# Patient Record
Sex: FEMALE | Race: WHITE | NOT HISPANIC OR LATINO | Employment: FULL TIME | URBAN - METROPOLITAN AREA
[De-identification: names, ages, dates, MRNs, and addresses within clinical notes are randomized per-mention and may not be internally consistent; named-entity substitution may affect disease eponyms.]

---

## 2017-01-18 ENCOUNTER — OFFICE VISIT (OUTPATIENT)
Dept: INTERNAL MEDICINE | Facility: CLINIC | Age: 62
End: 2017-01-18
Payer: COMMERCIAL

## 2017-01-18 ENCOUNTER — HOSPITAL ENCOUNTER (OUTPATIENT)
Dept: RADIOLOGY | Facility: HOSPITAL | Age: 62
Discharge: HOME | End: 2017-01-18
Attending: INTERNAL MEDICINE
Payer: COMMERCIAL

## 2017-01-18 VITALS
SYSTOLIC BLOOD PRESSURE: 134 MMHG | BODY MASS INDEX: 44.31 KG/M2 | HEIGHT: 67 IN | OXYGEN SATURATION: 97 % | WEIGHT: 282.31 LBS | HEART RATE: 84 BPM | DIASTOLIC BLOOD PRESSURE: 72 MMHG

## 2017-01-18 DIAGNOSIS — E11.40 TYPE 2 DIABETES MELLITUS WITH DIABETIC NEUROPATHY, WITHOUT LONG-TERM CURRENT USE OF INSULIN: ICD-10-CM

## 2017-01-18 DIAGNOSIS — L97.521 FOOT ULCER, LEFT, LIMITED TO BREAKDOWN OF SKIN: ICD-10-CM

## 2017-01-18 DIAGNOSIS — I10 ESSENTIAL HYPERTENSION: ICD-10-CM

## 2017-01-18 DIAGNOSIS — L97.521 FOOT ULCER, LEFT, LIMITED TO BREAKDOWN OF SKIN: Primary | ICD-10-CM

## 2017-01-18 PROCEDURE — 73630 X-RAY EXAM OF FOOT: CPT | Mod: 26,LT,, | Performed by: RADIOLOGY

## 2017-01-18 PROCEDURE — 99999 PR PBB SHADOW E&M-EST. PATIENT-LVL III: CPT | Mod: PBBFAC,,, | Performed by: INTERNAL MEDICINE

## 2017-01-18 PROCEDURE — 99214 OFFICE O/P EST MOD 30 MIN: CPT | Mod: S$GLB,,, | Performed by: INTERNAL MEDICINE

## 2017-01-18 PROCEDURE — 73630 X-RAY EXAM OF FOOT: CPT | Mod: TC,PO,LT

## 2017-01-18 RX ORDER — AMOXICILLIN AND CLAVULANATE POTASSIUM 875; 125 MG/1; MG/1
1 TABLET, FILM COATED ORAL 2 TIMES DAILY
Qty: 20 TABLET | Refills: 0 | Status: SHIPPED | OUTPATIENT
Start: 2017-01-18 | End: 2017-01-28

## 2017-01-18 NOTE — PROGRESS NOTES
REASON FOR VISIT:  This is a 61-year-old female who five days ago was getting a   pedicure and the person was using a file, but apparently it caused a cut   underneath her left foot, fifth metatarsal.  Since then, she sees a little bit   of ulceration and redness surrounding it and pain.    She has type 2 diabetes and hypertension.  She does not check her blood glucose   readings.  Diabetes was under fair control, last one being hemoglobin A1c of   7.4.    MEDICATIONS:  Per MedCard, which include metformin 500 mg two twice a day.    PHYSICAL EXAMINATION:  VITAL SIGNS:  Weight is 282, pulse 84, blood pressure by me 134/72.  EXTREMITIES:  On evaluation of the feet, there is no deformity.  She has 1+   posterior tibial and dorsalis pedis pulses.  Sensation is slightly diminished to   monofilament; there is about a 2 mm ulceration on the plantar surface of the   left foot fifth metatarsal and surrounding hyperemia.    IMPRESSION:  1.  Diabetic foot ulcer with cellulitis.  2.  Type 2 diabetes with probable peripheral neuropathy.    PLAN:  Today we will get a CBC, chemistry, hemoglobin A1c, foot x-ray, and refer   to Podiatry.  We will start her on Augmentin 875 mg twice a day for the next 10   days.    /sc [id number] review        JAM/DARRYL  dd: 01/18/2017 16:18:01 (CST)  td: 01/19/2017 07:18:33 (CST)  Doc ID   #[id number]  Job ID #[id number]    CC:

## 2017-01-18 NOTE — MR AVS SNAPSHOT
[facility] [address]  [address]  [address]  [facility] [address]  Phone: [phone]                  Meryl Johnson   2017 4:00 PM   Office Visit    Description:  Female : 1955   Provider:  Russell Martinez MD   Department:  [facility] [address]           Reason for Visit     Foot Injury           Diagnoses this Visit        Comments    Foot ulcer, left, limited to breakdown of skin    -  Primary     Type 2 diabetes mellitus with diabetic neuropathy, without long-term current use of insulin         Essential hypertension                To Do List           Future Appointments        Provider Department Dept Phone    2017 3:00 PM Jun Modi DPM [facility] - Podiatry [phone]      Goals (5 Years of Data)     None       These Medications        Disp Refills Start End    amoxicillin-clavulanate 875-125mg (AUGMENTIN) 875-125 mg per tablet 20 tablet 0 2017    Take 1 tablet by mouth 2 (two) times daily. - Oral    Pharmacy: [facility] - [place], [place] - [address] Ph #: [phone]         [facility]     [facility] -  Assistance  Registered nurses in the [facility] Center provide clinical advisement, health education, appointment booking, and other advisory services.  Call for this free service at [phone].             Medications           Message regarding Medications     Verify the changes and/or additions to your medication regime listed below are the same as discussed with your clinician today.  If any of these changes or additions are incorrect, please notify your healthcare provider.        START taking these NEW medications        Refills    amoxicillin-clavulanate 875-125mg (AUGMENTIN) 875-125 mg per tablet 0    Sig: Take 1 tablet by mouth 2 (two) times daily.    Class: Normal    Route: Oral           Verify that the below list of  "medications is an accurate representation of the medications you are currently taking.  If none reported, the list may be blank. If incorrect, please contact your healthcare provider. Carry this list with you in case of emergency.           Current Medications     atorvastatin (LIPITOR) 40 MG tablet TAKE ONE TABLET BY MOUTH EVERY DAY    benazepril (LOTENSIN) 40 MG tablet TAKE 1 TABLET BY MOUTH EVERY DAY.    blood sugar diagnostic (BLOOD GLUCOSE TEST) Strp 1 strip by Misc.(Non-Drug; Combo Route) route 2 (two) times daily before meals. For mikki contour    chlorthalidone (HYGROTEN) 25 MG Tab TAKE ONE TABLET BY MOUTH EVERY DAY    ergocalciferol (ERGOCALCIFEROL) 50,000 unit Cap TAKE 1 CAPSULE BY MOUTH EVERY 7 DAYS    lancets (ONE TOUCH DELICA LANCETS) 33 gauge Misc 1 lancet by Misc.(Non-Drug; Combo Route) route 2 (two) times daily.    metformin (GLUCOPHAGE) 500 MG tablet TAKE 2 TABLETS (1,000 MG TOTAL) BY MOUTH 2 (TWO) TIMES DAILY WITH MEALS.    amoxicillin-clavulanate 875-125mg (AUGMENTIN) 875-125 mg per tablet Take 1 tablet by mouth 2 (two) times daily.    liraglutide 0.6 mg/0.1 mL, 18 mg/3 mL, subq PNIJ 0.6 mg/0.1 mL (18 mg/3 mL) PnIj Inject 0.6 mg into the skin once daily.           Clinical Reference Information           Vital Signs - Last Recorded  Most recent update: 1/18/2017  4:18 PM by Russell Martinez MD    BP Pulse Ht Wt SpO2 BMI    134/72 84 5' 7" (1.702 m) 128.1 kg (282 lb 4.8 oz) 97% 44.21 kg/m2      Blood Pressure          Most Recent Value    BP  134/72      Allergies as of 1/18/2017     Codeine    Vicodin [Hydrocodone-acetaminophen]      Immunizations Administered on Date of Encounter - 1/18/2017     None      Orders Placed During Today's Visit      Normal Orders This Visit    Ambulatory Referral to Podiatry     Future Labs/Procedures Expected by Expires    Basic metabolic panel  1/18/2017 1/18/2018    CBC auto differential  1/18/2017 1/18/2018    Hemoglobin A1c  1/18/2017 1/18/2018    X-Ray Foot " Complete 3 view Left  1/18/2017 1/18/2018

## 2017-01-19 ENCOUNTER — OFFICE VISIT (OUTPATIENT)
Dept: PODIATRY | Facility: CLINIC | Age: 62
End: 2017-01-19
Payer: COMMERCIAL

## 2017-01-19 ENCOUNTER — PATIENT MESSAGE (OUTPATIENT)
Dept: INTERNAL MEDICINE | Facility: CLINIC | Age: 62
End: 2017-01-19

## 2017-01-19 VITALS
DIASTOLIC BLOOD PRESSURE: 67 MMHG | BODY MASS INDEX: 44.89 KG/M2 | SYSTOLIC BLOOD PRESSURE: 126 MMHG | HEIGHT: 67 IN | HEART RATE: 89 BPM | WEIGHT: 286 LBS

## 2017-01-19 DIAGNOSIS — L03.119 CELLULITIS OF FOOT EXCLUDING TOE: ICD-10-CM

## 2017-01-19 DIAGNOSIS — L97.521: Primary | ICD-10-CM

## 2017-01-19 DIAGNOSIS — E11.40 TYPE 2 DIABETES MELLITUS WITH DIABETIC NEUROPATHY, WITHOUT LONG-TERM CURRENT USE OF INSULIN: ICD-10-CM

## 2017-01-19 PROCEDURE — 97597 DBRDMT OPN WND 1ST 20 CM/<: CPT | Mod: S$GLB,,, | Performed by: PODIATRIST

## 2017-01-19 PROCEDURE — 99204 OFFICE O/P NEW MOD 45 MIN: CPT | Mod: 25,S$GLB,, | Performed by: PODIATRIST

## 2017-01-19 PROCEDURE — 87070 CULTURE OTHR SPECIMN AEROBIC: CPT

## 2017-01-19 PROCEDURE — 87075 CULTR BACTERIA EXCEPT BLOOD: CPT

## 2017-01-19 PROCEDURE — 99999 PR PBB SHADOW E&M-EST. PATIENT-LVL III: CPT | Mod: PBBFAC,,, | Performed by: PODIATRIST

## 2017-01-19 NOTE — LETTER
January 19, 2017      Russell Martinez MD  [address]  [place] [address]           [facility] - Podiatry  [address]  [place] [address]  Phone: [phone]          Patient: Meryl Johnson   MR Number: [id number]   YOB: 1955   Date of Visit: 1/19/2017       Dear Dr. Russell Martinez:    Thank you for referring Meryl Johnson to me for evaluation. Attached you will find relevant portions of my assessment and plan of care.    If you have questions, please do not hesitate to call me. I look forward to following Meryl Johnson along with you.    Sincerely,    Jun Modi, JOSE ANTONIO    Enclosure  CC:  No Recipients    If you would like to receive this communication electronically, please contact [email or web address] or [phone] to request more information on [employer] Link access.    For providers and/or their staff who would like to refer a patient to [facility], please contact us through our one-stop-shop provider referral line, [facility], at [phone].    If you feel you have received this communication in error or would no longer like to receive these types of communications, please e-mail [email or web address]

## 2017-01-19 NOTE — PROGRESS NOTES
Subjective:      Patient ID: Meryl Johnson is a 61 y.o. female.    Chief Complaint: Foot Problem (nail infection)  Patient presents to clinic out of concern for a wound of the Lt. Plantar foot.  Relates receiving a pedicure last week that resulted in the current wound after a period of aggressive filing of callused skin.  States the wound has not healed appropriately and subsequently became red, hot, and swollen.  States this prompted a visit to her PCP who started her on Augmentin.  States the wound remains painful and rates as a 4/10.  States symptoms are exacerbated with direct weight bearing and alleviated with rest.  Has attempted to self treat by cleansing with peroxide and covering with neosporin and a bandage.  Denies having N/V/F/C/D.  Denies any additional pedal complaints.       Past Medical History   Diagnosis Date    Diabetes mellitus type II     H. pylori infection     Hyperlipidemia     Hypertension     Unspecified vitamin D deficiency 4/24/2013       Past Surgical History   Procedure Laterality Date    Gastric bypass         Family History   Problem Relation Age of Onset    COPD Mother     Heart disease Mother     Cancer Father      liver    Heart disease Father     Cancer Sister     Diabetes Sister     Hyperlipidemia Sister     Hypothyroidism Sister     Amblyopia Neg Hx     Blindness Neg Hx     Cataracts Neg Hx     Glaucoma Neg Hx     Hypertension Neg Hx     Macular degeneration Neg Hx     Retinal detachment Neg Hx     Strabismus Neg Hx     Stroke Neg Hx     Thyroid disease Neg Hx     Breast cancer Neg Hx     Colon cancer Neg Hx     Ovarian cancer Neg Hx     No Known Problems Brother     No Known Problems Maternal Aunt     No Known Problems Maternal Uncle     No Known Problems Paternal Aunt     No Known Problems Paternal Uncle     No Known Problems Maternal Grandmother     No Known Problems Maternal Grandfather     No Known Problems Paternal Grandmother     No  Known Problems Paternal Grandfather        Social History     Social History    Marital status: Single     Spouse name: N/A    Number of children: N/A    Years of education: N/A     Occupational History     [employer]     Social History Main Topics    Smoking status: Never Smoker    Smokeless tobacco: Never Used    Alcohol use No    Drug use: No    Sexual activity: Not Asked     Other Topics Concern    None     Social History Narrative       Current Outpatient Prescriptions   Medication Sig Dispense Refill    amoxicillin-clavulanate 875-125mg (AUGMENTIN) 875-125 mg per tablet Take 1 tablet by mouth 2 (two) times daily. 20 tablet 0    atorvastatin (LIPITOR) 40 MG tablet TAKE ONE TABLET BY MOUTH EVERY DAY 30 tablet 11    benazepril (LOTENSIN) 40 MG tablet TAKE 1 TABLET BY MOUTH EVERY DAY. 30 tablet 11    blood sugar diagnostic (BLOOD GLUCOSE TEST) Strp 1 strip by Misc.(Non-Drug; Combo Route) route 2 (two) times daily before meals. For mikki contour 200 strip 3    chlorthalidone (HYGROTEN) 25 MG Tab TAKE ONE TABLET BY MOUTH EVERY DAY 30 tablet 11    ergocalciferol (ERGOCALCIFEROL) 50,000 unit Cap TAKE 1 CAPSULE BY MOUTH EVERY 7 DAYS 12 capsule 3    lancets (ONE TOUCH DELICA LANCETS) 33 gauge Misc 1 lancet by Misc.(Non-Drug; Combo Route) route 2 (two) times daily. 60 each 11    metformin (GLUCOPHAGE) 500 MG tablet TAKE 2 TABLETS (1,000 MG TOTAL) BY MOUTH 2 (TWO) TIMES DAILY WITH MEALS. 360 tablet 3    liraglutide 0.6 mg/0.1 mL, 18 mg/3 mL, subq PNIJ 0.6 mg/0.1 mL (18 mg/3 mL) PnIj Inject 0.6 mg into the skin once daily. 3 mL 11     Current Facility-Administered Medications   Medication Dose Route Frequency Provider Last Rate Last Dose    cyanocobalamin injection 1,000 mcg  1,000 mcg Intramuscular Q30 Days Russell Martinez MD   1,000 mcg at 10/11/16 1619       Review of patient's allergies indicates:   Allergen Reactions    Codeine Nausea Only    Vicodin [hydrocodone-acetaminophen]  Nausea Only       Review of Systems   Constitution: Negative for chills and fever.   Cardiovascular: Negative for claudication and leg swelling.   Skin: Positive for color change, nail changes and poor wound healing.   Musculoskeletal: Positive for joint pain. Negative for arthritis and joint swelling.   Neurological: Negative for numbness and paresthesias.   Psychiatric/Behavioral: Negative for altered mental status.           Objective:      Physical Exam   Constitutional: She is oriented to person, place, and time. She appears well-developed and well-nourished. No distress.   Cardiovascular:   Pulses:       Dorsalis pedis pulses are 2+ on the right side, and 2+ on the left side.        Posterior tibial pulses are 1+ on the right side, and 1+ on the left side.   CFT <3 seconds bilateral.  Pedal hair growth present bilateral.   Varicosities noted bilateral.  Mild localized edema noted to the Lt. 5th mtp joint.   Toes are warm to touch bilateral.     Musculoskeletal: She exhibits edema and tenderness.   Muscle strength 5/5 in all muscle groups bilateral.  No tenderness nor crepitation with ROM of foot/ankle joints bilateral.  Bilateral pes planus foot type.  Pain with palpation to the Lt. Plantar lateral 5th mtp joint wound.     Neurological: She is alert and oriented to person, place, and time. She has normal strength. A sensory deficit is present.   Decreased protective sensation bilateral.    Skin: Skin is warm and dry. No abrasion, no bruising, no burn, no ecchymosis, no laceration, no lesion, no petechiae and no rash noted. She is not diaphoretic. There is erythema. No pallor.   Open wound noted to the plantar lateral aspect of the Lt. Sub 5th met head.  Wound base is superficial and comprised entirely of fibrin.  Lucila wound is mildly erythematous and edematous.  Lucila wound is devoid of fluctuance, purulence, and malodor.  Measures 0.4 x 0.4 x 0.1cm.               Assessment:       Encounter Diagnoses   Name  Primary?    Type 2 diabetes mellitus with diabetic neuropathy, without long-term current use of insulin     Traumatic ulcer of left foot limited to breakdown of skin Yes    Cellulitis of foot excluding toe          Plan:       Meryl was seen today for foot problem.    Diagnoses and all orders for this visit:    Traumatic ulcer of left foot limited to breakdown of skin  -     Aerobic culture (Specify Source)  -     CULTURE, ANAEROBE    Type 2 diabetes mellitus with diabetic neuropathy, without long-term current use of insulin    Cellulitis of foot excluding toe  -     Aerobic culture (Specify Source)  -     CULTURE, ANAEROBE      I counseled the patient on her conditions, their implications and medical management.    Reviewed x-ray of the Lt. Foot.  No definitive signs of osteolysis noted to the Lt. 5th metatarsal head.      Discussed with patient the need for a selective excisional debridement of the Lt. Foot wound. Reviewed pertinent risks, benefits, procedure, and follow up care to which all questions were thoroughly answered. Consent was read, signed, and witnessed. See attached procedure note.      Orders written for cultures of the wound.    Iodosorb ointment was applied to the wound. The site was offloaded with roxanna foam, and a football dressing was then applied.      Instructed to keep the dressing clean, dry, and intact x 1 week.      Fitted and dispensed a postoperative shoe to help offload the ulceration site.      Instructed to rest and elevate bilateral lower extremity.      Discussed the importance of adequate protein intake and hydration as they pertain to wound healing.    Advised to continue current course of Augmentin.     RTC in 1 week for a wound check.    Jun Modi DPM

## 2017-01-20 NOTE — PROCEDURES
"Wound Debridement  Date/Time: 1/19/2017 3:00 PM  Performed by: JOSEPH DIAZ  Authorized by: JOSEPH DIAZ     Time out: Immediately prior to procedure a "time out" was called to verify the correct patient, procedure, equipment, support staff and site/side marked as required.    Consent Done?:  Yes (Written)    Preparation: Patient was prepped and draped in usual sterile fashion    Local anesthesia used?: No      Wound Details:    Location:  Left foot    Location:  Left 5th Metatarsal Head    Type of Debridement:  Excisional       Length (cm):  0.4       Area (sq cm):  0.16       Width (cm):  0.4       Percent Debrided (%):  100       Depth (cm):  0.1       Total Area Debrided (sq cm):  0.16    Depth of debridement:  Epidermis/Dermis    Tissue debrided:  Dermis    Devitalized tissue debrided:  Fibrin    Instruments:  Blade    Bleeding:  Minimal  Hemostasis Achieved: Yes    Method Used:  Pressure  Patient tolerance:  Patient tolerated the procedure well with no immediate complications      "

## 2017-01-23 LAB — BACTERIA SPEC AEROBE CULT: NORMAL

## 2017-01-26 ENCOUNTER — OFFICE VISIT (OUTPATIENT)
Dept: PODIATRY | Facility: CLINIC | Age: 62
End: 2017-01-26
Payer: COMMERCIAL

## 2017-01-26 VITALS
SYSTOLIC BLOOD PRESSURE: 103 MMHG | BODY MASS INDEX: 45.17 KG/M2 | HEART RATE: 88 BPM | HEIGHT: 67 IN | DIASTOLIC BLOOD PRESSURE: 61 MMHG | WEIGHT: 287.81 LBS

## 2017-01-26 DIAGNOSIS — E11.40 TYPE 2 DIABETES MELLITUS WITH DIABETIC NEUROPATHY, WITHOUT LONG-TERM CURRENT USE OF INSULIN: ICD-10-CM

## 2017-01-26 DIAGNOSIS — L97.521: Primary | ICD-10-CM

## 2017-01-26 LAB — BACTERIA SPEC ANAEROBE CULT: NORMAL

## 2017-01-26 PROCEDURE — 99499 UNLISTED E&M SERVICE: CPT | Mod: S$GLB,,, | Performed by: PODIATRIST

## 2017-01-26 PROCEDURE — 99999 PR PBB SHADOW E&M-EST. PATIENT-LVL III: CPT | Mod: PBBFAC,,, | Performed by: PODIATRIST

## 2017-01-26 PROCEDURE — 97597 DBRDMT OPN WND 1ST 20 CM/<: CPT | Mod: S$GLB,,, | Performed by: PODIATRIST

## 2017-01-26 NOTE — PROGRESS NOTES
Subjective:      Patient ID: Meryl Johnson is a 61 y.o. female.    Chief Complaint: Foot Ulcer (left ft.); Follow-up; Wound Care; Dressing Change; and Diabetes Mellitus  Patient presents to clinic for a 1 week wound check.  Relates mild pain to the wound of the Lt. Plantar foot, however, states symptoms are only a 2/10 with today's visit.  Has kept the previous clinic dressing clean, dry, and intact.  Relates ambulation to tolerance in the postoperative shoe.  Denies having N/V/F/C/D.  Denies any additional pedal complaints.     Review of Systems   Constitution: Negative for chills and fever.   Cardiovascular: Negative for claudication and leg swelling.   Skin: Positive for color change, nail changes and poor wound healing.   Musculoskeletal: Positive for joint pain. Negative for arthritis and joint swelling.   Neurological: Negative for numbness and paresthesias.   Psychiatric/Behavioral: Negative for altered mental status.           Objective:      Physical Exam   Constitutional: She is oriented to person, place, and time. She appears well-developed and well-nourished. No distress.   Cardiovascular:   Pulses:       Dorsalis pedis pulses are 2+ on the right side, and 2+ on the left side.        Posterior tibial pulses are 1+ on the right side, and 1+ on the left side.   CFT <3 seconds bilateral.  Pedal hair growth present bilateral.   Varicosities noted bilateral.  Toes are warm to touch bilateral.     Musculoskeletal: She exhibits tenderness. She exhibits no edema.   Muscle strength 5/5 in all muscle groups bilateral.  No tenderness nor crepitation with ROM of foot/ankle joints bilateral.  Bilateral pes planus foot type.  Mild pain with palpation to the Lt. Plantar lateral 5th mtp joint wound.     Neurological: She is alert and oriented to person, place, and time. She has normal strength. A sensory deficit is present.   Decreased protective sensation bilateral.    Skin: Skin is warm and dry. Lesion noted. No  abrasion, no bruising, no burn, no ecchymosis, no laceration, no petechiae and no rash noted. She is not diaphoretic. No erythema. No pallor.   Open wound noted to the plantar lateral aspect of the Lt. Sub 5th met head.  Wound base is superficial and comprised entirely of fibrin.   Lucila wound is devoid of erythema, edema, fluctuance, purulence, and malodor.  Measures 0.2 x 0.2 x 0.1cm.               Assessment:       Encounter Diagnoses   Name Primary?    Type 2 diabetes mellitus with diabetic neuropathy, without long-term current use of insulin     Traumatic ulcer of left foot limited to breakdown of skin Yes         Plan:       Meryl was seen today for foot ulcer, follow-up, wound care, dressing change and diabetes mellitus.    Diagnoses and all orders for this visit:    Traumatic ulcer of left foot limited to breakdown of skin    Type 2 diabetes mellitus with diabetic neuropathy, without long-term current use of insulin      I counseled the patient on her conditions, their implications and medical management.    Performed a selective excisional debridement of the Lt. Foot wound.  See attached procedure note.     Mary was applied to the wound. The site was offloaded with roxanna foam, and a football dressing was then applied.      Instructed to keep the dressing clean, dry, and intact x 1 week.      Advised to continue wearing the postoperative shoe to help offload the ulceration site.      Instructed to rest and elevate bilateral lower extremity.      RTC in 1 week for a wound check.     Jun Modi DPM

## 2017-01-26 NOTE — LETTER
January 26, 2017      Russell Martinez MD  [address]  [address]  [facility] [address]           [facility] - Podiatry  [address]  [place] [address]  Phone: [phone]          Patient: Meryl Johnson   MR Number: [id number]   YOB: 1955   Date of Visit: 1/26/2017       Dear Dr. Russell Martinez:    Thank you for referring Meryl Johnson to me for evaluation. Attached you will find relevant portions of my assessment and plan of care.    If you have questions, please do not hesitate to call me. I look forward to following Meryl Johnson along with you.    Sincerely,    Jun Modi DPM    Enclosure  CC:  No Recipients    If you would like to receive this communication electronically, please contact [email or web address] or [phone] to request more information on [employer] Link access.    For providers and/or their staff who would like to refer a patient to [facility], please contact us through our one-stop-shop provider referral line, [facility], at [phone].    If you feel you have received this communication in error or would no longer like to receive these types of communications, please e-mail [email or web address]

## 2017-01-26 NOTE — MR AVS SNAPSHOT
[facility] - Podiatry  [address] [facility] [address]  Phone: [phone]                  Meryl Johnson   2017 1:40 PM   Office Visit    Description:  Female : 1955   Provider:  Jun Modi DPM   Department:  [facility]           Reason for Visit     Foot Ulcer     Follow-up     Wound Care     Dressing Change     Diabetes Mellitus           Diagnoses this Visit        Comments    Traumatic ulcer of left foot limited to breakdown of skin    -  Primary     Type 2 diabetes mellitus with diabetic neuropathy, without long-term current use of insulin                To Do List           Future Appointments        Provider Department Dept Phone    2017 7:40 AM Jun Modi DPM [facility] Podiatr [phone]      Goals (5 Years of Data)     None      Follow-Up and Disposition     Return in about 1 week (around 2017).      [facility] On Call     [facility] On Call Nurse [facility] Line -  Assistance  Registered nurses in the [facility] On Call Center provide clinical advisement, health education, appointment booking, and other advisory services.  Call for this free service at [phone].             Medications           Message regarding Medications     Verify the changes and/or additions to your medication regime listed below are the same as discussed with your clinician today.  If any of these changes or additions are incorrect, please notify your healthcare provider.             Verify that the below list of medications is an accurate representation of the medications you are currently taking.  If none reported, the list may be blank. If incorrect, please contact your healthcare provider. Carry this list with you in case of emergency.           Current Medications     amoxicillin-clavulanate 875-125mg (AUGMENTIN) 875-125 mg per tablet Take 1 tablet by mouth 2 (two) times daily.    atorvastatin (LIPITOR) 40 MG tablet TAKE ONE TABLET BY MOUTH EVERY DAY    benazepril  "(LOTENSIN) 40 MG tablet TAKE 1 TABLET BY MOUTH EVERY DAY.    blood sugar diagnostic (BLOOD GLUCOSE TEST) Strp 1 strip by Misc.(Non-Drug; Combo Route) route 2 (two) times daily before meals. For mikki contour    chlorthalidone (HYGROTEN) 25 MG Tab TAKE ONE TABLET BY MOUTH EVERY DAY    ergocalciferol (ERGOCALCIFEROL) 50,000 unit Cap TAKE 1 CAPSULE BY MOUTH EVERY 7 DAYS    lancets (ONE TOUCH DELICA LANCETS) 33 gauge Misc 1 lancet by Misc.(Non-Drug; Combo Route) route 2 (two) times daily.    metformin (GLUCOPHAGE) 500 MG tablet TAKE 2 TABLETS (1,000 MG TOTAL) BY MOUTH 2 (TWO) TIMES DAILY WITH MEALS.    liraglutide 0.6 mg/0.1 mL, 18 mg/3 mL, subq PNIJ 0.6 mg/0.1 mL (18 mg/3 mL) PnIj Inject 0.6 mg into the skin once daily.           Clinical Reference Information           Vital Signs - Last Recorded  Most recent update: 1/26/2017  1:53 PM by Deirdre Pickering MA    BP Pulse Ht Wt BMI    103/61 88 5' 7" (1.702 m) 130.5 kg (287 lb 12.8 oz) 45.08 kg/m2      Blood Pressure          Most Recent Value    BP  103/61      Allergies as of 1/26/2017     Codeine    Vicodin [Hydrocodone-acetaminophen]      Immunizations Administered on Date of Encounter - 1/26/2017     None      "

## 2017-01-27 NOTE — PROCEDURES
"Wound Debridement  Date/Time: 1/26/2017 1:40 PM  Performed by: JOSEPH DIAZ  Authorized by: JOSEPH DIAZ     Time out: Immediately prior to procedure a "time out" was called to verify the correct patient, procedure, equipment, support staff and site/side marked as required.    Consent Done?:  Yes (Verbal)    Preparation: Patient was prepped and draped in usual sterile fashion    Local anesthesia used?: No      Wound Details:    Location:  Left foot    Location:  Left 5th Metatarsal Head    Type of Debridement:  Excisional       Length (cm):  0.2       Area (sq cm):  0.04       Width (cm):  0.2       Percent Debrided (%):  100       Depth (cm):  0.1       Total Area Debrided (sq cm):  0.04    Depth of debridement:  Epidermis/Dermis    Tissue debrided:  Dermis    Devitalized tissue debrided:  Fibrin    Instruments:  Blade    Bleeding:  Minimal  Hemostasis Achieved: Yes    Method Used:  Pressure  Patient tolerance:  Patient tolerated the procedure well with no immediate complications      "

## 2017-01-28 ENCOUNTER — PATIENT MESSAGE (OUTPATIENT)
Dept: INTERNAL MEDICINE | Facility: CLINIC | Age: 62
End: 2017-01-28

## 2017-01-31 ENCOUNTER — PATIENT MESSAGE (OUTPATIENT)
Dept: INTERNAL MEDICINE | Facility: CLINIC | Age: 62
End: 2017-01-31

## 2017-01-31 DIAGNOSIS — N18.30 TYPE 2 DIABETES MELLITUS WITH STAGE 3 CHRONIC KIDNEY DISEASE, WITHOUT LONG-TERM CURRENT USE OF INSULIN: Primary | ICD-10-CM

## 2017-01-31 DIAGNOSIS — E11.22 TYPE 2 DIABETES MELLITUS WITH STAGE 3 CHRONIC KIDNEY DISEASE, WITHOUT LONG-TERM CURRENT USE OF INSULIN: Primary | ICD-10-CM

## 2017-02-01 NOTE — TELEPHONE ENCOUNTER
Ruma   Call patient to set up bmp and the urine test      She her phone numbers with her time schedule

## 2017-02-02 ENCOUNTER — OFFICE VISIT (OUTPATIENT)
Dept: PODIATRY | Facility: CLINIC | Age: 62
End: 2017-02-02
Payer: COMMERCIAL

## 2017-02-02 ENCOUNTER — PATIENT MESSAGE (OUTPATIENT)
Dept: INTERNAL MEDICINE | Facility: CLINIC | Age: 62
End: 2017-02-02

## 2017-02-02 VITALS
DIASTOLIC BLOOD PRESSURE: 70 MMHG | BODY MASS INDEX: 45.15 KG/M2 | SYSTOLIC BLOOD PRESSURE: 135 MMHG | HEART RATE: 92 BPM | HEIGHT: 67 IN | WEIGHT: 287.69 LBS

## 2017-02-02 DIAGNOSIS — N18.30 TYPE 2 DIABETES MELLITUS WITH STAGE 3 CHRONIC KIDNEY DISEASE, WITHOUT LONG-TERM CURRENT USE OF INSULIN: Primary | ICD-10-CM

## 2017-02-02 DIAGNOSIS — L97.521: Primary | ICD-10-CM

## 2017-02-02 DIAGNOSIS — E11.22 TYPE 2 DIABETES MELLITUS WITH STAGE 3 CHRONIC KIDNEY DISEASE, WITHOUT LONG-TERM CURRENT USE OF INSULIN: Primary | ICD-10-CM

## 2017-02-02 DIAGNOSIS — E11.40 TYPE 2 DIABETES MELLITUS WITH DIABETIC NEUROPATHY, WITHOUT LONG-TERM CURRENT USE OF INSULIN: ICD-10-CM

## 2017-02-02 PROCEDURE — 99999 PR PBB SHADOW E&M-EST. PATIENT-LVL III: CPT | Mod: PBBFAC,,, | Performed by: PODIATRIST

## 2017-02-02 PROCEDURE — 99213 OFFICE O/P EST LOW 20 MIN: CPT | Mod: S$GLB,,, | Performed by: PODIATRIST

## 2017-02-02 NOTE — PROGRESS NOTES
Subjective:      Patient ID: Meryl Johnson is a 61 y.o. female.    Chief Complaint: Foot Ulcer (left ft.); Follow-up (PCP Dr. Martinez 1/18/17); Wound Care; and Dressing Change  Patient presents to clinic for a 1 week wound check of the Lt. Foot.  Denies pain to the affected extremity with today's exam.  Has kept the previous clinic dressing clean, dry, and intact x 1 week.  Relates ambulation to tolerance in the postoperative shoe.  Denies having N/V/F/C/D.  Denies any additional pedal complaints.      Review of Systems   Constitution: Negative for chills and fever.   Cardiovascular: Negative for claudication and leg swelling.   Skin: Positive for color change, nail changes and poor wound healing.   Musculoskeletal: Negative for arthritis, joint pain and joint swelling.   Neurological: Negative for numbness and paresthesias.   Psychiatric/Behavioral: Negative for altered mental status.           Objective:      Physical Exam   Constitutional: She is oriented to person, place, and time. She appears well-developed and well-nourished. No distress.   Cardiovascular:   Pulses:       Dorsalis pedis pulses are 2+ on the right side, and 2+ on the left side.        Posterior tibial pulses are 1+ on the right side, and 1+ on the left side.   CFT <3 seconds bilateral.  Pedal hair growth present bilateral.   Varicosities noted bilateral.  Toes are warm to touch bilateral.     Musculoskeletal: She exhibits no edema or tenderness.   Muscle strength 5/5 in all muscle groups bilateral.  No tenderness nor crepitation with ROM of foot/ankle joints bilateral.  Bilateral pes planus foot type.  No pain with palpation of bilateral foot and ankle.   Neurological: She is alert and oriented to person, place, and time. She has normal strength. A sensory deficit is present.   Decreased protective sensation bilateral.    Skin: Skin is warm and dry. Lesion noted. No abrasion, no bruising, no burn, no ecchymosis, no laceration, no petechiae  and no rash noted. She is not diaphoretic. No erythema. No pallor.   Open wound noted to the plantar lateral aspect of the Lt. Sub 5th met head.  Wound base is comprised entirely of granulation.   Lucila wound is devoid of erythema, edema, fluctuance, purulence, and malodor.  Measures 0.1 x 0.1 x 0.1cm.               Assessment:       Encounter Diagnoses   Name Primary?    Traumatic ulcer of left foot limited to breakdown of skin Yes    Type 2 diabetes mellitus with diabetic neuropathy, without long-term current use of insulin          Plan:       Meryl was seen today for foot ulcer, follow-up, wound care and dressing change.    Diagnoses and all orders for this visit:    Traumatic ulcer of left foot limited to breakdown of skin    Type 2 diabetes mellitus with diabetic neuropathy, without long-term current use of insulin      I counseled the patient on her conditions, their implications and medical management.    No debridement of the Lt. Pedal wound warranted with today's exam.      Mary was applied to the wound. The site was offloaded with roxanna foam, and a football dressing was then applied.      Instructed to keep the dressing clean, dry, and intact x 1 week.      Advised to continue wearing the postoperative shoe to help offload the ulceration site.      Instructed to rest and elevate bilateral lower extremity.      RTC in 1 week for a wound check.      Jun Modi DPM

## 2017-02-02 NOTE — LETTER
February 2, 2017      Russell Martinez MD  [address]  [address]  [facility] [address]           [facility] - Podiatry  [address]  [place] [address]  Phone: [phone]          Patient: Meryl Johnson   MR Number: [id number]   YOB: 1955   Date of Visit: 2/2/2017       Dear Dr. Russell Martinez:    Thank you for referring Meryl Johnson to me for evaluation. Attached you will find relevant portions of my assessment and plan of care.    If you have questions, please do not hesitate to call me. I look forward to following Meryl Johnson along with you.    Sincerely,    Jun Modi DPM    Enclosure  CC:  No Recipients    If you would like to receive this communication electronically, please contact [email or web address] or [phone] to request more information on [employer] Link access.    For providers and/or their staff who would like to refer a patient to [facility], please contact us through our one-stop-shop provider referral line, [facility], at [phone].    If you feel you have received this communication in error or would no longer like to receive these types of communications, please e-mail [email or web address]

## 2017-02-03 ENCOUNTER — LAB VISIT (OUTPATIENT)
Dept: LAB | Facility: HOSPITAL | Age: 62
End: 2017-02-03
Attending: INTERNAL MEDICINE
Payer: COMMERCIAL

## 2017-02-03 DIAGNOSIS — E11.22 TYPE 2 DIABETES MELLITUS WITH STAGE 3 CHRONIC KIDNEY DISEASE, WITHOUT LONG-TERM CURRENT USE OF INSULIN: ICD-10-CM

## 2017-02-03 DIAGNOSIS — N18.30 TYPE 2 DIABETES MELLITUS WITH STAGE 3 CHRONIC KIDNEY DISEASE, WITHOUT LONG-TERM CURRENT USE OF INSULIN: ICD-10-CM

## 2017-02-03 LAB
BILIRUB UR QL STRIP: NEGATIVE
CLARITY UR REFRACT.AUTO: CLEAR
COLOR UR AUTO: YELLOW
CREAT UR-MCNC: 108 MG/DL
GLUCOSE UR QL STRIP: NEGATIVE
HGB UR QL STRIP: NEGATIVE
KETONES UR QL STRIP: NEGATIVE
LEUKOCYTE ESTERASE UR QL STRIP: NEGATIVE
MICROALBUMIN UR DL<=1MG/L-MCNC: 18 UG/ML
MICROALBUMIN/CREATININE RATIO: 16.7 UG/MG
NITRITE UR QL STRIP: NEGATIVE
PH UR STRIP: 6 [PH] (ref 5–8)
PROT UR QL STRIP: NEGATIVE
SP GR UR STRIP: 1.01 (ref 1–1.03)
URN SPEC COLLECT METH UR: NORMAL
UROBILINOGEN UR STRIP-ACNC: NEGATIVE EU/DL

## 2017-02-03 PROCEDURE — 81003 URINALYSIS AUTO W/O SCOPE: CPT

## 2017-02-03 PROCEDURE — 82570 ASSAY OF URINE CREATININE: CPT

## 2017-02-05 ENCOUNTER — PATIENT MESSAGE (OUTPATIENT)
Dept: INTERNAL MEDICINE | Facility: CLINIC | Age: 62
End: 2017-02-05

## 2017-02-08 ENCOUNTER — PATIENT MESSAGE (OUTPATIENT)
Dept: INTERNAL MEDICINE | Facility: CLINIC | Age: 62
End: 2017-02-08

## 2017-02-08 RX ORDER — LIRAGLUTIDE 6 MG/ML
INJECTION SUBCUTANEOUS
Qty: 6 ML | Refills: 11 | Status: SHIPPED | OUTPATIENT
Start: 2017-02-08 | End: 2017-09-16

## 2017-02-09 ENCOUNTER — OFFICE VISIT (OUTPATIENT)
Dept: PODIATRY | Facility: CLINIC | Age: 62
End: 2017-02-09
Payer: COMMERCIAL

## 2017-02-09 VITALS
HEIGHT: 67 IN | HEART RATE: 86 BPM | RESPIRATION RATE: 18 BRPM | WEIGHT: 287 LBS | SYSTOLIC BLOOD PRESSURE: 109 MMHG | DIASTOLIC BLOOD PRESSURE: 69 MMHG | BODY MASS INDEX: 45.04 KG/M2

## 2017-02-09 DIAGNOSIS — E11.40 TYPE 2 DIABETES MELLITUS WITH DIABETIC NEUROPATHY, WITHOUT LONG-TERM CURRENT USE OF INSULIN: ICD-10-CM

## 2017-02-09 DIAGNOSIS — Z87.2 HEALED ULCER OF LEFT FOOT ON EXAMINATION: Primary | ICD-10-CM

## 2017-02-09 PROCEDURE — 99999 PR PBB SHADOW E&M-EST. PATIENT-LVL III: CPT | Mod: PBBFAC,,, | Performed by: PODIATRIST

## 2017-02-09 PROCEDURE — 99213 OFFICE O/P EST LOW 20 MIN: CPT | Mod: S$GLB,,, | Performed by: PODIATRIST

## 2017-02-09 NOTE — LETTER
February 9, 2017      Russell Martinez MD  [address]  [address]  [facility] [address]           [facility] - Podiatry  [address]  [place] [address]  Phone: [phone]          Patient: Meryl Johnson   MR Number: [id number]   YOB: 1955   Date of Visit: 2/9/2017       Dear Dr. Russell Martinez:    Thank you for referring Meryl Johnson to me for evaluation. Attached you will find relevant portions of my assessment and plan of care.    If you have questions, please do not hesitate to call me. I look forward to following Meryl Johnson along with you.    Sincerely,    Jun Modi DPM    Enclosure  CC:  No Recipients    If you would like to receive this communication electronically, please contact [email or web address] or [phone] to request more information on [employer] Link access.    For providers and/or their staff who would like to refer a patient to [facility], please contact us through our one-stop-shop provider referral line, [facility], at [phone].    If you feel you have received this communication in error or would no longer like to receive these types of communications, please e-mail [email or web address]

## 2017-02-09 NOTE — MR AVS SNAPSHOT
[facility] - Podiatry  [address]  [place] [address]  Phone: [phone]                  Meryl Johnson   2017 7:20 AM   Appointment    Description:  Female : 1955   Provider:  Jun Modi DPM   Department:  [facility] - Podiatry                To Do List           Goals (5 Years of Data)     None      [facility] On Call     [facility] On Call Nurse Care Line -  Assistance  Registered nurses in the [facility] On Call Center provide clinical advisement, health education, appointment booking, and other advisory services.  Call for this free service at [phone].             Medications           Message regarding Medications     Verify the changes and/or additions to your medication regime listed below are the same as discussed with your clinician today.  If any of these changes or additions are incorrect, please notify your healthcare provider.             Verify that the below list of medications is an accurate representation of the medications you are currently taking.  If none reported, the list may be blank. If incorrect, please contact your healthcare provider. Carry this list with you in case of emergency.           Current Medications     atorvastatin (LIPITOR) 40 MG tablet TAKE ONE TABLET BY MOUTH EVERY DAY    benazepril (LOTENSIN) 40 MG tablet TAKE 1 TABLET BY MOUTH EVERY DAY.    blood sugar diagnostic (BLOOD GLUCOSE TEST) Strp 1 strip by Misc.(Non-Drug; Combo Route) route 2 (two) times daily before meals. For mikki contour    chlorthalidone (HYGROTEN) 25 MG Tab TAKE ONE TABLET BY MOUTH EVERY DAY    ergocalciferol (ERGOCALCIFEROL) 50,000 unit Cap TAKE 1 CAPSULE BY MOUTH EVERY 7 DAYS    lancets (ONE TOUCH DELICA LANCETS) 33 gauge Misc 1 lancet by Misc.(Non-Drug; Combo Route) route 2 (two) times daily.    metformin (GLUCOPHAGE) 500 MG tablet TAKE 2 TABLETS (1,000 MG TOTAL) BY MOUTH 2 (TWO) TIMES DAILY WITH MEALS.    VICTOZA 2-JAQUELINE 0.6 mg/0.1 mL (18 mg/3 mL) PnIj INJECT 0.6 MG  INTO THE SKIN ONCE DAILY           Clinical Reference Information           Allergies as of 2/9/2017     Codeine    Vicodin [Hydrocodone-acetaminophen]      Immunizations Administered on Date of Encounter - 2/9/2017     None      Language Assistance Services     ATTENTION: Language assistance services are available, free of charge. Please call [phone].      ATENCIÓN: Si habla maxim, tiene a cm disposición servicios gratuitos de asistencia lingüística. Llame al [phone].     CHÚ Ý: N?u b?n nói Ti?ng Vi?t, có các d?ch v? h? tr? ngôn ng? mi?n phí dành cho b?n. G?i s? [phone].         [facility] complies with applicable Federal civil rights laws and does not discriminate on the basis of race, color, national origin, age, disability, or sex.

## 2017-02-09 NOTE — PROGRESS NOTES
Subjective:      Patient ID: Meryl Johnson is a 61 y.o. female.    Chief Complaint: Follow-up (wound check ); Foot Ulcer; and Dressing Change  Patient presents to clinic for a 1 week wound check of the Lt. Foot.  Denies pain to the affected extremity with today's exam.  Has kept the previous football dressing clean, dry, and intact.  Relates ambulation to tolerance in the postoperative shoe.  Denies having N/V/F/C/D.  Denies any additional pedal complaints.     Review of Systems   Constitution: Negative for chills and fever.   Cardiovascular: Negative for claudication and leg swelling.   Skin: Positive for color change, nail changes and poor wound healing.   Musculoskeletal: Negative for arthritis, joint pain and joint swelling.   Neurological: Negative for numbness and paresthesias.   Psychiatric/Behavioral: Negative for altered mental status.           Objective:      Physical Exam   Constitutional: She is oriented to person, place, and time. She appears well-developed and well-nourished. No distress.   Cardiovascular:   Pulses:       Dorsalis pedis pulses are 2+ on the right side, and 2+ on the left side.        Posterior tibial pulses are 1+ on the right side, and 1+ on the left side.   CFT <3 seconds bilateral.  Pedal hair growth present bilateral.   Varicosities noted bilateral.  Toes are warm to touch bilateral.     Musculoskeletal: She exhibits no edema or tenderness.   Muscle strength 5/5 in all muscle groups bilateral.  No tenderness nor crepitation with ROM of foot/ankle joints bilateral.  Bilateral pes planus foot type.  No pain with palpation of bilateral foot and ankle.   Neurological: She is alert and oriented to person, place, and time. She has normal strength. A sensory deficit is present.   Decreased protective sensation bilateral.    Skin: Skin is warm, dry and intact. No abrasion, no bruising, no burn, no ecchymosis, no laceration, no lesion, no petechiae and no rash noted. She is not  diaphoretic. No erythema. No pallor.   Fragile epithelium noted at the site of the previous Lt. Sub 5th metatarsal head wound.               Assessment:       Encounter Diagnoses   Name Primary?    Type 2 diabetes mellitus with diabetic neuropathy, without long-term current use of insulin     Healed ulcer of left foot on examination Yes         Plan:       Meryl was seen today for follow-up, foot ulcer and dressing change.    Diagnoses and all orders for this visit:    Healed ulcer of left foot on examination    Type 2 diabetes mellitus with diabetic neuropathy, without long-term current use of insulin      I counseled the patient on her conditions, their implications and medical management.    Lt. Pedal wound is completely healed with today's exam.       The site was offloaded with roxanna foam, and a football dressing was then applied.      Instructed to keep the dressing clean, dry, and intact x 1 week.      Advised to continue wearing the postoperative shoe to help offload the forefoot.      Instructed to rest and elevate bilateral lower extremity.      RTC in 1 week to ensure maturation of former wound site.      Jun Modi DPM

## 2017-02-16 ENCOUNTER — OFFICE VISIT (OUTPATIENT)
Dept: PODIATRY | Facility: CLINIC | Age: 62
End: 2017-02-16
Payer: COMMERCIAL

## 2017-02-16 VITALS
WEIGHT: 130 LBS | BODY MASS INDEX: 20.4 KG/M2 | SYSTOLIC BLOOD PRESSURE: 121 MMHG | HEIGHT: 67 IN | HEART RATE: 96 BPM | DIASTOLIC BLOOD PRESSURE: 73 MMHG

## 2017-02-16 DIAGNOSIS — E11.40 TYPE 2 DIABETES MELLITUS WITH DIABETIC NEUROPATHY, WITHOUT LONG-TERM CURRENT USE OF INSULIN: ICD-10-CM

## 2017-02-16 DIAGNOSIS — Z87.2 HEALED ULCER OF LEFT FOOT ON EXAMINATION: Primary | ICD-10-CM

## 2017-02-16 PROCEDURE — 99213 OFFICE O/P EST LOW 20 MIN: CPT | Mod: S$GLB,,, | Performed by: PODIATRIST

## 2017-02-16 PROCEDURE — 99999 PR PBB SHADOW E&M-EST. PATIENT-LVL III: CPT | Mod: PBBFAC,,, | Performed by: PODIATRIST

## 2017-02-16 NOTE — LETTER
February 16, 2017      Russell Martinez MD  [address]  [address]  [facility] [address]           [facility] - Podiatry  [address]  [place] [address]  Phone: [phone]          Patient: Meryl Johnson   MR Number: [id number]   YOB: 1955   Date of Visit: 2/16/2017       Dear Dr. Russell Martinez:    Thank you for referring Meryl Johnson to me for evaluation. Attached you will find relevant portions of my assessment and plan of care.    If you have questions, please do not hesitate to call me. I look forward to following Meryl Johnson along with you.    Sincerely,    Jun Modi DPM    Enclosure  CC:  No Recipients    If you would like to receive this communication electronically, please contact [email or web address] or [phone] to request more information on [employer] Link access.    For providers and/or their staff who would like to refer a patient to [facility], please contact us through our one-stop-shop provider referral line, [facility], at [phone].    If you feel you have received this communication in error or would no longer like to receive these types of communications, please e-mail [email or web address]

## 2017-02-16 NOTE — PROGRESS NOTES
Subjective:      Patient ID: Meryl Johnson is a 61 y.o. female.    Chief Complaint: Healed ulcer of left foot on examination (lt foot)  Patient presents to clinic for a final follow up for a previous wound of the Lt. Plantar foot.  Denies pain to the affected area with today's exam.  Was able to keep the previous clinic dressing clean, dry, and intact x 1 week.  Has been ambulating to tolerance in the postoperative shoe.  Denies any additional pedal complaints.      Review of Systems   Constitution: Negative for chills and fever.   Cardiovascular: Negative for claudication and leg swelling.   Skin: Positive for color change and nail changes. Negative for poor wound healing.   Musculoskeletal: Negative for arthritis, joint pain and joint swelling.   Neurological: Negative for numbness and paresthesias.   Psychiatric/Behavioral: Negative for altered mental status.           Objective:      Physical Exam   Constitutional: She is oriented to person, place, and time. She appears well-developed and well-nourished. No distress.   Cardiovascular:   Pulses:       Dorsalis pedis pulses are 2+ on the right side, and 2+ on the left side.        Posterior tibial pulses are 1+ on the right side, and 1+ on the left side.   CFT <3 seconds bilateral.  Pedal hair growth present bilateral.   Varicosities noted bilateral.  Toes are warm to touch bilateral.     Musculoskeletal: She exhibits no edema or tenderness.   Muscle strength 5/5 in all muscle groups bilateral.  No tenderness nor crepitation with ROM of foot/ankle joints bilateral.  Bilateral pes planus foot type.  No pain with palpation of bilateral foot and ankle.   Neurological: She is alert and oriented to person, place, and time. She has normal strength. A sensory deficit is present.   Decreased protective sensation bilateral.    Skin: Skin is warm, dry and intact. No abrasion, no bruising, no burn, no ecchymosis, no laceration, no lesion, no petechiae and no rash noted.  She is not diaphoretic. No erythema. No pallor.   Maturing epithelium noted at the site of the previous Lt. Sub 5th metatarsal head wound.               Assessment:       Encounter Diagnoses   Name Primary?    Healed ulcer of left foot on examination Yes    Type 2 diabetes mellitus with diabetic neuropathy, without long-term current use of insulin          Plan:       Meryl was seen today for healed ulcer of left foot on examination.    Diagnoses and all orders for this visit:    Healed ulcer of left foot on examination    Type 2 diabetes mellitus with diabetic neuropathy, without long-term current use of insulin      I counseled the patient on her conditions, their implications and medical management.    Wound remains closed with today's visit.  Continued maturation of the skin noted at today's visit.    Advised to cover the site with a mepilex border for the next 7 days as the skin matures.    Advised against excessive weight bearing to prevent wound recurrence.    May begin bathing per her normal routine, however, discouraged from submerging the extremity for the next 7 days.    Instructed to begin wearing casual shoe gear to tolerance.    RTC in 6 months for routine diabetic care.    Jun Modi DPM

## 2017-02-28 DIAGNOSIS — I10 ESSENTIAL HYPERTENSION: Primary | ICD-10-CM

## 2017-03-01 ENCOUNTER — PATIENT MESSAGE (OUTPATIENT)
Dept: INTERNAL MEDICINE | Facility: CLINIC | Age: 62
End: 2017-03-01

## 2017-03-01 RX ORDER — CHLORTHALIDONE 25 MG/1
TABLET ORAL
Qty: 30 TABLET | Refills: 11 | Status: SHIPPED | OUTPATIENT
Start: 2017-03-01 | End: 2018-04-04 | Stop reason: SDUPTHER

## 2017-03-05 ENCOUNTER — NURSE TRIAGE (OUTPATIENT)
Dept: ADMINISTRATIVE | Facility: CLINIC | Age: 62
End: 2017-03-05

## 2017-03-05 ENCOUNTER — OFFICE VISIT (OUTPATIENT)
Dept: INTERNAL MEDICINE | Facility: CLINIC | Age: 62
End: 2017-03-05
Payer: COMMERCIAL

## 2017-03-05 VITALS
DIASTOLIC BLOOD PRESSURE: 69 MMHG | RESPIRATION RATE: 16 BRPM | SYSTOLIC BLOOD PRESSURE: 139 MMHG | BODY MASS INDEX: 47.09 KG/M2 | WEIGHT: 282.63 LBS | HEART RATE: 81 BPM | HEIGHT: 65 IN

## 2017-03-05 DIAGNOSIS — N30.01 ACUTE CYSTITIS WITH HEMATURIA: Primary | ICD-10-CM

## 2017-03-05 LAB
BILIRUB SERPL-MCNC: NORMAL MG/DL
BLOOD URINE, POC: NORMAL
COLOR, POC UA: YELLOW
GLUCOSE UR QL STRIP: NORMAL
KETONES UR QL STRIP: NORMAL
LEUKOCYTE ESTERASE URINE, POC: NORMAL
NITRITE, POC UA: NORMAL
PH, POC UA: 5
PROTEIN, POC: NORMAL
SPECIFIC GRAVITY, POC UA: 1.02
UROBILINOGEN, POC UA: NORMAL

## 2017-03-05 PROCEDURE — 99213 OFFICE O/P EST LOW 20 MIN: CPT | Mod: 25,S$GLB,, | Performed by: INTERNAL MEDICINE

## 2017-03-05 PROCEDURE — 99999 PR PBB SHADOW E&M-EST. PATIENT-LVL III: CPT | Mod: PBBFAC,,, | Performed by: INTERNAL MEDICINE

## 2017-03-05 PROCEDURE — 81002 URINALYSIS NONAUTO W/O SCOPE: CPT | Mod: S$GLB,,, | Performed by: INTERNAL MEDICINE

## 2017-03-05 PROCEDURE — 87086 URINE CULTURE/COLONY COUNT: CPT

## 2017-03-05 RX ORDER — CIPROFLOXACIN 500 MG/1
500 TABLET ORAL 2 TIMES DAILY
Qty: 20 TABLET | Refills: 0 | Status: SHIPPED | OUTPATIENT
Start: 2017-03-05 | End: 2017-03-15

## 2017-03-05 NOTE — PROGRESS NOTES
Subjective:       Patient ID: Meryl Johnson is a 61 y.o. female.    Chief Complaint: Urinary Tract Infection and Back Pain    Urinary Tract Infection    This is a new problem. The current episode started acute onset. The problem occurs every urination. The problem has been unchanged. The quality of the pain is described as aching. The pain is at a severity of 2/10. The pain is mild. There has been no fever. Associated symptoms include frequency and urgency. Pertinent negatives include no chills, nausea, vomiting or rash. She has tried nothing for the symptoms. The treatment provided no relief.     Review of Systems   Constitutional: Negative for activity change, chills, fatigue and fever.   HENT: Negative for congestion, ear pain, nosebleeds, postnasal drip, sinus pressure and sore throat.    Eyes: Negative.  Negative for visual disturbance.   Respiratory: Negative for cough, chest tightness, shortness of breath and wheezing.    Cardiovascular: Negative for chest pain.   Gastrointestinal: Negative for abdominal pain, diarrhea, nausea and vomiting.   Genitourinary: Positive for frequency and urgency. Negative for difficulty urinating and dysuria.   Musculoskeletal: Negative for arthralgias and neck stiffness.   Skin: Negative for rash.   Neurological: Negative for dizziness, weakness and headaches.   Psychiatric/Behavioral: Negative for sleep disturbance. The patient is not nervous/anxious.        Objective:      Physical Exam   Constitutional: She is oriented to person, place, and time. She appears well-developed and well-nourished.  Non-toxic appearance. No distress.   HENT:   Head: Normocephalic and atraumatic.   Right Ear: Tympanic membrane, external ear and ear canal normal.   Left Ear: Tympanic membrane, external ear and ear canal normal.   Eyes: EOM are normal. Pupils are equal, round, and reactive to light. No scleral icterus.   Neck: Normal range of motion. Neck supple. No thyromegaly present.    Cardiovascular: Normal rate, regular rhythm and normal heart sounds.    Pulmonary/Chest: Effort normal and breath sounds normal.   Abdominal: Soft. Bowel sounds are normal. She exhibits no mass. There is no tenderness. There is no rebound.   Musculoskeletal: Normal range of motion.   Lymphadenopathy:     She has no cervical adenopathy.   Neurological: She is alert and oriented to person, place, and time. She has normal reflexes. She displays normal reflexes. No cranial nerve deficit. She exhibits normal muscle tone. Coordination normal.   Skin: Skin is warm and dry.   Psychiatric: She has a normal mood and affect. Her behavior is normal.       Assessment:       1. Acute cystitis with hematuria        Plan:   Meryl was seen today for urinary tract infection and back pain.    Diagnoses and all orders for this visit:    Acute cystitis with hematuria  -     POCT URINE DIPSTICK WITHOUT MICROSCOPE  -     Urine culture    Other orders  -     ciprofloxacin HCl (CIPRO) 500 MG tablet; Take 1 tablet (500 mg total) by mouth 2 (two) times daily.

## 2017-03-05 NOTE — TELEPHONE ENCOUNTER
Reason for Disposition   Caller has cancelled the call before the first contact.    Protocols used: ST NO CONTACT OR DUPLICATE CONTACT CALL-A-AH    Patient states she will go to urgent care this afternoon. Triage call cancelled.

## 2017-03-06 ENCOUNTER — PATIENT MESSAGE (OUTPATIENT)
Dept: INTERNAL MEDICINE | Facility: CLINIC | Age: 62
End: 2017-03-06

## 2017-03-06 LAB
BACTERIA UR CULT: NORMAL
BACTERIA UR CULT: NORMAL

## 2017-03-07 ENCOUNTER — PATIENT MESSAGE (OUTPATIENT)
Dept: INTERNAL MEDICINE | Facility: CLINIC | Age: 62
End: 2017-03-07

## 2017-05-11 ENCOUNTER — PATIENT MESSAGE (OUTPATIENT)
Dept: INTERNAL MEDICINE | Facility: CLINIC | Age: 62
End: 2017-05-11

## 2017-05-11 RX ORDER — DICLOFENAC SODIUM 10 MG/G
2 GEL TOPICAL 4 TIMES DAILY
Qty: 100 G | Refills: 0 | Status: SHIPPED | OUTPATIENT
Start: 2017-05-11 | End: 2017-09-16

## 2017-05-15 ENCOUNTER — PATIENT MESSAGE (OUTPATIENT)
Dept: INTERNAL MEDICINE | Facility: CLINIC | Age: 62
End: 2017-05-15

## 2017-05-19 ENCOUNTER — PATIENT MESSAGE (OUTPATIENT)
Dept: INTERNAL MEDICINE | Facility: CLINIC | Age: 62
End: 2017-05-19

## 2017-05-25 ENCOUNTER — PATIENT MESSAGE (OUTPATIENT)
Dept: INTERNAL MEDICINE | Facility: CLINIC | Age: 62
End: 2017-05-25

## 2017-06-05 RX ORDER — METFORMIN HYDROCHLORIDE 500 MG/1
TABLET ORAL
Qty: 360 TABLET | Refills: 3 | Status: SHIPPED | OUTPATIENT
Start: 2017-06-05 | End: 2018-12-26 | Stop reason: SDUPTHER

## 2017-06-05 RX ORDER — ATORVASTATIN CALCIUM 40 MG/1
TABLET, FILM COATED ORAL
Qty: 30 TABLET | Refills: 11 | Status: SHIPPED | OUTPATIENT
Start: 2017-06-05 | End: 2018-06-22 | Stop reason: SDUPTHER

## 2017-08-07 ENCOUNTER — PATIENT MESSAGE (OUTPATIENT)
Dept: INTERNAL MEDICINE | Facility: CLINIC | Age: 62
End: 2017-08-07

## 2017-08-07 ENCOUNTER — LAB VISIT (OUTPATIENT)
Dept: LAB | Facility: HOSPITAL | Age: 62
End: 2017-08-07
Attending: INTERNAL MEDICINE
Payer: COMMERCIAL

## 2017-08-07 ENCOUNTER — OFFICE VISIT (OUTPATIENT)
Dept: INTERNAL MEDICINE | Facility: CLINIC | Age: 62
End: 2017-08-07
Payer: COMMERCIAL

## 2017-08-07 VITALS
SYSTOLIC BLOOD PRESSURE: 120 MMHG | DIASTOLIC BLOOD PRESSURE: 69 MMHG | HEIGHT: 67 IN | HEART RATE: 74 BPM | BODY MASS INDEX: 45.01 KG/M2 | WEIGHT: 286.81 LBS

## 2017-08-07 DIAGNOSIS — E78.5 HYPERLIPIDEMIA, UNSPECIFIED HYPERLIPIDEMIA TYPE: ICD-10-CM

## 2017-08-07 DIAGNOSIS — I10 ESSENTIAL HYPERTENSION: ICD-10-CM
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
## 2017-08-07 DIAGNOSIS — E11.40 TYPE 2 DIABETES MELLITUS WITH DIABETIC NEUROPATHY, WITHOUT LONG-TERM CURRENT USE OF INSULIN: ICD-10-CM

## 2017-08-07 DIAGNOSIS — E11.40 TYPE 2 DIABETES MELLITUS WITH DIABETIC NEUROPATHY, WITHOUT LONG-TERM CURRENT USE OF INSULIN: Primary | ICD-10-CM

## 2017-08-07 LAB
ALBUMIN SERPL BCP-MCNC: 3.9 G/DL
ALP SERPL-CCNC: 135 U/L
ALT SERPL W/O P-5'-P-CCNC: 20 U/L
ANION GAP SERPL CALC-SCNC: 14 MMOL/L
AST SERPL-CCNC: 20 U/L
BASOPHILS # BLD AUTO: 0.02 K/UL
BASOPHILS NFR BLD: 0.2 %
BILIRUB SERPL-MCNC: 0.3 MG/DL
BUN SERPL-MCNC: 41 MG/DL
CALCIUM SERPL-MCNC: 9.2 MG/DL
CHLORIDE SERPL-SCNC: 106 MMOL/L
CHOLEST/HDLC SERPL: 4.2 {RATIO}
CO2 SERPL-SCNC: 19 MMOL/L
CREAT SERPL-MCNC: 1.4 MG/DL
DIFFERENTIAL METHOD: ABNORMAL
EOSINOPHIL # BLD AUTO: 0.1 K/UL
EOSINOPHIL NFR BLD: 0.6 %
ERYTHROCYTE [DISTWIDTH] IN BLOOD BY AUTOMATED COUNT: 16.1 %
EST. GFR  (AFRICAN AMERICAN): 46.5 ML/MIN/1.73 M^2
EST. GFR  (NON AFRICAN AMERICAN): 40.3 ML/MIN/1.73 M^2
GLUCOSE SERPL-MCNC: 118 MG/DL
HCT VFR BLD AUTO: 37.1 %
HDL/CHOLESTEROL RATIO: 23.9 %
HDLC SERPL-MCNC: 209 MG/DL
HDLC SERPL-MCNC: 50 MG/DL
HGB BLD-MCNC: 11.2 G/DL
LDLC SERPL CALC-MCNC: 122 MG/DL
LYMPHOCYTES # BLD AUTO: 2.6 K/UL
LYMPHOCYTES NFR BLD: 31 %
MCH RBC QN AUTO: 24.1 PG
MCHC RBC AUTO-ENTMCNC: 30.2 G/DL
MCV RBC AUTO: 80 FL
MONOCYTES # BLD AUTO: 0.5 K/UL
MONOCYTES NFR BLD: 5.8 %
NEUTROPHILS # BLD AUTO: 5.2 K/UL
NEUTROPHILS NFR BLD: 62.3 %
NONHDLC SERPL-MCNC: 159 MG/DL
PLATELET # BLD AUTO: 430 K/UL
PMV BLD AUTO: 10.7 FL
POTASSIUM SERPL-SCNC: 4.7 MMOL/L
PROT SERPL-MCNC: 8 G/DL
RBC # BLD AUTO: 4.65 M/UL
SODIUM SERPL-SCNC: 139 MMOL/L
TRIGL SERPL-MCNC: 185 MG/DL
TSH SERPL DL<=0.005 MIU/L-ACNC: 1.88 UIU/ML
WBC # BLD AUTO: 8.33 K/UL

## 2017-08-07 PROCEDURE — 80053 COMPREHEN METABOLIC PANEL: CPT

## 2017-08-07 PROCEDURE — 99999 PR PBB SHADOW E&M-EST. PATIENT-LVL IV: CPT | Mod: PBBFAC,,, | Performed by: INTERNAL MEDICINE

## 2017-08-07 PROCEDURE — 84443 ASSAY THYROID STIM HORMONE: CPT

## 2017-08-07 PROCEDURE — 99214 OFFICE O/P EST MOD 30 MIN: CPT | Mod: S$GLB,,, | Performed by: INTERNAL MEDICINE

## 2017-08-07 PROCEDURE — 3045F PR MOST RECENT HEMOGLOBIN A1C LEVEL 7.0-9.0%: CPT | Mod: S$GLB,,, | Performed by: INTERNAL MEDICINE

## 2017-08-07 PROCEDURE — 93005 ELECTROCARDIOGRAM TRACING: CPT | Mod: S$GLB,,, | Performed by: INTERNAL MEDICINE

## 2017-08-07 PROCEDURE — 4010F ACE/ARB THERAPY RXD/TAKEN: CPT | Mod: S$GLB,,, | Performed by: INTERNAL MEDICINE

## 2017-08-07 PROCEDURE — 80061 LIPID PANEL: CPT

## 2017-08-07 PROCEDURE — 93010 ELECTROCARDIOGRAM REPORT: CPT | Mod: S$GLB,,, | Performed by: INTERNAL MEDICINE

## 2017-08-07 PROCEDURE — 83036 HEMOGLOBIN GLYCOSYLATED A1C: CPT

## 2017-08-07 PROCEDURE — 3008F BODY MASS INDEX DOCD: CPT | Mod: S$GLB,,, | Performed by: INTERNAL MEDICINE

## 2017-08-07 PROCEDURE — 36415 COLL VENOUS BLD VENIPUNCTURE: CPT | Mod: PO

## 2017-08-07 PROCEDURE — 85025 COMPLETE CBC W/AUTO DIFF WBC: CPT

## 2017-08-07 NOTE — PROGRESS NOTES
HPI:   62-year-old female comes in for follow-up visit, but would prompted this is that 2 days ago she had what appeared to be a presyncopal spell.  [place] [employer], with sitting down for about 4 hours in the morning, afterwards when she started walking she felt a little bit nauseated she stood near [facility] and then she fell forward she was not aware of this but she was aware of someone talk to her.  She was evaluated by EMTs with a reported systolic blood pressure 185 and blood glucose around 225.  She did not have any other medications that morning no additional taken any food or liquids    There was no report of having pains in the chest palpitations shortness of breath or headaches    PMH:  Type 2 diabetes  Hypertension  Hyperlipidemia  Obesity with gastric bypass surgery  Helicobacter pylori which has been treated    Social History:  Tobacco and alcohol use none  Current Medications:  Current Outpatient Prescriptions   Medication Sig Dispense Refill    atorvastatin (LIPITOR) 40 MG tablet TAKE ONE TABLET BY MOUTH EVERY DAY 30 tablet 11    benazepril (LOTENSIN) 40 MG tablet TAKE 1 TABLET BY MOUTH EVERY DAY. 30 tablet 11    blood sugar diagnostic (BLOOD GLUCOSE TEST) Strp 1 strip by Misc.(Non-Drug; Combo Route) route 2 (two) times daily before meals. For mikki contour 200 strip 3    chlorthalidone (HYGROTEN) 25 MG Tab TAKE 1 TABLET BY MOUTH EVERY DAY. 30 tablet 11    ergocalciferol (ERGOCALCIFEROL) 50,000 unit Cap TAKE 1 CAPSULE BY MOUTH EVERY 7 DAYS 12 capsule 3    lancets (ONE TOUCH DELICA LANCETS) 33 gauge Misc 1 lancet by Misc.(Non-Drug; Combo Route) route 2 (two) times daily. 60 each 11    metformin (GLUCOPHAGE) 500 MG tablet TAKE 2 TABLETS BY MOUTH TWO TIMES A DAY WITH MEALS 360 tablet 3    VICTOZA 2-JAQUELINE 0.6 mg/0.1 mL (18 mg/3 mL) PnIj INJECT 0.6 MG INTO THE SKIN ONCE DAILY 6 mL 11    diclofenac sodium 1 % Gel Apply 2 g topically 4 (four) times daily. 100 g 0     Current  "Facility-Administered Medications   Medication Dose Route Frequency Provider Last Rate Last Dose    cyanocobalamin injection 1,000 mcg  1,000 mcg Intramuscular Q30 Days Russell Martinez MD   1,000 mcg at 10/11/16 1619         Review of Systems  As noted above    For 1-2 months she has not been taken the Victoza    Vitals:  /69   Pulse 74   Ht 5' 7" (1.702 m)   Wt 130.1 kg (286 lb 13.1 oz)   BMI 44.92 kg/m²       Physical Exam  General appearance: No acute distress  Neck: No thyromegaly  Lungs: Clear breath sounds  Heart: Regular rate and rhythm, no murmurs  Abdominal exam: Soft, nontender no masses    Visit Diagnosis:  Type 2 diabetes  Hypertension  Hyperlipidemia  Fall with possible presyncopal event   etiology is not clear other than this could've been a bout of dehydration or vasovagal reaction      No diagnosis found.      Plan:  Orders outlined below  Maintain proper hydration  And dietary habits    No orders of the defined types were placed in this encounter.    "

## 2017-08-08 ENCOUNTER — TELEPHONE (OUTPATIENT)
Dept: INTERNAL MEDICINE | Facility: CLINIC | Age: 62
End: 2017-08-08

## 2017-08-08 ENCOUNTER — PATIENT MESSAGE (OUTPATIENT)
Dept: INTERNAL MEDICINE | Facility: CLINIC | Age: 62
End: 2017-08-08

## 2017-08-08 DIAGNOSIS — E78.5 HYPERLIPIDEMIA, UNSPECIFIED HYPERLIPIDEMIA TYPE: ICD-10-CM

## 2017-08-08 DIAGNOSIS — E11.40 TYPE 2 DIABETES MELLITUS WITH DIABETIC NEUROPATHY, WITHOUT LONG-TERM CURRENT USE OF INSULIN: Primary | ICD-10-CM

## 2017-08-08 LAB
ESTIMATED AVG GLUCOSE: 174 MG/DL
HBA1C MFR BLD HPLC: 7.7 %

## 2017-08-08 NOTE — TELEPHONE ENCOUNTER
Sorry about another email. The prescription needs to be called into [facility]. Pharmacy Alber Mccurdy. Thanks again

## 2017-08-08 NOTE — TELEPHONE ENCOUNTER
----- Message from Lucita Garsia MA sent at 8/8/2017  1:14 PM CDT -----  Contact: Kathryn weber/[facility] - Ext [phone]  Requesting Orders for a New Free Style Glucometer / Test Strips / Lancets. Please call. Thanks!

## 2017-08-09 ENCOUNTER — DOCUMENTATION ONLY (OUTPATIENT)
Dept: INTERNAL MEDICINE | Facility: CLINIC | Age: 62
End: 2017-08-09

## 2017-08-09 NOTE — PROGRESS NOTES
Her recent lab values were reviewed lab values were reviewed    She has restarted  victoza    She states she's been taking Lipitor daily, will continue with this and repeat the labs in 6 weeks weeks       reinforced proper hydration due to the creatinine being elevated     repeat labs in 6 weeks

## 2017-08-10 ENCOUNTER — TELEPHONE (OUTPATIENT)
Dept: INTERNAL MEDICINE | Facility: CLINIC | Age: 62
End: 2017-08-10

## 2017-08-10 NOTE — TELEPHONE ENCOUNTER
Set up lab orders in 6 weeks (Routing comment)        Patient schedule for 6 wks lab...Appt mail out to home address file.

## 2017-08-11 ENCOUNTER — PATIENT MESSAGE (OUTPATIENT)
Dept: INTERNAL MEDICINE | Facility: CLINIC | Age: 62
End: 2017-08-11

## 2017-08-15 ENCOUNTER — PATIENT MESSAGE (OUTPATIENT)
Dept: INTERNAL MEDICINE | Facility: CLINIC | Age: 62
End: 2017-08-15

## 2017-08-18 ENCOUNTER — TELEPHONE (OUTPATIENT)
Dept: INTERNAL MEDICINE | Facility: CLINIC | Age: 62
End: 2017-08-18

## 2017-08-18 NOTE — TELEPHONE ENCOUNTER
----- Message from Lucita Garsia MA sent at 8/18/2017 12:49 PM CDT -----  Contact: self - [facility] Ext [phone]   Requesting to speak to the MA regarding her paperwork. Please call. Thanks!

## 2017-08-18 NOTE — TELEPHONE ENCOUNTER
Called pt to inform her that her paper work was ready to be picked up she said she will be by today at 330 to pick it up

## 2017-09-16 ENCOUNTER — OFFICE VISIT (OUTPATIENT)
Dept: INTERNAL MEDICINE | Facility: CLINIC | Age: 62
End: 2017-09-16
Payer: COMMERCIAL

## 2017-09-16 VITALS
TEMPERATURE: 99 F | HEART RATE: 88 BPM | HEIGHT: 67 IN | OXYGEN SATURATION: 96 % | BODY MASS INDEX: 44.88 KG/M2 | SYSTOLIC BLOOD PRESSURE: 120 MMHG | WEIGHT: 285.94 LBS | DIASTOLIC BLOOD PRESSURE: 62 MMHG

## 2017-09-16 DIAGNOSIS — M25.532 LEFT WRIST PAIN: Primary | ICD-10-CM

## 2017-09-16 PROCEDURE — 3074F SYST BP LT 130 MM HG: CPT | Mod: S$GLB,,, | Performed by: INTERNAL MEDICINE

## 2017-09-16 PROCEDURE — 3078F DIAST BP <80 MM HG: CPT | Mod: S$GLB,,, | Performed by: INTERNAL MEDICINE

## 2017-09-16 PROCEDURE — 3008F BODY MASS INDEX DOCD: CPT | Mod: S$GLB,,, | Performed by: INTERNAL MEDICINE

## 2017-09-16 PROCEDURE — 99214 OFFICE O/P EST MOD 30 MIN: CPT | Mod: S$GLB,,, | Performed by: INTERNAL MEDICINE

## 2017-09-16 PROCEDURE — 99999 PR PBB SHADOW E&M-EST. PATIENT-LVL IV: CPT | Mod: PBBFAC,,, | Performed by: INTERNAL MEDICINE

## 2017-09-16 RX ORDER — NAPROXEN 500 MG/1
500 TABLET ORAL 2 TIMES DAILY PRN
Qty: 30 TABLET | Refills: 3 | Status: SHIPPED | OUTPATIENT
Start: 2017-09-16 | End: 2018-07-10

## 2017-09-16 NOTE — PROGRESS NOTES
URGENT CARE CLINIC  Progress Note    PRESENTING HISTORY     PCP: Russell Martinez MD  Chief Complaint/Reason for Visit:     Chief Complaint   Patient presents with    Wrist Pain     x 2 month off and on       History of Present Illness & ROS : Ms. Meryl Johnson is a 62 y.o. female.      History of left wrist pain.  Yesterday the pain returned.  Yesterday it was difficult to move her left wrist.    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      PAST HISTORY:     Past Medical History:   Diagnosis Date    Diabetes mellitus type II     H. pylori infection     Hyperlipidemia     Hypertension     Unspecified vitamin D deficiency 4/24/2013       Past Surgical History:   Procedure Laterality Date    GASTRIC BYPASS         Family History   Problem Relation Age of Onset    COPD Mother     Heart disease Mother     Cancer Father      liver    Heart disease Father     Cancer Sister     Diabetes Sister     Hyperlipidemia Sister     Hypothyroidism Sister     Amblyopia Neg Hx     Blindness Neg Hx     Cataracts Neg Hx     Glaucoma Neg Hx     Hypertension Neg Hx     Macular degeneration Neg Hx     Retinal detachment Neg Hx     Strabismus Neg Hx     Stroke Neg Hx     Thyroid disease Neg Hx     Breast cancer Neg Hx     Colon cancer Neg Hx     Ovarian cancer Neg Hx     No Known Problems Brother     No Known Problems Maternal Aunt     No Known Problems Maternal Uncle     No Known Problems Paternal Aunt     No Known Problems Paternal Uncle     No Known Problems Maternal Grandmother     No Known Problems Maternal Grandfather     No Known Problems Paternal Grandmother     No Known Problems Paternal Grandfather        Social History     Social History    Marital status: Single     Spouse name: N/A    Number of children: N/A    Years of education: N/A     Occupational History     [employer]     Social History Main Topics    Smoking status: Never Smoker    Smokeless tobacco:  Never Used    Alcohol use No    Drug use: No    Sexual activity: Not Asked     Other Topics Concern    None     Social History Narrative    None       MEDICATIONS & ALLERGIES:     Current Outpatient Prescriptions on File Prior to Visit   Medication Sig Dispense Refill    atorvastatin (LIPITOR) 40 MG tablet TAKE ONE TABLET BY MOUTH EVERY DAY 30 tablet 11    benazepril (LOTENSIN) 40 MG tablet TAKE 1 TABLET BY MOUTH EVERY DAY. 30 tablet 11    blood sugar diagnostic (BLOOD GLUCOSE TEST) Strp 1 strip by Misc.(Non-Drug; Combo Route) route 2 (two) times daily before meals. For mikki contour 200 strip 3    chlorthalidone (HYGROTEN) 25 MG Tab TAKE 1 TABLET BY MOUTH EVERY DAY. 30 tablet 11    ergocalciferol (ERGOCALCIFEROL) 50,000 unit Cap TAKE 1 CAPSULE BY MOUTH EVERY 7 DAYS 12 capsule 3    lancets (ONE TOUCH DELICA LANCETS) 33 gauge Misc 1 lancet by Misc.(Non-Drug; Combo Route) route 2 (two) times daily. 60 each 11    metformin (GLUCOPHAGE) 500 MG tablet TAKE 2 TABLETS BY MOUTH TWO TIMES A DAY WITH MEALS 360 tablet 3     Review of patient's allergies indicates:   Allergen Reactions    Codeine Nausea Only    Vicodin [hydrocodone-acetaminophen] Nausea Only       Medications Reconciliation:   I have reconciled the patient's home medications with the patient/family. I have updated all changes.  Refer to After-Visit Medication List.    OBJECTIVE:     Vital Signs:  Vitals:    09/16/17 1459   BP: 120/62   Pulse: 88   Temp: 99 °F (37.2 °C)     Wt Readings from Last 1 Encounters:   09/16/17 1459 129.7 kg (285 lb 15 oz)     Body mass index is 44.78 kg/m².     Physical Exam:  Left wrist no swelling. Full range of motion.  There is slight tenderness with palpation of lateral tendon at the wrist.  No joint swelling.    Laboratory  Lab Results   Component Value Date    WBC 8.33 08/07/2017    HGB 11.2 (L) 08/07/2017    HCT 37.1 08/07/2017     (H) 08/07/2017    CHOL 209 (H) 08/07/2017    TRIG 185 (H) 08/07/2017    HDL  50 2017    ALT 20 2017    AST 20 2017     2017    K 4.7 2017     2017    CREATININE 1.4 2017    BUN 41 (H) 2017    CO2 19 (L) 2017    TSH 1.877 2017    INR 1.1 2004    GLUF 95 2005    HGBA1C 7.7 (H) 2017       ASSESSMENT & PLAN:     Left wrist pain  - Due to overuse syndrome.  - Treat conservatively:    Rx:  -     naproxen (NAPROSYN) 500 MG tablet; Take 1 tablet (500 mg total) by mouth 2 (two) times daily as needed (pain).  Dispense: 30 tablet; Refill: 3    Instructions for the patient:  Wear left wrist brace.  Take Naproxen 500 m tablet twice daily with food for 5 days then take as needed thereafter.  Apply ice to the wrist 10-20 minute twice daily for few days.       Scheduled Follow-up :  Future Appointments  Date Time Provider Department Center   2017 8:00 AM LAB, APPOINTMENT [facility]       After Visit Medication List :     Medication List          Accurate as of 17  3:20 PM. If you have any questions, ask your nurse or doctor.               START taking these medications    naproxen 500 MG tablet  Commonly known as:  NAPROSYN  Take 1 tablet (500 mg total) by mouth 2 (two) times daily as needed (pain).  Started by:  Alexis Whitehead MD        CONTINUE taking these medications    atorvastatin 40 MG tablet  Commonly known as:  LIPITOR  TAKE ONE TABLET BY MOUTH EVERY DAY     benazepril 40 MG tablet  Commonly known as:  LOTENSIN  TAKE 1 TABLET BY MOUTH EVERY DAY.     blood sugar diagnostic Strp  Commonly known as:  BLOOD GLUCOSE TEST  1 strip by Misc.(Non-Drug; Combo Route) route 2 (two) times daily before meals. For mikki contour     chlorthalidone 25 MG Tab  Commonly known as:  HYGROTEN  TAKE 1 TABLET BY MOUTH EVERY DAY.     ergocalciferol 50,000 unit Cap  Commonly known as:  ERGOCALCIFEROL  TAKE 1 CAPSULE BY MOUTH EVERY 7 DAYS     lancets 33 gauge Misc  Commonly known as:  ONETOUCH  DELICA LANCETS  1 lancet by Misc.(Non-Drug; Combo Route) route 2 (two) times daily.     metformin 500 MG tablet  Commonly known as:  GLUCOPHAGE  TAKE 2 TABLETS BY MOUTH TWO TIMES A DAY WITH MEALS        STOP taking these medications    diclofenac sodium 1 % Gel  Stopped by:  Alexis Whitehead MD     VICTOZA 2-JAQUELINE 0.6 mg/0.1 mL (18 mg/3 mL) Pnij  Generic drug:  liraglutide 0.6 mg/0.1 mL (18 mg/3 mL) subq PNIJ  Stopped by:  Alexis Whitehead MD           Where to Get Your Medications      These medications were sent to [facility] - [place], [place] - [address], [place] [address]    Phone:  [phone]   · naproxen 500 MG tablet         Signing Physician:  Alexis Whitehead MD

## 2017-09-16 NOTE — PATIENT INSTRUCTIONS
Wear left wrist brace.  Take Naproxen 500 m tablet twice daily with food for 5 days then take as needed thereafter.  Apply ice to the wrist 10-20 minute twice daily for few days.

## 2017-09-20 ENCOUNTER — LAB VISIT (OUTPATIENT)
Dept: LAB | Facility: HOSPITAL | Age: 62
End: 2017-09-20
Attending: INTERNAL MEDICINE
Payer: COMMERCIAL

## 2017-09-20 DIAGNOSIS — E78.5 HYPERLIPIDEMIA, UNSPECIFIED HYPERLIPIDEMIA TYPE: ICD-10-CM

## 2017-09-20 DIAGNOSIS — E11.40 TYPE 2 DIABETES MELLITUS WITH DIABETIC NEUROPATHY, WITHOUT LONG-TERM CURRENT USE OF INSULIN: ICD-10-CM

## 2017-09-20 LAB
ANION GAP SERPL CALC-SCNC: 9 MMOL/L
BUN SERPL-MCNC: 23 MG/DL
CALCIUM SERPL-MCNC: 9.2 MG/DL
CHLORIDE SERPL-SCNC: 106 MMOL/L
CHOLEST SERPL-MCNC: 162 MG/DL
CHOLEST/HDLC SERPL: 3.3 {RATIO}
CO2 SERPL-SCNC: 24 MMOL/L
CREAT SERPL-MCNC: 0.9 MG/DL
EST. GFR  (AFRICAN AMERICAN): >60 ML/MIN/1.73 M^2
EST. GFR  (NON AFRICAN AMERICAN): >60 ML/MIN/1.73 M^2
ESTIMATED AVG GLUCOSE: 163 MG/DL
GLUCOSE SERPL-MCNC: 131 MG/DL
HBA1C MFR BLD HPLC: 7.3 %
HDLC SERPL-MCNC: 49 MG/DL
HDLC SERPL: 30.2 %
LDLC SERPL CALC-MCNC: 85.8 MG/DL
NONHDLC SERPL-MCNC: 113 MG/DL
POTASSIUM SERPL-SCNC: 4.8 MMOL/L
SODIUM SERPL-SCNC: 139 MMOL/L
TRIGL SERPL-MCNC: 136 MG/DL

## 2017-09-20 PROCEDURE — 80061 LIPID PANEL: CPT

## 2017-09-20 PROCEDURE — 83036 HEMOGLOBIN GLYCOSYLATED A1C: CPT

## 2017-09-20 PROCEDURE — 80048 BASIC METABOLIC PNL TOTAL CA: CPT

## 2017-09-20 PROCEDURE — 36415 COLL VENOUS BLD VENIPUNCTURE: CPT

## 2017-09-28 RX ORDER — BENAZEPRIL HYDROCHLORIDE 40 MG/1
TABLET ORAL
Qty: 30 TABLET | Refills: 11 | Status: SHIPPED | OUTPATIENT
Start: 2017-09-28 | End: 2018-10-23 | Stop reason: SDUPTHER

## 2017-09-29 ENCOUNTER — PATIENT OUTREACH (OUTPATIENT)
Dept: ADMINISTRATIVE | Facility: HOSPITAL | Age: 62
End: 2017-09-29

## 2017-10-02 ENCOUNTER — PATIENT MESSAGE (OUTPATIENT)
Dept: ADMINISTRATIVE | Facility: HOSPITAL | Age: 62
End: 2017-10-02

## 2017-10-09 ENCOUNTER — PATIENT MESSAGE (OUTPATIENT)
Dept: INTERNAL MEDICINE | Facility: CLINIC | Age: 62
End: 2017-10-09

## 2017-10-09 DIAGNOSIS — Z12.31 ENCOUNTER FOR SCREENING MAMMOGRAM FOR BREAST CANCER: Primary | ICD-10-CM

## 2017-10-09 NOTE — TELEPHONE ENCOUNTER
Mammogram : can you please put an order in for me to schedule by mammogram. Thanks Meryl

## 2017-10-11 ENCOUNTER — OFFICE VISIT (OUTPATIENT)
Dept: OPTOMETRY | Facility: CLINIC | Age: 62
End: 2017-10-11
Payer: COMMERCIAL

## 2017-10-11 DIAGNOSIS — Z01.00 EXAMINATION OF EYES AND VISION: Primary | ICD-10-CM

## 2017-10-11 DIAGNOSIS — H52.4 PRESBYOPIA OF BOTH EYES: ICD-10-CM

## 2017-10-11 DIAGNOSIS — H52.7 REFRACTIVE ERRORS: ICD-10-CM

## 2017-10-11 PROCEDURE — 92014 COMPRE OPH EXAM EST PT 1/>: CPT | Mod: S$GLB,,, | Performed by: OPTOMETRIST

## 2017-10-11 PROCEDURE — 92015 DETERMINE REFRACTIVE STATE: CPT | Mod: S$GLB,,, | Performed by: OPTOMETRIST

## 2017-10-11 PROCEDURE — 99999 PR PBB SHADOW E&M-EST. PATIENT-LVL II: CPT | Mod: PBBFAC,,, | Performed by: OPTOMETRIST

## 2017-10-11 NOTE — PROGRESS NOTES
HPI     Concerns About Ocular Health    Additional comments: Eye exam - diabetic eye examination and refraction.  Reports no apparent problems.             Comments   Patient's age: 62 y.o.  Occupation: [employer]  Approximate date of last eye examination:  08/24/2015  Name of last eye doctor seen: Dr Harris   City/State: [facility]   Wears glasses? Yes     If yes, wears  Full-time or part-time?  Part time  Present glasses are: Bifocal, SV Distance, SV Reading?  OTC Readers   Any problem with VA with glasses?  Pt co blurred vision both distance and   near   Wears CLs?:  No  Headaches?  No  Eye pain/discomfort?  No                                                                                     Flashes?  No  Floaters?  No  Diplopia/Double vision?  No  Patient's Ocular History:         Any eye surgeries? No         Any eye injury?  No         Any treatment for eye disease?  No  Family history of eye disease?  None  Significant patient medical history:         1. Diabetes?  Yes, diagnosed 2002  LBS - Unsure  Hemoglobin A1C       Date                     Value               Ref Range             Status                09/20/2017               7.3 (H)             4.0 - 5.6 %           Final                   08/07/2017               7.7 (H)             4.0 - 5.6 %           Final                   01/18/2017               7.4 (H)             4.5 - 6.2 %           Final                ----------       If yes, IDDM or NIDDM? NIDDM   2. HBP?  Yes, controlled by medication and diet               3. Other (describe):     ! OTC eyedrops currently using:  No   ! Prescription eye meds currently using:  No   ! Any history of allergy/adverse reaction to any eye meds used   previously?  No    ! Any history of allergy/adverse reaction to eyedrops used during prior   eye exam(s)? No    ! Any history of allergy/adverse reaction to Novacaine or similar meds?   No   ! Any history of allergy/adverse reaction to Epinephrine or similar  "meds?   No    ! Patient okay with use of anesthetic eyedrops to check eye pressure?    Yes        ! Patient okay with use of eyedrops to dilate pupils today?  Yes   !  Allergies/Medications/Medical History/Family History reviewed today?    Yes      PD =   67/63  Desired reading distance =  16.75"                                                                    Last edited by Charles Harris, OD on 10/11/2017  8:50 AM. (History)            Assessment /Plan     For exam results, see Encounter Report.    1. Examination of eyes and vision     2. Refractive errors     3. Presbyopia of both eyes                  Trace nuclear sclerosis of lens in both eyes, and early peripheral cortical cataract in both eyes.  Monitor only.  Otherwise, good ocular health in each eye.  No evidence of of diabetic or hypertensive retinal changes in either eye.  Slight hyperopia in the right  eye, and slght myopia in the left eye.  Presbyopia consistent with age.  New spectacle lens Rx(s) issued for use as desired.  Recheck in 12 - 18 months.           "

## 2017-10-11 NOTE — PATIENT INSTRUCTIONS
Trace nuclear sclerosis of lens in both eyes, and early peripheral cortical cataract in both eyes.  Monitor only.  Otherwise, good ocular health in each eye.  No evidence of of diabetic or hypertensive retinal changes in either eye.  Slight hyperopia in the right  eye, and slght myopia in the left eye.  Presbyopia consistent with age.  New spectacle lens Rx(s) issued for use as desired.  Recheck in 12 - 18 months.

## 2017-10-13 ENCOUNTER — HOSPITAL ENCOUNTER (OUTPATIENT)
Dept: RADIOLOGY | Facility: HOSPITAL | Age: 62
Discharge: HOME | End: 2017-10-13
Attending: INTERNAL MEDICINE
Payer: COMMERCIAL

## 2017-10-13 VITALS — WEIGHT: 285 LBS | HEIGHT: 67 IN | BODY MASS INDEX: 44.73 KG/M2

## 2017-10-13 DIAGNOSIS — Z12.31 ENCOUNTER FOR SCREENING MAMMOGRAM FOR BREAST CANCER: ICD-10-CM

## 2017-10-13 PROCEDURE — 77067 SCR MAMMO BI INCL CAD: CPT | Mod: TC

## 2017-10-13 PROCEDURE — 77063 BREAST TOMOSYNTHESIS BI: CPT | Mod: 26,,, | Performed by: RADIOLOGY

## 2017-10-13 PROCEDURE — 77067 SCR MAMMO BI INCL CAD: CPT | Mod: 26,,, | Performed by: RADIOLOGY

## 2017-10-24 ENCOUNTER — OFFICE VISIT (OUTPATIENT)
Dept: OBSTETRICS AND GYNECOLOGY | Facility: CLINIC | Age: 62
End: 2017-10-24
Payer: COMMERCIAL

## 2017-10-24 VITALS — BODY MASS INDEX: 44.99 KG/M2 | WEIGHT: 286.63 LBS | HEIGHT: 67 IN

## 2017-10-24 DIAGNOSIS — N95.9 MENOPAUSAL AND PERIMENOPAUSAL DISORDER: ICD-10-CM

## 2017-10-24 DIAGNOSIS — Z01.419 VISIT FOR GYNECOLOGIC EXAMINATION: Primary | ICD-10-CM

## 2017-10-24 PROCEDURE — 99396 PREV VISIT EST AGE 40-64: CPT | Mod: S$GLB,,, | Performed by: OBSTETRICS & GYNECOLOGY

## 2017-10-24 PROCEDURE — 99999 PR PBB SHADOW E&M-EST. PATIENT-LVL II: CPT | Mod: PBBFAC,,, | Performed by: OBSTETRICS & GYNECOLOGY

## 2017-10-24 NOTE — PROGRESS NOTES
HISTORY OF PRESENT ILLNESS:    Meryl Johnson is a 62 y.o. female , presents for a routine exam and has no complaints.  RECENT NORMAL MAMMO AND PAP DUE .    HAS JUST MOVED HER OFFICE TO THE [place].  Exam is limited by body habitus.    LAST VISIT :  PAP DUE AND MAMMO NEEDED, REFERRED.  REF MASELLI - SISTER  OF SOME TYPE OF CA AND PT CONCERNED BUT ADVISED THAT FIRST DEGREE POSTMENOPAUSAL RELATIVE WITH CA IS NOT LIKELY ASSOCIATED WITH GYN CANCER SYNDROME.  NOT ASHKENAZI Jew.  STILL WORKING [employer]  LAST VISIT :  FOR ROUTINE VISIT. HAS JUST CELEBRATED HER  YEAR AT [employer] AND DISCUSSED  BEING WORLD'S BIGGEST SAINTS FAN. DUE MAMMO  - ORDERED AND REF FOR BMD.  MENOPAUSAL FOR MANY YEARS AND NO C/O OF MENOPAUSAL SX    Past Medical History:   Diagnosis Date    Diabetes mellitus type II     H. pylori infection     Hyperlipidemia     Hypertension     Unspecified vitamin D deficiency 2013       Past Surgical History:   Procedure Laterality Date    GASTRIC BYPASS          MEDICATIONS AND ALLERGIES:      Current Outpatient Prescriptions:     atorvastatin (LIPITOR) 40 MG tablet, TAKE ONE TABLET BY MOUTH EVERY DAY, Disp: 30 tablet, Rfl: 11    benazepril (LOTENSIN) 40 MG tablet, TAKE ONE TABLET BY MOUTH ONCE DAILY, Disp: 30 tablet, Rfl: 11    blood sugar diagnostic (BLOOD GLUCOSE TEST) Strp, 1 strip by Misc.(Non-Drug; Combo Route) route 2 (two) times daily before meals. For mikki contour, Disp: 200 strip, Rfl: 3    chlorthalidone (HYGROTEN) 25 MG Tab, TAKE 1 TABLET BY MOUTH EVERY DAY., Disp: 30 tablet, Rfl: 11    ergocalciferol (ERGOCALCIFEROL) 50,000 unit Cap, TAKE 1 CAPSULE BY MOUTH EVERY 7 DAYS, Disp: 12 capsule, Rfl: 3    lancets (ONE TOUCH DELICA LANCETS) 33 gauge Misc, 1 lancet by Misc.(Non-Drug; Combo Route) route 2 (two) times daily., Disp: 60 each, Rfl: 11    metformin (GLUCOPHAGE) 500 MG tablet, TAKE 2 TABLETS BY MOUTH TWO TIMES A DAY WITH MEALS, Disp: 360  tablet, Rfl: 3    naproxen (NAPROSYN) 500 MG tablet, Take 1 tablet (500 mg total) by mouth 2 (two) times daily as needed (pain)., Disp: 30 tablet, Rfl: 3    Review of patient's allergies indicates:   Allergen Reactions    Codeine Nausea Only    Vicodin [hydrocodone-acetaminophen] Nausea Only       Family History   Problem Relation Age of Onset    COPD Mother     Heart disease Mother     Cancer Father      liver    Heart disease Father     Cancer Sister     Diabetes Sister     Hyperlipidemia Sister     Hypothyroidism Sister     No Known Problems Brother     No Known Problems Maternal Aunt     No Known Problems Maternal Uncle     No Known Problems Paternal Aunt     No Known Problems Paternal Uncle     No Known Problems Maternal Grandmother     No Known Problems Maternal Grandfather     No Known Problems Paternal Grandmother     No Known Problems Paternal Grandfather     Amblyopia Neg Hx     Blindness Neg Hx     Cataracts Neg Hx     Glaucoma Neg Hx     Hypertension Neg Hx     Macular degeneration Neg Hx     Retinal detachment Neg Hx     Strabismus Neg Hx     Stroke Neg Hx     Thyroid disease Neg Hx     Breast cancer Neg Hx     Colon cancer Neg Hx     Ovarian cancer Neg Hx        Social History     Social History    Marital status: Single     Spouse name: N/A    Number of children: N/A    Years of education: N/A     Occupational History     [employer]     Social History Main Topics    Smoking status: Never Smoker    Smokeless tobacco: Never Used    Alcohol use No    Drug use: No    Sexual activity: Not on file     Other Topics Concern    Not on file     Social History Narrative    No narrative on file       COMPREHENSIVE GYN HISTORY:  PAP History:  Denies abnormal Paps except a noted above.  Infection History: Denies STDs. Denies PID.  Benign History: Denies uterine fibroids. Denies ovarian cysts. Denies endometriosis.  Denies other conditions.  Cancer History:  "Denies cervical cancer. Denies uterine cancer or hyperplasia. Denies ovarian cancer. Denies vulvar cancer or pre-cancer. Denies vaginal cancer or pre-cancer. Denies breast cancer. Denies colon cancer.    ROS:  GENERAL: No weight changes. No swelling. No fatigue. No fever.  CARDIOVASCULAR: No chest pain. No shortness of breath. No leg cramps.   NEUROLOGICAL: No headaches. No vision changes.  BREASTS: No pain. No lumps. No discharge.  ABDOMEN: No pain. No nausea. No vomiting. No diarrhea. No constipation.  REPRODUCTIVE: No abnormal bleeding.   VULVA: No pain. No lesions. No itching.  VAGINA: No relaxation. No itching. No odor. No discharge. No lesions.  URINARY: No incontinence. No nocturia. No frequency. No dysuria.    Ht 5' 7" (1.702 m)   Wt 130 kg (286 lb 9.6 oz)   BMI 44.89 kg/m²     PE: Exam is limited by body habitus.  APPEARANCE: Well nourished, well developed, in no acute distress.  AFFECT: WNL, alert and oriented x 3.  SKIN: No hirsutism or acne.  NECK: Neck symmetric without masses or thyromegaly.  NODES: No inguinal, cervical, axillary or femoral lymph node enlargement.  CHEST: Good respiratory effort.   ABDOMEN: Soft. No tenderness or masses. No hepatosplenomegaly. No hernias.  BREASTS: Symmetrical, no skin changes or visible lesions. No palpable masses, nipple discharge bilaterally.  PELVIC: ATROPHIC EXTERNAL FEMALE GENITALIA without lesions. Normal hair distribution. Adequate perineal body, normal urethral meatus. VAGINA DRY without lesions or discharge. CERVIX STENOTIC without lesions, discharge or tenderness. No significant cystocele or rectocele. Bimanual exam shows uterus to be normal size, regular, mobile and nontender. Adnexa without masses or tenderness.  EXTREMITIES: No edema.    PROCEDURES:      DIAGNOSIS:  1. Visit for gynecologic examination     2. Menopausal and perimenopausal disorder         PLAN:    COUNSELING:  The patient was counseled today on osteoporosis prevention, calcium " supplementation, and regular weight bearing exercise. The patient was also counseled today on ACS PAP guidelines, with recommendations for yearly pelvic exams unless their uterus, cervix, and ovaries were removed for benign reasons; in that case, examinations every 3-5 years are recommended.  The patient was also counseled regarding monthly breast self-examination, routine STD screening for at-risk populations, prophylactic immunizations for transmitted infections such as  HPV, Pertussis, or Influenza as appropriate, and yearly mammograms when indicated by ACS guidelines.  She was advised to see her primary care physician for all other health maintenance.    FOLLOW-UP with me annually.

## 2017-11-30 ENCOUNTER — PATIENT MESSAGE (OUTPATIENT)
Dept: INTERNAL MEDICINE | Facility: CLINIC | Age: 62
End: 2017-11-30

## 2017-12-20 ENCOUNTER — TELEPHONE (OUTPATIENT)
Dept: INTERNAL MEDICINE | Facility: CLINIC | Age: 62
End: 2017-12-20

## 2017-12-20 RX ORDER — AZELASTINE 1 MG/ML
1 SPRAY, METERED NASAL 2 TIMES DAILY
Qty: 30 ML | Refills: 0 | Status: SHIPPED | OUTPATIENT
Start: 2017-12-20 | End: 2018-01-15 | Stop reason: SDUPTHER

## 2017-12-20 RX ORDER — BENZONATATE 100 MG/1
100 CAPSULE ORAL 3 TIMES DAILY PRN
Qty: 30 CAPSULE | Refills: 0 | Status: SHIPPED | OUTPATIENT
Start: 2017-12-20 | End: 2017-12-30

## 2017-12-20 NOTE — TELEPHONE ENCOUNTER
Called pt back and she wanted an appt but we have nothing for today offered her aakash and she turned it down. Pt is complaining of fever cough and a cold and is wondering if Dr Martinez can call her in anything she said today would be the only day she could come in

## 2017-12-20 NOTE — TELEPHONE ENCOUNTER
----- Message from Maurilio Iglesias sent at 12/20/2017  3:47 PM CST -----  Contact: patient  Patient called in requesting that dr. lewis's nurse would give her a call at [phone]. Thank You.

## 2017-12-27 ENCOUNTER — PATIENT MESSAGE (OUTPATIENT)
Dept: INTERNAL MEDICINE | Facility: CLINIC | Age: 62
End: 2017-12-27

## 2017-12-30 ENCOUNTER — OFFICE VISIT (OUTPATIENT)
Dept: URGENT CARE | Facility: CLINIC | Age: 62
End: 2017-12-30
Payer: COMMERCIAL

## 2017-12-30 VITALS
WEIGHT: 280 LBS | DIASTOLIC BLOOD PRESSURE: 82 MMHG | OXYGEN SATURATION: 97 % | BODY MASS INDEX: 43.95 KG/M2 | RESPIRATION RATE: 18 BRPM | TEMPERATURE: 98 F | HEIGHT: 67 IN | SYSTOLIC BLOOD PRESSURE: 150 MMHG | HEART RATE: 77 BPM

## 2017-12-30 DIAGNOSIS — J40 SINOBRONCHITIS: Primary | ICD-10-CM

## 2017-12-30 DIAGNOSIS — J32.9 SINOBRONCHITIS: Primary | ICD-10-CM

## 2017-12-30 DIAGNOSIS — R52 BODY ACHES: ICD-10-CM

## 2017-12-30 DIAGNOSIS — R05.9 COUGH: ICD-10-CM

## 2017-12-30 LAB
CTP QC/QA: YES
FLUAV AG NPH QL: NEGATIVE
FLUBV AG NPH QL: NEGATIVE

## 2017-12-30 PROCEDURE — 96372 THER/PROPH/DIAG INJ SC/IM: CPT | Mod: S$GLB,,, | Performed by: EMERGENCY MEDICINE

## 2017-12-30 PROCEDURE — 87804 INFLUENZA ASSAY W/OPTIC: CPT | Mod: QW,S$GLB,, | Performed by: PHYSICIAN ASSISTANT

## 2017-12-30 PROCEDURE — 99203 OFFICE O/P NEW LOW 30 MIN: CPT | Mod: 25,S$GLB,, | Performed by: PHYSICIAN ASSISTANT

## 2017-12-30 RX ORDER — DOXYCYCLINE 100 MG/1
100 CAPSULE ORAL 2 TIMES DAILY
Qty: 20 CAPSULE | Refills: 0 | Status: SHIPPED | OUTPATIENT
Start: 2017-12-30 | End: 2018-01-09

## 2017-12-30 RX ORDER — PROMETHAZINE HYDROCHLORIDE AND CODEINE PHOSPHATE 6.25; 1 MG/5ML; MG/5ML
5 SOLUTION ORAL EVERY 4 HOURS PRN
Qty: 118 ML | Refills: 0 | Status: SHIPPED | OUTPATIENT
Start: 2017-12-30 | End: 2018-01-11 | Stop reason: SDUPTHER

## 2017-12-30 RX ORDER — BETAMETHASONE SODIUM PHOSPHATE AND BETAMETHASONE ACETATE 3; 3 MG/ML; MG/ML
6 INJECTION, SUSPENSION INTRA-ARTICULAR; INTRALESIONAL; INTRAMUSCULAR; SOFT TISSUE
Status: COMPLETED | OUTPATIENT
Start: 2017-12-30 | End: 2017-12-30

## 2017-12-30 RX ADMIN — BETAMETHASONE SODIUM PHOSPHATE AND BETAMETHASONE ACETATE 6 MG: 3; 3 INJECTION, SUSPENSION INTRA-ARTICULAR; INTRALESIONAL; INTRAMUSCULAR; SOFT TISSUE at 11:12

## 2017-12-30 NOTE — PROGRESS NOTES
"Subjective:       Patient ID: Meryl Johnson is a 62 y.o. female.    Vitals:  height is 5' 7" (1.702 m) and weight is 127 kg (280 lb). Her oral temperature is 97.8 °F (36.6 °C). Her blood pressure is 150/82 (abnormal) and her pulse is 77. Her respiration is 18 and oxygen saturation is 97%.     Chief Complaint: Generalized Body Aches and Nasal Congestion    Other   This is a new problem. The current episode started 1 to 4 weeks ago. The problem occurs intermittently. The problem has been gradually worsening. Associated symptoms include chills, congestion, coughing and headaches. Pertinent negatives include no abdominal pain, chest pain, fever, myalgias, nausea or sore throat. Nothing aggravates the symptoms. She has tried NSAIDs for the symptoms. The treatment provided mild relief.     Review of Systems   Constitution: Positive for chills and malaise/fatigue. Negative for fever.   HENT: Positive for congestion and ear pain. Negative for hoarse voice and sore throat.    Eyes: Negative for discharge and redness.   Cardiovascular: Negative for chest pain, dyspnea on exertion and leg swelling.   Respiratory: Positive for cough. Negative for shortness of breath, sputum production and wheezing.    Musculoskeletal: Negative for myalgias.   Gastrointestinal: Negative for abdominal pain and nausea.   Neurological: Positive for headaches.       Objective:      Physical Exam   Constitutional: She is oriented to person, place, and time. She appears well-developed and well-nourished. She is cooperative.  Non-toxic appearance. She does not appear ill. No distress.   HENT:   Head: Normocephalic and atraumatic.   Right Ear: Hearing, external ear and ear canal normal. A middle ear effusion is present.   Left Ear: Hearing, external ear and ear canal normal. A middle ear effusion is present.   Nose: Mucosal edema and rhinorrhea present. No nasal deformity. No epistaxis. Right sinus exhibits no maxillary sinus tenderness and no " frontal sinus tenderness. Left sinus exhibits no maxillary sinus tenderness and no frontal sinus tenderness.   Mouth/Throat: Uvula is midline, oropharynx is clear and moist and mucous membranes are normal. No trismus in the jaw. Normal dentition. No uvula swelling. No posterior oropharyngeal erythema.   Eyes: Conjunctivae and lids are normal. No scleral icterus.   Sclera clear bilat   Neck: Trachea normal, full passive range of motion without pain and phonation normal. Neck supple.   Cardiovascular: Normal rate, regular rhythm, normal heart sounds, intact distal pulses and normal pulses.    Pulmonary/Chest: Effort normal. No accessory muscle usage. No respiratory distress. She has no decreased breath sounds. She has no wheezes. She has no rhonchi. She has no rales.   Mild upper airway congestion   Abdominal: Soft. Normal appearance and bowel sounds are normal. She exhibits no distension. There is no tenderness.   Musculoskeletal: Normal range of motion. She exhibits no edema or deformity.   Neurological: She is alert and oriented to person, place, and time. She exhibits normal muscle tone. Coordination normal.   Skin: Skin is warm, dry and intact. She is not diaphoretic. No pallor.   Psychiatric: She has a normal mood and affect. Her speech is normal and behavior is normal. Judgment and thought content normal. Cognition and memory are normal.   Nursing note and vitals reviewed.      Assessment:       1. Sinobronchitis    2. Body aches    3. Cough        Plan:         Sinobronchitis  -     betamethasone acetate-betamethasone sodium phosphate injection 6 mg; Inject 1 mL (6 mg total) into the muscle one time.  -     doxycycline (VIBRAMYCIN) 100 MG Cap; Take 1 capsule (100 mg total) by mouth 2 (two) times daily.  Dispense: 20 capsule; Refill: 0  -     promethazine-codeine 6.25-10 mg/5 ml (PHENERGAN WITH CODEINE) 6.25-10 mg/5 mL syrup; Take 5 mLs by mouth every 4 (four) hours as needed for Cough.  Dispense: 118 mL;  Refill: 0    Body aches  -     POCT Influenza A/B    Cough          Bronchitis, Viral (Adult)    You have a viral bronchitis. Bronchitis is inflammation and swelling of the lining of the lungs. This is often caused by an infection. Symptoms include a dry, hacking cough that is worse at night. The cough may bring up yellow-green mucus. You may also feel short of breath or wheeze. Other symptoms may include tiredness, chest discomfort, and chills.  Bronchitis that is caused by a virus is not treated with antibiotics. Instead, medicines may be given to help relieve symptoms. Symptoms can last up to 2 weeks, although the cough may last much longer.  This illness is contagious during the first few days and is spread through the air by coughing and sneezing, or by direct contact (touching the sick person and then touching your own eyes, nose, or mouth).  Most viral illnesses resolve within 10 to 14 days with rest and simple home remedies, although they may sometimes last for several weeks.  Home care  · If symptoms are severe, rest at home for the first 2 to 3 days. When you go back to your usual activities, don't let yourself get too tired.  · Do not smoke. Also avoid being exposed to secondhand smoke.  · You may use over-the-counter medicine to control fever or pain, unless another pain medicine was prescribed. (Note: If you have chronic liver or kidney disease or have ever had a stomach ulcer or gastrointestinal bleeding, talk with your healthcare provider before using these medicines. Also talk to your provider if you are taking medicine to prevent blood clots.) Aspirin should never be given to anyone younger than 18 years of age who is ill with a viral infection or fever. It may cause severe liver or brain damage.  · Your appetite may be poor, so a light diet is fine. Avoid dehydration by drinking 6 to 8 glasses of fluids per day (such as water, soft drinks, sports drinks, juices, tea, or soup). Extra fluids will  help loosen secretions in the nose and lungs.  · Over-the-counter cough, cold, and sore-throat medicines will not shorten the length of the illness, but they may help to reduce symptoms. (Note: Do not use decongestants if you have high blood pressure.)  Follow-up care  Follow up with your healthcare provider, or as advised. If you had an X-ray or ECG (electrocardiogram), a specialist will review it. You will be notified of any new findings that may affect your care.  Note: If you are age 65 or older, or if you have a chronic lung disease or condition that affects your immune system, or you smoke, talk to your healthcare provider about having pneumococcal vaccinations and a yearly influenza vaccination (flu shot).  When to seek medical advice  Call your healthcare provider right away if any of these occur:  · Fever of 100.4°F (38°C) or higher  · Coughing up increased amounts of colored sputum  · Weakness, drowsiness, headache, facial pain, ear pain, or a stiff neck  Call 911, or get immediate medical care  Contact emergency services right away if any of these occur:  · Coughing up blood  · Worsening weakness, drowsiness, headache, or stiff neck  · Trouble breathing, wheezing, or pain with breathing  Date Last Reviewed: 9/13/2015  © 9873-9541 [employer]. [address] [place] [address], [place] [address]. All rights reserved. This information is not intended as a substitute for professional medical care. Always follow your healthcare professional's instructions.      Please follow up with your Primary care provider within 2-5 days if your signs and symptoms have not resolved or worsen.     If your condition worsens or fails to improve we recommend that you receive another evaluation at the emergency room immediately or contact your primary medical clinic to discuss your concerns.   You must understand that you have received an Urgent Care treatment only and that you may be released before all of your medical  problems are known or treated. You, the patient, will arrange for follow up care as instructed.

## 2017-12-30 NOTE — PATIENT INSTRUCTIONS
Bronchitis, Viral (Adult)    You have a viral bronchitis. Bronchitis is inflammation and swelling of the lining of the lungs. This is often caused by an infection. Symptoms include a dry, hacking cough that is worse at night. The cough may bring up yellow-green mucus. You may also feel short of breath or wheeze. Other symptoms may include tiredness, chest discomfort, and chills.  Bronchitis that is caused by a virus is not treated with antibiotics. Instead, medicines may be given to help relieve symptoms. Symptoms can last up to 2 weeks, although the cough may last much longer.  This illness is contagious during the first few days and is spread through the air by coughing and sneezing, or by direct contact (touching the sick person and then touching your own eyes, nose, or mouth).  Most viral illnesses resolve within 10 to 14 days with rest and simple home remedies, although they may sometimes last for several weeks.  Home care  · If symptoms are severe, rest at home for the first 2 to 3 days. When you go back to your usual activities, don't let yourself get too tired.  · Do not smoke. Also avoid being exposed to secondhand smoke.  · You may use over-the-counter medicine to control fever or pain, unless another pain medicine was prescribed. (Note: If you have chronic liver or kidney disease or have ever had a stomach ulcer or gastrointestinal bleeding, talk with your healthcare provider before using these medicines. Also talk to your provider if you are taking medicine to prevent blood clots.) Aspirin should never be given to anyone younger than 18 years of age who is ill with a viral infection or fever. It may cause severe liver or brain damage.  · Your appetite may be poor, so a light diet is fine. Avoid dehydration by drinking 6 to 8 glasses of fluids per day (such as water, soft drinks, sports drinks, juices, tea, or soup). Extra fluids will help loosen secretions in the nose and lungs.  · Over-the-counter  cough, cold, and sore-throat medicines will not shorten the length of the illness, but they may help to reduce symptoms. (Note: Do not use decongestants if you have high blood pressure.)  Follow-up care  Follow up with your healthcare provider, or as advised. If you had an X-ray or ECG (electrocardiogram), a specialist will review it. You will be notified of any new findings that may affect your care.  Note: If you are age 65 or older, or if you have a chronic lung disease or condition that affects your immune system, or you smoke, talk to your healthcare provider about having pneumococcal vaccinations and a yearly influenza vaccination (flu shot).  When to seek medical advice  Call your healthcare provider right away if any of these occur:  · Fever of 100.4°F (38°C) or higher  · Coughing up increased amounts of colored sputum  · Weakness, drowsiness, headache, facial pain, ear pain, or a stiff neck  Call 911, or get immediate medical care  Contact emergency services right away if any of these occur:  · Coughing up blood  · Worsening weakness, drowsiness, headache, or stiff neck  · Trouble breathing, wheezing, or pain with breathing  Date Last Reviewed: 9/13/2015  © 8466-2272 [employer]. [address] [place] [address], [place] [address]. All rights reserved. This information is not intended as a substitute for professional medical care. Always follow your healthcare professional's instructions.      Please follow up with your Primary care provider within 2-5 days if your signs and symptoms have not resolved or worsen.     If your condition worsens or fails to improve we recommend that you receive another evaluation at the emergency room immediately or contact your primary medical clinic to discuss your concerns.   You must understand that you have received an Urgent Care treatment only and that you may be released before all of your medical problems are known or treated. You, the patient, will arrange for  follow up care as instructed.

## 2018-01-11 ENCOUNTER — PATIENT MESSAGE (OUTPATIENT)
Dept: INTERNAL MEDICINE | Facility: CLINIC | Age: 63
End: 2018-01-11

## 2018-01-11 DIAGNOSIS — J32.9 SINOBRONCHITIS: ICD-10-CM

## 2018-01-11 DIAGNOSIS — J40 SINOBRONCHITIS: ICD-10-CM

## 2018-01-11 RX ORDER — FLUTICASONE PROPIONATE 50 MCG
2 SPRAY, SUSPENSION (ML) NASAL DAILY
Qty: 16 G | Refills: 1 | Status: SHIPPED | OUTPATIENT
Start: 2018-01-11 | End: 2019-09-06

## 2018-01-11 RX ORDER — PROMETHAZINE HYDROCHLORIDE AND CODEINE PHOSPHATE 6.25; 1 MG/5ML; MG/5ML
5 SOLUTION ORAL EVERY 4 HOURS PRN
Qty: 118 ML | Refills: 0 | Status: SHIPPED | OUTPATIENT
Start: 2018-01-11 | End: 2018-01-11 | Stop reason: SDUPTHER

## 2018-01-11 RX ORDER — PROMETHAZINE HYDROCHLORIDE AND CODEINE PHOSPHATE 6.25; 1 MG/5ML; MG/5ML
5 SOLUTION ORAL EVERY 4 HOURS PRN
Qty: 118 ML | Refills: 0 | Status: SHIPPED | OUTPATIENT
Start: 2018-01-11 | End: 2018-07-10 | Stop reason: CLARIF

## 2018-01-15 RX ORDER — AZELASTINE 1 MG/ML
1 SPRAY, METERED NASAL 2 TIMES DAILY
Qty: 30 ML | Refills: 1 | Status: SHIPPED | OUTPATIENT
Start: 2018-01-15 | End: 2018-06-04

## 2018-03-05 ENCOUNTER — PATIENT MESSAGE (OUTPATIENT)
Dept: INTERNAL MEDICINE | Facility: CLINIC | Age: 63
End: 2018-03-05

## 2018-03-05 ENCOUNTER — APPOINTMENT (OUTPATIENT)
Dept: LAB | Facility: HOSPITAL | Age: 63
End: 2018-03-05
Attending: INTERNAL MEDICINE
Payer: COMMERCIAL

## 2018-03-05 DIAGNOSIS — N39.0 URINARY TRACT INFECTION WITHOUT HEMATURIA, SITE UNSPECIFIED: Primary | ICD-10-CM

## 2018-03-06 RX ORDER — SULFAMETHOXAZOLE AND TRIMETHOPRIM 800; 160 MG/1; MG/1
1 TABLET ORAL 2 TIMES DAILY
Qty: 10 TABLET | Refills: 0 | Status: SHIPPED | OUTPATIENT
Start: 2018-03-06 | End: 2018-03-11

## 2018-04-04 DIAGNOSIS — E11.9 TYPE 2 DIABETES MELLITUS WITHOUT COMPLICATION: ICD-10-CM

## 2018-04-04 RX ORDER — CHLORTHALIDONE 25 MG/1
TABLET ORAL
Qty: 30 TABLET | Refills: 11 | Status: SHIPPED | OUTPATIENT
Start: 2018-04-04 | End: 2019-04-10 | Stop reason: SDUPTHER

## 2018-05-10 ENCOUNTER — PATIENT MESSAGE (OUTPATIENT)
Dept: INTERNAL MEDICINE | Facility: CLINIC | Age: 63
End: 2018-05-10

## 2018-06-03 ENCOUNTER — PATIENT MESSAGE (OUTPATIENT)
Dept: INTERNAL MEDICINE | Facility: CLINIC | Age: 63
End: 2018-06-03

## 2018-06-04 ENCOUNTER — OFFICE VISIT (OUTPATIENT)
Dept: INTERNAL MEDICINE | Facility: CLINIC | Age: 63
End: 2018-06-04
Payer: COMMERCIAL

## 2018-06-04 ENCOUNTER — TELEPHONE (OUTPATIENT)
Dept: INTERNAL MEDICINE | Facility: CLINIC | Age: 63
End: 2018-06-04

## 2018-06-04 VITALS
HEIGHT: 67 IN | BODY MASS INDEX: 44.49 KG/M2 | HEART RATE: 75 BPM | WEIGHT: 283.5 LBS | DIASTOLIC BLOOD PRESSURE: 72 MMHG | OXYGEN SATURATION: 96 % | SYSTOLIC BLOOD PRESSURE: 118 MMHG | TEMPERATURE: 98 F

## 2018-06-04 DIAGNOSIS — R10.9 RIGHT FLANK PAIN: Primary | ICD-10-CM

## 2018-06-04 DIAGNOSIS — R42 DIZZINESS: ICD-10-CM

## 2018-06-04 DIAGNOSIS — E11.40 TYPE 2 DIABETES MELLITUS WITH DIABETIC NEUROPATHY, WITHOUT LONG-TERM CURRENT USE OF INSULIN: ICD-10-CM

## 2018-06-04 LAB
BILIRUB SERPL-MCNC: ABNORMAL MG/DL
BLOOD URINE, POC: NEGATIVE
COLOR, POC UA: YELLOW
GLUCOSE SERPL-MCNC: 133 MG/DL (ref 70–110)
GLUCOSE UR QL STRIP: NORMAL
KETONES UR QL STRIP: NEGATIVE
LEUKOCYTE ESTERASE URINE, POC: ABNORMAL
NITRITE, POC UA: NEGATIVE
PH, POC UA: 5
PROTEIN, POC: ABNORMAL
SPECIFIC GRAVITY, POC UA: 1.02
UROBILINOGEN, POC UA: 1

## 2018-06-04 PROCEDURE — 3078F DIAST BP <80 MM HG: CPT | Mod: CPTII,S$GLB,, | Performed by: NURSE PRACTITIONER

## 2018-06-04 PROCEDURE — 3074F SYST BP LT 130 MM HG: CPT | Mod: CPTII,S$GLB,, | Performed by: NURSE PRACTITIONER

## 2018-06-04 PROCEDURE — 3008F BODY MASS INDEX DOCD: CPT | Mod: CPTII,S$GLB,, | Performed by: NURSE PRACTITIONER

## 2018-06-04 PROCEDURE — 99213 OFFICE O/P EST LOW 20 MIN: CPT | Mod: 25,S$GLB,, | Performed by: NURSE PRACTITIONER

## 2018-06-04 PROCEDURE — 87086 URINE CULTURE/COLONY COUNT: CPT

## 2018-06-04 PROCEDURE — 81002 URINALYSIS NONAUTO W/O SCOPE: CPT | Mod: S$GLB,,, | Performed by: NURSE PRACTITIONER

## 2018-06-04 PROCEDURE — 82948 REAGENT STRIP/BLOOD GLUCOSE: CPT | Mod: S$GLB,,, | Performed by: NURSE PRACTITIONER

## 2018-06-04 PROCEDURE — 99999 PR PBB SHADOW E&M-EST. PATIENT-LVL V: CPT | Mod: PBBFAC,,, | Performed by: NURSE PRACTITIONER

## 2018-06-04 PROCEDURE — 3045F PR MOST RECENT HEMOGLOBIN A1C LEVEL 7.0-9.0%: CPT | Mod: CPTII,S$GLB,, | Performed by: NURSE PRACTITIONER

## 2018-06-04 NOTE — TELEPHONE ENCOUNTER
----- Message from Stephany Walters sent at 6/4/2018  6:14 AM CDT -----  Contact: self  Pt would like to be seen today for pains in her side and her sugar level shooting up very high last night.  I offered pt an appt with another physician but she rather see Dr Martinez.     She can be reached at [phone] or [phone]

## 2018-06-04 NOTE — TELEPHONE ENCOUNTER
Pt states she has been feeling very weak lately and her sugar was high at 316 . She is scheduled to see UC today at 12pm

## 2018-06-04 NOTE — PROGRESS NOTES
Subjective:       Patient ID: Meryl Johnson is a 62 y.o. female.    Chief Complaint: Fatigue    Ms. Johnson has felt dizziness when moving from seated to standing position for 3 days. She reports drinking adequate water. She had 1 episode of very elevated blood sugar yesterday afternoon (300s) but her blood sugar returned to under 200 after a few hours.  She had eaten toaster waffles with syrup for breakfast about 5 hours before the elevated glucose reading.       Fatigue   This is a new problem. The current episode started in the past 7 days. Episode frequency: on and off all day. Associated symptoms include fatigue and weakness. Pertinent negatives include no abdominal pain, anorexia, change in bowel habit, chest pain, congestion, coughing, diaphoresis, headaches, nausea, neck pain, rash, swollen glands, urinary symptoms, visual change or vomiting. The symptoms are aggravated by standing. She has tried position changes for the symptoms. The treatment provided significant relief.     Review of Systems   Constitutional: Positive for fatigue. Negative for diaphoresis.   HENT: Negative for congestion.    Eyes: Negative for visual disturbance.   Respiratory: Negative for cough.    Cardiovascular: Negative for chest pain.   Gastrointestinal: Negative for abdominal pain, anorexia, change in bowel habit, nausea and vomiting.   Genitourinary: Negative for dysuria.   Musculoskeletal: Negative for neck pain.   Skin: Negative for rash.   Neurological: Positive for weakness. Negative for headaches.   Psychiatric/Behavioral: Negative for confusion.       Objective:      Physical Exam   Constitutional: She is oriented to person, place, and time. She appears well-developed. No distress.   obese   Eyes: EOM are normal. No scleral icterus.   Nystagmus bilaterally with head thrust.    Neck: Normal range of motion. Neck supple.   Cardiovascular: Normal rate, regular rhythm and normal heart sounds.    Pulmonary/Chest: Effort normal  and breath sounds normal. No respiratory distress. She has no wheezes. She has no rales.   Abdominal: Soft. Bowel sounds are normal. She exhibits no distension and no mass. There is no tenderness. There is no guarding.   Neurological: She is alert and oriented to person, place, and time.   Skin: Skin is warm and dry. She is not diaphoretic.   Psychiatric: She has a normal mood and affect. Her behavior is normal.   Nursing note and vitals reviewed.      Assessment:       1. Right flank pain    2. Type 2 diabetes mellitus with diabetic neuropathy, without long-term current use of insulin    3. Dizziness        Plan:   1. Right flank pain  - POCT urine dipstick without microscope  - Urine culture    2. Type 2 diabetes mellitus with diabetic neuropathy, without long-term current use of insulin  - POCT glucose    3. Dizziness  - Increase PO fluid intake (urine sg high, elevated heart rate with positional changes).  - If no improvement will order trial of antivert       Pt has been given instructions populated from [employer] database and has verbalized understanding of the after visit summary and information contained wherein.    Follow up with a primary care provider. May go to ER for acute shortness of breath, lightheadedness, fever, or any other emergent complaints or changes in condition.

## 2018-06-06 ENCOUNTER — PATIENT MESSAGE (OUTPATIENT)
Dept: INTERNAL MEDICINE | Facility: CLINIC | Age: 63
End: 2018-06-06

## 2018-06-06 LAB — BACTERIA UR CULT: NORMAL

## 2018-06-22 RX ORDER — ATORVASTATIN CALCIUM 40 MG/1
TABLET, FILM COATED ORAL
Qty: 30 TABLET | Refills: 11 | Status: CANCELLED | OUTPATIENT
Start: 2018-06-22

## 2018-06-25 RX ORDER — ATORVASTATIN CALCIUM 40 MG/1
TABLET, FILM COATED ORAL
Qty: 30 TABLET | Refills: 11 | Status: SHIPPED | OUTPATIENT
Start: 2018-06-25 | End: 2019-08-14

## 2018-07-06 ENCOUNTER — PATIENT MESSAGE (OUTPATIENT)
Dept: INTERNAL MEDICINE | Facility: CLINIC | Age: 63
End: 2018-07-06

## 2018-07-06 RX ORDER — SULFAMETHOXAZOLE AND TRIMETHOPRIM 800; 160 MG/1; MG/1
1 TABLET ORAL 2 TIMES DAILY
Qty: 10 TABLET | Refills: 0 | Status: SHIPPED | OUTPATIENT
Start: 2018-07-06 | End: 2018-07-11

## 2018-07-09 ENCOUNTER — TELEPHONE (OUTPATIENT)
Dept: INTERNAL MEDICINE | Facility: CLINIC | Age: 63
End: 2018-07-09

## 2018-07-09 ENCOUNTER — LAB VISIT (OUTPATIENT)
Dept: LAB | Facility: HOSPITAL | Age: 63
End: 2018-07-09
Attending: INTERNAL MEDICINE
Payer: COMMERCIAL

## 2018-07-09 ENCOUNTER — PATIENT MESSAGE (OUTPATIENT)
Dept: INTERNAL MEDICINE | Facility: CLINIC | Age: 63
End: 2018-07-09

## 2018-07-09 DIAGNOSIS — E78.5 HYPERLIPIDEMIA, UNSPECIFIED HYPERLIPIDEMIA TYPE: ICD-10-CM

## 2018-07-09 DIAGNOSIS — Z00.00 ANNUAL PHYSICAL EXAM: Primary | ICD-10-CM

## 2018-07-09 DIAGNOSIS — E11.40 TYPE 2 DIABETES MELLITUS WITH DIABETIC NEUROPATHY, WITHOUT LONG-TERM CURRENT USE OF INSULIN: ICD-10-CM

## 2018-07-09 DIAGNOSIS — I10 ESSENTIAL HYPERTENSION: ICD-10-CM

## 2018-07-09 DIAGNOSIS — Z00.00 ANNUAL PHYSICAL EXAM: ICD-10-CM

## 2018-07-09 LAB
ALBUMIN SERPL BCP-MCNC: 3.8 G/DL
ALP SERPL-CCNC: 150 U/L
ALT SERPL W/O P-5'-P-CCNC: 21 U/L
ANION GAP SERPL CALC-SCNC: 9 MMOL/L
AST SERPL-CCNC: 21 U/L
BASOPHILS # BLD AUTO: 0.04 K/UL
BASOPHILS NFR BLD: 0.4 %
BILIRUB SERPL-MCNC: 0.3 MG/DL
BUN SERPL-MCNC: 22 MG/DL
CALCIUM SERPL-MCNC: 9.7 MG/DL
CHLORIDE SERPL-SCNC: 104 MMOL/L
CHOLEST SERPL-MCNC: 191 MG/DL
CHOLEST/HDLC SERPL: 4.1 {RATIO}
CO2 SERPL-SCNC: 22 MMOL/L
CREAT SERPL-MCNC: 1.2 MG/DL
DIFFERENTIAL METHOD: ABNORMAL
EOSINOPHIL # BLD AUTO: 0.1 K/UL
EOSINOPHIL NFR BLD: 0.7 %
ERYTHROCYTE [DISTWIDTH] IN BLOOD BY AUTOMATED COUNT: 16 %
EST. GFR  (AFRICAN AMERICAN): 56 ML/MIN/1.73 M^2
EST. GFR  (NON AFRICAN AMERICAN): 48.6 ML/MIN/1.73 M^2
ESTIMATED AVG GLUCOSE: 180 MG/DL
GLUCOSE SERPL-MCNC: 126 MG/DL
HBA1C MFR BLD HPLC: 7.9 %
HCT VFR BLD AUTO: 36 %
HDLC SERPL-MCNC: 47 MG/DL
HDLC SERPL: 24.6 %
HGB BLD-MCNC: 10.9 G/DL
IMM GRANULOCYTES # BLD AUTO: 0.04 K/UL
IMM GRANULOCYTES NFR BLD AUTO: 0.4 %
LDLC SERPL CALC-MCNC: 98.8 MG/DL
LYMPHOCYTES # BLD AUTO: 3 K/UL
LYMPHOCYTES NFR BLD: 30.9 %
MCH RBC QN AUTO: 24.1 PG
MCHC RBC AUTO-ENTMCNC: 30.3 G/DL
MCV RBC AUTO: 80 FL
MONOCYTES # BLD AUTO: 0.6 K/UL
MONOCYTES NFR BLD: 5.9 %
NEUTROPHILS # BLD AUTO: 5.9 K/UL
NEUTROPHILS NFR BLD: 61.7 %
NONHDLC SERPL-MCNC: 144 MG/DL
NRBC BLD-RTO: 0 /100 WBC
PLATELET # BLD AUTO: 410 K/UL
PMV BLD AUTO: 11.3 FL
POTASSIUM SERPL-SCNC: 4.4 MMOL/L
PROT SERPL-MCNC: 7.7 G/DL
RBC # BLD AUTO: 4.52 M/UL
SODIUM SERPL-SCNC: 135 MMOL/L
TRIGL SERPL-MCNC: 226 MG/DL
TSH SERPL DL<=0.005 MIU/L-ACNC: 2.58 UIU/ML
WBC # BLD AUTO: 9.56 K/UL

## 2018-07-09 PROCEDURE — 83036 HEMOGLOBIN GLYCOSYLATED A1C: CPT

## 2018-07-09 PROCEDURE — 80061 LIPID PANEL: CPT

## 2018-07-09 PROCEDURE — 36415 COLL VENOUS BLD VENIPUNCTURE: CPT | Mod: PO

## 2018-07-09 PROCEDURE — 84443 ASSAY THYROID STIM HORMONE: CPT

## 2018-07-09 PROCEDURE — 80053 COMPREHEN METABOLIC PANEL: CPT

## 2018-07-09 PROCEDURE — 82728 ASSAY OF FERRITIN: CPT

## 2018-07-09 PROCEDURE — 85025 COMPLETE CBC W/AUTO DIFF WBC: CPT

## 2018-07-09 PROCEDURE — 83540 ASSAY OF IRON: CPT

## 2018-07-09 NOTE — TELEPHONE ENCOUNTER
Meryl Martinez MD 1 hour ago (7:46 AM)         Kortney, is this a fasting appoint ? Am i suppose to blood work prior to appointment? I do not see anyof that in the messages.         Please place lab orders

## 2018-07-10 ENCOUNTER — PATIENT MESSAGE (OUTPATIENT)
Dept: INTERNAL MEDICINE | Facility: CLINIC | Age: 63
End: 2018-07-10

## 2018-07-10 ENCOUNTER — OFFICE VISIT (OUTPATIENT)
Dept: INTERNAL MEDICINE | Facility: CLINIC | Age: 63
End: 2018-07-10
Payer: COMMERCIAL

## 2018-07-10 VITALS
HEART RATE: 78 BPM | DIASTOLIC BLOOD PRESSURE: 82 MMHG | BODY MASS INDEX: 45.33 KG/M2 | WEIGHT: 288.81 LBS | HEIGHT: 67 IN | SYSTOLIC BLOOD PRESSURE: 131 MMHG

## 2018-07-10 DIAGNOSIS — N18.30 TYPE 2 DIABETES MELLITUS WITH STAGE 3 CHRONIC KIDNEY DISEASE, WITHOUT LONG-TERM CURRENT USE OF INSULIN: ICD-10-CM

## 2018-07-10 DIAGNOSIS — Z00.00 ANNUAL PHYSICAL EXAM: Primary | ICD-10-CM

## 2018-07-10 DIAGNOSIS — I10 ESSENTIAL HYPERTENSION: ICD-10-CM

## 2018-07-10 DIAGNOSIS — E78.5 HYPERLIPIDEMIA, UNSPECIFIED HYPERLIPIDEMIA TYPE: ICD-10-CM

## 2018-07-10 DIAGNOSIS — E11.22 TYPE 2 DIABETES MELLITUS WITH STAGE 3 CHRONIC KIDNEY DISEASE, WITHOUT LONG-TERM CURRENT USE OF INSULIN: ICD-10-CM

## 2018-07-10 DIAGNOSIS — Z12.11 COLON CANCER SCREENING: ICD-10-CM

## 2018-07-10 DIAGNOSIS — D64.9 CHRONIC ANEMIA: ICD-10-CM

## 2018-07-10 LAB
FERRITIN SERPL-MCNC: 6 NG/ML
IRON SERPL-MCNC: 30 UG/DL
SATURATED IRON: 6 %
TOTAL IRON BINDING CAPACITY: 493 UG/DL
TRANSFERRIN SERPL-MCNC: 333 MG/DL

## 2018-07-10 PROCEDURE — 3079F DIAST BP 80-89 MM HG: CPT | Mod: CPTII,S$GLB,, | Performed by: INTERNAL MEDICINE

## 2018-07-10 PROCEDURE — 3045F PR MOST RECENT HEMOGLOBIN A1C LEVEL 7.0-9.0%: CPT | Mod: CPTII,S$GLB,, | Performed by: INTERNAL MEDICINE

## 2018-07-10 PROCEDURE — 99396 PREV VISIT EST AGE 40-64: CPT | Mod: S$GLB,,, | Performed by: INTERNAL MEDICINE

## 2018-07-10 PROCEDURE — 99999 PR PBB SHADOW E&M-EST. PATIENT-LVL IV: CPT | Mod: PBBFAC,,, | Performed by: INTERNAL MEDICINE

## 2018-07-10 PROCEDURE — 3075F SYST BP GE 130 - 139MM HG: CPT | Mod: CPTII,S$GLB,, | Performed by: INTERNAL MEDICINE

## 2018-07-10 NOTE — TELEPHONE ENCOUNTER
Call lab     see if the ferritin iron TIBC level can be added to lab studies from yesterday,     ICD 10 code D50.9

## 2018-07-10 NOTE — PROGRESS NOTES
PAST MEDICAL HISTORY:  Type 2 diabetes with peripheral neuropathy  Hypertension.  Hyperlipidemia.  Helicobacter pylori which has been treated.  Obesity with gastric bypass surgery.  Left foot ulcer, fifth metatarsal, debridement    SOCIAL HISTORY:  Tobacco and alcohol use - none.  Works in Security at [employer].    No formal exercise routine.    FAMILY HISTORY:  Mother is , COPD, heart disease.  Father is ,   liver cancer, heart disease.  Two sisters, but one sister recently passed away   from metastatic liver disease, the primary is unknown; the other sister has   diabetes and hypothyroid disease.      REASON FOR VISIT:  This is a 62-year-old female who comes in for annual routine   visit.  Really no change in health status.  She readily admits that she still   has not been as attentive to diet.  She states that she does stress and eats a   lot of chocolate.    MEDICATIONS:  Benazepril 40 mg.  Atorvastatin 40 mg.  Chlorthalidone 25 mg.  Metformin 500 mg two twice a day, but she is only taking it two in the morning.  Currently, she is finishing up Bactrim DS for UTI.    RECENT LABS:  Hemoglobin A1c 7.9, a little bit worse.  TSH normal.  Cholesterol   191, triglycerides 226.  Chemistry normal except glucose 126, creatinine was   1.2, alkaline phosphatase 150.  Hematocrit 36, MCV 80.  Iron levels were added.    REVIEW OF SYMPTOMS:  Endorses no pains in the chest, palpitations, shortness of   breath, or abdominal pain.  Regular bowel function.  No difficulty urinating.    Occasional urgency.  Occasional right knee arthralgias and nocturnal leg cramps.    PHYSICAL EXAMINATION:  VITAL SIGNS:  Weight is 288 pounds, pulse 76, blood pressure 130/82.  HEENT:  Tympanic membranes normal.  Nasal mucosa is clear.  Oropharynx, no   abnormal findings.  NECK:  No thyromegaly.  No masses.  LUNGS:  Clear breath sounds, good effort.  HEART:  Regular rate and rhythm.  ABDOMEN:  Active bowel sounds, soft, nontender.  No  hepatosplenomegaly or   abdominal masses.  PULSES:  2+ carotid pulses, 2+ pedal pulses.  EXTREMITIES:  No edema.  RECTAL:  Digital rectal exam deferred.    IMPRESSION:  1.  General examination.  2.  Type 2 diabetes with chronic kidney disease, stage III.  3.  Hypertension.  4.  Hyperlipidemia.  5.  Anemia.  6.  Nocturnal leg cramps.    PLAN:  We will have her take metformin two twice a day and refer to   Endocrinology for reassessment.  Proper hydration with water discussed and   attention to diet.  Arrange for a renal Doppler, FIT test for colon cancer   screening.  Right now she is deferring on doing a colonoscopy.  We will set up   again return appointment in four months.                /francisco [id number] review        JAM/HN  dd: 07/10/2018 08:33:12 (CDT)  td: 07/11/2018 04:13:10 (CDT)  Doc ID   #[id number]  Job ID #[id number]    CC:

## 2018-07-11 ENCOUNTER — HOSPITAL ENCOUNTER (OUTPATIENT)
Dept: RADIOLOGY | Facility: HOSPITAL | Age: 63
Discharge: HOME | End: 2018-07-11
Attending: INTERNAL MEDICINE
Payer: COMMERCIAL

## 2018-07-11 DIAGNOSIS — N18.30 TYPE 2 DIABETES MELLITUS WITH STAGE 3 CHRONIC KIDNEY DISEASE, WITHOUT LONG-TERM CURRENT USE OF INSULIN: ICD-10-CM

## 2018-07-11 DIAGNOSIS — E11.22 TYPE 2 DIABETES MELLITUS WITH STAGE 3 CHRONIC KIDNEY DISEASE, WITHOUT LONG-TERM CURRENT USE OF INSULIN: ICD-10-CM

## 2018-07-11 PROCEDURE — 76770 US EXAM ABDO BACK WALL COMP: CPT | Mod: TC

## 2018-07-11 PROCEDURE — 76770 US EXAM ABDO BACK WALL COMP: CPT | Mod: 26,,, | Performed by: RADIOLOGY

## 2018-07-12 ENCOUNTER — LAB VISIT (OUTPATIENT)
Dept: LAB | Facility: HOSPITAL | Age: 63
End: 2018-07-12
Attending: INTERNAL MEDICINE
Payer: COMMERCIAL

## 2018-07-12 ENCOUNTER — PATIENT MESSAGE (OUTPATIENT)
Dept: INTERNAL MEDICINE | Facility: CLINIC | Age: 63
End: 2018-07-12

## 2018-07-12 ENCOUNTER — DOCUMENTATION ONLY (OUTPATIENT)
Dept: INTERNAL MEDICINE | Facility: CLINIC | Age: 63
End: 2018-07-12

## 2018-07-12 DIAGNOSIS — Z12.11 COLON CANCER SCREENING: ICD-10-CM

## 2018-07-12 DIAGNOSIS — D50.9 IRON DEFICIENCY ANEMIA, UNSPECIFIED IRON DEFICIENCY ANEMIA TYPE: Primary | ICD-10-CM

## 2018-07-12 LAB — HEMOCCULT STL QL IA: NEGATIVE

## 2018-07-12 PROCEDURE — 82274 ASSAY TEST FOR BLOOD FECAL: CPT

## 2018-07-12 NOTE — PROGRESS NOTES
Test results reviewed    ultrasound did reveal mild degree of chronic kidney disease    Fit test was negative for blood    Ferritin level was low consistent with iron deficiency      to take Fergon twice a day   referral to gastroenterology regarding this determine if any further evaluations needed such as up endoscopy

## 2018-07-16 ENCOUNTER — TELEPHONE (OUTPATIENT)
Dept: ENDOSCOPY | Facility: HOSPITAL | Age: 63
End: 2018-07-16

## 2018-07-16 ENCOUNTER — OFFICE VISIT (OUTPATIENT)
Dept: GASTROENTEROLOGY | Facility: CLINIC | Age: 63
End: 2018-07-16
Payer: COMMERCIAL

## 2018-07-16 VITALS
HEART RATE: 81 BPM | HEIGHT: 67 IN | WEIGHT: 290.13 LBS | BODY MASS INDEX: 45.54 KG/M2 | SYSTOLIC BLOOD PRESSURE: 104 MMHG | DIASTOLIC BLOOD PRESSURE: 70 MMHG

## 2018-07-16 DIAGNOSIS — Z98.84 HISTORY OF ROUX-EN-Y GASTRIC BYPASS: ICD-10-CM

## 2018-07-16 DIAGNOSIS — D50.9 IRON DEFICIENCY ANEMIA, UNSPECIFIED IRON DEFICIENCY ANEMIA TYPE: Primary | ICD-10-CM

## 2018-07-16 DIAGNOSIS — E66.01 MORBID OBESITY WITH BMI OF 45.0-49.9, ADULT: ICD-10-CM

## 2018-07-16 PROCEDURE — 3078F DIAST BP <80 MM HG: CPT | Mod: CPTII,S$GLB,, | Performed by: PHYSICIAN ASSISTANT

## 2018-07-16 PROCEDURE — 3074F SYST BP LT 130 MM HG: CPT | Mod: CPTII,S$GLB,, | Performed by: PHYSICIAN ASSISTANT

## 2018-07-16 PROCEDURE — 99999 PR PBB SHADOW E&M-EST. PATIENT-LVL III: CPT | Mod: PBBFAC,,, | Performed by: PHYSICIAN ASSISTANT

## 2018-07-16 PROCEDURE — 3008F BODY MASS INDEX DOCD: CPT | Mod: CPTII,S$GLB,, | Performed by: PHYSICIAN ASSISTANT

## 2018-07-16 PROCEDURE — 99204 OFFICE O/P NEW MOD 45 MIN: CPT | Mod: S$GLB,,, | Performed by: PHYSICIAN ASSISTANT

## 2018-07-16 RX ORDER — FERROUS GLUCONATE 324(38)MG
324 TABLET ORAL
COMMUNITY
End: 2019-05-16

## 2018-07-16 NOTE — PROGRESS NOTES
[facility] Consultation Note    Reason for Consult:  The primary encounter diagnosis was Iron deficiency anemia, unspecified iron deficiency anemia type. Diagnoses of History of Milo-en-Y gastric bypass and Morbid obesity with BMI of 45.0-49.9, adult were also pertinent to this visit.    PCP:   Russell Martinez   [address] / [place] [address]    Referring MD:  Russell Martinez Md  [address] [place] [address]    HPI:  This is a 62 y.o. female here for evaluation of  Iron deficiency anemia.  S/P Milo-en-y gastric bypass in 2004     Records sheo a Hx for DUANE x 3 yrs  7/2018 Pertinent labs:   Hgb-10.9  MCV- 80  Iron- 30  Iron Sat-6  TIBC- elevated  Ferritin- 6     Blood in the stool-no  Abdominal pain-no  N/V- no  GERD-occasional  Occasional diarrhea with metformin   Iron Supplement- Advised to start on fergon 324 - which she has not started to take     NSAIDs/ ASA -no  Anticoagulants - no  Antacids -no     EGD -20yrs ago - Dx with [facility]  Colonoscopy- none - she is not interested in a colonoscopy. She is worried about throwing up the prep    Scott screening- Family Hx of:  Colon cancer-no    ROS:  Constitutional: No fevers, chills, No weight loss  ENT: No allergies  CV: No chest pain  Pulm: No cough, No shortness of breath  Ophtho: No vision changes  GI: see HPI  Derm: No rash  Heme: No lymphadenopathy, No bruising  MSK: No arthritis  : No dysuria, No hematuria  Endo: No hot or cold intolerance  Neuro: No syncope, No seizure  Psych: No anxiety, No depression    Medical History:  has a past medical history of Anemia; Diabetes mellitus type II; H. pylori infection; Hyperlipidemia; Hypertension; and Unspecified vitamin D deficiency (4/24/2013).    Surgical History:  has a past surgical history that includes Gastric bypass.    Family History: family history includes COPD in her mother; Cancer in her father and sister; Diabetes in her sister; Heart disease in her father  "and mother; Hyperlipidemia in her sister; Hypothyroidism in her sister; No Known Problems in her brother, maternal aunt, maternal grandfather, maternal grandmother, maternal uncle, paternal aunt, paternal grandfather, paternal grandmother, and paternal uncle..     Social History:  reports that she has never smoked. She has never used smokeless tobacco. She reports that she does not drink alcohol or use drugs.    Review of patient's allergies indicates:   Allergen Reactions    Codeine Nausea Only    Vicodin [hydrocodone-acetaminophen] Nausea Only       Current Outpatient Prescriptions on File Prior to Visit   Medication Sig Dispense Refill    atorvastatin (LIPITOR) 40 MG tablet TAKE ONE TABLET BY MOUTH EVERY DAY 30 tablet 11    benazepril (LOTENSIN) 40 MG tablet TAKE ONE TABLET BY MOUTH ONCE DAILY 30 tablet 11    blood sugar diagnostic (BLOOD GLUCOSE TEST) Strp 1 strip by Misc.(Non-Drug; Combo Route) route 2 (two) times daily before meals. For mikki contour 200 strip 3    chlorthalidone (HYGROTEN) 25 MG Tab TAKE ONE TABLET BY MOUTH ONCE DAILY 30 tablet 11    lancets (ONE TOUCH DELICA LANCETS) 33 gauge Misc 1 lancet by Misc.(Non-Drug; Combo Route) route 2 (two) times daily. 60 each 11    metFORMIN (GLUCOPHAGE) 500 MG tablet TAKE 2 TABLETS BY MOUTH TWO TIMES A DAY WITH MEALS 360 tablet 3    fluticasone (FLONASE) 50 mcg/actuation nasal spray 2 sprays (100 mcg total) by Each Nare route once daily. 16 g 1     No current facility-administered medications on file prior to visit.          Objective Findings:    Vital Signs:  /70   Pulse 81   Ht 5' 7" (1.702 m)   Wt 131.6 kg (290 lb 2 oz)   BMI 45.44 kg/m²   Body mass index is 45.44 kg/m².    Physical Exam:  General Appearance: Well appearing in no acute distress  Head:   Normocephalic, without obvious abnormality  Eyes:    No scleral icterus  ENT: Neck supple, Lips, mucosa, and tongue normal  Lungs: CTA bilaterally in anterior and posterior fields, no " wheezes, no crackles.  Heart:  Regular rate and rhythm, S1, S2 normal, no murmurs heard  Abdomen: Soft, non tender, non distended with positive bowel sounds in all four quadrants.   Extremities: no edema  Skin: No rash  Neurologic: AAO x 3      Labs:  Lab Results   Component Value Date    WBC 9.56 07/09/2018    HGB 10.9 (L) 07/09/2018    HCT 36.0 (L) 07/09/2018     (H) 07/09/2018    CHOL 191 07/09/2018    TRIG 226 (H) 07/09/2018    HDL 47 07/09/2018    ALT 21 07/09/2018    AST 21 07/09/2018     (L) 07/09/2018    K 4.4 07/09/2018     07/09/2018    CREATININE 1.2 07/09/2018    BUN 22 07/09/2018    CO2 22 (L) 07/09/2018    TSH 2.578 07/09/2018    INR 1.1 01/29/2004    GLUF 95 02/07/2005    HGBA1C 7.9 (H) 07/09/2018       Imaging:    Endoscopy:    EGD -20yrs ago - Dx with [facility]  Colonoscopy- none - she is not interested in a colonoscopy. She is worried about throwing up the prep    Assessment:  1. Iron deficiency anemia, unspecified iron deficiency anemia type    2. History of Milo-en-Y gastric bypass    3. Morbid obesity with BMI of 45.0-49.9, adult        63yo F s/p gastric bypass with an atleast 3yr Hx of DUANE. DUANE may be related to gastric bypass, however need to rule out sources of bleeding      Recommendations:  Advised scheduling EGD and colonoscopy, however she does not wish to have a colonoscopy. 7/2018 iFOBT test was negative     2. Schedule EGD to rule out sources of bleeding    No Follow-up on file.      Order summary:  Orders Placed This Encounter    Case request GI: EGD (ESOPHAGOGASTRODUODENOSCOPY)         Thank you so much for allowing me to participate in the care of Meryl Porter PA-C

## 2018-07-16 NOTE — LETTER
July 16, 2018      Russell Martinez MD  [address]  [place] [address]           [facility]  [address]  [place] [address]  Phone: [phone]  Fax: [phone]          Patient: Meryl Johnson   MR Number: [id number]   YOB: 1955   Date of Visit: 7/16/2018       Dear Dr. Russell Martinez:    Thank you for referring Meryl Johnson to me for evaluation. Attached you will find relevant portions of my assessment and plan of care.    If you have questions, please do not hesitate to call me. I look forward to following Meryl Johnson along with you.    Sincerely,    Leanne Porter PA-C    Enclosure  CC:  No Recipients    If you would like to receive this communication electronically, please contact [email or web address] or [phone] to request more information on [employer] access.    For providers and/or their staff who would like to refer a patient to [facility], please contact us through our one-stop-shop provider referral line, [facility], at [phone].    If you feel you have received this communication in error or would no longer like to receive these types of communications, please e-mail [email or web address]

## 2018-07-23 ENCOUNTER — PATIENT MESSAGE (OUTPATIENT)
Dept: INTERNAL MEDICINE | Facility: CLINIC | Age: 63
End: 2018-07-23

## 2018-08-08 ENCOUNTER — ANESTHESIA (OUTPATIENT)
Dept: ENDOSCOPY | Facility: HOSPITAL | Age: 63
End: 2018-08-08
Payer: COMMERCIAL

## 2018-08-08 ENCOUNTER — SURGERY (OUTPATIENT)
Age: 63
End: 2018-08-08

## 2018-08-08 ENCOUNTER — ANESTHESIA EVENT (OUTPATIENT)
Dept: ENDOSCOPY | Facility: HOSPITAL | Age: 63
End: 2018-08-08
Payer: COMMERCIAL

## 2018-08-08 ENCOUNTER — HOSPITAL ENCOUNTER (OUTPATIENT)
Facility: HOSPITAL | Age: 63
Discharge: HOME | End: 2018-08-08
Attending: INTERNAL MEDICINE | Admitting: INTERNAL MEDICINE
Payer: COMMERCIAL

## 2018-08-08 VITALS
RESPIRATION RATE: 17 BRPM | OXYGEN SATURATION: 97 % | HEIGHT: 67 IN | HEART RATE: 73 BPM | TEMPERATURE: 99 F | DIASTOLIC BLOOD PRESSURE: 65 MMHG | SYSTOLIC BLOOD PRESSURE: 137 MMHG | WEIGHT: 270 LBS | BODY MASS INDEX: 42.38 KG/M2

## 2018-08-08 DIAGNOSIS — D50.9 IRON DEFICIENCY ANEMIA, UNSPECIFIED IRON DEFICIENCY ANEMIA TYPE: Primary | ICD-10-CM

## 2018-08-08 DIAGNOSIS — D50.9 IRON DEFICIENCY ANEMIA: ICD-10-CM

## 2018-08-08 LAB — POCT GLUCOSE: 156 MG/DL (ref 70–110)

## 2018-08-08 PROCEDURE — 25000003 PHARM REV CODE 250: Performed by: INTERNAL MEDICINE

## 2018-08-08 PROCEDURE — 25000003 PHARM REV CODE 250: Performed by: NURSE ANESTHETIST, CERTIFIED REGISTERED

## 2018-08-08 PROCEDURE — 43235 EGD DIAGNOSTIC BRUSH WASH: CPT | Performed by: INTERNAL MEDICINE

## 2018-08-08 PROCEDURE — 82962 GLUCOSE BLOOD TEST: CPT | Performed by: INTERNAL MEDICINE

## 2018-08-08 PROCEDURE — 37000008 HC ANESTHESIA 1ST 15 MINUTES: Performed by: INTERNAL MEDICINE

## 2018-08-08 PROCEDURE — E9220 PRA ENDO ANESTHESIA: HCPCS | Mod: ,,, | Performed by: NURSE ANESTHETIST, CERTIFIED REGISTERED

## 2018-08-08 PROCEDURE — 43235 EGD DIAGNOSTIC BRUSH WASH: CPT | Mod: ,,, | Performed by: INTERNAL MEDICINE

## 2018-08-08 PROCEDURE — 63600175 PHARM REV CODE 636 W HCPCS: Performed by: NURSE ANESTHETIST, CERTIFIED REGISTERED

## 2018-08-08 PROCEDURE — 37000009 HC ANESTHESIA EA ADD 15 MINS: Performed by: INTERNAL MEDICINE

## 2018-08-08 RX ORDER — SODIUM CHLORIDE 0.9 % (FLUSH) 0.9 %
3 SYRINGE (ML) INJECTION
Status: DISCONTINUED | OUTPATIENT
Start: 2018-08-08 | End: 2018-08-08 | Stop reason: HOSPADM

## 2018-08-08 RX ORDER — PROPOFOL 10 MG/ML
VIAL (ML) INTRAVENOUS
Status: DISCONTINUED | OUTPATIENT
Start: 2018-08-08 | End: 2018-08-08

## 2018-08-08 RX ORDER — SODIUM CHLORIDE 9 MG/ML
INJECTION, SOLUTION INTRAVENOUS CONTINUOUS
Status: DISCONTINUED | OUTPATIENT
Start: 2018-08-08 | End: 2018-08-08 | Stop reason: HOSPADM

## 2018-08-08 RX ORDER — PROPOFOL 10 MG/ML
VIAL (ML) INTRAVENOUS CONTINUOUS PRN
Status: DISCONTINUED | OUTPATIENT
Start: 2018-08-08 | End: 2018-08-08

## 2018-08-08 RX ORDER — LIDOCAINE HCL/PF 100 MG/5ML
SYRINGE (ML) INTRAVENOUS
Status: DISCONTINUED | OUTPATIENT
Start: 2018-08-08 | End: 2018-08-08

## 2018-08-08 RX ORDER — LABETALOL HYDROCHLORIDE 5 MG/ML
INJECTION, SOLUTION INTRAVENOUS
Status: DISCONTINUED | OUTPATIENT
Start: 2018-08-08 | End: 2018-08-08

## 2018-08-08 RX ADMIN — LABETALOL HYDROCHLORIDE 5 MG: 5 INJECTION, SOLUTION INTRAVENOUS at 08:08

## 2018-08-08 RX ADMIN — PROPOFOL 60 MG: 10 INJECTION, EMULSION INTRAVENOUS at 08:08

## 2018-08-08 RX ADMIN — LIDOCAINE HYDROCHLORIDE 100 MG: 20 INJECTION, SOLUTION INTRAVENOUS at 08:08

## 2018-08-08 RX ADMIN — SODIUM CHLORIDE: 0.9 INJECTION, SOLUTION INTRAVENOUS at 08:08

## 2018-08-08 RX ADMIN — PROPOFOL 200 MCG/KG/MIN: 10 INJECTION, EMULSION INTRAVENOUS at 08:08

## 2018-08-08 NOTE — ANESTHESIA POSTPROCEDURE EVALUATION
"Anesthesia Post Evaluation    Patient: Meryl Johnson    Procedure(s) Performed: Procedure(s) (LRB):  EGD (ESOPHAGOGASTRODUODENOSCOPY) (N/A)    Final Anesthesia Type: general  Patient location during evaluation: PACU  Patient participation: Yes- Able to Participate  Level of consciousness: awake and alert  Post-procedure vital signs: reviewed and stable  Pain management: adequate  Airway patency: patent  PONV status at discharge: No PONV  Anesthetic complications: no      Cardiovascular status: blood pressure returned to baseline  Respiratory status: spontaneous ventilation and room air  Hydration status: euvolemic  Follow-up not needed.        Visit Vitals  /60 (BP Location: Left arm)   Pulse 73   Temp 37.2 °C (99 °F) (Temporal)   Resp 17   Ht 5' 7" (1.702 m)   Wt 122.5 kg (270 lb)   SpO2 97%   Breastfeeding? No   BMI 42.29 kg/m²       Pain/Alen Score: Pain Assessment Performed: Yes (8/8/2018  7:57 AM)  Presence of Pain: denies (8/8/2018  8:48 AM)  Alen Score: 10 (8/8/2018  8:48 AM)      "

## 2018-08-08 NOTE — TRANSFER OF CARE
"Anesthesia Transfer of Care Note    Patient: Meryl Johnson    Procedure(s) Performed: Procedure(s) (LRB):  EGD (ESOPHAGOGASTRODUODENOSCOPY) (N/A)    Patient location: PACU    Anesthesia Type: general    Transport from OR: Transported from OR on room air with adequate spontaneous ventilation    Post pain: adequate analgesia    Post assessment: no apparent anesthetic complications and tolerated procedure well    Post vital signs: stable    Level of consciousness: sedated and responds to stimulation    Nausea/Vomiting: no nausea/vomiting    Complications: none    Transfer of care protocol was followed      Last vitals:   Visit Vitals  BP (!) 158/70 (BP Location: Left arm, Patient Position: Lying)   Pulse 78   Temp 36.7 °C (98 °F)   Resp 16   Ht 5' 7" (1.702 m)   Wt 122.5 kg (270 lb)   SpO2 99%   Breastfeeding? No   BMI 42.29 kg/m²     "

## 2018-08-08 NOTE — [employer]OVATION PATIENT INSTRUCTIONS
Discharge Summary/Instructions after an Endoscopic Procedure  Patient Name: Meryl Johnson  Patient MRN: [id number]  Patient YOB: 1955 Wednesday, August 08, 2018  Darius Gerardo MD  RESTRICTIONS:  During your procedure today, you received medications for sedation.  These   medications may affect your judgment, balance and coordination.  Therefore,   for 24 hours, you have the following restrictions:   - DO NOT drive a car, operate machinery, make legal/financial decisions,   sign important papers or drink alcohol.    ACTIVITY:  Today: no heavy lifting, straining or running due to procedural   sedation/anesthesia.  The following day: return to full activity including work.  DIET:  Eat and drink normally unless instructed otherwise.     TREATMENT FOR COMMON SIDE EFFECTS:  - Mild abdominal pain, nausea, belching, bloating or excessive gas:  rest,   eat lightly and use a heating pad.  - Sore Throat: treat with throat lozenges and/or gargle with warm salt   water.  - Because air was used during the procedure, expelling large amounts of air   from your rectum or belching is normal.  - If a bowel prep was taken, you may not have a bowel movement for 1-3 days.    This is normal.  SYMPTOMS TO WATCH FOR AND REPORT TO YOUR PHYSICIAN:  1. Abdominal pain or bloating, other than gas cramps.  2. Chest pain.  3. Back pain.  4. Signs of infection such as: chills or fever occurring within 24 hours   after the procedure.  5. Rectal bleeding, which would show as bright red, maroon, or black stools.   (A tablespoon of blood from the rectum is not serious, especially if   hemorrhoids are present.)  6. Vomiting.  7. Weakness or dizziness.  GO DIRECTLY TO THE NEAREST EMERGENCY ROOM IF YOU HAVE ANY OF THE FOLLOWING:      Difficulty breathing              Chills and/or fever over 101 F   Persistent vomiting and/or vomiting blood   Severe abdominal pain   Severe chest pain   Black, tarry stools   Bleeding- more than one  tablespoon   Any other symptom or condition that you feel may need urgent attention  Your doctor recommends these additional instructions:  If any biopsies were taken, your doctors clinic will contact you in 1 to 2   weeks with any results.  - Patient has a contact number available for emergencies.  The signs and   symptoms of potential delayed complications were discussed with the   patient.  Return to normal activities tomorrow.  Written discharge   instructions were provided to the patient.   - Discharge patient to home.   - Resume previous diet.   - Continue present medications, including Iron supplementation.   - Return to referring provider.   For questions, problems or results please call your physician - Darius Gerardo MD at Work:  [phone].  [facility] [place], EMERGENCY ROOM PHONE NUMBER: [phone]  IF A COMPLICATION OR EMERGENCY SITUATION ARISES AND YOU ARE UNABLE TO REACH   YOUR PHYSICIAN - GO DIRECTLY TO THE EMERGENCY ROOM.  Darius Gerardo MD  8/8/2018 8:32:34 AM  This report has been verified and signed electronically.  [employer]

## 2018-08-08 NOTE — PLAN OF CARE
Discharge instructions given including s/s to notify MD and do not operate heavy machinery. Pt verbalized understanding. Pt denies need for wheelchair. Pt ambulated off of unit with family member at side. Steady gait. No distress noted

## 2018-08-08 NOTE — ANESTHESIA PREPROCEDURE EVALUATION
08/08/2018  Meryl Johnson is a 63 y.o., female.    Past Medical History:   Diagnosis Date    Anemia     Diabetes mellitus type II     H. pylori infection     Hyperlipidemia     Hypertension     Unspecified vitamin D deficiency 4/24/2013     Past Surgical History:   Procedure Laterality Date    GASTRIC BYPASS           Anesthesia Evaluation    I have reviewed the Patient Summary Reports.    I have reviewed the Nursing Notes.   I have reviewed the Medications.     Review of Systems  Anesthesia Hx:  No problems with previous Anesthesia  Neg history of prior surgery. Denies Family Hx of Anesthesia complications.   Denies Personal Hx of Anesthesia complications.   Hematology/Oncology:  Hematology Normal   Oncology Normal     EENT/Dental:EENT/Dental Normal   Cardiovascular:   Hypertension    Pulmonary:  Pulmonary Normal    Renal/:  Renal/ Normal     Hepatic/GI:  Hepatic/GI Normal    Musculoskeletal:  Musculoskeletal Normal    Neurological:  Neurology Normal    Endocrine:   Diabetes    Dermatological:  Skin Normal    Psych:  Psychiatric Normal           Physical Exam  General:  Obesity    Airway/Jaw/Neck:  Airway Findings: Mouth Opening: Normal Tongue: Normal  General Airway Assessment: Adult  Mallampati: II  Improves to I with phonation.  TM Distance: Normal, at least 6 cm  Jaw/Neck Findings:  Neck Findings:  Girth Increased     Eyes/Ears/Nose:  EYES/EARS/NOSE FINDINGS: Normal   Dental:  DENTAL FINDINGS: Normal   Chest/Lungs:  Chest/Lungs Findings: Clear to auscultation, Normal Respiratory Rate     Heart/Vascular:  Heart Findings: Rate: Normal  Rhythm: Regular Rhythm  Sounds: Normal  Heart murmur: negative Vascular Findings: Normal    Abdomen:  Abdomen Findings: Normal    Musculoskeletal:  Musculoskeletal Findings: Normal   Skin:  Skin Findings: Normal    Mental Status:  Mental Status Findings: Normal         Anesthesia Plan  Type of Anesthesia, risks & benefits discussed:  Anesthesia Type:  general  Patient's Preference:   Intra-op Monitoring Plan:   Intra-op Monitoring Plan Comments:   Post Op Pain Control Plan:   Post Op Pain Control Plan Comments:   Induction:   IV  Beta Blocker:  Patient is not currently on a Beta-Blocker (No further documentation required).       Informed Consent: Patient understands risks and agrees with Anesthesia plan.  Questions answered. Anesthesia consent signed with patient.  ASA Score: 3     Day of Surgery Review of History & Physical:    H&P update referred to the provider.         Ready For Surgery From Anesthesia Perspective.

## 2018-08-08 NOTE — H&P (VIEW-ONLY)
[facility] Consultation Note    Reason for Consult:  The primary encounter diagnosis was Iron deficiency anemia, unspecified iron deficiency anemia type. Diagnoses of History of Milo-en-Y gastric bypass and Morbid obesity with BMI of 45.0-49.9, adult were also pertinent to this visit.    PCP:   Russell Martinez   [address] / [place] [address]    Referring MD:  Russell Martinez Md  [address] [place] [address]    HPI:  This is a 62 y.o. female here for evaluation of  Iron deficiency anemia.  S/P Milo-en-y gastric bypass in 2004     Records sheo a Hx for DUANE x 3 yrs  7/2018 Pertinent labs:   Hgb-10.9  MCV- 80  Iron- 30  Iron Sat-6  TIBC- elevated  Ferritin- 6     Blood in the stool-no  Abdominal pain-no  N/V- no  GERD-occasional  Occasional diarrhea with metformin   Iron Supplement- Advised to start on fergon 324 - which she has not started to take     NSAIDs/ ASA -no  Anticoagulants - no  Antacids -no     EGD -20yrs ago - Dx with [facility]  Colonoscopy- none - she is not interested in a colonoscopy. She is worried about throwing up the prep    Scott screening- Family Hx of:  Colon cancer-no    ROS:  Constitutional: No fevers, chills, No weight loss  ENT: No allergies  CV: No chest pain  Pulm: No cough, No shortness of breath  Ophtho: No vision changes  GI: see HPI  Derm: No rash  Heme: No lymphadenopathy, No bruising  MSK: No arthritis  : No dysuria, No hematuria  Endo: No hot or cold intolerance  Neuro: No syncope, No seizure  Psych: No anxiety, No depression    Medical History:  has a past medical history of Anemia; Diabetes mellitus type II; H. pylori infection; Hyperlipidemia; Hypertension; and Unspecified vitamin D deficiency (4/24/2013).    Surgical History:  has a past surgical history that includes Gastric bypass.    Family History: family history includes COPD in her mother; Cancer in her father and sister; Diabetes in her sister; Heart disease in her father  "and mother; Hyperlipidemia in her sister; Hypothyroidism in her sister; No Known Problems in her brother, maternal aunt, maternal grandfather, maternal grandmother, maternal uncle, paternal aunt, paternal grandfather, paternal grandmother, and paternal uncle..     Social History:  reports that she has never smoked. She has never used smokeless tobacco. She reports that she does not drink alcohol or use drugs.    Review of patient's allergies indicates:   Allergen Reactions    Codeine Nausea Only    Vicodin [hydrocodone-acetaminophen] Nausea Only       Current Outpatient Prescriptions on File Prior to Visit   Medication Sig Dispense Refill    atorvastatin (LIPITOR) 40 MG tablet TAKE ONE TABLET BY MOUTH EVERY DAY 30 tablet 11    benazepril (LOTENSIN) 40 MG tablet TAKE ONE TABLET BY MOUTH ONCE DAILY 30 tablet 11    blood sugar diagnostic (BLOOD GLUCOSE TEST) Strp 1 strip by Misc.(Non-Drug; Combo Route) route 2 (two) times daily before meals. For mikki contour 200 strip 3    chlorthalidone (HYGROTEN) 25 MG Tab TAKE ONE TABLET BY MOUTH ONCE DAILY 30 tablet 11    lancets (ONE TOUCH DELICA LANCETS) 33 gauge Misc 1 lancet by Misc.(Non-Drug; Combo Route) route 2 (two) times daily. 60 each 11    metFORMIN (GLUCOPHAGE) 500 MG tablet TAKE 2 TABLETS BY MOUTH TWO TIMES A DAY WITH MEALS 360 tablet 3    fluticasone (FLONASE) 50 mcg/actuation nasal spray 2 sprays (100 mcg total) by Each Nare route once daily. 16 g 1     No current facility-administered medications on file prior to visit.          Objective Findings:    Vital Signs:  /70   Pulse 81   Ht 5' 7" (1.702 m)   Wt 131.6 kg (290 lb 2 oz)   BMI 45.44 kg/m²   Body mass index is 45.44 kg/m².    Physical Exam:  General Appearance: Well appearing in no acute distress  Head:   Normocephalic, without obvious abnormality  Eyes:    No scleral icterus  ENT: Neck supple, Lips, mucosa, and tongue normal  Lungs: CTA bilaterally in anterior and posterior fields, no " wheezes, no crackles.  Heart:  Regular rate and rhythm, S1, S2 normal, no murmurs heard  Abdomen: Soft, non tender, non distended with positive bowel sounds in all four quadrants.   Extremities: no edema  Skin: No rash  Neurologic: AAO x 3      Labs:  Lab Results   Component Value Date    WBC 9.56 07/09/2018    HGB 10.9 (L) 07/09/2018    HCT 36.0 (L) 07/09/2018     (H) 07/09/2018    CHOL 191 07/09/2018    TRIG 226 (H) 07/09/2018    HDL 47 07/09/2018    ALT 21 07/09/2018    AST 21 07/09/2018     (L) 07/09/2018    K 4.4 07/09/2018     07/09/2018    CREATININE 1.2 07/09/2018    BUN 22 07/09/2018    CO2 22 (L) 07/09/2018    TSH 2.578 07/09/2018    INR 1.1 01/29/2004    GLUF 95 02/07/2005    HGBA1C 7.9 (H) 07/09/2018       Imaging:    Endoscopy:    EGD -20yrs ago - Dx with [facility]  Colonoscopy- none - she is not interested in a colonoscopy. She is worried about throwing up the prep    Assessment:  1. Iron deficiency anemia, unspecified iron deficiency anemia type    2. History of Milo-en-Y gastric bypass    3. Morbid obesity with BMI of 45.0-49.9, adult        63yo F s/p gastric bypass with an atleast 3yr Hx of DUANE. DUANE may be related to gastric bypass, however need to rule out sources of bleeding      Recommendations:  Advised scheduling EGD and colonoscopy, however she does not wish to have a colonoscopy. 7/2018 iFOBT test was negative     2. Schedule EGD to rule out sources of bleeding    No Follow-up on file.      Order summary:  Orders Placed This Encounter    Case request GI: EGD (ESOPHAGOGASTRODUODENOSCOPY)         Thank you so much for allowing me to participate in the care of Meryl Porter PA-C

## 2018-08-08 NOTE — DISCHARGE INSTRUCTIONS

## 2018-08-08 NOTE — INTERVAL H&P NOTE
The patient has been examined and the H&P has been reviewed:    There is no interval changes since last encounter.    EGD: Iron def anemia, s/p RYGB  Sedation: GA  ASA: Per anesthesia  Mallampati: Per anesthesia    Endoscopy risks, benefits and alternative options discussed and understood by patient/family.          Active Hospital Problems    Diagnosis  POA    Iron deficiency anemia [D50.9]  Yes      Resolved Hospital Problems    Diagnosis Date Resolved POA   No resolved problems to display.

## 2018-08-15 ENCOUNTER — TELEPHONE (OUTPATIENT)
Dept: ENDOSCOPY | Facility: HOSPITAL | Age: 63
End: 2018-08-15

## 2018-08-31 ENCOUNTER — PATIENT MESSAGE (OUTPATIENT)
Dept: ADMINISTRATIVE | Facility: OTHER | Age: 63
End: 2018-08-31

## 2018-09-27 ENCOUNTER — PATIENT MESSAGE (OUTPATIENT)
Dept: INTERNAL MEDICINE | Facility: CLINIC | Age: 63
End: 2018-09-27

## 2018-09-28 ENCOUNTER — IMMUNIZATION (OUTPATIENT)
Dept: INTERNAL MEDICINE | Facility: CLINIC | Age: 63
End: 2018-09-28
Payer: COMMERCIAL

## 2018-09-28 ENCOUNTER — LAB VISIT (OUTPATIENT)
Dept: LAB | Facility: HOSPITAL | Age: 63
End: 2018-09-28
Attending: INTERNAL MEDICINE
Payer: COMMERCIAL

## 2018-09-28 ENCOUNTER — OFFICE VISIT (OUTPATIENT)
Dept: INTERNAL MEDICINE | Facility: CLINIC | Age: 63
End: 2018-09-28
Payer: COMMERCIAL

## 2018-09-28 VITALS
DIASTOLIC BLOOD PRESSURE: 62 MMHG | OXYGEN SATURATION: 96 % | HEIGHT: 67 IN | WEIGHT: 291 LBS | HEART RATE: 78 BPM | BODY MASS INDEX: 45.67 KG/M2 | SYSTOLIC BLOOD PRESSURE: 124 MMHG

## 2018-09-28 DIAGNOSIS — M79.602 PAIN OF LEFT UPPER EXTREMITY: ICD-10-CM

## 2018-09-28 DIAGNOSIS — D50.9 IRON DEFICIENCY ANEMIA, UNSPECIFIED IRON DEFICIENCY ANEMIA TYPE: ICD-10-CM

## 2018-09-28 DIAGNOSIS — N18.30 TYPE 2 DIABETES MELLITUS WITH STAGE 3 CHRONIC KIDNEY DISEASE, WITHOUT LONG-TERM CURRENT USE OF INSULIN: ICD-10-CM

## 2018-09-28 DIAGNOSIS — G47.62 NOCTURNAL LEG CRAMPS: Primary | ICD-10-CM

## 2018-09-28 DIAGNOSIS — G47.62 NOCTURNAL LEG CRAMPS: ICD-10-CM

## 2018-09-28 DIAGNOSIS — E11.22 TYPE 2 DIABETES MELLITUS WITH STAGE 3 CHRONIC KIDNEY DISEASE, WITHOUT LONG-TERM CURRENT USE OF INSULIN: ICD-10-CM

## 2018-09-28 LAB
ANION GAP SERPL CALC-SCNC: 11 MMOL/L
BASOPHILS # BLD AUTO: 0.03 K/UL
BASOPHILS NFR BLD: 0.3 %
BUN SERPL-MCNC: 21 MG/DL
CALCIUM SERPL-MCNC: 9.6 MG/DL
CHLORIDE SERPL-SCNC: 107 MMOL/L
CK SERPL-CCNC: 128 U/L
CO2 SERPL-SCNC: 20 MMOL/L
CREAT SERPL-MCNC: 1.1 MG/DL
DIFFERENTIAL METHOD: ABNORMAL
EOSINOPHIL # BLD AUTO: 0.1 K/UL
EOSINOPHIL NFR BLD: 1 %
ERYTHROCYTE [DISTWIDTH] IN BLOOD BY AUTOMATED COUNT: 17.1 %
EST. GFR  (AFRICAN AMERICAN): >60 ML/MIN/1.73 M^2
EST. GFR  (NON AFRICAN AMERICAN): 53.6 ML/MIN/1.73 M^2
ESTIMATED AVG GLUCOSE: 177 MG/DL
FERRITIN SERPL-MCNC: 6 NG/ML
GLUCOSE SERPL-MCNC: 101 MG/DL
HBA1C MFR BLD HPLC: 7.8 %
HCT VFR BLD AUTO: 36.6 %
HGB BLD-MCNC: 10.6 G/DL
IMM GRANULOCYTES # BLD AUTO: 0.03 K/UL
IMM GRANULOCYTES NFR BLD AUTO: 0.3 %
IRON SERPL-MCNC: 31 UG/DL
LYMPHOCYTES # BLD AUTO: 2.5 K/UL
LYMPHOCYTES NFR BLD: 26.3 %
MAGNESIUM SERPL-MCNC: 2.1 MG/DL
MCH RBC QN AUTO: 23.4 PG
MCHC RBC AUTO-ENTMCNC: 29 G/DL
MCV RBC AUTO: 81 FL
MONOCYTES # BLD AUTO: 0.6 K/UL
MONOCYTES NFR BLD: 6.3 %
NEUTROPHILS # BLD AUTO: 6.2 K/UL
NEUTROPHILS NFR BLD: 65.8 %
NRBC BLD-RTO: 0 /100 WBC
PLATELET # BLD AUTO: 371 K/UL
PMV BLD AUTO: 11.1 FL
POTASSIUM SERPL-SCNC: 4.7 MMOL/L
RBC # BLD AUTO: 4.53 M/UL
SATURATED IRON: 6 %
SODIUM SERPL-SCNC: 138 MMOL/L
TOTAL IRON BINDING CAPACITY: 505 UG/DL
TRANSFERRIN SERPL-MCNC: 341 MG/DL
WBC # BLD AUTO: 9.39 K/UL

## 2018-09-28 PROCEDURE — 80048 BASIC METABOLIC PNL TOTAL CA: CPT

## 2018-09-28 PROCEDURE — 99214 OFFICE O/P EST MOD 30 MIN: CPT | Mod: 25,S$GLB,, | Performed by: INTERNAL MEDICINE

## 2018-09-28 PROCEDURE — 83735 ASSAY OF MAGNESIUM: CPT

## 2018-09-28 PROCEDURE — 83036 HEMOGLOBIN GLYCOSYLATED A1C: CPT

## 2018-09-28 PROCEDURE — 99999 PR PBB SHADOW E&M-EST. PATIENT-LVL III: CPT | Mod: PBBFAC,,, | Performed by: INTERNAL MEDICINE

## 2018-09-28 PROCEDURE — 3045F PR MOST RECENT HEMOGLOBIN A1C LEVEL 7.0-9.0%: CPT | Mod: CPTII,S$GLB,, | Performed by: INTERNAL MEDICINE

## 2018-09-28 PROCEDURE — 83540 ASSAY OF IRON: CPT

## 2018-09-28 PROCEDURE — 3078F DIAST BP <80 MM HG: CPT | Mod: CPTII,S$GLB,, | Performed by: INTERNAL MEDICINE

## 2018-09-28 PROCEDURE — 90471 IMMUNIZATION ADMIN: CPT | Mod: S$GLB,,, | Performed by: INTERNAL MEDICINE

## 2018-09-28 PROCEDURE — 3074F SYST BP LT 130 MM HG: CPT | Mod: CPTII,S$GLB,, | Performed by: INTERNAL MEDICINE

## 2018-09-28 PROCEDURE — 82728 ASSAY OF FERRITIN: CPT

## 2018-09-28 PROCEDURE — 90686 IIV4 VACC NO PRSV 0.5 ML IM: CPT | Mod: S$GLB,,, | Performed by: INTERNAL MEDICINE

## 2018-09-28 PROCEDURE — 3008F BODY MASS INDEX DOCD: CPT | Mod: CPTII,S$GLB,, | Performed by: INTERNAL MEDICINE

## 2018-09-28 PROCEDURE — 82550 ASSAY OF CK (CPK): CPT

## 2018-09-28 PROCEDURE — 36415 COLL VENOUS BLD VENIPUNCTURE: CPT | Mod: PO

## 2018-09-28 PROCEDURE — 85025 COMPLETE CBC W/AUTO DIFF WBC: CPT

## 2018-09-28 RX ORDER — DILTIAZEM HYDROCHLORIDE 30 MG/1
30 TABLET, FILM COATED ORAL NIGHTLY
Qty: 30 TABLET | Refills: 0 | Status: SHIPPED | OUTPATIENT
Start: 2018-09-28 | End: 2018-12-06

## 2018-09-28 NOTE — PROGRESS NOTES
REASON FOR VISIT:  This is a 63-year-old female.  For months now, she has been   having ongoing cramp pain that she feels in the legs at night.  Sometimes the   foot will be twisted because of such.  Also, for three days, she has been   noticing a muscle type pain in her left arm and her fingers will then cramp and   flex down and this is mainly also at night when she is lying in bed and she is   not lying on the left side.  There are no other symptoms.    PAST MEDICAL HISTORY:  Type 2 diabetes.  Hypertension.  Hyperlipidemia.    Recently on evaluation, she was noted to be iron deficient.  A FIT test was   negative and an upper endoscopy showed evidence of gastric bypass, but no acute   abnormal findings.    PHYSICAL EXAMINATION:  VITAL SIGNS:  Weight is 291 pounds, blood pressure 128/62.  LUNGS:  Clear.  HEART:  Regular rate and rhythm.  EXTREMITIES:  1+ biceps, triceps, 1+ knee and trace ankle jerk reflexes.    Negative Tinel sign.  PULSES:  1+ pedal pulses.    IMPRESSION:  1.  Muscular arm pain.  2.  Nocturnal leg cramps.    PLAN:  We will give a trial of diltiazem 30 mg at bedtime for one month.  Today   we will repeat a CBC, ferritin and iron levels, chemistry and hemoglobin A1c and   magnesium.  Phone review to followup.          /francisco [id number] review        CLAUS/DARRYL  dd: 09/28/2018 11:51:14 (CDT)  td: 09/29/2018 03:20:15 (CDT)  Doc ID   #[id number]  Job ID #[id number]    CC:

## 2018-09-29 ENCOUNTER — PATIENT MESSAGE (OUTPATIENT)
Dept: INTERNAL MEDICINE | Facility: CLINIC | Age: 63
End: 2018-09-29

## 2018-09-29 DIAGNOSIS — E11.40 TYPE 2 DIABETES MELLITUS WITH DIABETIC NEUROPATHY, WITHOUT LONG-TERM CURRENT USE OF INSULIN: ICD-10-CM

## 2018-09-29 DIAGNOSIS — D50.9 IRON DEFICIENCY ANEMIA, UNSPECIFIED IRON DEFICIENCY ANEMIA TYPE: Primary | ICD-10-CM

## 2018-09-29 NOTE — PROGRESS NOTES
Message was sent to the patient regarding labs    This still a significant iron deficiency anemia despite taking iron supplementation    will inquire to determine if she is actually doing this and referred to Gastroenterology for further evaluation      Also referred to Endocrinology regarding her type 2 diabetes and still suboptimal control      possibly the use of Victoza or Truclity may be of benefit

## 2018-10-01 ENCOUNTER — PATIENT MESSAGE (OUTPATIENT)
Dept: INTERNAL MEDICINE | Facility: CLINIC | Age: 63
End: 2018-10-01

## 2018-10-15 ENCOUNTER — PATIENT OUTREACH (OUTPATIENT)
Dept: OTHER | Facility: OTHER | Age: 63
End: 2018-10-15

## 2018-10-15 NOTE — PROGRESS NOTES
Last 5 Patient Entered Readings                                      Current 30 Day Average: 110/63     Recent Readings 9/20/2018 9/19/2018 9/18/2018 9/17/2018 9/16/2018    SBP (mmHg) 108 105 120 123 93    DBP (mmHg) 67 65 62 68 51    Pulse 90 72 90 74 108        10/16: RCB at 3:30pm

## 2018-10-15 NOTE — LETTER
October 30, 2018     Meryl Johnson  [address] [place] [id number]       Dear Meryl,    Thank you for enrolling in the [facility]. To participate in our programs, we ask that you submit weekly home readings through your [facility] account and maintain regular contact with your Care Team.  We have not received any data or heard from you in some time.     The Digital Medicine Care Team has attempted to reach you on multiple occasions to determine if you would like to continue participating in the program. While we encourage you to continue participating fully, we understand that circumstances may change.      To continue participating in the program, please contact me at . If we do not hear back, you will be un-enrolled and your physician will be notified of your decision.    If you have submitted home readings readings during the past 40 days and believe you are receiving this letter in error, please call the Digital Medicine Patient Support Line at [phone] for troubleshooting.      We look forward to hearing from you soon.    Sincerely,     Sage Johnson  [facility]  [phone]

## 2018-10-16 NOTE — PROGRESS NOTES
Last 5 Patient Entered Readings                                      Current 30 Day Average: 110/63     Recent Readings 9/20/2018 9/19/2018 9/18/2018 9/17/2018 9/16/2018    SBP (mmHg) 108 105 120 123 93    DBP (mmHg) 67 65 62 68 51    Pulse 90 72 90 74 108          10/16: No voice on the other side of the phone, just white noise.  Will call on 10/18 to complete enrollment call.

## 2018-10-18 NOTE — PROGRESS NOTES
Last 5 Patient Entered Readings                                      Current 30 Day Average: 111/65     Recent Readings 9/20/2018 9/19/2018 9/18/2018 9/17/2018 9/16/2018    SBP (mmHg) 108 105 120 123 93    DBP (mmHg) 67 65 62 68 51    Pulse 90 72 90 74 108          10/18: LVM.  Will call back on 10/23 to complete enrollment call.

## 2018-10-22 ENCOUNTER — PATIENT MESSAGE (OUTPATIENT)
Dept: INTERNAL MEDICINE | Facility: CLINIC | Age: 63
End: 2018-10-22

## 2018-10-23 RX ORDER — BENAZEPRIL HYDROCHLORIDE 40 MG/1
TABLET ORAL
Qty: 30 TABLET | Refills: 11 | Status: SHIPPED | OUTPATIENT
Start: 2018-10-23 | End: 2019-12-09 | Stop reason: SDUPTHER

## 2018-10-23 RX ORDER — BENAZEPRIL HYDROCHLORIDE 40 MG/1
TABLET ORAL
Qty: 30 TABLET | Refills: 11 | Status: CANCELLED | OUTPATIENT
Start: 2018-10-23

## 2018-10-23 NOTE — PROGRESS NOTES
Last 5 Patient Entered Readings                                      Current 30 Day Average:      Recent Readings 9/20/2018 9/19/2018 9/18/2018 9/17/2018 9/16/2018    SBP (mmHg) 108 105 120 123 93    DBP (mmHg) 67 65 62 68 51    Pulse 90 72 90 74 108          10/23: LVM.  Sent MyChart.  Will call in 1 week to complete enrollment call.

## 2018-10-30 NOTE — PROGRESS NOTES
Last 5 Patient Entered Readings                                      Current 30 Day Average:      Recent Readings 9/20/2018 9/19/2018 9/18/2018 9/17/2018 9/16/2018    SBP (mmHg) 108 105 120 123 93    DBP (mmHg) 67 65 62 68 51    Pulse 90 72 90 74 108        10/30: LVM.  Non compliance.  Will call in 2 weeks.

## 2018-11-02 ENCOUNTER — PATIENT MESSAGE (OUTPATIENT)
Dept: ADMINISTRATIVE | Facility: OTHER | Age: 63
End: 2018-11-02

## 2018-11-02 DIAGNOSIS — E11.9 TYPE 2 DIABETES MELLITUS: ICD-10-CM

## 2018-11-05 ENCOUNTER — PATIENT MESSAGE (OUTPATIENT)
Dept: INTERNAL MEDICINE | Facility: CLINIC | Age: 63
End: 2018-11-05

## 2018-11-05 ENCOUNTER — LAB VISIT (OUTPATIENT)
Dept: LAB | Facility: HOSPITAL | Age: 63
End: 2018-11-05
Attending: INTERNAL MEDICINE
Payer: COMMERCIAL

## 2018-11-05 DIAGNOSIS — D64.9 CHRONIC ANEMIA: ICD-10-CM

## 2018-11-05 DIAGNOSIS — E11.22 TYPE 2 DIABETES MELLITUS WITH STAGE 3 CHRONIC KIDNEY DISEASE, WITHOUT LONG-TERM CURRENT USE OF INSULIN: ICD-10-CM

## 2018-11-05 DIAGNOSIS — E78.5 HYPERLIPIDEMIA, UNSPECIFIED HYPERLIPIDEMIA TYPE: ICD-10-CM

## 2018-11-05 DIAGNOSIS — N18.30 TYPE 2 DIABETES MELLITUS WITH STAGE 3 CHRONIC KIDNEY DISEASE, WITHOUT LONG-TERM CURRENT USE OF INSULIN: ICD-10-CM

## 2018-11-05 DIAGNOSIS — Z00.00 ANNUAL PHYSICAL EXAM: ICD-10-CM

## 2018-11-05 DIAGNOSIS — I10 ESSENTIAL HYPERTENSION: ICD-10-CM

## 2018-11-05 LAB
ANION GAP SERPL CALC-SCNC: 11 MMOL/L
BASOPHILS # BLD AUTO: 0.04 K/UL
BASOPHILS NFR BLD: 0.5 %
BUN SERPL-MCNC: 26 MG/DL
CALCIUM SERPL-MCNC: 9.5 MG/DL
CHLORIDE SERPL-SCNC: 106 MMOL/L
CHOLEST SERPL-MCNC: 172 MG/DL
CHOLEST/HDLC SERPL: 3.8 {RATIO}
CO2 SERPL-SCNC: 22 MMOL/L
CREAT SERPL-MCNC: 1 MG/DL
DIFFERENTIAL METHOD: ABNORMAL
EOSINOPHIL # BLD AUTO: 0.1 K/UL
EOSINOPHIL NFR BLD: 1.1 %
ERYTHROCYTE [DISTWIDTH] IN BLOOD BY AUTOMATED COUNT: 16.8 %
EST. GFR  (AFRICAN AMERICAN): >60 ML/MIN/1.73 M^2
EST. GFR  (NON AFRICAN AMERICAN): >60 ML/MIN/1.73 M^2
ESTIMATED AVG GLUCOSE: 174 MG/DL
GLUCOSE SERPL-MCNC: 137 MG/DL
HBA1C MFR BLD HPLC: 7.7 %
HCT VFR BLD AUTO: 36.6 %
HDLC SERPL-MCNC: 45 MG/DL
HDLC SERPL: 26.2 %
HGB BLD-MCNC: 10.5 G/DL
IMM GRANULOCYTES # BLD AUTO: 0.02 K/UL
IMM GRANULOCYTES NFR BLD AUTO: 0.3 %
LDLC SERPL CALC-MCNC: 96.4 MG/DL
LYMPHOCYTES # BLD AUTO: 2.1 K/UL
LYMPHOCYTES NFR BLD: 28.7 %
MCH RBC QN AUTO: 23.5 PG
MCHC RBC AUTO-ENTMCNC: 28.7 G/DL
MCV RBC AUTO: 82 FL
MONOCYTES # BLD AUTO: 0.6 K/UL
MONOCYTES NFR BLD: 8 %
NEUTROPHILS # BLD AUTO: 4.5 K/UL
NEUTROPHILS NFR BLD: 61.4 %
NONHDLC SERPL-MCNC: 127 MG/DL
NRBC BLD-RTO: 0 /100 WBC
PLATELET # BLD AUTO: 361 K/UL
PMV BLD AUTO: 11.5 FL
POTASSIUM SERPL-SCNC: 4.5 MMOL/L
RBC # BLD AUTO: 4.46 M/UL
SODIUM SERPL-SCNC: 139 MMOL/L
TRIGL SERPL-MCNC: 153 MG/DL
WBC # BLD AUTO: 7.34 K/UL

## 2018-11-05 PROCEDURE — 80061 LIPID PANEL: CPT

## 2018-11-05 PROCEDURE — 83036 HEMOGLOBIN GLYCOSYLATED A1C: CPT

## 2018-11-05 PROCEDURE — 36415 COLL VENOUS BLD VENIPUNCTURE: CPT

## 2018-11-05 PROCEDURE — 80048 BASIC METABOLIC PNL TOTAL CA: CPT

## 2018-11-05 PROCEDURE — 85025 COMPLETE CBC W/AUTO DIFF WBC: CPT

## 2018-11-13 NOTE — PROGRESS NOTES
Last 5 Patient Entered Readings                                      Current 30 Day Average:      Recent Readings 9/20/2018 9/19/2018 9/18/2018 9/17/2018 9/16/2018    SBP (mmHg) 108 105 120 123 93    DBP (mmHg) 67 65 62 68 51    Pulse 90 72 90 74 108          11/13:  Final attempt.  LVM.  If no response in 2 weeks, will drop from Digital Medicine registry.

## 2018-11-27 NOTE — PROGRESS NOTES
Last 5 Patient Entered Readings                                      Current 30 Day Average:      Recent Readings 9/20/2018 9/19/2018 9/18/2018 9/17/2018 9/16/2018    SBP (mmHg) 108 105 120 123 93    DBP (mmHg) 67 65 62 68 51    Pulse 90 72 90 74 108          11/27: No response from patient.  Removing her from digital medicine registry.

## 2018-12-06 ENCOUNTER — LAB VISIT (OUTPATIENT)
Dept: LAB | Facility: HOSPITAL | Age: 63
End: 2018-12-06
Attending: INTERNAL MEDICINE
Payer: COMMERCIAL

## 2018-12-06 ENCOUNTER — OFFICE VISIT (OUTPATIENT)
Dept: INTERNAL MEDICINE | Facility: CLINIC | Age: 63
End: 2018-12-06
Payer: COMMERCIAL

## 2018-12-06 VITALS
WEIGHT: 284.38 LBS | HEIGHT: 67 IN | BODY MASS INDEX: 44.63 KG/M2 | OXYGEN SATURATION: 98 % | SYSTOLIC BLOOD PRESSURE: 128 MMHG | HEART RATE: 85 BPM | DIASTOLIC BLOOD PRESSURE: 80 MMHG

## 2018-12-06 DIAGNOSIS — D50.9 IRON DEFICIENCY ANEMIA, UNSPECIFIED IRON DEFICIENCY ANEMIA TYPE: ICD-10-CM

## 2018-12-06 DIAGNOSIS — E78.5 HYPERLIPIDEMIA, UNSPECIFIED HYPERLIPIDEMIA TYPE: ICD-10-CM

## 2018-12-06 DIAGNOSIS — E11.40 TYPE 2 DIABETES MELLITUS WITH DIABETIC NEUROPATHY, WITHOUT LONG-TERM CURRENT USE OF INSULIN: Primary | ICD-10-CM

## 2018-12-06 DIAGNOSIS — M54.6 ACUTE RIGHT-SIDED THORACIC BACK PAIN: ICD-10-CM

## 2018-12-06 DIAGNOSIS — I10 ESSENTIAL HYPERTENSION: ICD-10-CM

## 2018-12-06 LAB
FERRITIN SERPL-MCNC: 9 NG/ML
IRON SERPL-MCNC: 37 UG/DL
SATURATED IRON: 8 %
TOTAL IRON BINDING CAPACITY: 466 UG/DL
TRANSFERRIN SERPL-MCNC: 315 MG/DL

## 2018-12-06 PROCEDURE — 99214 OFFICE O/P EST MOD 30 MIN: CPT | Mod: S$GLB,,, | Performed by: INTERNAL MEDICINE

## 2018-12-06 PROCEDURE — 82728 ASSAY OF FERRITIN: CPT

## 2018-12-06 PROCEDURE — 36415 COLL VENOUS BLD VENIPUNCTURE: CPT | Mod: PO

## 2018-12-06 PROCEDURE — 3074F SYST BP LT 130 MM HG: CPT | Mod: CPTII,S$GLB,, | Performed by: INTERNAL MEDICINE

## 2018-12-06 PROCEDURE — 99999 PR PBB SHADOW E&M-EST. PATIENT-LVL III: CPT | Mod: PBBFAC,,, | Performed by: INTERNAL MEDICINE

## 2018-12-06 PROCEDURE — 3008F BODY MASS INDEX DOCD: CPT | Mod: CPTII,S$GLB,, | Performed by: INTERNAL MEDICINE

## 2018-12-06 PROCEDURE — 83540 ASSAY OF IRON: CPT

## 2018-12-06 PROCEDURE — 3045F PR MOST RECENT HEMOGLOBIN A1C LEVEL 7.0-9.0%: CPT | Mod: CPTII,S$GLB,, | Performed by: INTERNAL MEDICINE

## 2018-12-06 PROCEDURE — 3079F DIAST BP 80-89 MM HG: CPT | Mod: CPTII,S$GLB,, | Performed by: INTERNAL MEDICINE

## 2018-12-06 RX ORDER — METHOCARBAMOL 500 MG/1
500 TABLET, FILM COATED ORAL 3 TIMES DAILY
Qty: 30 TABLET | Refills: 0 | Status: SHIPPED | OUTPATIENT
Start: 2018-12-06 | End: 2018-12-18

## 2018-12-06 NOTE — PROGRESS NOTES
PAST MEDICAL HISTORY:  Type 2 diabetes with peripheral neuropathy  Hypertension.  Hyperlipidemia.  Helicobacter pylori which has been treated.  Obesity with gastric bypass surgery.  Left foot ulcer, fifth metatarsal, debridement  Iron deficiency anemia  Noturnal leg cramps      Upper endoscopy August 2018, gastric bypass status, no ulcerations or active bleeding          REASON FOR VISIT:  This is a 63-year-old female.  She comes in for a followup   visit.    The only thing noted lately she has been having pain in the left lateral   thoracic region, particularly when she turns or moves her body.  About four   years ago, there was a situation that they had an MRI of the thoracic spine,   which was unrevealing.    In regard to iron deficiency anemia, she had an upper endoscopy August 8th,   which showed the changes of the gastric bypass, but no other acute findings.  It   looks like since September she has been taking iron twice a day.    CURRENT MEDICATIONS:  1.  Atorvastatin 40 mg.  2.  Benazepril 40 mg.  3.  Chlorthalidone 25 mg.  4.  Fergon one twice a day.  5.  Metformin 500 mg two twice a day.    ON RECENT LABS:  Basic metabolic profile was normal except glucose 136,   creatinine 1.0, cholesterol 172, HDL 45, LDL 96.  Hemoglobin A1c was 7.7, but   the last two times, it was 7.8 or 7.9.  Hemoglobin and hematocrit 10.5-36.6,   unchanged compared to two months ago.    SYSTEMS REVIEW:  Endorses no chest pain, shortness of breath.  No abdominal   pain.  Bowel function is regular.  No difficulty urinating.    PHYSICAL EXAMINATION:  VITAL SIGNS:  Weight is 284 pounds, pulse 80, blood pressure 128/72.  NECK:  No thyromegaly.  LUNGS:  Clear.  HEART:  Regular rate and rhythm.  ABDOMEN:  Active bowel sounds, soft, nontender.  No masses.  PULSES:  2+ carotid pulses, 1+ pedal pulse.  There is a tender area when I   palpate over the right mid thoracic region.    IMPRESSION:  1.  Type 2 diabetes.  2.  Hypertension.  3.   Hyperlipidemia.  4.  Iron deficiency anemia.  5.  Thoracic back pain.    PLAN:  Methocarbamol 500 mg three times a day for 10 days prescribed.  Today, we   will get Fergon and iron levels.  Regarding diabetes, discussed about going on   Trulicity.  There was one time she was on Victoza, but I believe that it caused   nausea.    ADDENDUM:  On last visit, she was having nocturnal leg cramps, but did not take   the diltiazem; however, it is not much of a problem right now.          /[email or web address] [id number] review        JAM/DARRYL  dd: 12/06/2018 08:54:43 (CST)  td: 12/06/2018 23:34:23 (CST)  Doc ID   #[id number]  Job ID #[id number]    CC:

## 2018-12-07 ENCOUNTER — PATIENT MESSAGE (OUTPATIENT)
Dept: INTERNAL MEDICINE | Facility: CLINIC | Age: 63
End: 2018-12-07

## 2018-12-07 NOTE — PROGRESS NOTES
The ferritin marker for iron body stores is still low, although the actual iron level in the blood is normal    See if you can take the Fergon medicine 3 times a day    Also were you able to get the new medicine Trulicity regarding the diabetes

## 2018-12-10 ENCOUNTER — TELEPHONE (OUTPATIENT)
Dept: PHARMACY | Facility: CLINIC | Age: 63
End: 2018-12-10

## 2018-12-10 ENCOUNTER — PATIENT MESSAGE (OUTPATIENT)
Dept: INTERNAL MEDICINE | Facility: CLINIC | Age: 63
End: 2018-12-10

## 2018-12-12 ENCOUNTER — OFFICE VISIT (OUTPATIENT)
Dept: OPTOMETRY | Facility: CLINIC | Age: 63
End: 2018-12-12
Payer: COMMERCIAL

## 2018-12-12 DIAGNOSIS — H52.4 PRESBYOPIA OF BOTH EYES: ICD-10-CM

## 2018-12-12 DIAGNOSIS — H52.01 HYPERMETROPIA OF RIGHT EYE: ICD-10-CM

## 2018-12-12 DIAGNOSIS — Z01.00 DIABETIC EYE EXAM: Primary | ICD-10-CM

## 2018-12-12 DIAGNOSIS — E11.9 DIABETIC EYE EXAM: Primary | ICD-10-CM

## 2018-12-12 DIAGNOSIS — H52.12 MYOPIA OF LEFT EYE: ICD-10-CM

## 2018-12-12 DIAGNOSIS — E11.9 TYPE 2 DIABETES MELLITUS WITHOUT RETINOPATHY: ICD-10-CM

## 2018-12-12 PROCEDURE — 92014 COMPRE OPH EXAM EST PT 1/>: CPT | Mod: S$GLB,,, | Performed by: OPTOMETRIST

## 2018-12-12 PROCEDURE — 92015 DETERMINE REFRACTIVE STATE: CPT | Mod: S$GLB,,, | Performed by: OPTOMETRIST

## 2018-12-12 PROCEDURE — 99999 PR PBB SHADOW E&M-EST. PATIENT-LVL III: CPT | Mod: PBBFAC,,, | Performed by: OPTOMETRIST

## 2018-12-12 NOTE — PROGRESS NOTES
"HPI     Patient's age: 63 y.o. WF  Occupation: [employer]  Approximate date of last eye examination:  10/11/2017  Name of last eye doctor seen:   City/State: [facility]  Wears glasses? Yes      If yes, wears  Full-time or part-time?  Part time  Present glasses are: Bifocal, SV Distance, SV Reading?  Bifocals  Approximate age of present glasses:  2- yrs    Got new glasses following last exam, or subsequently?:  No    Any problem with VA with glasses?  Possible VA changes   Wears CLs?:  No   Headaches?  Frontal headaches occasionally - does seem to be related to   ocular/vision problems  Eye pain/discomfort?  No                                                                                      Flashes?  No   Floaters?  No   Diplopia/Double vision?  No   Patient's Ocular History:         Any eye surgeries? No          Any eye injury?  No          Any treatment for eye disease?  No   Family history of eye disease?  Sister + Diabetic   Significant patient medical history:         1. Diabetes?  Yes        If yes, IDDM or NIDDM?  NIDDM    2. HBP?  Yes               3. Other (describe):  No    ! OTC eyedrops currently using:  No    ! Prescription eye meds currently using:  No    ! Any history of allergy/adverse reaction to any eye meds used   previously?  No     ! Any history of allergy/adverse reaction to eyedrops used during prior   eye exam(s)? No     ! Any history of allergy/adverse reaction to Novacaine or similar meds?   No    ! Any history of allergy/adverse reaction to Epinephrine or similar meds?   No     ! Patient okay with use of anesthetic eyedrops to check eye pressure?    Yes         ! Patient okay with use of eyedrops to dilate pupils today?  Yes    !  Allergies/Medications/Medical History/Family History reviewed today?    Yes       PD =   67/63  Desired reading distance =  16.75"                                                              Last edited by Charles Harris, OD on 12/12/2018 10:57 AM. " (History)            Assessment /Plan     For exam results, see Encounter Report.    1. Diabetic eye exam     2. Type 2 diabetes mellitus without retinopathy     3. Hypermetropia of right eye     4. Myopia of left eye     5. Presbyopia of both eyes                 Trace nuclear sclerosis of lens in both eyes, and early peripheral cortical cataract in both eyes.  Monitor only.  Otherwise, good ocular health in each eye.  No evidence of of diabetic or hypertensive retinal changes in either eye.  Slight hyperopia in the right  eye, and slght myopia in the left eye.  Presbyopia consistent with age.  New spectacle lens Rx(s) issued for use as desired.  Recheck in 12 - 18 months.

## 2018-12-26 RX ORDER — METFORMIN HYDROCHLORIDE 500 MG/1
TABLET ORAL
Qty: 360 TABLET | Refills: 3 | Status: SHIPPED | OUTPATIENT
Start: 2018-12-26 | End: 2020-04-06

## 2019-03-19 ENCOUNTER — OFFICE VISIT (OUTPATIENT)
Dept: ENDOCRINOLOGY | Facility: CLINIC | Age: 64
End: 2019-03-19
Payer: COMMERCIAL

## 2019-03-19 VITALS
SYSTOLIC BLOOD PRESSURE: 108 MMHG | BODY MASS INDEX: 45.64 KG/M2 | DIASTOLIC BLOOD PRESSURE: 60 MMHG | HEART RATE: 78 BPM | HEIGHT: 67 IN | WEIGHT: 290.81 LBS

## 2019-03-19 DIAGNOSIS — E11.40 TYPE 2 DIABETES MELLITUS WITH DIABETIC NEUROPATHY, WITHOUT LONG-TERM CURRENT USE OF INSULIN: Primary | ICD-10-CM

## 2019-03-19 DIAGNOSIS — E66.01 MORBID OBESITY: ICD-10-CM

## 2019-03-19 DIAGNOSIS — Z98.84 HISTORY OF WEIGHT LOSS SURGERY: ICD-10-CM

## 2019-03-19 PROCEDURE — 3074F PR MOST RECENT SYSTOLIC BLOOD PRESSURE < 130 MM HG: ICD-10-PCS | Mod: CPTII,S$GLB,, | Performed by: INTERNAL MEDICINE

## 2019-03-19 PROCEDURE — 99204 PR OFFICE/OUTPT VISIT, NEW, LEVL IV, 45-59 MIN: ICD-10-PCS | Mod: S$GLB,,, | Performed by: INTERNAL MEDICINE

## 2019-03-19 PROCEDURE — 3078F DIAST BP <80 MM HG: CPT | Mod: CPTII,S$GLB,, | Performed by: INTERNAL MEDICINE

## 2019-03-19 PROCEDURE — 99999 PR PBB SHADOW E&M-EST. PATIENT-LVL IV: ICD-10-PCS | Mod: PBBFAC,,, | Performed by: INTERNAL MEDICINE

## 2019-03-19 PROCEDURE — 3045F PR MOST RECENT HEMOGLOBIN A1C LEVEL 7.0-9.0%: ICD-10-PCS | Mod: CPTII,S$GLB,, | Performed by: INTERNAL MEDICINE

## 2019-03-19 PROCEDURE — 3074F SYST BP LT 130 MM HG: CPT | Mod: CPTII,S$GLB,, | Performed by: INTERNAL MEDICINE

## 2019-03-19 PROCEDURE — 3045F PR MOST RECENT HEMOGLOBIN A1C LEVEL 7.0-9.0%: CPT | Mod: CPTII,S$GLB,, | Performed by: INTERNAL MEDICINE

## 2019-03-19 PROCEDURE — 3078F PR MOST RECENT DIASTOLIC BLOOD PRESSURE < 80 MM HG: ICD-10-PCS | Mod: CPTII,S$GLB,, | Performed by: INTERNAL MEDICINE

## 2019-03-19 PROCEDURE — 99204 OFFICE O/P NEW MOD 45 MIN: CPT | Mod: S$GLB,,, | Performed by: INTERNAL MEDICINE

## 2019-03-19 PROCEDURE — 99999 PR PBB SHADOW E&M-EST. PATIENT-LVL IV: CPT | Mod: PBBFAC,,, | Performed by: INTERNAL MEDICINE

## 2019-03-19 PROCEDURE — 3008F BODY MASS INDEX DOCD: CPT | Mod: CPTII,S$GLB,, | Performed by: INTERNAL MEDICINE

## 2019-03-19 PROCEDURE — 3008F PR BODY MASS INDEX (BMI) DOCUMENTED: ICD-10-PCS | Mod: CPTII,S$GLB,, | Performed by: INTERNAL MEDICINE

## 2019-03-19 NOTE — LETTER
March 19, 2019      Russell Martinez MD  [address]  [place] [address]           [facility] - Endocrinology  [address]  [place] [address]  Phone: [phone]          Patient: Meryl Johnson   MR Number: [id number]   YOB: 1955   Date of Visit: 3/19/2019       Dear Dr. Russell Martinez:    Thank you for referring Meryl Johnson to me for evaluation. Attached you will find relevant portions of my assessment and plan of care.    If you have questions, please do not hesitate to call me. I look forward to following Meryl Johnson along with you.    Sincerely,    Lotus Sherwood MD    Enclosure  CC:  No Recipients    If you would like to receive this communication electronically, please contact [email or web address] or [phone] to request more information on [email or web address] access.    For providers and/or their staff who would like to refer a patient to [facility], please contact us through our one-stop-shop provider referral line, [facility], at [phone].    If you feel you have received this communication in error or would no longer like to receive these types of communications, please e-mail [email or web address]

## 2019-03-19 NOTE — PATIENT INSTRUCTIONS
Goal weight loss 5% body weight ( 15 lbs)  Weigh yourself weekly  Increase vegetables, lean proteins, limit carbohydrates.   Choose high fiber (more than 3 - 5 grams/serving) of carbohydrates.     Labs in three months

## 2019-03-19 NOTE — PROGRESS NOTES
Subjective:       Patient ID: Meryl Johnson is a 63 y.o. female.    Chief Complaint: Consult    Meryl Johnson is a 63 y.o. Lady with DM2, HTN, HLD and obesity who is here for management of her DM.    She was diagnosed with DM in [id number].   She has previously been on Insulin in addition to her oral hypoglycemics, underwent a gastric sleeve surgery.  Since she lost weight has been off Insulin.     She currently denies any polyuria, nocturia, polydipsia, burning pain in the feet.     Diet   Breakfast - Grits / Egg and Winter  Lunch - Mac and cheese or skips lunch  Dinner - Pasta / Baked meat  Snacks -  Chocolates / cookies , ice tea    Medications   Metformin 500 mg BID    Tried Victoza previously and could not tolerate it due to nausea  She was started on trulicity but never took it and forgot about it     HbA1C 11/18 - 7.7  CKD - stage 3 - 4 fluctuation    Statin - Yes  ACE inhibiter - Yes    Last eye check up - 12/18 No diabetic retinopathy      No hospitalizations for DKA/HHS or hypoglycemic episodes  Does not check her BS at home.         Review of Systems   Constitutional: Negative for activity change, chills and fever.   HENT: Negative for sore throat and trouble swallowing.    Respiratory: Negative for cough and shortness of breath.    Cardiovascular: Negative for chest pain, palpitations and leg swelling.   Gastrointestinal: Negative for abdominal distention, abdominal pain, diarrhea, nausea and vomiting.   Endocrine: Negative for polydipsia, polyphagia and polyuria.   Genitourinary: Negative for dysuria and hematuria.   Skin: Negative for rash and wound.   Neurological: Negative for dizziness and headaches.   Psychiatric/Behavioral: Negative for agitation and confusion.       Objective:      Physical Exam   Constitutional: She is oriented to person, place, and time. She appears well-developed and well-nourished.   BMI - 45   HENT:   Head: Normocephalic and atraumatic.   Eyes: EOM are normal. Pupils are  equal, round, and reactive to light.   Neck: Normal range of motion. No JVD present. No tracheal deviation present.   Cardiovascular: Normal rate, regular rhythm and normal heart sounds.   No murmur heard.  Pulmonary/Chest: Effort normal and breath sounds normal. She has no wheezes. She has no rales.   Abdominal: Soft. Bowel sounds are normal. She exhibits no distension. There is no tenderness.   Musculoskeletal: Normal range of motion. She exhibits no edema.   Feet:   Right Foot:   Protective Sensation: 5 sites tested. 5 sites sensed.   Left Foot:   Protective Sensation: 5 sites tested. 5 sites sensed.   Neurological: She is alert and oriented to person, place, and time. No cranial nerve deficit.   Psychiatric: She has a normal mood and affect. Judgment normal.       Assessment:       1. Type 2 diabetes mellitus with mild CKD, without long-term current use of insulin        Plan:         63 y.o. lady with DM, mild CKD and obesity is here for management of her DM.    She has an HbA1C of 7.7, currently on Metformin 500mg BID, with a poor diet.    - Lifestyle changes with emphasis on Diet and weight loss  - Will refer to Diabetic Education.  - Start Trulicity, see if she can tolerate it as this will help with weight loss as well.  - If not tolerating will consider starting Tradjenta  - Repeat HbA1C in 3 months    Previously has Vit D Deficiency following with PCP  2015 was 13, DEXA scan last in 2010. Not taking her supplements  - Will recommend repeat  Vit D and DEXA    RTC in 3 months.    Case discussed with Dr Presley Mart  PGY -1   Endocrinology

## 2019-03-25 ENCOUNTER — PATIENT MESSAGE (OUTPATIENT)
Dept: INTERNAL MEDICINE | Facility: CLINIC | Age: 64
End: 2019-03-25

## 2019-03-25 NOTE — PROGRESS NOTES
PAST MEDICAL HISTORY:  Type 2 diabetes with peripheral neuropathy  Hypertension.  Hyperlipidemia.  Helicobacter pylori which has been treated.  Obesity with gastric bypass surgery.  Left foot ulcer, fifth metatarsal, debridement  Iron deficiency anemia  Noturnal leg cramps      Outpatient Medications  atorvastatin (LIPITOR) 40 MG tablet  benazepril (LOTENSIN) 40 MG tablet  chlorthalidone (HYGROTEN) 25 MG Tab  dulaglutide (TRULICITY) 0.75 mg/0.5 mL PnIj q week  ferrous gluconate (FERGON) 324 MG tablet BID  fluticasone (FLONASE) 50 mcg/actuation nasal spray  metFORMIN (GLUCOPHAGE) 500 MG tablet 2 BID          This is a 63-year-old female.  Reason for visit is that now for about five days,   she has been having persistent pain in the right upper quadrant.  There have   been times when she would feel it in the right mid back going up to the upper   back.  No nausea or vomiting.  At times felt a little bit queasy after eating.    No change in bowel function, although it is usually loose.  No change in   urination.  No dysuria.  No acute chest pain.  She will have dyspnea on   exertion, but this is nothing new.    MEDICAL HISTORY AND MEDICATIONS:  Outlined above.  The only abdominal surgery   has been gastric bypass surgery and she has had Helicobacter pylori, which has   been treated.    She has a history of iron deficiency anemia.  An upper GI in August 2018   revealed gastric bypass finding, but the mucosa looked normal.  Also, a fecal   immunochemical test in August 2018 was negative.  She has been resistant on   doing colonoscopies.    PHYSICAL EXAMINATION:  VITAL SIGNS:  Weight is 286 pounds, pulse 72, blood pressure 130/82.  LUNGS:  Clear.  HEART:  Regular rate and rhythm.  ABDOMEN:  Active bowel sounds, soft.  She has an area of discomfort in the right   upper quadrant, more lateral.  Also, mild right flank discomfort.    IMPRESSION:  1. Right upper quadrant pain associated with right flank pain.  Etiologies  could   be that due to gallbladder, gastritis, pancreatitis, urinary tract infection.  2. Type 2 diabetes.  3. History of iron deficiency anemia.  4. Hypertension.    PLAN:  Today, CBC, basic metabolic profile, hepatic function panel.  We will get   a urine for UA, urine C and S, and arrange for abdominal ultrasound.  Phone   review to follow up.        JAM/HN  dd: 03/26/2019 07:51:04 (CDT)  td: 03/26/2019 23:20:31 (CDT)  Doc ID   #[id number]  Job ID #[id number]    CC:                   Ultrasound and lab findings reviewed    The abdominal ultrasound reveal hepatomegaly and hepatic steatosis     nothing abnormal was seen in regard to gallbladder and pancreas      In regard to the labs, the lipase is elevated at 80       the diabetes status is low bit worse at 8.1 hemoglobin A1c       and is still a significant iron deficient anemia despite taking Fergon b.i.d.      In regard to the abdominal pain and elevated lipase, was noted that she met with Endocrinology March 19th, trulicity was started in the 1st injection was a few days prior to the onset of the right upper quadrant abdominal pain    She will contact her endocrinologist terminate the needs to be another medication in its place    Regarding the persistence of iron deficiency anemia, will put in referral to meet with Gastroenterology for any further gastrointestinal workup and referred to Hematology oncology to determine if iron infusions should be given

## 2019-03-26 ENCOUNTER — TELEPHONE (OUTPATIENT)
Dept: INTERNAL MEDICINE | Facility: CLINIC | Age: 64
End: 2019-03-26

## 2019-03-26 ENCOUNTER — OFFICE VISIT (OUTPATIENT)
Dept: INTERNAL MEDICINE | Facility: CLINIC | Age: 64
End: 2019-03-26
Payer: COMMERCIAL

## 2019-03-26 ENCOUNTER — PATIENT MESSAGE (OUTPATIENT)
Dept: INTERNAL MEDICINE | Facility: CLINIC | Age: 64
End: 2019-03-26

## 2019-03-26 ENCOUNTER — HOSPITAL ENCOUNTER (OUTPATIENT)
Dept: RADIOLOGY | Facility: HOSPITAL | Age: 64
Discharge: HOME | End: 2019-03-26
Attending: INTERNAL MEDICINE
Payer: COMMERCIAL

## 2019-03-26 VITALS
WEIGHT: 286 LBS | OXYGEN SATURATION: 97 % | HEIGHT: 67 IN | HEART RATE: 73 BPM | BODY MASS INDEX: 44.89 KG/M2 | DIASTOLIC BLOOD PRESSURE: 82 MMHG | SYSTOLIC BLOOD PRESSURE: 130 MMHG

## 2019-03-26 DIAGNOSIS — D50.9 IRON DEFICIENCY ANEMIA, UNSPECIFIED IRON DEFICIENCY ANEMIA TYPE: ICD-10-CM

## 2019-03-26 DIAGNOSIS — I10 ESSENTIAL HYPERTENSION: Primary | ICD-10-CM

## 2019-03-26 DIAGNOSIS — R10.11 RUQ ABDOMINAL PAIN: Primary | ICD-10-CM

## 2019-03-26 DIAGNOSIS — R10.11 RUQ ABDOMINAL PAIN: ICD-10-CM

## 2019-03-26 DIAGNOSIS — E11.40 TYPE 2 DIABETES MELLITUS WITH DIABETIC NEUROPATHY, WITHOUT LONG-TERM CURRENT USE OF INSULIN: ICD-10-CM

## 2019-03-26 DIAGNOSIS — R10.9 RT FLANK PAIN: ICD-10-CM

## 2019-03-26 DIAGNOSIS — D50.9 IRON DEFICIENCY ANEMIA, UNSPECIFIED IRON DEFICIENCY ANEMIA TYPE: Primary | ICD-10-CM

## 2019-03-26 LAB
BACTERIA #/AREA URNS AUTO: ABNORMAL /HPF
BILIRUB UR QL STRIP: NEGATIVE
CLARITY UR REFRACT.AUTO: ABNORMAL
COLOR UR AUTO: YELLOW
GLUCOSE UR QL STRIP: NEGATIVE
HGB UR QL STRIP: ABNORMAL
HYALINE CASTS UR QL AUTO: 3 /LPF
KETONES UR QL STRIP: NEGATIVE
LEUKOCYTE ESTERASE UR QL STRIP: ABNORMAL
MICROSCOPIC COMMENT: ABNORMAL
NITRITE UR QL STRIP: NEGATIVE
NON-SQ EPI CELLS #/AREA URNS AUTO: <1 /HPF
PH UR STRIP: 5 [PH] (ref 5–8)
PROT UR QL STRIP: NEGATIVE
RBC #/AREA URNS AUTO: >100 /HPF (ref 0–4)
SP GR UR STRIP: 1.01 (ref 1–1.03)
SQUAMOUS #/AREA URNS AUTO: 3 /HPF
URN SPEC COLLECT METH UR: ABNORMAL
WBC #/AREA URNS AUTO: >100 /HPF (ref 0–5)
WBC CLUMPS UR QL AUTO: ABNORMAL
YEAST UR QL AUTO: ABNORMAL

## 2019-03-26 PROCEDURE — 3045F PR MOST RECENT HEMOGLOBIN A1C LEVEL 7.0-9.0%: ICD-10-PCS | Mod: CPTII,S$GLB,, | Performed by: INTERNAL MEDICINE

## 2019-03-26 PROCEDURE — 87186 SC STD MICRODIL/AGAR DIL: CPT

## 2019-03-26 PROCEDURE — 76700 US ABDOMEN COMPLETE: ICD-10-PCS | Mod: 26,,, | Performed by: RADIOLOGY

## 2019-03-26 PROCEDURE — 3045F PR MOST RECENT HEMOGLOBIN A1C LEVEL 7.0-9.0%: CPT | Mod: CPTII,S$GLB,, | Performed by: INTERNAL MEDICINE

## 2019-03-26 PROCEDURE — 3079F PR MOST RECENT DIASTOLIC BLOOD PRESSURE 80-89 MM HG: ICD-10-PCS | Mod: CPTII,S$GLB,, | Performed by: INTERNAL MEDICINE

## 2019-03-26 PROCEDURE — 76700 US EXAM ABDOM COMPLETE: CPT | Mod: TC

## 2019-03-26 PROCEDURE — 81001 URINALYSIS AUTO W/SCOPE: CPT

## 2019-03-26 PROCEDURE — 3075F PR MOST RECENT SYSTOLIC BLOOD PRESS GE 130-139MM HG: ICD-10-PCS | Mod: CPTII,S$GLB,, | Performed by: INTERNAL MEDICINE

## 2019-03-26 PROCEDURE — 99214 OFFICE O/P EST MOD 30 MIN: CPT | Mod: S$GLB,,, | Performed by: INTERNAL MEDICINE

## 2019-03-26 PROCEDURE — 99999 PR PBB SHADOW E&M-EST. PATIENT-LVL III: ICD-10-PCS | Mod: PBBFAC,,, | Performed by: INTERNAL MEDICINE

## 2019-03-26 PROCEDURE — 3008F PR BODY MASS INDEX (BMI) DOCUMENTED: ICD-10-PCS | Mod: CPTII,S$GLB,, | Performed by: INTERNAL MEDICINE

## 2019-03-26 PROCEDURE — 99999 PR PBB SHADOW E&M-EST. PATIENT-LVL III: CPT | Mod: PBBFAC,,, | Performed by: INTERNAL MEDICINE

## 2019-03-26 PROCEDURE — 3079F DIAST BP 80-89 MM HG: CPT | Mod: CPTII,S$GLB,, | Performed by: INTERNAL MEDICINE

## 2019-03-26 PROCEDURE — 87088 URINE BACTERIA CULTURE: CPT

## 2019-03-26 PROCEDURE — 76700 US EXAM ABDOM COMPLETE: CPT | Mod: 26,,, | Performed by: RADIOLOGY

## 2019-03-26 PROCEDURE — 99214 PR OFFICE/OUTPT VISIT, EST, LEVL IV, 30-39 MIN: ICD-10-PCS | Mod: S$GLB,,, | Performed by: INTERNAL MEDICINE

## 2019-03-26 PROCEDURE — 87077 CULTURE AEROBIC IDENTIFY: CPT

## 2019-03-26 PROCEDURE — 3075F SYST BP GE 130 - 139MM HG: CPT | Mod: CPTII,S$GLB,, | Performed by: INTERNAL MEDICINE

## 2019-03-26 PROCEDURE — 87086 URINE CULTURE/COLONY COUNT: CPT

## 2019-03-26 PROCEDURE — 3008F BODY MASS INDEX DOCD: CPT | Mod: CPTII,S$GLB,, | Performed by: INTERNAL MEDICINE

## 2019-03-26 RX ORDER — CIPROFLOXACIN 500 MG/1
500 TABLET ORAL 2 TIMES DAILY
Qty: 20 TABLET | Refills: 0 | Status: SHIPPED | OUTPATIENT
Start: 2019-03-26 | End: 2019-04-05

## 2019-03-26 NOTE — TELEPHONE ENCOUNTER
Put in referral to both Gastroenterology and Hematology oncology for persistent iron deficiency anemia

## 2019-03-28 LAB — BACTERIA UR CULT: NORMAL

## 2019-03-28 NOTE — PROGRESS NOTES
History:     Reason For Consultation:   Anemia    Referring Provider:   Russell Martinez MD  [address] [place] [address]    Records Obtained: Records of the patients history including those obtained from the referring provider were reviewed and summarized in detail.    HPI:   Meryl Johnson presents for consultation of iron deficiency anemia. Patient had labs done recently which showed hemoglobin 10.5, white blood cells 6.6 and platelets 376, MCV 79, ANC 4.5. Iron studies showed iron 39, iron sat 8, TIBC 481 and ferritin 8. She is status post gastric bypass. She had fatigue and leg gramps.     She denies dark black stools/bright red blood per rectum.   She denies chest pain  She denies shortness of breath  EGD 8/8/18: gastric bypass with pouch, intact staple line.     Past Medical   Past Medical History:   Diagnosis Date    Anemia     Diabetes mellitus type II     H. pylori infection     Hyperlipidemia     Hypertension     Unspecified vitamin D deficiency 4/24/2013     Patient Active Problem List   Diagnosis    Hypertension    Hyperlipidemia    Type 2 diabetes mellitus with diabetic neuropathy, without long-term current use of insulin    Vitamin D deficiency    Morbid obesity    Iron deficiency anemia     Social History   Social History     Socioeconomic History    Marital status: Single     Spouse name: Not on file    Number of children: Not on file    Years of education: Not on file    Highest education level: Not on file   Occupational History     Employer: [employer]   Social Needs    Financial resource strain: Not on file    Food insecurity:     Worry: Not on file     Inability: Not on file    Transportation needs:     Medical: Not on file     Non-medical: Not on file   Tobacco Use    Smoking status: Never Smoker    Smokeless tobacco: Never Used   Substance and Sexual Activity    Alcohol use: No    Drug use: No    Sexual activity: Not on file   Lifestyle     Physical activity:     Days per week: Not on file     Minutes per session: Not on file    Stress: Not on file   Relationships    Social connections:     Talks on phone: Not on file     Gets together: Not on file     Attends Yazidism service: Not on file     Active member of club or organization: Not on file     Attends meetings of clubs or organizations: Not on file     Relationship status: Not on file    Intimate partner violence:     Fear of current or ex partner: Not on file     Emotionally abused: Not on file     Physically abused: Not on file     Forced sexual activity: Not on file   Other Topics Concern    Not on file   Social History Narrative    Not on file     Family History  Family History   Problem Relation Age of Onset    COPD Mother     Heart disease Mother     Cancer Father         liver    Heart disease Father     Cancer Sister     Diabetes Sister     Hyperlipidemia Sister     Hypothyroidism Sister     No Known Problems Brother     No Known Problems Maternal Aunt     No Known Problems Maternal Uncle     No Known Problems Paternal Aunt     No Known Problems Paternal Uncle     No Known Problems Maternal Grandmother     No Known Problems Maternal Grandfather     No Known Problems Paternal Grandmother     No Known Problems Paternal Grandfather     Amblyopia Neg Hx     Blindness Neg Hx     Cataracts Neg Hx     Glaucoma Neg Hx     Hypertension Neg Hx     Macular degeneration Neg Hx     Retinal detachment Neg Hx     Strabismus Neg Hx     Stroke Neg Hx     Thyroid disease Neg Hx     Breast cancer Neg Hx     Colon cancer Neg Hx     Ovarian cancer Neg Hx     Stomach cancer Neg Hx     Rectal cancer Neg Hx      Medications    Current Outpatient Medications:     atorvastatin (LIPITOR) 40 MG tablet, TAKE ONE TABLET BY MOUTH EVERY DAY, Disp: 30 tablet, Rfl: 11    benazepril (LOTENSIN) 40 MG tablet, TAKE ONE TABLET BY MOUTH ONCE DAILY, Disp: 30 tablet, Rfl: 11    blood  sugar diagnostic (BLOOD GLUCOSE TEST) Strp, 1 strip by Misc.(Non-Drug; Combo Route) route 2 (two) times daily before meals. For mikki contour, Disp: 200 strip, Rfl: 3    chlorthalidone (HYGROTEN) 25 MG Tab, TAKE ONE TABLET BY MOUTH ONCE DAILY, Disp: 30 tablet, Rfl: 11    ciprofloxacin HCl (CIPRO) 500 MG tablet, Take 1 tablet (500 mg total) by mouth 2 (two) times daily. for 10 days, Disp: 20 tablet, Rfl: 0    dulaglutide (TRULICITY) 0.75 mg/0.5 mL PnIj, Inject 0.5 mLs (0.75 mg total) into the skin every 7 days., Disp: 4 Syringe, Rfl: 5    ferrous gluconate (FERGON) 324 MG tablet, Take 324 mg by mouth daily with breakfast., Disp: , Rfl:     fluticasone (FLONASE) 50 mcg/actuation nasal spray, 2 sprays (100 mcg total) by Each Nare route once daily., Disp: 16 g, Rfl: 1    lancets (ONE TOUCH DELICA LANCETS) 33 gauge Misc, 1 lancet by Misc.(Non-Drug; Combo Route) route 2 (two) times daily., Disp: 60 each, Rfl: 11    metFORMIN (GLUCOPHAGE) 500 MG tablet, TAKE 2 TABLETS BY MOUTH TWO TIMES A DAY WITH MEALS, Disp: 360 tablet, Rfl: 3  Allergies  Review of patient's allergies indicates:   Allergen Reactions    Codeine Nausea Only    Vicodin [hydrocodone-acetaminophen] Nausea Only     Review of Systems  Review of Systems   Constitutional: Positive for appetite change. Negative for activity change, chills, fatigue, fever and unexpected weight change.   HENT: Negative for congestion, hearing loss, nosebleeds, sinus pressure and trouble swallowing.    Eyes: Negative for pain, discharge, itching and visual disturbance.   Respiratory: Positive for shortness of breath. Negative for cough and chest tightness.    Cardiovascular: Negative for chest pain, palpitations and leg swelling.   Gastrointestinal: Positive for abdominal pain. Negative for abdominal distention, blood in stool, diarrhea, nausea, rectal pain and vomiting.   Endocrine: Negative for heat intolerance and polydipsia.   Genitourinary: Negative for difficulty  urinating, dysuria, flank pain, frequency, hematuria and urgency.   Musculoskeletal: Positive for back pain. Negative for arthralgias and neck pain.   Skin: Negative for color change, pallor and rash.   Neurological: Positive for headaches. Negative for dizziness and numbness.   Hematological: Negative for adenopathy. Does not bruise/bleed easily (.lastiron).   Psychiatric/Behavioral: Negative for confusion and decreased concentration. The patient is nervous/anxious.         Objective     Objective:     Vitals:    03/29/19 0901   BP: 139/73   Pulse: 72   Temp: 97.9 °F (36.6 °C)     Body surface area is 2.5 meters squared.  Body mass index is 45.47 kg/m².    GENERAL:  Well-developed, well-nourished female in no acute distress. Vital signs reviewed.   SKIN:  Warm, dry without rashes, purpura or petechiae. Scattered bruising from recent phlebotomy noted on left arm  EYES:  Pupils equal, round and reactive to light.  EOMs intact.  Conjunctivae normal.  HEAD:  Normocephalic.  EARS/NOSE/MOUTH/THROAT:  Hearing intact. Septum midline.  No excoriations or nasal discharge. No stomatitis or ulcers.  Lips normal. Oropharynx without lesions or exudates.  NECK:  Supple with good range of motion; no thyromegaly or masses  LYMPHATICS:  No cervical, supraclavicular, axillary adenopathy.  RESP:  Lungs clear to percussion and auscultation. Good airflow. Normal respiratory effort.   CARDIAC:  Regular rate and rhythm without murmurs, rubs or gallops. Normal S1,S2. No edema  GI:  Soft, nontender, no hepatosplenomegaly, obese, normal bowel sounds  MSK:  No clubbing or cyanosis, No joint swelling noted in hands  NEUROLOGICAL:  Cranial Nerves II-XII grossly intact.  No focal neurological deficits.  PSYCHIATRIC:  Normal affect and mood.  Alert and Oriented x 3.       Labs and Imaging    Lab Results   Component Value Date    WBC 6.66 03/26/2019    RBC 4.46 03/26/2019    HGB 10.5 (L) 03/26/2019    HCT 35.4 (L) 03/26/2019    MCV 79 (L)  03/26/2019    MCH 23.5 (L) 03/26/2019    MCHC 29.7 (L) 03/26/2019    RDW 16.3 (H) 03/26/2019     (H) 03/26/2019    MPV 10.9 03/26/2019    GRAN 4.5 03/26/2019    GRAN 67.1 03/26/2019    LYMPH 1.6 03/26/2019    LYMPH 24.3 03/26/2019    MONO 0.5 03/26/2019    MONO 6.9 03/26/2019    EOS 0.1 03/26/2019    BASO 0.02 03/26/2019    EOSINOPHIL 1.1 03/26/2019    BASOPHIL 0.3 03/26/2019     Lab Results   Component Value Date    IRON 39 03/26/2019    TIBC 481 (H) 03/26/2019    FERRITIN 8 (L) 03/26/2019         Assessment   Assessment/Plan:     Iron deficiency anemia in setting of gastric bypass   * refractory to oral iron   * Injectafer x 2 doses. Discussed risk of allergic reaction and she's agreeable to proceed.    - RTC in 8 weeks to review response.     2. Morbid obesity  3. DM on insulin, HTN, managed by PCP    Follow-up in 2 months. I asked the patient to call for any questions, concerns, or new symptoms.         Distress Screening Results: Psychosocial Distress screening score of Distress Score: 0 noted and reviewed. No intervention indicated.

## 2019-03-29 ENCOUNTER — INITIAL CONSULT (OUTPATIENT)
Dept: HEMATOLOGY/ONCOLOGY | Facility: CLINIC | Age: 64
End: 2019-03-29
Payer: COMMERCIAL

## 2019-03-29 VITALS
HEART RATE: 72 BPM | DIASTOLIC BLOOD PRESSURE: 73 MMHG | BODY MASS INDEX: 45.57 KG/M2 | SYSTOLIC BLOOD PRESSURE: 139 MMHG | TEMPERATURE: 98 F | WEIGHT: 290.38 LBS | HEIGHT: 67 IN

## 2019-03-29 DIAGNOSIS — I10 ESSENTIAL HYPERTENSION: ICD-10-CM

## 2019-03-29 DIAGNOSIS — E11.40 TYPE 2 DIABETES MELLITUS WITH DIABETIC NEUROPATHY, WITHOUT LONG-TERM CURRENT USE OF INSULIN: ICD-10-CM

## 2019-03-29 DIAGNOSIS — E66.01 MORBID OBESITY: ICD-10-CM

## 2019-03-29 DIAGNOSIS — D50.8 OTHER IRON DEFICIENCY ANEMIA: Primary | ICD-10-CM

## 2019-03-29 PROCEDURE — 99999 PR PBB SHADOW E&M-EST. PATIENT-LVL III: ICD-10-PCS | Mod: PBBFAC,,, | Performed by: INTERNAL MEDICINE

## 2019-03-29 PROCEDURE — 99999 PR PBB SHADOW E&M-EST. PATIENT-LVL III: CPT | Mod: PBBFAC,,, | Performed by: INTERNAL MEDICINE

## 2019-03-29 PROCEDURE — 99244 OFF/OP CNSLTJ NEW/EST MOD 40: CPT | Mod: S$GLB,,, | Performed by: INTERNAL MEDICINE

## 2019-03-29 PROCEDURE — 99244 PR OFFICE CONSULTATION,LEVEL IV: ICD-10-PCS | Mod: S$GLB,,, | Performed by: INTERNAL MEDICINE

## 2019-03-29 RX ORDER — SODIUM CHLORIDE 9 MG/ML
INJECTION, SOLUTION INTRAVENOUS CONTINUOUS
Status: CANCELLED | OUTPATIENT
Start: 2019-03-29

## 2019-03-29 RX ORDER — HEPARIN 100 UNIT/ML
5 SYRINGE INTRAVENOUS
Status: CANCELLED | OUTPATIENT
Start: 2019-03-29

## 2019-03-29 RX ORDER — SODIUM CHLORIDE 0.9 % (FLUSH) 0.9 %
10 SYRINGE (ML) INJECTION
Status: CANCELLED | OUTPATIENT
Start: 2019-03-29

## 2019-03-29 NOTE — LETTER
March 29, 2019      Russell Martinez MD  [address]  [facility] [address]           [facility]  [address]  [facility] [address]  Phone: [phone]          Patient: Meryl Johnson   MR Number: [id number]   YOB: 1955   Date of Visit: 3/29/2019       Dear Dr. Russell Martinez:    Thank you for referring Meryl Johnson to me for evaluation. Attached you will find relevant portions of my assessment and plan of care.    If you have questions, please do not hesitate to call me. I look forward to following Meryl Johnson along with you.    Sincerely,    Vanna Stevenson MD    Enclosure  CC:  No Recipients    If you would like to receive this communication electronically, please contact [email or web address] or [phone] to request more information on [facility] access.    For providers and/or their staff who would like to refer a patient to [facility], please contact us through our one-stop-shop provider referral line, [facility] , at [phone].    If you feel you have received this communication in error or would no longer like to receive these types of communications, please e-mail [email or web address]

## 2019-04-09 ENCOUNTER — TELEPHONE (OUTPATIENT)
Dept: HEMATOLOGY/ONCOLOGY | Facility: CLINIC | Age: 64
End: 2019-04-09

## 2019-04-09 NOTE — TELEPHONE ENCOUNTER
Called patient to schedule iron infusion for Friday 4/12 & Monday 4/22. She will call back if can not get off of work..      ----- Message from Vanna Stevenson MD sent at 4/8/2019 11:22 AM CDT -----  [place], Will you please schedule Injectafer? It's been approved.   ----- Message -----  From: Clarice Mayer  Sent: 4/5/2019   8:30 AM  To: Vanna Stevenson MD, Pre Service Intake    Can we please get status on pt's Injectafer? Pending since 3/29    ----- Message -----  From: Vanna Stevenson MD  Sent: 4/5/2019   8:19 AM  To: Clarice Oneil - this patient's Injectafer is still pending review. Will you please touch base with precert?    Thanks, Vanna

## 2019-04-10 ENCOUNTER — PATIENT MESSAGE (OUTPATIENT)
Dept: INTERNAL MEDICINE | Facility: CLINIC | Age: 64
End: 2019-04-10

## 2019-04-10 RX ORDER — CHLORTHALIDONE 25 MG/1
TABLET ORAL
Qty: 30 TABLET | Refills: 11 | Status: SHIPPED | OUTPATIENT
Start: 2019-04-10 | End: 2020-07-06 | Stop reason: SDUPTHER

## 2019-04-12 ENCOUNTER — INFUSION (OUTPATIENT)
Dept: INFUSION THERAPY | Facility: HOSPITAL | Age: 64
End: 2019-04-12
Attending: INTERNAL MEDICINE
Payer: COMMERCIAL

## 2019-04-12 VITALS
TEMPERATURE: 98 F | DIASTOLIC BLOOD PRESSURE: 66 MMHG | SYSTOLIC BLOOD PRESSURE: 137 MMHG | RESPIRATION RATE: 18 BRPM | HEART RATE: 92 BPM

## 2019-04-12 DIAGNOSIS — D50.8 OTHER IRON DEFICIENCY ANEMIA: Primary | ICD-10-CM

## 2019-04-12 PROCEDURE — 25000003 PHARM REV CODE 250: Performed by: INTERNAL MEDICINE

## 2019-04-12 PROCEDURE — 96365 THER/PROPH/DIAG IV INF INIT: CPT

## 2019-04-12 PROCEDURE — 63600175 PHARM REV CODE 636 W HCPCS: Mod: JG | Performed by: INTERNAL MEDICINE

## 2019-04-12 RX ORDER — SODIUM CHLORIDE 9 MG/ML
INJECTION, SOLUTION INTRAVENOUS CONTINUOUS
Status: DISCONTINUED | OUTPATIENT
Start: 2019-04-12 | End: 2019-04-12 | Stop reason: HOSPADM

## 2019-04-12 RX ORDER — HEPARIN 100 UNIT/ML
5 SYRINGE INTRAVENOUS
Status: CANCELLED | OUTPATIENT
Start: 2019-04-19

## 2019-04-12 RX ORDER — SODIUM CHLORIDE 9 MG/ML
INJECTION, SOLUTION INTRAVENOUS CONTINUOUS
Status: CANCELLED | OUTPATIENT
Start: 2019-04-19

## 2019-04-12 RX ORDER — SODIUM CHLORIDE 0.9 % (FLUSH) 0.9 %
10 SYRINGE (ML) INJECTION
Status: CANCELLED | OUTPATIENT
Start: 2019-04-19

## 2019-04-12 RX ADMIN — FERRIC CARBOXYMALTOSE INJECTION 750 MG: 50 INJECTION, SOLUTION INTRAVENOUS at 08:04

## 2019-04-12 RX ADMIN — SODIUM CHLORIDE: 9 INJECTION, SOLUTION INTRAVENOUS at 08:04

## 2019-04-12 NOTE — PLAN OF CARE
Problem: Anemia  Goal: Anemia Symptom Improvement  Outcome: Ongoing (interventions implemented as appropriate)  Patient here for injectafer.  Assessment completed and labs reviewed.  VSS.  No needs at this time.  Chair reclined and blanket offered.  Will continue to monitor.

## 2019-04-12 NOTE — PLAN OF CARE
Problem: Adult Inpatient Plan of Care  Goal: Plan of Care Review  Outcome: Ongoing (interventions implemented as appropriate)  Tolerated infusion well.  Observed patient 1 hour post infusion, no reactions noted.  No questions or concerns at this time.  Ambulated off unit in [facility].

## 2019-04-15 ENCOUNTER — HOSPITAL ENCOUNTER (EMERGENCY)
Facility: HOSPITAL | Age: 64
Discharge: HOME | End: 2019-04-15
Attending: EMERGENCY MEDICINE
Payer: COMMERCIAL

## 2019-04-15 VITALS
SYSTOLIC BLOOD PRESSURE: 139 MMHG | HEART RATE: 96 BPM | OXYGEN SATURATION: 98 % | DIASTOLIC BLOOD PRESSURE: 75 MMHG | WEIGHT: 277 LBS | TEMPERATURE: 98 F | BODY MASS INDEX: 43.47 KG/M2 | HEIGHT: 67 IN | RESPIRATION RATE: 18 BRPM

## 2019-04-15 DIAGNOSIS — R04.0 EPISTAXIS: Primary | ICD-10-CM

## 2019-04-15 PROCEDURE — 25000003 PHARM REV CODE 250: Performed by: EMERGENCY MEDICINE

## 2019-04-15 PROCEDURE — 99284 EMERGENCY DEPT VISIT MOD MDM: CPT | Mod: ,,, | Performed by: EMERGENCY MEDICINE

## 2019-04-15 PROCEDURE — 99284 PR EMERGENCY DEPT VISIT,LEVEL IV: ICD-10-PCS | Mod: ,,, | Performed by: EMERGENCY MEDICINE

## 2019-04-15 PROCEDURE — 99283 EMERGENCY DEPT VISIT LOW MDM: CPT

## 2019-04-15 RX ORDER — OXYMETAZOLINE HCL 0.05 %
1 SPRAY, NON-AEROSOL (ML) NASAL
Status: COMPLETED | OUTPATIENT
Start: 2019-04-15 | End: 2019-04-15

## 2019-04-15 RX ADMIN — OXYMETAZOLINE HYDROCHLORIDE 1 SPRAY: 5 SPRAY NASAL at 09:04

## 2019-04-15 NOTE — ED TRIAGE NOTES
Pt presents with nose bleed from R nares. Pt reports she had a nose bleed last Tuesday and stopped and Wednesday she had an iron infusion.

## 2019-04-22 ENCOUNTER — INFUSION (OUTPATIENT)
Dept: INFUSION THERAPY | Facility: HOSPITAL | Age: 64
End: 2019-04-22
Attending: INTERNAL MEDICINE
Payer: COMMERCIAL

## 2019-04-22 VITALS
TEMPERATURE: 98 F | HEART RATE: 71 BPM | SYSTOLIC BLOOD PRESSURE: 139 MMHG | RESPIRATION RATE: 18 BRPM | DIASTOLIC BLOOD PRESSURE: 60 MMHG

## 2019-04-22 DIAGNOSIS — D50.8 OTHER IRON DEFICIENCY ANEMIA: Primary | ICD-10-CM

## 2019-04-22 PROCEDURE — 63600175 PHARM REV CODE 636 W HCPCS: Mod: JG | Performed by: INTERNAL MEDICINE

## 2019-04-22 PROCEDURE — 96374 THER/PROPH/DIAG INJ IV PUSH: CPT

## 2019-04-22 PROCEDURE — 25000003 PHARM REV CODE 250: Performed by: INTERNAL MEDICINE

## 2019-04-22 RX ORDER — SODIUM CHLORIDE 0.9 % (FLUSH) 0.9 %
10 SYRINGE (ML) INJECTION
Status: CANCELLED | OUTPATIENT
Start: 2019-04-26

## 2019-04-22 RX ORDER — SODIUM CHLORIDE 0.9 % (FLUSH) 0.9 %
10 SYRINGE (ML) INJECTION
Status: DISCONTINUED | OUTPATIENT
Start: 2019-04-22 | End: 2019-04-22 | Stop reason: HOSPADM

## 2019-04-22 RX ORDER — HEPARIN 100 UNIT/ML
5 SYRINGE INTRAVENOUS
Status: CANCELLED | OUTPATIENT
Start: 2019-04-26

## 2019-04-22 RX ORDER — SODIUM CHLORIDE 9 MG/ML
INJECTION, SOLUTION INTRAVENOUS CONTINUOUS
Status: CANCELLED | OUTPATIENT
Start: 2019-04-26

## 2019-04-22 RX ORDER — HEPARIN 100 UNIT/ML
5 SYRINGE INTRAVENOUS
Status: DISCONTINUED | OUTPATIENT
Start: 2019-04-22 | End: 2019-04-22 | Stop reason: HOSPADM

## 2019-04-22 RX ORDER — SODIUM CHLORIDE 9 MG/ML
INJECTION, SOLUTION INTRAVENOUS CONTINUOUS
Status: DISCONTINUED | OUTPATIENT
Start: 2019-04-22 | End: 2019-04-22 | Stop reason: HOSPADM

## 2019-04-22 RX ADMIN — SODIUM CHLORIDE: 9 INJECTION, SOLUTION INTRAVENOUS at 07:04

## 2019-04-22 RX ADMIN — FERRIC CARBOXYMALTOSE INJECTION 750 MG: 50 INJECTION, SOLUTION INTRAVENOUS at 07:04

## 2019-04-22 NOTE — PLAN OF CARE
Problem: Adult Inpatient Plan of Care  Goal: Plan of Care Review  Outcome: Outcome(s) achieved Date Met: 04/22/19  Patient tolerated injectafer #2 infusion with no complications. VS stable. Labs reviewed. PIV accessed removed upon therapy completion. Monitored for 30 minutes post infusion. AVS refused. Discharged home.

## 2019-05-16 ENCOUNTER — PATIENT MESSAGE (OUTPATIENT)
Dept: INTERNAL MEDICINE | Facility: CLINIC | Age: 64
End: 2019-05-16

## 2019-05-16 ENCOUNTER — OFFICE VISIT (OUTPATIENT)
Dept: INTERNAL MEDICINE | Facility: CLINIC | Age: 64
End: 2019-05-16
Payer: COMMERCIAL

## 2019-05-16 VITALS
SYSTOLIC BLOOD PRESSURE: 126 MMHG | TEMPERATURE: 98 F | OXYGEN SATURATION: 98 % | DIASTOLIC BLOOD PRESSURE: 74 MMHG | HEART RATE: 79 BPM | HEIGHT: 67 IN | WEIGHT: 286.63 LBS | BODY MASS INDEX: 44.99 KG/M2

## 2019-05-16 DIAGNOSIS — J06.9 VIRAL URI WITH COUGH: Primary | ICD-10-CM

## 2019-05-16 DIAGNOSIS — E11.40 TYPE 2 DIABETES MELLITUS WITH DIABETIC NEUROPATHY, WITHOUT LONG-TERM CURRENT USE OF INSULIN: ICD-10-CM

## 2019-05-16 DIAGNOSIS — D50.9 IRON DEFICIENCY ANEMIA, UNSPECIFIED IRON DEFICIENCY ANEMIA TYPE: ICD-10-CM

## 2019-05-16 PROCEDURE — 3074F SYST BP LT 130 MM HG: CPT | Mod: CPTII,S$GLB,, | Performed by: INTERNAL MEDICINE

## 2019-05-16 PROCEDURE — 3078F PR MOST RECENT DIASTOLIC BLOOD PRESSURE < 80 MM HG: ICD-10-PCS | Mod: CPTII,S$GLB,, | Performed by: INTERNAL MEDICINE

## 2019-05-16 PROCEDURE — 3008F BODY MASS INDEX DOCD: CPT | Mod: CPTII,S$GLB,, | Performed by: INTERNAL MEDICINE

## 2019-05-16 PROCEDURE — 3074F PR MOST RECENT SYSTOLIC BLOOD PRESSURE < 130 MM HG: ICD-10-PCS | Mod: CPTII,S$GLB,, | Performed by: INTERNAL MEDICINE

## 2019-05-16 PROCEDURE — 3045F PR MOST RECENT HEMOGLOBIN A1C LEVEL 7.0-9.0%: ICD-10-PCS | Mod: CPTII,S$GLB,, | Performed by: INTERNAL MEDICINE

## 2019-05-16 PROCEDURE — 99999 PR PBB SHADOW E&M-EST. PATIENT-LVL III: CPT | Mod: PBBFAC,,, | Performed by: INTERNAL MEDICINE

## 2019-05-16 PROCEDURE — 3045F PR MOST RECENT HEMOGLOBIN A1C LEVEL 7.0-9.0%: CPT | Mod: CPTII,S$GLB,, | Performed by: INTERNAL MEDICINE

## 2019-05-16 PROCEDURE — 99214 PR OFFICE/OUTPT VISIT, EST, LEVL IV, 30-39 MIN: ICD-10-PCS | Mod: S$GLB,,, | Performed by: INTERNAL MEDICINE

## 2019-05-16 PROCEDURE — 3078F DIAST BP <80 MM HG: CPT | Mod: CPTII,S$GLB,, | Performed by: INTERNAL MEDICINE

## 2019-05-16 PROCEDURE — 3008F PR BODY MASS INDEX (BMI) DOCUMENTED: ICD-10-PCS | Mod: CPTII,S$GLB,, | Performed by: INTERNAL MEDICINE

## 2019-05-16 PROCEDURE — 99214 OFFICE O/P EST MOD 30 MIN: CPT | Mod: S$GLB,,, | Performed by: INTERNAL MEDICINE

## 2019-05-16 PROCEDURE — 99999 PR PBB SHADOW E&M-EST. PATIENT-LVL III: ICD-10-PCS | Mod: PBBFAC,,, | Performed by: INTERNAL MEDICINE

## 2019-05-16 RX ORDER — AZELASTINE 1 MG/ML
2 SPRAY, METERED NASAL 2 TIMES DAILY
Qty: 30 ML | Refills: 0 | Status: SHIPPED | OUTPATIENT
Start: 2019-05-16 | End: 2019-09-06

## 2019-05-16 RX ORDER — BENZONATATE 100 MG/1
100 CAPSULE ORAL 3 TIMES DAILY PRN
Qty: 30 CAPSULE | Refills: 0 | Status: SHIPPED | OUTPATIENT
Start: 2019-05-16 | End: 2019-05-26

## 2019-05-16 NOTE — PROGRESS NOTES
REASON FOR VISIT:  This is a 63-year-old female.  Five days ago, she started   having rhinorrhea, nasal congestion and irritated throat.  Now she has a cough   that is mainly nonproductive, but today was clear mucus.  She is not running no   fever.  She is not short of breath or wheezing.    PAST MEDICAL HISTORY:  Type 2 diabetes.  Hypertension.  Hyperlipidemia.  Iron deficiency anemia.    MEDICATIONS:  List as per MedCard.    PHYSICAL EXAMINATION:  VITAL SIGNS:  Weight is 289 pounds, temperature is 98, pulse 80, blood pressure   100/72.  HEENT:  Tympanic membranes normal.  Nasal mucosa is swollen and congested.    Oropharynx, no abnormal findings.  NECK:  No adenopathy.  LUNGS:  Clear.  HEART:  Regular rate and rhythm.  IMPRESSION:  Upper respiratory infection with cough.    PLAN:  Astelin nasal spray, Cheratussin with codeine, can take Nyquil at night.    ADDENDUM:  She received IV and iron infusion, April 12th and April 22nd.  Plan   was to repeat labs in two months' time, but I do not see nothing has been   ordered.    Another issue is that she met with Endocrinology in March 28th regarding   diabetes.  There is a bit of confusion regarding medications.  She has been on   Trulicity, but Ms. Johnson has not had the instructions to put a hold on it, so   she will just stay on metformin, but I do not see that being recorded in the   notes.  She does not check her blood glucose and I am going to have her come   back next week after she is better.  We will repeat hemoglobin, hematocrit and a   hemoglobin A1c.        JAM/HN  dd: 05/16/2019 11:34:38 (CDT)  td: 05/17/2019 02:54:36 (CDT)  Doc ID   #[id number]  Job ID #[id number]    CC:

## 2019-05-23 ENCOUNTER — LAB VISIT (OUTPATIENT)
Dept: LAB | Facility: HOSPITAL | Age: 64
End: 2019-05-23
Attending: INTERNAL MEDICINE
Payer: COMMERCIAL

## 2019-05-23 DIAGNOSIS — E11.40 TYPE 2 DIABETES MELLITUS WITH DIABETIC NEUROPATHY, WITHOUT LONG-TERM CURRENT USE OF INSULIN: ICD-10-CM

## 2019-05-23 DIAGNOSIS — D50.9 IRON DEFICIENCY ANEMIA, UNSPECIFIED IRON DEFICIENCY ANEMIA TYPE: ICD-10-CM

## 2019-05-23 LAB
ANION GAP SERPL CALC-SCNC: 8 MMOL/L (ref 8–16)
BUN SERPL-MCNC: 23 MG/DL (ref 8–23)
CALCIUM SERPL-MCNC: 10.1 MG/DL (ref 8.7–10.5)
CHLORIDE SERPL-SCNC: 105 MMOL/L (ref 95–110)
CO2 SERPL-SCNC: 26 MMOL/L (ref 23–29)
CREAT SERPL-MCNC: 1 MG/DL (ref 0.5–1.4)
EST. GFR  (AFRICAN AMERICAN): >60 ML/MIN/1.73 M^2
EST. GFR  (NON AFRICAN AMERICAN): >60 ML/MIN/1.73 M^2
ESTIMATED AVG GLUCOSE: 154 MG/DL (ref 68–131)
GLUCOSE SERPL-MCNC: 133 MG/DL (ref 70–110)
HBA1C MFR BLD HPLC: 7 % (ref 4–5.6)
HCT VFR BLD AUTO: 42 % (ref 37–48.5)
HGB BLD-MCNC: 12.9 G/DL (ref 12–16)
POTASSIUM SERPL-SCNC: 4.7 MMOL/L (ref 3.5–5.1)
SODIUM SERPL-SCNC: 139 MMOL/L (ref 136–145)

## 2019-05-23 PROCEDURE — 80048 BASIC METABOLIC PNL TOTAL CA: CPT

## 2019-05-23 PROCEDURE — 83036 HEMOGLOBIN GLYCOSYLATED A1C: CPT

## 2019-05-23 PROCEDURE — 36415 COLL VENOUS BLD VENIPUNCTURE: CPT

## 2019-05-23 PROCEDURE — 85014 HEMATOCRIT: CPT

## 2019-05-23 PROCEDURE — 85018 HEMOGLOBIN: CPT

## 2019-05-23 NOTE — PROGRESS NOTES
Results discuss    The hemoglobin hematocrit is normal    Hemoglobin A1c is down to 7.0 and she admits that she has been more mindful and attentive to in eating habits in the continue with this

## 2019-06-05 ENCOUNTER — PATIENT MESSAGE (OUTPATIENT)
Dept: INTERNAL MEDICINE | Facility: CLINIC | Age: 64
End: 2019-06-05

## 2019-06-05 RX ORDER — AMOXICILLIN 875 MG/1
875 TABLET, FILM COATED ORAL 2 TIMES DAILY
Qty: 20 TABLET | Refills: 0 | Status: SHIPPED | OUTPATIENT
Start: 2019-06-05 | End: 2019-09-06

## 2019-06-05 RX ORDER — METHYLPREDNISOLONE 4 MG/1
TABLET ORAL
Qty: 21 PACKAGE | Refills: 0 | Status: SHIPPED | OUTPATIENT
Start: 2019-06-05 | End: 2019-09-06

## 2019-08-09 ENCOUNTER — PATIENT MESSAGE (OUTPATIENT)
Dept: INTERNAL MEDICINE | Facility: CLINIC | Age: 64
End: 2019-08-09

## 2019-08-14 RX ORDER — ATORVASTATIN CALCIUM 40 MG/1
TABLET, FILM COATED ORAL
Qty: 30 TABLET | Refills: 0 | Status: SHIPPED | OUTPATIENT
Start: 2019-08-14 | End: 2019-10-04

## 2019-08-14 RX ORDER — ATORVASTATIN CALCIUM 40 MG/1
TABLET, FILM COATED ORAL
Qty: 30 TABLET | Refills: 11 | Status: CANCELLED | OUTPATIENT
Start: 2019-08-14

## 2019-09-06 ENCOUNTER — PATIENT MESSAGE (OUTPATIENT)
Dept: INTERNAL MEDICINE | Facility: CLINIC | Age: 64
End: 2019-09-06

## 2019-09-06 ENCOUNTER — HOSPITAL ENCOUNTER (OUTPATIENT)
Dept: RADIOLOGY | Facility: HOSPITAL | Age: 64
Discharge: HOME | End: 2019-09-06
Attending: INTERNAL MEDICINE
Payer: COMMERCIAL

## 2019-09-06 ENCOUNTER — OFFICE VISIT (OUTPATIENT)
Dept: INTERNAL MEDICINE | Facility: CLINIC | Age: 64
End: 2019-09-06
Payer: COMMERCIAL

## 2019-09-06 VITALS
HEIGHT: 67 IN | DIASTOLIC BLOOD PRESSURE: 72 MMHG | BODY MASS INDEX: 45.15 KG/M2 | WEIGHT: 287.69 LBS | SYSTOLIC BLOOD PRESSURE: 120 MMHG | HEART RATE: 76 BPM

## 2019-09-06 DIAGNOSIS — E11.22 TYPE 2 DIABETES MELLITUS WITH STAGE 3 CHRONIC KIDNEY DISEASE, WITHOUT LONG-TERM CURRENT USE OF INSULIN: ICD-10-CM

## 2019-09-06 DIAGNOSIS — S86.911A KNEE STRAIN, RIGHT, INITIAL ENCOUNTER: ICD-10-CM

## 2019-09-06 DIAGNOSIS — S83.421A SPRAIN OF LATERAL COLLATERAL LIGAMENT OF RIGHT KNEE, INITIAL ENCOUNTER: ICD-10-CM

## 2019-09-06 DIAGNOSIS — N18.30 TYPE 2 DIABETES MELLITUS WITH STAGE 3 CHRONIC KIDNEY DISEASE, WITHOUT LONG-TERM CURRENT USE OF INSULIN: ICD-10-CM

## 2019-09-06 DIAGNOSIS — S83.421A SPRAIN OF LATERAL COLLATERAL LIGAMENT OF RIGHT KNEE, INITIAL ENCOUNTER: Primary | ICD-10-CM

## 2019-09-06 PROCEDURE — 3008F PR BODY MASS INDEX (BMI) DOCUMENTED: ICD-10-PCS | Mod: CPTII,S$GLB,, | Performed by: INTERNAL MEDICINE

## 2019-09-06 PROCEDURE — 3008F BODY MASS INDEX DOCD: CPT | Mod: CPTII,S$GLB,, | Performed by: INTERNAL MEDICINE

## 2019-09-06 PROCEDURE — 96372 PR INJECTION,THERAP/PROPH/DIAG2ST, IM OR SUBCUT: ICD-10-PCS | Mod: S$GLB,,, | Performed by: INTERNAL MEDICINE

## 2019-09-06 PROCEDURE — 99214 PR OFFICE/OUTPT VISIT, EST, LEVL IV, 30-39 MIN: ICD-10-PCS | Mod: 25,S$GLB,, | Performed by: INTERNAL MEDICINE

## 2019-09-06 PROCEDURE — 3045F PR MOST RECENT HEMOGLOBIN A1C LEVEL 7.0-9.0%: CPT | Mod: CPTII,S$GLB,, | Performed by: INTERNAL MEDICINE

## 2019-09-06 PROCEDURE — 99999 PR PBB SHADOW E&M-EST. PATIENT-LVL III: ICD-10-PCS | Mod: PBBFAC,,, | Performed by: INTERNAL MEDICINE

## 2019-09-06 PROCEDURE — 96372 THER/PROPH/DIAG INJ SC/IM: CPT | Mod: S$GLB,,, | Performed by: INTERNAL MEDICINE

## 2019-09-06 PROCEDURE — 3078F DIAST BP <80 MM HG: CPT | Mod: CPTII,S$GLB,, | Performed by: INTERNAL MEDICINE

## 2019-09-06 PROCEDURE — 3074F PR MOST RECENT SYSTOLIC BLOOD PRESSURE < 130 MM HG: ICD-10-PCS | Mod: CPTII,S$GLB,, | Performed by: INTERNAL MEDICINE

## 2019-09-06 PROCEDURE — 73562 XR KNEE ORTHO RIGHT WITH FLEXION: ICD-10-PCS | Mod: 26,59,LT, | Performed by: RADIOLOGY

## 2019-09-06 PROCEDURE — 99999 PR PBB SHADOW E&M-EST. PATIENT-LVL III: CPT | Mod: PBBFAC,,, | Performed by: INTERNAL MEDICINE

## 2019-09-06 PROCEDURE — 73562 X-RAY EXAM OF KNEE 3: CPT | Mod: 26,59,LT, | Performed by: RADIOLOGY

## 2019-09-06 PROCEDURE — 99214 OFFICE O/P EST MOD 30 MIN: CPT | Mod: 25,S$GLB,, | Performed by: INTERNAL MEDICINE

## 2019-09-06 PROCEDURE — 73564 XR KNEE ORTHO RIGHT WITH FLEXION: ICD-10-PCS | Mod: 26,RT,, | Performed by: RADIOLOGY

## 2019-09-06 PROCEDURE — 3074F SYST BP LT 130 MM HG: CPT | Mod: CPTII,S$GLB,, | Performed by: INTERNAL MEDICINE

## 2019-09-06 PROCEDURE — 3078F PR MOST RECENT DIASTOLIC BLOOD PRESSURE < 80 MM HG: ICD-10-PCS | Mod: CPTII,S$GLB,, | Performed by: INTERNAL MEDICINE

## 2019-09-06 PROCEDURE — 3045F PR MOST RECENT HEMOGLOBIN A1C LEVEL 7.0-9.0%: ICD-10-PCS | Mod: CPTII,S$GLB,, | Performed by: INTERNAL MEDICINE

## 2019-09-06 PROCEDURE — 73562 X-RAY EXAM OF KNEE 3: CPT | Mod: TC,FY,PO,LT

## 2019-09-06 PROCEDURE — 73564 X-RAY EXAM KNEE 4 OR MORE: CPT | Mod: 26,RT,, | Performed by: RADIOLOGY

## 2019-09-06 RX ORDER — KETOROLAC TROMETHAMINE 30 MG/ML
60 INJECTION, SOLUTION INTRAMUSCULAR; INTRAVENOUS
Status: COMPLETED | OUTPATIENT
Start: 2019-09-06 | End: 2019-09-06

## 2019-09-06 RX ADMIN — KETOROLAC TROMETHAMINE 60 MG: 30 INJECTION, SOLUTION INTRAMUSCULAR; INTRAVENOUS at 04:09

## 2019-09-06 NOTE — PROGRESS NOTES
REASON FOR VISIT:  This is a 64-year-old female.  She has been experiencing pain   involving the right knee over the lateral posterior aspect.  She started   noticing it exactly a week ago at work while walking and then she really noticed   it when she was bending the knee to get in bed at night, and it still has been   persistent.  The night and evening before, she was doing a lot of walking up   steps [place] the [employer] game.  There is no swelling.  It does not feel unstable.    PAST MEDICAL HISTORY:  Hypertension.  Hyperlipidemia.  Type 2 diabetes.    MEDICATIONS:  List as per MedCard.  She states that glucose readings a couple of   months ago when checked was in the low 100s, but it has not been checked in a   while.    PHYSICAL EXAMINATION:  VITAL SIGNS:  Per EPIC and were taken by me.  EXTREMITIES:  There is soft tissue swelling noted over the right knee.  The   lateral aspect does feel a little bit warm to touch.  She is slightly tender   over the joint line.  Pain is noted when a varus force is applied.  She does   have a negative Anatoliy sign.    IMPRESSION:  Left knee sprain and strain.    PLAN:  We will get a knee x-ray to see if there is any significant arthritis.    Cool pack has been recommended.  Toradol 60 mg IM today and then start her on   diclofenac 75 mg twice a day.    ADDENDUM:  Type 2 diabetes, and today we will repeat a basic metabolic profile   and hemoglobin A1c, hemoglobin, and hematocrit since she has in the past had a   history of iron deficiency anemia.        JAM/HN  dd: 09/06/2019 16:00:30 (CDT)  td: 09/07/2019 15:24:26 (CDT)  Doc ID   #[id number]  Job ID #[id number]    CC:          Addendum  Studies reviewed  X-ray shows moderate effusion  The hemoglobin A1c is up at 7.5  Will restart trulicity0.75 mg once a week along with to continue with metformin  Maintain proper hydration      This morning the patient feels that her pain is much worse    Will call in diclofenac 75 mg twice a day  For 1  week, prednisone 20 mg once a day for 4 days, and tramadol 50 mg 3 times a day as needed for 3 days    May need to be seen by Orthopedics for joint aspiration

## 2019-09-07 ENCOUNTER — HOSPITAL ENCOUNTER (EMERGENCY)
Facility: HOSPITAL | Age: 64
Discharge: HOME | End: 2019-09-07
Attending: EMERGENCY MEDICINE
Payer: COMMERCIAL

## 2019-09-07 ENCOUNTER — TELEPHONE (OUTPATIENT)
Dept: INTERNAL MEDICINE | Facility: CLINIC | Age: 64
End: 2019-09-07

## 2019-09-07 VITALS
BODY MASS INDEX: 43.95 KG/M2 | RESPIRATION RATE: 18 BRPM | HEART RATE: 76 BPM | TEMPERATURE: 98 F | OXYGEN SATURATION: 96 % | SYSTOLIC BLOOD PRESSURE: 134 MMHG | DIASTOLIC BLOOD PRESSURE: 78 MMHG | WEIGHT: 280 LBS | HEIGHT: 67 IN

## 2019-09-07 DIAGNOSIS — M25.561 ACUTE PAIN OF RIGHT KNEE: Primary | ICD-10-CM

## 2019-09-07 PROCEDURE — 99284 EMERGENCY DEPT VISIT MOD MDM: CPT | Mod: ,,, | Performed by: PHYSICIAN ASSISTANT

## 2019-09-07 PROCEDURE — 25000003 PHARM REV CODE 250: Performed by: PHYSICIAN ASSISTANT

## 2019-09-07 PROCEDURE — 99284 PR EMERGENCY DEPT VISIT,LEVEL IV: ICD-10-PCS | Mod: ,,, | Performed by: PHYSICIAN ASSISTANT

## 2019-09-07 PROCEDURE — 99282 EMERGENCY DEPT VISIT SF MDM: CPT

## 2019-09-07 RX ORDER — ACETAMINOPHEN 500 MG
1000 TABLET ORAL
Status: COMPLETED | OUTPATIENT
Start: 2019-09-07 | End: 2019-09-07

## 2019-09-07 RX ORDER — PREDNISONE 20 MG/1
20 TABLET ORAL DAILY
Qty: 4 TABLET | Refills: 0 | Status: SHIPPED | OUTPATIENT
Start: 2019-09-07 | End: 2019-09-11

## 2019-09-07 RX ORDER — DICLOFENAC SODIUM 75 MG/1
75 TABLET, DELAYED RELEASE ORAL 2 TIMES DAILY
Qty: 20 TABLET | Refills: 0 | Status: SHIPPED | OUTPATIENT
Start: 2019-09-07 | End: 2019-09-17

## 2019-09-07 RX ORDER — TRAMADOL HYDROCHLORIDE 50 MG/1
50 TABLET ORAL 3 TIMES DAILY PRN
Qty: 9 TABLET | Refills: 0 | Status: SHIPPED | OUTPATIENT
Start: 2019-09-07 | End: 2019-09-12

## 2019-09-07 RX ORDER — IBUPROFEN 600 MG/1
600 TABLET ORAL
Status: COMPLETED | OUTPATIENT
Start: 2019-09-07 | End: 2019-09-07

## 2019-09-07 RX ADMIN — ACETAMINOPHEN 1000 MG: 500 TABLET ORAL at 12:09

## 2019-09-07 RX ADMIN — IBUPROFEN 600 MG: 600 TABLET ORAL at 12:09

## 2019-09-07 NOTE — ED NOTES
"Pt reports knee pain, seen by Dr. Dawn yesterday, X rays "showed fluid". She reports right knee pain x 1 week. Denies fall, trauma.   "

## 2019-09-07 NOTE — ED NOTES
Patient identifiers verified and correct for Meryl Johnson.    LOC: The patient is awake, alert and aware of environment with an appropriate affect, the patient is oriented x 3 and speaking appropriately.  APPEARANCE: Patient resting comfortably and in no acute distress, patient is clean and well groomed, patient's clothing is properly fastened.  SKIN: The skin is warm and dry, color consistent with ethnicity, patient has normal skin turgor and moist mucus membranes, skin intact, no breakdown or bruising noted.  MUSCULOSKELETAL: Patient moving all extremities spontaneously, no obvious swelling or deformities noted.  RESPIRATORY: Airway is open and patent, respirations are spontaneous, patient has a normal effort and rate, no accessory muscle use noted, bilateral breath sounds clear and present.  CARDIAC: Patient has a normal rate and regular rhythm, no periphreal edema noted, capillary refill < 3 seconds.  ABDOMEN: Soft and non tender to palpation, no distention noted, normoactive bowel sounds present in all four quadrants.  NEUROLOGIC: PERRL,3 mm bilaterally, eyes open spontaneously, behavior appropriate to situation, follows commands, facial expression symmetrical, bilateral hand grasp equal and even, purposeful motor response noted, normal sensation in all extremities when touched with a finger.

## 2019-09-07 NOTE — ED PROVIDER NOTES
Encounter Date: 9/7/2019       History     Chief Complaint   Patient presents with    Knee Pain     rt knee pain wiorse  after xrays and eval in PCP's office yesterday     64-year-old white female with history of hypertension, hyperlipidemia, diabetes presents to the ED complaining of right knee pain for the past week.  She went to the [employer] game on Thursday and had to walk up and down a lot of stairs.  She developed right knee pain the next morning.  She describes her pain is 8/10 and worse with movement.  She denies any falls or direct trauma to the knee.  She denies any past surgical history to the right knee.  She has not been wearing any brace or playing ice to the area.  She was seen by her PCP yesterday and had x-ray which showed an effusion.  She had worsening pain last night and called her PCP this morning, she was instructed to present to the ED for evaluation.  She has not yet filled any of the medications prescribed by her PCP yesterday.  She denies fever, chills, chest pain, shortness of breath, abdominal pain, numbness, paresthesias.    The history is provided by the patient.     Review of patient's allergies indicates:   Allergen Reactions    Codeine Nausea Only    Vicodin [hydrocodone-acetaminophen] Nausea Only     Past Medical History:   Diagnosis Date    Anemia     Diabetes mellitus type II     H. pylori infection     Hyperlipidemia     Hypertension     Unspecified vitamin D deficiency 4/24/2013     Past Surgical History:   Procedure Laterality Date    EGD (ESOPHAGOGASTRODUODENOSCOPY) N/A 8/8/2018    Performed by [facility] at [facility] ([facility])    GASTRIC BYPASS       Family History   Problem Relation Age of Onset    COPD Mother     Heart disease Mother     Cancer Father         liver    Heart disease Father     Cancer Sister     Diabetes Sister     Hyperlipidemia Sister     Hypothyroidism Sister     No Known Problems Brother     No Known Problems Maternal Aunt      No Known Problems Maternal Uncle     No Known Problems Paternal Aunt     No Known Problems Paternal Uncle     No Known Problems Maternal Grandmother     No Known Problems Maternal Grandfather     No Known Problems Paternal Grandmother     No Known Problems Paternal Grandfather     Amblyopia Neg Hx     Blindness Neg Hx     Cataracts Neg Hx     Glaucoma Neg Hx     Hypertension Neg Hx     Macular degeneration Neg Hx     Retinal detachment Neg Hx     Strabismus Neg Hx     Stroke Neg Hx     Thyroid disease Neg Hx     Breast cancer Neg Hx     Colon cancer Neg Hx     Ovarian cancer Neg Hx     Stomach cancer Neg Hx     Rectal cancer Neg Hx      Social History     Tobacco Use    Smoking status: Never Smoker    Smokeless tobacco: Never Used   Substance Use Topics    Alcohol use: No    Drug use: No     Review of Systems   Constitutional: Negative for chills and fever.   HENT: Negative for congestion, rhinorrhea and sore throat.    Eyes: Negative for photophobia and visual disturbance.   Respiratory: Negative for shortness of breath.    Cardiovascular: Negative for chest pain.   Gastrointestinal: Negative for abdominal pain, constipation, diarrhea, nausea and vomiting.   Genitourinary: Negative for dysuria and hematuria.   Musculoskeletal: Positive for arthralgias, gait problem and joint swelling.   Skin: Negative for rash and wound.   Neurological: Negative for light-headedness, numbness and headaches.   Psychiatric/Behavioral: Negative for confusion.       Physical Exam     Initial Vitals [09/07/19 1125]   BP Pulse Resp Temp SpO2   134/78 76 18 97.9 °F (36.6 °C) 96 %      MAP       --         Physical Exam    Nursing note and vitals reviewed.  Constitutional: She appears well-developed and well-nourished. She is not diaphoretic. No distress.   HENT:   Head: Normocephalic and atraumatic.   Neck: Normal range of motion. Neck supple.   Cardiovascular: Normal rate, regular rhythm and normal heart  sounds. Exam reveals no gallop and no friction rub.    No murmur heard.  Pulmonary/Chest: Breath sounds normal. She has no wheezes. She has no rhonchi. She has no rales.   Abdominal: Soft. Bowel sounds are normal. There is no tenderness. There is no rebound and no guarding.   Musculoskeletal: Normal range of motion.        Right knee: She exhibits effusion. She exhibits normal range of motion, no ecchymosis, no deformity, no erythema and no bony tenderness. Tenderness found. Lateral joint line tenderness noted.   R pedal pulse 2+   Neurological: She is alert and oriented to person, place, and time.   Skin: Skin is warm and dry. No rash noted. No erythema.   Psychiatric: She has a normal mood and affect.         ED Course   Procedures  Labs Reviewed - No data to display       Imaging Results    None          Medical Decision Making:   History:   Old Records Summarized: records from clinic visits.       <> Summary of Records: Saw PCP yesterday.  X-ray of the right knee shows an effusion.  Prescribed diclofenac, prednisone, tramadol.       APC / Resident Notes:   64-year-old white female with history of hypertension, hyperlipidemia, diabetes presents to the ED complaining of right knee pain for the past week.  Vital signs stable. Normal range of motion of the right knee.  Tenderness along the lateral joint line.  No erythema or warmth.  Right pedal pulse 2 +.  I do not suspect septic arthritis.  She had an x-ray yesterday did not show any acute fracture.  I do not feel that she needs any further labs or imaging at this time.  Stable for discharge.    Right knee placed in Ace wrap.  She was offered crutches but declined stating that she has can at home.  Given Tylenol and ibuprofen in the ED.    She was discharged without any new prescriptions.  She will follow medications prescribed by her PCP yesterday.  She was instructed on RICE therapy.  She will follow up with Orthopedics.  All of the patient's questions were  answered.  I reviewed the patient's chart and discussed the case with my supervising physician.                    Clinical Impression:       ICD-10-CM ICD-9-CM   1. Acute pain of right knee M25.561 719.46         Disposition:   Disposition: Discharged  Condition: Stable                        Joanie Berg PA-C  09/07/19 1501

## 2019-09-09 ENCOUNTER — PATIENT MESSAGE (OUTPATIENT)
Dept: INTERNAL MEDICINE | Facility: CLINIC | Age: 64
End: 2019-09-09

## 2019-09-09 ENCOUNTER — OFFICE VISIT (OUTPATIENT)
Dept: ORTHOPEDICS | Facility: CLINIC | Age: 64
End: 2019-09-09
Payer: COMMERCIAL

## 2019-09-09 VITALS — WEIGHT: 284.38 LBS | HEIGHT: 67 IN | BODY MASS INDEX: 44.63 KG/M2

## 2019-09-09 DIAGNOSIS — G89.29 CHRONIC PAIN OF RIGHT KNEE: ICD-10-CM

## 2019-09-09 DIAGNOSIS — M25.461 EFFUSION OF RIGHT KNEE: ICD-10-CM

## 2019-09-09 DIAGNOSIS — G89.29 CHRONIC PAIN OF RIGHT KNEE: Primary | ICD-10-CM

## 2019-09-09 DIAGNOSIS — M25.561 CHRONIC PAIN OF RIGHT KNEE: ICD-10-CM

## 2019-09-09 DIAGNOSIS — M25.561 CHRONIC PAIN OF RIGHT KNEE: Primary | ICD-10-CM

## 2019-09-09 PROCEDURE — 99203 OFFICE O/P NEW LOW 30 MIN: CPT | Mod: S$GLB,,, | Performed by: PHYSICIAN ASSISTANT

## 2019-09-09 PROCEDURE — 99203 PR OFFICE/OUTPT VISIT, NEW, LEVL III, 30-44 MIN: ICD-10-PCS | Mod: S$GLB,,, | Performed by: PHYSICIAN ASSISTANT

## 2019-09-09 PROCEDURE — 99999 PR PBB SHADOW E&M-EST. PATIENT-LVL III: ICD-10-PCS | Mod: PBBFAC,,, | Performed by: PHYSICIAN ASSISTANT

## 2019-09-09 PROCEDURE — 3008F BODY MASS INDEX DOCD: CPT | Mod: CPTII,S$GLB,, | Performed by: PHYSICIAN ASSISTANT

## 2019-09-09 PROCEDURE — 99999 PR PBB SHADOW E&M-EST. PATIENT-LVL III: CPT | Mod: PBBFAC,,, | Performed by: PHYSICIAN ASSISTANT

## 2019-09-09 PROCEDURE — 3008F PR BODY MASS INDEX (BMI) DOCUMENTED: ICD-10-PCS | Mod: CPTII,S$GLB,, | Performed by: PHYSICIAN ASSISTANT

## 2019-09-09 NOTE — LETTER
September 10, 2019      Russell Martinez MD  [address]  [facility] [address]           [facility] - Orthopedics  [address], [address]  [facility] [address]  Phone: [phone]          Patient: Meryl Johnson   MR Number: [id number]   YOB: 1955   Date of Visit: 9/9/2019       Dear Dr. Russell Martinez:    Thank you for referring Meryl Johnson to me for evaluation. Attached you will find relevant portions of my assessment and plan of care.    If you have questions, please do not hesitate to call me. I look forward to following Meryl Johnson along with you.    Sincerely,    Venice Vega PA-C    Enclosure  CC:  No Recipients    If you would like to receive this communication electronically, please contact [email or web address] or [phone] to request more information on [employer] access.    For providers and/or their staff who would like to refer a patient to [facility], please contact us through our one-stop-shop provider referral line, [facility], at [phone].    If you feel you have received this communication in error or would no longer like to receive these types of communications, please e-mail [email or web address]

## 2019-09-10 NOTE — PROGRESS NOTES
"  SUBJECTIVE:     Chief Complaint : right knee pain    History of Present Illness:  Meryl Johnson is a 64 y.o. female [employer] employee in [place] seen in clinic today with a chief complaint of right knee pain. Patient went to [employer] game last week and after climbing a large amount of stairs she woke up the next morning with right knee pain. There was no injury.  Pain was severe. She was seen by her PCP who gave her IM toradol as well as diclofenac 75 mg bid and oral prednisone 20 mg x 4 doses. She felt better initially but had such severe pain the following day that she went to the ED where she was put in compression. ED visit was 9/7.  Pt presents today feeling somewhat better. Her pain is 6/10 on VAS. She presents in wheelchair. Pain is in knee without radiation. She admits to swelling and feeling of knee catching. Pan in medial knee as well as posterior. She denies instability, radiation of pain. She was off of work today and has been taking prednisone and oral diclofenac. A1c is 7.5. She has a cane but did not bring it today. No previous knee pain, injury, surgery. No pain in other joints.     Past Medical History:   Diagnosis Date    Anemia     Diabetes mellitus type II     H. pylori infection     Hyperlipidemia     Hypertension     Unspecified vitamin D deficiency 4/24/2013       Review of Systems:  Constitutional: no fever or chills  ENT: no nasal congestion or sore throat  Respiratory: no cough or shortness of breath  Cardiovascular: no chest pain or palpitations  Gastrointestinal: no nausea or vomiting, tolerating diet  Integument/Breast: no rash or pruritis  Hematologic/Lymphatic: no easy bruising or lymphadenopathy  Musculoskeletal: see HPI  Neurological: no seizures or tremors  Behavioral/Psych: no auditory or visual hallucinations    OBJECTIVE:     PHYSICAL EXAM:  Height 5' 7" (1.702 m), weight 129 kg (284 lb 6.3 oz).   General Appearance: WDWN, NAD  Gait: Antalgic  Neuro/Psych: Mood & affect " appropriate  Lungs: Respirations equal and unlabored.   CV: 2+ bilateral upper and lower extremity pulses.   Skin: Intact throughout LE  Extremities: No LE edema    Right Knee Exam  Range of Motion:0-120 active   Effusion:yes  Condition of skin:intact  Location of tenderness:Medial joint line   Strength:4 of 5 quadriceps strength and 5 of 5 hamstring strength  Stability:stable to testing  Anatoliy: + pain medially     Left Knee Exam  Range of Motion:0-125 active   Effusion:none  Condition of skin:intact  Location of tenderness:None   Strength:4 of 5 quadriceps strength and 5 of 5 hamstring strength  Stability:stable to testing  Anatoliy: negative/negative    Alignment:normal    Right Hip Examination: no pain with PROM     RADIOGRAPHS: AP, lateral and merchant knee x-rays reviewed today by me reveal minimal degenerative changes. No fracture. + swelling     ASSESSMENT/PLAN:   Right knee pain   - Pt is diabetic and is taking oral steroids and NSAID. She feels somewhat better today. We discussed options. Patient would like to continue waiting and taking medications. If she still has pain and swelling next week she will send me [facility] and I will fit her in for aspiration of effusion and injection if needed. She understands that this is a steroid as well and could cause temporary elevation of her glucose.  Pt is satisfied with plan. Pt escorted to [facility].

## 2019-09-25 ENCOUNTER — TELEPHONE (OUTPATIENT)
Dept: INTERNAL MEDICINE | Facility: CLINIC | Age: 64
End: 2019-09-25

## 2019-09-25 RX ORDER — IBUPROFEN 800 MG/1
800 TABLET ORAL 3 TIMES DAILY
Qty: 21 TABLET | Refills: 0 | Status: SHIPPED | OUTPATIENT
Start: 2019-09-25 | End: 2019-10-04

## 2019-09-25 NOTE — TELEPHONE ENCOUNTER
Pt states she need something for her knee pain and swelling she would like anti inflammatory sent to [facility] pharmacy pt is [place] [employer] [place] please advise thanks

## 2019-10-04 RX ORDER — ATORVASTATIN CALCIUM 40 MG/1
TABLET, FILM COATED ORAL
Qty: 30 TABLET | Refills: 5 | Status: SHIPPED | OUTPATIENT
Start: 2019-10-04 | End: 2020-07-06 | Stop reason: SDUPTHER

## 2019-11-22 DIAGNOSIS — E11.9 TYPE 2 DIABETES MELLITUS WITHOUT COMPLICATION: ICD-10-CM

## 2019-12-09 RX ORDER — BENAZEPRIL HYDROCHLORIDE 40 MG/1
TABLET ORAL
Qty: 30 TABLET | Refills: 11 | Status: SHIPPED | OUTPATIENT
Start: 2019-12-09 | End: 2020-12-31 | Stop reason: SDUPTHER

## 2020-04-06 RX ORDER — METFORMIN HYDROCHLORIDE 500 MG/1
TABLET ORAL
Qty: 360 TABLET | Refills: 3 | Status: SHIPPED | OUTPATIENT
Start: 2020-04-06 | End: 2021-03-24 | Stop reason: SDUPTHER

## 2020-04-21 DIAGNOSIS — Z01.84 ANTIBODY RESPONSE EXAMINATION: ICD-10-CM

## 2020-04-30 ENCOUNTER — LAB VISIT (OUTPATIENT)
Dept: LAB | Facility: HOSPITAL | Age: 65
End: 2020-04-30
Attending: INTERNAL MEDICINE
Payer: COMMERCIAL

## 2020-04-30 DIAGNOSIS — Z01.84 ANTIBODY RESPONSE EXAMINATION: ICD-10-CM

## 2020-04-30 LAB — SARS-COV-2 IGG SERPLBLD QL IA.RAPID: NEGATIVE

## 2020-04-30 PROCEDURE — 86769 SARS-COV-2 COVID-19 ANTIBODY: CPT

## 2020-04-30 PROCEDURE — 36415 COLL VENOUS BLD VENIPUNCTURE: CPT

## 2020-05-28 DIAGNOSIS — E11.9 TYPE 2 DIABETES MELLITUS WITHOUT COMPLICATION: ICD-10-CM

## 2020-07-06 ENCOUNTER — PATIENT MESSAGE (OUTPATIENT)
Dept: INTERNAL MEDICINE | Facility: CLINIC | Age: 65
End: 2020-07-06

## 2020-07-06 ENCOUNTER — PATIENT OUTREACH (OUTPATIENT)
Dept: ADMINISTRATIVE | Facility: HOSPITAL | Age: 65
End: 2020-07-06

## 2020-07-06 DIAGNOSIS — Z11.59 ENCOUNTER FOR HEPATITIS C SCREENING TEST FOR LOW RISK PATIENT: ICD-10-CM

## 2020-07-06 DIAGNOSIS — Z11.59 NEED FOR HEPATITIS C SCREENING TEST: Primary | ICD-10-CM

## 2020-07-06 DIAGNOSIS — N18.30 TYPE 2 DIABETES MELLITUS WITH STAGE 3 CHRONIC KIDNEY DISEASE, WITHOUT LONG-TERM CURRENT USE OF INSULIN: ICD-10-CM

## 2020-07-06 DIAGNOSIS — Z12.31 ENCOUNTER FOR SCREENING MAMMOGRAM FOR BREAST CANCER: ICD-10-CM

## 2020-07-06 DIAGNOSIS — Z12.11 COLON CANCER SCREENING: ICD-10-CM

## 2020-07-06 DIAGNOSIS — Z11.4 SCREENING FOR HIV WITHOUT PRESENCE OF RISK FACTORS: ICD-10-CM

## 2020-07-06 DIAGNOSIS — E11.40 TYPE 2 DIABETES MELLITUS WITH DIABETIC NEUROPATHY, WITHOUT LONG-TERM CURRENT USE OF INSULIN: ICD-10-CM

## 2020-07-06 DIAGNOSIS — Z00.00 ANNUAL PHYSICAL EXAM: Primary | ICD-10-CM

## 2020-07-06 DIAGNOSIS — E11.22 TYPE 2 DIABETES MELLITUS WITH STAGE 3 CHRONIC KIDNEY DISEASE, WITHOUT LONG-TERM CURRENT USE OF INSULIN: ICD-10-CM

## 2020-07-06 DIAGNOSIS — E78.5 HYPERLIPIDEMIA, UNSPECIFIED HYPERLIPIDEMIA TYPE: ICD-10-CM

## 2020-07-06 DIAGNOSIS — I10 ESSENTIAL HYPERTENSION: ICD-10-CM

## 2020-07-06 RX ORDER — CHLORTHALIDONE 25 MG/1
TABLET ORAL
Qty: 30 TABLET | Refills: 11 | Status: SHIPPED | OUTPATIENT
Start: 2020-07-06 | End: 2021-07-14 | Stop reason: SDUPTHER

## 2020-07-06 RX ORDER — ATORVASTATIN CALCIUM 40 MG/1
TABLET, FILM COATED ORAL
Qty: 30 TABLET | Refills: 5 | Status: SHIPPED | OUTPATIENT
Start: 2020-07-06 | End: 2021-03-24 | Stop reason: SDUPTHER

## 2020-07-08 ENCOUNTER — HOSPITAL ENCOUNTER (EMERGENCY)
Facility: HOSPITAL | Age: 65
Discharge: HOME | End: 2020-07-08
Attending: EMERGENCY MEDICINE
Payer: OTHER MISCELLANEOUS

## 2020-07-08 VITALS
SYSTOLIC BLOOD PRESSURE: 116 MMHG | RESPIRATION RATE: 15 BRPM | TEMPERATURE: 99 F | HEART RATE: 73 BPM | WEIGHT: 260 LBS | DIASTOLIC BLOOD PRESSURE: 59 MMHG | HEIGHT: 67 IN | OXYGEN SATURATION: 96 % | BODY MASS INDEX: 40.81 KG/M2

## 2020-07-08 DIAGNOSIS — S52.124A CLOSED NONDISPLACED FRACTURE OF HEAD OF RIGHT RADIUS, INITIAL ENCOUNTER: Primary | ICD-10-CM
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
## 2020-07-08 DIAGNOSIS — S70.01XA CONTUSION OF RIGHT HIP, INITIAL ENCOUNTER: ICD-10-CM

## 2020-07-08 DIAGNOSIS — S50.311A ABRASION OF RIGHT ELBOW, INITIAL ENCOUNTER: ICD-10-CM

## 2020-07-08 DIAGNOSIS — S63.622A SPRAIN OF INTERPHALANGEAL JOINT OF LEFT THUMB, INITIAL ENCOUNTER: ICD-10-CM

## 2020-07-08 PROCEDURE — 90715 TDAP VACCINE 7 YRS/> IM: CPT | Performed by: NURSE PRACTITIONER

## 2020-07-08 PROCEDURE — 99284 PR EMERGENCY DEPT VISIT,LEVEL IV: ICD-10-PCS | Mod: ,,, | Performed by: NURSE PRACTITIONER

## 2020-07-08 PROCEDURE — 99284 EMERGENCY DEPT VISIT MOD MDM: CPT | Mod: ,,, | Performed by: NURSE PRACTITIONER

## 2020-07-08 PROCEDURE — 99284 EMERGENCY DEPT VISIT MOD MDM: CPT | Mod: 25

## 2020-07-08 PROCEDURE — 63600175 PHARM REV CODE 636 W HCPCS: Performed by: NURSE PRACTITIONER

## 2020-07-08 PROCEDURE — 25000003 PHARM REV CODE 250: Performed by: NURSE PRACTITIONER

## 2020-07-08 PROCEDURE — 90471 IMMUNIZATION ADMIN: CPT | Performed by: NURSE PRACTITIONER

## 2020-07-08 RX ORDER — ACETAMINOPHEN 500 MG
1000 TABLET ORAL
Status: COMPLETED | OUTPATIENT
Start: 2020-07-08 | End: 2020-07-08

## 2020-07-08 RX ORDER — TRAMADOL HYDROCHLORIDE 50 MG/1
50 TABLET ORAL EVERY 8 HOURS PRN
Qty: 10 TABLET | Refills: 0 | Status: SHIPPED | OUTPATIENT
Start: 2020-07-08 | End: 2020-08-14

## 2020-07-08 RX ORDER — ONDANSETRON 4 MG/1
4 TABLET, ORALLY DISINTEGRATING ORAL EVERY 6 HOURS PRN
Qty: 20 TABLET | Refills: 0 | Status: SHIPPED | OUTPATIENT
Start: 2020-07-08 | End: 2020-07-21

## 2020-07-08 RX ADMIN — ACETAMINOPHEN 1000 MG: 500 TABLET ORAL at 04:07

## 2020-07-08 RX ADMIN — CLOSTRIDIUM TETANI TOXOID ANTIGEN (FORMALDEHYDE INACTIVATED), CORYNEBACTERIUM DIPHTHERIAE TOXOID ANTIGEN (FORMALDEHYDE INACTIVATED), BORDETELLA PERTUSSIS TOXOID ANTIGEN (GLUTARALDEHYDE INACTIVATED), BORDETELLA PERTUSSIS FILAMENTOUS HEMAGGLUTININ ANTIGEN (FORMALDEHYDE INACTIVATED), BORDETELLA PERTUSSIS PERTACTIN ANTIGEN, AND BORDETELLA PERTUSSIS FIMBRIAE 2/3 ANTIGEN 0.5 ML: 5; 2; 2.5; 5; 3; 5 INJECTION, SUSPENSION INTRAMUSCULAR at 04:07

## 2020-07-08 NOTE — DISCHARGE INSTRUCTIONS
Follow up with your orthopedic for continued treatment.    Thank you for allowing me to care for you today.  I hope our treatment plan will make you feel better in the next few days.  In order for me to take better care of my future patients and improve our Emergency Department, I would appreciate if you can provide us with feedback.  In the next few days, you may receive a survey in the mail.  If you do, it would mean a great deal to me if you would please take the time to complete it.    Thank you and I hope you feel better.  CLULEN Kirkland

## 2020-07-08 NOTE — ED TRIAGE NOTES
Patient states she was getting into her car when she tripped over unleveled ground. Denies hitting head. Denies being on blood thinners. Wound to right elbow. Reports pain to right elbow, right hand, right hip, and left thumb. Rates pain 8/10.

## 2020-07-08 NOTE — ED PROVIDER NOTES
Encounter Date: 7/8/2020       History     Chief Complaint   Patient presents with    Fall     tripped on cement, no loc, r elbow, wrist, L thumb, r hip     This is the urgent evaluation a 64-year-old white female who prior to arrival was leaving work and tripped and fell on some seen meant.  Patient reports that she landed on her right side, complaining of right hip and elbow pain with an abrasion to her posterior aspect of her elbow.  Also complaining of left thumb pain.  She denies hitting her head or any loss consciousness.  Patient has ambulated since the fall.  Tetanus is not up-to-date.  Past medical history includes diabetes, hyperlipidemia, hypertension.        Review of patient's allergies indicates:   Allergen Reactions    Codeine Nausea Only    Vicodin [hydrocodone-acetaminophen] Nausea Only     Past Medical History:   Diagnosis Date    Anemia     Diabetes mellitus type II     H. pylori infection     Hyperlipidemia     Hypertension     Unspecified vitamin D deficiency 4/24/2013     Past Surgical History:   Procedure Laterality Date    ESOPHAGOGASTRODUODENOSCOPY N/A 8/8/2018    Procedure: EGD (ESOPHAGOGASTRODUODENOSCOPY);  Surgeon: Darius Gerardo MD;  Location: [address]);  Service: Endoscopy;  Laterality: N/A;    GASTRIC BYPASS       Family History   Problem Relation Age of Onset    COPD Mother     Heart disease Mother     Cancer Father         liver    Heart disease Father     Cancer Sister     Diabetes Sister     Hyperlipidemia Sister     Hypothyroidism Sister     No Known Problems Brother     No Known Problems Maternal Aunt     No Known Problems Maternal Uncle     No Known Problems Paternal Aunt     No Known Problems Paternal Uncle     No Known Problems Maternal Grandmother     No Known Problems Maternal Grandfather     No Known Problems Paternal Grandmother     No Known Problems Paternal Grandfather     Amblyopia Neg Hx     Blindness Neg Hx     Cataracts Neg  Hx     Glaucoma Neg Hx     Hypertension Neg Hx     Macular degeneration Neg Hx     Retinal detachment Neg Hx     Strabismus Neg Hx     Stroke Neg Hx     Thyroid disease Neg Hx     Breast cancer Neg Hx     Colon cancer Neg Hx     Ovarian cancer Neg Hx     Stomach cancer Neg Hx     Rectal cancer Neg Hx      Social History     Tobacco Use    Smoking status: Never Smoker    Smokeless tobacco: Never Used   Substance Use Topics    Alcohol use: No    Drug use: No     Review of Systems   Constitutional: Negative for chills and fever.   Respiratory: Negative for cough and stridor.    Cardiovascular: Negative for chest pain.   Musculoskeletal: Positive for arthralgias.   Skin: Positive for wound.   Neurological: Negative for headaches.   All other systems reviewed and are negative.      Physical Exam     Initial Vitals [07/08/20 1542]   BP Pulse Resp Temp SpO2   (!) 159/89 (!) 112 18 97.7 °F (36.5 °C) 96 %      MAP       --         Physical Exam    Nursing note and vitals reviewed.  Constitutional: Vital signs are normal. She appears well-developed and well-nourished. She does not appear ill. No distress.   Neck: Neck supple.   Cardiovascular: Normal rate, regular rhythm and normal heart sounds.   Pulses:       Radial pulses are 2+ on the right side and 2+ on the left side.   Pulmonary/Chest: Effort normal and breath sounds normal. She has no decreased breath sounds. She has no wheezes.   Musculoskeletal:      Right shoulder: Normal.      Left shoulder: Normal.      Right elbow: She exhibits decreased range of motion and swelling. Tenderness found. Radial head tenderness noted.      Right wrist: She exhibits tenderness. She exhibits normal range of motion and no deformity.      Right hip: She exhibits tenderness. She exhibits normal range of motion, no swelling and no deformity.      Left hip: Normal.      Cervical back: Normal.      Comments: Elbow pain with pronation    Neurological: She is alert and  oriented to person, place, and time. No sensory deficit. GCS eye subscore is 4. GCS verbal subscore is 5. GCS motor subscore is 6.   Skin: Skin is warm, dry and intact.   Psychiatric: She has a normal mood and affect. Her behavior is normal.         ED Course   Procedures  Labs Reviewed - No data to display       Imaging Results          X-Ray Elbow Complete 3 views Right (Final result)  Result time 07/08/20 17:17:16    Final result by Deep Carrasco MD (07/08/20 17:17:16)                 Impression:      1. Lucency involving the radial head/neck junction may reflect that of vascular channel as this is not appreciated on subsequent orthogonal views however impacted nondisplaced fracture would be difficult to definitively exclude.  Correlation is recommended.  Further evaluation as warranted.      Electronically signed by: Deep Carrasco MD  Date:    07/08/2020  Time:    17:17             Narrative:    EXAMINATION:  XR ELBOW COMPLETE 3 VIEW RIGHT    CLINICAL HISTORY:  . Unspecified fall, initial encounter    TECHNIQUE:  AP, lateral, and oblique views of the right elbow were performed.    COMPARISON:  None    FINDINGS:  Three views right elbow.    No significant displacement of the anterior or posterior elbow fat pads.  Anterior humeral line and radiocapitellar line are in appropriate orientation.  There is subtle lucency involving the right radial head at the head/neck junction.  This is suspected to reflect positional change as this is only appreciated on the lateral view however correlation with any focal tenderness is recommended as impacted nondisplaced radial neck fracture cannot be definitively excluded.  There is osteopenia.  No radiopaque foreign body.                               X-Ray Hip 2 View Right (Final result)  Result time 07/08/20 17:18:11    Final result by Deep Carrasco MD (07/08/20 17:18:11)                 Impression:      1. No acute displaced fracture or dislocation of the right  hip.      Electronically signed by: Deep Carrasco MD  Date:    07/08/2020  Time:    17:18             Narrative:    EXAMINATION:  XR HIP 2 VIEW RIGHT    CLINICAL HISTORY:  fall;    TECHNIQUE:  AP view of the pelvis and frog leg lateral view of the right hip were performed.    COMPARISON:  01/29/2004    FINDINGS:  Two views right hip.    The right femoral head maintains appropriate relationship with the acetabulum.  No acute displaced fracture or dislocation of the right hip.  The bilateral sacroiliac joints are intact.  The pubic symphysis is intact.                               X-Ray Forearm Right (Final result)  Result time 07/08/20 17:19:27    Final result by Deep Carrasco MD (07/08/20 17:19:27)                 Impression:      1. Configuration of the radial head/neck junction is concerning for impacted fracture, correlation is advised.      Electronically signed by: Deep Carrasco MD  Date:    07/08/2020  Time:    17:19             Narrative:    EXAMINATION:  XR FOREARM RIGHT    CLINICAL HISTORY:  Unspecified fall, initial encounter    TECHNIQUE:  AP and lateral views of the right forearm were performed.    COMPARISON:  12/26/2009    FINDINGS:  Two views right forearm.    There is interval healing of previous distal radial fracture.  There is irregularity at the radial head/neck junction, concerning for impacted injury.  No significant joint effusion.  The elbow appears otherwise intact.                               X-Ray Hand 3 view Left (Final result)  Result time 07/08/20 17:20:33    Final result by Deep Carrasco MD (07/08/20 17:20:33)                 Impression:      1. No acute displaced fracture or dislocation of the hand noting degenerative changes.      Electronically signed by: Deep Carrasco MD  Date:    07/08/2020  Time:    17:20             Narrative:    EXAMINATION:  XR HAND COMPLETE 3 VIEW LEFT    CLINICAL HISTORY:  fall;.    TECHNIQUE:  PA, lateral, and oblique views of the left  hand were performed.    COMPARISON:  None    FINDINGS:  Three views left hand.    There is osteopenia.  Degenerative changes are noted of the hand.  No acute displaced fracture or dislocation of the hand.  No radiopaque foreign body.                                 Medical Decision Making:   Differential Diagnosis:   Differential Diagnosis includes, but is not limited to:  Fracture, dislocation, compartment syndrome, nerve injury/palsy, vascular injury, rhabdomyolysis, hemarthrosis, septic joint, bursitis, muscle strain, ligament tear/sprain, abrasion, soft tissue contusion, osteoarthritis.    Clinical Tests:   Radiological Study: Ordered and Reviewed  ED Management:  Sling was applied, discussed need to follow-up with orthopedics for continued evaluation of possible radial head fracture.  Discussed NSAIDs and ice.  And discussed wear the sling for 3 days and begin early movement afterwards.                                   Clinical Impression:       ICD-10-CM ICD-9-CM   1. Closed nondisplaced fracture of head of right radius, initial encounter  S52.124A 813.05   2. Fall  W19.XXXA E888.9   3. Abrasion of right elbow, initial encounter  S50.311A 913.0   4. Contusion of right hip, initial encounter  S70.01XA 924.01   5. Sprain of interphalangeal joint of left thumb, initial encounter  S63.622A 842.13         Disposition:   Disposition: Discharged  Condition: Stable                        Shayy Vieyra, CULLEN  07/08/20 [id number]

## 2020-07-09 ENCOUNTER — PATIENT OUTREACH (OUTPATIENT)
Dept: ADMINISTRATIVE | Facility: OTHER | Age: 65
End: 2020-07-09

## 2020-07-09 DIAGNOSIS — E11.9 TYPE 2 DIABETES MELLITUS WITHOUT COMPLICATION, WITHOUT LONG-TERM CURRENT USE OF INSULIN: Primary | ICD-10-CM

## 2020-07-09 NOTE — PROGRESS NOTES
Care Everywhere:   Immunization: updated  Health Maintenance: updated  Media Review: reviewed for outside colon cancer, mammogram and eye exam report  Legacy Review:   Order placed: diabetic eye screening photo  Upcoming appts:hemoglobin a1c 7.10.2020  Mammogram scheduling ticket sent patient's portal on 7.6.2020

## 2020-07-10 ENCOUNTER — OFFICE VISIT (OUTPATIENT)
Dept: SPORTS MEDICINE | Facility: CLINIC | Age: 65
End: 2020-07-10
Payer: COMMERCIAL

## 2020-07-10 ENCOUNTER — LAB VISIT (OUTPATIENT)
Dept: LAB | Facility: HOSPITAL | Age: 65
End: 2020-07-10
Attending: INTERNAL MEDICINE
Payer: COMMERCIAL

## 2020-07-10 VITALS — WEIGHT: 260 LBS | HEIGHT: 67 IN | BODY MASS INDEX: 40.81 KG/M2

## 2020-07-10 DIAGNOSIS — E78.5 HYPERLIPIDEMIA, UNSPECIFIED HYPERLIPIDEMIA TYPE: ICD-10-CM

## 2020-07-10 DIAGNOSIS — S52.124A CLOSED NONDISPLACED FRACTURE OF HEAD OF RIGHT RADIUS, INITIAL ENCOUNTER: Primary | ICD-10-CM

## 2020-07-10 DIAGNOSIS — I10 ESSENTIAL HYPERTENSION: ICD-10-CM

## 2020-07-10 DIAGNOSIS — E11.40 TYPE 2 DIABETES MELLITUS WITH DIABETIC NEUROPATHY, WITHOUT LONG-TERM CURRENT USE OF INSULIN: ICD-10-CM

## 2020-07-10 DIAGNOSIS — Z11.4 SCREENING FOR HIV WITHOUT PRESENCE OF RISK FACTORS: ICD-10-CM

## 2020-07-10 DIAGNOSIS — E11.22 TYPE 2 DIABETES MELLITUS WITH STAGE 3 CHRONIC KIDNEY DISEASE, WITHOUT LONG-TERM CURRENT USE OF INSULIN: ICD-10-CM

## 2020-07-10 DIAGNOSIS — N18.30 TYPE 2 DIABETES MELLITUS WITH STAGE 3 CHRONIC KIDNEY DISEASE, WITHOUT LONG-TERM CURRENT USE OF INSULIN: ICD-10-CM

## 2020-07-10 DIAGNOSIS — Z11.59 ENCOUNTER FOR HEPATITIS C SCREENING TEST FOR LOW RISK PATIENT: ICD-10-CM

## 2020-07-10 DIAGNOSIS — Z00.00 ANNUAL PHYSICAL EXAM: ICD-10-CM

## 2020-07-10 LAB
ALBUMIN SERPL BCP-MCNC: 3.8 G/DL (ref 3.5–5.2)
ALP SERPL-CCNC: 141 U/L (ref 55–135)
ALT SERPL W/O P-5'-P-CCNC: 21 U/L (ref 10–44)
ANION GAP SERPL CALC-SCNC: 10 MMOL/L (ref 8–16)
AST SERPL-CCNC: 17 U/L (ref 10–40)
BASOPHILS # BLD AUTO: 0.03 K/UL (ref 0–0.2)
BASOPHILS NFR BLD: 0.5 % (ref 0–1.9)
BILIRUB SERPL-MCNC: 0.4 MG/DL (ref 0.1–1)
BUN SERPL-MCNC: 20 MG/DL (ref 8–23)
CALCIUM SERPL-MCNC: 9.5 MG/DL (ref 8.7–10.5)
CHLORIDE SERPL-SCNC: 107 MMOL/L (ref 95–110)
CHOLEST SERPL-MCNC: 181 MG/DL (ref 120–199)
CHOLEST/HDLC SERPL: 3.9 {RATIO} (ref 2–5)
CO2 SERPL-SCNC: 22 MMOL/L (ref 23–29)
CREAT SERPL-MCNC: 1 MG/DL (ref 0.5–1.4)
DIFFERENTIAL METHOD: ABNORMAL
EOSINOPHIL # BLD AUTO: 0.1 K/UL (ref 0–0.5)
EOSINOPHIL NFR BLD: 2.1 % (ref 0–8)
ERYTHROCYTE [DISTWIDTH] IN BLOOD BY AUTOMATED COUNT: 13.9 % (ref 11.5–14.5)
EST. GFR  (AFRICAN AMERICAN): >60 ML/MIN/1.73 M^2
EST. GFR  (NON AFRICAN AMERICAN): 59.7 ML/MIN/1.73 M^2
ESTIMATED AVG GLUCOSE: 163 MG/DL (ref 68–131)
GLUCOSE SERPL-MCNC: 164 MG/DL (ref 70–110)
HBA1C MFR BLD HPLC: 7.3 % (ref 4–5.6)
HCT VFR BLD AUTO: 41 % (ref 37–48.5)
HCV AB SERPL QL IA: NEGATIVE
HDLC SERPL-MCNC: 46 MG/DL (ref 40–75)
HDLC SERPL: 25.4 % (ref 20–50)
HGB BLD-MCNC: 12.8 G/DL (ref 12–16)
IMM GRANULOCYTES # BLD AUTO: 0.01 K/UL (ref 0–0.04)
IMM GRANULOCYTES NFR BLD AUTO: 0.2 % (ref 0–0.5)
LDLC SERPL CALC-MCNC: 102 MG/DL (ref 63–159)
LYMPHOCYTES # BLD AUTO: 1.4 K/UL (ref 1–4.8)
LYMPHOCYTES NFR BLD: 24.3 % (ref 18–48)
MCH RBC QN AUTO: 28.8 PG (ref 27–31)
MCHC RBC AUTO-ENTMCNC: 31.2 G/DL (ref 32–36)
MCV RBC AUTO: 92 FL (ref 82–98)
MONOCYTES # BLD AUTO: 0.4 K/UL (ref 0.3–1)
MONOCYTES NFR BLD: 6.9 % (ref 4–15)
NEUTROPHILS # BLD AUTO: 3.7 K/UL (ref 1.8–7.7)
NEUTROPHILS NFR BLD: 66 % (ref 38–73)
NONHDLC SERPL-MCNC: 135 MG/DL
NRBC BLD-RTO: 0 /100 WBC
PLATELET # BLD AUTO: 254 K/UL (ref 150–350)
PMV BLD AUTO: 10.9 FL (ref 9.2–12.9)
POTASSIUM SERPL-SCNC: 4 MMOL/L (ref 3.5–5.1)
PROT SERPL-MCNC: 7.3 G/DL (ref 6–8.4)
RBC # BLD AUTO: 4.44 M/UL (ref 4–5.4)
SODIUM SERPL-SCNC: 139 MMOL/L (ref 136–145)
TRIGL SERPL-MCNC: 165 MG/DL (ref 30–150)
TSH SERPL DL<=0.005 MIU/L-ACNC: 2.45 UIU/ML (ref 0.4–4)
WBC # BLD AUTO: 5.64 K/UL (ref 3.9–12.7)

## 2020-07-10 PROCEDURE — 86803 HEPATITIS C AB TEST: CPT

## 2020-07-10 PROCEDURE — 80053 COMPREHEN METABOLIC PANEL: CPT

## 2020-07-10 PROCEDURE — 80061 LIPID PANEL: CPT

## 2020-07-10 PROCEDURE — 99999 PR PBB SHADOW E&M-EST. PATIENT-LVL III: CPT | Mod: PBBFAC,,, | Performed by: ORTHOPAEDIC SURGERY

## 2020-07-10 PROCEDURE — 84443 ASSAY THYROID STIM HORMONE: CPT

## 2020-07-10 PROCEDURE — 3008F BODY MASS INDEX DOCD: CPT | Mod: CPTII,S$GLB,, | Performed by: ORTHOPAEDIC SURGERY

## 2020-07-10 PROCEDURE — 99203 OFFICE O/P NEW LOW 30 MIN: CPT | Mod: S$GLB,,, | Performed by: ORTHOPAEDIC SURGERY

## 2020-07-10 PROCEDURE — 85025 COMPLETE CBC W/AUTO DIFF WBC: CPT

## 2020-07-10 PROCEDURE — 36415 COLL VENOUS BLD VENIPUNCTURE: CPT

## 2020-07-10 PROCEDURE — 83036 HEMOGLOBIN GLYCOSYLATED A1C: CPT

## 2020-07-10 PROCEDURE — 99999 PR PBB SHADOW E&M-EST. PATIENT-LVL III: ICD-10-PCS | Mod: PBBFAC,,, | Performed by: ORTHOPAEDIC SURGERY

## 2020-07-10 PROCEDURE — 99203 PR OFFICE/OUTPT VISIT, NEW, LEVL III, 30-44 MIN: ICD-10-PCS | Mod: S$GLB,,, | Performed by: ORTHOPAEDIC SURGERY

## 2020-07-10 PROCEDURE — 3008F PR BODY MASS INDEX (BMI) DOCUMENTED: ICD-10-PCS | Mod: CPTII,S$GLB,, | Performed by: ORTHOPAEDIC SURGERY

## 2020-07-10 NOTE — PROGRESS NOTES
CC Right elbow pain.  Prior patient.    RHD.  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    Was walking to car, at work, stepped off a curb and fell onto the ground.  Landed onto the Right side.  07/08/20.  No numbness or tingling.  First elbow injury.  Mild pain in the Right shoulder.  No elbow pain or injuries prior to this fall.      Review of Systems   Constitution: Negative. Negative for chills, fever and night sweats.   HENT: Negative for congestion and headaches.    Eyes: Negative for blurred vision, left vision loss and right vision loss.   Cardiovascular: Negative for chest pain and syncope.   Respiratory: Negative for cough and shortness of breath.    Endocrine: Negative for polydipsia, polyphagia and polyuria.   Hematologic/Lymphatic: Negative for bleeding problem. Does not bruise/bleed easily.   Skin: Negative for dry skin, itching and rash.   Musculoskeletal: Negative for falls and muscle weakness.   Gastrointestinal: Negative for abdominal pain and bowel incontinence.   Genitourinary: Negative for bladder incontinence and nocturia.   Neurological: Negative for disturbances in coordination, loss of balance and seizures.   Psychiatric/Behavioral: Negative for depression. The patient does not have insomnia.    Allergic/Immunologic: Negative for hives and persistent infections.     PAST MEDICAL HISTORY:   Past Medical History:   Diagnosis Date    Anemia     Diabetes mellitus type II     H. pylori infection     Hyperlipidemia     Hypertension     Unspecified vitamin D deficiency 4/24/2013     PAST SURGICAL HISTORY:   Past Surgical History:   Procedure Laterality Date    ESOPHAGOGASTRODUODENOSCOPY N/A 8/8/2018    Procedure: EGD (ESOPHAGOGASTRODUODENOSCOPY);  Surgeon: Darius Gerardo MD;  Location: [address];  Service: Endoscopy;  Laterality: N/A;    GASTRIC BYPASS       FAMILY HISTORY:   Family History   Problem Relation Age of Onset    COPD Mother     Heart disease Mother     Cancer Father          liver    Heart disease Father     Cancer Sister     Diabetes Sister     Hyperlipidemia Sister     Hypothyroidism Sister     No Known Problems Brother     No Known Problems Maternal Aunt     No Known Problems Maternal Uncle     No Known Problems Paternal Aunt     No Known Problems Paternal Uncle     No Known Problems Maternal Grandmother     No Known Problems Maternal Grandfather     No Known Problems Paternal Grandmother     No Known Problems Paternal Grandfather     Amblyopia Neg Hx     Blindness Neg Hx     Cataracts Neg Hx     Glaucoma Neg Hx     Hypertension Neg Hx     Macular degeneration Neg Hx     Retinal detachment Neg Hx     Strabismus Neg Hx     Stroke Neg Hx     Thyroid disease Neg Hx     Breast cancer Neg Hx     Colon cancer Neg Hx     Ovarian cancer Neg Hx     Stomach cancer Neg Hx     Rectal cancer Neg Hx      SOCIAL HISTORY:   Social History     Socioeconomic History    Marital status: Single     Spouse name: Not on file    Number of children: Not on file    Years of education: Not on file    Highest education level: Not on file   Occupational History     Employer: [employer]   Social Needs    Financial resource strain: Not on file    Food insecurity     Worry: Not on file     Inability: Not on file    Transportation needs     Medical: Not on file     Non-medical: Not on file   Tobacco Use    Smoking status: Never Smoker    Smokeless tobacco: Never Used   Substance and Sexual Activity    Alcohol use: No    Drug use: No    Sexual activity: Not on file   Lifestyle    Physical activity     Days per week: Not on file     Minutes per session: Not on file    Stress: Not on file   Relationships    Social connections     Talks on phone: Not on file     Gets together: Not on file     Attends Pent[employer]al service: Not on file     Active member of club or organization: Not on file     Attends meetings of clubs or organizations: Not on file     Relationship  "status: Not on file   Other Topics Concern    Not on file   Social History Narrative    Not on file       MEDICATIONS:   Current Outpatient Medications:     atorvastatin (LIPITOR) 40 MG tablet, TAKE ONE TABLET BY MOUTH EVERY DAY, Disp: 30 tablet, Rfl: 5    benazepriL (LOTENSIN) 40 MG tablet, TAKE ONE TABLET BY MOUTH ONCE DAILY, Disp: 30 tablet, Rfl: 11    blood sugar diagnostic (BLOOD GLUCOSE TEST) Strp, 1 strip by Misc.(Non-Drug; Combo Route) route 2 (two) times daily before meals. For mikki contour, Disp: 200 strip, Rfl: 3    chlorthalidone (HYGROTEN) 25 MG Tab, TAKE ONE TABLET BY MOUTH ONCE DAILY, Disp: 30 tablet, Rfl: 11    lancets (ONE TOUCH DELICA LANCETS) 33 gauge Misc, 1 lancet by Misc.(Non-Drug; Combo Route) route 2 (two) times daily., Disp: 60 each, Rfl: 11    metFORMIN (GLUCOPHAGE) 500 MG tablet, TAKE 2 TABLETS BY MOUTH TWO TIMES A DAY WITH MEALS, Disp: 360 tablet, Rfl: 3    traMADoL (ULTRAM) 50 mg tablet, Take 1 tablet (50 mg total) by mouth every 8 (eight) hours as needed for Pain., Disp: 10 tablet, Rfl: 0    dulaglutide (TRULICITY) 0.75 mg/0.5 mL PnIj, Inject 0.5 mLs (0.75 mg total) into the skin every 7 days. (Patient not taking: Reported on 7/10/2020), Disp: 4 Syringe, Rfl: 5    ondansetron (ZOFRAN-ODT) 4 MG TbDL, Dissolve 1 tablet (4 mg total) by mouth every 6 (six) hours as needed. (Patient not taking: Reported on 7/10/2020), Disp: 20 tablet, Rfl: 0  ALLERGIES:   Review of patient's allergies indicates:   Allergen Reactions    Codeine Nausea Only    Vicodin [hydrocodone-acetaminophen] Nausea Only       VITAL SIGNS: Ht 5' 7" (1.702 m)   Wt 117.9 kg (260 lb)   BMI 40.72 kg/m²        PHYSICAL EXAM:  General: Patient appears alert and oriented x 3.  Mood is pleasant.  Observation of ears, eyes and nose reveal no gross abnormalities. HEENT: NCAT, sclera nonicteric  Lungs: Respirations are equal and unlabored.  Well nourished, in no acute distress and ambulates with a non-antalgic gait " with no assistive devices.    Skin: Skin intact bilaterally. Sensation intact bilaterally. Compartments soft. No evidence of edema, infection, or induration.     Right ELBOW / WRIST EXTREMITY EXAM:    OBSERVATION / INSPECTION    Swelling  none    Deformity  none  Discoloration  none     Scars   none    Atrophy  None  Abrasion present over the olecranon    TENDERNESS / CREPITUS (T / C):           T / C        Medial epicondyle   - / -    Med. (Ulnar) collateral ligament  - / -    Flexor pronator Musculature   - / -   Biceps tendon    - / -   Head of radius    - / -    Lateral epicondyle   - / -    Extensor Musculature   - / -   Brachioradialis   - / -   Triceps tendon   - / -   Triceps muscle   - / -   Olecranon    - / -   Olecranon bursa   - / -   Cubital fossa    - / -   Anterior jointline   - / -   Radial tunnel    -/ -             ROM: ('*' = with pain)    Right Elbow  AROM (PROM)     Extension   0 deg  (5 deg)   Flexion   145 deg (145 deg)         Pronation  90 deg  (90 deg)      Supination   80 deg  (80 deg)                 Left Elbow  AROM (PROM)     Extension   0 deg  (5 deg)   Flexion   145 deg (145 deg)         Pronation  90 deg  (90 deg)      Supination   80 deg  (80 deg)            Right Wrist  AROM (PROM)   Extension   80 deg (85 deg)   Flexion   80 deg (85 deg)         Ulnar Deviation   35 deg (40 deg)  Radial Deviation 35 deg (40 deg)             Left Wrist   AROM (PROM)     Extension   80 deg (85 deg)   Flexion   80 deg (85 deg)         Ulnar Deviation   35 deg (40 deg)  Radial Deviation 35 deg (40 deg)        STRENGTH: ('*' = with pain)    Elbow Flexion:   5/5  Elbow Extension:  5/5  Wrist Flexion:   5/5  Wrist Extension:  5/5  :    5/5  Intrinsics:   5/5  EPL (Ext. pollicis longus): 5/5  Pinch Mechanism:  5/5    ELBOW EXAMINATION:  See above noted areas of tenderness.   Test for Ligamentous Instability - UCL normal  Test for Ligamentous Instability - LUCL normal  PLRI       neg  Tinel's  (Percussion) Test - Cubital  neg  Tennis Elbow Test    neg  Golfer's Elbow Test    neg  Radial Capitellar Grind Test   neg  Valgus/Extension Overload Test  neg  Resisted Long Finger Extension Pain neg  Moving Valgus    neg  Forearm pain with resisted supination neg  Yeargeson' s (elbow pain)   neg  Hook test     neg    WRIST EXAMINATION:  See above noted areas of tenderness.   Finkelstein's Test   neg  Tinel's Test - Carpal Tunnel  neg  Phalen's Test    neg  Median Nerve Compression Test neg  Ulnar-sided Compression Test neg  LT Ballottment Test   neg  Snuff box tenderness   neg  Lau's Test    neg  LT Instability    neg  Hook of Hamate Tenderness  neg     EXTREMITY NEURO-VASCULAR EXAMINATION: Sensation grossly intact to light touch all dermatomal regions. DTR 2+ Biceps, Triceps, BR and Negative Clara's sign. Grossly intact motor function at Elbow, Wrist and Hand. Distal pulses radial and ulnar 2+, brisk cap refill, symmetric.    Other Findings:  TTP over the radial head    Xrays: (AP, lateral, oblique) Right elbow ordered and reviewed by me personally today: Minimally displaced radial head fracture        ASSESSMENT:  Right elbow pain      PLAN:  Sling for 10 more days  Occupation therapy prescribed for elbow ROM with Pat at [place].  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  F/U 1 mo for interval x-rays and exam.

## 2020-07-13 ENCOUNTER — OFFICE VISIT (OUTPATIENT)
Dept: INTERNAL MEDICINE | Facility: CLINIC | Age: 65
End: 2020-07-13
Payer: COMMERCIAL

## 2020-07-13 ENCOUNTER — TELEPHONE (OUTPATIENT)
Dept: SPORTS MEDICINE | Facility: CLINIC | Age: 65
End: 2020-07-13

## 2020-07-13 VITALS
HEART RATE: 90 BPM | DIASTOLIC BLOOD PRESSURE: 82 MMHG | BODY MASS INDEX: 42.91 KG/M2 | HEIGHT: 67 IN | WEIGHT: 273.38 LBS | SYSTOLIC BLOOD PRESSURE: 130 MMHG | OXYGEN SATURATION: 98 %

## 2020-07-13 DIAGNOSIS — Z00.00 ANNUAL PHYSICAL EXAM: Primary | ICD-10-CM

## 2020-07-13 DIAGNOSIS — I10 ESSENTIAL HYPERTENSION: ICD-10-CM

## 2020-07-13 DIAGNOSIS — E78.5 HYPERLIPIDEMIA, UNSPECIFIED HYPERLIPIDEMIA TYPE: ICD-10-CM

## 2020-07-13 DIAGNOSIS — E11.40 TYPE 2 DIABETES MELLITUS WITH DIABETIC NEUROPATHY, WITHOUT LONG-TERM CURRENT USE OF INSULIN: ICD-10-CM

## 2020-07-13 PROCEDURE — 3051F PR MOST RECENT HEMOGLOBIN A1C LEVEL 7.0 - < 8.0%: ICD-10-PCS | Mod: CPTII,S$GLB,, | Performed by: INTERNAL MEDICINE

## 2020-07-13 PROCEDURE — 99999 PR PBB SHADOW E&M-EST. PATIENT-LVL III: ICD-10-PCS | Mod: PBBFAC,,, | Performed by: INTERNAL MEDICINE

## 2020-07-13 PROCEDURE — 99999 PR PBB SHADOW E&M-EST. PATIENT-LVL III: CPT | Mod: PBBFAC,,, | Performed by: INTERNAL MEDICINE

## 2020-07-13 PROCEDURE — 3051F HG A1C>EQUAL 7.0%<8.0%: CPT | Mod: CPTII,S$GLB,, | Performed by: INTERNAL MEDICINE

## 2020-07-13 PROCEDURE — 99396 PREV VISIT EST AGE 40-64: CPT | Mod: S$GLB,,, | Performed by: INTERNAL MEDICINE

## 2020-07-13 PROCEDURE — 3079F DIAST BP 80-89 MM HG: CPT | Mod: CPTII,S$GLB,, | Performed by: INTERNAL MEDICINE

## 2020-07-13 PROCEDURE — 3075F SYST BP GE 130 - 139MM HG: CPT | Mod: CPTII,S$GLB,, | Performed by: INTERNAL MEDICINE

## 2020-07-13 PROCEDURE — 3079F PR MOST RECENT DIASTOLIC BLOOD PRESSURE 80-89 MM HG: ICD-10-PCS | Mod: CPTII,S$GLB,, | Performed by: INTERNAL MEDICINE

## 2020-07-13 PROCEDURE — 99396 PR PREVENTIVE VISIT,EST,40-64: ICD-10-PCS | Mod: S$GLB,,, | Performed by: INTERNAL MEDICINE

## 2020-07-13 PROCEDURE — 3075F PR MOST RECENT SYSTOLIC BLOOD PRESS GE 130-139MM HG: ICD-10-PCS | Mod: CPTII,S$GLB,, | Performed by: INTERNAL MEDICINE

## 2020-07-13 NOTE — PROGRESS NOTES
PAST MEDICAL HISTORY:  Type 2 diabetes with peripheral neuropathy  Hypertension.  Hyperlipidemia.  Helicobacter pylori which has been treated.  Obesity with gastric bypass surgery.  Left foot ulcer, fifth metatarsal, debridement     SOCIAL HISTORY:  Tobacco and alcohol use - none.  Works in Security at [employer].  No formal exercise routine.     FAMILY HISTORY:  Mother is , COPD, heart disease.  Father is , liver cancer, heart disease.  Two sisters, but one sister recently passed away from metastatic liver disease, the primary is unknown; the other sister has diabetes and hypothyroid disease.      64-year-old female  Routine follow-up visit      The most noted circumstances was at last week she had a fall, tripped on the see min, landed on her right side, Systane fracture of the right radial head at the elbow, seen by Sports Medicine, in the sling, will be starting physical therapy      In regard to her diabetes, she is only taking metformin 2 a day instead of 2 twice a day due to diarrhea and she has not taking Trulicity because a nausea    Medications  Benazepril 40 mg  Atorvastatin 40 mg  Chlorthalidone 25 mg  Metformin 500 mg 2 in the morning    Component      Latest Ref Rng & Units 7/10/2020   WBC      3.90 - 12.70 K/uL 5.64   RBC      4.00 - 5.40 M/uL 4.44   Hemoglobin      12.0 - 16.0 g/dL 12.8   Hematocrit      37.0 - 48.5 % 41.0   MCV      82 - 98 fL 92   MCH      27.0 - 31.0 pg 28.8   MCHC      32.0 - 36.0 g/dL 31.2 (L)   RDW      11.5 - 14.5 % 13.9   Platelets      150 - 350 K/uL 254   MPV      9.2 - 12.9 fL 10.9   Immature Granulocytes      0.0 - 0.5 % 0.2   Gran # (ANC)      1.8 - 7.7 K/uL 3.7   Immature Grans (Abs)      0.00 - 0.04 K/uL 0.01   Lymph #      1.0 - 4.8 K/uL 1.4   Mono #      0.3 - 1.0 K/uL 0.4   Eos #      0.0 - 0.5 K/uL 0.1   Baso #      0.00 - 0.20 K/uL 0.03   nRBC      0 /100 WBC 0   Gran%      38.0 - 73.0 % 66.0   Lymph%      18.0 - 48.0 % 24.3   Mono%      4.0 - 15.0 %  6.9   Eosinophil%      0.0 - 8.0 % 2.1   Basophil%      0.0 - 1.9 % 0.5   Differential Method       Automated   Sodium      136 - 145 mmol/L 139   Potassium      3.5 - 5.1 mmol/L 4.0   Chloride      95 - 110 mmol/L 107   CO2      23 - 29 mmol/L 22 (L)   Glucose      70 - 110 mg/dL 164 (H)   BUN, Bld      8 - 23 mg/dL 20   Creatinine      0.5 - 1.4 mg/dL 1.0   Calcium      8.7 - 10.5 mg/dL 9.5   PROTEIN TOTAL      6.0 - 8.4 g/dL 7.3   Albumin      3.5 - 5.2 g/dL 3.8   BILIRUBIN TOTAL      0.1 - 1.0 mg/dL 0.4   Alkaline Phosphatase      55 - 135 U/L 141 (H)   AST      10 - 40 U/L 17   ALT      10 - 44 U/L 21   Anion Gap      8 - 16 mmol/L 10   eGFR if African American      >60 mL/min/1.73 m:2 >60.0   eGFR if non African American      >60 mL/min/1.73 m:2 59.7 (A)   Cholesterol      120 - 199 mg/dL 181   Triglycerides      30 - 150 mg/dL 165 (H)   HDL      40 - 75 mg/dL 46   LDL Cholesterol External      63.0 - 159.0 mg/dL 102.0   Hdl/Cholesterol Ratio      20.0 - 50.0 % 25.4   Total Cholesterol/HDL Ratio      2.0 - 5.0 3.9   Non-HDL Cholesterol      mg/dL 135   Hemoglobin A1C External      4.0 - 5.6 % 7.3 (H)   Estimated Avg Glucose      68 - 131 mg/dL 163 (H)   TSH      0.400 - 4.000 uIU/mL 2.450   Hepatitis C Ab      Negative Negative         Recent labs outlined above, hemoglobin A1c 7.3 which is slightly  Improved    Systems review, negative for chest pain, shortness of breath, palpitations, abdominal pain  No difficulty urinating  No indigestion heartburn  No headaches  Had right hip arthralgia an x-ray was normal      Examination  Weight 173 lb  Pulse 88  Blood pressure 100/62  HEENT exam no abnormal findings  Neck no thyromegaly no masses  Lungs clear breath sounds  Heart regular rate and rhythm  Abdominal exam is soft nontender no hepatosplenomegaly abdominal masses  Pulses 2+ carotid pulses 2+ pedal pulses  Extremities no edema    Impression  General exam  Type 2 diabetes  Hypertension  Hyperlipidemia  Radial  elbow fracture    Plan will  Referral to spine Endocrinology internal medicine regarding diabetic management, change metformin 1 pill twice a day and hopefully may be able to taper, take Metamucil tbsp once a day twice a day

## 2020-07-13 NOTE — TELEPHONE ENCOUNTER
Received FMLA forms from [employer]. Forms completed and faxed to them at [phone].    Colleen Amedee MA [facility]

## 2020-07-22 ENCOUNTER — TELEPHONE (OUTPATIENT)
Dept: SPORTS MEDICINE | Facility: CLINIC | Age: 65
End: 2020-07-22

## 2020-07-22 NOTE — TELEPHONE ENCOUNTER
----- Message from Diann Scott sent at 7/22/2020  3:46 PM CDT -----  Regarding: self  Pt is calling about therapy order. Asking for a call a back.    Contact info [phone]

## 2020-07-23 ENCOUNTER — CLINICAL SUPPORT (OUTPATIENT)
Dept: REHABILITATION | Facility: HOSPITAL | Age: 65
End: 2020-07-23
Payer: OTHER MISCELLANEOUS

## 2020-07-23 DIAGNOSIS — M25.60 RANGE OF MOTION DEFICIT: ICD-10-CM

## 2020-07-23 DIAGNOSIS — S52.124A CLOSED NONDISPLACED FRACTURE OF HEAD OF RIGHT RADIUS, INITIAL ENCOUNTER: ICD-10-CM

## 2020-07-23 PROCEDURE — 97110 THERAPEUTIC EXERCISES: CPT

## 2020-07-23 PROCEDURE — 97165 OT EVAL LOW COMPLEX 30 MIN: CPT

## 2020-07-23 NOTE — PATIENT INSTRUCTIONS
[facility], OCCUPATIONAL THERAPY  HOME EXERCISE PROGRAM     Complete the following exercises with 10 repetitions each, 4x/day.     AROM: Elbow Flexion / Extension          Bend and straighten elbow in 3 different positions: thumb up, palm up, palm down.      AROM: Supination / Pronation   With your elbow by your side, turn your palm up then turn your palm down.    AROM: Wrist Flexion / Extension               Bend your wrist forward and back as far as possible.      AROM: Wrist Radial / Ulnar Deviation  Bend your wrist from side to side as far as possible.    AROM: Wrist Flexion / Extension  Make a fist, then bend your wrist forward then back as far as possible.         AROM: Wrist Radial / Ulnar Deviation   Make a fist then bend your wrist toward your body, then away.         Copyright © [employer]. All rights reserved.     Therapist: DILLON Ignacio CHT

## 2020-07-23 NOTE — PLAN OF CARE
"  [facility] Occupational Therapy  Initial Evaluation     Name: Meryl Johnson  [facility] Number: [id number]    Therapy Diagnosis:   Encounter Diagnoses   Name Primary?    Closed nondisplaced fracture of head of right radius, initial encounter     Range of motion deficit      Physician: Alexis Thurman MD    Physician Orders:  ROM  Medical Diagnosis: S52.124A (ICD-10-CM) - Closed nondisplaced fracture of head of right radius, initial encounter     Surgical Procedure and Date: N/A  Evaluation Date: 7/23/2020  Insurance: Generic Worker's Comp  Insurance Authorization period Expiration: 07/10/2021     Plan of Care Certification Period: 7/23/2020 to 9/23/2020    Visit # / Visits Authortized: 1 / 12  Time In:3:50 pm  Time Out: 4:35 pm  Total Billable Time: 45 minutes    Precautions: Diabetes    Subjective     Involved Side: right  Dominant Side: Right  Date of Onset: 7/8/2020  Mechanism of Injury: Walking to car and stepped off curb and fell  History of Current Condition: Pt went to ER and placed in sling then she contacted Dr Babin for follow-up  Imaging: X-ray 7/8/2020 Impression:     1. Lucency involving the radial head/neck junction may reflect that of vascular channel as this is not appreciated on subsequent orthogonal views however impacted nondisplaced fracture would be difficult to definitively exclude.  Correlation is recommended.  Further evaluation as warranted.  Previous Therapy: yes    Patient's Goals for Therapy: "be able to use right arm"    Pain:  Functional Pain Scale Rating 0-10:   5/10 on average  3/10 at best  9/10 at worst  Location: right elbow  Description: Sharp  Aggravating Factors: Extension and Flexing  Easing Factors: rest    Occupation:  ID processor and access controller  Working presently: employed  Duties: computer skills    Functional Limitations/Social History:    Previous functional status includes: Independent with all ADLs.     Current FunctionalStatus   Home/Living " environment : lives with their family      Limitation of Functional Status as follows:   ADLs/IADLs:     - Feeding:with left hand    - Bathing: with left hand    - Dressing/Grooming: with left hand    - Driving: with left hand     Leisure: none noted      Past Medical History/Physical Systems Review:   Meryl Johnson  has a past medical history of Anemia, Diabetes mellitus type II, H. pylori infection, Hyperlipidemia, Hypertension, and Unspecified vitamin D deficiency.    Meryl Johnson  has a past surgical history that includes Gastric bypass and Esophagogastroduodenoscopy (N/A, 8/8/2018).    Meryl has a current medication list which includes the following prescription(s): atorvastatin, benazepril, blood sugar diagnostic, chlorthalidone, dulaglutide, lancets, metformin, and tramadol.    Review of patient's allergies indicates:   Allergen Reactions    Codeine Nausea Only    Vicodin [hydrocodone-acetaminophen] Nausea Only          Objective   Observation/Appearance:  Skin intact and pt in sling    Edema. Measured in centimeters.   7/23/2020 7/23/2020    Left Right   2in. Above elbow 33.2 40   2in. Below elbow 27 28.9   Elbow Crease 27.2 30       Elbow/Wrist AROM  Date 7/23/2020 7/23/2020    Left Right   Elbow Ext/Flex 0/140 15/130   Supination/Pronation 90/90 *0/90   Wrist ext/flex 80/70 *50/45   Wrist RD/UD 25/30 15/30   *Pt reports that she had a previous wrist fracture 10 years ago and her range of motion was limited in her wrist since then.    Sensation: c/o occasional night numbness in fingers     Strength (Dyanmometer) and Pinch Strength (Pinch Gauge)  Measured in pounds and psi. Average of three trials.   7/23/2020 7/23/2020    Left Right   Rung II def def           CMS Impairment/Limitation/Restriction for FOTO Elbow Survey    Therapist reviewed FOTO scores for Meryl Johnson on 7/23/2020.   FOTO documents entered into [employer] - see Media section.    Limitation Score: 73%  Category:  Carrying    Current : CL = least 60% but < 80% impaired, limited or restricted  Goal: CK = at least 40% but < 60% impaired, limited or restricted           Treatment     Treatment Time In: 3:50 pm  Treatment Time Out: 4:35 pm  Total Treatment time separate from Evaluation time:10 minutes    Meryl received the following supervised modalities after being cleared for contradictions for 10 minutes:   -Patient received MH x 10 min to right elbow to increase blood flow, circulation and tissue elasticity prior to therex      Meryl received the following manual therapy techniques for 0 minutes:   -NT    Meryl received therapeutic exercises for 10 minutes including:  -AROM as follows: elbow flex/ext 3 ways, sup/pron, wrist ext/flex (open/closed fist), RD/UD (open/closed fist)    Meryl participated in dynamic functional therapeutic activities to improve functional performance for 0  minutes, including:  -NT    Home Exercise Program/Education:  Issued HEP (see patient instructions in EMR) and educated on modality use for pain management . Exercises were reviewed and Meryl was able to demonstrate them prior to the end of the session.   Pt received a written copy of exercises to perform at home. Meryl demonstrated good  understanding of the education provided.  Pt was advised to perform these exercises free of pain, and to stop performing them if pain occurs.    Patient/Family Education: role of OT, goals for OT, scheduling/cancellations - pt verbalized understanding. Discussed insurance limitations with patient.    Additional Education provided: Issued Tubigrip G for edema management.    Assessment     Meryl Johnson is a 64 y.o. female referred to outpatient occupational/hand therapy and presents with a medical diagnosis of left radial head fracture, resulting in pain, decreased range of motion, decreased strength and demonstrates limitations as described in the chart below. Following a brief medical record review it  "is determined that pt will benefit from occupational therapy services in order to maximize pain free and/or functional use of right arm.     The patient's rehab potential is Good.     Anticipated barriers to occupational therapy: none  Pt has no cultural, educational or language barriers to learning provided.    Profile and History Assessment of Occupational Performance Level of Clinical Decision Making Complexity Score   Occupational Profile:   Meryl Johnson is a 64 y.o. female who lives with their family and is currently employed as an ID processor. Meryl Johnson has difficulty with  feeding, bathing, grooming and dressing  housework/household chores  affecting his/her daily functional abilities. His/her main goal for therapy is  "be able to use right arm"    .     Comorbidities:    has a past medical history of Anemia, Diabetes mellitus type II, H. pylori infection, Hyperlipidemia, Hypertension, and Unspecified vitamin D deficiency.        Medical and Therapy History Review:   Brief               Performance Deficits    Physical:  Joint Mobility  Joint Stability  Muscle Power/Strength  Muscle Endurance  Edema   Strength  Pain    Cognitive:  No Deficits    Psychosocial:    No Deficits     Clinical Decision Making:  low    Assessment Process:  Problem-Focused Assessments    Modification/Need for Assistance:  Not Necessary    Intervention Selection:  Limited Treatment Options       low  Based on PMHX, co morbidities , data from assessments and functional level of assistance required with task and clinical presentation directly impacting function.       The following goals were discussed with the patient and patient is in agreement with them as to be addressed in the treatment plan.     Goals:   Short Term (4 weeks on 8/24/2020):  1)   Patient to be IND with HEP and modalities for pain management  2)   Increase ROM 5-10 degrees for elbow ext/flex  to increase functional hand use for feeding and dressing " herself.  3)   Measure  in 2 weeks.  4)   Decrease edema .2-.3 cm to increase joint mobility /flexibility for improved overall functional hand use.       Long Term (by discharge):  1)   Pt will report 0 out of 10 pain with right arm use.  2)   Patient to score at CK on FOTO to demonstrate improved perception of functional right hand use.  3)   Pt will return to prior level of function for ADLs and household management.       Plan   Certification Period/Plan of care expiration: 7/23/2020 to 9/23/2020.    Outpatient Occupational Therapy 1 time weekly for 8 weeks may include the following interventions: Manual therapy/joint mobilizations, Modalities for pain management, Therapeutic exercises/activities., Strengthening and Edema Control.      Pat Amin, OT

## 2020-07-29 ENCOUNTER — CLINICAL SUPPORT (OUTPATIENT)
Dept: REHABILITATION | Facility: HOSPITAL | Age: 65
End: 2020-07-29
Payer: OTHER MISCELLANEOUS

## 2020-07-29 DIAGNOSIS — M25.60 RANGE OF MOTION DEFICIT: ICD-10-CM

## 2020-07-29 PROCEDURE — 97110 THERAPEUTIC EXERCISES: CPT

## 2020-07-29 NOTE — PROGRESS NOTES
Occupational Therapy Daily Treatment Note     Date: 7/29/2020  Name: Meryl Johnson  Clinic Number: [id number]    Therapy Diagnosis:   Encounter Diagnosis   Name Primary?    Range of motion deficit      Physician: Alexis Thurman MD     Physician Orders:  ROM  Medical Diagnosis: S52.124A (ICD-10-CM) - Closed nondisplaced fracture of head of right radius, initial encounter      Surgical Procedure and Date: N/A  Evaluation Date: 7/23/2020  Insurance: Generic Worker's Comp  Insurance Authorization period Expiration: 07/10/2021      Plan of Care Certification Period: 7/23/2020 to 9/23/2020     Visit # / Visits Authortized: 2/12  Time In: 9:15 am  Time Out: 9:50 am  Total Billable Time: 25 minutes     Precautions: Diabetes           Subjective     Pt reports: she was compliant with home exercise program given last session.   Response to previous treatment:more motion  Functional change: able to type better    Pain: 5/10  Location: right elbow      Objective   Observation/Appearance:  Skin intact and pt in sling     Edema. Measured in centimeters.    7/23/2020 7/23/2020     Left Right   2in. Above elbow 33.2 40   2in. Below elbow 27 28.9   Elbow Crease 27.2 30         Elbow/Wrist AROM  Date 7/23/2020 7/23/2020     Left Right   Elbow Ext/Flex 0/140 15/130   Supination/Pronation 90/90 *0/90   Wrist ext/flex 80/70 *50/45   Wrist RD/UD 25/30 15/30   *Pt reports that she had a previous wrist fracture 10 years ago and her range of motion was limited in her wrist since then.     Sensation: c/o occasional night numbness in fingers      Strength (Dyanmometer) and Pinch Strength (Pinch Gauge)  Measured in pounds and psi. Average of three trials.    7/23/2020 7/23/2020     Left Right   Rung II def def       Meryl received the following supervised modalities after being cleared for contradictions for 10 minutes:   -Patient received MH x 10 min to right elbow to increase blood flow, circulation and tissue  elasticity prior to therex      Meryl received the following manual therapy techniques for 0 minutes:   -NT     Meryl received therapeutic exercises for 25 minutes including:  -AROM as follows: elbow flex/ext 3 ways, sup/pron, wrist ext/flex (open/closed fist), RD/UD (open/closed fist)  -sup/pron with blue wheel  -UBE x 5 min forward, level 1     Meryl participated in dynamic functional therapeutic activities to improve functional performance for 0  minutes, including:  -NT       Home Exercises and Education Provided     Education provided:   - none today  - Progress towards goals: Excellent    Written Home Exercises Provided: Patient instructed to cont prior HEP.  Exercises were reviewed and Meryl was able to demonstrate them prior to the end of the session.  Meryl demonstrated good  understanding of the education provided.     See EMR under Patient Instructions for exercises provided prior visit.     Meryl demonstrated good  understanding of the education provided.         Assessment   Meryl Johnson is a 64 y.o. female referred to outpatient occupational/hand therapy and presents with a medical diagnosis of left radial head fracture, resulting in pain, decreased range of motion and decreased strength in right arm.  Advanced light activities with mild complaints of pain. Pt states that she is typing better.    Pt would continue to benefit from skilled OT.      Meryl is progressing well towards her goals and there are no updates to goals at this time. Pt prognosis is Good.     Pt will continue to benefit from skilled outpatient occupational therapy to address the deficits listed in the problem list on initial evaluation provide pt/family education and to maximize pt's level of independence in the home and community environment.     Anticipated barriers to occupational therapy: DM    Pt's spiritual, cultural and educational needs considered and pt agreeable to plan of care and goals.    Goals:  Short Term  (4 weeks on 8/24/2020):  1)   Patient to be IND with HEP and modalities for pain management. - Met 7/29/2020  2)   Increase ROM 5-10 degrees for elbow ext/flex  to increase functional hand use for feeding and dressing herself.-progressing  3)   Measure  in 2 weeks.-progressing  4)   Decrease edema .2-.3 cm to increase joint mobility /flexibility for improved overall functional hand use.-progressing         Long Term (by discharge):  1)   Pt will report 0 out of 10 pain with right arm use. - progressing  2)   Patient to score at CK on FOTO to demonstrate improved perception of functional right hand use.-progressing  3)   Pt will return to prior level of function for ADLs and household management. -progressing         Plan: Cont with OT   Certification Period/Plan of care expiration: 7/23/2020 to 9/23/2020.     Outpatient Occupational Therapy 1 time weekly for 8 weeks may include the following interventions: Manual therapy/joint mobilizations, Modalities for pain management, Therapeutic exercises/activities., Strengthening and Edema Control.    Discussed Plan of Care with patient: Yes  Updates/Grading for next session: Advance as tolerated.      Pat Amin, OT

## 2020-08-05 ENCOUNTER — CLINICAL SUPPORT (OUTPATIENT)
Dept: REHABILITATION | Facility: HOSPITAL | Age: 65
End: 2020-08-05
Payer: OTHER MISCELLANEOUS

## 2020-08-05 DIAGNOSIS — M25.60 RANGE OF MOTION DEFICIT: ICD-10-CM

## 2020-08-05 PROCEDURE — 97010 HOT OR COLD PACKS THERAPY: CPT

## 2020-08-05 PROCEDURE — 97110 THERAPEUTIC EXERCISES: CPT

## 2020-08-05 NOTE — PROGRESS NOTES
Occupational Therapy Daily Treatment Note     Date: 8/5/2020  Name: Meryl Johnson  Clinic Number: [id number]    Therapy Diagnosis:   Encounter Diagnosis   Name Primary?    Range of motion deficit      Physician: Alexis Thurman MD     Physician Orders:  ROM  Medical Diagnosis: S52.124A (ICD-10-CM) - Closed nondisplaced fracture of head of right radius, initial encounter      Surgical Procedure and Date: N/A  Evaluation Date: 7/23/2020  Insurance: Generic Worker's Comp  Insurance Authorization period Expiration: 07/10/2021      Plan of Care Certification Period: 7/23/2020 to 9/23/2020     Visit # / Visits Authortized: 3/12  Time In: 11:30 am  Time Out: 12:10 pm  Total Billable Time: 30 minutes     Precautions: Diabetes           Subjective     Pt reports: she was compliant with home exercise program given last session.   Response to previous treatment:more motion  Functional change: able to bathe better with right hand    Pain: 3/10  Location: right elbow      Objective   Observation/Appearance:  Skin intact and pt in sling     Edema. Measured in centimeters.    Left Right Right   2in. Above elbow 33.2 40 40   2in. Below elbow 27 28.9 28.9   Elbow Crease 27.2 30 28.8         Elbow/Wrist AROM  Date 7/23/2020 7/23/2020 8/5/2020     Left Right Right   Elbow Ext/Flex 0/140 15/130 0/135   Supination/Pronation 90/90 *0/90 45/90   Wrist ext/flex 80/70 *50/45 60/55   Wrist RD/UD 25/30 15/30 25/30   *Pt reports that she had a previous wrist fracture 10 years ago and her range of motion was limited in her wrist since then.     Sensation: c/o occasional night numbness in fingers      Strength (Dyanmometer) and Pinch Strength (Pinch Gauge)  Measured in pounds and psi. Average of three trials.    7/23/2020 7/23/2020     Left Right   Rung II def def       Meryl received the following supervised modalities after being cleared for contradictions for 10 minutes:   -Patient received MH x 10 min to right  elbow to increase blood flow, circulation and tissue elasticity prior to therex      Meryl received the following manual therapy techniques for 0 minutes:   -NT     Meryl received therapeutic exercises for 30 minutes including:  -AROM as follows: elbow flex/ext 3 ways, sup/pron, wrist ext/flex (open/closed fist), RD/UD (open/closed fist)  -brown dowel elbow ext/flex 2 x 15 reps (palm up, palm down)  -sup/pron with blue wheel  -UBE x 5 min forward,  5 min reverse, level 1     Meryl participated in dynamic functional therapeutic activities to improve functional performance for 0  minutes, including:  -NT       Home Exercises and Education Provided     Education provided:   - none today  - Progress towards goals: Excellent    Written Home Exercises Provided: Patient instructed to cont prior HEP.  Exercises were reviewed and Meryl was able to demonstrate them prior to the end of the session.  Meryl demonstrated good  understanding of the education provided.     See EMR under Patient Instructions for exercises provided prior visit.     Meryl demonstrated good  understanding of the education provided.         Assessment   Meryl Johnson is a 64 y.o. female referred to outpatient occupational/hand therapy and presents with a medical diagnosis of left radial head fracture, resulting in pain, decreased range of motion and decreased strength in right arm.  Advanced light activities with mild complaints of pain. Pt reports that she is able to bathe herself better.    Pt would continue to benefit from skilled OT.      Meryl is progressing well towards her goals and there are no updates to goals at this time. Pt prognosis is Good.     Pt will continue to benefit from skilled outpatient occupational therapy to address the deficits listed in the problem list on initial evaluation provide pt/family education and to maximize pt's level of independence in the home and community environment.     Anticipated barriers to  occupational therapy: DM    Pt's spiritual, cultural and educational needs considered and pt agreeable to plan of care and goals.    Goals:  Short Term (4 weeks on 8/24/2020):  1)   Patient to be IND with HEP and modalities for pain management. - Met 7/29/2020  2)   Increase ROM 5-10 degrees for elbow ext/flex  to increase functional hand use for feeding and dressing herself.-progressing  3)   Measure  in 2 weeks.-progressing  4)   Decrease edema .2-.3 cm to increase joint mobility /flexibility for improved overall functional hand use.-progressing         Long Term (by discharge):  1)   Pt will report 0 out of 10 pain with right arm use. - progressing  2)   Patient to score at CK on FOTO to demonstrate improved perception of functional right hand use.-progressing  3)   Pt will return to prior level of function for ADLs and household management. -progressing         Plan: Cont with OT   Certification Period/Plan of care expiration: 7/23/2020 to 9/23/2020.     Outpatient Occupational Therapy 1 time weekly for 8 weeks may include the following interventions: Manual therapy/joint mobilizations, Modalities for pain management, Therapeutic exercises/activities., Strengthening and Edema Control.    Discussed Plan of Care with patient: Yes  Updates/Grading for next session: Advance as tolerated.      Pat Amin, OT

## 2020-08-07 ENCOUNTER — PATIENT OUTREACH (OUTPATIENT)
Dept: ADMINISTRATIVE | Facility: OTHER | Age: 65
End: 2020-08-07

## 2020-08-07 NOTE — PROGRESS NOTES
Care Everywhere: updated  Immunization: updated  Health Maintenance: updated  Media Review: reviewed for outside colon cancer and mammogram report  Legacy Review:   Order placed:   Upcoming appts:  Mammogram scheduling ticket sent to patient's portal sent on 7/6/2020

## 2020-08-10 ENCOUNTER — OFFICE VISIT (OUTPATIENT)
Dept: SPORTS MEDICINE | Facility: CLINIC | Age: 65
End: 2020-08-10
Payer: OTHER MISCELLANEOUS

## 2020-08-10 ENCOUNTER — TELEPHONE (OUTPATIENT)
Dept: SPORTS MEDICINE | Facility: CLINIC | Age: 65
End: 2020-08-10

## 2020-08-10 ENCOUNTER — HOSPITAL ENCOUNTER (OUTPATIENT)
Dept: RADIOLOGY | Facility: HOSPITAL | Age: 65
Discharge: HOME | End: 2020-08-10
Attending: ORTHOPAEDIC SURGERY
Payer: COMMERCIAL

## 2020-08-10 VITALS — WEIGHT: 273 LBS | HEIGHT: 67 IN | BODY MASS INDEX: 42.85 KG/M2

## 2020-08-10 DIAGNOSIS — M75.21 BICEPS TENDINITIS OF RIGHT UPPER EXTREMITY: Primary | ICD-10-CM

## 2020-08-10 DIAGNOSIS — M25.529 ELBOW PAIN, UNSPECIFIED LATERALITY: ICD-10-CM

## 2020-08-10 DIAGNOSIS — M25.529 ELBOW PAIN, UNSPECIFIED LATERALITY: Primary | ICD-10-CM

## 2020-08-10 PROCEDURE — 73080 XR ELBOW COMPLETE 3 VIEW RIGHT: ICD-10-PCS | Mod: 26,RT,, | Performed by: RADIOLOGY

## 2020-08-10 PROCEDURE — 99999 PR PBB SHADOW E&M-EST. PATIENT-LVL II: CPT | Mod: PBBFAC,,, | Performed by: ORTHOPAEDIC SURGERY

## 2020-08-10 PROCEDURE — 99214 PR OFFICE/OUTPT VISIT, EST, LEVL IV, 30-39 MIN: ICD-10-PCS | Mod: S$GLB,,, | Performed by: ORTHOPAEDIC SURGERY

## 2020-08-10 PROCEDURE — 99214 OFFICE O/P EST MOD 30 MIN: CPT | Mod: S$GLB,,, | Performed by: ORTHOPAEDIC SURGERY

## 2020-08-10 PROCEDURE — 99999 PR PBB SHADOW E&M-EST. PATIENT-LVL II: ICD-10-PCS | Mod: PBBFAC,,, | Performed by: ORTHOPAEDIC SURGERY

## 2020-08-10 PROCEDURE — 73080 X-RAY EXAM OF ELBOW: CPT | Mod: 26,RT,, | Performed by: RADIOLOGY

## 2020-08-10 PROCEDURE — 73080 X-RAY EXAM OF ELBOW: CPT | Mod: TC,RT

## 2020-08-10 NOTE — PROGRESS NOTES
CC Right elbow pain.  Prior patient.    RHD.  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    Sane score 25% post fall. Currently 50% after therapy.     Here for routine 4 week followup. Has been doing therapy and reporting improved pain and range of motion. Denies of new paresthesias or new trauma. Notes some intermittent anterior shoulder pain and dorsal ulnar wrist pain with PT exercises, no weakness.  Minimal sxs at rest / night time.     Was walking to car, at work, stepped off a curb and fell onto the ground.  Landed onto the Right side.  07/08/20.  No numbness or tingling.  First elbow injury.  Mild pain in the Right shoulder.  No elbow pain or injuries prior to this fall.      Review of Systems   Constitution: Negative. Negative for chills, fever and night sweats.   HENT: Negative for congestion and headaches.    Eyes: Negative for blurred vision, left vision loss and right vision loss.   Cardiovascular: Negative for chest pain and syncope.   Respiratory: Negative for cough and shortness of breath.    Endocrine: Negative for polydipsia, polyphagia and polyuria.   Hematologic/Lymphatic: Negative for bleeding problem. Does not bruise/bleed easily.   Skin: Negative for dry skin, itching and rash.   Musculoskeletal: Negative for falls and muscle weakness.   Gastrointestinal: Negative for abdominal pain and bowel incontinence.   Genitourinary: Negative for bladder incontinence and nocturia.   Neurological: Negative for disturbances in coordination, loss of balance and seizures.   Psychiatric/Behavioral: Negative for depression. The patient does not have insomnia.    Allergic/Immunologic: Negative for hives and persistent infections.     PAST MEDICAL HISTORY:   Past Medical History:   Diagnosis Date    Anemia     Diabetes mellitus type II     H. pylori infection     Hyperlipidemia     Hypertension     Unspecified vitamin D deficiency 4/24/2013     PAST SURGICAL HISTORY:   Past Surgical History:   Procedure Laterality Date     ESOPHAGOGASTRODUODENOSCOPY N/A 8/8/2018    Procedure: EGD (ESOPHAGOGASTRODUODENOSCOPY);  Surgeon: Darius Gerardo MD;  Location: [address];  Service: Endoscopy;  Laterality: N/A;    GASTRIC BYPASS       FAMILY HISTORY:   Family History   Problem Relation Age of Onset    COPD Mother     Heart disease Mother     Cancer Father         liver    Heart disease Father     Cancer Sister     Diabetes Sister     Hyperlipidemia Sister     Hypothyroidism Sister     No Known Problems Brother     No Known Problems Maternal Aunt     No Known Problems Maternal Uncle     No Known Problems Paternal Aunt     No Known Problems Paternal Uncle     No Known Problems Maternal Grandmother     No Known Problems Maternal Grandfather     No Known Problems Paternal Grandmother     No Known Problems Paternal Grandfather     Amblyopia Neg Hx     Blindness Neg Hx     Cataracts Neg Hx     Glaucoma Neg Hx     Hypertension Neg Hx     Macular degeneration Neg Hx     Retinal detachment Neg Hx     Strabismus Neg Hx     Stroke Neg Hx     Thyroid disease Neg Hx     Breast cancer Neg Hx     Colon cancer Neg Hx     Ovarian cancer Neg Hx     Stomach cancer Neg Hx     Rectal cancer Neg Hx      SOCIAL HISTORY:   Social History     Socioeconomic History    Marital status: Single     Spouse name: Not on file    Number of children: Not on file    Years of education: Not on file    Highest education level: Not on file   Occupational History     Employer: [employer]   Social Needs    Financial resource strain: Not on file    Food insecurity     Worry: Not on file     Inability: Not on file    Transportation needs     Medical: Not on file     Non-medical: Not on file   Tobacco Use    Smoking status: Never Smoker    Smokeless tobacco: Never Used   Substance and Sexual Activity    Alcohol use: No    Drug use: No    Sexual activity: Not on file   Lifestyle    Physical activity     Days per  "week: Not on file     Minutes per session: Not on file    Stress: Not on file   Relationships    Social connections     Talks on phone: Not on file     Gets together: Not on file     Attends [employer]ism service: Not on file     Active member of club or organization: Not on file     Attends meetings of clubs or organizations: Not on file     Relationship status: Not on file   Other Topics Concern    Not on file   Social History Narrative    Not on file       MEDICATIONS:   Current Outpatient Medications:     atorvastatin (LIPITOR) 40 MG tablet, TAKE ONE TABLET BY MOUTH EVERY DAY, Disp: 30 tablet, Rfl: 5    benazepriL (LOTENSIN) 40 MG tablet, TAKE ONE TABLET BY MOUTH ONCE DAILY, Disp: 30 tablet, Rfl: 11    blood sugar diagnostic (BLOOD GLUCOSE TEST) Strp, 1 strip by Misc.(Non-Drug; Combo Route) route 2 (two) times daily before meals. For mikki contour, Disp: 200 strip, Rfl: 3    chlorthalidone (HYGROTEN) 25 MG Tab, TAKE ONE TABLET BY MOUTH ONCE DAILY, Disp: 30 tablet, Rfl: 11    dulaglutide (TRULICITY) 0.75 mg/0.5 mL PnIj, Inject 0.5 mLs (0.75 mg total) into the skin every 7 days. (Patient not taking: Reported on 7/13/2020), Disp: 4 Syringe, Rfl: 5    lancets (ONE TOUCH DELICA LANCETS) 33 gauge Misc, 1 lancet by Misc.(Non-Drug; Combo Route) route 2 (two) times daily., Disp: 60 each, Rfl: 11    metFORMIN (GLUCOPHAGE) 500 MG tablet, TAKE 2 TABLETS BY MOUTH TWO TIMES A DAY WITH MEALS, Disp: 360 tablet, Rfl: 3    traMADoL (ULTRAM) 50 mg tablet, Take 1 tablet (50 mg total) by mouth every 8 (eight) hours as needed for Pain., Disp: 10 tablet, Rfl: 0  ALLERGIES:   Review of patient's allergies indicates:   Allergen Reactions    Codeine Nausea Only    Vicodin [hydrocodone-acetaminophen] Nausea Only       VITAL SIGNS: Ht 5' 7" (1.702 m)   Wt 123.8 kg (273 lb)   BMI 42.76 kg/m²        PHYSICAL EXAM:  General: Patient appears alert and oriented x 3.  Mood is pleasant.  Observation of ears, eyes and nose reveal no " gross abnormalities. HEENT: NCAT, sclera nonicteric  Lungs: Respirations are equal and unlabored.  Well nourished, in no acute distress and ambulates with a non-antalgic gait with no assistive devices.    Skin: Skin intact bilaterally. Sensation intact bilaterally. Compartments soft. No evidence of edema, infection, or induration.     Right ELBOW / WRIST EXTREMITY EXAM:    OBSERVATION / INSPECTION    Swelling  none    Deformity  none  Discoloration  none     Scars   none    Atrophy  None  Abrasion present over the olecranon    TENDERNESS / CREPITUS (T / C):           T / C        Medial epicondyle   - / -    Med. (Ulnar) collateral ligament  - / -    Flexor pronator Musculature   - / -   Biceps tendon    - / -   Head of radius    - / -    Lateral epicondyle   - / -    Extensor Musculature   - / -   Brachioradialis   - / -   Triceps tendon   - / -   Triceps muscle   - / -   Olecranon    - / -   Olecranon bursa   - / -   Cubital fossa    - / -   Anterior jointline   - / -   Radial tunnel    -/ -             ROM: ('*' = with pain)    Right Elbow  AROM (PROM)     Extension   0 deg  (5 deg)   Flexion   145 deg (145 deg)         Pronation  90 deg  (90 deg)      Supination   80 deg  (80 deg)                 Left Elbow  AROM (PROM)     Extension   0 deg  (5 deg)   Flexion   145 deg (145 deg)         Pronation  90 deg  (90 deg)      Supination   80 deg  (80 deg)            Right Wrist  AROM (PROM)   Extension   80 deg (85 deg)   Flexion   80 deg (85 deg)         Ulnar Deviation   35 deg (40 deg)  Radial Deviation 35 deg (40 deg)             Left Wrist   AROM (PROM)     Extension   80 deg (85 deg)   Flexion   80 deg (85 deg)         Ulnar Deviation   35 deg (40 deg)  Radial Deviation 35 deg (40 deg)        STRENGTH: ('*' = with pain)    Elbow Flexion:   5/5  Elbow Extension:  5/5  Wrist Flexion:   5/5  Wrist Extension:  5/5  :    5/5  Intrinsics:   5/5  EPL (Ext. pollicis longus): 5/5  Pinch Mechanism:  5/5    ELBOW  EXAMINATION:  See above noted areas of tenderness.   Test for Ligamentous Instability - UCL normal  Test for Ligamentous Instability - LUCL normal  PLRI       neg  Tinel's (Percussion) Test - Cubital  neg  Tennis Elbow Test    neg  Golfer's Elbow Test    neg  Radial Capitellar Grind Test   neg  Valgus/Extension Overload Test  neg  Resisted Long Finger Extension Pain neg  Moving Valgus    neg  Forearm pain with resisted supination neg  Yeargeson' s (elbow pain)   neg  Hook test     neg    WRIST EXAMINATION:  See above noted areas of tenderness.   Finkelstein's Test   neg  Tinel's Test - Carpal Tunnel  neg  Phalen's Test    neg  Median Nerve Compression Test neg  Ulnar-sided Compression Test neg  LT Ballottment Test   neg  Snuff box tenderness   neg  Lau's Test    neg  LT Instability    neg  Hook of Hamate Tenderness  neg     EXTREMITY NEURO-VASCULAR EXAMINATION: Sensation grossly intact to light touch all dermatomal regions. DTR 2+ Biceps, Triceps, BR and Negative Clara's sign. Grossly intact motor function at Elbow, Wrist and Hand. Distal pulses radial and ulnar 2+, brisk cap refill, symmetric.    Other Findings:    TTP over right bicipital groove.   Mild TTP over right ECU tendon at the wrist    Xrays: (AP, lateral, oblique) Right elbow ordered and reviewed by me personally today:  - Compared to images taken 1 month prior shows minimal change in terms of alignment small amount of callus formation at the radial neck as expected no additional fractures noted        ASSESSMENT:  Minimally angulated right proximal radial neck fracture, proximal biceps tendonitis.       PLAN:  PT: continue work on ROM no strengthening.   Occupation therapy prescribed for elbow ROM and biceps tendonitis with Pat at [place].  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  F/U in 3 weeks

## 2020-08-13 ENCOUNTER — TELEPHONE (OUTPATIENT)
Dept: SPORTS MEDICINE | Facility: CLINIC | Age: 65
End: 2020-08-13

## 2020-08-13 ENCOUNTER — CLINICAL SUPPORT (OUTPATIENT)
Dept: REHABILITATION | Facility: HOSPITAL | Age: 65
End: 2020-08-13
Payer: OTHER MISCELLANEOUS

## 2020-08-13 DIAGNOSIS — M25.60 RANGE OF MOTION DEFICIT: ICD-10-CM

## 2020-08-13 PROCEDURE — 97110 THERAPEUTIC EXERCISES: CPT

## 2020-08-13 NOTE — PROGRESS NOTES
Occupational Therapy Daily Treatment Note     Date: 8/13/2020  Name: Meryl Johnson  [facility] Number: [id number]    Therapy Diagnosis:   Encounter Diagnosis   Name Primary?    Range of motion deficit      Physician: Alexis Thurman MD     Physician Orders:  ROM  Medical Diagnosis: S52.124A (ICD-10-CM) - Closed nondisplaced fracture of head of right radius, initial encounter      Surgical Procedure and Date: N/A  Evaluation Date: 7/23/2020  Insurance: Generic Worker's Comp  Insurance Authorization period Expiration: 07/10/2021      Plan of Care Certification Period: 7/23/2020 to 9/23/2020     Visit # / Visits Authortized: 4/12  Time In: 10:45 am  Time Out: 11:25 am  Total Billable Time: 30 minutes     Precautions: Diabetes           Subjective     Pt reports: she was compliant with home exercise program given last session.  Response to previous treatment: increased shoulder pain  Functional change: non noted    Pain: 3/10  Location: right elbow      Objective   Observation/Appearance:  Skin intact and pt in sling     Edema. Measured in centimeters.    Left Right Right   2in. Above elbow 33.2 40 40   2in. Below elbow 27 28.9 28.9   Elbow Crease 27.2 30 28.8         Elbow/Wrist AROM  Date 7/23/2020 7/23/2020 8/5/2020     Left Right Right   Elbow Ext/Flex 0/140 15/130 0/135   Supination/Pronation 90/90 *0/90 45/90   Wrist ext/flex 80/70 *50/45 60/55   Wrist RD/UD 25/30 15/30 25/30   *Pt reports that she had a previous wrist fracture 10 years ago and her range of motion was limited in her wrist since then.     Sensation: c/o occasional night numbness in fingers      Strength (Dyanmometer) and Pinch Strength (Pinch Gauge)  Measured in pounds and psi. Average of three trials.    7/23/2020 7/23/2020     Left Right   Rung II def def       Meryl received the following supervised modalities after being cleared for contradictions for 10 minutes:   -Patient received MH x 10 min to right elbow to  increase blood flow, circulation and tissue elasticity prior to therex      Meryl received the following manual therapy techniques for 2 minutes:   -soft tissue mobilization     Meryl received therapeutic exercises for 30 minutes including:  -AROM as follows: elbow flex/ext 3 ways, sup/pron, wrist ext/flex (open/closed fist), RD/UD (open/closed fist)  -brown dowel elbow ext/flex 2 x 15 reps (palm up, palm down)  -sup/pron with blue wheel  -UBE x 5 min forward,  3 min reverse, level 1     Meryl participated in dynamic functional therapeutic activities to improve functional performance for 0  minutes, including:  -NT     Ice pack post tx to go x 10 min  Home Exercises and Education Provided     Education provided:   - none today  - Progress towards goals: Excellent    Written Home Exercises Provided: Patient instructed to cont prior HEP.  Exercises were reviewed and Meryl was able to demonstrate them prior to the end of the session.  Meryl demonstrated good  understanding of the education provided.     See EMR under Patient Instructions for exercises provided prior visit.     Meryl demonstrated good  understanding of the education provided.         Assessment   Meryl Johnson is a 64 y.o. female referred to outpatient occupational/hand therapy and presents with a medical diagnosis of left radial head fracture, resulting in pain, decreased range of motion and decreased strength in right arm.  Continued activities  as tolerated.    Pt would continue to benefit from skilled OT.      Meryl is progressing well towards her goals and there are no updates to goals at this time. Pt prognosis is Good.     Pt will continue to benefit from skilled outpatient occupational therapy to address the deficits listed in the problem list on initial evaluation provide pt/family education and to maximize pt's level of independence in the home and community environment.     Anticipated barriers to occupational therapy: DM    Pt's  spiritual, cultural and educational needs considered and pt agreeable to plan of care and goals.    Goals:  Short Term (4 weeks on 8/24/2020):  1)   Patient to be IND with HEP and modalities for pain management. - Met 7/29/2020  2)   Increase ROM 5-10 degrees for elbow ext/flex  to increase functional hand use for feeding and dressing herself.-progressing  3)   Measure  in 2 weeks.-progressing  4)   Decrease edema .2-.3 cm to increase joint mobility /flexibility for improved overall functional hand use.-progressing         Long Term (by discharge):  1)   Pt will report 0 out of 10 pain with right arm use. - progressing  2)   Patient to score at CK on FOTO to demonstrate improved perception of functional right hand use.-progressing  3)   Pt will return to prior level of function for ADLs and household management. -progressing         Plan: Cont with OT   Certification Period/Plan of care expiration: 7/23/2020 to 9/23/2020.     Outpatient Occupational Therapy 1 time weekly for 8 weeks may include the following interventions: Manual therapy/joint mobilizations, Modalities for pain management, Therapeutic exercises/activities., Strengthening and Edema Control.    Discussed Plan of Care with patient: Yes  Updates/Grading for next session: Advance as tolerated.      Pat Amin, OT

## 2020-08-13 NOTE — TELEPHONE ENCOUNTER
----- Message from Martha Orta sent at 8/13/2020  3:42 PM CDT -----  JAMIL Tijerina      Please call patient when you get a chance involves her PT orders and W/C    Cell @# [phone]  Work- [facility]  @ ext #  [phone] (tomorrow 8/14)

## 2020-08-14 ENCOUNTER — OFFICE VISIT (OUTPATIENT)
Dept: INTERNAL MEDICINE | Facility: CLINIC | Age: 65
End: 2020-08-14
Payer: OTHER MISCELLANEOUS

## 2020-08-14 ENCOUNTER — TELEPHONE (OUTPATIENT)
Dept: INTERNAL MEDICINE | Facility: CLINIC | Age: 65
End: 2020-08-14

## 2020-08-14 VITALS
OXYGEN SATURATION: 98 % | WEIGHT: 273.38 LBS | DIASTOLIC BLOOD PRESSURE: 78 MMHG | BODY MASS INDEX: 42.82 KG/M2 | HEART RATE: 73 BPM | SYSTOLIC BLOOD PRESSURE: 128 MMHG

## 2020-08-14 DIAGNOSIS — H01.002 BLEPHARITIS OF RIGHT LOWER EYELID, UNSPECIFIED TYPE: ICD-10-CM

## 2020-08-14 DIAGNOSIS — H00.012 HORDEOLUM EXTERNUM OF RIGHT LOWER EYELID: Primary | ICD-10-CM

## 2020-08-14 PROCEDURE — 99999 PR PBB SHADOW E&M-EST. PATIENT-LVL III: CPT | Mod: PBBFAC,,, | Performed by: INTERNAL MEDICINE

## 2020-08-14 PROCEDURE — 99212 PR OFFICE/OUTPT VISIT, EST, LEVL II, 10-19 MIN: ICD-10-PCS | Mod: S$GLB,,, | Performed by: INTERNAL MEDICINE

## 2020-08-14 PROCEDURE — 99999 PR PBB SHADOW E&M-EST. PATIENT-LVL III: ICD-10-PCS | Mod: PBBFAC,,, | Performed by: INTERNAL MEDICINE

## 2020-08-14 PROCEDURE — 99212 OFFICE O/P EST SF 10 MIN: CPT | Mod: S$GLB,,, | Performed by: INTERNAL MEDICINE

## 2020-08-14 RX ORDER — SULFACETAMIDE SODIUM 100 MG/G
OINTMENT OPHTHALMIC 3 TIMES DAILY
Qty: 3.5 G | Refills: 0 | Status: SHIPPED | OUTPATIENT
Start: 2020-08-14 | End: 2020-08-17

## 2020-08-14 RX ORDER — SULFACETAMIDE SODIUM 100 MG/G
OINTMENT OPHTHALMIC 3 TIMES DAILY
Qty: 3.5 G | Refills: 0 | Status: SHIPPED | OUTPATIENT
Start: 2020-08-14 | End: 2020-08-14 | Stop reason: SDUPTHER

## 2020-08-14 NOTE — TELEPHONE ENCOUNTER
[facility] called the medication that was prescribned is out of stock pt need another ointment sent in

## 2020-08-14 NOTE — PROGRESS NOTES
65-year-old female  For 5 daysnotice persistent erythematous bump on her right lower eyelid medially  One point was looking better but it came back  No significant drainage other than mild clear liquid      Examination  Weight 273  Pulse 72  Blood pressure 128/72  Small erythematous papular low within the right lower eyelid with surrounding erythema    Impression  Stye with blepharitis    Plan  Packs over the ice  Scrubed with baby shampoo        Addendum  Regarding her medications in the use of metformin, she is now taking 5 mg twice a day and is not having any issue of diarrhea that she has had before  She can try taking 1 in the morning 2 in evening it to see if she can tolerate this

## 2020-08-18 ENCOUNTER — CLINICAL SUPPORT (OUTPATIENT)
Dept: REHABILITATION | Facility: HOSPITAL | Age: 65
End: 2020-08-18
Payer: OTHER MISCELLANEOUS

## 2020-08-18 DIAGNOSIS — M25.60 RANGE OF MOTION DEFICIT: ICD-10-CM

## 2020-08-18 PROCEDURE — 97110 THERAPEUTIC EXERCISES: CPT

## 2020-08-18 NOTE — PROGRESS NOTES
Occupational Therapy Daily Treatment Note     Date: 8/18/2020  Name: Meryl Johnson  Clinic Number: [id number]    Therapy Diagnosis:   Encounter Diagnosis   Name Primary?    Range of motion deficit      Physician: Alexis Thurman MD     Physician Orders:  ROM  Medical Diagnosis: S52.124A (ICD-10-CM) - Closed nondisplaced fracture of head of right radius, initial encounter      Surgical Procedure and Date: N/A  Evaluation Date: 7/23/2020  Insurance: Generic Worker's Comp  Insurance Authorization period Expiration: 07/10/2021      Plan of Care Certification Period: 7/23/2020 to 9/23/2020     Visit # / Visits Authortized: 5/12  Time In: 9:15 am  Time Out: 10:00 am  Total Billable Time: 30 minutes     Precautions: Diabetes           Subjective     Pt reports: she was compliant with home exercise program given last session.  Response to previous treatment: increased shoulder pain  Functional change: non noted    Pain: 3/10  Location: right elbow      Objective   Observation/Appearance:  Skin intact and pt in sling     Edema. Measured in centimeters.    Left Right Right   2in. Above elbow 33.2 40 40   2in. Below elbow 27 28.9 28.9   Elbow Crease 27.2 30 28.8         Elbow/Wrist AROM  Date 7/23/2020 7/23/2020 8/5/2020     Left Right Right   Elbow Ext/Flex 0/140 15/130 0/135   Supination/Pronation 90/90 *0/90 45/90   Wrist ext/flex 80/70 *50/45 60/55   Wrist RD/UD 25/30 15/30 25/30   *Pt reports that she had a previous wrist fracture 10 years ago and her range of motion was limited in her wrist since then.     Sensation: c/o occasional night numbness in fingers      Strength (Dyanmometer) and Pinch Strength (Pinch Gauge)  Measured in pounds and psi. Average of three trials.    7/23/2020 7/23/2020     Left Right   Rung II def def        CMS Impairment/Limitation/Restriction for FOTO Elbow Survey    Therapist reviewed FOTO scores for Meryl Johnson on 8/18/2020.   FOTO documents entered into  EPIC - see Media section.    Limitation Score: 57%  Category: Carrying    Current : CK = at least 40% but < 60% impaired, limited or restricted  Goal: CK = at least 40% but < 60% impaired, limited or restricted              Meryl received the following supervised modalities after being cleared for contradictions for 10 minutes:   -Patient received MH x 10 min to right elbow to increase blood flow, circulation and tissue elasticity prior to therex      Meryl received the following manual therapy techniques for 2 minutes:   -soft tissue mobilization     Meryl received therapeutic exercises for 30 minutes including:  -AROM as follows: elbow flex/ext 3 ways, sup/pron, wrist ext/flex (open/closed fist), RD/UD (open/closed fist) x 15 reps  -brown dowel elbow ext/flex 2 x 15 reps (palm up, palm down)  -sup/pron with blue wheel  -UBE x 5 min forward,  5 min reverse, level 2     Meryl participated in dynamic functional therapeutic activities to improve functional performance for 0  minutes, including:  -NT     Home Exercises and Education Provided     Education provided:   - none today  - Progress towards goals: Excellent    Written Home Exercises Provided: Patient instructed to cont prior HEP.  Exercises were reviewed and Meryl was able to demonstrate them prior to the end of the session.  Meryl demonstrated good  understanding of the education provided.     See EMR under Patient Instructions for exercises provided prior visit.     Meryl demonstrated good  understanding of the education provided.         Assessment   Meryl Johnson is a 64 y.o. female referred to outpatient occupational/hand therapy and presents with a medical diagnosis of left radial head fracture, resulting in pain, decreased range of motion and decreased strength in right arm.  Advanced light activities without difficulty.    Pt would continue to benefit from skilled OT.      Meryl is progressing well towards her goals and there are no updates  to goals at this time. Pt prognosis is Good.     Pt will continue to benefit from skilled outpatient occupational therapy to address the deficits listed in the problem list on initial evaluation provide pt/family education and to maximize pt's level of independence in the home and community environment.     Anticipated barriers to occupational therapy: DM    Pt's spiritual, cultural and educational needs considered and pt agreeable to plan of care and goals.    Goals:  Short Term (4 weeks on 8/24/2020):  1)   Patient to be IND with HEP and modalities for pain management. - Met 7/29/2020  2)   Increase ROM 5-10 degrees for elbow ext/flex  to increase functional hand use for feeding and dressing herself.-Met 8/5/2020  3)   Measure  in 2 weeks.-progressing  4)   Decrease edema .2-.3 cm to increase joint mobility /flexibility for improved overall functional hand use.-progressing         Long Term (by discharge):  1)   Pt will report 0 out of 10 pain with right arm use. - progressing  2)   Patient to score at CK on FOTO to demonstrate improved perception of functional right hand use.-progressing  3)   Pt will return to prior level of function for ADLs and household management. -progressing         Plan: Measure next session.   Certification Period/Plan of care expiration: 7/23/2020 to 9/23/2020.     Outpatient Occupational Therapy 1 time weekly for 8 weeks may include the following interventions: Manual therapy/joint mobilizations, Modalities for pain management, Therapeutic exercises/activities., Strengthening and Edema Control.    Discussed Plan of Care with patient: Yes  Updates/Grading for next session: Advance as tolerated.      Pat Amin, OT

## 2020-08-25 ENCOUNTER — CLINICAL SUPPORT (OUTPATIENT)
Dept: REHABILITATION | Facility: HOSPITAL | Age: 65
End: 2020-08-25
Payer: OTHER MISCELLANEOUS

## 2020-08-25 DIAGNOSIS — M25.60 RANGE OF MOTION DEFICIT: ICD-10-CM

## 2020-08-25 PROCEDURE — 97110 THERAPEUTIC EXERCISES: CPT

## 2020-08-25 NOTE — PROGRESS NOTES
Occupational Therapy Daily Treatment Note     Date: 8/25/2020  Name: Meryl Johnson  Clinic Number: [id number]    Therapy Diagnosis:   Encounter Diagnosis   Name Primary?    Range of motion deficit      Physician: Alexis Thurman MD     Physician Orders:  ROM  Medical Diagnosis: S52.124A (ICD-10-CM) - Closed nondisplaced fracture of head of right radius, initial encounter      Surgical Procedure and Date: N/A  Evaluation Date: 7/23/2020  Insurance: Generic Worker's Comp  Insurance Authorization period Expiration: 07/10/2021      Plan of Care Certification Period: 7/23/2020 to 9/23/2020     Visit # / Visits Authortized: 6/12  Time In: 9:15 am  Time Out: 10:00 am  Total Billable Time: 30 minutes     Precautions: Diabetes           Subjective     Pt reports: she was compliant with home exercise program given last session.  Response to previous treatment: increased shoulder pain  Functional change: non noted    Pain: 3/10  Location: right elbow      Objective   Observation/Appearance:  Skin intact      Edema. Measured in centimeters.    Left Right Right Right    7/23/2020 7/23/2020 8/5/2020 8/25/2020   2in. Above elbow 33.2 40 40 39   2in. Below elbow 27 28.9 28.9 28.9   Elbow Crease 27.2 30 28.8 28.8         Elbow/Wrist AROM  Date 7/23/2020 7/23/2020 8/5/2020 8/25/2020     Left Right Right Right   Elbow Ext/Flex 0/140 15/130 0/135 0/140   Supination/Pronation 90/90 *0/90 45/90 60/90   Wrist ext/flex 80/70 *50/45 *60/55 *65/60   Wrist RD/UD 25/30 15/30 25/30 25/30   *Pt reports that she had a previous wrist fracture 10 years ago and her range of motion was limited in her wrist since then.     Sensation: c/o occasional night numbness in fingers      Strength (Dyanmometer) and Pinch Strength (Pinch Gauge)  Measured in pounds and psi. Average of three trials.    7/23/2020 7/23/2020 8/25/2020 8/25/2020     Left Right Left Right   Rung II def def 45 28           Meryl received the following  supervised modalities after being cleared for contradictions for 10 minutes:   -Patient received MH x 10 min to right elbow to increase blood flow, circulation and tissue elasticity prior to therex      Meryl received the following manual therapy techniques for 5 minutes:   -soft tissue mobilization     Meryl received therapeutic exercises for 30 minutes including:  -AROM as follows: elbow flex/ext 3 ways, sup/pron, wrist ext/flex (open/closed fist), RD/UD (open/closed fist) x 15 reps  -blue dowel elbow ext/flex 2 x 15 reps (palm up, palm down)  -rotation bar with 1 plate x 30 reps  -hand gripper rung 3 x 30 reps  -red putty: 2' molding, 2' grasping  -UBE x 5 min forward,  5 min reverse, level 3     Meryl participated in dynamic functional therapeutic activities to improve functional performance for 0  minutes, including:  -NT     Home Exercises and Education Provided     Education provided:   - none today  - Progress towards goals: Excellent    Written Home Exercises Provided: Patient instructed to cont prior HEP.  Exercises were reviewed and Meryl was able to demonstrate them prior to the end of the session.  Meryl demonstrated good  understanding of the education provided.     See EMR under Patient Instructions for exercises provided prior visit.     Meryl demonstrated good  understanding of the education provided.         Assessment   Meryl Johnson is a 64 y.o. female referred to outpatient occupational/hand therapy and presents with a medical diagnosis of left radial head fracture, resulting in pain, decreased range of motion and decreased strength in right arm.  Advanced light activities without difficulty. Pt continues to make steady progress with AROM of her elbow and wrist. Elbow ext/flex is WNL.  strength taken today. Issued red putty for home use. Pain remains in her elbow at 3/10.    Pt would continue to benefit from skilled OT.      Meryl is progressing well towards her goals and there are  no updates to goals at this time. Pt prognosis is Good.     Pt will continue to benefit from skilled outpatient occupational therapy to address the deficits listed in the problem list on initial evaluation provide pt/family education and to maximize pt's level of independence in the home and community environment.     Anticipated barriers to occupational therapy: DM    Pt's spiritual, cultural and educational needs considered and pt agreeable to plan of care and goals.    Goals:  Short Term (4 weeks on 8/24/2020):  1)   Patient to be IND with HEP and modalities for pain management. - Met 7/29/2020  2)   Increase ROM 5-10 degrees for elbow ext/flex  to increase functional hand use for feeding and dressing herself.-Met 8/5/2020  3)   Measure  in 2 weeks.-Met 8/25/2020  4)   Decrease edema .2-.3 cm to increase joint mobility /flexibility for improved overall functional hand use.-progressing         Long Term (by discharge):  1)   Pt will report 0 out of 10 pain with right arm use. - progressing  2)   Patient to score at CK on FOTO to demonstrate improved perception of functional right hand use.-progressing  3)   Pt will return to prior level of function for ADLs and household management. -progressing         Plan:    Certification Period/Plan of care expiration: 7/23/2020 to 9/23/2020.     Outpatient Occupational Therapy 1 time weekly for 8 weeks may include the following interventions: Manual therapy/joint mobilizations, Modalities for pain management, Therapeutic exercises/activities., Strengthening and Edema Control.    Discussed Plan of Care with patient: Yes  Updates/Grading for next session: Advance as tolerated.      Pat Amin, OT

## 2020-08-25 NOTE — PATIENT INSTRUCTIONS
[facility]  OCCUPATIONAL THERAPY  HOME EXERCISE PROGRAM     Complete the following strengthening program 2 x/day.                              DILLON Ignacio, GRAZYNA  Certified Hand Therapist  Occupational Therapist

## 2020-08-31 ENCOUNTER — HOSPITAL ENCOUNTER (OUTPATIENT)
Dept: RADIOLOGY | Facility: HOSPITAL | Age: 65
Discharge: HOME | End: 2020-08-31
Attending: ORTHOPAEDIC SURGERY
Payer: COMMERCIAL

## 2020-08-31 ENCOUNTER — OFFICE VISIT (OUTPATIENT)
Dept: SPORTS MEDICINE | Facility: CLINIC | Age: 65
End: 2020-08-31
Payer: OTHER MISCELLANEOUS

## 2020-08-31 VITALS
BODY MASS INDEX: 42.38 KG/M2 | HEART RATE: 76 BPM | DIASTOLIC BLOOD PRESSURE: 85 MMHG | SYSTOLIC BLOOD PRESSURE: 171 MMHG | HEIGHT: 67 IN | WEIGHT: 270 LBS

## 2020-08-31 DIAGNOSIS — M25.521 RIGHT ELBOW PAIN: ICD-10-CM

## 2020-08-31 DIAGNOSIS — M25.521 RIGHT ELBOW PAIN: Primary | ICD-10-CM

## 2020-08-31 PROCEDURE — 99214 OFFICE O/P EST MOD 30 MIN: CPT | Mod: S$GLB,,, | Performed by: ORTHOPAEDIC SURGERY

## 2020-08-31 PROCEDURE — 99214 PR OFFICE/OUTPT VISIT, EST, LEVL IV, 30-39 MIN: ICD-10-PCS | Mod: S$GLB,,, | Performed by: ORTHOPAEDIC SURGERY

## 2020-08-31 PROCEDURE — 73080 X-RAY EXAM OF ELBOW: CPT | Mod: TC,RT

## 2020-08-31 PROCEDURE — 73080 X-RAY EXAM OF ELBOW: CPT | Mod: 26,RT,, | Performed by: RADIOLOGY

## 2020-08-31 PROCEDURE — 99999 PR PBB SHADOW E&M-EST. PATIENT-LVL III: CPT | Mod: PBBFAC,,, | Performed by: ORTHOPAEDIC SURGERY

## 2020-08-31 PROCEDURE — 99999 PR PBB SHADOW E&M-EST. PATIENT-LVL III: ICD-10-PCS | Mod: PBBFAC,,, | Performed by: ORTHOPAEDIC SURGERY

## 2020-08-31 PROCEDURE — 73080 XR ELBOW COMPLETE 3 VIEW RIGHT: ICD-10-PCS | Mod: 26,RT,, | Performed by: RADIOLOGY

## 2020-08-31 NOTE — PROGRESS NOTES
CC Right elbow pain.  Prior patient.    RHD.  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    SANE score 25% post fall. Currently 75% after therapy.     Here for routine 4 week followup. Has been doing therapy and reporting improved pain and range of motion. Denies of new paresthesias or new trauma. Notes some intermittent anterior shoulder pain and dorsal ulnar wrist pain with PT exercises, no weakness.  Minimal sxs at rest / night time.     Was walking to car, at work, stepped off a curb and fell onto the ground.  Landed onto the Right side.  07/08/20.  No numbness or tingling.  First elbow injury.  Mild pain in the Right shoulder.  No elbow pain or injuries prior to this fall.      Review of Systems   Constitution: Negative. Negative for chills, fever and night sweats.   HENT: Negative for congestion and headaches.    Eyes: Negative for blurred vision, left vision loss and right vision loss.   Cardiovascular: Negative for chest pain and syncope.   Respiratory: Negative for cough and shortness of breath.    Endocrine: Negative for polydipsia, polyphagia and polyuria.   Hematologic/Lymphatic: Negative for bleeding problem. Does not bruise/bleed easily.   Skin: Negative for dry skin, itching and rash.   Musculoskeletal: Negative for falls and muscle weakness.   Gastrointestinal: Negative for abdominal pain and bowel incontinence.   Genitourinary: Negative for bladder incontinence and nocturia.   Neurological: Negative for disturbances in coordination, loss of balance and seizures.   Psychiatric/Behavioral: Negative for depression. The patient does not have insomnia.    Allergic/Immunologic: Negative for hives and persistent infections.     PAST MEDICAL HISTORY:   Past Medical History:   Diagnosis Date    Anemia     Diabetes mellitus type II     H. pylori infection     Hyperlipidemia     Hypertension     Unspecified vitamin D deficiency 4/24/2013     PAST SURGICAL HISTORY:   Past Surgical History:   Procedure Laterality Date     ESOPHAGOGASTRODUODENOSCOPY N/A 8/8/2018    Procedure: EGD (ESOPHAGOGASTRODUODENOSCOPY);  Surgeon: Darius Gerardo MD;  Location: [address];  Service: Endoscopy;  Laterality: N/A;    GASTRIC BYPASS       FAMILY HISTORY:   Family History   Problem Relation Age of Onset    COPD Mother     Heart disease Mother     Cancer Father         liver    Heart disease Father     Cancer Sister     Diabetes Sister     Hyperlipidemia Sister     Hypothyroidism Sister     No Known Problems Brother     No Known Problems Maternal Aunt     No Known Problems Maternal Uncle     No Known Problems Paternal Aunt     No Known Problems Paternal Uncle     No Known Problems Maternal Grandmother     No Known Problems Maternal Grandfather     No Known Problems Paternal Grandmother     No Known Problems Paternal Grandfather     Amblyopia Neg Hx     Blindness Neg Hx     Cataracts Neg Hx     Glaucoma Neg Hx     Hypertension Neg Hx     Macular degeneration Neg Hx     Retinal detachment Neg Hx     Strabismus Neg Hx     Stroke Neg Hx     Thyroid disease Neg Hx     Breast cancer Neg Hx     Colon cancer Neg Hx     Ovarian cancer Neg Hx     Stomach cancer Neg Hx     Rectal cancer Neg Hx      SOCIAL HISTORY:   Social History     Socioeconomic History    Marital status: Single     Spouse name: Not on file    Number of children: Not on file    Years of education: Not on file    Highest education level: Not on file   Occupational History     Employer: [employer]   Social Needs    Financial resource strain: Not on file    Food insecurity     Worry: Not on file     Inability: Not on file    Transportation needs     Medical: Not on file     Non-medical: Not on file   Tobacco Use    Smoking status: Never Smoker    Smokeless tobacco: Never Used   Substance and Sexual Activity    Alcohol use: No    Drug use: No    Sexual activity: Not on file   Lifestyle    Physical activity     Days per  "week: Not on file     Minutes per session: Not on file    Stress: Not on file   Relationships    Social connections     Talks on phone: Not on file     Gets together: Not on file     Attends Restorationism service: Not on file     Active member of club or organization: Not on file     Attends meetings of clubs or organizations: Not on file     Relationship status: Not on file   Other Topics Concern    Not on file   Social History Narrative    Not on file       MEDICATIONS:   Current Outpatient Medications:     atorvastatin (LIPITOR) 40 MG tablet, TAKE ONE TABLET BY MOUTH EVERY DAY, Disp: 30 tablet, Rfl: 5    benazepriL (LOTENSIN) 40 MG tablet, TAKE ONE TABLET BY MOUTH ONCE DAILY, Disp: 30 tablet, Rfl: 11    blood sugar diagnostic (BLOOD GLUCOSE TEST) Strp, 1 strip by Misc.(Non-Drug; Combo Route) route 2 (two) times daily before meals. For mikki contour, Disp: 200 strip, Rfl: 3    chlorthalidone (HYGROTEN) 25 MG Tab, TAKE ONE TABLET BY MOUTH ONCE DAILY, Disp: 30 tablet, Rfl: 11    lancets (ONE TOUCH DELICA LANCETS) 33 gauge Misc, 1 lancet by Misc.(Non-Drug; Combo Route) route 2 (two) times daily., Disp: 60 each, Rfl: 11    metFORMIN (GLUCOPHAGE) 500 MG tablet, TAKE 2 TABLETS BY MOUTH TWO TIMES A DAY WITH MEALS, Disp: 360 tablet, Rfl: 3  ALLERGIES:   Review of patient's allergies indicates:   Allergen Reactions    Codeine Nausea Only    Vicodin [hydrocodone-acetaminophen] Nausea Only       VITAL SIGNS: BP (!) 171/85   Pulse 76   Ht 5' 7" (1.702 m)   Wt 122.5 kg (270 lb)   BMI 42.29 kg/m²        PHYSICAL EXAM:  General: Patient appears alert and oriented x 3.  Mood is pleasant.  Observation of ears, eyes and nose reveal no gross abnormalities. HEENT: NCAT, sclera nonicteric  Lungs: Respirations are equal and unlabored.  Well nourished, in no acute distress and ambulates with a non-antalgic gait with no assistive devices.    Skin: Skin intact bilaterally. Sensation intact bilaterally. Compartments soft. No " evidence of edema, infection, or induration.     Right ELBOW / WRIST EXTREMITY EXAM:    OBSERVATION / INSPECTION    Swelling  none    Deformity  none  Discoloration  none     Scars   none    Atrophy  None  Abrasion present over the olecranon    TENDERNESS / CREPITUS (T / C):           T / C        Medial epicondyle   - / -    Med. (Ulnar) collateral ligament  - / -    Flexor pronator Musculature   - / -   Biceps tendon    - / -   Head of radius    + / -    Lateral epicondyle   - / -    Extensor Musculature   - / -   Brachioradialis   - / -   Triceps tendon   - / -   Triceps muscle   - / -   Olecranon    - / -   Olecranon bursa   - / -   Cubital fossa    - / -   Anterior jointline   - / -   Radial tunnel    -/ -             ROM: ('*' = with pain)    Right Elbow  AROM (PROM)     Extension   0 deg  (5 deg)   Flexion   145 deg (145 deg)         Pronation  90 deg  (90 deg)      Supination   80 deg  (80 deg)                 Left Elbow  AROM (PROM)     Extension   0 deg  (5 deg)   Flexion   145 deg (145 deg)         Pronation  90 deg  (90 deg)      Supination   80 deg  (80 deg)            Right Wrist  AROM (PROM)   Extension   80 deg (85 deg)   Flexion   80 deg (85 deg)         Ulnar Deviation   35 deg (40 deg)  Radial Deviation 35 deg (40 deg)             Left Wrist   AROM (PROM)     Extension   80 deg (85 deg)   Flexion   80 deg (85 deg)         Ulnar Deviation   35 deg (40 deg)  Radial Deviation 35 deg (40 deg)        STRENGTH: ('*' = with pain)    Elbow Flexion:   5/5  Elbow Extension:  5/5  Wrist Flexion:   5/5  Wrist Extension:  5/5  :    5/5  Intrinsics:   5/5  EPL (Ext. pollicis longus): 5/5  Pinch Mechanism:  5/5    ELBOW EXAMINATION:  See above noted areas of tenderness.   Test for Ligamentous Instability - UCL normal  Test for Ligamentous Instability - LUCL normal  PLRI       neg  Tinel's (Percussion) Test - Cubital  neg  Tennis Elbow Test    neg  Golfer's Elbow Test    neg  Radial Capitellar Grind  Test   neg  Valgus/Extension Overload Test  neg  Resisted Long Finger Extension Pain neg  Moving Valgus    neg  Forearm pain with resisted supination neg  Yeargeson' s (elbow pain)   neg  Hook test     neg    WRIST EXAMINATION:  See above noted areas of tenderness.   Finkelstein's Test   neg  Tinel's Test - Carpal Tunnel  neg  Phalen's Test    neg  Median Nerve Compression Test neg  Ulnar-sided Compression Test neg  LT Ballottment Test   neg  Snuff box tenderness   neg  Lau's Test    neg  LT Instability    neg  Hook of Hamate Tenderness  neg     EXTREMITY NEURO-VASCULAR EXAMINATION: Sensation grossly intact to light touch all dermatomal regions. DTR 2+ Biceps, Triceps, BR and Negative Clara's sign. Grossly intact motor function at Elbow, Wrist and Hand. Distal pulses radial and ulnar 2+, brisk cap refill, symmetric.    Other Findings:    TTP over right bicipital groove.   Mild TTP over right ECU tendon at the wrist    Xrays: (AP, lateral, oblique) Right elbow ordered and reviewed by me personally today:  - Compared to images taken 1 month prior shows minimal change in terms of alignment small amount of callus formation at the radial neck as expected no additional fractures noted        ASSESSMENT:  Minimally angulated right proximal radial neck fracture, proximal biceps tendonitis.       PLAN:  PT: continue work on ROM no strengthening.   Occupation therapy prescribed for elbow ROM and biceps tendonitis with Pat at [place].  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  F/U in 4 weeks / PRN

## 2020-09-02 ENCOUNTER — CLINICAL SUPPORT (OUTPATIENT)
Dept: REHABILITATION | Facility: HOSPITAL | Age: 65
End: 2020-09-02
Payer: OTHER MISCELLANEOUS

## 2020-09-02 DIAGNOSIS — M75.21 BICEPS TENDINITIS OF RIGHT UPPER EXTREMITY: ICD-10-CM

## 2020-09-02 DIAGNOSIS — M25.511 ACUTE PAIN OF RIGHT SHOULDER: ICD-10-CM

## 2020-09-02 DIAGNOSIS — M25.60 RANGE OF MOTION DEFICIT: ICD-10-CM

## 2020-09-02 PROCEDURE — 97110 THERAPEUTIC EXERCISES: CPT | Performed by: PHYSICAL THERAPIST

## 2020-09-02 PROCEDURE — 97161 PT EVAL LOW COMPLEX 20 MIN: CPT | Performed by: PHYSICAL THERAPIST

## 2020-09-02 PROCEDURE — 97110 THERAPEUTIC EXERCISES: CPT

## 2020-09-02 PROCEDURE — 97010 HOT OR COLD PACKS THERAPY: CPT

## 2020-09-02 NOTE — PROGRESS NOTES
Occupational Therapy Daily Treatment Note     Date: 9/2/2020  Name: Meryl Johnson  [facility] Number: [id number]    Therapy Diagnosis:   Encounter Diagnosis   Name Primary?    Range of motion deficit      Physician: Radha Viera MD     Physician Orders:  ROM  Medical Diagnosis: S52.124A (ICD-10-CM) - Closed nondisplaced fracture of head of right radius, initial encounter      Surgical Procedure and Date: N/A  Evaluation Date: 7/23/2020  Insurance: Generic Worker's Comp  Insurance Authorization period Expiration: 07/10/2021      Plan of Care Certification Period: 7/23/2020 to 9/23/2020     Visit # / Visits Authortized: 7/12  Time In: 9:15 am  Time Out: 10:00 am  Total Billable Time: 30 minutes     Precautions: Diabetes and no more than 1 lb for elbow           Subjective     Pt reports: she was compliant with home exercise program given last session.  Response to previous treatment: increased activity  Functional change: able to perform light household activities    Pain: 3/10  Location: right elbow      Objective   Observation/Appearance:  Skin intact      Edema. Measured in centimeters.    Left Right Right Right    7/23/2020 7/23/2020 8/5/2020 8/25/2020   2in. Above elbow 33.2 40 40 39   2in. Below elbow 27 28.9 28.9 28.9   Elbow Crease 27.2 30 28.8 28.8         Elbow/Wrist AROM  Date 7/23/2020 7/23/2020 8/5/2020 8/25/2020     Left Right Right Right   Elbow Ext/Flex 0/140 15/130 0/135 0/140   Supination/Pronation 90/90 *0/90 45/90 60/90   Wrist ext/flex 80/70 *50/45 *60/55 *65/60   Wrist RD/UD 25/30 15/30 25/30 25/30   *Pt reports that she had a previous wrist fracture 10 years ago and her range of motion was limited in her wrist since then.     Sensation: c/o occasional night numbness in fingers      Strength (Dyanmometer) and Pinch Strength (Pinch Gauge)  Measured in pounds and psi. Average of three trials.    7/23/2020 7/23/2020 8/25/2020 8/25/2020     Left Right Left Right   Rung II  def def 45 28           Meryl received the following supervised modalities after being cleared for contradictions for 10 minutes:   -Patient received MH x 10 min to right elbow to increase blood flow, circulation and tissue elasticity prior to therex      Meryl received the following manual therapy techniques for 5 minutes:   -soft tissue mobilization     Meryl received therapeutic exercises for 30 minutes including:  -AROM as follows: elbow flex/ext 3 ways, sup/pron, wrist ext/flex (open/closed fist), RD/UD (open/closed fist) x 15 reps  -brown sean elbow ext/flex 2 x 15 reps (palm up, palm down)  -rotation bar with 1 plate x 30 reps  -hand gripper rung 3 x 30 reps  -red putty: 2' molding, 2' grasping (at home)  -wrist 3 ways with 1 lb 2 x 15 reps  -hand gripper rung 3 x 30 reps  -UBE x 5 min forward,  5 min reverse, level 3     Meryl participated in dynamic functional therapeutic activities to improve functional performance for 0  minutes, including:  -NT     Home Exercises and Education Provided     Education provided:   - none today  - Progress towards goals: Excellent    Written Home Exercises Provided: Patient instructed to cont prior HEP.  Exercises were reviewed and Meryl was able to demonstrate them prior to the end of the session.  Meryl demonstrated good  understanding of the education provided.     See EMR under Patient Instructions for exercises provided prior visit.     Meryl demonstrated good  understanding of the education provided.         Assessment   Meryl Johnson is a 64 y.o. female referred to outpatient occupational/hand therapy and presents with a medical diagnosis of left radial head fracture, resulting in pain, decreased range of motion and decreased strength in right arm.  Advanced light activities with mild difficulty. Pt able to do light household activities with right arm.    Pt would continue to benefit from skilled OT.      Meryl is progressing well towards her goals and  there are no updates to goals at this time. Pt prognosis is Good.     Pt will continue to benefit from skilled outpatient occupational therapy to address the deficits listed in the problem list on initial evaluation provide pt/family education and to maximize pt's level of independence in the home and community environment.     Anticipated barriers to occupational therapy: DM    Pt's spiritual, cultural and educational needs considered and pt agreeable to plan of care and goals.    Goals:  Short Term (4 weeks on 8/24/2020):  1)   Patient to be IND with HEP and modalities for pain management. - Met 7/29/2020  2)   Increase ROM 5-10 degrees for elbow ext/flex  to increase functional hand use for feeding and dressing herself.-Met 8/5/2020  3)   Measure  in 2 weeks.-Met 8/25/2020  4)   Decrease edema .2-.3 cm to increase joint mobility /flexibility for improved overall functional hand use.-progressing         Long Term (by discharge):  1)   Pt will report 0 out of 10 pain with right arm use. - progressing  2)   Patient to score at CK on FOTO to demonstrate improved perception of functional right hand use.-progressing  3)   Pt will return to prior level of function for ADLs and household management. -progressing         Plan:    Certification Period/Plan of care expiration: 7/23/2020 to 9/23/2020.     Outpatient Occupational Therapy 1 time weekly for 8 weeks may include the following interventions: Manual therapy/joint mobilizations, Modalities for pain management, Therapeutic exercises/activities., Strengthening and Edema Control.    Discussed Plan of Care with patient: Yes  Updates/Grading for next session: Continue with strengthening limitations of elbow.      Pat Amin, OT

## 2020-09-02 NOTE — PLAN OF CARE
[facility]  Physical Therapy Initial Evaluation    Date: 9/2/2020   Name: Meryl Johnson  Clinic Number: [id number]    Therapy Diagnosis:   Encounter Diagnoses   Name Primary?    Biceps tendinitis of right upper extremity     Acute pain of right shoulder      Physician: Radha Viera MD    Physician Orders: PT Eval and Treat   Medical Diagnosis from Referral: M75.21 (ICD-10-CM) - Biceps tendinitis of right upper extremity  Evaluation Date: 9/2/2020  Authorization Period Expiration: 11/20/2020  Plan of Care Expiration: 12/3/2020  Visit # / Visits authorized: 1/ 12    Time In: 0830  Time Out: 0915  Total Appointment Time (timed & untimed codes): 45 minutes    Precautions: Standard    Subjective   Date of onset: 7/8/20  History of current condition - Meryl reports: Patient reports she fell at work on 7/8 and experienced a right radius fracture. She has been receiving OT but also complains of R shoulder pain which is yet to receive treatment. At times the pain travels down the arm on the deltoid and into the hand. She has maintained motion in the shoulder but feels weaker following the fall.      Medical History:   Past Medical History:   Diagnosis Date    Anemia     Diabetes mellitus type II     H. pylori infection     Hyperlipidemia     Hypertension     Unspecified vitamin D deficiency 4/24/2013       Surgical History:   Meryl Johnson  has a past surgical history that includes Gastric bypass and Esophagogastroduodenoscopy (N/A, 8/8/2018).    Medications:   Meryl has a current medication list which includes the following prescription(s): atorvastatin, benazepril, blood sugar diagnostic, chlorthalidone, lancets, and metformin.    Allergies:   Review of patient's allergies indicates:   Allergen Reactions    Codeine Nausea Only    Vicodin [hydrocodone-acetaminophen] Nausea Only        Imaging, xray:     Prior Therapy: OT for elbow  Social History: She lives with their  family  Occupation: [employer]- currently [employer]   Prior Level of Function: Independent with ADLs using RUE  Current Level of Function: Modified independent, unable lift greater than 1# with RUE     Pain:  Current 4/10, worst 8/10, best 3/10   Location: { right shoulder(s)  Description: Aching, Burning, Throbbing and Shooting  Aggravating Factors: Flexing and Lifting  Easing Factors: ice and rest    Pts goals: return to using right arm without pain    Objective     Observation: Patient is an overweight female presenting without RUE arm swing    Posture: Right shoulder abducted, anterior tilt to scap, increased tone to upper trap    Passive Range of Motion:   Shoulder Right Left   Flexion 165 pain 180   Abduction 170 180   ER at 0 35 40   ER at 90 76 90   IR 60 54      Active Range of Motion:   Shoulder Right Left   Flexion 158 180   Abduction 162 172   ER at 0 30 36   ER at 90 72 84   IR 53 48     Strength:  Shoulder Right Left   Flexion 4- 4+   Abduction 4 4+   ER 4- 4   IR 4 4+   Serratus Anterior 4- 4   Middle Trap 4- 4   Low Trap 4- 4       Special Tests:   Right Left   AC Joint Compression Test - -   Empty Can Test + -   Drop Arm test + -   Subscaputlaris Lift Off - -   Clunk test - -   O'barber's test - -   Hawkin's Kenndy + -   Neer's Test - -   Speed's test - -   Anterior Apprehension test - -   Relocation test - -   Posterior Apprehension test - -   Sulcus Sign - -       Joint Mobility: Poor posterior and inferior glide to humeral head, painful ER. Poor scapular mechanics with shoulder shrug, overactive upper trap    Palpation: Tender over upper trap, supraspinous fossa. Mid trap tender under repositioned     Sensation: intact    Flexibility:   Lat: R - ; L -   Pec Minor: R short ; L short      Limitation/Restriction for FOTO shoulder Survey    Therapist reviewed FOTO scores for Meryl Johnson on 9/2/2020.   FOTO documents entered into [email or web address] - see Media section.             TREATMENT   Treatment  Time In: 0845  Treatment Time Out: 0900  Total Treatment time (time-based codes) separate from Evaluation: 15 minutes    Meryl received therapeutic exercises to develop strength, endurance and ROM for 15 minutes including:  Scaptions no weight 20x  No money OTB 15x   Reviewed HEP exercises and form      Home Exercises and Patient Education Provided    Education provided:   - Proper scap control with exercise, not lifting over 1#    Written Home Exercises Provided: yes.  Exercises were reviewed and patient was able to demonstrate them prior to the end of the session.  Patient demonstrated good  understanding of the education provided.     See EMR under Patient Instructions for exercisesprovided 9/2/2020.    Assessment   Meryl is a 65 y.o. female referred to outpatient Physical Therapy with a medical diagnosis of biceps tendinitis. Pt presents with limited right shoulder mobility, strength deficits, difficulty completing ADLs with the RUE, poor exercise tolerance, and trouble sleeping at night    Pt prognosis is Good.   Pt will benefit from skilled outpatient Physical Therapy to address the deficits stated above and in the chart below, provide pt/family education, and to maximize pt's level of independence.     Plan of care discussed with patient: Yes  Pt's spiritual, cultural and educational needs considered and patient is agreeable to the plan of care and goals as stated below:     Anticipated Barriers for therapy: covid    Medical Necessity is demonstrated by the following  History  Co-morbidities and personal factors that may impact the plan of care Co-morbidities:   difficulty sleeping, high BMI, HTN and level of undertstanding of current condition    Personal Factors:   no deficits     moderate   Examination  Body Structures and Functions, activity limitations and participation restrictions that may impact the plan of care Body Regions:   upper extremities    Body Systems:    ROM  strength  transfers  motor  control  motor learning  edema    Participation Restrictions:   covid-19    Activity limitations:   Learning and applying knowledge  no deficits    General Tasks and Commands  no deficits    Communication  no deficits    Mobility  lifting and carrying objects  fine hand use (grasping/picking up)    Self care  no deficits    Domestic Life  no deficits    Interactions/Relationships  no deficits    Life Areas  no deficits    Community and Social Life  no deficits         moderate   Clinical Presentation stable and uncomplicated low   Decision Making/ Complexity Score: low     GOALS: Short Term Goals:  4 weeks  1.Report decreased shoulder pain < / =  3/10  to increase tolerance for using RUE at work  2. Increase PROM 160 passive flexion without pain in shoulder   3. Increased strength by 1/3 MMT grade in right shoulder to increase tolerance for ADL and work activities.  4. Pt to tolerate HEP to improve ROM and independence with ADL's    Long Term Goals: 8 weeks  1.Report decreased shoulder pain  < / =  1 /10  to increase tolerance for lifting 5# overhead  2.Increase AROM to full flexion without shoulder pain   3.Increase strength to >/= 4/5 in mid trap to increase tolerance for ADL and work activities.  4. Pt goal: return to working without shoulder pain  5. Pt will have improved gcode of CJ (20-40% limited) on FOTO shoulder in order to demonstrate true functional improvement.      Plan   Plan of care Certification: 9/2/2020 to 12/3/2020.    Outpatient Physical Therapy 2 times weekly for 10 weeks to include the following interventions: Manual Therapy, Moist Heat/ Ice, Neuromuscular Re-ed, Patient Education, Self Care, Therapeutic Activites and Therapeutic Exercise.     Fabricio Tolentino, PT

## 2020-09-09 ENCOUNTER — CLINICAL SUPPORT (OUTPATIENT)
Dept: REHABILITATION | Facility: HOSPITAL | Age: 65
End: 2020-09-09
Payer: OTHER MISCELLANEOUS

## 2020-09-09 DIAGNOSIS — M25.60 RANGE OF MOTION DEFICIT: ICD-10-CM

## 2020-09-09 PROCEDURE — 97110 THERAPEUTIC EXERCISES: CPT

## 2020-09-09 NOTE — PROGRESS NOTES
"                            Occupational Therapy Daily Treatment Note     Date: 9/9/2020  Name: Meryl Johnson  Clinic Number: [id number]    Therapy Diagnosis:   Encounter Diagnosis   Name Primary?    Range of motion deficit      Physician: Alexis Thurman MD     Physician Orders:  ROM  Medical Diagnosis: S52.124A (ICD-10-CM) - Closed nondisplaced fracture of head of right radius, initial encounter      Surgical Procedure and Date: N/A  Evaluation Date: 7/23/2020  Insurance: Generic Worker's Comp  Insurance Authorization period Expiration: 07/10/2021      Plan of Care Certification Period: 7/23/2020 to 9/23/2020     Visit # / Visits Authortized: 7/12  Time In: 8:05 am  Time Out: 8:45 am  Total Billable Time: 30 minutes     Precautions: Diabetes and no more than 1 lb for elbow           Subjective     Pt states "it feels pretty good. The motion seems to be doing fine"    Pt reports: she was compliant with home exercise program given last session.  Response to previous treatment: increased activity  Functional change: able to perform light household activities    Pain: 0/10  Location: right elbow      Objective   Observation/Appearance:  Skin intact      Edema. Measured in centimeters.    Left Right Right Right    7/23/2020 7/23/2020 8/5/2020 8/25/2020   2in. Above elbow 33.2 40 40 39   2in. Below elbow 27 28.9 28.9 28.9   Elbow Crease 27.2 30 28.8 28.8         Elbow/Wrist AROM  Date 7/23/2020 7/23/2020 8/5/2020 8/25/2020     Left Right Right Right   Elbow Ext/Flex 0/140 15/130 0/135 0/140   Supination/Pronation 90/90 *0/90 45/90 60/90   Wrist ext/flex 80/70 *50/45 *60/55 *65/60   Wrist RD/UD 25/30 15/30 25/30 25/30   *Pt reports that she had a previous wrist fracture 10 years ago and her range of motion was limited in her wrist since then.     Sensation: c/o occasional night numbness in fingers      Strength (Dyanmometer) and Pinch Strength (Pinch Gauge)  Measured in pounds and psi. Average of three trials.    " 7/23/2020 7/23/2020 8/25/2020 8/25/2020     Left Right Left Right   Rung II def def 45 28           Meryl received the following supervised modalities after being cleared for contradictions for 10 minutes:   -Patient received MH x 10 min to right elbow to increase blood flow, circulation and tissue elasticity prior to therex      Meryl received the following manual therapy techniques for 5 minutes:   -soft tissue mobilization     Meryl received therapeutic exercises for 25 minutes including:  -AROM as follows: elbow flex/ext 3 ways, sup/pron, wrist ext/flex (open/closed fist), RD/UD (open/closed fist) x 15 reps  -brown dowel elbow ext/flex 2 x 15 reps (palm up, palm down)  -rotation bar with 1 plate x 30 reps  -hand gripper rung 3 x 30 reps  -red putty: 2' molding, 2' grasping (at home)  -wrist 3 ways with 1 lb 2 x 15 reps  -UBE x 5 min forward,  5 min reverse, level 3     Meryl participated in dynamic functional therapeutic activities to improve functional performance for 0  minutes, including:  -NT     Home Exercises and Education Provided     Education provided:   - none today  - Progress towards goals: Excellent    Written Home Exercises Provided: Patient instructed to cont prior HEP.  Exercises were reviewed and Meryl was able to demonstrate them prior to the end of the session.  Meryl demonstrated good  understanding of the education provided.     See EMR under Patient Instructions for exercises provided prior visit.     Meryl demonstrated good  understanding of the education provided.         Assessment   Meryl Johnson is a 64 y.o. female referred to outpatient occupational/hand therapy and presents with a medical diagnosis of left radial head fracture, resulting in pain, decreased range of motion and decreased strength in right arm.  Pt tolerated tx well this date. She denied pain today. Pt able to do light household activities with right arm. She participated well and is motivated.     Pt would  continue to benefit from skilled OT.      Meryl is progressing well towards her goals and there are no updates to goals at this time. Pt prognosis is Good.     Pt will continue to benefit from skilled outpatient occupational therapy to address the deficits listed in the problem list on initial evaluation provide pt/family education and to maximize pt's level of independence in the home and community environment.     Anticipated barriers to occupational therapy: DM    Pt's spiritual, cultural and educational needs considered and pt agreeable to plan of care and goals.    Goals:  Short Term (4 weeks on 8/24/2020):  1)   Patient to be IND with HEP and modalities for pain management. - Met 7/29/2020  2)   Increase ROM 5-10 degrees for elbow ext/flex  to increase functional hand use for feeding and dressing herself.-Met 8/5/2020  3)   Measure  in 2 weeks.-Met 8/25/2020  4)   Decrease edema .2-.3 cm to increase joint mobility /flexibility for improved overall functional hand use.-progressing         Long Term (by discharge):  1)   Pt will report 0 out of 10 pain with right arm use. - progressing  2)   Patient to score at CK on FOTO to demonstrate improved perception of functional right hand use.-progressing  3)   Pt will return to prior level of function for ADLs and household management. -progressing         Plan:    Certification Period/Plan of care expiration: 7/23/2020 to 9/23/2020.     Outpatient Occupational Therapy 1 time weekly for 8 weeks may include the following interventions: Manual therapy/joint mobilizations, Modalities for pain management, Therapeutic exercises/activities., Strengthening and Edema Control.    Discussed Plan of Care with patient: Yes  Updates/Grading for next session: Continue with strengthening limitations of elbow.      Della Grullon, OT

## 2020-09-18 ENCOUNTER — CLINICAL SUPPORT (OUTPATIENT)
Dept: REHABILITATION | Facility: HOSPITAL | Age: 65
End: 2020-09-18
Payer: OTHER MISCELLANEOUS

## 2020-09-18 DIAGNOSIS — M25.60 RANGE OF MOTION DEFICIT: ICD-10-CM

## 2020-09-18 DIAGNOSIS — M25.511 ACUTE PAIN OF RIGHT SHOULDER: ICD-10-CM

## 2020-09-18 PROCEDURE — 97112 NEUROMUSCULAR REEDUCATION: CPT | Performed by: PHYSICAL THERAPIST

## 2020-09-18 PROCEDURE — 97140 MANUAL THERAPY 1/> REGIONS: CPT | Performed by: PHYSICAL THERAPIST

## 2020-09-18 PROCEDURE — 97110 THERAPEUTIC EXERCISES: CPT | Performed by: PHYSICAL THERAPIST

## 2020-09-18 PROCEDURE — 97110 THERAPEUTIC EXERCISES: CPT

## 2020-09-18 NOTE — PROGRESS NOTES
"  Physical Therapy Treatment Note     Name: Meryl Johnson  [facility] Number: [id number]    Therapy Diagnosis:   Encounter Diagnosis   Name Primary?    Acute pain of right shoulder      Physician: Radha Viera MD    Visit Date: 9/18/2020    Physician Orders: PT Eval and Treat   Medical Diagnosis from Referral: M75.21 (ICD-10-CM) - Biceps tendinitis of right upper extremity  Evaluation Date: 9/2/2020  Authorization Period Expiration: 11/20/2020  Plan of Care Expiration: 12/3/2020  Visit # / Visits authorized: 2/ 12    Time In: 0945  Time Out: 1030  Total Billable Time: 45 minutes    Precautions: Fall    Subjective     Pt reports: The shoulder is sore but feeling better. Just one question with the exercises  She was compliant with home exercise program.  Response to previous treatment: sore shoulder blade  Functional change: Improved motion but continues shoulder hike    Pain: 3/10  Location: right shoulder      Objective     Meryl received therapeutic exercises to develop strength, endurance and ROM for 25 minutes including:    Pulley 4' scaptions  Scaptions 1# 2 x 15  Scap retractions GTB 20x 3"    Meryl received the following manual therapy techniques: Joint mobilizations and Soft tissue Mobilization were applied to the: R shoulder for 10 minutes, including:    Inferior mobs arm abducted 20 deg and 90 deg  Posterior capsule stretch blocking scap  End range flexion with inferior humeral mob Gr III  IR with posterior mob Gr III    Meryl participated in neuromuscular re-education activities to improve: Sense, Proprioception and Posture for 10 minutes. The following activities were included:    Ball on wall CW/CCW 30x ea  Serratus wall clock red loop 2 x 20      Home Exercises Provided and Patient Education Provided     Education provided:   - Proper scap stability with overhead lifting    Written Home Exercises Provided: Patient instructed to cont prior HEP.  Exercises were reviewed and Meryl was able to demonstrate " them prior to the end of the session.  Meryl demonstrated good  understanding of the education provided.     See EMR under Patient Instructions for exercises provided prior visit.    Assessment     Meryl fatigued with wall clock, notes 1# felt heavy afterwards and her arm shaking. Relief from inferior humeral mobilizations with end range flexion exercise. Tactile cue with scap retractions    Meryl is progressing well towards her goals.   Pt prognosis is Good.     Pt will continue to benefit from skilled outpatient physical therapy to address the deficits listed in the problem list box on initial evaluation, provide pt/family education and to maximize pt's level of independence in the home and community environment.     Pt's spiritual, cultural and educational needs considered and pt agreeable to plan of care and goals.     Anticipated barriers to physical therapy: covid    GOALS: Short Term Goals:  4 weeks  1.Report decreased shoulder pain < / =  3/10  to increase tolerance for using RUE at work (Progressing, not met)  2. Increase PROM 160 passive flexion without pain in shoulder (Progressing, not met)  3. Increased strength by 1/3 MMT grade in right shoulder to increase tolerance for ADL and work activities.(Progressing, not met)  4. Pt to tolerate HEP to improve ROM and independence with ADL's(Progressing, not met)     Long Term Goals: 8 weeks  1.Report decreased shoulder pain  < / =  1 /10  to increase tolerance for lifting 5# overhead(Progressing, not met)  2.Increase AROM to full flexion without shoulder pain (Progressing, not met)  3.Increase strength to >/= 4/5 in mid trap to increase tolerance for ADL and work activities.(Progressing, not met)  4. Pt goal: return to working without shoulder pain(Progressing, not met)  5. Pt will have improved gcode of CJ (20-40% limited) on FOTO shoulder in order to demonstrate true functional improvement.(Progressing, not met)       Plan     Plan of care Certification:  9/2/2020 to 12/3/2020.     Outpatient Physical Therapy 2 times weekly for 10 weeks to include the following interventions: Manual Therapy, Moist Heat/ Ice, Neuromuscular Re-ed, Patient Education, Self Care, Therapeutic Activites and Therapeutic Exercise.     Improve scap stabilizations    Fabricio Tolentino, PT

## 2020-09-18 NOTE — PROGRESS NOTES
"                            Occupational Therapy Daily Treatment Note     Date: 9/18/2020  Name: Meryl Johnson  Clinic Number: [id number]    Therapy Diagnosis:   Encounter Diagnosis   Name Primary?    Range of motion deficit      Physician: Alexis Thurman MD     Physician Orders:  ROM  Medical Diagnosis: S52.124A (ICD-10-CM) - Closed nondisplaced fracture of head of right radius, initial encounter      Surgical Procedure and Date: N/A  Evaluation Date: 7/23/2020  Insurance: Generic Worker's Comp  Insurance Authorization period Expiration: 07/10/2021      Plan of Care Certification Period: 7/23/2020 to 9/23/2020     Visit # / Visits Authortized: 8/12  Time In: 8:45 am  Time Out: 9:25 am  Total Billable Time: 30 minutes     Precautions: Diabetes and no more than 1 lb for elbow           Subjective     Pt states "it feels pretty good. The motion seems to be doing fine"    Pt reports: she was compliant with home exercise program given last session.  Response to previous treatment: increased activity  Functional change: able to perform light household activities    Pain: 0/10  Location: right elbow      Objective   Observation/Appearance:  Skin intact      Edema. Measured in centimeters.    Left Right Right Right    7/23/2020 7/23/2020 8/5/2020 8/25/2020   2in. Above elbow 33.2 40 40 39   2in. Below elbow 27 28.9 28.9 28.9   Elbow Crease 27.2 30 28.8 28.8         Elbow/Wrist AROM  Date 7/23/2020 7/23/2020 8/5/2020 8/25/2020     Left Right Right Right   Elbow Ext/Flex 0/140 15/130 0/135 0/140   Supination/Pronation 90/90 *0/90 45/90 60/90   Wrist ext/flex 80/70 *50/45 *60/55 *65/60   Wrist RD/UD 25/30 15/30 25/30 25/30   *Pt reports that she had a previous wrist fracture 10 years ago and her range of motion was limited in her wrist since then.     Sensation: c/o occasional night numbness in fingers      Strength (Dyanmometer) and Pinch Strength (Pinch Gauge)  Measured in pounds and psi. Average of three trials.    " 7/23/2020 7/23/2020 8/25/2020 8/25/2020     Left Right Left Right   Rung II def def 45 28           Meryl received the following supervised modalities after being cleared for contradictions for 10 minutes:   -Patient received MH x 10 min to right elbow to increase blood flow, circulation and tissue elasticity prior to therex      Meryl received the following manual therapy techniques for 5 minutes:   -soft tissue mobilization     Meryl received therapeutic exercises for 25 minutes including:  -AROM as follows: elbow flex/ext 3 ways, sup/pron, wrist ext/flex (open/closed fist), RD/UD (open/closed fist) x 15 reps  -1 lb elbow 3 ways 2 x 15 reps   -rotation bar with 1 plate x 30 reps  -hand gripper rung 3 x 30 reps  -red putty: 2' molding, 2' grasping (at home)  -wrist 3 ways with 1 lb 2 x 15 reps  -UBE x 5 min forward,  5 min reverse, level 3     Meryl participated in dynamic functional therapeutic activities to improve functional performance for 0  minutes, including:  -NT     Home Exercises and Education Provided     Education provided:   - none today  - Progress towards goals: Excellent    Written Home Exercises Provided: Patient instructed to cont prior HEP.  Exercises were reviewed and Meryl was able to demonstrate them prior to the end of the session.  Meryl demonstrated good  understanding of the education provided.     See EMR under Patient Instructions for exercises provided prior visit.     Meryl demonstrated good  understanding of the education provided.         Assessment   Meryl Johnson is a 64 y.o. female referred to outpatient occupational/hand therapy and presents with a medical diagnosis of left radial head fracture, resulting in pain, decreased range of motion and decreased strength in right arm.  Pt tolerated tx well this date. She denied pain today. Pt able to do light household activities with right arm. She participated well and is motivated.     Pt would continue to benefit from  skilled OT.      Meryl is progressing well towards her goals and there are no updates to goals at this time. Pt prognosis is Good.     Pt will continue to benefit from skilled outpatient occupational therapy to address the deficits listed in the problem list on initial evaluation provide pt/family education and to maximize pt's level of independence in the home and community environment.     Anticipated barriers to occupational therapy: DM    Pt's spiritual, cultural and educational needs considered and pt agreeable to plan of care and goals.    Goals:  Short Term (4 weeks on 8/24/2020):  1)   Patient to be IND with HEP and modalities for pain management. - Met 7/29/2020  2)   Increase ROM 5-10 degrees for elbow ext/flex  to increase functional hand use for feeding and dressing herself.-Met 8/5/2020  3)   Measure  in 2 weeks.-Met 8/25/2020  4)   Decrease edema .2-.3 cm to increase joint mobility /flexibility for improved overall functional hand use.-progressing         Long Term (by discharge):  1)   Pt will report 0 out of 10 pain with right arm use. - progressing  2)   Patient to score at CK on FOTO to demonstrate improved perception of functional right hand use.-progressing  3)   Pt will return to prior level of function for ADLs and household management. -progressing         Plan: Re- assess next session   Certification Period/Plan of care expiration: 7/23/2020 to 9/23/2020.     Outpatient Occupational Therapy 1 time weekly for 8 weeks may include the following interventions: Manual therapy/joint mobilizations, Modalities for pain management, Therapeutic exercises/activities., Strengthening and Edema Control.    Discussed Plan of Care with patient: Yes  Updates/Grading for next session: Continue with strengthening limitations of elbow.      Pat Amin, OT

## 2020-09-22 ENCOUNTER — CLINICAL SUPPORT (OUTPATIENT)
Dept: REHABILITATION | Facility: HOSPITAL | Age: 65
End: 2020-09-22
Payer: OTHER MISCELLANEOUS

## 2020-09-22 DIAGNOSIS — M25.60 RANGE OF MOTION DEFICIT: ICD-10-CM

## 2020-09-22 DIAGNOSIS — M25.511 ACUTE PAIN OF RIGHT SHOULDER: ICD-10-CM

## 2020-09-22 PROCEDURE — 97110 THERAPEUTIC EXERCISES: CPT | Performed by: PHYSICAL THERAPIST

## 2020-09-22 PROCEDURE — 97110 THERAPEUTIC EXERCISES: CPT

## 2020-09-22 PROCEDURE — 97140 MANUAL THERAPY 1/> REGIONS: CPT | Performed by: PHYSICAL THERAPIST

## 2020-09-22 PROCEDURE — 97112 NEUROMUSCULAR REEDUCATION: CPT | Performed by: PHYSICAL THERAPIST

## 2020-09-22 NOTE — PROGRESS NOTES
"  Physical Therapy Treatment Note     Name: Meryl Johnson  [facility] Number: [id number]    Therapy Diagnosis:   Encounter Diagnosis   Name Primary?    Acute pain of right shoulder      Physician: Radha Viera MD    Visit Date: 9/22/2020    Physician Orders: PT Eval and Treat   Medical Diagnosis from Referral: M75.21 (ICD-10-CM) - Biceps tendinitis of right upper extremity  Evaluation Date: 9/2/2020  Authorization Period Expiration: 11/20/2020  Plan of Care Expiration: 12/3/2020  Visit # / Visits authorized: 3/ 12    Time In: 1015  Time Out: 1100  Total Billable Time: 45 minutes    Precautions: Fall    Subjective     Pt reports: I was very sore, you worked me out hard. Sore back behind the shoulder along the line of the scapula  She was compliant with home exercise program.  Response to previous treatment: sore shoulder blade  Functional change: Improved motion - more hiking on L>R    Pain: 3/10  Location: right shoulder      Objective     Meryl received therapeutic exercises to develop strength, endurance and ROM for 27 minutes including:    Sidelying flexion 2# 3 x 10  Serratus press cables 3# 2 x 10  No money GTB 3 x 12  Scaptions 2# 2 x 15    NT  Pulley 4' scaptions  Scap retractions GTB 20x 3"    Meryl received the following manual therapy techniques: Joint mobilizations and Soft tissue Mobilization were applied to the: R shoulder for 10 minutes, including:    Inferior mobs arm abducted 20 deg and 90 deg  Posterior capsule stretch blocking scap  End range flexion with inferior humeral mob Gr III  IR with posterior mob Gr III    Meryl participated in neuromuscular re-education activities to improve: Sense, Proprioception and Posture for 8 minutes. The following activities were included:    Serratus slides yellow loop 3 x 10    NT  Ball on wall CW/CCW 30x ea  Serratus wall clock red loop 2 x 20      Home Exercises Provided and Patient Education Provided     Education provided:   - Proper scap stability with " overhead lifting    Written Home Exercises Provided: Patient instructed to cont prior HEP.  Exercises were reviewed and Meryl was able to demonstrate them prior to the end of the session.  Meryl demonstrated good  understanding of the education provided.     See EMR under Patient Instructions for exercises provided prior visit.    Assessment     Meryl progressed with scap stabilization, noting mid trap fatigue. Added sidelying flexion for lower trap activation. Less fatigue with the serratus slides today compared to ball on wall. Good following of cues with serratus press at the cable system. Manually corrected scap hiking with scaptions    Meryl is progressing well towards her goals.   Pt prognosis is Good.     Pt will continue to benefit from skilled outpatient physical therapy to address the deficits listed in the problem list box on initial evaluation, provide pt/family education and to maximize pt's level of independence in the home and community environment.     Pt's spiritual, cultural and educational needs considered and pt agreeable to plan of care and goals.     Anticipated barriers to physical therapy: covid    GOALS: Short Term Goals:  4 weeks  1.Report decreased shoulder pain < / =  3/10  to increase tolerance for using RUE at work (Progressing, not met)  2. Increase PROM 160 passive flexion without pain in shoulder (Progressing, not met)  3. Increased strength by 1/3 MMT grade in right shoulder to increase tolerance for ADL and work activities.(Progressing, not met)  4. Pt to tolerate HEP to improve ROM and independence with ADL's(Progressing, not met)     Long Term Goals: 8 weeks  1.Report decreased shoulder pain  < / =  1 /10  to increase tolerance for lifting 5# overhead(Progressing, not met)  2.Increase AROM to full flexion without shoulder pain (Progressing, not met)  3.Increase strength to >/= 4/5 in mid trap to increase tolerance for ADL and work activities.(Progressing, not met)  4. Pt  goal: return to working without shoulder pain(Progressing, not met)  5. Pt will have improved gcode of CJ (20-40% limited) on FOTO shoulder in order to demonstrate true functional improvement.(Progressing, not met)       Plan     Plan of care Certification: 9/2/2020 to 12/3/2020.     Outpatient Physical Therapy 2 times weekly for 10 weeks to include the following interventions: Manual Therapy, Moist Heat/ Ice, Neuromuscular Re-ed, Patient Education, Self Care, Therapeutic Activites and Therapeutic Exercise.     Improve scap stabilizations    Fabricio Tolentino, PT

## 2020-09-22 NOTE — PROGRESS NOTES
"                            Occupational Therapy Re-evaluation Note     Date: 9/22/2020  Name: Meryl Johnson  Clinic Number: [id number]    Therapy Diagnosis:   Encounter Diagnosis   Name Primary?    Range of motion deficit      Physician: Alexis Thurman MD     Physician Orders:  ROM  Medical Diagnosis: S52.124A (ICD-10-CM) - Closed nondisplaced fracture of head of right radius, initial encounter      Surgical Procedure and Date: N/A  Evaluation Date: 7/23/2020  Insurance: Generic Worker's Comp  Insurance Authorization period Expiration: 07/10/2021      Plan of Care Certification Period: 9/22/2020 to 11/22/2020     Visit # / Visits Authortized: 8/12  Time In: 8:45 am  Time Out: 9:25 am  Total Billable Time: 30 minutes     Precautions: Diabetes and no more than 1 lb for elbow           Subjective     Pt states "it feels pretty good. The motion seems to be doing fine"    Pt reports: she was compliant with home exercise program given last session.  Response to previous treatment: increased activity  Functional change: able to perform light household activities    Pain: 0/10  Location: right elbow      Objective   Observation/Appearance:  Skin intact      Edema. Measured in centimeters.    Left Right Right Right Right    7/23/2020 7/23/2020 8/5/2020 8/25/2020 9/22/2020   2in. Above elbow 33.2 40 40 39 39   2in. Below elbow 27 28.9 28.9 28.9 28.8   Elbow Crease 27.2 30 28.8 28.8 28         Elbow/Wrist AROM  Date 7/23/2020 7/23/2020 8/5/2020 8/25/2020 9/22/2020     Left Right Right Right Right   Elbow Ext/Flex 0/140 15/130 0/135 0/140 WNL   Supination/Pronation 90/90 *0/90 45/90 60/90 65/90   Wrist ext/flex 80/70 *50/45 *60/55 *65/60 *65/65   Wrist RD/UD 25/30 15/30 25/30 25/30 WNL   *Pt reports that she had a previous wrist fracture 10 years ago and her range of motion was limited in her wrist since then.     Sensation: c/o occasional night numbness in fingers      Strength (Dyanmometer) and Pinch Strength (Pinch " Gauge)  Measured in pounds and psi. Average of three trials.    7/23/2020 7/23/2020 8/25/2020 8/25/2020 9/22/2020     Left Right Left Right Right   Rung II def def 45 28 46           Meryl received the following supervised modalities after being cleared for contradictions for 10 minutes:   -Patient received MH x 10 min to right elbow to increase blood flow, circulation and tissue elasticity prior to therex      Meryl received the following manual therapy techniques for 5 minutes:   -soft tissue mobilization     Meryl received therapeutic exercises for 25 minutes including:  -AROM as follows:  sup/pron, wrist ext/flex (open/closed fist), RD/UD (open/closed fist) x 15 reps  -1 lb elbow 3 ways 2 x 15 reps   -rotation bar with 1 plate x 30 reps  -hand gripper rung 3 x 30 reps  -red putty: 2' molding, 2' grasping   -wrist 3 ways with 1 lb 2 x 15 reps  -UBE x 5 min forward,  5 min reverse, level 3     Meryl participated in dynamic functional therapeutic activities to improve functional performance for 0  minutes, including:  -NT     Home Exercises and Education Provided     Education provided:   - none today  - Progress towards goals: Excellent    Written Home Exercises Provided: Patient instructed to cont prior HEP.  Exercises were reviewed and Meryl was able to demonstrate them prior to the end of the session.  Meryl demonstrated good  understanding of the education provided.     See EMR under Patient Instructions for exercises provided prior visit.     Meryl demonstrated good  understanding of the education provided.         Assessment   Meryl Johnson is a 64 y.o. female referred to outpatient occupational/hand therapy and presents with a medical diagnosis of left radial head fracture, resulting in pain, decreased range of motion and decreased strength in right arm.  Pt AROM has improved with supination and wrist flexion. Edema has decreased.  strength improved by 18 lbs.  She participated well and is  motivated.     Pt would continue to benefit from skilled OT.      Meryl is progressing well towards her goals and there are no updates to goals at this time. Pt prognosis is Good.     Pt will continue to benefit from skilled outpatient occupational therapy to address the deficits listed in the problem list on initial evaluation provide pt/family education and to maximize pt's level of independence in the home and community environment.     Anticipated barriers to occupational therapy: DM    Pt's spiritual, cultural and educational needs considered and pt agreeable to plan of care and goals.    Goals:  Short Term (4 weeks on 8/24/2020):  1)   Patient to be IND with HEP and modalities for pain management. - Met 7/29/2020  2)   Increase ROM 5-10 degrees for elbow ext/flex  to increase functional hand use for feeding and dressing herself.-Met 8/5/2020  3)   Measure  in 2 weeks.-Met 8/25/2020  4)   Decrease edema .2-.3 cm to increase joint mobility /flexibility for improved overall functional hand use.- Met 9/22/2020        Long Term (by discharge):  1)   Pt will report 0 out of 10 pain with right arm use. - progressing  2)   Patient to score at CK on FOTO to demonstrate improved perception of functional right hand use.-progressing  3)   Pt will return to prior level of function for ADLs and household management. -progressing         Plan:    Certification Period/Plan of care expiration: 9/22/2020 to 11/22/2020.     Outpatient Occupational Therapy 1 time weekly for 8 weeks may include the following interventions: Manual therapy/joint mobilizations, Modalities for pain management, Therapeutic exercises/activities., Strengthening and Edema Control.    Discussed Plan of Care with patient: Yes  Updates/Grading for next session: Continue with strengthening limitations of elbow.      Pat Amin, OT

## 2020-09-22 NOTE — PLAN OF CARE
"                          Occupational Therapy Re-evaluation Note     Date: 9/22/2020  Name: Meryl Johnson  Clinic Number: [id number]    Therapy Diagnosis:   Encounter Diagnosis   Name Primary?    Range of motion deficit      Physician: Alexis Thurman MD     Physician Orders:  ROM  Medical Diagnosis: S52.124A (ICD-10-CM) - Closed nondisplaced fracture of head of right radius, initial encounter      Surgical Procedure and Date: N/A  Evaluation Date: 7/23/2020  Insurance: Generic Worker's Comp  Insurance Authorization period Expiration: 07/10/2021      Plan of Care Certification Period: 9/22/2020 to 11/22/2020     Visit # / Visits Authortized: 8/12  Time In: 8:45 am  Time Out: 9:25 am  Total Billable Time: 30 minutes     Precautions: Diabetes and no more than 1 lb for elbow           Subjective     Pt states "it feels pretty good. The motion seems to be doing fine"    Pt reports: she was compliant with home exercise program given last session.  Response to previous treatment: increased activity  Functional change: able to perform light household activities    Pain: 0/10  Location: right elbow      Objective   Observation/Appearance:  Skin intact      Edema. Measured in centimeters.    Left Right Right Right Right    7/23/2020 7/23/2020 8/5/2020 8/25/2020 9/22/2020   2in. Above elbow 33.2 40 40 39 39   2in. Below elbow 27 28.9 28.9 28.9 28.8   Elbow Crease 27.2 30 28.8 28.8 28         Elbow/Wrist AROM  Date 7/23/2020 7/23/2020 8/5/2020 8/25/2020 9/22/2020     Left Right Right Right Right   Elbow Ext/Flex 0/140 15/130 0/135 0/140 WNL   Supination/Pronation 90/90 *0/90 45/90 60/90 65/90   Wrist ext/flex 80/70 *50/45 *60/55 *65/60 *65/65   Wrist RD/UD 25/30 15/30 25/30 25/30 WNL   *Pt reports that she had a previous wrist fracture 10 years ago and her range of motion was limited in her wrist since then.     Sensation: c/o occasional night numbness in fingers      Strength (Dyanmometer) and Pinch Strength (Pinch " Gauge)  Measured in pounds and psi. Average of three trials.    7/23/2020 7/23/2020 8/25/2020 8/25/2020 9/22/2020     Left Right Left Right Right   Rung II def def 45 28 46           Meryl received the following supervised modalities after being cleared for contradictions for 10 minutes:   -Patient received MH x 10 min to right elbow to increase blood flow, circulation and tissue elasticity prior to therex      Meryl received the following manual therapy techniques for 5 minutes:   -soft tissue mobilization     Meryl received therapeutic exercises for 25 minutes including:  -AROM as follows:  sup/pron, wrist ext/flex (open/closed fist), RD/UD (open/closed fist) x 15 reps  -1 lb elbow 3 ways 2 x 15 reps   -rotation bar with 1 plate x 30 reps  -hand gripper rung 3 x 30 reps  -red putty: 2' molding, 2' grasping   -wrist 3 ways with 1 lb 2 x 15 reps  -UBE x 5 min forward,  5 min reverse, level 3     Meryl participated in dynamic functional therapeutic activities to improve functional performance for 0  minutes, including:  -NT     Home Exercises and Education Provided     Education provided:   - none today  - Progress towards goals: Excellent    Written Home Exercises Provided: Patient instructed to cont prior HEP.  Exercises were reviewed and Meryl was able to demonstrate them prior to the end of the session.  Meryl demonstrated good  understanding of the education provided.     See EMR under Patient Instructions for exercises provided prior visit.     Meryl demonstrated good  understanding of the education provided.         Assessment   Meryl Johnson is a 64 y.o. female referred to outpatient occupational/hand therapy and presents with a medical diagnosis of left radial head fracture, resulting in pain, decreased range of motion and decreased strength in right arm.  Pt AROM has improved with supination and wrist flexion. Edema has decreased.  strength improved by 18 lbs.  She participated well and is  motivated.     Pt would continue to benefit from skilled OT.      Meryl is progressing well towards her goals and there are no updates to goals at this time. Pt prognosis is Good.     Pt will continue to benefit from skilled outpatient occupational therapy to address the deficits listed in the problem list on initial evaluation provide pt/family education and to maximize pt's level of independence in the home and community environment.     Anticipated barriers to occupational therapy: DM    Pt's spiritual, cultural and educational needs considered and pt agreeable to plan of care and goals.    Goals:  Short Term (4 weeks on 8/24/2020):  1)   Patient to be IND with HEP and modalities for pain management. - Met 7/29/2020  2)   Increase ROM 5-10 degrees for elbow ext/flex  to increase functional hand use for feeding and dressing herself.-Met 8/5/2020  3)   Measure  in 2 weeks.-Met 8/25/2020  4)   Decrease edema .2-.3 cm to increase joint mobility /flexibility for improved overall functional hand use.- Met 9/22/2020        Long Term (by discharge):  1)   Pt will report 0 out of 10 pain with right arm use. - progressing  2)   Patient to score at CK on FOTO to demonstrate improved perception of functional right hand use.-progressing  3)   Pt will return to prior level of function for ADLs and household management. -progressing         Plan:    Certification Period/Plan of care expiration: 9/22/2020 to 11/22/2020.     Outpatient Occupational Therapy 1 time weekly for 8 weeks may include the following interventions: Manual therapy/joint mobilizations, Modalities for pain management, Therapeutic exercises/activities., Strengthening and Edema Control.    Discussed Plan of Care with patient: Yes  Updates/Grading for next session: Continue with strengthening limitations of elbow.      Pat Amin, OT     I certify the need for these services furnished under the plan of treatment and while under my care.      ____________________________________________________________________  Physician/Referring Practitioner   Date of Signature

## 2020-09-28 ENCOUNTER — PATIENT OUTREACH (OUTPATIENT)
Dept: ADMINISTRATIVE | Facility: OTHER | Age: 65
End: 2020-09-28

## 2020-09-28 ENCOUNTER — HOSPITAL ENCOUNTER (OUTPATIENT)
Dept: RADIOLOGY | Facility: HOSPITAL | Age: 65
Discharge: HOME | End: 2020-09-28
Attending: ORTHOPAEDIC SURGERY
Payer: COMMERCIAL

## 2020-09-28 ENCOUNTER — OFFICE VISIT (OUTPATIENT)
Dept: SPORTS MEDICINE | Facility: CLINIC | Age: 65
End: 2020-09-28
Payer: OTHER MISCELLANEOUS

## 2020-09-28 VITALS
WEIGHT: 274.88 LBS | HEART RATE: 91 BPM | BODY MASS INDEX: 43.14 KG/M2 | HEIGHT: 67 IN | DIASTOLIC BLOOD PRESSURE: 94 MMHG | SYSTOLIC BLOOD PRESSURE: 152 MMHG

## 2020-09-28 DIAGNOSIS — M25.521 RIGHT ELBOW PAIN: Primary | ICD-10-CM

## 2020-09-28 DIAGNOSIS — M25.521 RIGHT ELBOW PAIN: ICD-10-CM

## 2020-09-28 PROCEDURE — 99214 OFFICE O/P EST MOD 30 MIN: CPT | Mod: S$GLB,,, | Performed by: ORTHOPAEDIC SURGERY

## 2020-09-28 PROCEDURE — 99999 PR PBB SHADOW E&M-EST. PATIENT-LVL III: ICD-10-PCS | Mod: PBBFAC,,, | Performed by: ORTHOPAEDIC SURGERY

## 2020-09-28 PROCEDURE — 73080 X-RAY EXAM OF ELBOW: CPT | Mod: 26,RT,, | Performed by: RADIOLOGY

## 2020-09-28 PROCEDURE — 99214 PR OFFICE/OUTPT VISIT, EST, LEVL IV, 30-39 MIN: ICD-10-PCS | Mod: S$GLB,,, | Performed by: ORTHOPAEDIC SURGERY

## 2020-09-28 PROCEDURE — 73080 XR ELBOW COMPLETE 3 VIEW RIGHT: ICD-10-PCS | Mod: 26,RT,, | Performed by: RADIOLOGY

## 2020-09-28 PROCEDURE — 99999 PR PBB SHADOW E&M-EST. PATIENT-LVL III: CPT | Mod: PBBFAC,,, | Performed by: ORTHOPAEDIC SURGERY

## 2020-09-28 PROCEDURE — 73080 X-RAY EXAM OF ELBOW: CPT | Mod: TC,RT

## 2020-09-28 NOTE — PROGRESS NOTES
Care Everywhere:   Immunization: updated  Health Maintenance: updated  Media Review: review for outside colon cancer, eye exam and mammogram report  Legacy Review:   Order placed:   Upcoming appts:  Mammogram scheduling ticket sent to patient's portal on 7/6/2020

## 2020-09-28 NOTE — PROGRESS NOTES
CC Right elbow pain.  Prior patient.    RHD.  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    SANE score 25% post fall. Currently 95% after therapy.     Here for routine 11 week followup. Has been doing therapy and reporting improved pain and range of motion. Denies of new paresthesias or new trauma. Notes some intermittent anterior shoulder pain and dorsal ulnar wrist pain with PT exercises, no weakness.  Minimal sxs at rest / night time.     Was walking to car, at work, stepped off a curb and fell onto the ground.  Landed onto the Right side.  07/08/20.  No numbness or tingling.  First elbow injury.  Mild pain in the Right shoulder.  No elbow pain or injuries prior to this fall.      Review of Systems   Constitution: Negative. Negative for chills, fever and night sweats.   HENT: Negative for congestion and headaches.    Eyes: Negative for blurred vision, left vision loss and right vision loss.   Cardiovascular: Negative for chest pain and syncope.   Respiratory: Negative for cough and shortness of breath.    Endocrine: Negative for polydipsia, polyphagia and polyuria.   Hematologic/Lymphatic: Negative for bleeding problem. Does not bruise/bleed easily.   Skin: Negative for dry skin, itching and rash.   Musculoskeletal: Negative for falls and muscle weakness.   Gastrointestinal: Negative for abdominal pain and bowel incontinence.   Genitourinary: Negative for bladder incontinence and nocturia.   Neurological: Negative for disturbances in coordination, loss of balance and seizures.   Psychiatric/Behavioral: Negative for depression. The patient does not have insomnia.    Allergic/Immunologic: Negative for hives and persistent infections.     PAST MEDICAL HISTORY:   Past Medical History:   Diagnosis Date    Anemia     Diabetes mellitus type II     H. pylori infection     Hyperlipidemia     Hypertension     Unspecified vitamin D deficiency 4/24/2013     PAST SURGICAL HISTORY:   Past Surgical History:   Procedure Laterality  Date    ESOPHAGOGASTRODUODENOSCOPY N/A 8/8/2018    Procedure: EGD (ESOPHAGOGASTRODUODENOSCOPY);  Surgeon: Darius Gerardo MD;  Location: [address];  Service: Endoscopy;  Laterality: N/A;    GASTRIC BYPASS       FAMILY HISTORY:   Family History   Problem Relation Age of Onset    COPD Mother     Heart disease Mother     Cancer Father         liver    Heart disease Father     Cancer Sister     Diabetes Sister     Hyperlipidemia Sister     Hypothyroidism Sister     No Known Problems Brother     No Known Problems Maternal Aunt     No Known Problems Maternal Uncle     No Known Problems Paternal Aunt     No Known Problems Paternal Uncle     No Known Problems Maternal Grandmother     No Known Problems Maternal Grandfather     No Known Problems Paternal Grandmother     No Known Problems Paternal Grandfather     Amblyopia Neg Hx     Blindness Neg Hx     Cataracts Neg Hx     Glaucoma Neg Hx     Hypertension Neg Hx     Macular degeneration Neg Hx     Retinal detachment Neg Hx     Strabismus Neg Hx     Stroke Neg Hx     Thyroid disease Neg Hx     Breast cancer Neg Hx     Colon cancer Neg Hx     Ovarian cancer Neg Hx     Stomach cancer Neg Hx     Rectal cancer Neg Hx      SOCIAL HISTORY:   Social History     Socioeconomic History    Marital status: Single     Spouse name: Not on file    Number of children: Not on file    Years of education: Not on file    Highest education level: Not on file   Occupational History     Employer: [employer]   Social Needs    Financial resource strain: Not on file    Food insecurity     Worry: Not on file     Inability: Not on file    Transportation needs     Medical: Not on file     Non-medical: Not on file   Tobacco Use    Smoking status: Never Smoker    Smokeless tobacco: Never Used   Substance and Sexual Activity    Alcohol use: No    Drug use: No    Sexual activity: Not on file   Lifestyle    Physical activity     Days  "per week: Not on file     Minutes per session: Not on file    Stress: Not on file   Relationships    Social connections     Talks on phone: Not on file     Gets together: Not on file     Attends Tenriism service: Not on file     Active member of club or organization: Not on file     Attends meetings of clubs or organizations: Not on file     Relationship status: Not on file   Other Topics Concern    Not on file   Social History Narrative    Not on file       MEDICATIONS:   Current Outpatient Medications:     atorvastatin (LIPITOR) 40 MG tablet, TAKE ONE TABLET BY MOUTH EVERY DAY, Disp: 30 tablet, Rfl: 5    benazepriL (LOTENSIN) 40 MG tablet, TAKE ONE TABLET BY MOUTH ONCE DAILY, Disp: 30 tablet, Rfl: 11    blood sugar diagnostic (BLOOD GLUCOSE TEST) Strp, 1 strip by Misc.(Non-Drug; Combo Route) route 2 (two) times daily before meals. For mikki contour, Disp: 200 strip, Rfl: 3    chlorthalidone (HYGROTEN) 25 MG Tab, TAKE ONE TABLET BY MOUTH ONCE DAILY, Disp: 30 tablet, Rfl: 11    lancets (ONE TOUCH DELICA LANCETS) 33 gauge Misc, 1 lancet by Misc.(Non-Drug; Combo Route) route 2 (two) times daily., Disp: 60 each, Rfl: 11    metFORMIN (GLUCOPHAGE) 500 MG tablet, TAKE 2 TABLETS BY MOUTH TWO TIMES A DAY WITH MEALS, Disp: 360 tablet, Rfl: 3  ALLERGIES:   Review of patient's allergies indicates:   Allergen Reactions    Codeine Nausea Only    Vicodin [hydrocodone-acetaminophen] Nausea Only       VITAL SIGNS: BP (!) 152/94   Pulse 91   Ht 5' 7" (1.702 m)   Wt 124.7 kg (274 lb 14.4 oz)   BMI 43.06 kg/m²        PHYSICAL EXAM:  General: Patient appears alert and oriented x 3.  Mood is pleasant.  Observation of ears, eyes and nose reveal no gross abnormalities. HEENT: NCAT, sclera nonicteric  Lungs: Respirations are equal and unlabored.  Well nourished, in no acute distress and ambulates with a non-antalgic gait with no assistive devices.    Skin: Skin intact bilaterally. Sensation intact bilaterally. Compartments " soft. No evidence of edema, infection, or induration.     Right ELBOW / WRIST EXTREMITY EXAM:    OBSERVATION / INSPECTION    Swelling  none    Deformity  none  Discoloration  none     Scars   none    Atrophy  None  Abrasion present over the olecranon    TENDERNESS / CREPITUS (T / C):           T / C        Medial epicondyle   - / -    Med. (Ulnar) collateral ligament  - / -    Flexor pronator Musculature   - / -   Biceps tendon    - / -   Head of radius    - / -    Lateral epicondyle   - / -    Extensor Musculature   - / -   Brachioradialis   - / -   Triceps tendon   - / -   Triceps muscle   - / -   Olecranon    - / -   Olecranon bursa   - / -   Cubital fossa    - / -   Anterior jointline   - / -   Radial tunnel    -/ -             ROM: ('*' = with pain)    Right Elbow  AROM (PROM)     Extension   0 deg  (5 deg)   Flexion   145 deg (145 deg)         Pronation  90 deg  (90 deg)      Supination   80 deg  (80 deg)                 Left Elbow  AROM (PROM)     Extension   0 deg  (5 deg)   Flexion   145 deg (145 deg)         Pronation  90 deg  (90 deg)      Supination   80 deg  (80 deg)            Right Wrist  AROM (PROM)   Extension   80 deg (85 deg)   Flexion   80 deg (85 deg)         Ulnar Deviation   35 deg (40 deg)  Radial Deviation 35 deg (40 deg)             Left Wrist   AROM (PROM)     Extension   80 deg (85 deg)   Flexion   80 deg (85 deg)         Ulnar Deviation   35 deg (40 deg)  Radial Deviation 35 deg (40 deg)        STRENGTH: ('*' = with pain)    Elbow Flexion:   5/5  Elbow Extension:  5/5  Wrist Flexion:   5/5  Wrist Extension:  5/5  :    5/5  Intrinsics:   5/5  EPL (Ext. pollicis longus): 5/5  Pinch Mechanism:  5/5    ELBOW EXAMINATION:  See above noted areas of tenderness.   Test for Ligamentous Instability - UCL normal  Test for Ligamentous Instability - LUCL normal  PLRI       neg  Tinel's (Percussion) Test - Cubital  neg  Tennis Elbow Test    neg  Golfer's Elbow Test    neg  Radial Capitellar Grind  Test   neg  Valgus/Extension Overload Test  neg  Resisted Long Finger Extension Pain neg  Moving Valgus    neg  Forearm pain with resisted supination neg  Yeargeson' s (elbow pain)   neg  Hook test     neg    WRIST EXAMINATION:  See above noted areas of tenderness.   Finkelstein's Test   neg  Tinel's Test - Carpal Tunnel  neg  Phalen's Test    neg  Median Nerve Compression Test neg  Ulnar-sided Compression Test neg  LT Ballottment Test   neg  Snuff box tenderness   neg  Lau's Test    neg  LT Instability    neg  Hook of Hamate Tenderness  neg     EXTREMITY NEURO-VASCULAR EXAMINATION: Sensation grossly intact to light touch all dermatomal regions. DTR 2+ Biceps, Triceps, BR and Negative Clara's sign. Grossly intact motor function at Elbow, Wrist and Hand. Distal pulses radial and ulnar 2+, brisk cap refill, symmetric.    Other Findings:    TTP over right bicipital groove.   Mild TTP over right ECU tendon at the wrist    Xrays: (AP, lateral, oblique) Right elbow ordered and reviewed by me personally today:  - Compared to images taken 1 month prior shows minimal change in terms of alignment small amount of callus formation at the radial neck as expected no additional fractures noted, healed        ASSESSMENT:  Minimally angulated right proximal radial neck fracture, proximal biceps tendonitis.       PLAN:  OT/PT: continue work x 6-8 weeks  Occupation therapy prescribed for elbow ROM and biceps tendonitis with Pat at [facility].  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

## 2020-09-29 ENCOUNTER — PATIENT MESSAGE (OUTPATIENT)
Dept: ADMINISTRATIVE | Facility: OTHER | Age: 65
End: 2020-09-29

## 2020-09-29 ENCOUNTER — CLINICAL SUPPORT (OUTPATIENT)
Dept: REHABILITATION | Facility: HOSPITAL | Age: 65
End: 2020-09-29
Payer: OTHER MISCELLANEOUS

## 2020-09-29 ENCOUNTER — PATIENT MESSAGE (OUTPATIENT)
Dept: OTHER | Facility: OTHER | Age: 65
End: 2020-09-29

## 2020-09-29 DIAGNOSIS — M25.60 RANGE OF MOTION DEFICIT: ICD-10-CM

## 2020-09-29 DIAGNOSIS — M25.511 ACUTE PAIN OF RIGHT SHOULDER: ICD-10-CM

## 2020-09-29 PROCEDURE — 97140 MANUAL THERAPY 1/> REGIONS: CPT | Performed by: PHYSICAL THERAPIST

## 2020-09-29 PROCEDURE — 97110 THERAPEUTIC EXERCISES: CPT

## 2020-09-29 PROCEDURE — 97110 THERAPEUTIC EXERCISES: CPT | Performed by: PHYSICAL THERAPIST

## 2020-09-29 NOTE — PROGRESS NOTES
"  Physical Therapy Treatment Note     Name: Meryl Johnson  [facility] Number: [id number]    Therapy Diagnosis:   Encounter Diagnosis   Name Primary?    Acute pain of right shoulder      Physician: Radha Viera MD    Visit Date: 9/29/2020    Physician Orders: PT Eval and Treat   Medical Diagnosis from Referral: M75.21 (ICD-10-CM) - Biceps tendinitis of right upper extremity  Evaluation Date: 9/2/2020  Authorization Period Expiration: 11/20/2020  Plan of Care Expiration: 12/3/2020  Visit # / Visits authorized: 4/ 12    Time In: 0930  Time Out: 1015  Total Billable Time: 45 minutes    Precautions: Fall    Subjective     Pt reports: I was very sore, you worked me out hard. Sore back behind the shoulder along the line of the scapula  She was compliant with home exercise program.  Response to previous treatment: sore shoulder blade  Functional change: Improved motion - more hiking on L>R    Pain: 3/10  Location: right shoulder      Objective     Meryl received therapeutic exercises to develop strength, endurance and ROM for 30 minutes including:    No money BTB 3 x 12  Scaptions 2# 2 x 15  Shoulder extensions GTB 2 x 15  IR/ER GTB 20x ea  [place] and arrows GTB 20x  Tricep extension 20x    NT  Sidelying flexion 2# 3 x 10  Serratus press cables 3# 2 x 10  Pulley 4' scaptions  Scap retractions GTB 20x 3"    Meryl received the following manual therapy techniques: Joint mobilizations and Soft tissue Mobilization were applied to the: R shoulder for 10 minutes, including:    Inferior mobs arm abducted 20 deg and 90 deg  Posterior capsule stretch blocking scap  End range flexion with inferior humeral mob Gr III  IR with posterior mob Gr III    Meryl participated in neuromuscular re-education activities to improve: Sense, Proprioception and Posture for 5 minutes. The following activities were included:    Ball on wall CW/CCW 30x ea    NT  Serratus slides yellow loop 3 x 10  Serratus wall clock red loop 2 x 20      Home Exercises " Provided and Patient Education Provided     Education provided:   - Proper scap stability with overhead lifting    Written Home Exercises Provided: Patient instructed to cont prior HEP.  Exercises were reviewed and Meryl was able to demonstrate them prior to the end of the session.  Meryl demonstrated good  understanding of the education provided.     See EMR under Patient Instructions for exercises provided prior visit.    Assessment     Meryl progressed with scap stabilization. Educated on importance of correcting posture in the office. Cues with bow and arrow for proper scap retraction. Good humeral mobility, relief with inferior mob with flexion      Meryl is progressing well towards her goals.   Pt prognosis is Good.     Pt will continue to benefit from skilled outpatient physical therapy to address the deficits listed in the problem list box on initial evaluation, provide pt/family education and to maximize pt's level of independence in the home and community environment.     Pt's spiritual, cultural and educational needs considered and pt agreeable to plan of care and goals.     Anticipated barriers to physical therapy: covid    GOALS: Short Term Goals:  4 weeks  1.Report decreased shoulder pain < / =  3/10  to increase tolerance for using RUE at work (Progressing, not met)  2. Increase PROM 160 passive flexion without pain in shoulder (Progressing, not met)  3. Increased strength by 1/3 MMT grade in right shoulder to increase tolerance for ADL and work activities.(Progressing, not met)  4. Pt to tolerate HEP to improve ROM and independence with ADL's(Progressing, not met)     Long Term Goals: 8 weeks  1.Report decreased shoulder pain  < / =  1 /10  to increase tolerance for lifting 5# overhead(Progressing, not met)  2.Increase AROM to full flexion without shoulder pain (Progressing, not met)  3.Increase strength to >/= 4/5 in mid trap to increase tolerance for ADL and work activities.(Progressing, not  met)  4. Pt goal: return to working without shoulder pain(Progressing, not met)  5. Pt will have improved gcode of CJ (20-40% limited) on FOTO shoulder in order to demonstrate true functional improvement.(Progressing, not met)       Plan     Plan of care Certification: 9/2/2020 to 12/3/2020.     Outpatient Physical Therapy 2 times weekly for 10 weeks to include the following interventions: Manual Therapy, Moist Heat/ Ice, Neuromuscular Re-ed, Patient Education, Self Care, Therapeutic Activites and Therapeutic Exercise.     Improve scap stabilizations    Fabricio Tolentino, PT

## 2020-09-29 NOTE — PROGRESS NOTES
"                            Occupational Therapy Re-evaluation Note     Date: 9/29/2020  Name: eMryl Johnson  Clinic Number: [id number]    Therapy Diagnosis:   Encounter Diagnosis   Name Primary?    Range of motion deficit      Physician: Alexis Thurman MD     Physician Orders:  ROM  Medical Diagnosis: S52.124A (ICD-10-CM) - Closed nondisplaced fracture of head of right radius, initial encounter      Surgical Procedure and Date: N/A  Evaluation Date: 7/23/2020  Insurance: Generic Worker's Comp  Insurance Authorization period Expiration: 07/10/2021      Plan of Care Certification Period: 9/22/2020 to 11/22/2020     Visit # / Visits Authortized: 9/12  Time In: 8:30 am  Time Out: 9:15 am  Total Billable Time: 30 minutes     Precautions: Diabetes and standard     X-ray 9/28/2020: Impression:     Healing fracture at the right radial neck with unchanged position and alignment       Dr. Babin approved strengthening for right upper extremity.  Subjective     Pt states "it feels pretty good. The motion seems to be doing fine"    Pt reports: she was compliant with home exercise program given last session.  Response to previous treatment: increased activity  Functional change: able to perform light household activities    Pain: 0/10  Location: right elbow      Objective   Observation/Appearance:  Skin intact      Edema. Measured in centimeters.    Left Right Right Right Right    7/23/2020 7/23/2020 8/5/2020 8/25/2020 9/22/2020   2in. Above elbow 33.2 40 40 39 39   2in. Below elbow 27 28.9 28.9 28.9 28.8   Elbow Crease 27.2 30 28.8 28.8 28         Elbow/Wrist AROM  Date 7/23/2020 7/23/2020 8/5/2020 8/25/2020 9/22/2020     Left Right Right Right Right   Elbow Ext/Flex 0/140 15/130 0/135 0/140 WNL   Supination/Pronation 90/90 *0/90 45/90 60/90 65/90   Wrist ext/flex 80/70 *50/45 *60/55 *65/60 *65/65   Wrist RD/UD 25/30 15/30 25/30 25/30 WNL   *Pt reports that she had a previous wrist fracture 10 years ago and her range of motion " was limited in her wrist since then.     Sensation: c/o occasional night numbness in fingers      Strength (Dyanmometer) and Pinch Strength (Pinch Gauge)  Measured in pounds and psi. Average of three trials.    7/23/2020 7/23/2020 8/25/2020 8/25/2020 9/22/2020     Left Right Left Right Right   Rung II def def 45 28 46           Meryl received the following manual therapy techniques for 0 minutes:        Meryl received therapeutic exercises for 35 minutes including:    -2 lb elbow 3 ways 2 x 15 reps   -rotation bar with 2 plates x 30 reps  -hand gripper rung 4 x 30 reps  -green putty: 2' molding, 2' grasping   -wrist 3 ways with 2 lb 2 x 15 reps  -UBE x 5 min forward,  5 min reverse, level 3.5  -weight well with 1/2 lb x 5 reps  -red ball with rebounder x 30 reps     Meryl participated in dynamic functional therapeutic activities to improve functional performance for 0  minutes, including:  -NT     Home Exercises and Education Provided     Education provided:   - green putty  - Progress towards goals: Excellent    Written Home Exercises Provided: Patient instructed to cont prior HEP.  Exercises were reviewed and Meryl was able to demonstrate them prior to the end of the session.  Meryl demonstrated good  understanding of the education provided.     See EMR under Patient Instructions for exercises provided prior visit.     Meryl demonstrated good  understanding of the education provided.         Assessment   Meryl Johnson is a 64 y.o. female referred to outpatient occupational/hand therapy and presents with a medical diagnosis of left radial head fracture, resulting in pain, decreased range of motion and decreased strength in right arm. Advanced strengthening exercises with no difficulty.      Pt would continue to benefit from skilled OT.      Meryl is progressing well towards her goals and there are no updates to goals at this time. Pt prognosis is Good.     Pt will continue to benefit from skilled  outpatient occupational therapy to address the deficits listed in the problem list on initial evaluation provide pt/family education and to maximize pt's level of independence in the home and community environment.     Anticipated barriers to occupational therapy: DM    Pt's spiritual, cultural and educational needs considered and pt agreeable to plan of care and goals.    Goals:  Short Term (4 weeks on 8/24/2020):  1)   Patient to be IND with HEP and modalities for pain management. - Met 7/29/2020  2)   Increase ROM 5-10 degrees for elbow ext/flex  to increase functional hand use for feeding and dressing herself.-Met 8/5/2020  3)   Measure  in 2 weeks.-Met 8/25/2020  4)   Decrease edema .2-.3 cm to increase joint mobility /flexibility for improved overall functional hand use.- Met 9/22/2020        Long Term (by discharge):  1)   Pt will report 0 out of 10 pain with right arm use. - progressing  2)   Patient to score at CK on FOTO to demonstrate improved perception of functional right hand use.-progressing  3)   Pt will return to prior level of function for ADLs and household management. -progressing         Plan:    Certification Period/Plan of care expiration: 9/22/2020 to 11/22/2020.     Outpatient Occupational Therapy 1 time weekly for 8 weeks may include the following interventions: Manual therapy/joint mobilizations, Modalities for pain management, Therapeutic exercises/activities., Strengthening and Edema Control.    Discussed Plan of Care with patient: Yes  Updates/Grading for next session: Continue with strengthening limitations of elbow.      Pat Amin, OT

## 2020-09-30 DIAGNOSIS — E11.9 TYPE 2 DIABETES MELLITUS: ICD-10-CM

## 2020-10-06 ENCOUNTER — CLINICAL SUPPORT (OUTPATIENT)
Dept: REHABILITATION | Facility: HOSPITAL | Age: 65
End: 2020-10-06
Payer: OTHER MISCELLANEOUS

## 2020-10-06 DIAGNOSIS — M25.511 ACUTE PAIN OF RIGHT SHOULDER: ICD-10-CM

## 2020-10-06 DIAGNOSIS — M25.60 RANGE OF MOTION DEFICIT: ICD-10-CM

## 2020-10-06 PROCEDURE — 97140 MANUAL THERAPY 1/> REGIONS: CPT | Performed by: PHYSICAL THERAPIST

## 2020-10-06 PROCEDURE — 97110 THERAPEUTIC EXERCISES: CPT

## 2020-10-06 PROCEDURE — 97110 THERAPEUTIC EXERCISES: CPT | Performed by: PHYSICAL THERAPIST

## 2020-10-06 NOTE — PROGRESS NOTES
"                            Occupational Therapy Re-evaluation Note     Date: 10/6/2020  Name: Meryl Johnson  Clinic Number: [id number]    Therapy Diagnosis:   Encounter Diagnosis   Name Primary?    Range of motion deficit      Physician: Alexis Thurman MD     Physician Orders:  ROM  Medical Diagnosis: S52.124A (ICD-10-CM) - Closed nondisplaced fracture of head of right radius, initial encounter      Surgical Procedure and Date: N/A  Evaluation Date: 7/23/2020  Insurance: Generic Worker's Comp  Insurance Authorization period Expiration: 07/10/2021      Plan of Care Certification Period: 9/22/2020 to 11/22/2020     Visit # / Visits Authortized: 11/12  Time In: 8:30 am  Time Out: 9:10 am  Total Billable Time: 40 minutes     Precautions: Diabetes and standard     X-ray 9/28/2020: Impression:     Healing fracture at the right radial neck with unchanged position and alignment       Subjective     Pt states "it feels pretty good. The motion seems to be doing fine"    Pt reports: she was compliant with home exercise program given last session.  Response to previous treatment: increased activity  Functional change: able to perform light household activities    Pain: 0/10  Location: right elbow      Objective   Observation/Appearance:  Skin intact      Edema. Measured in centimeters.    Left Right Right Right Right    7/23/2020 7/23/2020 8/5/2020 8/25/2020 9/22/2020   2in. Above elbow 33.2 40 40 39 39   2in. Below elbow 27 28.9 28.9 28.9 28.8   Elbow Crease 27.2 30 28.8 28.8 28         Elbow/Wrist AROM  Date 7/23/2020 7/23/2020 8/5/2020 8/25/2020 9/22/2020     Left Right Right Right Right   Elbow Ext/Flex 0/140 15/130 0/135 0/140 WNL   Supination/Pronation 90/90 *0/90 45/90 60/90 65/90   Wrist ext/flex 80/70 *50/45 *60/55 *65/60 *65/65   Wrist RD/UD 25/30 15/30 25/30 25/30 WNL   *Pt reports that she had a previous wrist fracture 10 years ago and her range of motion was limited in her wrist since then.     Sensation: c/o " occasional night numbness in fingers      Strength (Dyanmometer) and Pinch Strength (Pinch Gauge)  Measured in pounds and psi. Average of three trials.    7/23/2020 7/23/2020 8/25/2020 8/25/2020 9/22/2020     Left Right Left Right Right   Rung II def def 45 28 46           Meryl received the following manual therapy techniques for 0 minutes:        Meryl received therapeutic exercises for 40 minutes including:    -3 lb elbow 3 ways 2 x 15 reps   -rotation bar with 2 plates x 30 reps  -hand gripper rung 4 x 30 reps  -green putty: 2' molding, 2' grasping (at home)  -red therabar frowns, smiles and twists x 30 reps  -wrist 3 ways with 3 lb 2 x 15 reps  -UBE x 5 min forward,  5 min reverse, level 4.0  -weight well with 1 1/2 lb x 5 reps  -red ball with rebounder x 30 reps     Meryl participated in dynamic functional therapeutic activities to improve functional performance for 0  minutes, including:  -NT     Home Exercises and Education Provided     Education provided:   - none today  - Progress towards goals: Excellent    Written Home Exercises Provided: Patient instructed to cont prior HEP.  Exercises were reviewed and Meryl was able to demonstrate them prior to the end of the session.  Meryl demonstrated good  understanding of the education provided.     See EMR under Patient Instructions for exercises provided prior visit.     Meryl demonstrated good  understanding of the education provided.         Assessment   Meryl Johnson is a 64 y.o. female referred to outpatient occupational/hand therapy and presents with a medical diagnosis of left radial head fracture, resulting in pain, decreased range of motion and decreased strength in right arm. Advanced strengthening exercises with no difficulty.      Pt would continue to benefit from skilled OT.      Meryl is progressing well towards her goals and there are no updates to goals at this time. Pt prognosis is Good.     Pt will continue to benefit from skilled  outpatient occupational therapy to address the deficits listed in the problem list on initial evaluation provide pt/family education and to maximize pt's level of independence in the home and community environment.     Anticipated barriers to occupational therapy: DM    Pt's spiritual, cultural and educational needs considered and pt agreeable to plan of care and goals.    Goals:  Short Term (4 weeks on 8/24/2020):  1)   Patient to be IND with HEP and modalities for pain management. - Met 7/29/2020  2)   Increase ROM 5-10 degrees for elbow ext/flex  to increase functional hand use for feeding and dressing herself.-Met 8/5/2020  3)   Measure  in 2 weeks.-Met 8/25/2020  4)   Decrease edema .2-.3 cm to increase joint mobility /flexibility for improved overall functional hand use.- Met 9/22/2020        Long Term (by discharge):  1)   Pt will report 0 out of 10 pain with right arm use. - progressing  2)   Patient to score at CK on FOTO to demonstrate improved perception of functional right hand use.-progressing  3)   Pt will return to prior level of function for ADLs and household management. -progressing         Plan: Measure next session.   Certification Period/Plan of care expiration: 9/22/2020 to 11/22/2020.     Outpatient Occupational Therapy 1 time weekly for 8 weeks may include the following interventions: Manual therapy/joint mobilizations, Modalities for pain management, Therapeutic exercises/activities., Strengthening and Edema Control.    Discussed Plan of Care with patient: Yes  Updates/Grading for next session: Continue with strengthening limitations of elbow.      Pat Amin, OT

## 2020-10-06 NOTE — PROGRESS NOTES
"  Physical Therapy Treatment Note     Name: Meryl Johnson  [facility] Number: [id number]    Therapy Diagnosis:   Encounter Diagnosis   Name Primary?    Acute pain of right shoulder      Physician: Radha Viera MD    Visit Date: 10/6/2020    Physician Orders: PT Eval and Treat   Medical Diagnosis from Referral: M75.21 (ICD-10-CM) - Biceps tendinitis of right upper extremity  Evaluation Date: 9/2/2020  Authorization Period Expiration: 11/20/2020  Plan of Care Expiration: 12/3/2020  Visit # / Visits authorized: 4/ 12    Time In: 0930  Time Out: 1005  Total Billable Time: 35 minutes    Precautions: Fall    Subjective     Pt reports: I was sore pointing towards her lat. She notes the shoulder feeling stronger just soreness  She was compliant with home exercise program.  Response to previous treatment: sore shoulder blade  Functional change: Improved motion - more hiking on L>R    Pain: 3/10  Location: right shoulder      Objective     Meryl received therapeutic exercises to develop strength, endurance and ROM for 25 minutes including:    Scaptions 2# 2 x 15  Cross body wall slides 2# 2 x 15  Wand behind her back LLLD stretch posterior capsule 30 sec 5x  No money GTB 3 x 12    NT  Shoulder extensions GTB 2 x 15  IR/ER GTB 20x ea  [place] and arrows GTB 20x  Tricep extension 20x  Sidelying flexion 2# 3 x 10  Serratus press cables 3# 2 x 10  [place] 4' scaptions  Scap retractions GTB 20x 3"    Meryl received the following manual therapy techniques: Joint mobilizations and Soft tissue Mobilization were applied to the: R shoulder for 10 minutes, including:    Inferior mobs arm abducted 20 deg and 90 deg  Posterior capsule stretch blocking scap  End range flexion with inferior humeral mob Gr III  IR with posterior mob Gr III    Meryl participated in neuromuscular re-education activities to improve: Sense, Proprioception and Posture for 0 minutes. The following activities were included:    Ball on wall CW/CCW 30x ea    NT  Serratus " slides yellow loop 3 x 10  Serratus wall clock red loop 2 x 20      Home Exercises Provided and Patient Education Provided     Education provided:   - Proper scap stability with overhead lifting    Written Home Exercises Provided: Patient instructed to cont prior HEP.  Exercises were reviewed and Meryl was able to demonstrate them prior to the end of the session.  Meryl demonstrated good  understanding of the education provided.     See EMR under Patient Instructions for exercises provided prior visit.    Assessment     Meryl was most limited into IR and progressed with stretching and strengthening within new ROM after manual and passive stretch. Strengthened hypermobile ER to restore stabilization to the scapula. Patient fatigued quickly as she completed OT before our treatment.       Meryl is progressing well towards her goals.   Pt prognosis is Good.     Pt will continue to benefit from skilled outpatient physical therapy to address the deficits listed in the problem list box on initial evaluation, provide pt/family education and to maximize pt's level of independence in the home and community environment.     Pt's spiritual, cultural and educational needs considered and pt agreeable to plan of care and goals.     Anticipated barriers to physical therapy: covid    GOALS: Short Term Goals:  4 weeks  1.Report decreased shoulder pain < / =  3/10  to increase tolerance for using RUE at work (Progressing, not met)  2. Increase PROM 160 passive flexion without pain in shoulder (Progressing, not met)  3. Increased strength by 1/3 MMT grade in right shoulder to increase tolerance for ADL and work activities.(Progressing, not met)  4. Pt to tolerate HEP to improve ROM and independence with ADL's(Progressing, not met)     Long Term Goals: 8 weeks  1.Report decreased shoulder pain  < / =  1 /10  to increase tolerance for lifting 5# overhead(Progressing, not met)  2.Increase AROM to full flexion without shoulder pain  (Progressing, not met)  3.Increase strength to >/= 4/5 in mid trap to increase tolerance for ADL and work activities.(Progressing, not met)  4. Pt goal: return to working without shoulder pain(Progressing, not met)  5. Pt will have improved gcode of CJ (20-40% limited) on FOTO shoulder in order to demonstrate true functional improvement.(Progressing, not met)       Plan     Plan of care Certification: 9/2/2020 to 12/3/2020.     Outpatient Physical Therapy 2 times weekly for 10 weeks to include the following interventions: Manual Therapy, Moist Heat/ Ice, Neuromuscular Re-ed, Patient Education, Self Care, Therapeutic Activites and Therapeutic Exercise.     Improve scap stabilizations    Fabricio Tolentino, PT

## 2020-10-07 ENCOUNTER — PATIENT MESSAGE (OUTPATIENT)
Dept: ADMINISTRATIVE | Facility: HOSPITAL | Age: 65
End: 2020-10-07

## 2020-10-13 ENCOUNTER — CLINICAL SUPPORT (OUTPATIENT)
Dept: REHABILITATION | Facility: HOSPITAL | Age: 65
End: 2020-10-13
Attending: INTERNAL MEDICINE
Payer: COMMERCIAL

## 2020-10-13 ENCOUNTER — CLINICAL SUPPORT (OUTPATIENT)
Dept: REHABILITATION | Facility: HOSPITAL | Age: 65
End: 2020-10-13
Payer: OTHER MISCELLANEOUS

## 2020-10-13 DIAGNOSIS — M25.511 ACUTE PAIN OF RIGHT SHOULDER: ICD-10-CM

## 2020-10-13 DIAGNOSIS — M25.60 RANGE OF MOTION DEFICIT: ICD-10-CM

## 2020-10-13 PROCEDURE — 97110 THERAPEUTIC EXERCISES: CPT

## 2020-10-13 PROCEDURE — 97140 MANUAL THERAPY 1/> REGIONS: CPT | Performed by: PHYSICAL THERAPIST

## 2020-10-13 PROCEDURE — 97110 THERAPEUTIC EXERCISES: CPT | Performed by: PHYSICAL THERAPIST

## 2020-10-13 NOTE — PROGRESS NOTES
"                            Occupational Therapy Re-evaluation Note     Date: 10/13/2020  Name: Meryl Johnson  Clinic Number: [id number]    Therapy Diagnosis:   Encounter Diagnosis   Name Primary?    Range of motion deficit      Physician: Alexis Thurman MD     Physician Orders:  ROM  Medical Diagnosis: S52.124A (ICD-10-CM) - Closed nondisplaced fracture of head of right radius, initial encounter      Surgical Procedure and Date: N/A  Evaluation Date: 7/23/2020  Insurance: Generic Worker's Comp  Insurance Authorization period Expiration: 07/10/2021      Plan of Care Certification Period: 9/22/2020 to 11/22/2020     Visit # / Visits Authortized: 12/12  Time In: 9:15 am  Time Out: 9:50 am  Total Billable Time: 35 minutes     Precautions: Diabetes and standard     X-ray 9/28/2020: Impression:     Healing fracture at the right radial neck with unchanged position and alignment       Subjective     Pt states "it feels pretty good. The motion seems to be doing fine"    Pt reports: she was compliant with home exercise program given last session.  Response to previous treatment: increased activity  Functional change: able to perform light household activities    Pain: 0/10  Location: right elbow      Objective   Observation/Appearance:  Skin intact      Edema. Measured in centimeters.    Left Right Right Right Right    7/23/2020 7/23/2020 8/5/2020 8/25/2020 9/22/2020   2in. Above elbow 33.2 40 40 39 39   2in. Below elbow 27 28.9 28.9 28.9 28.8   Elbow Crease 27.2 30 28.8 28.8 28         Elbow/Wrist AROM  Date 7/23/2020 7/23/2020 8/5/2020 8/25/2020 9/22/2020 10/13/2020     Left Right Right Right Right Right   Elbow Ext/Flex 0/140 15/130 0/135 0/140 WNL WNL   Supination/Pronation 90/90 *0/90 45/90 60/90 65/90 70/90   Wrist ext/flex 80/70 *50/45 *60/55 *65/60 *65/65 *65/65   Wrist RD/UD 25/30 15/30 25/30 25/30 WNL WNL   *Pt reports that she had a previous wrist fracture 10 years ago and her range of motion was limited in her " wrist since then.     Sensation: c/o occasional night numbness in fingers      Strength (Dyanmometer) and Pinch Strength (Pinch Gauge)  Measured in pounds and psi. Average of three trials.    7/23/2020 7/23/2020 8/25/2020 8/25/2020 9/22/2020 10/13/2020     Left Right Left Right Right Right   Rung II def def 45 28 46 51           Meryl received the following manual therapy techniques for 0 minutes:        Meryl received therapeutic exercises for 35 minutes including:    -4 lb elbow 3 ways 2 x 15 reps   -rotation bar with 2 plates x 30 reps  -hand gripper rung 4 x 30 reps  -green putty: 2' molding, 2' grasping (at home)  -green therabar frowns, smiles and twists x 30 reps  -wrist 3 ways with 3 lb 2 x 15 reps  -UBE x 5 min forward,  5 min reverse, level 4.0  -weight well with 1 1/2 lb x 5 reps  -red ball with rebounder x 30 reps     Meryl participated in dynamic functional therapeutic activities to improve functional performance for 0  minutes, including:  -NT     Home Exercises and Education Provided     Education provided:   - none today  - Progress towards goals: Excellent    Written Home Exercises Provided: Patient instructed to cont prior HEP.  Exercises were reviewed and Meryl was able to demonstrate them prior to the end of the session.  Meryl demonstrated good  understanding of the education provided.     See EMR under Patient Instructions for exercises provided prior visit.     Meryl demonstrated good  understanding of the education provided.         Assessment   Meryl Johnson is a 64 y.o. female referred to outpatient occupational/hand therapy and presents with a medical diagnosis of left radial head fracture, resulting in pain, decreased range of motion and decreased strength in right arm. Advanced strengthening exercises with no difficulty.      Pt would continue to benefit from skilled OT.      Meryl is progressing well towards her goals and there are no updates to goals at this time. Pt  prognosis is Good.     Pt will continue to benefit from skilled outpatient occupational therapy to address the deficits listed in the problem list on initial evaluation provide pt/family education and to maximize pt's level of independence in the home and community environment.     Anticipated barriers to occupational therapy: DM    Pt's spiritual, cultural and educational needs considered and pt agreeable to plan of care and goals.    Goals:  Short Term (4 weeks on 8/24/2020):  1)   Patient to be IND with HEP and modalities for pain management. - Met 7/29/2020  2)   Increase ROM 5-10 degrees for elbow ext/flex  to increase functional hand use for feeding and dressing herself.-Met 8/5/2020  3)   Measure  in 2 weeks.-Met 8/25/2020  4)   Decrease edema .2-.3 cm to increase joint mobility /flexibility for improved overall functional hand use.- Met 9/22/2020        Long Term (by discharge):  1)   Pt will report 0 out of 10 pain with right arm use. - progressing  2)   Patient to score at CK on FOTO to demonstrate improved perception of functional right hand use.-progressing  3)   Pt will return to prior level of function for ADLs and household management. -progressing         Plan:   Certification Period/Plan of care expiration: 9/22/2020 to 11/22/2020.     Outpatient Occupational Therapy 1 time weekly for 8 weeks may include the following interventions: Manual therapy/joint mobilizations, Modalities for pain management, Therapeutic exercises/activities., Strengthening and Edema Control.    Discussed Plan of Care with patient: Yes  Updates/Grading for next session: Continue with strengthening limitations of elbow.      Pat Amin, OT

## 2020-10-13 NOTE — PROGRESS NOTES
"  Physical Therapy Treatment Note     Name: Meryl Johnson  [facility] Number: [id number]    Therapy Diagnosis:   Encounter Diagnosis   Name Primary?    Acute pain of right shoulder      Physician: Radha Viera MD    Visit Date: 10/13/2020    Physician Orders: PT Eval and Treat   Medical Diagnosis from Referral: M75.21 (ICD-10-CM) - Biceps tendinitis of right upper extremity  Evaluation Date: 9/2/2020  Authorization Period Expiration: 11/20/2020  Plan of Care Expiration: 12/3/2020  Visit # / Visits authorized: 5/ 12    Time In: 1015  Time Out: 1054  Total Billable Time: 39 minutes    Precautions: Fall    Subjective     Pt reports: I made the mistake of picking up 30 lb box of food last week and now my shoulder and back are sore    She was compliant with home exercise program.  Response to previous treatment: sore shoulder blade  Functional change: Improved motion - more hiking on L>R    Pain: 3/10  Location: right shoulder      Objective     Meryl received therapeutic exercises to develop strength, endurance and ROM for 26 minutes including:    Sidelying ER 2# 2 x 15  Sidelying flexion and abduction 2# 2 x 15  Serratus press OTB 2 x 20  Lat stretch 2# supine 30x 3" stretch    NT  Scaptions 2# 2 x 15  Cross body wall slides 2# 2 x 15  Wand behind her back LLLD stretch posterior capsule 30 sec 5x  No money GTB 3 x 12   Shoulder extensions GTB 2 x 15  IR/ER GTB 20x ea  Neeses and arrows GTB 20x  Tricep extension 20x  Sidelying flexion 2# 3 x 10  Serratus press cables 3# 2 x 10  Pulley 4' scaptions  Scap retractions GTB 20x 3"    Meryl received the following manual therapy techniques: Joint mobilizations and Soft tissue Mobilization were applied to the: R shoulder for 8 minutes, including:    Inferior mobs arm abducted 20 deg and 90 deg  Posterior capsule stretch blocking scap  End range flexion with inferior humeral mob Gr III  IR with posterior mob Gr III    Meryl participated in neuromuscular re-education activities to " improve: Sense, Proprioception and Posture for 5 minutes. The following activities were included:    Ball on wall CW/CCW 30x ea    NT  Serratus slides yellow loop 3 x 10  Serratus wall clock red loop 2 x 20      Home Exercises Provided and Patient Education Provided     Education provided:   - Proper scap stability with overhead lifting    Written Home Exercises Provided: Patient instructed to cont prior HEP.  Exercises were reviewed and Meryl was able to demonstrate them prior to the end of the session.  Meryl demonstrated good  understanding of the education provided.     See EMR under Patient Instructions for exercises provided prior visit.    Assessment     Meryl sore to midline scapula and lats, relief from wand flexion. Took breaks with 2# ER and flexion/abduction due to fatigue from prior OT session. Continues to progress with serratus scap stabilization    Meryl is progressing well towards her goals.   Pt prognosis is Good.     Pt will continue to benefit from skilled outpatient physical therapy to address the deficits listed in the problem list box on initial evaluation, provide pt/family education and to maximize pt's level of independence in the home and community environment.     Pt's spiritual, cultural and educational needs considered and pt agreeable to plan of care and goals.     Anticipated barriers to physical therapy: covid    GOALS: Short Term Goals:  4 weeks  1.Report decreased shoulder pain < / =  3/10  to increase tolerance for using RUE at work (Progressing, not met)  2. Increase PROM 160 passive flexion without pain in shoulder (Progressing, not met)  3. Increased strength by 1/3 MMT grade in right shoulder to increase tolerance for ADL and work activities.(Progressing, not met)  4. Pt to tolerate HEP to improve ROM and independence with ADL's(Progressing, not met)     Long Term Goals: 8 weeks  1.Report decreased shoulder pain  < / =  1 /10  to increase tolerance for lifting 5#  overhead(Progressing, not met)  2.Increase AROM to full flexion without shoulder pain (Progressing, not met)  3.Increase strength to >/= 4/5 in mid trap to increase tolerance for ADL and work activities.(Progressing, not met)  4. Pt goal: return to working without shoulder pain(Progressing, not met)  5. Pt will have improved gcode of CJ (20-40% limited) on FOTO shoulder in order to demonstrate true functional improvement.(Progressing, not met)       Plan     Plan of care Certification: 9/2/2020 to 12/3/2020.     Outpatient Physical Therapy 2 times weekly for 10 weeks to include the following interventions: Manual Therapy, Moist Heat/ Ice, Neuromuscular Re-ed, Patient Education, Self Care, Therapeutic Activites and Therapeutic Exercise.     Improve scap stabilizations    Fabricio Tolentino, PT

## 2020-10-19 ENCOUNTER — OFFICE VISIT (OUTPATIENT)
Dept: OPTOMETRY | Facility: CLINIC | Age: 65
End: 2020-10-19
Payer: COMMERCIAL

## 2020-10-19 DIAGNOSIS — H52.221 REGULAR ASTIGMATISM, RIGHT EYE: ICD-10-CM

## 2020-10-19 DIAGNOSIS — H52.12 MYOPIA OF LEFT EYE: ICD-10-CM

## 2020-10-19 DIAGNOSIS — E11.9 DIABETIC EYE EXAM: Primary | ICD-10-CM

## 2020-10-19 DIAGNOSIS — Z13.5 SCREENING FOR EYE CONDITION: ICD-10-CM

## 2020-10-19 DIAGNOSIS — H52.4 PRESBYOPIA OF BOTH EYES: ICD-10-CM

## 2020-10-19 DIAGNOSIS — E11.9 TYPE 2 DIABETES MELLITUS WITHOUT RETINOPATHY: ICD-10-CM

## 2020-10-19 DIAGNOSIS — I10 HYPERTENSION, UNSPECIFIED TYPE: ICD-10-CM

## 2020-10-19 DIAGNOSIS — Z01.00 DIABETIC EYE EXAM: Primary | ICD-10-CM

## 2020-10-19 PROCEDURE — 92015 PR REFRACTION: ICD-10-PCS | Mod: S$GLB,,, | Performed by: OPTOMETRIST

## 2020-10-19 PROCEDURE — 92015 DETERMINE REFRACTIVE STATE: CPT | Mod: S$GLB,,, | Performed by: OPTOMETRIST

## 2020-10-19 PROCEDURE — 99999 PR PBB SHADOW E&M-EST. PATIENT-LVL III: CPT | Mod: PBBFAC,,, | Performed by: OPTOMETRIST

## 2020-10-19 PROCEDURE — 92014 COMPRE OPH EXAM EST PT 1/>: CPT | Mod: S$GLB,,, | Performed by: OPTOMETRIST

## 2020-10-19 PROCEDURE — 92014 PR EYE EXAM, EST PATIENT,COMPREHESV: ICD-10-PCS | Mod: S$GLB,,, | Performed by: OPTOMETRIST

## 2020-10-19 PROCEDURE — 99999 PR PBB SHADOW E&M-EST. PATIENT-LVL III: ICD-10-PCS | Mod: PBBFAC,,, | Performed by: OPTOMETRIST

## 2020-10-19 NOTE — PROGRESS NOTES
HPI     Diabetic Eye Exam      Additional comments: Overdue diabetic eye examination and refraction.  Saw PCP regarding problem with nasal RLL,  he prescribed medication (of   some sort), but was not available.  Was advised to do lid scrubs, and   problem resolved.   Overall changes in VA              Comments     Patient's age: 65 y.o. WF  Occupation: [employer]  Approximate date of last eye examination:  12/12/2018  Name of last eye doctor seen:   City/State: [facility]  Wears glasses? Yes      If yes, wears  Full-time or part-time?  Part time  Present glasses are: Bifocal, SV Distance, SV Reading?  Bifocals  Approximate age of present glasses:  2 + yrs old  Got new glasses following last exam, or subsequently?:  Yes    Any problem with VA with glasses?  Overall changes    Wears CLs?:  No   Headaches?  No   Eye pain/discomfort?  No                                                                                      Flashes?  No   Floaters?  No   Diplopia/Double vision?  No   Patient's Ocular History:         Any eye surgeries? No          Any eye injury?  No          Any treatment for eye disease?  No   Family history of eye disease?  Sister + Diabetic   Significant patient medical history:         1. Diabetes?  Yes, pt did not check BS this morning       If yes, IDDM or NIDDM?  NIDDM    2. HBP?  Yes, controlled with medication               3. Other (describe):  No    ! OTC eyedrops currently using:  No    ! Prescription eye meds currently using:  No    ! Any history of allergy/adverse reaction to any eye meds used   previously?  No     ! Any history of allergy/adverse reaction to eyedrops used during prior   eye exam(s)? No     ! Any history of allergy/adverse reaction to Novacaine or similar meds?   No    ! Any history of allergy/adverse reaction to Epinephrine or similar meds?   No     ! Patient okay with use of anesthetic eyedrops to check eye pressure?    Yes         ! Patient okay with use of eyedrops to  "dilate pupils today?  Yes    !  Allergies/Medications/Medical History/Family History reviewed today?    Yes       PD =   67/63  Desired reading distance =  16.75"                                                                    Last edited by Charles Harris, OD on 10/19/2020  4:10 PM. (History)            Assessment /Plan     For exam results, see Encounter Report.    1. Diabetic eye exam     2. Type 2 diabetes mellitus without retinopathy     3. Hypertension, unspecified type     4. Screening for eye condition     5. Regular astigmatism, right eye     6. Myopia of left eye     7. Presbyopia of both eyes                    Early nuclear sclerosis of lens in both eyes, and early peripheral cortical cataract in both eyes.    Monitor only.  Otherwise, good ocular health in each eye.  No evidence of of diabetic or hypertensive retinal changes in either eye.  Slight simple hyperopic astigmatism in the right  eye, and slght myopia in the left eye.  Presbyopia consistent with age.  New spectacle lens Rx(s) issued for use as desired.  Recheck in 12 - 18 months.         "

## 2020-10-19 NOTE — PATIENT INSTRUCTIONS
Early nuclear sclerosis of lens in both eyes, and early peripheral cortical cataract in both eyes.    Monitor only.  Otherwise, good ocular health in each eye.  No evidence of of diabetic or hypertensive retinal changes in either eye.  Slight simple hyperopic astigmatism in the right  eye, and slght myopia in the left eye.  Presbyopia consistent with age.  New spectacle lens Rx(s) issued for use as desired.  Recheck in 12 - 18 months.

## 2020-10-20 ENCOUNTER — OFFICE VISIT (OUTPATIENT)
Dept: OBSTETRICS AND GYNECOLOGY | Facility: CLINIC | Age: 65
End: 2020-10-20
Payer: COMMERCIAL

## 2020-10-20 DIAGNOSIS — Z01.419 VISIT FOR GYNECOLOGIC EXAMINATION: Primary | ICD-10-CM

## 2020-10-20 DIAGNOSIS — N95.9 MENOPAUSAL AND PERIMENOPAUSAL DISORDER: ICD-10-CM

## 2020-10-20 PROCEDURE — 99999 PR PBB SHADOW E&M-EST. PATIENT-LVL II: CPT | Mod: PBBFAC,,, | Performed by: OBSTETRICS & GYNECOLOGY

## 2020-10-20 PROCEDURE — 99999 PR PBB SHADOW E&M-EST. PATIENT-LVL II: ICD-10-PCS | Mod: PBBFAC,,, | Performed by: OBSTETRICS & GYNECOLOGY

## 2020-10-20 PROCEDURE — 99397 PR PREVENTIVE VISIT,EST,65 & OVER: ICD-10-PCS | Mod: S$GLB,,, | Performed by: OBSTETRICS & GYNECOLOGY

## 2020-10-20 PROCEDURE — 99397 PER PM REEVAL EST PAT 65+ YR: CPT | Mod: S$GLB,,, | Performed by: OBSTETRICS & GYNECOLOGY

## 2020-10-20 PROCEDURE — 87624 HPV HI-RISK TYP POOLED RSLT: CPT

## 2020-10-20 PROCEDURE — 88175 CYTOPATH C/V AUTO FLUID REDO: CPT

## 2020-10-20 NOTE — PROGRESS NOTES
HISTORY OF PRESENT ILLNESS:    Meryl Johnson is a 65 y.o. female , presents for a routine exam and has no complaints.  COTEST.  MAMMO HAS BEEN ORDERED, REF FOR BMD.  HAD FALLEN AND BROKEN ELBOW THIS SUMMER.  GAVE UP SAINTS TIX THIS YEAR TO AVOID BEING IN SUPERDOME WITH A CROWD . .     LAST :   RECENT NORMAL MAMMO AND PAP DUE .    HAS JUST MOVED HER OFFICE TO THE [facility].  Exam is limited by body habitus.  LAST VISIT :  PAP DUE AND MAMMO NEEDED, REFERRED.  REF MASELLI - SISTER  OF SOME TYPE OF CA AND PT CONCERNED BUT ADVISED THAT FIRST DEGREE POSTMENOPAUSAL RELATIVE WITH CA IS NOT LIKELY ASSOCIATED WITH GYN CANCER SYNDROME.  NOT ASHKENAZI Religion.  STILL WORKING [employer]  LAST VISIT :  FOR ROUTINE VISIT. HAS JUST CELEBRATED HER  YEAR AT [employer] AND DISCUSSED  BEING WORLD'S BIGGEST SAINTS FAN. DUE MAMMO  - ORDERED AND REF FOR BMD.  MENOPAUSAL FOR MANY YEARS AND NO C/O OF MENOPAUSAL SX    Past Medical History:   Diagnosis Date    Anemia     Diabetes mellitus type II     H. pylori infection     Hyperlipidemia     Hypertension     Unspecified vitamin D deficiency 2013       Past Surgical History:   Procedure Laterality Date    ESOPHAGOGASTRODUODENOSCOPY N/A 2018    Procedure: EGD (ESOPHAGOGASTRODUODENOSCOPY);  Surgeon: Darius Gerardo MD;  Location: [address]);  Service: Endoscopy;  Laterality: N/A;    GASTRIC BYPASS          MEDICATIONS AND ALLERGIES:      Current Outpatient Medications:     atorvastatin (LIPITOR) 40 MG tablet, TAKE ONE TABLET BY MOUTH EVERY DAY, Disp: 30 tablet, Rfl: 5    benazepriL (LOTENSIN) 40 MG tablet, TAKE ONE TABLET BY MOUTH ONCE DAILY, Disp: 30 tablet, Rfl: 11    blood sugar diagnostic (BLOOD GLUCOSE TEST) Strp, 1 strip by Misc.(Non-Drug; Combo Route) route 2 (two) times daily before meals. For mikki contour, Disp: 200 strip, Rfl: 3    chlorthalidone (HYGROTEN) 25 MG Tab, TAKE ONE TABLET BY MOUTH ONCE DAILY, Disp: 30  tablet, Rfl: 11    lancets (ONE TOUCH DELICA LANCETS) 33 gauge Misc, 1 lancet by Misc.(Non-Drug; Combo Route) route 2 (two) times daily., Disp: 60 each, Rfl: 11    metFORMIN (GLUCOPHAGE) 500 MG tablet, TAKE 2 TABLETS BY MOUTH TWO TIMES A DAY WITH MEALS, Disp: 360 tablet, Rfl: 3    Review of patient's allergies indicates:   Allergen Reactions    Codeine Nausea Only    Vicodin [hydrocodone-acetaminophen] Nausea Only       Family History   Problem Relation Age of Onset    COPD Mother     Heart disease Mother     Cancer Father         liver    Heart disease Father     Cancer Sister     Diabetes Sister     Hyperlipidemia Sister     Hypothyroidism Sister     No Known Problems Brother     No Known Problems Maternal Aunt     No Known Problems Maternal Uncle     No Known Problems Paternal Aunt     No Known Problems Paternal Uncle     No Known Problems Maternal Grandmother     No Known Problems Maternal Grandfather     No Known Problems Paternal Grandmother     No Known Problems Paternal Grandfather     Amblyopia Neg Hx     Blindness Neg Hx     Cataracts Neg Hx     Glaucoma Neg Hx     Hypertension Neg Hx     Macular degeneration Neg Hx     Retinal detachment Neg Hx     Strabismus Neg Hx     Stroke Neg Hx     Thyroid disease Neg Hx     Breast cancer Neg Hx     Colon cancer Neg Hx     Ovarian cancer Neg Hx     Stomach cancer Neg Hx     Rectal cancer Neg Hx        Social History     Socioeconomic History    Marital status: Single     Spouse name: Not on file    Number of children: Not on file    Years of education: Not on file    Highest education level: Not on file   Occupational History     Employer: [employer]   Social Needs    Financial resource strain: Not on file    Food insecurity     Worry: Not on file     Inability: Not on file    Transportation needs     Medical: Not on file     Non-medical: Not on file   Tobacco Use    Smoking status: Never Smoker     Smokeless tobacco: Never Used   Substance and Sexual Activity    Alcohol use: No    Drug use: No    Sexual activity: Not on file   Lifestyle    Physical activity     Days per week: Not on file     Minutes per session: Not on file    Stress: Not on file   Relationships    Social connections     Talks on phone: Not on file     Gets together: Not on file     Attends Anabaptist service: Not on file     Active member of club or organization: Not on file     Attends meetings of clubs or organizations: Not on file     Relationship status: Not on file   Other Topics Concern    Not on file   Social History Narrative    Not on file       COMPREHENSIVE GYN HISTORY:  PAP History:  Denies abnormal Paps except a noted above.  Infection History: Denies STDs. Denies PID.  Benign History: Denies uterine fibroids. Denies ovarian cysts. Denies endometriosis.  Denies other conditions.  Cancer History: Denies cervical cancer. Denies uterine cancer or hyperplasia. Denies ovarian cancer. Denies vulvar cancer or pre-cancer. Denies vaginal cancer or pre-cancer. Denies breast cancer. Denies colon cancer.    ROS:  GENERAL: No weight changes. No swelling. No fatigue. No fever.  CARDIOVASCULAR: No chest pain. No shortness of breath. No leg cramps.   NEUROLOGICAL: No headaches. No vision changes.  BREASTS: No pain. No lumps. No discharge.  ABDOMEN: No pain. No nausea. No vomiting. No diarrhea. No constipation.  REPRODUCTIVE: No abnormal bleeding.   VULVA: No pain. No lesions. No itching.  VAGINA: No relaxation. No itching. No odor. No discharge. No lesions.  URINARY: No incontinence. No nocturia. No frequency. No dysuria.    There were no vitals taken for this visit.    PE:  APPEARANCE: Well nourished, well developed, in no acute distress.  AFFECT: WNL, alert and oriented x 3.  SKIN: No hirsutism or acne.  NECK: Neck symmetric without masses or thyromegaly.  NODES: No inguinal, cervical, axillary or femoral lymph node enlargement.  CHEST:  Good respiratory effort.   ABDOMEN: Soft. No tenderness or masses. No hepatosplenomegaly. No hernias.  BREASTS: Symmetrical, no skin changes or visible lesions. No palpable masses, nipple discharge bilaterally.  PELVIC: ATROPHIC EXTERNAL FEMALE GENITALIA without lesions. Normal hair distribution. Adequate perineal body, normal urethral meatus. VAGINA DRY without lesions or discharge. CERVIX STENOTIC without lesions, discharge or tenderness. No significant cystocele or rectocele. Bimanual exam shows uterus to be normal size, regular, mobile and nontender. Adnexa without masses or tenderness.  EXTREMITIES: No edema.    PROCEDURES:  Pap    DIAGNOSIS:  1. Visit for gynecologic examination  Liquid-Based Pap Smear, Screening    HPV High Risk Genotypes, PCR    CANCELED: Liquid-Based Pap Smear, Screening    CANCELED: HPV High Risk Genotypes, PCR   2. Menopausal and perimenopausal disorder  DXA Bone Density Spine And Hip       PLAN:    LABS AND TESTS ORDERED:  Mammogram    COUNSELING:  The patient was counseled today on osteoporosis prevention, calcium supplementation, and regular weight bearing exercise. The patient was also counseled today on ACS PAP guidelines, with recommendations for yearly pelvic exams unless their uterus, cervix, and ovaries were removed for benign reasons; in that case, examinations every 3-5 years are recommended.  The patient was also counseled regarding monthly breast self-examination, routine STD screening for at-risk populations, prophylactic immunizations for transmitted infections such as  HPV, Pertussis, or Influenza as appropriate, and yearly mammograms when indicated by ACS guidelines.  She was advised to see her primary care physician for all other health maintenance.    FOLLOW-UP with me annually.

## 2020-10-22 ENCOUNTER — CLINICAL SUPPORT (OUTPATIENT)
Dept: REHABILITATION | Facility: HOSPITAL | Age: 65
End: 2020-10-22
Payer: OTHER MISCELLANEOUS

## 2020-10-22 DIAGNOSIS — M25.511 ACUTE PAIN OF RIGHT SHOULDER: ICD-10-CM

## 2020-10-22 DIAGNOSIS — M25.60 RANGE OF MOTION DEFICIT: ICD-10-CM

## 2020-10-22 PROCEDURE — 97110 THERAPEUTIC EXERCISES: CPT

## 2020-10-22 PROCEDURE — 97110 THERAPEUTIC EXERCISES: CPT | Performed by: PHYSICAL THERAPIST

## 2020-10-22 PROCEDURE — 97140 MANUAL THERAPY 1/> REGIONS: CPT | Performed by: PHYSICAL THERAPIST

## 2020-10-22 NOTE — PROGRESS NOTES
"  Physical Therapy Treatment Note     Name: Meryl Johnson  [facility] Number: [id number]    Therapy Diagnosis:   Encounter Diagnosis   Name Primary?    Acute pain of right shoulder      Physician: Radha Viera MD    Visit Date: 10/22/2020    Physician Orders: PT Eval and Treat   Medical Diagnosis from Referral: M75.21 (ICD-10-CM) - Biceps tendinitis of right upper extremity  Evaluation Date: 9/2/2020  Authorization Period Expiration: 11/20/2020  Plan of Care Expiration: 12/3/2020  Visit # / Visits authorized: 6/ 12    Time In: 0930  Time Out: 1015  Total Billable Time: 45 minutes    Precautions: Fall    Subjective     Pt reports: I am feeling better this week. Shoulder is just sore bilaterally in my back after a long day of work but improves when I relax at home    She was compliant with home exercise program.  Response to previous treatment: sore shoulder blade  Functional change: Improved motion - more hiking on L>R    Pain: 3/10  Location: right shoulder      Objective     Meryl received therapeutic exercises to develop strength, endurance and ROM for 30 minutes including:    Sidelying ER 2# 2 x 15  Row with ER GTB 2 x 15  [place] and arrow GTB 2 x 15  Scaptions 2# 2 x 15    NT  Sidelying flexion and abduction 2# 2 x 15  Serratus press OTB 2 x 20  Lat stretch 2# supine 30x 3" stretch  Cross body wall slides 2# 2 x 15  Wand behind her back LLLD stretch posterior capsule 30 sec 5x  No money GTB 3 x 12   Shoulder extensions GTB 2 x 15  IR/ER GTB 20x ea  [place] and arrows GTB 20x  Tricep extension 20x  Sidelying flexion 2# 3 x 10  Serratus press cables 3# 2 x 10  [place]ley 4' scaptions  Scap retractions GTB 20x 3"    Meryl received the following manual therapy techniques: Joint mobilizations and Soft tissue Mobilization were applied to the: R shoulder for 15 minutes, including:    Inferior mobs arm abducted 20 deg and 90 deg  Posterior capsule stretch blocking scap  End range flexion with inferior humeral mob Gr III  IR with " posterior mob Gr III  Supine resisted PNF D1    Meryl participated in neuromuscular re-education activities to improve: Sense, Proprioception and Posture for  minutes. The following activities were included:    Ball on wall CW/CCW 30x ea    NT  Serratus slides yellow loop 3 x 10  Serratus wall clock red loop 2 x 20      Home Exercises Provided and Patient Education Provided     Education provided:   - Proper scap stability with overhead lifting    Written Home Exercises Provided: Patient instructed to cont prior HEP.  Exercises were reviewed and Meryl was able to demonstrate them prior to the end of the session.  Meryl demonstrated good  understanding of the education provided.     See EMR under Patient Instructions for exercises provided prior visit.    Assessment     Meryl with improved endurance to the right shoulder    Meryl is progressing well towards her goals.   Pt prognosis is Good.     Pt will continue to benefit from skilled outpatient physical therapy to address the deficits listed in the problem list box on initial evaluation, provide pt/family education and to maximize pt's level of independence in the home and community environment.     Pt's spiritual, cultural and educational needs considered and pt agreeable to plan of care and goals.     Anticipated barriers to physical therapy: covid    GOALS: Short Term Goals:  4 weeks  1.Report decreased shoulder pain < / =  3/10  to increase tolerance for using RUE at work (Progressing, not met)  2. Increase PROM 160 passive flexion without pain in shoulder (Progressing, not met)  3. Increased strength by 1/3 MMT grade in right shoulder to increase tolerance for ADL and work activities.(Progressing, not met)  4. Pt to tolerate HEP to improve ROM and independence with ADL's(Progressing, not met)     Long Term Goals: 8 weeks  1.Report decreased shoulder pain  < / =  1 /10  to increase tolerance for lifting 5# overhead(Progressing, not met)  2.Increase AROM to  full flexion without shoulder pain (Progressing, not met)  3.Increase strength to >/= 4/5 in mid trap to increase tolerance for ADL and work activities.(Progressing, not met)  4. Pt goal: return to working without shoulder pain(Progressing, not met)  5. Pt will have improved gcode of CJ (20-40% limited) on FOTO shoulder in order to demonstrate true functional improvement.(Progressing, not met)       Plan     Plan of care Certification: 9/2/2020 to 12/3/2020.     Outpatient Physical Therapy 2 times weekly for 10 weeks to include the following interventions: Manual Therapy, Moist Heat/ Ice, Neuromuscular Re-ed, Patient Education, Self Care, Therapeutic Activites and Therapeutic Exercise.     Improve scap stabilizations    Fabricio Tolentino, PT

## 2020-10-22 NOTE — PROGRESS NOTES
Occupational Therapy Daily Treament Note     Date: 10/22/2020  Name: Meryl Johnson  Clinic Number: [id number]    Therapy Diagnosis:   Encounter Diagnosis   Name Primary?    Range of motion deficit      Physician: Radha Viera MD     Physician Orders:  ROM  Medical Diagnosis: S52.124A (ICD-10-CM) - Closed nondisplaced fracture of head of right radius, initial encounter      Surgical Procedure and Date: N/A  Evaluation Date: 7/23/2020  Insurance: Generic Worker's Comp  Insurance Authorization period Expiration: 07/10/2021      Plan of Care Certification Period: 9/22/2020 to 11/22/2020     Visit # / Visits Authortized: 1/1  Visit #: 13  Time In: 8:30 am  Time Out: 9:05 am  Total Billable Time: 35  minutes     Precautions: Diabetes and standard     X-ray 9/28/2020: Impression:     Healing fracture at the right radial neck with unchanged position and alignment       Subjective     Pt states: Pt reports no pain in R arm and that it feels good.     Pt reports: she was compliant with home exercise program given last session.  Response to previous treatment: increased activity  Functional change: able to perform light household activities    Pain: 0/10  Location: right elbow      Objective   Observation/Appearance:  Skin intact      Edema. Measured in centimeters.    Left Right Right Right Right    7/23/2020 7/23/2020 8/5/2020 8/25/2020 9/22/2020   2in. Above elbow 33.2 40 40 39 39   2in. Below elbow 27 28.9 28.9 28.9 28.8   Elbow Crease 27.2 30 28.8 28.8 28         Elbow/Wrist AROM  Date 7/23/2020 7/23/2020 8/5/2020 8/25/2020 9/22/2020 10/13/2020     Left Right Right Right Right Right   Elbow Ext/Flex 0/140 15/130 0/135 0/140 WNL WNL   Supination/Pronation 90/90 *0/90 45/90 60/90 65/90 70/90   Wrist ext/flex 80/70 *50/45 *60/55 *65/60 *65/65 *65/65   Wrist RD/UD 25/30 15/30 25/30 25/30 WNL WNL   *Pt reports that she had a previous wrist fracture 10 years ago and her range of motion was limited  in her wrist since then.     Sensation: c/o occasional night numbness in fingers      Strength (Dyanmometer) and Pinch Strength (Pinch Gauge)  Measured in pounds and psi. Average of three trials.    7/23/2020 7/23/2020 8/25/2020 8/25/2020 9/22/2020 10/13/2020     Left Right Left Right Right Right   Rung II def def 45 28 46 51           Meryl received the following manual therapy techniques for 0 minutes:        Meryl received therapeutic exercises for 35 minutes including:    -5 lb elbow 3 ways 2 x 15 reps   -rotation bar with 3 plates x 30 reps  -hand gripper rung 4, x 30 reps  -green putty: 2' molding, 2' grasping (at home)  -green therabar frowns, smiles and twists x 30 reps  -wrist 3 ways with 4 lb 2 x 15 reps  -UBE x 5 min forward,  5 min reverse, level 4.5  -weight well with 1 lb x 5 reps  -red ball with rebounder x 30 reps     Meryl participated in dynamic functional therapeutic activities to improve functional performance for 0  minutes, including:  -NT     Home Exercises and Education Provided     Education provided:   - none today  - Progress towards goals: Excellent    Written Home Exercises Provided: Patient instructed to cont prior HEP.  Exercises were reviewed and Meryl was able to demonstrate them prior to the end of the session.  Meryl demonstrated good  understanding of the education provided.     See EMR under Patient Instructions for exercises provided prior visit.     Meryl demonstrated good  understanding of the education provided.         Assessment   Meryl Johnson is a 64 y.o. female referred to outpatient occupational/hand therapy and presents with a medical diagnosis of left radial head fracture, resulting in pain, decreased range of motion and decreased strength in right arm. Advanced strengthening exercises with no difficulty.  Pt increased to level 4.5 on UBE, increased to 3 plates on rotation bar, 4# wrist 3 ways, 5# elbow 3 ways, 1# on weight well. Pt tolerated progression  "of strengthening well this day, she is motivated and participates well.     Pt would continue to benefit from skilled OT.      Meryl is progressing well towards her goals and there are no updates to goals at this time. Pt prognosis is Good.     Pt will continue to benefit from skilled outpatient occupational therapy to address the deficits listed in the problem list on initial evaluation provide pt/family education and to maximize pt's level of independence in the home and community environment.     Anticipated barriers to occupational therapy: DM    Pt's spiritual, cultural and educational needs considered and pt agreeable to plan of care and goals.    Goals:  Short Term (4 weeks on 8/24/2020):  1)   Patient to be IND with HEP and modalities for pain management. - Met 7/29/2020  2)   Increase ROM 5-10 degrees for elbow ext/flex  to increase functional hand use for feeding and dressing herself.-Met 8/5/2020  3)   Measure  in 2 weeks.-Met 8/25/2020  4)   Decrease edema .2-.3 cm to increase joint mobility /flexibility for improved overall functional hand use.- Met 9/22/2020        Long Term (by discharge):  1)   Pt will report 0 out of 10 pain with right arm use. - 10/22/2020  2)   Patient to score at CK on FOTO to demonstrate improved perception of functional right hand use.-progressing  3)   Pt will return to prior level of function for ADLs and household management. -progressing         Plan:   Certification Period/Plan of care expiration: 9/22/2020 to 11/22/2020.     Outpatient Occupational Therapy 1 time weekly for 8 weeks may include the following interventions: Manual therapy/joint mobilizations, Modalities for pain management, Therapeutic exercises/activities., Strengthening and Edema Control.    Discussed Plan of Care with patient: Yes  Updates/Grading for next session: Continue with strengthening limitations of elbow.    Student: Joanie Montoya, ELENO    " I certify that I was present in the room " "directing the student in service delivery and guiding them using my skilled judgement. As the co-signing therapist, I have reviewed the student's documentation and am responsible for the treatment, assessment and plan."    Therapist: DILLON Ignacio, GRAZYNA Amin, OT     "

## 2020-10-26 ENCOUNTER — PATIENT MESSAGE (OUTPATIENT)
Dept: REHABILITATION | Facility: HOSPITAL | Age: 65
End: 2020-10-26

## 2020-10-27 ENCOUNTER — CLINICAL SUPPORT (OUTPATIENT)
Dept: REHABILITATION | Facility: HOSPITAL | Age: 65
End: 2020-10-27
Payer: OTHER MISCELLANEOUS

## 2020-10-27 ENCOUNTER — PATIENT MESSAGE (OUTPATIENT)
Dept: INTERNAL MEDICINE | Facility: CLINIC | Age: 65
End: 2020-10-27

## 2020-10-27 DIAGNOSIS — E11.40 TYPE 2 DIABETES MELLITUS WITH DIABETIC NEUROPATHY, WITHOUT LONG-TERM CURRENT USE OF INSULIN: Primary | ICD-10-CM

## 2020-10-27 DIAGNOSIS — I10 ESSENTIAL HYPERTENSION: ICD-10-CM

## 2020-10-27 DIAGNOSIS — M25.511 ACUTE PAIN OF RIGHT SHOULDER: ICD-10-CM

## 2020-10-27 DIAGNOSIS — E78.5 HYPERLIPIDEMIA, UNSPECIFIED HYPERLIPIDEMIA TYPE: ICD-10-CM

## 2020-10-27 PROCEDURE — 97110 THERAPEUTIC EXERCISES: CPT | Performed by: PHYSICAL THERAPIST

## 2020-10-27 PROCEDURE — 97140 MANUAL THERAPY 1/> REGIONS: CPT | Performed by: PHYSICAL THERAPIST

## 2020-10-27 NOTE — PROGRESS NOTES
"  Physical Therapy Treatment Note     Name: Meryl Johnson  [facility] Number: [id number]    Therapy Diagnosis:   Encounter Diagnosis   Name Primary?    Acute pain of right shoulder      Physician: Radha Viera MD    Visit Date: 10/27/2020    Physician Orders: PT Eval and Treat   Medical Diagnosis from Referral: M75.21 (ICD-10-CM) - Biceps tendinitis of right upper extremity  Evaluation Date: 9/2/2020  Authorization Period Expiration: 11/20/2020  Plan of Care Expiration: 12/3/2020  Visit # / Visits authorized: 7/ 12    Time In: 0930  Time Out: 1015  Total Billable Time: 45 minutes    Precautions: Fall    Subjective     Pt reports: My shoulder feel good, no OT as I await approval.     She was compliant with home exercise program.  Response to previous treatment: sore shoulder blade  Functional change: Improved motion - more hiking on L>R    Pain: 3/10  Location: right shoulder      Objective     Meryl received therapeutic exercises to develop strength, endurance and ROM for 30 minutes including:    UBE 4'/4' level 4.5   Sidelying ER 2# 2 x 15  Prone row with ER 2# 30x  Overhead arm in front 90/90 GTB step outs 30x ea    NT  Row with ER GTB 2 x 15  [place] and arrow GTB 2 x 15  Scaptions 2# 2 x 15  Sidelying flexion and abduction 2# 2 x 15  Serratus press OTB 2 x 20  Lat stretch 2# supine 30x 3" stretch  Cross body wall slides 2# 2 x 15  Wand behind her back LLLD stretch posterior capsule 30 sec 5x  No money GTB 3 x 12   Shoulder extensions GTB 2 x 15  IR/ER GTB 20x ea  [place] and arrows GTB 20x  Tricep extension 20x  Sidelying flexion 2# 3 x 10  Serratus press cables 3# 2 x 10  [place]ley 4' scaptions  Scap retractions GTB 20x 3"    Meryl received the following manual therapy techniques: Joint mobilizations and Soft tissue Mobilization were applied to the: R shoulder for 15 minutes, including:    Inferior mobs arm abducted 20 deg and 90 deg  Posterior capsule stretch blocking scap  End range flexion with inferior humeral mob Gr " III  IR with posterior mob Gr III  Supine resisted PNF D1    Meryl participated in neuromuscular re-education activities to improve: Sense, Proprioception and Posture for 0  minutes. The following activities were included:    Ball on wall CW/CCW 30x ea    NT  Serratus slides yellow loop 3 x 10  Serratus wall clock red loop 2 x 20      Home Exercises Provided and Patient Education Provided     Education provided:   - Proper scap stability with overhead lifting    Written Home Exercises Provided: Patient instructed to cont prior HEP.  Exercises were reviewed and Meryl was able to demonstrate them prior to the end of the session.  Meryl demonstrated good  understanding of the education provided.     See EMR under Patient Instructions for exercises provided prior visit.    Assessment     Meryl with hypermobility into ER and focused on strengthening with prone row and ER. Good tolerance to progression to overhead stabilization. Will continue progress towards restoring IR end range      Meryl is progressing well towards her goals.   Pt prognosis is Good.     Pt will continue to benefit from skilled outpatient physical therapy to address the deficits listed in the problem list box on initial evaluation, provide pt/family education and to maximize pt's level of independence in the home and community environment.     Pt's spiritual, cultural and educational needs considered and pt agreeable to plan of care and goals.     Anticipated barriers to physical therapy: covid    GOALS: Short Term Goals:  4 weeks  1.Report decreased shoulder pain < / =  3/10  to increase tolerance for using RUE at work (Progressing, not met)  2. Increase PROM 160 passive flexion without pain in shoulder (Progressing, not met)  3. Increased strength by 1/3 MMT grade in right shoulder to increase tolerance for ADL and work activities.(Progressing, not met)  4. Pt to tolerate HEP to improve ROM and independence with ADL's(Progressing, not  met)     Long Term Goals: 8 weeks  1.Report decreased shoulder pain  < / =  1 /10  to increase tolerance for lifting 5# overhead(Progressing, not met)  2.Increase AROM to full flexion without shoulder pain (Progressing, not met)  3.Increase strength to >/= 4/5 in mid trap to increase tolerance for ADL and work activities.(Progressing, not met)  4. Pt goal: return to working without shoulder pain(Progressing, not met)  5. Pt will have improved gcode of CJ (20-40% limited) on FOTO shoulder in order to demonstrate true functional improvement.(Progressing, not met)       Plan     Plan of care Certification: 9/2/2020 to 12/3/2020.     Outpatient Physical Therapy 2 times weekly for 10 weeks to include the following interventions: Manual Therapy, Moist Heat/ Ice, Neuromuscular Re-ed, Patient Education, Self Care, Therapeutic Activites and Therapeutic Exercise.     Improve scap stabilizations    Fabricio Tolentino, PT

## 2020-10-28 ENCOUNTER — LAB VISIT (OUTPATIENT)
Dept: LAB | Facility: HOSPITAL | Age: 65
End: 2020-10-28
Attending: INTERNAL MEDICINE
Payer: COMMERCIAL

## 2020-10-28 DIAGNOSIS — E78.5 HYPERLIPIDEMIA, UNSPECIFIED HYPERLIPIDEMIA TYPE: ICD-10-CM

## 2020-10-28 DIAGNOSIS — E11.40 TYPE 2 DIABETES MELLITUS WITH DIABETIC NEUROPATHY, WITHOUT LONG-TERM CURRENT USE OF INSULIN: ICD-10-CM

## 2020-10-28 DIAGNOSIS — I10 ESSENTIAL HYPERTENSION: ICD-10-CM

## 2020-10-28 LAB
25(OH)D3+25(OH)D2 SERPL-MCNC: 10 NG/ML (ref 30–96)
ALBUMIN SERPL BCP-MCNC: 3.8 G/DL (ref 3.5–5.2)
ALP SERPL-CCNC: 135 U/L (ref 55–135)
ALT SERPL W/O P-5'-P-CCNC: 22 U/L (ref 10–44)
ANION GAP SERPL CALC-SCNC: 8 MMOL/L (ref 8–16)
AST SERPL-CCNC: 23 U/L (ref 10–40)
BILIRUB SERPL-MCNC: 0.4 MG/DL (ref 0.1–1)
BUN SERPL-MCNC: 27 MG/DL (ref 8–23)
CALCIUM SERPL-MCNC: 9.4 MG/DL (ref 8.7–10.5)
CHLORIDE SERPL-SCNC: 107 MMOL/L (ref 95–110)
CHOLEST SERPL-MCNC: 162 MG/DL (ref 120–199)
CHOLEST/HDLC SERPL: 3.6 {RATIO} (ref 2–5)
CO2 SERPL-SCNC: 24 MMOL/L (ref 23–29)
CREAT SERPL-MCNC: 1.1 MG/DL (ref 0.5–1.4)
EST. GFR  (AFRICAN AMERICAN): >60 ML/MIN/1.73 M^2
EST. GFR  (NON AFRICAN AMERICAN): 52.8 ML/MIN/1.73 M^2
ESTIMATED AVG GLUCOSE: 169 MG/DL (ref 68–131)
GLUCOSE SERPL-MCNC: 140 MG/DL (ref 70–110)
HBA1C MFR BLD HPLC: 7.5 % (ref 4–5.6)
HDLC SERPL-MCNC: 45 MG/DL (ref 40–75)
HDLC SERPL: 27.8 % (ref 20–50)
LDLC SERPL CALC-MCNC: 86.8 MG/DL (ref 63–159)
NONHDLC SERPL-MCNC: 117 MG/DL
POTASSIUM SERPL-SCNC: 4.7 MMOL/L (ref 3.5–5.1)
PROT SERPL-MCNC: 7.3 G/DL (ref 6–8.4)
SODIUM SERPL-SCNC: 139 MMOL/L (ref 136–145)
TRIGL SERPL-MCNC: 151 MG/DL (ref 30–150)

## 2020-10-28 PROCEDURE — 36415 COLL VENOUS BLD VENIPUNCTURE: CPT

## 2020-10-28 PROCEDURE — 83036 HEMOGLOBIN GLYCOSYLATED A1C: CPT

## 2020-10-28 PROCEDURE — 80061 LIPID PANEL: CPT

## 2020-10-28 PROCEDURE — 80053 COMPREHEN METABOLIC PANEL: CPT

## 2020-10-28 PROCEDURE — 82306 VITAMIN D 25 HYDROXY: CPT

## 2020-10-29 ENCOUNTER — OFFICE VISIT (OUTPATIENT)
Dept: INTERNAL MEDICINE | Facility: CLINIC | Age: 65
End: 2020-10-29
Payer: COMMERCIAL

## 2020-10-29 VITALS
HEART RATE: 80 BPM | DIASTOLIC BLOOD PRESSURE: 78 MMHG | WEIGHT: 273.38 LBS | HEIGHT: 67 IN | OXYGEN SATURATION: 98 % | SYSTOLIC BLOOD PRESSURE: 130 MMHG | BODY MASS INDEX: 42.91 KG/M2

## 2020-10-29 DIAGNOSIS — Z12.11 SCREENING FOR COLON CANCER: ICD-10-CM

## 2020-10-29 DIAGNOSIS — E78.5 HYPERLIPIDEMIA, UNSPECIFIED HYPERLIPIDEMIA TYPE: ICD-10-CM

## 2020-10-29 DIAGNOSIS — I10 ESSENTIAL HYPERTENSION: ICD-10-CM

## 2020-10-29 DIAGNOSIS — R79.89 LOW VITAMIN D LEVEL: ICD-10-CM

## 2020-10-29 DIAGNOSIS — E11.40 TYPE 2 DIABETES MELLITUS WITH DIABETIC NEUROPATHY, WITHOUT LONG-TERM CURRENT USE OF INSULIN: Primary | ICD-10-CM

## 2020-10-29 PROCEDURE — 99214 PR OFFICE/OUTPT VISIT, EST, LEVL IV, 30-39 MIN: ICD-10-PCS | Mod: 25,S$GLB,, | Performed by: INTERNAL MEDICINE

## 2020-10-29 PROCEDURE — 1101F PR PT FALLS ASSESS DOC 0-1 FALLS W/OUT INJ PAST YR: ICD-10-PCS | Mod: CPTII,S$GLB,, | Performed by: INTERNAL MEDICINE

## 2020-10-29 PROCEDURE — 90670 PNEUMOCOCCAL CONJUGATE VACCINE 13-VALENT LESS THAN 5YO & GREATER THAN: ICD-10-PCS | Mod: S$GLB,,, | Performed by: INTERNAL MEDICINE

## 2020-10-29 PROCEDURE — 3051F HG A1C>EQUAL 7.0%<8.0%: CPT | Mod: CPTII,S$GLB,, | Performed by: INTERNAL MEDICINE

## 2020-10-29 PROCEDURE — 99214 OFFICE O/P EST MOD 30 MIN: CPT | Mod: 25,S$GLB,, | Performed by: INTERNAL MEDICINE

## 2020-10-29 PROCEDURE — 3008F BODY MASS INDEX DOCD: CPT | Mod: CPTII,S$GLB,, | Performed by: INTERNAL MEDICINE

## 2020-10-29 PROCEDURE — 90471 IMMUNIZATION ADMIN: CPT | Mod: S$GLB,,, | Performed by: INTERNAL MEDICINE

## 2020-10-29 PROCEDURE — 3075F PR MOST RECENT SYSTOLIC BLOOD PRESS GE 130-139MM HG: ICD-10-PCS | Mod: CPTII,S$GLB,, | Performed by: INTERNAL MEDICINE

## 2020-10-29 PROCEDURE — 99999 PR PBB SHADOW E&M-EST. PATIENT-LVL III: ICD-10-PCS | Mod: PBBFAC,,, | Performed by: INTERNAL MEDICINE

## 2020-10-29 PROCEDURE — 3075F SYST BP GE 130 - 139MM HG: CPT | Mod: CPTII,S$GLB,, | Performed by: INTERNAL MEDICINE

## 2020-10-29 PROCEDURE — 99999 PR PBB SHADOW E&M-EST. PATIENT-LVL III: CPT | Mod: PBBFAC,,, | Performed by: INTERNAL MEDICINE

## 2020-10-29 PROCEDURE — 3008F PR BODY MASS INDEX (BMI) DOCUMENTED: ICD-10-PCS | Mod: CPTII,S$GLB,, | Performed by: INTERNAL MEDICINE

## 2020-10-29 PROCEDURE — 1101F PT FALLS ASSESS-DOCD LE1/YR: CPT | Mod: CPTII,S$GLB,, | Performed by: INTERNAL MEDICINE

## 2020-10-29 PROCEDURE — 3078F PR MOST RECENT DIASTOLIC BLOOD PRESSURE < 80 MM HG: ICD-10-PCS | Mod: CPTII,S$GLB,, | Performed by: INTERNAL MEDICINE

## 2020-10-29 PROCEDURE — 90670 PCV13 VACCINE IM: CPT | Mod: S$GLB,,, | Performed by: INTERNAL MEDICINE

## 2020-10-29 PROCEDURE — 3051F PR MOST RECENT HEMOGLOBIN A1C LEVEL 7.0 - < 8.0%: ICD-10-PCS | Mod: CPTII,S$GLB,, | Performed by: INTERNAL MEDICINE

## 2020-10-29 PROCEDURE — 90471 PNEUMOCOCCAL CONJUGATE VACCINE 13-VALENT LESS THAN 5YO & GREATER THAN: ICD-10-PCS | Mod: S$GLB,,, | Performed by: INTERNAL MEDICINE

## 2020-10-29 PROCEDURE — 3078F DIAST BP <80 MM HG: CPT | Mod: CPTII,S$GLB,, | Performed by: INTERNAL MEDICINE

## 2020-10-29 RX ORDER — ERGOCALCIFEROL 1.25 MG/1
50000 CAPSULE ORAL
Qty: 12 CAPSULE | Refills: 1 | Status: SHIPPED | OUTPATIENT
Start: 2020-10-29 | End: 2021-03-24 | Stop reason: SDUPTHER

## 2020-10-29 NOTE — PROGRESS NOTES
MEDICAL HISTORY:  Type 2 diabetes with peripheral neuropathy  Hypertension.  Hyperlipidemia.  Helicobacter pylori which has been treated.  Obesity with gastric bypass surgery.  Left foot ulcer, fifth metatarsal, debridement       Medications  Benazepril 40 mg  Atorvastatin 40 mg  Chlorthalidone 25 mg  Metformin 500 mg 2 in the morning    Component      Latest Ref Rng & Units 10/28/2020   Sodium      136 - 145 mmol/L 139   Potassium      3.5 - 5.1 mmol/L 4.7   Chloride      95 - 110 mmol/L 107   CO2      23 - 29 mmol/L 24   Glucose      70 - 110 mg/dL 140 (H)   BUN      8 - 23 mg/dL 27 (H)   Creatinine      0.5 - 1.4 mg/dL 1.1   Calcium      8.7 - 10.5 mg/dL 9.4   PROTEIN TOTAL      6.0 - 8.4 g/dL 7.3   Albumin      3.5 - 5.2 g/dL 3.8   BILIRUBIN TOTAL      0.1 - 1.0 mg/dL 0.4   Alkaline Phosphatase      55 - 135 U/L 135   AST      10 - 40 U/L 23   ALT      10 - 44 U/L 22   Anion Gap      8 - 16 mmol/L 8   eGFR if African American      >60 mL/min/1.73 m:2 >60.0   eGFR if non African American      >60 mL/min/1.73 m:2 52.8 (A)   Cholesterol      120 - 199 mg/dL 162   Triglycerides      30 - 150 mg/dL 151 (H)   HDL      40 - 75 mg/dL 45   LDL Cholesterol External      63.0 - 159.0 mg/dL 86.8   HDL/Cholesterol Ratio      20.0 - 50.0 % 27.8   Total Cholesterol/HDL Ratio      2.0 - 5.0 3.6   Non-HDL Cholesterol      mg/dL 117   Hemoglobin A1C External      4.0 - 5.6 % 7.5 (H)   Estimated Avg Glucose      68 - 131 mg/dL 169 (H)   Vit D, 25-Hydroxy      30 - 96 ng/mL 10 (L)      65-year-old female    Follow-up visit    Since last visit, the metformin is been changed to 500 mg 2 in the morning , 1 in the evening     she still has loose stools with this routine, no different than taking a 1 twice a day    in regard to her diabetes hemoglobin A1c is 7.5, in the past she attempted Victoza in Trulicity but felt bad an nauseated with the medication and did not take it for long and she thinks she had a similar reaction when Januvia  was prescribed .    Or the last visit she was to be referred to Endocrinology internal medicine but this has not occurred    Also she states that she mailed in the fit stool test but there is no report in the system.  She defers on doing a colonoscopy    She continues to follow-up with outpatient rehab given the history of right elbow fracture in shoulder pain in his working well and she recently had visits with gynecology in [facility]    Review of symptoms  Reports no chest pain, palpitations, shortness of breath, abdominal pain.  Urination at times can be urgent infrequent but no dysuria    Examination  Weight 273  Pulse 80  Blood pressure 100/62  Neck no thyromegaly no masses  Chest clear breath sounds  Heart regular rate and rhythm  Abdominal exam is bowel sounds soft nontender no hepatosplenomegaly abdominal masses  Pulses 2+ carotid pulses no bruits 2+ pedal pulses  Extremities no edema    He impression  Type 2 diabetes with chronic kidney disease stage 3  Hypertension  Hyperlipidemia  Low vitamin-D    Plan  Redo another stool fit test and order to meet with Endocrinology as turn medicine  Metformin 2 tablets twice a day intake Metamucil regularly    She does not check her blood glucose and the importance of this was discussed with her  Possibly re-attempt to use a Trulicity or may be that of Ozempic , possibly without metformin    Recommend a put a hold on chlorthalidone and will get involving the digital hypertension medicine program, possibly this may be a factor with the slight elevated creatinine    Vitamin-D 16260 units once a week this prescribed    Return 4 months    Prevnar 13 vaccine

## 2020-11-02 LAB
HPV HR 12 DNA SPEC QL NAA+PROBE: NEGATIVE
HPV16 AG SPEC QL: NEGATIVE
HPV18 DNA SPEC QL NAA+PROBE: NEGATIVE

## 2020-11-03 ENCOUNTER — CLINICAL SUPPORT (OUTPATIENT)
Dept: REHABILITATION | Facility: HOSPITAL | Age: 65
End: 2020-11-03
Payer: OTHER MISCELLANEOUS

## 2020-11-03 DIAGNOSIS — M25.511 ACUTE PAIN OF RIGHT SHOULDER: ICD-10-CM

## 2020-11-03 PROCEDURE — 97112 NEUROMUSCULAR REEDUCATION: CPT

## 2020-11-03 PROCEDURE — 97110 THERAPEUTIC EXERCISES: CPT

## 2020-11-03 NOTE — PLAN OF CARE
"  Physical Therapy Treatment Note     Name: Meryl Johnson  [facility] Number: [id number]    Therapy Diagnosis:   No diagnosis found.  Physician: Radha Viera MD    Visit Date: 11/3/2020    Physician Orders: PT Eval and Treat   Medical Diagnosis from Referral: M75.21 (ICD-10-CM) - Biceps tendinitis of right upper extremity  Evaluation Date: 9/2/2020  Authorization Period Expiration: 11/20/2020  Plan of Care Expiration: 12/3/2020  Visit # / Visits authorized: 11/ 12    Time In: 0931  Time Out: 1016  Total Billable Time: 45 minutes    Precautions: Fall    Subjective     Pt reports: My shoulder is feeling good today, I feel 60-70% better since beginning PT.     She was compliant with home exercise program.  Response to previous treatment: sore shoulder blade  Functional change: 60-70% improvement     Pain: 3/10  Location: right shoulder      Objective   ROM: Flexion, ABD, ER, IR equal to contralateral side     Right Left Comment   Shoulder flexion: 4+/5 5/5    Shoulder Abduction: 4+/5 5/5    Shoulder ER: 4/5 4+/5    Shoulder IR: 5/5 5/5        Meryl received therapeutic exercises to develop strength, endurance and ROM for 37 minutes including:    UBE 4'/4' level 4.5   Sidelying ER 2# 2 x 15  Prone row with ER 2# 30x  Prone scaption 3x10   No money with OTB 3x10-12  Serratus punch at 120 degrees flexion 3x10        NT  Overhead arm in front 90/90 GTB step outs 30x ea  Row with ER GTB 2 x 15  Hill and arrow GTB 2 x 15  Scaptions 2# 2 x 15  Sidelying flexion and abduction 2# 2 x 15  Serratus press OTB 2 x 20  Lat stretch 2# supine 30x 3" stretch  Cross body wall slides 2# 2 x 15  Wand behind her back LLLD stretch posterior capsule 30 sec 5x  No money GTB 3 x 12   Shoulder extensions GTB 2 x 15  IR/ER GTB 20x ea  Hill and arrows GTB 20x  Tricep extension 20x  Sidelying flexion 2# 3 x 10  Serratus press cables 3# 2 x 10  Pulley 4' scaptions  Scap retractions GTB 20x 3"    Meryl received the following manual therapy techniques: " Joint mobilizations and Soft tissue Mobilization were applied to the: R shoulder for 0 minutes, including:    Inferior mobs arm abducted 20 deg and 90 deg  Posterior capsule stretch blocking scap  End range flexion with inferior humeral mob Gr III  IR with posterior mob Gr III  Supine resisted PNF D1    Meryl participated in neuromuscular re-education activities to improve: Sense, Proprioception and Posture for 8  minutes. The following activities were included:    Ball on wall ABCs x2   Serratus wall slides with OTB 3x10-12     NT  Ball on wall CW/CCW 30x ea  Serratus slides yellow loop 3 x 10  Serratus wall clock red loop 2 x 20      Home Exercises Provided and Patient Education Provided     Education provided:   - Proper scap stability with overhead lifting    Written Home Exercises Provided: Patient instructed to cont prior HEP.  Exercises were reviewed and Meryl was able to demonstrate them prior to the end of the session.  Meryl demonstrated good  understanding of the education provided.     See EMR under Patient Instructions for exercises provided prior visit.    Assessment     Meryl tolerated treatment well with no increase in symptoms. She is progressing well, however continues to present with R shoulder weakness. Pt's deficits are limiting her ability to perform lifting/carrying activities at work.       Meryl is progressing well towards her goals.   Pt prognosis is Good.     Pt will continue to benefit from skilled outpatient physical therapy to address the deficits listed in the problem list box on initial evaluation, provide pt/family education and to maximize pt's level of independence in the home and community environment.     Pt's spiritual, cultural and educational needs considered and pt agreeable to plan of care and goals.     Anticipated barriers to physical therapy: covid    GOALS: Short Term Goals:  4 weeks  1.Report decreased shoulder pain < / =  3/10  to increase tolerance for using RUE  at work MET   2. Increase PROM 160 passive flexion without pain in shoulder MET  3. Increased strength by 1/3 MMT grade in right shoulder to increase tolerance for ADL and work activities.(Progressing, not met)  4. Pt to tolerate HEP to improve ROM and independence with ADL's. MET      Long Term Goals: 8 weeks  1.Report decreased shoulder pain  < / =  1 /10  to increase tolerance for lifting 5# overhead(Progressing, not met)  2.Increase AROM to full flexion without shoulder pain (Progressing, not met)  3.Increase strength to >/= 4/5 in mid trap to increase tolerance for ADL and work activities.(Progressing, not met)  4. Pt goal: return to working without shoulder pain(Progressing, not met)  5. Pt will have improved gcode of CJ (20-40% limited) on FOTO shoulder in order to demonstrate true functional improvement.(Progressing, not met)       Plan     Reasons for Recertification of Therapy:   R shoulder weakness limiting her ability to perform lifting/carrying activities at work. (Moving computers/boxes)     Updated Certification Period: 11/3/2020 to 12/31/2020  Recommended Treatment Plan: 1 times per week for 8 weeks: Manual Therapy, Neuromuscular Re-ed, Patient Education, Therapeutic Activites and Therapeutic Exercise  Other Recommendations: none     Bert Kinsey, PT  11/3/2020      I CERTIFY THE NEED FOR THESE SERVICES FURNISHED UNDER THIS PLAN OF TREATMENT AND WHILE UNDER MY CARE    Physician's comments:        Physician's Signature: ___________________________________________________

## 2020-11-03 NOTE — PROGRESS NOTES
"  Physical Therapy Treatment Note     Name: Meryl Johnson  [facility] Number: [id number]    Therapy Diagnosis:   No diagnosis found.  Physician: Radha Viera MD    Visit Date: 11/3/2020    Physician Orders: PT Eval and Treat   Medical Diagnosis from Referral: M75.21 (ICD-10-CM) - Biceps tendinitis of right upper extremity  Evaluation Date: 9/2/2020  Authorization Period Expiration: 11/20/2020  Plan of Care Expiration: 12/3/2020  Visit # / Visits authorized: 11/ 12    Time In: 0931  Time Out: 1016  Total Billable Time: 45 minutes    Precautions: Fall    Subjective     Pt reports: My shoulder is feeling good today, I feel 60-70% better since beginning PT.     She was compliant with home exercise program.  Response to previous treatment: sore shoulder blade  Functional change: 60-70% improvement     Pain: 3/10  Location: right shoulder      Objective   ROM: Flexion, ABD, ER, IR equal to contralateral side     Right Left Comment   Shoulder flexion: 4+/5 5/5    Shoulder Abduction: 4+/5 5/5    Shoulder ER: 4/5 4+/5    Shoulder IR: 5/5 5/5        Meryl received therapeutic exercises to develop strength, endurance and ROM for 37 minutes including:    UBE 4'/4' level 4.5   Sidelying ER 2# 2 x 15  Prone row with ER 2# 30x  Prone scaption 3x10   No money with OTB 3x10-12  Serratus punch at 120 degrees flexion 3x10        NT  Overhead arm in front 90/90 GTB step outs 30x ea  Row with ER GTB 2 x 15  Gobles and arrow GTB 2 x 15  Scaptions 2# 2 x 15  Sidelying flexion and abduction 2# 2 x 15  Serratus press OTB 2 x 20  Lat stretch 2# supine 30x 3" stretch  Cross body wall slides 2# 2 x 15  Wand behind her back LLLD stretch posterior capsule 30 sec 5x  No money GTB 3 x 12   Shoulder extensions GTB 2 x 15  IR/ER GTB 20x ea  Gobles and arrows GTB 20x  Tricep extension 20x  Sidelying flexion 2# 3 x 10  Serratus press cables 3# 2 x 10  Pulley 4' scaptions  Scap retractions GTB 20x 3"    Meryl received the following manual therapy techniques: " Joint mobilizations and Soft tissue Mobilization were applied to the: R shoulder for 0 minutes, including:    Inferior mobs arm abducted 20 deg and 90 deg  Posterior capsule stretch blocking scap  End range flexion with inferior humeral mob Gr III  IR with posterior mob Gr III  Supine resisted PNF D1    Meryl participated in neuromuscular re-education activities to improve: Sense, Proprioception and Posture for 8  minutes. The following activities were included:    Ball on wall ABCs x2   Serratus wall slides with OTB 3x10-12     NT  Ball on wall CW/CCW 30x ea  Serratus slides yellow loop 3 x 10  Serratus wall clock red loop 2 x 20      Home Exercises Provided and Patient Education Provided     Education provided:   - Proper scap stability with overhead lifting    Written Home Exercises Provided: Patient instructed to cont prior HEP.  Exercises were reviewed and Meryl was able to demonstrate them prior to the end of the session.  Meryl demonstrated good  understanding of the education provided.     See EMR under Patient Instructions for exercises provided prior visit.    Assessment     Meryl tolerated treatment well with no increase in symptoms. She is progressing well, however continues to present with R shoulder weakness. Pt's deficits are limiting her ability to perform lifting/carrying activities at work.       Meryl is progressing well towards her goals.   Pt prognosis is Good.     Pt will continue to benefit from skilled outpatient physical therapy to address the deficits listed in the problem list box on initial evaluation, provide pt/family education and to maximize pt's level of independence in the home and community environment.     Pt's spiritual, cultural and educational needs considered and pt agreeable to plan of care and goals.     Anticipated barriers to physical therapy: covid    GOALS: Short Term Goals:  4 weeks  1.Report decreased shoulder pain < / =  3/10  to increase tolerance for using RUE  at work MET   2. Increase PROM 160 passive flexion without pain in shoulder MET  3. Increased strength by 1/3 MMT grade in right shoulder to increase tolerance for ADL and work activities.(Progressing, not met)  4. Pt to tolerate HEP to improve ROM and independence with ADL's. MET      Long Term Goals: 8 weeks  1.Report decreased shoulder pain  < / =  1 /10  to increase tolerance for lifting 5# overhead(Progressing, not met)  2.Increase AROM to full flexion without shoulder pain (Progressing, not met)  3.Increase strength to >/= 4/5 in mid trap to increase tolerance for ADL and work activities.(Progressing, not met)  4. Pt goal: return to working without shoulder pain(Progressing, not met)  5. Pt will have improved gcode of CJ (20-40% limited) on FOTO shoulder in order to demonstrate true functional improvement.(Progressing, not met)       Plan     Reasons for Recertification of Therapy:   R shoulder weakness limiting her ability to perform lifting/carrying activities at work. (Moving computers/boxes)     Updated Certification Period: 11/3/2020 to 12/31/2020  Recommended Treatment Plan: 1 times per week for 8 weeks: Manual Therapy, Neuromuscular Re-ed, Patient Education, Therapeutic Activites and Therapeutic Exercise  Other Recommendations: none     Bert Kinsey, PT  11/3/2020      I CERTIFY THE NEED FOR THESE SERVICES FURNISHED UNDER THIS PLAN OF TREATMENT AND WHILE UNDER MY CARE    Physician's comments:        Physician's Signature: ___________________________________________________

## 2020-11-10 ENCOUNTER — CLINICAL SUPPORT (OUTPATIENT)
Dept: REHABILITATION | Facility: HOSPITAL | Age: 65
End: 2020-11-10
Payer: OTHER MISCELLANEOUS

## 2020-11-10 DIAGNOSIS — M25.511 ACUTE PAIN OF RIGHT SHOULDER: ICD-10-CM

## 2020-11-10 DIAGNOSIS — M25.60 RANGE OF MOTION DEFICIT: ICD-10-CM

## 2020-11-10 PROCEDURE — 97110 THERAPEUTIC EXERCISES: CPT

## 2020-11-10 NOTE — PROGRESS NOTES
"  Physical Therapy Treatment Note     Name: Meryl Johnson  [facility] Number: [id number]    Therapy Diagnosis:   No diagnosis found.  Physician: Radha Viera MD    Visit Date: 11/10/2020    Physician Orders: PT Eval and Treat   Medical Diagnosis from Referral: M75.21 (ICD-10-CM) - Biceps tendinitis of right upper extremity  Evaluation Date: 9/2/2020  Authorization Period Expiration: 11/20/2020  Plan of Care Expiration: 12/3/2020  Visit # / Visits authorized: 12/ 12    Time In: 0931  Time Out: 1016  Total Billable Time: 45 minutes    Precautions: Fall    Subjective     Pt reports: muscle soreness in R shoulder and that she is coming from OT appointment this morning.     She was compliant with home exercise program.  Response to previous treatment: sore shoulder blade  Functional change: 60-70% improvement     Pain: 3/10  Location: right shoulder      Objective   ROM: Flexion, ABD, ER, IR equal to contralateral side     Right Left Comment   Shoulder flexion: 4+/5 5/5    Shoulder Abduction: 4+/5 5/5    Shoulder ER: 4/5 4+/5    Shoulder IR: 5/5 5/5        Meryl received therapeutic exercises to develop strength, endurance and ROM for 45 minutes including:    UBE 4'/4' level 4.5 (NP today)   Sidelying ER 2# 2 x 15  Prone row with ER 4# 3x15  Prone scaption 3x15  Prone abduction 3x15  No money with OTB 3x10-12  Y wall slides with lift off 3x15      Serratus punch at 120 degrees flexion 3x10 (NP today)         NT  Overhead arm in front 90/90 GTB step outs 30x ea  Row with ER GTB 2 x 15  Boyce and arrow GTB 2 x 15  Scaptions 2# 2 x 15  Sidelying flexion and abduction 2# 2 x 15  Serratus press OTB 2 x 20  Lat stretch 2# supine 30x 3" stretch  Cross body wall slides 2# 2 x 15  Wand behind her back LLLD stretch posterior capsule 30 sec 5x  No money GTB 3 x 12   Shoulder extensions GTB 2 x 15  IR/ER GTB 20x ea  Tricep extension 20x  Sidelying flexion 2# 3 x 10  Serratus press cables 3# 2 x 10  Pulley 4' scaptions      Meryl " received the following manual therapy techniques: Joint mobilizations and Soft tissue Mobilization were applied to the: R shoulder for 0 minutes, including:    Inferior mobs arm abducted 20 deg and 90 deg  Posterior capsule stretch blocking scap  End range flexion with inferior humeral mob Gr III  IR with posterior mob Gr III  Supine resisted PNF D1    Meryl participated in neuromuscular re-education activities to improve: Sense, Proprioception and Posture for 0  minutes. The following activities were included:    Ball on wall ABCs x2   Serratus wall slides with OTB 3x10-12     NT  Ball on wall CW/CCW 30x ea  Serratus slides yellow loop 3 x 10  Serratus wall clock red loop 2 x 20      Home Exercises Provided and Patient Education Provided     Education provided:   - continue HEP    Written Home Exercises Provided: Patient instructed to cont prior HEP.  Exercises were reviewed and Meryl was able to demonstrate them prior to the end of the session.  Meryl demonstrated good  understanding of the education provided.     See EMR under Patient Instructions for exercises provided prior visit.    Assessment     Meryl tolerated treatment well with focus on scapular strengthening.  She demonstrates weakness in lower trap when performing overhead scaption exercises.     Meryl is progressing well towards her goals.   Pt prognosis is Good.     Pt will continue to benefit from skilled outpatient physical therapy to address the deficits listed in the problem list box on initial evaluation, provide pt/family education and to maximize pt's level of independence in the home and community environment.     Pt's spiritual, cultural and educational needs considered and pt agreeable to plan of care and goals.     Anticipated barriers to physical therapy: covid    GOALS: Short Term Goals:  4 weeks  1.Report decreased shoulder pain < / =  3/10  to increase tolerance for using RUE at work MET   2. Increase PROM 160 passive flexion  without pain in shoulder MET  3. Increased strength by 1/3 MMT grade in right shoulder to increase tolerance for ADL and work activities.(Progressing, not met)  4. Pt to tolerate HEP to improve ROM and independence with ADL's. MET      Long Term Goals: 8 weeks  1.Report decreased shoulder pain  < / =  1 /10  to increase tolerance for lifting 5# overhead(Progressing, not met)  2.Increase AROM to full flexion without shoulder pain (Progressing, not met)  3.Increase strength to >/= 4/5 in mid trap to increase tolerance for ADL and work activities.(Progressing, not met)  4. Pt goal: return to working without shoulder pain(Progressing, not met)  5. Pt will have improved gcode of CJ (20-40% limited) on FOTO shoulder in order to demonstrate true functional improvement.(Progressing, not met)       Plan   Continue POC with focus on strengthening of scapular musculature and improving ability to perform lifting/carrying activities for work.       Bert Kinsey, PT  11/10/2020

## 2020-11-10 NOTE — PROGRESS NOTES
Occupational Therapy Daily Treament Note     Date: 11/10/2020  Name: Meryl Johnson  Clinic Number: [id number]    Therapy Diagnosis:   Encounter Diagnosis   Name Primary?    Range of motion deficit      Physician: Alexis Thurman MD     Physician Orders:  ROM  Medical Diagnosis: S52.124A (ICD-10-CM) - Closed nondisplaced fracture of head of right radius, initial encounter      Surgical Procedure and Date: N/A  Evaluation Date: 7/23/2020  Insurance: Generic Worker's Comp  Insurance Authorization period Expiration: 07/10/2021      Plan of Care Certification Period: 9/22/2020 to 11/22/2020     Visit # / Visits Authortized: 1/12  Visit #: 14  Time In: 8:25 am  Time Out: 9:10 am  Total Billable Time: 45  minutes     Precautions: Diabetes and standard     X-ray 9/28/2020: Impression:     Healing fracture at the right radial neck with unchanged position and alignment       Subjective     Pt states: Pt reports no pain in R arm and that 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     Pt reports: she was compliant with home exercise program given last session.  Response to previous treatment: functionally the same  Functional change: Pt reports she is the same functionally; able to perform light household activities    Pain: 0/10  Location: right elbow      Objective   Observation/Appearance:  Skin intact      Edema. Measured in centimeters.    Left Right Right Right Right    7/23/2020 7/23/2020 8/5/2020 8/25/2020 9/22/2020   2in. Above elbow 33.2 40 40 39 39   2in. Below elbow 27 28.9 28.9 28.9 28.8   Elbow Crease 27.2 30 28.8 28.8 28         Elbow/Wrist AROM  Date 7/23/2020 7/23/2020 8/5/2020 8/25/2020 9/22/2020 10/13/2020 11/10/2020     Left Right Right Right Right Right Right   Elbow Ext/Flex 0/140 15/130 0/135 0/140 WNL WNL WNL   Supination/Pronation 90/90 *0/90 45/90 60/90 65/90 70/90 70/90   Wrist ext/flex 80/70 *50/45 *60/55 *65/60 *65/65 *65/65 *65/65   Wrist RD/UD 25/30 15/30 25/30 25/30 WNL WNL  WNL   *Pt reports that she had a previous wrist fracture 10 years ago and her range of motion was limited in her wrist since then.     Sensation: c/o occasional night numbness in fingers      Strength (Dyanmometer) and Pinch Strength (Pinch Gauge)  Measured in pounds and psi. Average of three trials.    7/23/2020 7/23/2020 8/25/2020 8/25/2020 9/22/2020 10/13/2020 11/10/2020     Left Right Left Right Right Right Right   Rung II def def 45 28 46 51 46           Meryl received the following manual therapy techniques for 0 minutes:        Meryl received therapeutic exercises for 45 minutes including:    -5 lb elbow 3 ways 2 x 15 reps (switched to 4 lb weight for palm up/palm down)  -rotation bar with 3 plates x 30 reps  -hand gripper rung 4, x 30 reps  -green putty: 2' molding, 2' grasping (at home)  -green therabar frowns, smiles and twists x 30 reps  -wrist 3 ways with 4 lb 2 x 15 reps  -UBE x 5 min forward,  5 min reverse, level 4  -weight well with 1 lb x 5 reps  -red ball with rebounder x 30 reps     Meryl participated in dynamic functional therapeutic activities to improve functional performance for 0  minutes, including:  -NT     Home Exercises and Education Provided     Education provided:   - none today  - Progress towards goals: Excellent    Written Home Exercises Provided: Patient instructed to cont prior HEP.  Exercises were reviewed and Meryl was able to demonstrate them prior to the end of the session.  Meryl demonstrated good  understanding of the education provided.     See EMR under Patient Instructions for exercises provided prior visit.     Meryl demonstrated good  understanding of the education provided.         Assessment   Meryl Johnson is a 64 y.o. female referred to outpatient occupational/hand therapy and presents with a medical diagnosis of left radial head fracture, resulting in pain, decreased range of motion and decreased strength in right arm. Decreased weight and resistance  this day due to decreased strength from missed sessions waiting on approval for worker's comp. Pt stated she has no pain this day in wrist, and she is doing the same functional tasks at home.  strength and AROM was measured this day and noted above. Pt is motivated and participates well in therapy.     Pt would continue to benefit from skilled OT.      Meryl is progressing well towards her goals and there are no updates to goals at this time. Pt prognosis is Good.     Pt will continue to benefit from skilled outpatient occupational therapy to address the deficits listed in the problem list on initial evaluation provide pt/family education and to maximize pt's level of independence in the home and community environment.     Anticipated barriers to occupational therapy: DM    Pt's spiritual, cultural and educational needs considered and pt agreeable to plan of care and goals.    Goals:  Short Term (4 weeks on 8/24/2020):  1)   Patient to be IND with HEP and modalities for pain management. - Met 7/29/2020  2)   Increase ROM 5-10 degrees for elbow ext/flex  to increase functional hand use for feeding and dressing herself.-Met 8/5/2020  3)   Measure  in 2 weeks.-Met 8/25/2020  4)   Decrease edema .2-.3 cm to increase joint mobility /flexibility for improved overall functional hand use.- Met 9/22/2020        Long Term (by discharge):  1)   Pt will report 0 out of 10 pain with right arm use. - 10/22/2020  2)   Patient to score at CK on FOTO to demonstrate improved perception of functional right hand use.-progressing  3)   Pt will return to prior level of function for ADLs and household management. -progressing         Plan:   Certification Period/Plan of care expiration: 9/22/2020 to 11/22/2020.     Outpatient Occupational Therapy 1 time weekly for 8 weeks may include the following interventions: Manual therapy/joint mobilizations, Modalities for pain management, Therapeutic exercises/activities., Strengthening  "and Edema Control.    Discussed Plan of Care with patient: Yes  Updates/Grading for next session: Continue with strengthening limitations of elbow.    Student: ELENO Tejada    " I certify that I was present in the room directing the student in service delivery and guiding them using my skilled judgement. As the co-signing therapist, I have reviewed the student's documentation and am responsible for the treatment, assessment and plan."    Therapist: DILLON Ignacio, T      Pat Amin, OT     "

## 2020-11-12 ENCOUNTER — HOSPITAL ENCOUNTER (OUTPATIENT)
Dept: RADIOLOGY | Facility: HOSPITAL | Age: 65
Discharge: HOME | End: 2020-11-12
Attending: INTERNAL MEDICINE
Payer: COMMERCIAL

## 2020-11-12 VITALS — BODY MASS INDEX: 42.39 KG/M2 | WEIGHT: 270.06 LBS | HEIGHT: 67 IN

## 2020-11-12 DIAGNOSIS — Z12.31 ENCOUNTER FOR SCREENING MAMMOGRAM FOR BREAST CANCER: ICD-10-CM

## 2020-11-12 PROCEDURE — 77067 SCR MAMMO BI INCL CAD: CPT | Mod: 26,,, | Performed by: RADIOLOGY

## 2020-11-12 PROCEDURE — 77067 MAMMO DIGITAL SCREENING BILAT WITH TOMO: ICD-10-PCS | Mod: 26,,, | Performed by: RADIOLOGY

## 2020-11-12 PROCEDURE — 77067 SCR MAMMO BI INCL CAD: CPT | Mod: TC

## 2020-11-12 PROCEDURE — 77063 MAMMO DIGITAL SCREENING BILAT WITH TOMO: ICD-10-PCS | Mod: 26,,, | Performed by: RADIOLOGY

## 2020-11-12 PROCEDURE — 77063 BREAST TOMOSYNTHESIS BI: CPT | Mod: 26,,, | Performed by: RADIOLOGY

## 2020-11-17 ENCOUNTER — CLINICAL SUPPORT (OUTPATIENT)
Dept: REHABILITATION | Facility: HOSPITAL | Age: 65
End: 2020-11-17
Payer: OTHER MISCELLANEOUS

## 2020-11-17 DIAGNOSIS — M25.511 ACUTE PAIN OF RIGHT SHOULDER: ICD-10-CM

## 2020-11-17 DIAGNOSIS — M25.60 RANGE OF MOTION DEFICIT: ICD-10-CM

## 2020-11-17 PROCEDURE — 97110 THERAPEUTIC EXERCISES: CPT

## 2020-11-17 NOTE — PROGRESS NOTES
Occupational Therapy Daily Treament Note     Date: 11/17/2020  Name: Meryl Johnson  Clinic Number: [id number]    Therapy Diagnosis:   Encounter Diagnosis   Name Primary?    Range of motion deficit      Physician: Alexis Thurman MD     Physician Orders:  ROM  Medical Diagnosis: S52.124A (ICD-10-CM) - Closed nondisplaced fracture of head of right radius, initial encounter      Surgical Procedure and Date: N/A  Evaluation Date: 7/23/2020  Insurance: Generic Worker's Comp  Insurance Authorization period Expiration: 07/10/2021      Plan of Care Certification Period: 9/22/2020 to 11/22/2020     Visit # / Visits Authortized: 2/12  Visit #: 15  Time In: 8:25 am  Time Out: 9:10  am  Total Billable Time: 45 minutes     Precautions: Diabetes and standard     X-ray 9/28/2020: Impression:     Healing fracture at the right radial neck with unchanged position and alignment       Subjective      Pt reports: Pt reports she was sore over the last week in her arm.   Response to previous treatment: functionally the same  Functional change: Pt reports she is still doing all light household activities.     Pain: 0/10  Location: right elbow      Objective   Observation/Appearance:  Skin intact      Edema. Measured in centimeters.    Left Right Right Right Right    7/23/2020 7/23/2020 8/5/2020 8/25/2020 9/22/2020   2in. Above elbow 33.2 40 40 39 39   2in. Below elbow 27 28.9 28.9 28.9 28.8   Elbow Crease 27.2 30 28.8 28.8 28         Elbow/Wrist AROM  Date 7/23/2020 7/23/2020 8/5/2020 8/25/2020 9/22/2020 10/13/2020 11/10/2020     Left Right Right Right Right Right Right   Elbow Ext/Flex 0/140 15/130 0/135 0/140 WNL WNL WNL   Supination/Pronation 90/90 *0/90 45/90 60/90 65/90 70/90 70/90   Wrist ext/flex 80/70 *50/45 *60/55 *65/60 *65/65 *65/65 *65/65   Wrist RD/UD 25/30 15/30 25/30 25/30 WNL WNL WNL   *Pt reports that she had a previous wrist fracture 10 years ago and her range of motion was limited in her wrist  since then.     Sensation: c/o occasional night numbness in fingers      Strength (Dyanmometer) and Pinch Strength (Pinch Gauge)  Measured in pounds and psi. Average of three trials.    7/23/2020 7/23/2020 8/25/2020 8/25/2020 9/22/2020 10/13/2020 11/10/2020     Left Right Left Right Right Right Right   Rung II def def 45 28 46 51 46           Meryl received the following manual therapy techniques for 0 minutes:        Meryl received therapeutic exercises for 45 minutes including:    -5 lb elbow 3 ways 2 x 15 reps   -rotation bar with 3 plates x 30 reps  -hand gripper rung 4, x 30 reps  -green putty: 2' molding, 2' grasping (at home)  -green therabar frowns, smiles and twists x 30 reps  -wrist 3 ways with 4 lb 2 x 15 reps  -UBE x 5 min forward,  5 min reverse, level 3.8 new UBE  -weight well with 1 lb x 5 reps  -red ball with rebounder x 30 reps     Meryl participated in dynamic functional therapeutic activities to improve functional performance for 0  minutes, including:  -NT     Home Exercises and Education Provided     Education provided:   - none today  - Progress towards goals: Excellent    Written Home Exercises Provided: Patient instructed to cont prior HEP.  Exercises were reviewed and Meryl was able to demonstrate them prior to the end of the session.  Meryl demonstrated good  understanding of the education provided.     See EMR under Patient Instructions for exercises provided prior visit.     Meryl demonstrated good  understanding of the education provided.         Assessment   Meryl Johnson is a 64 y.o. female referred to outpatient occupational/hand therapy and presents with a medical diagnosis of left radial head fracture, resulting in pain, decreased range of motion and decreased strength in right arm. Advanced in strengthening this day with little difficulty , 5# elbow 3 ways. Pt reports she had soreness in arm after over the past week. Pt is motivated and participates well in therapy.  "    Pt would continue to benefit from skilled OT.      Meryl is progressing well towards her goals and there are no updates to goals at this time. Pt prognosis is Good.     Pt will continue to benefit from skilled outpatient occupational therapy to address the deficits listed in the problem list on initial evaluation provide pt/family education and to maximize pt's level of independence in the home and community environment.     Anticipated barriers to occupational therapy: DM    Pt's spiritual, cultural and educational needs considered and pt agreeable to plan of care and goals.    Goals:  Short Term (4 weeks on 8/24/2020):  1)   Patient to be IND with HEP and modalities for pain management. - Met 7/29/2020  2)   Increase ROM 5-10 degrees for elbow ext/flex  to increase functional hand use for feeding and dressing herself.-Met 8/5/2020  3)   Measure  in 2 weeks.-Met 8/25/2020  4)   Decrease edema .2-.3 cm to increase joint mobility /flexibility for improved overall functional hand use.- Met 9/22/2020        Long Term (by discharge):  1)   Pt will report 0 out of 10 pain with right arm use. - 10/22/2020  2)   Patient to score at CK on FOTO to demonstrate improved perception of functional right hand use.-progressing  3)   Pt will return to prior level of function for ADLs and household management. -progressing         Plan:   Certification Period/Plan of care expiration: 9/22/2020 to 11/22/2020.     Outpatient Occupational Therapy 1 time weekly for 8 weeks may include the following interventions: Manual therapy/joint mobilizations, Modalities for pain management, Therapeutic exercises/activities., Strengthening and Edema Control.    Discussed Plan of Care with patient: Yes  Updates/Grading for next session: Continue with strengthening limitations of elbow.    Student: ELENO Tejada    " I certify that I was present in the room directing the student in service delivery and guiding them using my skilled " "judgement. As the co-signing therapist, I have reviewed the student's documentation and am responsible for the treatment, assessment and plan."    Therapist: DILLON Ignacio, GRAZYNA Amin, OT     "

## 2020-11-17 NOTE — PROGRESS NOTES
Occupational Therapy Daily Treament Note     Date: 11/17/2020  Name: Meryl Johnson  Clinic Number: [id number]    Therapy Diagnosis:   Encounter Diagnosis   Name Primary?    Range of motion deficit      Physician: Alexis Thurman MD     Physician Orders:  ROM  Medical Diagnosis: S52.124A (ICD-10-CM) - Closed nondisplaced fracture of head of right radius, initial encounter      Surgical Procedure and Date: N/A  Evaluation Date: 7/23/2020  Insurance: Generic Worker's Comp  Insurance Authorization period Expiration: 07/10/2021      Plan of Care Certification Period: 9/22/2020 to 11/22/2020     Visit # / Visits Authortized: 2/12  Visit #: 15  Time In: 8:25 am  Time Out: 9:10  am  Total Billable Time: 45 minutes     Precautions: Diabetes and standard     X-ray 9/28/2020: Impression:     Healing fracture at the right radial neck with unchanged position and alignment       Subjective      Pt reports: Pt reports she was sore over the last week in her arm.   Response to previous treatment: functionally the same  Functional change: Pt reports she is still doing all light household activities.     Pain: 0/10  Location: right elbow      Objective   Observation/Appearance:  Skin intact      Edema. Measured in centimeters.    Left Right Right Right Right    7/23/2020 7/23/2020 8/5/2020 8/25/2020 9/22/2020   2in. Above elbow 33.2 40 40 39 39   2in. Below elbow 27 28.9 28.9 28.9 28.8   Elbow Crease 27.2 30 28.8 28.8 28         Elbow/Wrist AROM  Date 7/23/2020 7/23/2020 8/5/2020 8/25/2020 9/22/2020 10/13/2020 11/10/2020     Left Right Right Right Right Right Right   Elbow Ext/Flex 0/140 15/130 0/135 0/140 WNL WNL WNL   Supination/Pronation 90/90 *0/90 45/90 60/90 65/90 70/90 70/90   Wrist ext/flex 80/70 *50/45 *60/55 *65/60 *65/65 *65/65 *65/65   Wrist RD/UD 25/30 15/30 25/30 25/30 WNL WNL WNL   *Pt reports that she had a previous wrist fracture 10 years ago and her range of motion was limited in her wrist  since then.     Sensation: c/o occasional night numbness in fingers      Strength (Dyanmometer) and Pinch Strength (Pinch Gauge)  Measured in pounds and psi. Average of three trials.    7/23/2020 7/23/2020 8/25/2020 8/25/2020 9/22/2020 10/13/2020 11/10/2020     Left Right Left Right Right Right Right   Rung II def def 45 28 46 51 46           Meryl received the following manual therapy techniques for 0 minutes:        Meryl received therapeutic exercises for 45 minutes including:    -5 lb elbow 3 ways 2 x 15 reps   -rotation bar with 3 plates x 30 reps  -hand gripper rung 4, x 30 reps  -green putty: 2' molding, 2' grasping (at home)  -green therabar frowns, smiles and twists x 30 reps  -wrist 3 ways with 4 lb 2 x 15 reps  -UBE x 5 min forward,  5 min reverse, level 3.8 new UBE  -weight well with 1 lb x 5 reps  -red ball with rebounder x 30 reps     Meryl participated in dynamic functional therapeutic activities to improve functional performance for 0  minutes, including:  -NT     Home Exercises and Education Provided     Education provided:   - none today  - Progress towards goals: Excellent    Written Home Exercises Provided: Patient instructed to cont prior HEP.  Exercises were reviewed and Meryl was able to demonstrate them prior to the end of the session.  Meryl demonstrated good  understanding of the education provided.     See EMR under Patient Instructions for exercises provided prior visit.     Meryl demonstrated good  understanding of the education provided.         Assessment   Meryl Johnson is a 64 y.o. female referred to outpatient occupational/hand therapy and presents with a medical diagnosis of left radial head fracture, resulting in pain, decreased range of motion and decreased strength in right arm. Advanced in strengthening this day with little difficulty , 5# elbow 3 ways. Pt reports she had soreness in arm after over the past week. Pt is motivated and participates well in therapy.  "    Pt would continue to benefit from skilled OT.      Meryl is progressing well towards her goals and there are no updates to goals at this time. Pt prognosis is Good.     Pt will continue to benefit from skilled outpatient occupational therapy to address the deficits listed in the problem list on initial evaluation provide pt/family education and to maximize pt's level of independence in the home and community environment.     Anticipated barriers to occupational therapy: DM    Pt's spiritual, cultural and educational needs considered and pt agreeable to plan of care and goals.    Goals:  Short Term (4 weeks on 8/24/2020):  1)   Patient to be IND with HEP and modalities for pain management. - Met 7/29/2020  2)   Increase ROM 5-10 degrees for elbow ext/flex  to increase functional hand use for feeding and dressing herself.-Met 8/5/2020  3)   Measure  in 2 weeks.-Met 8/25/2020  4)   Decrease edema .2-.3 cm to increase joint mobility /flexibility for improved overall functional hand use.- Met 9/22/2020        Long Term (by discharge):  1)   Pt will report 0 out of 10 pain with right arm use. - 10/22/2020  2)   Patient to score at CK on FOTO to demonstrate improved perception of functional right hand use.-progressing  3)   Pt will return to prior level of function for ADLs and household management. -progressing         Plan:   Certification Period/Plan of care expiration: 9/22/2020 to 11/22/2020.     Outpatient Occupational Therapy 1 time weekly for 8 weeks may include the following interventions: Manual therapy/joint mobilizations, Modalities for pain management, Therapeutic exercises/activities., Strengthening and Edema Control.    Discussed Plan of Care with patient: Yes  Updates/Grading for next session: Continue with strengthening limitations of elbow.    Student: ELENO Tejada    " I certify that I was present in the room directing the student in service delivery and guiding them using my skilled " "judgement. As the co-signing therapist, I have reviewed the student's documentation and am responsible for the treatment, assessment and plan."    Therapist: Shelby Fagan, OTR/L,CHT      "

## 2020-11-17 NOTE — PROGRESS NOTES
"  Physical Therapy Treatment Note     Name: Meryl Johnson  [facility] Number: [id number]    Therapy Diagnosis:   Encounter Diagnosis   Name Primary?    Acute pain of right shoulder      Physician: Radha Viera MD    Visit Date: 11/17/2020    Physician Orders: PT Eval and Treat   Medical Diagnosis from Referral: M75.21 (ICD-10-CM) - Biceps tendinitis of right upper extremity  Evaluation Date: 9/2/2020  Authorization Period Expiration: 11/20/2020  Plan of Care Expiration: 12/3/2020  Visit # / Visits authorized: 12/ 12    Time In: 0930  Time Out: 1015  Total Billable Time: 45 minutes    Precautions: Fall    Subjective     Pt reports: shoulder is feeling good, she is presenting to PT following OT visit. She states her elbow was sore last week, but she does not know why.     She was compliant with home exercise program.  Response to previous treatment: sore shoulder blade  Functional change: 60-70% improvement     Pain: 3/10  Location: right shoulder      Objective   ROM: Flexion, ABD, ER, IR equal to contralateral side     Right Left Comment   Shoulder flexion: 4+/5 5/5    Shoulder Abduction: 4+/5 5/5    Shoulder ER: 4/5 4+/5    Shoulder IR: 5/5 5/5        Meryl received therapeutic exercises to develop strength, endurance and ROM for 45 minutes including:    UBE 4'/4' level 4.5 (NP today)   Sidelying ER 2# 2 x 15  Prone row with ER 5# 3x15  Prone scaption 3x10 with 5s hold   Prone abduction 3x10 with 5s hold   Y wall slides with lift off 3x10   No money with GTB 3x10-12  End range flexion from 100-end range 3x10      Serratus punch at 120 degrees flexion 3x10 (NP today)         NT  Overhead arm in front 90/90 GTB step outs 30x ea  Row with ER GTB 2 x 15  [place] and arrow GTB 2 x 15  Scaptions 2# 2 x 15  Sidelying flexion and abduction 2# 2 x 15  Serratus press OTB 2 x 20  Lat stretch 2# supine 30x 3" stretch  Cross body wall slides 2# 2 x 15  Wand behind her back LLLD stretch posterior capsule 30 sec 5x  No money GTB 3 x 12 "   Shoulder extensions GTB 2 x 15  IR/ER GTB 20x ea  Tricep extension 20x  Sidelying flexion 2# 3 x 10  Serratus press cables 3# 2 x 10  [place] 4' scaptions      Meryl received the following manual therapy techniques: Joint mobilizations and Soft tissue Mobilization were applied to the: R shoulder for 0 minutes, including:    Inferior mobs arm abducted 20 deg and 90 deg  Posterior capsule stretch blocking scap  End range flexion with inferior humeral mob Gr III  IR with posterior mob Gr III  Supine resisted PNF D1    Meryl participated in neuromuscular re-education activities to improve: Sense, Proprioception and Posture for 0  minutes. The following activities were included:    Ball on wall ABCs x2   Serratus wall slides with OTB 3x10-12     NT  Ball on wall CW/CCW 30x ea  Serratus slides yellow loop 3 x 10  Serratus wall clock red loop 2 x 20      Home Exercises Provided and Patient Education Provided     Education provided:   - continue HEP    Written Home Exercises Provided: Patient instructed to cont prior HEP.  Exercises were reviewed and Meryl was able to demonstrate them prior to the end of the session.  Meryl demonstrated good  understanding of the education provided.     See EMR under Patient Instructions for exercises provided prior visit.    Assessment     Meryl tolerated treatment well with focus on scapular strengthening and end range R shoulder flexion strength. Pt continues with weakness at end range R shoulder flexion and is progressing as expected.     Meryl is progressing well towards her goals.   Pt prognosis is Good.     Pt will continue to benefit from skilled outpatient physical therapy to address the deficits listed in the problem list box on initial evaluation, provide pt/family education and to maximize pt's level of independence in the home and community environment.     Pt's spiritual, cultural and educational needs considered and pt agreeable to plan of care and goals.      Anticipated barriers to physical therapy: covid    GOALS: Short Term Goals:  4 weeks  1.Report decreased shoulder pain < / =  3/10  to increase tolerance for using RUE at work MET   2. Increase PROM 160 passive flexion without pain in shoulder MET  3. Increased strength by 1/3 MMT grade in right shoulder to increase tolerance for ADL and work activities.(Progressing, not met)  4. Pt to tolerate HEP to improve ROM and independence with ADL's. MET      Long Term Goals: 8 weeks  1.Report decreased shoulder pain  < / =  1 /10  to increase tolerance for lifting 5# overhead(Progressing, not met)  2.Increase AROM to full flexion without shoulder pain (Progressing, not met)  3.Increase strength to >/= 4/5 in mid trap to increase tolerance for ADL and work activities.(Progressing, not met)  4. Pt goal: return to working without shoulder pain(Progressing, not met)  5. Pt will have improved gcode of CJ (20-40% limited) on FOTO shoulder in order to demonstrate true functional improvement.(Progressing, not met)       Plan   Continue POC with focus on strengthening of scapular musculature and improving ability to perform lifting/carrying activities for work.       Bert Kinsey, PT  11/17/2020

## 2020-11-19 ENCOUNTER — PATIENT OUTREACH (OUTPATIENT)
Dept: ADMINISTRATIVE | Facility: OTHER | Age: 65
End: 2020-11-19

## 2020-11-19 LAB
FINAL PATHOLOGIC DIAGNOSIS: NORMAL
Lab: NORMAL

## 2020-11-20 NOTE — PROGRESS NOTES
Care Everywhere:   Immunization: updated  Health Maintenance: updated  Media Review: review for outside colon cancer report   Legacy Review:   Order placed:   Upcoming appts:

## 2020-11-22 ENCOUNTER — PATIENT MESSAGE (OUTPATIENT)
Dept: OBSTETRICS AND GYNECOLOGY | Facility: CLINIC | Age: 65
End: 2020-11-22

## 2020-11-23 ENCOUNTER — OFFICE VISIT (OUTPATIENT)
Dept: SPORTS MEDICINE | Facility: CLINIC | Age: 65
End: 2020-11-23
Payer: OTHER MISCELLANEOUS

## 2020-11-23 VITALS
HEIGHT: 67 IN | BODY MASS INDEX: 43 KG/M2 | HEART RATE: 67 BPM | DIASTOLIC BLOOD PRESSURE: 91 MMHG | SYSTOLIC BLOOD PRESSURE: 185 MMHG | WEIGHT: 274 LBS

## 2020-11-23 DIAGNOSIS — M75.21 BICEPS TENDINITIS OF RIGHT UPPER EXTREMITY: ICD-10-CM

## 2020-11-23 DIAGNOSIS — M25.521 RIGHT ELBOW PAIN: Primary | ICD-10-CM

## 2020-11-23 PROCEDURE — 99999 PR PBB SHADOW E&M-EST. PATIENT-LVL III: ICD-10-PCS | Mod: PBBFAC,,, | Performed by: ORTHOPAEDIC SURGERY

## 2020-11-23 PROCEDURE — 99214 OFFICE O/P EST MOD 30 MIN: CPT | Mod: S$GLB,,, | Performed by: ORTHOPAEDIC SURGERY

## 2020-11-23 PROCEDURE — 99999 PR PBB SHADOW E&M-EST. PATIENT-LVL III: CPT | Mod: PBBFAC,,, | Performed by: ORTHOPAEDIC SURGERY

## 2020-11-23 PROCEDURE — 99214 PR OFFICE/OUTPT VISIT, EST, LEVL IV, 30-39 MIN: ICD-10-PCS | Mod: S$GLB,,, | Performed by: ORTHOPAEDIC SURGERY

## 2020-11-24 ENCOUNTER — CLINICAL SUPPORT (OUTPATIENT)
Dept: REHABILITATION | Facility: HOSPITAL | Age: 65
End: 2020-11-24
Payer: OTHER MISCELLANEOUS

## 2020-11-24 DIAGNOSIS — M25.511 ACUTE PAIN OF RIGHT SHOULDER: ICD-10-CM

## 2020-11-24 DIAGNOSIS — M25.60 RANGE OF MOTION DEFICIT: ICD-10-CM

## 2020-11-24 PROCEDURE — 97110 THERAPEUTIC EXERCISES: CPT | Performed by: PHYSICAL THERAPIST

## 2020-11-24 PROCEDURE — 97140 MANUAL THERAPY 1/> REGIONS: CPT | Performed by: PHYSICAL THERAPIST

## 2020-11-24 PROCEDURE — 97110 THERAPEUTIC EXERCISES: CPT

## 2020-11-24 NOTE — PROGRESS NOTES
Occupational Therapy Re-evaluation Note     Date: 11/17/2020  Name: Meryl Johnson  [facility] Number: [id number]    Therapy Diagnosis:   Encounter Diagnosis   Name Primary?    Range of motion deficit      Physician: Alexis Thurman MD     Physician Orders:  ROM  Medical Diagnosis: S52.124A (ICD-10-CM) - Closed nondisplaced fracture of head of right radius, initial encounter      Surgical Procedure and Date: N/A  Evaluation Date: 7/23/2020  Insurance: Generic Worker's Comp  Insurance Authorization period Expiration: 07/10/2021      Plan of Care Certification Period: 11/22/2020 to 1/22/2021     Visit # / Visits Authortized: 5/12  Visit #: 18  Time In: 8:25 am  Time Out: 9:10 am  Total Billable Time: 45 minutes     Precautions: Diabetes and standard     X-ray 9/28/2020: Impression:     Healing fracture at the right radial neck with unchanged position and alignment       Subjective      Pt reports: Pt reports she was compliant with her HEP.  Response to previous treatment: functionally the same  Functional change: Pt reports she is still doing all light household activities.     Pain: 0/10  Location: right elbow      Objective   Observation/Appearance:  Skin intact      Edema. Measured in centimeters.    Left Right Right Right Right    7/23/2020 7/23/2020 8/5/2020 8/25/2020 9/22/2020   2in. Above elbow 33.2 40 40 39 39   2in. Below elbow 27 28.9 28.9 28.9 28.8   Elbow Crease 27.2 30 28.8 28.8 28         Elbow/Wrist AROM  Date 7/23/2020 7/23/2020 8/5/2020 8/25/2020 9/22/2020 10/13/2020 11/10/2020 11/24/2020     Left Right Right Right Right Right Right Right   Elbow Ext/Flex 0/140 15/130 0/135 0/140 WNL WNL WNL WNL   Supination/Pronation 90/90 *0/90 45/90 60/90 65/90 70/90 70/90 WNL   Wrist ext/flex 80/70 *50/45 *60/55 *65/60 *65/65 *65/65 *65/65 WNL   Wrist RD/UD 25/30 15/30 25/30 25/30 WNL WNL WNL WNL   *Pt reports that she had a previous wrist fracture 10 years ago and her range of motion was  limited in her wrist since then.     Sensation: c/o occasional night numbness in fingers      Strength (Dyanmometer) and Pinch Strength (Pinch Gauge)  Measured in pounds and psi. Average of three trials.    7/23/2020 7/23/2020 8/25/2020 8/25/2020 9/22/2020 10/13/2020 11/10/2020 11/24/2020     Left Right Left Right Right Right Right Right   Rung II def def 45 28 46 51 46 50           Meryl received the following manual therapy techniques for 0 minutes:        Meryl received therapeutic exercises for 45 minutes including:    -4 lb elbow 3 ways 2 x 15 reps   -rotation bar with 3 plates x 30 reps  -hand gripper rung 4, x 30 reps  -green putty: 2' molding, 2' grasping (at home)  -green therabar frowns, smiles and twists x 30 reps  -wrist 3 ways with 4 lb 2 x 15 reps  -UBE x 5 min forward,  5 min reverse, level 3.8 new UBE  -weight well with 1 lb x 5 reps  -red ball with rebounder x 30 reps     Meryl participated in dynamic functional therapeutic activities to improve functional performance for 0  minutes, including:  -NT     Home Exercises and Education Provided     Education provided:   - none today  - Progress towards goals: Excellent    Written Home Exercises Provided: Patient instructed to cont prior HEP.  Exercises were reviewed and Meryl was able to demonstrate them prior to the end of the session.  Meryl demonstrated good  understanding of the education provided.     See EMR under Patient Instructions for exercises provided prior visit.     Meryl demonstrated good  understanding of the education provided.         Assessment   Meryl Johnson is a 64 y.o. female referred to outpatient occupational/hand therapy and presents with a medical diagnosis of left radial head fracture, resulting in pain, decreased range of motion and decreased strength in right arm. Continued strengthening this day with no difficulty. AROM is WNL.  strength has improved. Pt is motivated and participates well in therapy.      Pt would continue to benefit from skilled OT.      Meryl is progressing well towards her goals and there are no updates to goals at this time. Pt prognosis is Good.     Pt will continue to benefit from skilled outpatient occupational therapy to address the deficits listed in the problem list on initial evaluation provide pt/family education and to maximize pt's level of independence in the home and community environment.     Anticipated barriers to occupational therapy: DM    Pt's spiritual, cultural and educational needs considered and pt agreeable to plan of care and goals.    Goals:  Short Term (4 weeks on 8/24/2020):  1)   Patient to be IND with HEP and modalities for pain management. - Met 7/29/2020  2)   Increase ROM 5-10 degrees for elbow ext/flex  to increase functional hand use for feeding and dressing herself.-Met 8/5/2020  3)   Measure  in 2 weeks.-Met 8/25/2020  4)   Decrease edema .2-.3 cm to increase joint mobility /flexibility for improved overall functional hand use.- Met 9/22/2020        Long Term (by discharge):  1)   Pt will report 0 out of 10 pain with right arm use. - 10/22/2020  2)   Patient to score at CK on FOTO to demonstrate improved perception of functional right hand use.-progressing  3)   Pt will return to prior level of function for ADLs and household management. -progressing         Plan:   Certification Period/Plan of care expiration: 11/22/2020 to 1/22/2021.     Outpatient Occupational Therapy 1 time weekly for 8 weeks may include the following interventions: Manual therapy/joint mobilizations, Modalities for pain management, Therapeutic exercises/activities., Strengthening and Edema Control.    Discussed Plan of Care with patient: Yes  Updates/Grading for next session: Continue with strengthening limitations of elbow.    Therapist: Pat Amin, OT

## 2020-11-24 NOTE — PROGRESS NOTES
"  Physical Therapy Treatment Note     Name: Meryl Johnson  [facility] Number: [id number]    Therapy Diagnosis:   Encounter Diagnosis   Name Primary?    Acute pain of right shoulder      Physician: Radha Viera MD    Visit Date: 11/24/2020    Physician Orders: PT Eval and Treat   Medical Diagnosis from Referral: M75.21 (ICD-10-CM) - Biceps tendinitis of right upper extremity  Evaluation Date: 9/2/2020  Authorization Period Expiration: 1/31/2021  Plan of Care Expiration: 12/3/2020  Visit # / Visits authorized: 5/ 12  Episode visits: 11    Time In: 0930  Time Out: 1009  Total Billable Time: 39 minutes    Precautions: Fall    Subjective     Pt reports: shoulder continues to feel good, I saw Dr. Babin yesterday and he said to just finish out therapy.    She was compliant with home exercise program.  Response to previous treatment: sore shoulder blade  Functional change: 60-70% improvement     Pain: 3/10  Location: right shoulder      Objective   ROM: Flexion, ABD, ER, IR equal to contralateral side     Right Left Comment   Shoulder flexion: 4+/5 5/5    Shoulder Abduction: 4+/5 5/5    Shoulder ER: 4/5 4+/5    Shoulder IR: 5/5 5/5        Meryl received therapeutic exercises to develop strength, endurance and ROM for 30 minutes including:    UBE 4'/4' level 4.5 (NP today)   Sidelying ER 2# 2 x 15  Overhead step outs OTB 2 x 15  Serratus punch at 120 degrees flexion 3x10       NT  Prone row with ER 5# 3x15  Prone scaption 3x10 with 5s hold   Prone abduction 3x10 with 5s hold   Y wall slides with lift off 3x10   No money with GTB 3x10-12  End range flexion from 100-end range 3x10  Overhead arm in front 90/90 GTB step outs 30x ea  Row with ER GTB 2 x 15  [place] and arrow GTB 2 x 15  Scaptions 2# 2 x 15  Sidelying flexion and abduction 2# 2 x 15  Serratus press OTB 2 x 20  Lat stretch 2# supine 30x 3" stretch  Cross body wall slides 2# 2 x 15  Wand behind her back LLLD stretch posterior capsule 30 sec 5x  No money GTB 3 x 12 "   Shoulder extensions GTB 2 x 15  IR/ER GTB 20x ea  Tricep extension 20x  Sidelying flexion 2# 3 x 10  Serratus press cables 3# 2 x 10  Pulley 4' scaptions      Meryl received the following manual therapy techniques: Joint mobilizations and Soft tissue Mobilization were applied to the: R shoulder for 9 minutes, including:    Inferior mobs arm abducted 20 deg and 90 deg  Posterior capsule stretch blocking scap  End range flexion with inferior humeral mob Gr III  IR with posterior mob Gr III  Supine resisted PNF D1    Meryl participated in neuromuscular re-education activities to improve: Sense, Proprioception and Posture for 0  minutes. The following activities were included:    Ball on wall ABCs x2   Serratus wall slides with OTB 3x10-12     NT  Ball on wall CW/CCW 30x ea  Serratus slides yellow loop 3 x 10  Serratus wall clock red loop 2 x 20      Home Exercises Provided and Patient Education Provided     Education provided:   - continue HEP    Written Home Exercises Provided: Patient instructed to cont prior HEP.  Exercises were reviewed and Meryl was able to demonstrate them prior to the end of the session.  Meryl demonstrated good  understanding of the education provided.     See EMR under Patient Instructions for exercises provided prior visit.    Assessment     Meryl notes fatigue following OT prior to treatment. She remains hypermobile into ER with anterior glide of humerus causing bicep tendon impingement. Her serratus press improved with tactile cue     Meryl is progressing well towards her goals.   Pt prognosis is Good.     Pt will continue to benefit from skilled outpatient physical therapy to address the deficits listed in the problem list box on initial evaluation, provide pt/family education and to maximize pt's level of independence in the home and community environment.     Pt's spiritual, cultural and educational needs considered and pt agreeable to plan of care and goals.     Anticipated  barriers to physical therapy: covid    GOALS: Short Term Goals:  4 weeks  1.Report decreased shoulder pain < / =  3/10  to increase tolerance for using RUE at work MET   2. Increase PROM 160 passive flexion without pain in shoulder MET  3. Increased strength by 1/3 MMT grade in right shoulder to increase tolerance for ADL and work activities.(Progressing, not met)  4. Pt to tolerate HEP to improve ROM and independence with ADL's. MET      Long Term Goals: 8 weeks  1.Report decreased shoulder pain  < / =  1 /10  to increase tolerance for lifting 5# overhead(Progressing, not met)  2.Increase AROM to full flexion without shoulder pain (Progressing, not met)  3.Increase strength to >/= 4/5 in mid trap to increase tolerance for ADL and work activities.(Progressing, not met)  4. Pt goal: return to working without shoulder pain(Progressing, not met)  5. Pt will have improved gcode of CJ (20-40% limited) on FOTO shoulder in order to demonstrate true functional improvement.(Progressing, not met)       Plan   Continue POC with focus on strengthening of scapular musculature and improving ability to perform lifting/carrying activities for work.       Fabricio Tolentino, PT  11/24/2020

## 2020-11-24 NOTE — PLAN OF CARE
Occupational Therapy Re-evaluation Note     Date: 11/17/2020  Name: Meryl Johnson  [facility] Number: [id number]    Therapy Diagnosis:   Encounter Diagnosis   Name Primary?    Range of motion deficit      Physician: Alexis Thurman MD     Physician Orders:  ROM  Medical Diagnosis: S52.124A (ICD-10-CM) - Closed nondisplaced fracture of head of right radius, initial encounter      Surgical Procedure and Date: N/A  Evaluation Date: 7/23/2020  Insurance: Generic Worker's Comp  Insurance Authorization period Expiration: 07/10/2021      Plan of Care Certification Period: 11/22/2020 to 1/22/2021     Visit # / Visits Authortized: 5/12  Visit #: 18  Time In: 8:25 am  Time Out: 9:10 am  Total Billable Time: 45 minutes     Precautions: Diabetes and standard     X-ray 9/28/2020: Impression:     Healing fracture at the right radial neck with unchanged position and alignment       Subjective      Pt reports: Pt reports she was compliant with her HEP.  Response to previous treatment: functionally the same  Functional change: Pt reports she is still doing all light household activities.     Pain: 0/10  Location: right elbow      Objective   Observation/Appearance:  Skin intact      Edema. Measured in centimeters.    Left Right Right Right Right    7/23/2020 7/23/2020 8/5/2020 8/25/2020 9/22/2020   2in. Above elbow 33.2 40 40 39 39   2in. Below elbow 27 28.9 28.9 28.9 28.8   Elbow Crease 27.2 30 28.8 28.8 28         Elbow/Wrist AROM  Date 7/23/2020 7/23/2020 8/5/2020 8/25/2020 9/22/2020 10/13/2020 11/10/2020 11/24/2020     Left Right Right Right Right Right Right Right   Elbow Ext/Flex 0/140 15/130 0/135 0/140 WNL WNL WNL WNL   Supination/Pronation 90/90 *0/90 45/90 60/90 65/90 70/90 70/90 WNL   Wrist ext/flex 80/70 *50/45 *60/55 *65/60 *65/65 *65/65 *65/65 WNL   Wrist RD/UD 25/30 15/30 25/30 25/30 WNL WNL WNL WNL   *Pt reports that she had a previous wrist fracture 10 years ago and her range of motion was  limited in her wrist since then.     Sensation: c/o occasional night numbness in fingers      Strength (Dyanmometer) and Pinch Strength (Pinch Gauge)  Measured in pounds and psi. Average of three trials.    7/23/2020 7/23/2020 8/25/2020 8/25/2020 9/22/2020 10/13/2020 11/10/2020 11/24/2020     Left Right Left Right Right Right Right Right   Rung II def def 45 28 46 51 46 50           Meryl received the following manual therapy techniques for 0 minutes:        Meryl received therapeutic exercises for 45 minutes including:    -4 lb elbow 3 ways 2 x 15 reps   -rotation bar with 3 plates x 30 reps  -hand gripper rung 4, x 30 reps  -green putty: 2' molding, 2' grasping (at home)  -green therabar frowns, smiles and twists x 30 reps  -wrist 3 ways with 4 lb 2 x 15 reps  -UBE x 5 min forward,  5 min reverse, level 3.8 new UBE  -weight well with 1 lb x 5 reps  -red ball with rebounder x 30 reps     Meryl participated in dynamic functional therapeutic activities to improve functional performance for 0  minutes, including:  -NT     Home Exercises and Education Provided     Education provided:   - none today  - Progress towards goals: Excellent    Written Home Exercises Provided: Patient instructed to cont prior HEP.  Exercises were reviewed and Meryl was able to demonstrate them prior to the end of the session.  Meryl demonstrated good  understanding of the education provided.     See EMR under Patient Instructions for exercises provided prior visit.     Meryl demonstrated good  understanding of the education provided.         Assessment   Meryl Johnson is a 64 y.o. female referred to outpatient occupational/hand therapy and presents with a medical diagnosis of left radial head fracture, resulting in pain, decreased range of motion and decreased strength in right arm. Continued strengthening this day with no difficulty. AROM is WNL.  strength has improved. Pt is motivated and participates well in therapy.      Pt would continue to benefit from skilled OT.      Meryl is progressing well towards her goals and there are no updates to goals at this time. Pt prognosis is Good.     Pt will continue to benefit from skilled outpatient occupational therapy to address the deficits listed in the problem list on initial evaluation provide pt/family education and to maximize pt's level of independence in the home and community environment.     Anticipated barriers to occupational therapy: DM    Pt's spiritual, cultural and educational needs considered and pt agreeable to plan of care and goals.    Goals:  Short Term (4 weeks on 8/24/2020):  1)   Patient to be IND with HEP and modalities for pain management. - Met 7/29/2020  2)   Increase ROM 5-10 degrees for elbow ext/flex  to increase functional hand use for feeding and dressing herself.-Met 8/5/2020  3)   Measure  in 2 weeks.-Met 8/25/2020  4)   Decrease edema .2-.3 cm to increase joint mobility /flexibility for improved overall functional hand use.- Met 9/22/2020        Long Term (by discharge):  1)   Pt will report 0 out of 10 pain with right arm use. - 10/22/2020  2)   Patient to score at CK on FOTO to demonstrate improved perception of functional right hand use.-progressing  3)   Pt will return to prior level of function for ADLs and household management. -progressing         Plan:   Certification Period/Plan of care expiration: 11/22/2020 to 1/22/2021.     Outpatient Occupational Therapy 1 time weekly for 8 weeks may include the following interventions: Manual therapy/joint mobilizations, Modalities for pain management, Therapeutic exercises/activities., Strengthening and Edema Control.    Discussed Plan of Care with patient: Yes  Updates/Grading for next session: Continue with strengthening limitations of elbow.    Therapist: Pat Amin, OT    I certify the need for these services furnished under the plan of treatment and while under my care.      ____________________________________________________________________  Physician/Referring Practitioner   Date of Signature

## 2020-12-01 ENCOUNTER — CLINICAL SUPPORT (OUTPATIENT)
Dept: REHABILITATION | Facility: HOSPITAL | Age: 65
End: 2020-12-01
Attending: FAMILY MEDICINE
Payer: OTHER MISCELLANEOUS

## 2020-12-01 ENCOUNTER — CLINICAL SUPPORT (OUTPATIENT)
Dept: REHABILITATION | Facility: HOSPITAL | Age: 65
End: 2020-12-01
Payer: OTHER MISCELLANEOUS

## 2020-12-01 DIAGNOSIS — M25.511 ACUTE PAIN OF RIGHT SHOULDER: ICD-10-CM

## 2020-12-01 DIAGNOSIS — M25.60 RANGE OF MOTION DEFICIT: ICD-10-CM

## 2020-12-01 PROCEDURE — 97110 THERAPEUTIC EXERCISES: CPT | Performed by: PHYSICAL THERAPIST

## 2020-12-01 PROCEDURE — 97110 THERAPEUTIC EXERCISES: CPT

## 2020-12-01 NOTE — PROGRESS NOTES
"  Physical Therapy Treatment Note     Name: Meryl Johnson  [facility] Number: [id number]    Therapy Diagnosis:   Encounter Diagnosis   Name Primary?    Acute pain of right shoulder      Physician: Radha Viera MD    Visit Date: 12/1/2020    Physician Orders: PT Eval and Treat   Medical Diagnosis from Referral: M75.21 (ICD-10-CM) - Biceps tendinitis of right upper extremity  Evaluation Date: 9/2/2020  Authorization Period Expiration: 1/31/2021  Plan of Care Expiration: 12/3/2020  Visit # / Visits authorized: 6/ 12  Episode visits: 12    Time In: 0915  Time Out: 1002  Total Billable Time: 47 minutes    Precautions: Fall    Subjective     Pt reports: Shoulder is doing well, had a nice Thanksgiving. Not much pain today    She was compliant with home exercise program.  Response to previous treatment: sore shoulder blade  Functional change: 60-70% improvement     Pain: 3/10  Location: right shoulder      Objective   ROM: Flexion, ABD, ER, IR equal to contralateral side     Right Left Comment   Shoulder flexion: 4+/5 5/5    Shoulder Abduction: 4+/5 5/5    Shoulder ER: 4/5 4+/5    Shoulder IR: 5/5 5/5        Meryl received therapeutic exercises to develop strength, endurance and ROM for 47 minutes including:    [place]ley IR 10 sec 20x  Sidelying ER 2# 2 x 15  Overhead step outs GTB 2 x 15  Prone row with ER 0# 3x15  Prone row 3# 3 x 10  Prone extensions 3# 3 x 10  TRX Rows 3 x 12    NT  Serratus punch at 120 degrees flexion 3x10   UBE 4'/4' level 4.5 (NP today)   Prone scaption 3x10 with 5s hold   Prone abduction 3x10 with 5s hold   Y wall slides with lift off 3x10   No money with GTB 3x10-12  End range flexion from 100-end range 3x10  Overhead arm in front 90/90 GTB step outs 30x ea  Row with ER GTB 2 x 15  [place] and arrow GTB 2 x 15  Scaptions 2# 2 x 15  Sidelying flexion and abduction 2# 2 x 15  Serratus press OTB 2 x 20  Lat stretch 2# supine 30x 3" stretch  Cross body wall slides 2# 2 x 15  Wand behind her back LLLD stretch " posterior capsule 30 sec 5x  No money GTB 3 x 12   Shoulder extensions GTB 2 x 15  IR/ER GTB 20x ea  Tricep extension 20x  Sidelying flexion 2# 3 x 10  Serratus press cables 3# 2 x 10  [place] 4' scaptions      Meryl received the following manual therapy techniques: Joint mobilizations and Soft tissue Mobilization were applied to the: R shoulder for 0 minutes, including:    Inferior mobs arm abducted 20 deg and 90 deg  Posterior capsule stretch blocking scap  End range flexion with inferior humeral mob Gr III  IR with posterior mob Gr III  Supine resisted PNF D1    Meryl participated in neuromuscular re-education activities to improve: Sense, Proprioception and Posture for 0  minutes. The following activities were included:    Ball on wall ABCs x2   Serratus wall slides with OTB 3x10-12     NT  Ball on wall CW/CCW 30x ea  Serratus slides yellow loop 3 x 10  Serratus wall clock red loop 2 x 20      Home Exercises Provided and Patient Education Provided     Education provided:   - continue HEP    Written Home Exercises Provided: Patient instructed to cont prior HEP.  Exercises were reviewed and Meryl was able to demonstrate them prior to the end of the session.  Meryl demonstrated good  understanding of the education provided.     See EMR under Patient Instructions for exercises provided prior visit.    Assessment     Meryl progressed with scap stabilization in prone. Good stretch but pain free with IR behind back. She was hesitant with TRX squats but able to perform. Tactile cues with overhead step outs     Meryl is progressing well towards her goals.   Pt prognosis is Good.     Pt will continue to benefit from skilled outpatient physical therapy to address the deficits listed in the problem list box on initial evaluation, provide pt/family education and to maximize pt's level of independence in the home and community environment.     Pt's spiritual, cultural and educational needs considered and pt agreeable to  plan of care and goals.     Anticipated barriers to physical therapy: covid    GOALS: Short Term Goals:  4 weeks  1.Report decreased shoulder pain < / =  3/10  to increase tolerance for using RUE at work MET   2. Increase PROM 160 passive flexion without pain in shoulder MET  3. Increased strength by 1/3 MMT grade in right shoulder to increase tolerance for ADL and work activities.(Progressing, not met)  4. Pt to tolerate HEP to improve ROM and independence with ADL's. MET      Long Term Goals: 8 weeks  1.Report decreased shoulder pain  < / =  1 /10  to increase tolerance for lifting 5# overhead(Progressing, not met)  2.Increase AROM to full flexion without shoulder pain (Progressing, not met)  3.Increase strength to >/= 4/5 in mid trap to increase tolerance for ADL and work activities.(Progressing, not met)  4. Pt goal: return to working without shoulder pain(Progressing, not met)  5. Pt will have improved gcode of CJ (20-40% limited) on FOTO shoulder in order to demonstrate true functional improvement.(Progressing, not met)       Plan   Continue POC with focus on strengthening of scapular musculature and improving ability to perform lifting/carrying activities for work.       Fabricio Tolentino, PT  12/1/2020

## 2020-12-03 ENCOUNTER — HOSPITAL ENCOUNTER (OUTPATIENT)
Dept: RADIOLOGY | Facility: CLINIC | Age: 65
Discharge: HOME | End: 2020-12-03
Attending: OBSTETRICS & GYNECOLOGY
Payer: COMMERCIAL

## 2020-12-03 DIAGNOSIS — N95.9 MENOPAUSAL AND PERIMENOPAUSAL DISORDER: ICD-10-CM

## 2020-12-03 PROCEDURE — 77080 DXA BONE DENSITY AXIAL: CPT | Mod: TC

## 2020-12-03 PROCEDURE — 77080 DEXA BONE DENSITY SPINE HIP: ICD-10-PCS | Mod: 26,,, | Performed by: INTERNAL MEDICINE

## 2020-12-03 PROCEDURE — 77080 DXA BONE DENSITY AXIAL: CPT | Mod: 26,,, | Performed by: INTERNAL MEDICINE

## 2020-12-08 ENCOUNTER — CLINICAL SUPPORT (OUTPATIENT)
Dept: REHABILITATION | Facility: HOSPITAL | Age: 65
End: 2020-12-08
Payer: OTHER MISCELLANEOUS

## 2020-12-08 DIAGNOSIS — M25.511 ACUTE PAIN OF RIGHT SHOULDER: ICD-10-CM

## 2020-12-08 DIAGNOSIS — M25.60 RANGE OF MOTION DEFICIT: ICD-10-CM

## 2020-12-08 PROCEDURE — 97140 MANUAL THERAPY 1/> REGIONS: CPT | Performed by: PHYSICAL THERAPIST

## 2020-12-08 PROCEDURE — 97110 THERAPEUTIC EXERCISES: CPT | Performed by: PHYSICAL THERAPIST

## 2020-12-08 PROCEDURE — 97110 THERAPEUTIC EXERCISES: CPT

## 2020-12-08 NOTE — PLAN OF CARE
"  Physical Therapy Treatment Note     Name: Meryl Johnson  [facility] Number: [id number]    Therapy Diagnosis:   Encounter Diagnosis   Name Primary?    Acute pain of right shoulder      Physician: Radha Viera MD    Visit Date: 12/8/2020    Physician Orders: PT Eval and Treat   Medical Diagnosis from Referral: M75.21 (ICD-10-CM) - Biceps tendinitis of right upper extremity  Evaluation Date: 9/2/2020  Authorization Period Expiration: 1/31/2021  Plan of Care Expiration: 3/1/2020  Visit # / Visits authorized: 7/ 12  Episode visits: 13    Time In: 0915  Time Out: 0958  Total Billable Time: 43 minutes    Precautions: Fall    Subjective     Pt reports: Shoulders doing well, getting stronger.     She was compliant with home exercise program.  Response to previous treatment: sore shoulder blade  Functional change: 60-70% improvement     Pain: 3/10  Location: right shoulder      Objective   Combined motion behind back 2 levels lower on the RUE     Right Left Comment   Shoulder flexion: 4+/5 5/5    Shoulder Abduction: 4+/5 5/5    Shoulder ER: 4/5 4+/5 Hypermobile  degrees on R   Shoulder IR: 5/5 5/5        Meryl received therapeutic exercises to develop strength, endurance and ROM for 33 minutes including:    [place] IR 10 sec 20x  Sidelying ER 2# 2 x 15  Supine 2# 90-0 ER 2 x 15  Prone row with ER 0# 3x15  Scaptions 2# 2 x 15    NT  Prone row 3# 3 x 10  Prone extensions 3# 3 x 10  TRX Rows 3 x 12  Serratus punch at 120 degrees flexion 3x10   UBE 4'/4' level 4.5 (NP today)   Prone scaption 3x10 with 5s hold   Prone abduction 3x10 with 5s hold   Y wall slides with lift off 3x10   No money with GTB 3x10-12  End range flexion from 100-end range 3x10  Overhead arm in front 90/90 GTB step outs 30x ea  Row with ER GTB 2 x 15  [place] and arrow GTB 2 x 15  Sidelying flexion and abduction 2# 2 x 15  Serratus press OTB 2 x 20  Lat stretch 2# supine 30x 3" stretch  Cross body wall slides 2# 2 x 15  Wand behind her back LLLD stretch " posterior capsule 30 sec 5x  No money GTB 3 x 12   Shoulder extensions GTB 2 x 15  IR/ER GTB 20x ea  Tricep extension 20x  Sidelying flexion 2# 3 x 10  Serratus press cables 3# 2 x 10  [place] 4' scaptions      Meryl received the following manual therapy techniques: Joint mobilizations and Soft tissue Mobilization were applied to the: R shoulder for 10 minutes, including:    Inferior mobs arm abducted 20 deg and 90 deg  Posterior capsule stretch blocking scap  End range flexion with inferior humeral mob Gr III  IR with posterior mob Gr III  Supine resisted PNF D1    Meryl participated in neuromuscular re-education activities to improve: Sense, Proprioception and Posture for 0  minutes. The following activities were included:    Ball on wall ABCs x2   Serratus wall slides with OTB 3x10-12     NT  Ball on wall CW/CCW 30x ea  Serratus slides yellow loop 3 x 10  Serratus wall clock red loop 2 x 20      Home Exercises Provided and Patient Education Provided     Education provided:   - continue HEP    Written Home Exercises Provided: Patient instructed to cont prior HEP.  Exercises were reviewed and Meryl was able to demonstrate them prior to the end of the session.  Meryl demonstrated good  understanding of the education provided.     See EMR under Patient Instructions for exercises provided prior visit.    Assessment     Meryl remains hypermobile  deg and weak at end range, progressed with strengthening in supine. Restored IR to normal 70 degrees,just lacks endurance with exercise. Continues to improve functional reach behind her back to 2 levels lower than LLE    Meryl is progressing well towards her goals.   Pt prognosis is Good.     Pt will continue to benefit from skilled outpatient physical therapy to address the deficits listed in the problem list box on initial evaluation, provide pt/family education and to maximize pt's level of independence in the home and community environment.     Pt's spiritual,  cultural and educational needs considered and pt agreeable to plan of care and goals.     Anticipated barriers to physical therapy: covid    GOALS: Short Term Goals:  4 weeks  1.Report decreased shoulder pain < / =  3/10  to increase tolerance for using RUE at work MET   2. Increase PROM 160 passive flexion without pain in shoulder MET  3. Increased strength by 1/3 MMT grade in right shoulder to increase tolerance for ADL and work activities.(Progressing, not met)  4. Pt to tolerate HEP to improve ROM and independence with ADL's. MET      Long Term Goals: 8 weeks  1.Report decreased shoulder pain  < / =  1 /10  to increase tolerance for lifting 5# overhead(Progressing, not met)  2.Increase AROM to full flexion without shoulder pain (Progressing, not met)  3.Increase strength to >/= 4/5 in mid trap to increase tolerance for ADL and work activities.(Progressing, not met)  4. Pt goal: return to working without shoulder pain(Progressing, not met)  5. Pt will have improved gcode of CJ (20-40% limited) on FOTO shoulder in order to demonstrate true functional improvement.(Progressing, not met)       Plan   Certification Period: 12/8/20 to 3/1/2021  Recommended Treatment Plan: 1 times per week for 8 weeks: Manual Therapy, Moist Heat/ Ice, Neuromuscular Re-ed, Patient Education, Self Care, Therapeutic Activites and Therapeutic Exercise  Other Recommendations: modalities prn, ASTYM prn    Therapist: Fabricio Tolentino, PT, DPT    I CERTIFY THE NEED FOR THESE SERVICES FURNISHED UNDER THIS PLAN OF TREATMENT AND WHILE UNDER MY CARE    Physician's comments: ________________________________________________________________________________________________________________________________________________      Physician's Name: ___________________________________

## 2020-12-08 NOTE — PROGRESS NOTES
"  Physical Therapy Treatment Note     Name: Meryl Johnson  [facility] Number: [id number]    Therapy Diagnosis:   Encounter Diagnosis   Name Primary?    Acute pain of right shoulder      Physician: Radha Viera MD    Visit Date: 12/8/2020    Physician Orders: PT Eval and Treat   Medical Diagnosis from Referral: M75.21 (ICD-10-CM) - Biceps tendinitis of right upper extremity  Evaluation Date: 9/2/2020  Authorization Period Expiration: 1/31/2021  Plan of Care Expiration: 3/1/2020  Visit # / Visits authorized: 7/ 12  Episode visits: 13    Time In: 0915  Time Out: 0958  Total Billable Time: 43 minutes    Precautions: Fall    Subjective     Pt reports: Shoulders doing well, getting stronger.     She was compliant with home exercise program.  Response to previous treatment: sore shoulder blade  Functional change: 60-70% improvement     Pain: 3/10  Location: right shoulder      Objective   Combined motion behind back 2 levels lower on the RUE     Right Left Comment   Shoulder flexion: 4+/5 5/5    Shoulder Abduction: 4+/5 5/5    Shoulder ER: 4/5 4+/5 Hypermobile  degrees on R   Shoulder IR: 5/5 5/5        Meryl received therapeutic exercises to develop strength, endurance and ROM for 33 minutes including:    Pulley IR 10 sec 20x  Sidelying ER 2# 2 x 15  Supine 2# 90-0 ER 2 x 15  Prone row with ER 0# 3x15  Scaptions 2# 2 x 15    NT  Prone row 3# 3 x 10  Prone extensions 3# 3 x 10  TRX Rows 3 x 12  Serratus punch at 120 degrees flexion 3x10   UBE 4'/4' level 4.5 (NP today)   Prone scaption 3x10 with 5s hold   Prone abduction 3x10 with 5s hold   Y wall slides with lift off 3x10   No money with GTB 3x10-12  End range flexion from 100-end range 3x10  Overhead arm in front 90/90 GTB step outs 30x ea  Row with ER GTB 2 x 15  [place] and arrow GTB 2 x 15  Sidelying flexion and abduction 2# 2 x 15  Serratus press OTB 2 x 20  Lat stretch 2# supine 30x 3" stretch  Cross body wall slides 2# 2 x 15  Wand behind her back LLLD stretch " posterior capsule 30 sec 5x  No money GTB 3 x 12   Shoulder extensions GTB 2 x 15  IR/ER GTB 20x ea  Tricep extension 20x  Sidelying flexion 2# 3 x 10  Serratus press cables 3# 2 x 10  Pulley 4' scaptions      Meryl received the following manual therapy techniques: Joint mobilizations and Soft tissue Mobilization were applied to the: R shoulder for 10 minutes, including:    Inferior mobs arm abducted 20 deg and 90 deg  Posterior capsule stretch blocking scap  End range flexion with inferior humeral mob Gr III  IR with posterior mob Gr III  Supine resisted PNF D1    Meryl participated in neuromuscular re-education activities to improve: Sense, Proprioception and Posture for 0  minutes. The following activities were included:    Ball on wall ABCs x2   Serratus wall slides with OTB 3x10-12     NT  Ball on wall CW/CCW 30x ea  Serratus slides yellow loop 3 x 10  Serratus wall clock red loop 2 x 20      Home Exercises Provided and Patient Education Provided     Education provided:   - continue HEP    Written Home Exercises Provided: Patient instructed to cont prior HEP.  Exercises were reviewed and Meryl was able to demonstrate them prior to the end of the session.  Meryl demonstrated good  understanding of the education provided.     See EMR under Patient Instructions for exercises provided prior visit.    Assessment     Meryl remains hypermobile  deg and weak at end range, progressed with strengthening in supine. Restored IR to normal 70 degrees,just lacks endurance with exercise. Continues to improve functional reach behind her back to 2 levels lower than LLE    Meryl is progressing well towards her goals.   Pt prognosis is Good.     Pt will continue to benefit from skilled outpatient physical therapy to address the deficits listed in the problem list box on initial evaluation, provide pt/family education and to maximize pt's level of independence in the home and community environment.     Pt's spiritual,  cultural and educational needs considered and pt agreeable to plan of care and goals.     Anticipated barriers to physical therapy: covid    GOALS: Short Term Goals:  4 weeks  1.Report decreased shoulder pain < / =  3/10  to increase tolerance for using RUE at work MET   2. Increase PROM 160 passive flexion without pain in shoulder MET  3. Increased strength by 1/3 MMT grade in right shoulder to increase tolerance for ADL and work activities.(Progressing, not met)  4. Pt to tolerate HEP to improve ROM and independence with ADL's. MET      Long Term Goals: 8 weeks  1.Report decreased shoulder pain  < / =  1 /10  to increase tolerance for lifting 5# overhead(Progressing, not met)  2.Increase AROM to full flexion without shoulder pain (Progressing, not met)  3.Increase strength to >/= 4/5 in mid trap to increase tolerance for ADL and work activities.(Progressing, not met)  4. Pt goal: return to working without shoulder pain(Progressing, not met)  5. Pt will have improved gcode of CJ (20-40% limited) on FOTO shoulder in order to demonstrate true functional improvement.(Progressing, not met)       Plan   Certification Period: 12/8/20 to 3/1/2021  Recommended Treatment Plan: 1 times per week for 8 weeks: Manual Therapy, Moist Heat/ Ice, Neuromuscular Re-ed, Patient Education, Self Care, Therapeutic Activites and Therapeutic Exercise  Other Recommendations: modalities prn, ASTYM prn    Therapist: Fabricio Tolentino, PT, DPT    I CERTIFY THE NEED FOR THESE SERVICES FURNISHED UNDER THIS PLAN OF TREATMENT AND WHILE UNDER MY CARE    Physician's comments: ________________________________________________________________________________________________________________________________________________      Physician's Name: ___________________________________

## 2020-12-08 NOTE — PROGRESS NOTES
Occupational Therapy Daily Treatment Note     Date: 11/17/2020  Name: Meryl Johnson  [facility] Number: [id number]    Therapy Diagnosis:   Encounter Diagnosis   Name Primary?    Range of motion deficit      Physician: Alexis Thurman MD     Physician Orders:  ROM  Medical Diagnosis: S52.124A (ICD-10-CM) - Closed nondisplaced fracture of head of right radius, initial encounter      Surgical Procedure and Date: N/A  Evaluation Date: 7/23/2020  Insurance: Generic Worker's Comp  Insurance Authorization period Expiration: 07/10/2021      Plan of Care Certification Period: 11/22/2020 to 1/22/2021     Visit # / Visits Authortized: 7/12  Visit #: 20  Time In: 8:25 am  Time Out: 9:10 am  Total Billable Time: 45 minutes     Precautions: Diabetes and standard     X-ray 9/28/2020: Impression:     Healing fracture at the right radial neck with unchanged position and alignment       Subjective      Pt reports: Pt reports she was compliant with her HEP.  Response to previous treatment: functionally the same  Functional change: Pt reports she is still doing all light household activities.     Pain: 0/10  Location: right elbow      Objective   Observation/Appearance:  Skin intact      Edema. Measured in centimeters.    Left Right Right Right Right    7/23/2020 7/23/2020 8/5/2020 8/25/2020 9/22/2020   2in. Above elbow 33.2 40 40 39 39   2in. Below elbow 27 28.9 28.9 28.9 28.8   Elbow Crease 27.2 30 28.8 28.8 28         Elbow/Wrist AROM  Date 7/23/2020 7/23/2020 8/5/2020 8/25/2020 9/22/2020 10/13/2020 11/10/2020 11/24/2020     Left Right Right Right Right Right Right Right   Elbow Ext/Flex 0/140 15/130 0/135 0/140 WNL WNL WNL WNL   Supination/Pronation 90/90 *0/90 45/90 60/90 65/90 70/90 70/90 WNL   Wrist ext/flex 80/70 *50/45 *60/55 *65/60 *65/65 *65/65 *65/65 WNL   Wrist RD/UD 25/30 15/30 25/30 25/30 WNL WNL WNL WNL   *Pt reports that she had a previous wrist fracture 10 years ago and her range of motion was  limited in her wrist since then.     Sensation: c/o occasional night numbness in fingers      Strength (Dyanmometer) and Pinch Strength (Pinch Gauge)  Measured in pounds and psi. Average of three trials.    7/23/2020 7/23/2020 8/25/2020 8/25/2020 9/22/2020 10/13/2020 11/10/2020 11/24/2020     Left Right Left Right Right Right Right Right   Rung II def def 45 28 46 51 46 50           Meryl received the following manual therapy techniques for 0 minutes:        Meryl received therapeutic exercises for 45 minutes including:    -5 lb elbow 3 ways 2 x 15 reps   -rotation bar with 3 plates x 30 reps  -hand gripper rung 4 silver spring, x 30 reps  -green putty: 2' molding, 2' grasping (at home)  -green therabar frowns, smiles and twists x 30 reps  -wrist 3 ways with 5 lb 2 x 15 reps  -UBE x 5 min forward,  5 min reverse, level 4.0 new UBE  -weight well with 2.5 lb x 5 reps  -yellow ball with rebounder x 30 reps     Meryl participated in dynamic functional therapeutic activities to improve functional performance for 0  minutes, including:  -NT     Home Exercises and Education Provided     Education provided:   - none today  - Progress towards goals: Excellent    Written Home Exercises Provided: Patient instructed to cont prior HEP.  Exercises were reviewed and Meryl was able to demonstrate them prior to the end of the session.  Meryl demonstrated good  understanding of the education provided.     See EMR under Patient Instructions for exercises provided prior visit.     Meryl demonstrated good  understanding of the education provided.         Assessment   Meryl Johnson is a 64 y.o. female referred to outpatient occupational/hand therapy and presents with a medical diagnosis of left radial head fracture, resulting in pain, decreased range of motion and decreased strength in right arm. Advanced strengthening this day with no difficulty.  Pt is motivated and participates well in therapy.     Pt would continue to  benefit from skilled OT.      Meryl is progressing well towards her goals and there are no updates to goals at this time. Pt prognosis is Good.     Pt will continue to benefit from skilled outpatient occupational therapy to address the deficits listed in the problem list on initial evaluation provide pt/family education and to maximize pt's level of independence in the home and community environment.     Anticipated barriers to occupational therapy: DM    Pt's spiritual, cultural and educational needs considered and pt agreeable to plan of care and goals.    Goals:  Short Term (4 weeks on 8/24/2020):  1)   Patient to be IND with HEP and modalities for pain management. - Met 7/29/2020  2)   Increase ROM 5-10 degrees for elbow ext/flex  to increase functional hand use for feeding and dressing herself.-Met 8/5/2020  3)   Measure  in 2 weeks.-Met 8/25/2020  4)   Decrease edema .2-.3 cm to increase joint mobility /flexibility for improved overall functional hand use.- Met 9/22/2020        Long Term (by discharge):  1)   Pt will report 0 out of 10 pain with right arm use. - 10/22/2020  2)   Patient to score at CK on FOTO to demonstrate improved perception of functional right hand use.-progressing  3)   Pt will return to prior level of function for ADLs and household management. -progressing         Plan:   Certification Period/Plan of care expiration: 11/22/2020 to 1/22/2021.     Outpatient Occupational Therapy 1 time weekly for 8 weeks may include the following interventions: Manual therapy/joint mobilizations, Modalities for pain management, Therapeutic exercises/activities., Strengthening and Edema Control.    Discussed Plan of Care with patient: Yes  Updates/Grading for next session: Advance as tolerated.    Therapist: Pat Amin, OT

## 2020-12-15 ENCOUNTER — CLINICAL SUPPORT (OUTPATIENT)
Dept: REHABILITATION | Facility: HOSPITAL | Age: 65
End: 2020-12-15
Payer: OTHER MISCELLANEOUS

## 2020-12-15 DIAGNOSIS — M25.511 ACUTE PAIN OF RIGHT SHOULDER: ICD-10-CM

## 2020-12-15 DIAGNOSIS — M25.60 RANGE OF MOTION DEFICIT: ICD-10-CM

## 2020-12-15 PROCEDURE — 97110 THERAPEUTIC EXERCISES: CPT | Performed by: PHYSICAL THERAPIST

## 2020-12-15 PROCEDURE — 97140 MANUAL THERAPY 1/> REGIONS: CPT | Performed by: PHYSICAL THERAPIST

## 2020-12-15 PROCEDURE — 97110 THERAPEUTIC EXERCISES: CPT

## 2020-12-15 NOTE — PROGRESS NOTES
"  Physical Therapy Treatment Note     Name: Meryl Johnson  [facility] Number: [id number]    Therapy Diagnosis:   Encounter Diagnosis   Name Primary?    Acute pain of right shoulder      Physician: Radha Viera MD    Visit Date: 12/15/2020    Physician Orders: PT Eval and Treat   Medical Diagnosis from Referral: M75.21 (ICD-10-CM) - Biceps tendinitis of right upper extremity  Evaluation Date: 9/2/2020  Authorization Period Expiration: 1/31/2021  Plan of Care Expiration: 3/1/2020  Visit # / Visits authorized: 1/1  Episode visits: 14    Time In: 0915  Time Out: 0955  Total Billable Time: 40 minutes    Precautions: Fall    Subjective     Pt reports: The elbow hurt last week, likely from OT wrist exercises, Pain with end range elbow flexion  She was compliant with home exercise program.  Response to previous treatment: sore shoulder blade  Functional change: 60-70% improvement     Pain: 3/10  Location: right shoulder      Objective   Combined motion behind back 2 levels lower on the RUE     Right Left Comment   Shoulder flexion: 4+/5 5/5    Shoulder Abduction: 4+/5 5/5    Shoulder ER: 4/5 4+/5 Hypermobile  degrees on R   Shoulder IR: 5/5 5/5        Meryl received therapeutic exercises to develop strength, endurance and ROM for 30 minutes including:    Sleeper 2# 1' 3x  No money GTB 2 x 20 3"  holds  Supine 2# 90-0 ER 2 x 15  Prone row with ER 0# 3x15  Scaptions 2# 2 x 15  Prone subscap IR 2# 3 x 15    NT  Prone row 3# 3 x 10  Prone extensions 3# 3 x 10  TRX Rows 3 x 12  Serratus punch at 120 degrees flexion 3x10   UBE 4'/4' level 4.5 (NP today)   Prone scaption 3x10 with 5s hold   Prone abduction 3x10 with 5s hold   Y wall slides with lift off 3x10   No money with GTB 3x10-12  End range flexion from 100-end range 3x10  Overhead arm in front 90/90 GTB step outs 30x ea  Row with ER GTB 2 x 15  [place] and arrow GTB 2 x 15  Sidelying flexion and abduction 2# 2 x 15  Serratus press OTB 2 x 20  Lat stretch 2# supine 30x 3" " stretch  Cross body wall slides 2# 2 x 15  Wand behind her back LLLD stretch posterior capsule 30 sec 5x  No money GTB 3 x 12   Shoulder extensions GTB 2 x 15  IR/ER GTB 20x ea  Tricep extension 20x  Sidelying flexion 2# 3 x 10  Serratus press cables 3# 2 x 10  Pulley 4' scaptions      Meryl received the following manual therapy techniques: Joint mobilizations and Soft tissue Mobilization were applied to the: R shoulder for 10 minutes, including:    Inferior mobs arm abducted 20 deg and 90 deg  Posterior capsule stretch blocking scap  End range flexion with inferior humeral mob Gr III  IR with posterior mob Gr III  Supine resisted PNF D1    Meryl participated in neuromuscular re-education activities to improve: Sense, Proprioception and Posture for 0  minutes. The following activities were included:    Ball on wall ABCs x2   Serratus wall slides with OTB 3x10-12     NT  Ball on wall CW/CCW 30x ea  Serratus slides yellow loop 3 x 10  Serratus wall clock red loop 2 x 20      Home Exercises Provided and Patient Education Provided     Education provided:   - continue HEP    Written Home Exercises Provided: Patient instructed to cont prior HEP.  Exercises were reviewed and Meryl was able to demonstrate them prior to the end of the session.  Meryl demonstrated good  understanding of the education provided.     See EMR under Patient Instructions for exercises provided prior visit.    Assessment     Meryl notes pain free palpation to the shoulder today. Decreased upper trap tightness in clinic today. Most limited into IR, did well with subscap strengthening. Will add no money to HEP    Meryl is progressing well towards her goals.   Pt prognosis is Good.     Pt will continue to benefit from skilled outpatient physical therapy to address the deficits listed in the problem list box on initial evaluation, provide pt/family education and to maximize pt's level of independence in the home and community environment.     Pt's  spiritual, cultural and educational needs considered and pt agreeable to plan of care and goals.     Anticipated barriers to physical therapy: covid    GOALS: Short Term Goals:  4 weeks  1.Report decreased shoulder pain < / =  3/10  to increase tolerance for using RUE at work MET   2. Increase PROM 160 passive flexion without pain in shoulder MET  3. Increased strength by 1/3 MMT grade in right shoulder to increase tolerance for ADL and work activities.(Progressing, not met)  4. Pt to tolerate HEP to improve ROM and independence with ADL's. MET      Long Term Goals: 8 weeks  1.Report decreased shoulder pain  < / =  1 /10  to increase tolerance for lifting 5# overhead(Progressing, not met)  2.Increase AROM to full flexion without shoulder pain (Progressing, not met)  3.Increase strength to >/= 4/5 in mid trap to increase tolerance for ADL and work activities.(Progressing, not met)  4. Pt goal: return to working without shoulder pain(Progressing, not met)  5. Pt will have improved gcode of CJ (20-40% limited) on FOTO shoulder in order to demonstrate true functional improvement.(Progressing, not met)       Plan   Certification Period: 12/8/20 to 3/1/2021  Recommended Treatment Plan: 1 times per week for 8 weeks: Manual Therapy, Moist Heat/ Ice, Neuromuscular Re-ed, Patient Education, Self Care, Therapeutic Activites and Therapeutic Exercise  Other Recommendations: modalities prn, ASTYM prn    Therapist: Fabricio Tolentino, PT, DPT

## 2020-12-15 NOTE — PROGRESS NOTES
Occupational Therapy Daily Treatment Note     Date: 11/17/2020  Name: Meryl Johnson  [facility] Number: [id number]    Therapy Diagnosis:   Encounter Diagnosis   Name Primary?    Range of motion deficit      Physician: Alexis Thurman MD     Physician Orders:  ROM  Medical Diagnosis: S52.124A (ICD-10-CM) - Closed nondisplaced fracture of head of right radius, initial encounter      Surgical Procedure and Date: N/A  Evaluation Date: 7/23/2020  Insurance: Generic Worker's Comp  Insurance Authorization period Expiration: 07/10/2021      Plan of Care Certification Period: 11/22/2020 to 1/22/2021     Visit # / Visits Authortized: 8/12  Visit #: 21  Time In: 8:30 am  Time Out: 9:05 am  Total Billable Time: 35 minutes     Precautions: Diabetes and standard       Subjective      Pt reports: Pt reports she was compliant with her HEP. She reported that she had increased elbow pain following last treatment session.   Response to previous treatment: increase in elbow pain  Functional change: Pt reports she is still doing all light household activities.     Pain: 0/10  Location: right elbow      Objective   Observation/Appearance:  Skin intact      Edema. Measured in centimeters.    Left Right Right Right Right    7/23/2020 7/23/2020 8/5/2020 8/25/2020 9/22/2020   2in. Above elbow 33.2 40 40 39 39   2in. Below elbow 27 28.9 28.9 28.9 28.8   Elbow Crease 27.2 30 28.8 28.8 28         Elbow/Wrist AROM  Date 7/23/2020 7/23/2020 8/5/2020 8/25/2020 9/22/2020 10/13/2020 11/10/2020 11/24/2020     Left Right Right Right Right Right Right Right   Elbow Ext/Flex 0/140 15/130 0/135 0/140 WNL WNL WNL WNL   Supination/Pronation 90/90 *0/90 45/90 60/90 65/90 70/90 70/90 WNL   Wrist ext/flex 80/70 *50/45 *60/55 *65/60 *65/65 *65/65 *65/65 WNL   Wrist RD/UD 25/30 15/30 25/30 25/30 WNL WNL WNL WNL   *Pt reports that she had a previous wrist fracture 10 years ago and her range of motion was limited in her wrist since then.      Sensation: c/o occasional night numbness in fingers      Strength (Dyanmometer) and Pinch Strength (Pinch Gauge)  Measured in pounds and psi. Average of three trials.    7/23/2020 7/23/2020 8/25/2020 8/25/2020 9/22/2020 10/13/2020 11/10/2020 11/24/2020     Left Right Left Right Right Right Right Right   Rung II def def 45 28 46 51 46 50       Meryl received the following manual therapy techniques for 0 minutes:      Meryl received therapeutic exercises for 35 minutes including:    -5 lb elbow 3 ways 2 x 15 reps   -rotation bar with 3 plates x 30 reps  -green putty: 2' molding, 2' grasping (at home)  -green therabar frowns, smiles and twists x 30 reps  -UBE x 5 min forward,  5 min reverse, level 4.0 new UBE  -yellow ball with rebounder x 30 reps     Meryl participated in dynamic functional therapeutic activities to improve functional performance for 0  minutes, including:  -NT     Home Exercises and Education Provided     Education provided:   - Red theraband, elbow 3 ways, elbow ext   - Progress towards goals: Excellent    Written Home Exercises Provided: Patient instructed to cont prior HEP.  Exercises were reviewed and Meryl was able to demonstrate them prior to the end of the session.  Meryl demonstrated good  understanding of the education provided.     See EMR under Patient Instructions for exercises provided prior visit.     Meryl demonstrated good  understanding of the education provided.     Assessment   Meryl Johnson is a 64 y.o. female referred to outpatient occupational/hand therapy and presents with a medical diagnosis of left radial head fracture, resulting in pain, decreased range of motion and decreased strength in right arm. Pt reported an increase in pain following last session. She was tender over her lateral epicondyle.  Pt activities modified and gripping/wrist activities not performed due to pain. Pt tolerated treatment well this day. Pt instructed to begin red theraband at home.  "Pt is motivated and participates well in therapy.     Pt would continue to benefit from skilled OT.      Meryl is progressing well towards her goals and there are no updates to goals at this time. Pt prognosis is Good.     Pt will continue to benefit from skilled outpatient occupational therapy to address the deficits listed in the problem list on initial evaluation provide pt/family education and to maximize pt's level of independence in the home and community environment.     Anticipated barriers to occupational therapy: DM    Pt's spiritual, cultural and educational needs considered and pt agreeable to plan of care and goals.    Goals:  Short Term (4 weeks on 8/24/2020):  1)   Patient to be IND with HEP and modalities for pain management. - Met 7/29/2020  2)   Increase ROM 5-10 degrees for elbow ext/flex  to increase functional hand use for feeding and dressing herself.-Met 8/5/2020  3)   Measure  in 2 weeks.-Met 8/25/2020  4)   Decrease edema .2-.3 cm to increase joint mobility /flexibility for improved overall functional hand use.- Met 9/22/2020        Long Term (by discharge):  1)   Pt will report 0 out of 10 pain with right arm use. - 10/22/2020  2)   Patient to score at CK on FOTO to demonstrate improved perception of functional right hand use.-progressing  3)   Pt will return to prior level of function for ADLs and household management. -progressing         Plan:   Certification Period/Plan of care expiration: 11/22/2020 to 1/22/2021.     Outpatient Occupational Therapy 1 time weekly for 8 weeks may include the following interventions: Manual therapy/joint mobilizations, Modalities for pain management, Therapeutic exercises/activities., Strengthening and Edema Control.    Discussed Plan of Care with patient: Yes  Updates/Grading for next session: Advance as tolerated.    Student: ELENO Tejada    " I certify that I was present in the room directing the student in service delivery and guiding " "them using my skilled judgement. As the co-signing therapist, I have reviewed the student's documentation and am responsible for the treatment, assessment and plan."    Therapist: Pat Amin OT              "

## 2020-12-23 ENCOUNTER — CLINICAL SUPPORT (OUTPATIENT)
Dept: REHABILITATION | Facility: HOSPITAL | Age: 65
End: 2020-12-23
Payer: OTHER MISCELLANEOUS

## 2020-12-23 DIAGNOSIS — M25.511 ACUTE PAIN OF RIGHT SHOULDER: ICD-10-CM

## 2020-12-23 PROCEDURE — 97140 MANUAL THERAPY 1/> REGIONS: CPT | Performed by: PHYSICAL THERAPIST

## 2020-12-23 PROCEDURE — 97110 THERAPEUTIC EXERCISES: CPT | Performed by: PHYSICAL THERAPIST

## 2020-12-23 NOTE — PROGRESS NOTES
Physical Therapy Treatment Note     Name: Meryl Johnson  [facility] Number: [id number]    Therapy Diagnosis:   Encounter Diagnosis   Name Primary?    Acute pain of right shoulder      Physician: Radha Viera MD    Visit Date: 12/23/2020    Physician Orders: PT Eval and Treat   Medical Diagnosis from Referral: M75.21 (ICD-10-CM) - Biceps tendinitis of right upper extremity  Evaluation Date: 9/2/2020  Authorization Period Expiration: 1/31/2021  Plan of Care Expiration: 3/1/2020  Visit # / Visits authorized: 1/1  Episode visits: 15    Time In: 0745  Time Out: 0830  Total Billable Time: 45 minutes    Precautions: Fall    Subjective     Pt reports: Shoulder feeling good and wrist is doing better  She was compliant with home exercise program.  Response to previous treatment: sore shoulder blade  Functional change: Able to use arms more      Pain: 3/10  Location: right shoulder      Objective   Combined motion behind back 2 levels lower on the RUE     Right Left Comment   Shoulder flexion: 4+/5 5/5    Shoulder Abduction: 4+/5 5/5    Shoulder ER: 4/5 4+/5 Hypermobile  degrees on R   Shoulder IR: 5/5 5/5        Meryl received therapeutic exercises to develop strength, endurance and ROM for 35 minutes including:    UBE 4'/4' level 5  Sidelying ER 2# 2 x 15  [place] and arrow GTB 2 x 15  Scap extensions 2 x 15 GTB   Scaptions 2# 2 x 15  Row and ER GTB 2 x 15      NT  Prone row 3# 3 x 10  Prone extensions 3# 3 x 10  TRX Rows 3 x 12  Serratus punch at 120 degrees flexion 3x10   Prone scaption 3x10 with 5s hold   Prone abduction 3x10 with 5s hold   Y wall slides with lift off 3x10   No money with GTB 3x10-12  End range flexion from 100-end range 3x10      Meryl received the following manual therapy techniques: Joint mobilizations and Soft tissue Mobilization were applied to the: R shoulder for 10 minutes, including:    Inferior mobs arm abducted 20 deg and 90 deg  Posterior capsule stretch blocking scap  End range flexion with  inferior humeral mob Gr III  IR with posterior mob Gr III  Supine resisted PNF D1    Meryl participated in neuromuscular re-education activities to improve: Sense, Proprioception and Posture for 0  minutes. The following activities were included:    Ball on wall ABCs x2   Serratus wall slides with OTB 3x10-12     NT  Ball on wall CW/CCW 30x ea  Serratus slides yellow loop 3 x 10  Serratus wall clock red loop 2 x 20      Home Exercises Provided and Patient Education Provided     Education provided:   - continue HEP    Written Home Exercises Provided: Patient instructed to cont prior HEP.  Exercises were reviewed and Meryl was able to demonstrate them prior to the end of the session.  Mreyl demonstrated good  understanding of the education provided.     See EMR under Patient Instructions for exercises provided prior visit.    Assessment     Meryl progressed with IR stretching and scap stabilization. Improved strength into ER with band today. Limited her motion to reduce anterior glide at AC joint.     Meryl is progressing well towards her goals.   Pt prognosis is Good.     Pt will continue to benefit from skilled outpatient physical therapy to address the deficits listed in the problem list box on initial evaluation, provide pt/family education and to maximize pt's level of independence in the home and community environment.     Pt's spiritual, cultural and educational needs considered and pt agreeable to plan of care and goals.     Anticipated barriers to physical therapy: covid    GOALS: Short Term Goals:  4 weeks  1.Report decreased shoulder pain < / =  3/10  to increase tolerance for using RUE at work MET   2. Increase PROM 160 passive flexion without pain in shoulder MET  3. Increased strength by 1/3 MMT grade in right shoulder to increase tolerance for ADL and work activities.(Progressing, not met)  4. Pt to tolerate HEP to improve ROM and independence with ADL's. MET      Long Term Goals: 8 weeks  1.Report  decreased shoulder pain  < / =  1 /10  to increase tolerance for lifting 5# overhead(Progressing, not met)  2.Increase AROM to full flexion without shoulder pain (Progressing, not met)  3.Increase strength to >/= 4/5 in mid trap to increase tolerance for ADL and work activities.(Progressing, not met)  4. Pt goal: return to working without shoulder pain(Progressing, not met)  5. Pt will have improved gcode of CJ (20-40% limited) on FOTO shoulder in order to demonstrate true functional improvement.(Progressing, not met)       Plan   Certification Period: 12/8/20 to 3/1/2021  Recommended Treatment Plan: 1 times per week for 8 weeks: Manual Therapy, Moist Heat/ Ice, Neuromuscular Re-ed, Patient Education, Self Care, Therapeutic Activites and Therapeutic Exercise  Other Recommendations: modalities prn, ASTYM prn    Therapist: Fabricio Tolentino, PT, DPT

## 2020-12-28 ENCOUNTER — IMMUNIZATION (OUTPATIENT)
Dept: INTERNAL MEDICINE | Facility: CLINIC | Age: 65
End: 2020-12-28
Payer: COMMERCIAL

## 2020-12-28 DIAGNOSIS — Z23 NEED FOR VACCINATION: ICD-10-CM

## 2020-12-28 PROCEDURE — 91300 COVID-19, MRNA, LNP-S, PF, 30 MCG/0.3 ML DOSE VACCINE: CPT | Mod: ,,, | Performed by: INTERNAL MEDICINE

## 2020-12-28 PROCEDURE — 0001A COVID-19, MRNA, LNP-S, PF, 30 MCG/0.3 ML DOSE VACCINE: CPT | Mod: CV19,,, | Performed by: INTERNAL MEDICINE

## 2020-12-28 PROCEDURE — 0001A COVID-19, MRNA, LNP-S, PF, 30 MCG/0.3 ML DOSE VACCINE: ICD-10-PCS | Mod: CV19,,, | Performed by: INTERNAL MEDICINE

## 2020-12-28 PROCEDURE — 91300 COVID-19, MRNA, LNP-S, PF, 30 MCG/0.3 ML DOSE VACCINE: ICD-10-PCS | Mod: ,,, | Performed by: INTERNAL MEDICINE

## 2020-12-29 ENCOUNTER — CLINICAL SUPPORT (OUTPATIENT)
Dept: REHABILITATION | Facility: HOSPITAL | Age: 65
End: 2020-12-29
Payer: OTHER MISCELLANEOUS

## 2020-12-29 DIAGNOSIS — M25.511 ACUTE PAIN OF RIGHT SHOULDER: ICD-10-CM

## 2020-12-29 PROCEDURE — 97140 MANUAL THERAPY 1/> REGIONS: CPT | Performed by: PHYSICAL THERAPIST

## 2020-12-29 PROCEDURE — 97110 THERAPEUTIC EXERCISES: CPT | Performed by: PHYSICAL THERAPIST

## 2020-12-29 NOTE — PROGRESS NOTES
Physical Therapy Treatment Note     Name: Meryl Johnson  [facility] Number: [id number]    Therapy Diagnosis:   Encounter Diagnosis   Name Primary?    Acute pain of right shoulder      Physician: Radha Viera MD    Visit Date: 12/29/2020    Physician Orders: PT Eval and Treat   Medical Diagnosis from Referral: M75.21 (ICD-10-CM) - Biceps tendinitis of right upper extremity  Evaluation Date: 9/2/2020  Authorization Period Expiration: 1/31/2021  Plan of Care Expiration: 3/1/2020  Visit # / Visits authorized: 2/1  Episode visits: 16    Time In: 0915  Time Out: 1000  Total Billable Time: 45 minutes    Precautions: Fall    Subjective     Pt reports: Got the vaccine in the RUE so its sore today, but overall doing well  She was compliant with home exercise program.  Response to previous treatment: sore shoulder blade  Functional change: Able to use arms more      Pain: 3/10  Location: right shoulder      Objective   Combined motion behind back 2 levels lower on the RUE     Right Left Comment   Shoulder flexion: 4+/5 5/5    Shoulder Abduction: 4+/5 5/5    Shoulder ER: 4/5 4+/5 Hypermobile  degrees on R   Shoulder IR: 5/5 5/5        Meryl received therapeutic exercises to develop strength, endurance and ROM for 35 minutes including:    UBE 5'/5' level 5  Sidelying ER 2# 2 x 15  Prone row 5# 3 x 10  No money GTB 2 x 15  Scaptions 2# 2 x 15    NT  [place] and arrow GTB 2 x 15  Scap extensions 2 x 15 GTB   Row and ER GTB 2 x 15  Prone extensions 3# 3 x 10  TRX Rows 3 x 12  Serratus punch at 120 degrees flexion 3x10   Prone scaption 3x10 with 5s hold   Prone abduction 3x10 with 5s hold   Y wall slides with lift off 3x10   No money with GTB 3x10-12  End range flexion from 100-end range 3x10      Meryl received the following manual therapy techniques: Joint mobilizations and Soft tissue Mobilization were applied to the: R shoulder for 10 minutes, including:    Inferior mobs arm abducted 20 deg and 90 deg  Posterior capsule  stretch blocking scap  End range flexion with inferior humeral mob Gr III  IR with posterior mob Gr III  Supine resisted PNF D1    Meryl participated in neuromuscular re-education activities to improve: Sense, Proprioception and Posture for 0  minutes. The following activities were included:    Ball on wall ABCs x2   Serratus wall slides with OTB 3x10-12     NT  Ball on wall CW/CCW 30x ea  Serratus slides yellow loop 3 x 10  Serratus wall clock red loop 2 x 20      Home Exercises Provided and Patient Education Provided     Education provided:   - continue HEP    Written Home Exercises Provided: Patient instructed to cont prior HEP.  Exercises were reviewed and Meryl was able to demonstrate them prior to the end of the session.  Meryl demonstrated good  understanding of the education provided.     See EMR under Patient Instructions for exercises provided prior visit.    Assessment     Meryl did well with progression with prone row in clinic. Improved form with row with ER today. Noted scaptions without shoulder hiking.     Meryl is progressing well towards her goals.   Pt prognosis is Good.     Pt will continue to benefit from skilled outpatient physical therapy to address the deficits listed in the problem list box on initial evaluation, provide pt/family education and to maximize pt's level of independence in the home and community environment.     Pt's spiritual, cultural and educational needs considered and pt agreeable to plan of care and goals.     Anticipated barriers to physical therapy: covid    GOALS: Short Term Goals:  4 weeks  1.Report decreased shoulder pain < / =  3/10  to increase tolerance for using RUE at work MET   2. Increase PROM 160 passive flexion without pain in shoulder MET  3. Increased strength by 1/3 MMT grade in right shoulder to increase tolerance for ADL and work activities.(Progressing, not met)  4. Pt to tolerate HEP to improve ROM and independence with ADL's. MET      Long Term  Goals: 8 weeks  1.Report decreased shoulder pain  < / =  1 /10  to increase tolerance for lifting 5# overhead(Progressing, not met)  2.Increase AROM to full flexion without shoulder pain (Progressing, not met)  3.Increase strength to >/= 4/5 in mid trap to increase tolerance for ADL and work activities.(Progressing, not met)  4. Pt goal: return to working without shoulder pain(Progressing, not met)  5. Pt will have improved gcode of CJ (20-40% limited) on FOTO shoulder in order to demonstrate true functional improvement.(Progressing, not met)       Plan   Certification Period: 12/8/20 to 3/1/2021  Recommended Treatment Plan: 1 times per week for 8 weeks: Manual Therapy, Moist Heat/ Ice, Neuromuscular Re-ed, Patient Education, Self Care, Therapeutic Activites and Therapeutic Exercise  Other Recommendations: modalities prn, ASTYM prn    Therapist: Fabricio Tolentino, PT, DPT

## 2020-12-31 ENCOUNTER — PATIENT OUTREACH (OUTPATIENT)
Dept: ADMINISTRATIVE | Facility: HOSPITAL | Age: 65
End: 2020-12-31

## 2020-12-31 RX ORDER — BENAZEPRIL HYDROCHLORIDE 40 MG/1
TABLET ORAL
Qty: 30 TABLET | Refills: 11 | Status: SHIPPED | OUTPATIENT
Start: 2020-12-31 | End: 2022-02-18 | Stop reason: SDUPTHER

## 2021-01-04 ENCOUNTER — CLINICAL SUPPORT (OUTPATIENT)
Dept: REHABILITATION | Facility: HOSPITAL | Age: 66
End: 2021-01-04
Payer: OTHER MISCELLANEOUS

## 2021-01-04 DIAGNOSIS — M25.511 ACUTE PAIN OF RIGHT SHOULDER: ICD-10-CM

## 2021-01-04 PROCEDURE — 97140 MANUAL THERAPY 1/> REGIONS: CPT | Performed by: PHYSICAL THERAPIST

## 2021-01-04 PROCEDURE — 97110 THERAPEUTIC EXERCISES: CPT | Performed by: PHYSICAL THERAPIST

## 2021-01-06 ENCOUNTER — CLINICAL SUPPORT (OUTPATIENT)
Dept: REHABILITATION | Facility: HOSPITAL | Age: 66
End: 2021-01-06
Payer: OTHER MISCELLANEOUS

## 2021-01-06 DIAGNOSIS — M25.60 RANGE OF MOTION DEFICIT: ICD-10-CM

## 2021-01-08 ENCOUNTER — PATIENT MESSAGE (OUTPATIENT)
Dept: INTERNAL MEDICINE | Facility: CLINIC | Age: 66
End: 2021-01-08

## 2021-01-11 ENCOUNTER — LAB VISIT (OUTPATIENT)
Dept: LAB | Facility: HOSPITAL | Age: 66
End: 2021-01-11
Attending: INTERNAL MEDICINE
Payer: COMMERCIAL

## 2021-01-11 DIAGNOSIS — E11.40 TYPE 2 DIABETES MELLITUS WITH DIABETIC NEUROPATHY, WITHOUT LONG-TERM CURRENT USE OF INSULIN: ICD-10-CM

## 2021-01-11 DIAGNOSIS — E78.5 HYPERLIPIDEMIA, UNSPECIFIED HYPERLIPIDEMIA TYPE: ICD-10-CM

## 2021-01-11 DIAGNOSIS — R79.89 LOW VITAMIN D LEVEL: ICD-10-CM

## 2021-01-11 DIAGNOSIS — I10 ESSENTIAL HYPERTENSION: ICD-10-CM

## 2021-01-11 LAB
ALBUMIN SERPL BCP-MCNC: 3.8 G/DL (ref 3.5–5.2)
ALP SERPL-CCNC: 136 U/L (ref 55–135)
ALT SERPL W/O P-5'-P-CCNC: 20 U/L (ref 10–44)
ANION GAP SERPL CALC-SCNC: 11 MMOL/L (ref 8–16)
AST SERPL-CCNC: 19 U/L (ref 10–40)
BILIRUB SERPL-MCNC: 0.3 MG/DL (ref 0.1–1)
BUN SERPL-MCNC: 19 MG/DL (ref 8–23)
CALCIUM SERPL-MCNC: 9.2 MG/DL (ref 8.7–10.5)
CHLORIDE SERPL-SCNC: 104 MMOL/L (ref 95–110)
CHOLEST SERPL-MCNC: 177 MG/DL (ref 120–199)
CHOLEST/HDLC SERPL: 3.7 {RATIO} (ref 2–5)
CO2 SERPL-SCNC: 25 MMOL/L (ref 23–29)
CREAT SERPL-MCNC: 1 MG/DL (ref 0.5–1.4)
EST. GFR  (AFRICAN AMERICAN): >60 ML/MIN/1.73 M^2
EST. GFR  (NON AFRICAN AMERICAN): 59.3 ML/MIN/1.73 M^2
ESTIMATED AVG GLUCOSE: 169 MG/DL (ref 68–131)
GLUCOSE SERPL-MCNC: 139 MG/DL (ref 70–110)
HBA1C MFR BLD HPLC: 7.5 % (ref 4–5.6)
HDLC SERPL-MCNC: 48 MG/DL (ref 40–75)
HDLC SERPL: 27.1 % (ref 20–50)
LDLC SERPL CALC-MCNC: 94.8 MG/DL (ref 63–159)
NONHDLC SERPL-MCNC: 129 MG/DL
POTASSIUM SERPL-SCNC: 4.4 MMOL/L (ref 3.5–5.1)
PROT SERPL-MCNC: 7.4 G/DL (ref 6–8.4)
SODIUM SERPL-SCNC: 140 MMOL/L (ref 136–145)
TRIGL SERPL-MCNC: 171 MG/DL (ref 30–150)

## 2021-01-11 PROCEDURE — 36415 COLL VENOUS BLD VENIPUNCTURE: CPT

## 2021-01-11 PROCEDURE — 80053 COMPREHEN METABOLIC PANEL: CPT

## 2021-01-11 PROCEDURE — 83036 HEMOGLOBIN GLYCOSYLATED A1C: CPT

## 2021-01-11 PROCEDURE — 80061 LIPID PANEL: CPT

## 2021-01-12 ENCOUNTER — CLINICAL SUPPORT (OUTPATIENT)
Dept: REHABILITATION | Facility: HOSPITAL | Age: 66
End: 2021-01-12
Payer: OTHER MISCELLANEOUS

## 2021-01-12 DIAGNOSIS — M25.511 ACUTE PAIN OF RIGHT SHOULDER: ICD-10-CM

## 2021-01-12 PROCEDURE — 97110 THERAPEUTIC EXERCISES: CPT | Performed by: PHYSICAL THERAPIST

## 2021-01-12 PROCEDURE — 97140 MANUAL THERAPY 1/> REGIONS: CPT | Performed by: PHYSICAL THERAPIST

## 2021-01-13 ENCOUNTER — OFFICE VISIT (OUTPATIENT)
Dept: INTERNAL MEDICINE | Facility: CLINIC | Age: 66
End: 2021-01-13
Payer: COMMERCIAL

## 2021-01-13 ENCOUNTER — CLINICAL SUPPORT (OUTPATIENT)
Dept: REHABILITATION | Facility: HOSPITAL | Age: 66
End: 2021-01-13
Payer: OTHER MISCELLANEOUS

## 2021-01-13 VITALS
HEIGHT: 67 IN | HEART RATE: 80 BPM | WEIGHT: 271.19 LBS | DIASTOLIC BLOOD PRESSURE: 72 MMHG | SYSTOLIC BLOOD PRESSURE: 128 MMHG | BODY MASS INDEX: 42.56 KG/M2

## 2021-01-13 DIAGNOSIS — E78.5 HYPERLIPIDEMIA, UNSPECIFIED HYPERLIPIDEMIA TYPE: ICD-10-CM

## 2021-01-13 DIAGNOSIS — M25.60 RANGE OF MOTION DEFICIT: ICD-10-CM

## 2021-01-13 DIAGNOSIS — E11.40 TYPE 2 DIABETES MELLITUS WITH DIABETIC NEUROPATHY, WITHOUT LONG-TERM CURRENT USE OF INSULIN: Primary | ICD-10-CM

## 2021-01-13 DIAGNOSIS — I10 ESSENTIAL HYPERTENSION: ICD-10-CM

## 2021-01-13 PROCEDURE — 99999 PR PBB SHADOW E&M-EST. PATIENT-LVL III: ICD-10-PCS | Mod: PBBFAC,,, | Performed by: INTERNAL MEDICINE

## 2021-01-13 PROCEDURE — 3051F HG A1C>EQUAL 7.0%<8.0%: CPT | Mod: CPTII,S$GLB,, | Performed by: INTERNAL MEDICINE

## 2021-01-13 PROCEDURE — 3051F PR MOST RECENT HEMOGLOBIN A1C LEVEL 7.0 - < 8.0%: ICD-10-PCS | Mod: CPTII,S$GLB,, | Performed by: INTERNAL MEDICINE

## 2021-01-13 PROCEDURE — 3078F PR MOST RECENT DIASTOLIC BLOOD PRESSURE < 80 MM HG: ICD-10-PCS | Mod: CPTII,S$GLB,, | Performed by: INTERNAL MEDICINE

## 2021-01-13 PROCEDURE — 3008F PR BODY MASS INDEX (BMI) DOCUMENTED: ICD-10-PCS | Mod: CPTII,S$GLB,, | Performed by: INTERNAL MEDICINE

## 2021-01-13 PROCEDURE — 97110 THERAPEUTIC EXERCISES: CPT

## 2021-01-13 PROCEDURE — 99214 OFFICE O/P EST MOD 30 MIN: CPT | Mod: S$GLB,,, | Performed by: INTERNAL MEDICINE

## 2021-01-13 PROCEDURE — 3078F DIAST BP <80 MM HG: CPT | Mod: CPTII,S$GLB,, | Performed by: INTERNAL MEDICINE

## 2021-01-13 PROCEDURE — 99999 PR PBB SHADOW E&M-EST. PATIENT-LVL III: CPT | Mod: PBBFAC,,, | Performed by: INTERNAL MEDICINE

## 2021-01-13 PROCEDURE — 3072F PR LOW RISK FOR RETINOPATHY: ICD-10-PCS | Mod: S$GLB,,, | Performed by: INTERNAL MEDICINE

## 2021-01-13 PROCEDURE — 99214 PR OFFICE/OUTPT VISIT, EST, LEVL IV, 30-39 MIN: ICD-10-PCS | Mod: S$GLB,,, | Performed by: INTERNAL MEDICINE

## 2021-01-13 PROCEDURE — 3008F BODY MASS INDEX DOCD: CPT | Mod: CPTII,S$GLB,, | Performed by: INTERNAL MEDICINE

## 2021-01-13 PROCEDURE — 3074F PR MOST RECENT SYSTOLIC BLOOD PRESSURE < 130 MM HG: ICD-10-PCS | Mod: CPTII,S$GLB,, | Performed by: INTERNAL MEDICINE

## 2021-01-13 PROCEDURE — 3072F LOW RISK FOR RETINOPATHY: CPT | Mod: S$GLB,,, | Performed by: INTERNAL MEDICINE

## 2021-01-13 PROCEDURE — 3074F SYST BP LT 130 MM HG: CPT | Mod: CPTII,S$GLB,, | Performed by: INTERNAL MEDICINE

## 2021-01-18 ENCOUNTER — IMMUNIZATION (OUTPATIENT)
Dept: INTERNAL MEDICINE | Facility: CLINIC | Age: 66
End: 2021-01-18
Payer: COMMERCIAL

## 2021-01-18 DIAGNOSIS — Z23 NEED FOR VACCINATION: Primary | ICD-10-CM

## 2021-01-18 PROCEDURE — 91300 COVID-19, MRNA, LNP-S, PF, 30 MCG/0.3 ML DOSE VACCINE: CPT | Mod: PBBFAC | Performed by: INTERNAL MEDICINE

## 2021-01-18 PROCEDURE — 0002A COVID-19, MRNA, LNP-S, PF, 30 MCG/0.3 ML DOSE VACCINE: CPT | Mod: PBBFAC | Performed by: INTERNAL MEDICINE

## 2021-01-22 ENCOUNTER — CLINICAL SUPPORT (OUTPATIENT)
Dept: REHABILITATION | Facility: HOSPITAL | Age: 66
End: 2021-01-22
Payer: OTHER MISCELLANEOUS

## 2021-01-22 DIAGNOSIS — M25.60 RANGE OF MOTION DEFICIT: ICD-10-CM

## 2021-01-22 PROCEDURE — 97110 THERAPEUTIC EXERCISES: CPT

## 2021-01-29 ENCOUNTER — CLINICAL SUPPORT (OUTPATIENT)
Dept: REHABILITATION | Facility: HOSPITAL | Age: 66
End: 2021-01-29
Payer: OTHER MISCELLANEOUS

## 2021-01-29 DIAGNOSIS — M25.60 RANGE OF MOTION DEFICIT: ICD-10-CM

## 2021-01-29 PROCEDURE — 97110 THERAPEUTIC EXERCISES: CPT

## 2021-02-14 ENCOUNTER — PATIENT MESSAGE (OUTPATIENT)
Dept: REHABILITATION | Facility: HOSPITAL | Age: 66
End: 2021-02-14

## 2021-03-25 RX ORDER — ATORVASTATIN CALCIUM 40 MG/1
40 TABLET, FILM COATED ORAL DAILY
Qty: 90 TABLET | Refills: 1 | Status: SHIPPED | OUTPATIENT
Start: 2021-03-25 | End: 2021-10-05 | Stop reason: SDUPTHER

## 2021-03-25 RX ORDER — METFORMIN HYDROCHLORIDE 500 MG/1
1000 TABLET ORAL 2 TIMES DAILY WITH MEALS
Qty: 360 TABLET | Refills: 1 | Status: SHIPPED | OUTPATIENT
Start: 2021-03-25 | End: 2021-12-18 | Stop reason: SDUPTHER

## 2021-03-25 RX ORDER — ERGOCALCIFEROL 1.25 MG/1
50000 CAPSULE ORAL
Qty: 12 CAPSULE | Refills: 1 | Status: SHIPPED | OUTPATIENT
Start: 2021-03-25 | End: 2022-05-16 | Stop reason: SDUPTHER

## 2021-04-05 ENCOUNTER — PATIENT MESSAGE (OUTPATIENT)
Dept: ADMINISTRATIVE | Facility: HOSPITAL | Age: 66
End: 2021-04-05

## 2021-05-31 ENCOUNTER — PATIENT MESSAGE (OUTPATIENT)
Dept: INTERNAL MEDICINE | Facility: CLINIC | Age: 66
End: 2021-05-31

## 2021-06-21 ENCOUNTER — PATIENT MESSAGE (OUTPATIENT)
Dept: INTERNAL MEDICINE | Facility: CLINIC | Age: 66
End: 2021-06-21

## 2021-06-22 ENCOUNTER — PATIENT MESSAGE (OUTPATIENT)
Dept: INTERNAL MEDICINE | Facility: CLINIC | Age: 66
End: 2021-06-22

## 2021-06-22 RX ORDER — AZITHROMYCIN 250 MG/1
TABLET, FILM COATED ORAL
Qty: 6 TABLET | Refills: 0 | Status: SHIPPED | OUTPATIENT
Start: 2021-06-22 | End: 2022-05-13

## 2021-07-06 ENCOUNTER — PATIENT MESSAGE (OUTPATIENT)
Dept: ADMINISTRATIVE | Facility: HOSPITAL | Age: 66
End: 2021-07-06

## 2021-07-14 RX ORDER — EMPAGLIFLOZIN 10 MG/1
10 TABLET, FILM COATED ORAL DAILY
Qty: 30 TABLET | Refills: 3 | Status: SHIPPED | OUTPATIENT
Start: 2021-07-14 | End: 2022-02-18 | Stop reason: SDUPTHER

## 2021-07-14 RX ORDER — CHLORTHALIDONE 25 MG/1
TABLET ORAL
Qty: 30 TABLET | Refills: 11 | Status: SHIPPED | OUTPATIENT
Start: 2021-07-14 | End: 2022-02-18 | Stop reason: SDUPTHER

## 2021-08-03 ENCOUNTER — PATIENT MESSAGE (OUTPATIENT)
Dept: ADMINISTRATIVE | Facility: HOSPITAL | Age: 66
End: 2021-08-03

## 2021-08-10 ENCOUNTER — PATIENT MESSAGE (OUTPATIENT)
Dept: ADMINISTRATIVE | Facility: OTHER | Age: 66
End: 2021-08-10

## 2021-08-25 ENCOUNTER — PATIENT MESSAGE (OUTPATIENT)
Dept: INTERNAL MEDICINE | Facility: CLINIC | Age: 66
End: 2021-08-25

## 2021-09-28 ENCOUNTER — IMMUNIZATION (OUTPATIENT)
Dept: INTERNAL MEDICINE | Facility: CLINIC | Age: 66
End: 2021-09-28
Payer: COMMERCIAL

## 2021-09-28 DIAGNOSIS — Z23 NEED FOR VACCINATION: Primary | ICD-10-CM

## 2021-09-28 PROCEDURE — 91300 COVID-19, MRNA, LNP-S, PF, 30 MCG/0.3 ML DOSE VACCINE: CPT | Mod: PBBFAC | Performed by: INTERNAL MEDICINE

## 2021-09-28 PROCEDURE — 0003A COVID-19, MRNA, LNP-S, PF, 30 MCG/0.3 ML DOSE VACCINE: CPT | Mod: CV19,PBBFAC | Performed by: INTERNAL MEDICINE

## 2021-10-03 ENCOUNTER — PATIENT OUTREACH (OUTPATIENT)
Dept: ADMINISTRATIVE | Facility: OTHER | Age: 66
End: 2021-10-03

## 2021-10-03 DIAGNOSIS — E11.9 TYPE 2 DIABETES MELLITUS WITHOUT COMPLICATION, UNSPECIFIED WHETHER LONG TERM INSULIN USE: Primary | ICD-10-CM

## 2021-10-04 ENCOUNTER — OFFICE VISIT (OUTPATIENT)
Dept: SPORTS MEDICINE | Facility: CLINIC | Age: 66
End: 2021-10-04
Payer: COMMERCIAL

## 2021-10-04 ENCOUNTER — HOSPITAL ENCOUNTER (OUTPATIENT)
Dept: RADIOLOGY | Facility: HOSPITAL | Age: 66
Discharge: HOME | End: 2021-10-04
Attending: ORTHOPAEDIC SURGERY
Payer: COMMERCIAL

## 2021-10-04 VITALS
DIASTOLIC BLOOD PRESSURE: 80 MMHG | HEIGHT: 67 IN | HEART RATE: 69 BPM | SYSTOLIC BLOOD PRESSURE: 142 MMHG | BODY MASS INDEX: 42.53 KG/M2 | WEIGHT: 271 LBS

## 2021-10-04 DIAGNOSIS — M25.561 PAIN IN BOTH KNEES, UNSPECIFIED CHRONICITY: Primary | ICD-10-CM

## 2021-10-04 DIAGNOSIS — M25.562 PAIN IN BOTH KNEES, UNSPECIFIED CHRONICITY: Primary | ICD-10-CM

## 2021-10-04 DIAGNOSIS — M25.562 PAIN IN BOTH KNEES, UNSPECIFIED CHRONICITY: ICD-10-CM

## 2021-10-04 DIAGNOSIS — M25.561 PAIN IN BOTH KNEES, UNSPECIFIED CHRONICITY: ICD-10-CM

## 2021-10-04 PROCEDURE — 1159F PR MEDICATION LIST DOCUMENTED IN MEDICAL RECORD: ICD-10-PCS | Mod: CPTII,S$GLB,, | Performed by: ORTHOPAEDIC SURGERY

## 2021-10-04 PROCEDURE — 1159F MED LIST DOCD IN RCRD: CPT | Mod: CPTII,S$GLB,, | Performed by: ORTHOPAEDIC SURGERY

## 2021-10-04 PROCEDURE — 99999 PR PBB SHADOW E&M-EST. PATIENT-LVL III: CPT | Mod: PBBFAC,,, | Performed by: ORTHOPAEDIC SURGERY

## 2021-10-04 PROCEDURE — 99999 PR PBB SHADOW E&M-EST. PATIENT-LVL III: ICD-10-PCS | Mod: PBBFAC,,, | Performed by: ORTHOPAEDIC SURGERY

## 2021-10-04 PROCEDURE — 1125F AMNT PAIN NOTED PAIN PRSNT: CPT | Mod: CPTII,S$GLB,, | Performed by: ORTHOPAEDIC SURGERY

## 2021-10-04 PROCEDURE — 1160F PR REVIEW ALL MEDS BY PRESCRIBER/CLIN PHARMACIST DOCUMENTED: ICD-10-PCS | Mod: CPTII,S$GLB,, | Performed by: ORTHOPAEDIC SURGERY

## 2021-10-04 PROCEDURE — 1101F PR PT FALLS ASSESS DOC 0-1 FALLS W/OUT INJ PAST YR: ICD-10-PCS | Mod: CPTII,S$GLB,, | Performed by: ORTHOPAEDIC SURGERY

## 2021-10-04 PROCEDURE — 1101F PT FALLS ASSESS-DOCD LE1/YR: CPT | Mod: CPTII,S$GLB,, | Performed by: ORTHOPAEDIC SURGERY

## 2021-10-04 PROCEDURE — 99214 PR OFFICE/OUTPT VISIT, EST, LEVL IV, 30-39 MIN: ICD-10-PCS | Mod: S$GLB,,, | Performed by: ORTHOPAEDIC SURGERY

## 2021-10-04 PROCEDURE — 3077F PR MOST RECENT SYSTOLIC BLOOD PRESSURE >= 140 MM HG: ICD-10-PCS | Mod: CPTII,S$GLB,, | Performed by: ORTHOPAEDIC SURGERY

## 2021-10-04 PROCEDURE — 73564 X-RAY EXAM KNEE 4 OR MORE: CPT | Mod: 26,50,, | Performed by: RADIOLOGY

## 2021-10-04 PROCEDURE — 3008F PR BODY MASS INDEX (BMI) DOCUMENTED: ICD-10-PCS | Mod: CPTII,S$GLB,, | Performed by: ORTHOPAEDIC SURGERY

## 2021-10-04 PROCEDURE — 1160F RVW MEDS BY RX/DR IN RCRD: CPT | Mod: CPTII,S$GLB,, | Performed by: ORTHOPAEDIC SURGERY

## 2021-10-04 PROCEDURE — 3072F LOW RISK FOR RETINOPATHY: CPT | Mod: CPTII,S$GLB,, | Performed by: ORTHOPAEDIC SURGERY

## 2021-10-04 PROCEDURE — 73564 XR KNEE ORTHO BILAT WITH FLEXION: ICD-10-PCS | Mod: 26,50,, | Performed by: RADIOLOGY

## 2021-10-04 PROCEDURE — 4010F ACE/ARB THERAPY RXD/TAKEN: CPT | Mod: CPTII,S$GLB,, | Performed by: ORTHOPAEDIC SURGERY

## 2021-10-04 PROCEDURE — 3288F PR FALLS RISK ASSESSMENT DOCUMENTED: ICD-10-PCS | Mod: CPTII,S$GLB,, | Performed by: ORTHOPAEDIC SURGERY

## 2021-10-04 PROCEDURE — 3072F PR LOW RISK FOR RETINOPATHY: ICD-10-PCS | Mod: CPTII,S$GLB,, | Performed by: ORTHOPAEDIC SURGERY

## 2021-10-04 PROCEDURE — 3051F PR MOST RECENT HEMOGLOBIN A1C LEVEL 7.0 - < 8.0%: ICD-10-PCS | Mod: CPTII,S$GLB,, | Performed by: ORTHOPAEDIC SURGERY

## 2021-10-04 PROCEDURE — 3079F PR MOST RECENT DIASTOLIC BLOOD PRESSURE 80-89 MM HG: ICD-10-PCS | Mod: CPTII,S$GLB,, | Performed by: ORTHOPAEDIC SURGERY

## 2021-10-04 PROCEDURE — 3077F SYST BP >= 140 MM HG: CPT | Mod: CPTII,S$GLB,, | Performed by: ORTHOPAEDIC SURGERY

## 2021-10-04 PROCEDURE — 99214 OFFICE O/P EST MOD 30 MIN: CPT | Mod: S$GLB,,, | Performed by: ORTHOPAEDIC SURGERY

## 2021-10-04 PROCEDURE — 3051F HG A1C>EQUAL 7.0%<8.0%: CPT | Mod: CPTII,S$GLB,, | Performed by: ORTHOPAEDIC SURGERY

## 2021-10-04 PROCEDURE — 4010F PR ACE/ARB THEARPY RXD/TAKEN: ICD-10-PCS | Mod: CPTII,S$GLB,, | Performed by: ORTHOPAEDIC SURGERY

## 2021-10-04 PROCEDURE — 1125F PR PAIN SEVERITY QUANTIFIED, PAIN PRESENT: ICD-10-PCS | Mod: CPTII,S$GLB,, | Performed by: ORTHOPAEDIC SURGERY

## 2021-10-04 PROCEDURE — 73564 X-RAY EXAM KNEE 4 OR MORE: CPT | Mod: TC,50

## 2021-10-04 PROCEDURE — 3288F FALL RISK ASSESSMENT DOCD: CPT | Mod: CPTII,S$GLB,, | Performed by: ORTHOPAEDIC SURGERY

## 2021-10-04 PROCEDURE — 3008F BODY MASS INDEX DOCD: CPT | Mod: CPTII,S$GLB,, | Performed by: ORTHOPAEDIC SURGERY

## 2021-10-04 PROCEDURE — 3079F DIAST BP 80-89 MM HG: CPT | Mod: CPTII,S$GLB,, | Performed by: ORTHOPAEDIC SURGERY

## 2021-10-05 ENCOUNTER — PATIENT MESSAGE (OUTPATIENT)
Dept: ADMINISTRATIVE | Facility: HOSPITAL | Age: 66
End: 2021-10-05

## 2021-10-05 ENCOUNTER — HOSPITAL ENCOUNTER (OUTPATIENT)
Dept: RADIOLOGY | Facility: HOSPITAL | Age: 66
Discharge: HOME | End: 2021-10-05
Attending: ORTHOPAEDIC SURGERY
Payer: COMMERCIAL

## 2021-10-05 DIAGNOSIS — M25.562 PAIN IN BOTH KNEES, UNSPECIFIED CHRONICITY: ICD-10-CM

## 2021-10-05 DIAGNOSIS — M25.561 PAIN IN BOTH KNEES, UNSPECIFIED CHRONICITY: ICD-10-CM

## 2021-10-05 PROCEDURE — 73721 MRI JNT OF LWR EXTRE W/O DYE: CPT | Mod: 26,RT,, | Performed by: RADIOLOGY

## 2021-10-05 PROCEDURE — 73721 MRI JNT OF LWR EXTRE W/O DYE: CPT | Mod: TC,RT

## 2021-10-05 PROCEDURE — 73721 MRI KNEE WITHOUT CONTRAST RIGHT: ICD-10-PCS | Mod: 26,RT,, | Performed by: RADIOLOGY

## 2021-10-05 RX ORDER — ATORVASTATIN CALCIUM 40 MG/1
40 TABLET, FILM COATED ORAL DAILY
Qty: 90 TABLET | Refills: 1 | Status: SHIPPED | OUTPATIENT
Start: 2021-10-05 | End: 2022-02-18 | Stop reason: SDUPTHER

## 2021-10-07 DIAGNOSIS — Z12.11 ENCOUNTER FOR FIT (FECAL IMMUNOCHEMICAL TEST) SCREENING: Primary | ICD-10-CM

## 2021-10-08 ENCOUNTER — TELEPHONE (OUTPATIENT)
Dept: SPORTS MEDICINE | Facility: CLINIC | Age: 66
End: 2021-10-08

## 2021-10-08 ENCOUNTER — PATIENT MESSAGE (OUTPATIENT)
Dept: SPORTS MEDICINE | Facility: CLINIC | Age: 66
End: 2021-10-08

## 2021-10-08 DIAGNOSIS — M17.10 ARTHRITIS OF KNEE: Primary | ICD-10-CM

## 2021-10-08 RX ORDER — MELOXICAM 7.5 MG/1
7.5 TABLET ORAL DAILY
Qty: 30 TABLET | Refills: 0 | Status: SHIPPED | OUTPATIENT
Start: 2021-10-08 | End: 2021-11-03

## 2021-10-18 ENCOUNTER — CLINICAL SUPPORT (OUTPATIENT)
Dept: REHABILITATION | Facility: HOSPITAL | Age: 66
End: 2021-10-18
Payer: COMMERCIAL

## 2021-10-18 DIAGNOSIS — R26.2 DIFFICULTY WALKING UP STAIRS: ICD-10-CM

## 2021-10-18 DIAGNOSIS — M17.10 ARTHRITIS OF KNEE: ICD-10-CM

## 2021-10-18 DIAGNOSIS — M25.561 ACUTE PAIN OF RIGHT KNEE: ICD-10-CM

## 2021-10-18 PROBLEM — M25.511 ACUTE PAIN OF RIGHT SHOULDER: Status: RESOLVED | Noted: 2020-09-02 | Resolved: 2021-10-18

## 2021-10-18 PROCEDURE — 97161 PT EVAL LOW COMPLEX 20 MIN: CPT | Performed by: PHYSICAL THERAPIST

## 2021-10-20 ENCOUNTER — CLINICAL SUPPORT (OUTPATIENT)
Dept: REHABILITATION | Facility: HOSPITAL | Age: 66
End: 2021-10-20
Payer: COMMERCIAL

## 2021-10-20 DIAGNOSIS — M25.561 ACUTE PAIN OF RIGHT KNEE: ICD-10-CM

## 2021-10-20 DIAGNOSIS — R26.2 DIFFICULTY WALKING UP STAIRS: ICD-10-CM

## 2021-10-20 PROCEDURE — 97140 MANUAL THERAPY 1/> REGIONS: CPT | Performed by: PHYSICAL THERAPIST

## 2021-10-20 PROCEDURE — 97110 THERAPEUTIC EXERCISES: CPT | Performed by: PHYSICAL THERAPIST

## 2021-10-22 ENCOUNTER — OFFICE VISIT (OUTPATIENT)
Dept: SPORTS MEDICINE | Facility: CLINIC | Age: 66
End: 2021-10-22
Payer: COMMERCIAL

## 2021-10-22 VITALS
DIASTOLIC BLOOD PRESSURE: 84 MMHG | WEIGHT: 270.94 LBS | HEIGHT: 67 IN | SYSTOLIC BLOOD PRESSURE: 176 MMHG | BODY MASS INDEX: 42.53 KG/M2

## 2021-10-22 DIAGNOSIS — R03.0 ELEVATED BLOOD PRESSURE READING: ICD-10-CM

## 2021-10-22 DIAGNOSIS — M17.11 ARTHRITIS OF RIGHT KNEE: Primary | ICD-10-CM

## 2021-10-22 DIAGNOSIS — E66.01 CLASS 3 SEVERE OBESITY WITH BODY MASS INDEX (BMI) OF 40.0 TO 44.9 IN ADULT, UNSPECIFIED OBESITY TYPE, UNSPECIFIED WHETHER SERIOUS COMORBIDITY PRESENT: ICD-10-CM

## 2021-10-22 PROCEDURE — 3079F PR MOST RECENT DIASTOLIC BLOOD PRESSURE 80-89 MM HG: ICD-10-PCS | Mod: CPTII,S$GLB,, | Performed by: STUDENT IN AN ORGANIZED HEALTH CARE EDUCATION/TRAINING PROGRAM

## 2021-10-22 PROCEDURE — 3051F PR MOST RECENT HEMOGLOBIN A1C LEVEL 7.0 - < 8.0%: ICD-10-PCS | Mod: CPTII,S$GLB,, | Performed by: STUDENT IN AN ORGANIZED HEALTH CARE EDUCATION/TRAINING PROGRAM

## 2021-10-22 PROCEDURE — 1159F MED LIST DOCD IN RCRD: CPT | Mod: CPTII,S$GLB,, | Performed by: STUDENT IN AN ORGANIZED HEALTH CARE EDUCATION/TRAINING PROGRAM

## 2021-10-22 PROCEDURE — 3079F DIAST BP 80-89 MM HG: CPT | Mod: CPTII,S$GLB,, | Performed by: STUDENT IN AN ORGANIZED HEALTH CARE EDUCATION/TRAINING PROGRAM

## 2021-10-22 PROCEDURE — 3008F BODY MASS INDEX DOCD: CPT | Mod: CPTII,S$GLB,, | Performed by: STUDENT IN AN ORGANIZED HEALTH CARE EDUCATION/TRAINING PROGRAM

## 2021-10-22 PROCEDURE — 4010F PR ACE/ARB THEARPY RXD/TAKEN: ICD-10-PCS | Mod: CPTII,S$GLB,, | Performed by: STUDENT IN AN ORGANIZED HEALTH CARE EDUCATION/TRAINING PROGRAM

## 2021-10-22 PROCEDURE — 3072F LOW RISK FOR RETINOPATHY: CPT | Mod: CPTII,S$GLB,, | Performed by: STUDENT IN AN ORGANIZED HEALTH CARE EDUCATION/TRAINING PROGRAM

## 2021-10-22 PROCEDURE — 20611 DRAIN/INJ JOINT/BURSA W/US: CPT | Mod: RT,S$GLB,, | Performed by: STUDENT IN AN ORGANIZED HEALTH CARE EDUCATION/TRAINING PROGRAM

## 2021-10-22 PROCEDURE — 3072F PR LOW RISK FOR RETINOPATHY: ICD-10-PCS | Mod: CPTII,S$GLB,, | Performed by: STUDENT IN AN ORGANIZED HEALTH CARE EDUCATION/TRAINING PROGRAM

## 2021-10-22 PROCEDURE — 3051F HG A1C>EQUAL 7.0%<8.0%: CPT | Mod: CPTII,S$GLB,, | Performed by: STUDENT IN AN ORGANIZED HEALTH CARE EDUCATION/TRAINING PROGRAM

## 2021-10-22 PROCEDURE — 99499 NO LOS: ICD-10-PCS | Mod: S$GLB,,, | Performed by: STUDENT IN AN ORGANIZED HEALTH CARE EDUCATION/TRAINING PROGRAM

## 2021-10-22 PROCEDURE — 20611 LARGE JOINT ASPIRATION/INJECTION: R KNEE: ICD-10-PCS | Mod: RT,S$GLB,, | Performed by: STUDENT IN AN ORGANIZED HEALTH CARE EDUCATION/TRAINING PROGRAM

## 2021-10-22 PROCEDURE — 1160F PR REVIEW ALL MEDS BY PRESCRIBER/CLIN PHARMACIST DOCUMENTED: ICD-10-PCS | Mod: CPTII,S$GLB,, | Performed by: STUDENT IN AN ORGANIZED HEALTH CARE EDUCATION/TRAINING PROGRAM

## 2021-10-22 PROCEDURE — 3008F PR BODY MASS INDEX (BMI) DOCUMENTED: ICD-10-PCS | Mod: CPTII,S$GLB,, | Performed by: STUDENT IN AN ORGANIZED HEALTH CARE EDUCATION/TRAINING PROGRAM

## 2021-10-22 PROCEDURE — 1159F PR MEDICATION LIST DOCUMENTED IN MEDICAL RECORD: ICD-10-PCS | Mod: CPTII,S$GLB,, | Performed by: STUDENT IN AN ORGANIZED HEALTH CARE EDUCATION/TRAINING PROGRAM

## 2021-10-22 PROCEDURE — 3077F PR MOST RECENT SYSTOLIC BLOOD PRESSURE >= 140 MM HG: ICD-10-PCS | Mod: CPTII,S$GLB,, | Performed by: STUDENT IN AN ORGANIZED HEALTH CARE EDUCATION/TRAINING PROGRAM

## 2021-10-22 PROCEDURE — 99499 UNLISTED E&M SERVICE: CPT | Mod: S$GLB,,, | Performed by: STUDENT IN AN ORGANIZED HEALTH CARE EDUCATION/TRAINING PROGRAM

## 2021-10-22 PROCEDURE — 3077F SYST BP >= 140 MM HG: CPT | Mod: CPTII,S$GLB,, | Performed by: STUDENT IN AN ORGANIZED HEALTH CARE EDUCATION/TRAINING PROGRAM

## 2021-10-22 PROCEDURE — 99999 PR PBB SHADOW E&M-EST. PATIENT-LVL III: ICD-10-PCS | Mod: PBBFAC,,, | Performed by: STUDENT IN AN ORGANIZED HEALTH CARE EDUCATION/TRAINING PROGRAM

## 2021-10-22 PROCEDURE — 1160F RVW MEDS BY RX/DR IN RCRD: CPT | Mod: CPTII,S$GLB,, | Performed by: STUDENT IN AN ORGANIZED HEALTH CARE EDUCATION/TRAINING PROGRAM

## 2021-10-22 PROCEDURE — 4010F ACE/ARB THERAPY RXD/TAKEN: CPT | Mod: CPTII,S$GLB,, | Performed by: STUDENT IN AN ORGANIZED HEALTH CARE EDUCATION/TRAINING PROGRAM

## 2021-10-22 PROCEDURE — 99999 PR PBB SHADOW E&M-EST. PATIENT-LVL III: CPT | Mod: PBBFAC,,, | Performed by: STUDENT IN AN ORGANIZED HEALTH CARE EDUCATION/TRAINING PROGRAM

## 2021-10-25 ENCOUNTER — CLINICAL SUPPORT (OUTPATIENT)
Dept: REHABILITATION | Facility: HOSPITAL | Age: 66
End: 2021-10-25
Payer: COMMERCIAL

## 2021-10-25 DIAGNOSIS — M25.561 ACUTE PAIN OF RIGHT KNEE: ICD-10-CM

## 2021-10-25 DIAGNOSIS — R26.2 DIFFICULTY WALKING UP STAIRS: ICD-10-CM

## 2021-10-25 PROCEDURE — 97110 THERAPEUTIC EXERCISES: CPT | Performed by: PHYSICAL THERAPIST

## 2021-10-28 ENCOUNTER — CLINICAL SUPPORT (OUTPATIENT)
Dept: REHABILITATION | Facility: HOSPITAL | Age: 66
End: 2021-10-28
Payer: COMMERCIAL

## 2021-10-28 DIAGNOSIS — R26.2 DIFFICULTY WALKING UP STAIRS: ICD-10-CM

## 2021-10-28 DIAGNOSIS — M25.561 ACUTE PAIN OF RIGHT KNEE: ICD-10-CM

## 2021-10-28 PROCEDURE — 97110 THERAPEUTIC EXERCISES: CPT | Performed by: PHYSICAL THERAPIST

## 2021-10-28 PROCEDURE — 97140 MANUAL THERAPY 1/> REGIONS: CPT | Performed by: PHYSICAL THERAPIST

## 2021-10-29 ENCOUNTER — OFFICE VISIT (OUTPATIENT)
Dept: SPORTS MEDICINE | Facility: CLINIC | Age: 66
End: 2021-10-29
Payer: COMMERCIAL

## 2021-10-29 VITALS
HEIGHT: 67 IN | BODY MASS INDEX: 42.38 KG/M2 | SYSTOLIC BLOOD PRESSURE: 149 MMHG | WEIGHT: 270 LBS | RESPIRATION RATE: 18 BRPM | HEART RATE: 80 BPM | DIASTOLIC BLOOD PRESSURE: 81 MMHG

## 2021-10-29 DIAGNOSIS — M17.11 ARTHRITIS OF RIGHT KNEE: Primary | ICD-10-CM

## 2021-10-29 DIAGNOSIS — R03.0 ELEVATED BLOOD PRESSURE READING: ICD-10-CM

## 2021-10-29 PROCEDURE — 99499 NO LOS: ICD-10-PCS | Mod: S$GLB,,, | Performed by: STUDENT IN AN ORGANIZED HEALTH CARE EDUCATION/TRAINING PROGRAM

## 2021-10-29 PROCEDURE — 4010F ACE/ARB THERAPY RXD/TAKEN: CPT | Mod: CPTII,S$GLB,, | Performed by: STUDENT IN AN ORGANIZED HEALTH CARE EDUCATION/TRAINING PROGRAM

## 2021-10-29 PROCEDURE — 3077F PR MOST RECENT SYSTOLIC BLOOD PRESSURE >= 140 MM HG: ICD-10-PCS | Mod: CPTII,S$GLB,, | Performed by: STUDENT IN AN ORGANIZED HEALTH CARE EDUCATION/TRAINING PROGRAM

## 2021-10-29 PROCEDURE — 1159F PR MEDICATION LIST DOCUMENTED IN MEDICAL RECORD: ICD-10-PCS | Mod: CPTII,S$GLB,, | Performed by: STUDENT IN AN ORGANIZED HEALTH CARE EDUCATION/TRAINING PROGRAM

## 2021-10-29 PROCEDURE — 3077F SYST BP >= 140 MM HG: CPT | Mod: CPTII,S$GLB,, | Performed by: STUDENT IN AN ORGANIZED HEALTH CARE EDUCATION/TRAINING PROGRAM

## 2021-10-29 PROCEDURE — 1160F PR REVIEW ALL MEDS BY PRESCRIBER/CLIN PHARMACIST DOCUMENTED: ICD-10-PCS | Mod: CPTII,S$GLB,, | Performed by: STUDENT IN AN ORGANIZED HEALTH CARE EDUCATION/TRAINING PROGRAM

## 2021-10-29 PROCEDURE — 99999 PR PBB SHADOW E&M-EST. PATIENT-LVL III: ICD-10-PCS | Mod: PBBFAC,,, | Performed by: STUDENT IN AN ORGANIZED HEALTH CARE EDUCATION/TRAINING PROGRAM

## 2021-10-29 PROCEDURE — 99999 PR PBB SHADOW E&M-EST. PATIENT-LVL III: CPT | Mod: PBBFAC,,, | Performed by: STUDENT IN AN ORGANIZED HEALTH CARE EDUCATION/TRAINING PROGRAM

## 2021-10-29 PROCEDURE — 1160F RVW MEDS BY RX/DR IN RCRD: CPT | Mod: CPTII,S$GLB,, | Performed by: STUDENT IN AN ORGANIZED HEALTH CARE EDUCATION/TRAINING PROGRAM

## 2021-10-29 PROCEDURE — 1101F PR PT FALLS ASSESS DOC 0-1 FALLS W/OUT INJ PAST YR: ICD-10-PCS | Mod: CPTII,S$GLB,, | Performed by: STUDENT IN AN ORGANIZED HEALTH CARE EDUCATION/TRAINING PROGRAM

## 2021-10-29 PROCEDURE — 3051F PR MOST RECENT HEMOGLOBIN A1C LEVEL 7.0 - < 8.0%: ICD-10-PCS | Mod: CPTII,S$GLB,, | Performed by: STUDENT IN AN ORGANIZED HEALTH CARE EDUCATION/TRAINING PROGRAM

## 2021-10-29 PROCEDURE — 20611 DRAIN/INJ JOINT/BURSA W/US: CPT | Mod: RT,S$GLB,, | Performed by: STUDENT IN AN ORGANIZED HEALTH CARE EDUCATION/TRAINING PROGRAM

## 2021-10-29 PROCEDURE — 3079F DIAST BP 80-89 MM HG: CPT | Mod: CPTII,S$GLB,, | Performed by: STUDENT IN AN ORGANIZED HEALTH CARE EDUCATION/TRAINING PROGRAM

## 2021-10-29 PROCEDURE — 1125F PR PAIN SEVERITY QUANTIFIED, PAIN PRESENT: ICD-10-PCS | Mod: CPTII,S$GLB,, | Performed by: STUDENT IN AN ORGANIZED HEALTH CARE EDUCATION/TRAINING PROGRAM

## 2021-10-29 PROCEDURE — 3072F LOW RISK FOR RETINOPATHY: CPT | Mod: CPTII,S$GLB,, | Performed by: STUDENT IN AN ORGANIZED HEALTH CARE EDUCATION/TRAINING PROGRAM

## 2021-10-29 PROCEDURE — 3079F PR MOST RECENT DIASTOLIC BLOOD PRESSURE 80-89 MM HG: ICD-10-PCS | Mod: CPTII,S$GLB,, | Performed by: STUDENT IN AN ORGANIZED HEALTH CARE EDUCATION/TRAINING PROGRAM

## 2021-10-29 PROCEDURE — 1125F AMNT PAIN NOTED PAIN PRSNT: CPT | Mod: CPTII,S$GLB,, | Performed by: STUDENT IN AN ORGANIZED HEALTH CARE EDUCATION/TRAINING PROGRAM

## 2021-10-29 PROCEDURE — 1101F PT FALLS ASSESS-DOCD LE1/YR: CPT | Mod: CPTII,S$GLB,, | Performed by: STUDENT IN AN ORGANIZED HEALTH CARE EDUCATION/TRAINING PROGRAM

## 2021-10-29 PROCEDURE — 3008F BODY MASS INDEX DOCD: CPT | Mod: CPTII,S$GLB,, | Performed by: STUDENT IN AN ORGANIZED HEALTH CARE EDUCATION/TRAINING PROGRAM

## 2021-10-29 PROCEDURE — 3051F HG A1C>EQUAL 7.0%<8.0%: CPT | Mod: CPTII,S$GLB,, | Performed by: STUDENT IN AN ORGANIZED HEALTH CARE EDUCATION/TRAINING PROGRAM

## 2021-10-29 PROCEDURE — 99499 UNLISTED E&M SERVICE: CPT | Mod: S$GLB,,, | Performed by: STUDENT IN AN ORGANIZED HEALTH CARE EDUCATION/TRAINING PROGRAM

## 2021-10-29 PROCEDURE — 3288F PR FALLS RISK ASSESSMENT DOCUMENTED: ICD-10-PCS | Mod: CPTII,S$GLB,, | Performed by: STUDENT IN AN ORGANIZED HEALTH CARE EDUCATION/TRAINING PROGRAM

## 2021-10-29 PROCEDURE — 3072F PR LOW RISK FOR RETINOPATHY: ICD-10-PCS | Mod: CPTII,S$GLB,, | Performed by: STUDENT IN AN ORGANIZED HEALTH CARE EDUCATION/TRAINING PROGRAM

## 2021-10-29 PROCEDURE — 20611 LARGE JOINT ASPIRATION/INJECTION: R KNEE: ICD-10-PCS | Mod: RT,S$GLB,, | Performed by: STUDENT IN AN ORGANIZED HEALTH CARE EDUCATION/TRAINING PROGRAM

## 2021-10-29 PROCEDURE — 4010F PR ACE/ARB THEARPY RXD/TAKEN: ICD-10-PCS | Mod: CPTII,S$GLB,, | Performed by: STUDENT IN AN ORGANIZED HEALTH CARE EDUCATION/TRAINING PROGRAM

## 2021-10-29 PROCEDURE — 1159F MED LIST DOCD IN RCRD: CPT | Mod: CPTII,S$GLB,, | Performed by: STUDENT IN AN ORGANIZED HEALTH CARE EDUCATION/TRAINING PROGRAM

## 2021-10-29 PROCEDURE — 3008F PR BODY MASS INDEX (BMI) DOCUMENTED: ICD-10-PCS | Mod: CPTII,S$GLB,, | Performed by: STUDENT IN AN ORGANIZED HEALTH CARE EDUCATION/TRAINING PROGRAM

## 2021-10-29 PROCEDURE — 3288F FALL RISK ASSESSMENT DOCD: CPT | Mod: CPTII,S$GLB,, | Performed by: STUDENT IN AN ORGANIZED HEALTH CARE EDUCATION/TRAINING PROGRAM

## 2021-11-01 ENCOUNTER — CLINICAL SUPPORT (OUTPATIENT)
Dept: REHABILITATION | Facility: HOSPITAL | Age: 66
End: 2021-11-01
Payer: COMMERCIAL

## 2021-11-01 DIAGNOSIS — R26.2 DIFFICULTY WALKING UP STAIRS: ICD-10-CM

## 2021-11-01 DIAGNOSIS — M25.561 ACUTE PAIN OF RIGHT KNEE: ICD-10-CM

## 2021-11-01 PROCEDURE — 97140 MANUAL THERAPY 1/> REGIONS: CPT | Performed by: PHYSICAL THERAPIST

## 2021-11-01 PROCEDURE — 97110 THERAPEUTIC EXERCISES: CPT | Performed by: PHYSICAL THERAPIST

## 2021-11-02 DIAGNOSIS — I10 ESSENTIAL HYPERTENSION: ICD-10-CM

## 2021-11-02 DIAGNOSIS — Z00.00 ANNUAL PHYSICAL EXAM: Primary | ICD-10-CM

## 2021-11-02 DIAGNOSIS — E11.40 TYPE 2 DIABETES MELLITUS WITH DIABETIC NEUROPATHY, WITHOUT LONG-TERM CURRENT USE OF INSULIN: ICD-10-CM

## 2021-11-02 DIAGNOSIS — R79.89 LOW VITAMIN D LEVEL: ICD-10-CM

## 2021-11-02 DIAGNOSIS — E78.5 HYPERLIPIDEMIA, UNSPECIFIED HYPERLIPIDEMIA TYPE: ICD-10-CM

## 2021-11-03 ENCOUNTER — OFFICE VISIT (OUTPATIENT)
Dept: INTERNAL MEDICINE | Facility: CLINIC | Age: 66
End: 2021-11-03
Payer: COMMERCIAL

## 2021-11-03 ENCOUNTER — LAB VISIT (OUTPATIENT)
Dept: LAB | Facility: HOSPITAL | Age: 66
End: 2021-11-03
Payer: COMMERCIAL

## 2021-11-03 VITALS
BODY MASS INDEX: 43.16 KG/M2 | HEIGHT: 67 IN | OXYGEN SATURATION: 99 % | SYSTOLIC BLOOD PRESSURE: 130 MMHG | HEART RATE: 88 BPM | DIASTOLIC BLOOD PRESSURE: 88 MMHG | WEIGHT: 275 LBS

## 2021-11-03 DIAGNOSIS — E78.5 HYPERLIPIDEMIA, UNSPECIFIED HYPERLIPIDEMIA TYPE: ICD-10-CM

## 2021-11-03 DIAGNOSIS — Z00.00 ANNUAL PHYSICAL EXAM: ICD-10-CM

## 2021-11-03 DIAGNOSIS — E11.40 TYPE 2 DIABETES MELLITUS WITH DIABETIC NEUROPATHY, WITHOUT LONG-TERM CURRENT USE OF INSULIN: ICD-10-CM

## 2021-11-03 DIAGNOSIS — Z00.00 ANNUAL PHYSICAL EXAM: Primary | ICD-10-CM

## 2021-11-03 DIAGNOSIS — I10 ESSENTIAL HYPERTENSION: ICD-10-CM

## 2021-11-03 DIAGNOSIS — N18.31 TYPE 2 DIABETES MELLITUS WITH STAGE 3A CHRONIC KIDNEY DISEASE, WITHOUT LONG-TERM CURRENT USE OF INSULIN: ICD-10-CM

## 2021-11-03 DIAGNOSIS — R19.5 LOOSE STOOLS: ICD-10-CM

## 2021-11-03 DIAGNOSIS — E11.22 TYPE 2 DIABETES MELLITUS WITH STAGE 3A CHRONIC KIDNEY DISEASE, WITHOUT LONG-TERM CURRENT USE OF INSULIN: ICD-10-CM

## 2021-11-03 DIAGNOSIS — R79.89 LOW VITAMIN D LEVEL: ICD-10-CM

## 2021-11-03 LAB
25(OH)D3+25(OH)D2 SERPL-MCNC: 16 NG/ML (ref 30–96)
ALBUMIN SERPL BCP-MCNC: 3.7 G/DL (ref 3.5–5.2)
ALP SERPL-CCNC: 127 U/L (ref 55–135)
ALT SERPL W/O P-5'-P-CCNC: 23 U/L (ref 10–44)
ANION GAP SERPL CALC-SCNC: 7 MMOL/L (ref 8–16)
AST SERPL-CCNC: 20 U/L (ref 10–40)
BASOPHILS # BLD AUTO: 0.01 K/UL (ref 0–0.2)
BASOPHILS NFR BLD: 0.2 % (ref 0–1.9)
BILIRUB SERPL-MCNC: 0.4 MG/DL (ref 0.1–1)
BUN SERPL-MCNC: 15 MG/DL (ref 8–23)
CALCIUM SERPL-MCNC: 10 MG/DL (ref 8.7–10.5)
CHLORIDE SERPL-SCNC: 107 MMOL/L (ref 95–110)
CHOLEST SERPL-MCNC: 237 MG/DL (ref 120–199)
CHOLEST/HDLC SERPL: 4.7 {RATIO} (ref 2–5)
CO2 SERPL-SCNC: 26 MMOL/L (ref 23–29)
CREAT SERPL-MCNC: 1.3 MG/DL (ref 0.5–1.4)
DIFFERENTIAL METHOD: ABNORMAL
EOSINOPHIL # BLD AUTO: 0.1 K/UL (ref 0–0.5)
EOSINOPHIL NFR BLD: 2.5 % (ref 0–8)
ERYTHROCYTE [DISTWIDTH] IN BLOOD BY AUTOMATED COUNT: 13.8 % (ref 11.5–14.5)
EST. GFR  (AFRICAN AMERICAN): 49.4 ML/MIN/1.73 M^2
EST. GFR  (NON AFRICAN AMERICAN): 42.9 ML/MIN/1.73 M^2
ESTIMATED AVG GLUCOSE: 146 MG/DL (ref 68–131)
GLUCOSE SERPL-MCNC: 146 MG/DL (ref 70–110)
HBA1C MFR BLD: 6.7 % (ref 4–5.6)
HCT VFR BLD AUTO: 40.5 % (ref 37–48.5)
HDLC SERPL-MCNC: 50 MG/DL (ref 40–75)
HDLC SERPL: 21.1 % (ref 20–50)
HGB BLD-MCNC: 12.7 G/DL (ref 12–16)
IMM GRANULOCYTES # BLD AUTO: 0.02 K/UL (ref 0–0.04)
IMM GRANULOCYTES NFR BLD AUTO: 0.4 % (ref 0–0.5)
LDLC SERPL CALC-MCNC: 156.6 MG/DL (ref 63–159)
LYMPHOCYTES # BLD AUTO: 1.4 K/UL (ref 1–4.8)
LYMPHOCYTES NFR BLD: 26.2 % (ref 18–48)
MCH RBC QN AUTO: 29.1 PG (ref 27–31)
MCHC RBC AUTO-ENTMCNC: 31.4 G/DL (ref 32–36)
MCV RBC AUTO: 93 FL (ref 82–98)
MONOCYTES # BLD AUTO: 0.4 K/UL (ref 0.3–1)
MONOCYTES NFR BLD: 7 % (ref 4–15)
NEUTROPHILS # BLD AUTO: 3.4 K/UL (ref 1.8–7.7)
NEUTROPHILS NFR BLD: 63.7 % (ref 38–73)
NONHDLC SERPL-MCNC: 187 MG/DL
NRBC BLD-RTO: 0 /100 WBC
PLATELET # BLD AUTO: 260 K/UL (ref 150–450)
PMV BLD AUTO: 10.9 FL (ref 9.2–12.9)
POTASSIUM SERPL-SCNC: 5.1 MMOL/L (ref 3.5–5.1)
PROT SERPL-MCNC: 7.2 G/DL (ref 6–8.4)
RBC # BLD AUTO: 4.37 M/UL (ref 4–5.4)
SODIUM SERPL-SCNC: 140 MMOL/L (ref 136–145)
TRIGL SERPL-MCNC: 152 MG/DL (ref 30–150)
TSH SERPL DL<=0.005 MIU/L-ACNC: 3.51 UIU/ML (ref 0.4–4)
WBC # BLD AUTO: 5.27 K/UL (ref 3.9–12.7)

## 2021-11-03 PROCEDURE — 90732 PNEUMOCOCCAL POLYSACCHARIDE VACCINE 23-VALENT =>2YO SQ IM: ICD-10-PCS | Mod: S$GLB,,, | Performed by: INTERNAL MEDICINE

## 2021-11-03 PROCEDURE — 1159F PR MEDICATION LIST DOCUMENTED IN MEDICAL RECORD: ICD-10-PCS | Mod: CPTII,S$GLB,, | Performed by: INTERNAL MEDICINE

## 2021-11-03 PROCEDURE — 3072F PR LOW RISK FOR RETINOPATHY: ICD-10-PCS | Mod: CPTII,S$GLB,, | Performed by: INTERNAL MEDICINE

## 2021-11-03 PROCEDURE — 3079F DIAST BP 80-89 MM HG: CPT | Mod: CPTII,S$GLB,, | Performed by: INTERNAL MEDICINE

## 2021-11-03 PROCEDURE — 85025 COMPLETE CBC W/AUTO DIFF WBC: CPT | Performed by: INTERNAL MEDICINE

## 2021-11-03 PROCEDURE — 80061 LIPID PANEL: CPT | Performed by: INTERNAL MEDICINE

## 2021-11-03 PROCEDURE — 1101F PR PT FALLS ASSESS DOC 0-1 FALLS W/OUT INJ PAST YR: ICD-10-PCS | Mod: CPTII,S$GLB,, | Performed by: INTERNAL MEDICINE

## 2021-11-03 PROCEDURE — 4010F PR ACE/ARB THEARPY RXD/TAKEN: ICD-10-PCS | Mod: CPTII,S$GLB,, | Performed by: INTERNAL MEDICINE

## 2021-11-03 PROCEDURE — 82306 VITAMIN D 25 HYDROXY: CPT | Performed by: INTERNAL MEDICINE

## 2021-11-03 PROCEDURE — 3075F PR MOST RECENT SYSTOLIC BLOOD PRESS GE 130-139MM HG: ICD-10-PCS | Mod: CPTII,S$GLB,, | Performed by: INTERNAL MEDICINE

## 2021-11-03 PROCEDURE — 3008F PR BODY MASS INDEX (BMI) DOCUMENTED: ICD-10-PCS | Mod: CPTII,S$GLB,, | Performed by: INTERNAL MEDICINE

## 2021-11-03 PROCEDURE — 1125F AMNT PAIN NOTED PAIN PRSNT: CPT | Mod: CPTII,S$GLB,, | Performed by: INTERNAL MEDICINE

## 2021-11-03 PROCEDURE — 80053 COMPREHEN METABOLIC PANEL: CPT | Performed by: INTERNAL MEDICINE

## 2021-11-03 PROCEDURE — 1101F PT FALLS ASSESS-DOCD LE1/YR: CPT | Mod: CPTII,S$GLB,, | Performed by: INTERNAL MEDICINE

## 2021-11-03 PROCEDURE — 3075F SYST BP GE 130 - 139MM HG: CPT | Mod: CPTII,S$GLB,, | Performed by: INTERNAL MEDICINE

## 2021-11-03 PROCEDURE — 3079F PR MOST RECENT DIASTOLIC BLOOD PRESSURE 80-89 MM HG: ICD-10-PCS | Mod: CPTII,S$GLB,, | Performed by: INTERNAL MEDICINE

## 2021-11-03 PROCEDURE — 4010F ACE/ARB THERAPY RXD/TAKEN: CPT | Mod: CPTII,S$GLB,, | Performed by: INTERNAL MEDICINE

## 2021-11-03 PROCEDURE — 3288F FALL RISK ASSESSMENT DOCD: CPT | Mod: CPTII,S$GLB,, | Performed by: INTERNAL MEDICINE

## 2021-11-03 PROCEDURE — 83036 HEMOGLOBIN GLYCOSYLATED A1C: CPT | Performed by: INTERNAL MEDICINE

## 2021-11-03 PROCEDURE — 1125F PR PAIN SEVERITY QUANTIFIED, PAIN PRESENT: ICD-10-PCS | Mod: CPTII,S$GLB,, | Performed by: INTERNAL MEDICINE

## 2021-11-03 PROCEDURE — 90471 PNEUMOCOCCAL POLYSACCHARIDE VACCINE 23-VALENT =>2YO SQ IM: ICD-10-PCS | Mod: S$GLB,,, | Performed by: INTERNAL MEDICINE

## 2021-11-03 PROCEDURE — 99397 PR PREVENTIVE VISIT,EST,65 & OVER: ICD-10-PCS | Mod: 25,S$GLB,, | Performed by: INTERNAL MEDICINE

## 2021-11-03 PROCEDURE — 3072F LOW RISK FOR RETINOPATHY: CPT | Mod: CPTII,S$GLB,, | Performed by: INTERNAL MEDICINE

## 2021-11-03 PROCEDURE — 3044F HG A1C LEVEL LT 7.0%: CPT | Mod: CPTII,S$GLB,, | Performed by: INTERNAL MEDICINE

## 2021-11-03 PROCEDURE — 1159F MED LIST DOCD IN RCRD: CPT | Mod: CPTII,S$GLB,, | Performed by: INTERNAL MEDICINE

## 2021-11-03 PROCEDURE — 3044F PR MOST RECENT HEMOGLOBIN A1C LEVEL <7.0%: ICD-10-PCS | Mod: CPTII,S$GLB,, | Performed by: INTERNAL MEDICINE

## 2021-11-03 PROCEDURE — 99397 PER PM REEVAL EST PAT 65+ YR: CPT | Mod: 25,S$GLB,, | Performed by: INTERNAL MEDICINE

## 2021-11-03 PROCEDURE — 1160F PR REVIEW ALL MEDS BY PRESCRIBER/CLIN PHARMACIST DOCUMENTED: ICD-10-PCS | Mod: CPTII,S$GLB,, | Performed by: INTERNAL MEDICINE

## 2021-11-03 PROCEDURE — 99999 PR PBB SHADOW E&M-EST. PATIENT-LVL III: CPT | Mod: PBBFAC,,, | Performed by: INTERNAL MEDICINE

## 2021-11-03 PROCEDURE — 90471 IMMUNIZATION ADMIN: CPT | Mod: S$GLB,,, | Performed by: INTERNAL MEDICINE

## 2021-11-03 PROCEDURE — 1160F RVW MEDS BY RX/DR IN RCRD: CPT | Mod: CPTII,S$GLB,, | Performed by: INTERNAL MEDICINE

## 2021-11-03 PROCEDURE — 84443 ASSAY THYROID STIM HORMONE: CPT | Performed by: INTERNAL MEDICINE

## 2021-11-03 PROCEDURE — 99999 PR PBB SHADOW E&M-EST. PATIENT-LVL III: ICD-10-PCS | Mod: PBBFAC,,, | Performed by: INTERNAL MEDICINE

## 2021-11-03 PROCEDURE — 3288F PR FALLS RISK ASSESSMENT DOCUMENTED: ICD-10-PCS | Mod: CPTII,S$GLB,, | Performed by: INTERNAL MEDICINE

## 2021-11-03 PROCEDURE — 36415 COLL VENOUS BLD VENIPUNCTURE: CPT | Performed by: INTERNAL MEDICINE

## 2021-11-03 PROCEDURE — 90732 PPSV23 VACC 2 YRS+ SUBQ/IM: CPT | Mod: S$GLB,,, | Performed by: INTERNAL MEDICINE

## 2021-11-03 PROCEDURE — 3008F BODY MASS INDEX DOCD: CPT | Mod: CPTII,S$GLB,, | Performed by: INTERNAL MEDICINE

## 2021-11-04 ENCOUNTER — CLINICAL SUPPORT (OUTPATIENT)
Dept: REHABILITATION | Facility: HOSPITAL | Age: 66
End: 2021-11-04
Payer: COMMERCIAL

## 2021-11-04 DIAGNOSIS — M25.561 ACUTE PAIN OF RIGHT KNEE: ICD-10-CM

## 2021-11-04 DIAGNOSIS — R26.2 DIFFICULTY WALKING UP STAIRS: ICD-10-CM

## 2021-11-04 PROCEDURE — 97110 THERAPEUTIC EXERCISES: CPT | Performed by: PHYSICAL THERAPIST

## 2021-11-04 PROCEDURE — 97140 MANUAL THERAPY 1/> REGIONS: CPT | Performed by: PHYSICAL THERAPIST

## 2021-11-05 ENCOUNTER — OFFICE VISIT (OUTPATIENT)
Dept: SPORTS MEDICINE | Facility: CLINIC | Age: 66
End: 2021-11-05
Payer: COMMERCIAL

## 2021-11-05 VITALS
DIASTOLIC BLOOD PRESSURE: 72 MMHG | SYSTOLIC BLOOD PRESSURE: 118 MMHG | WEIGHT: 275 LBS | HEIGHT: 66 IN | HEART RATE: 80 BPM | BODY MASS INDEX: 44.2 KG/M2 | TEMPERATURE: 98 F

## 2021-11-05 DIAGNOSIS — E66.01 CLASS 3 SEVERE OBESITY WITH BODY MASS INDEX (BMI) OF 40.0 TO 44.9 IN ADULT, UNSPECIFIED OBESITY TYPE, UNSPECIFIED WHETHER SERIOUS COMORBIDITY PRESENT: ICD-10-CM

## 2021-11-05 DIAGNOSIS — M17.11 ARTHRITIS OF RIGHT KNEE: Primary | ICD-10-CM

## 2021-11-05 PROCEDURE — 1160F RVW MEDS BY RX/DR IN RCRD: CPT | Mod: CPTII,S$GLB,, | Performed by: STUDENT IN AN ORGANIZED HEALTH CARE EDUCATION/TRAINING PROGRAM

## 2021-11-05 PROCEDURE — 1159F PR MEDICATION LIST DOCUMENTED IN MEDICAL RECORD: ICD-10-PCS | Mod: CPTII,S$GLB,, | Performed by: STUDENT IN AN ORGANIZED HEALTH CARE EDUCATION/TRAINING PROGRAM

## 2021-11-05 PROCEDURE — 1125F PR PAIN SEVERITY QUANTIFIED, PAIN PRESENT: ICD-10-PCS | Mod: CPTII,S$GLB,, | Performed by: STUDENT IN AN ORGANIZED HEALTH CARE EDUCATION/TRAINING PROGRAM

## 2021-11-05 PROCEDURE — 3074F PR MOST RECENT SYSTOLIC BLOOD PRESSURE < 130 MM HG: ICD-10-PCS | Mod: CPTII,S$GLB,, | Performed by: STUDENT IN AN ORGANIZED HEALTH CARE EDUCATION/TRAINING PROGRAM

## 2021-11-05 PROCEDURE — 1125F AMNT PAIN NOTED PAIN PRSNT: CPT | Mod: CPTII,S$GLB,, | Performed by: STUDENT IN AN ORGANIZED HEALTH CARE EDUCATION/TRAINING PROGRAM

## 2021-11-05 PROCEDURE — 4010F ACE/ARB THERAPY RXD/TAKEN: CPT | Mod: CPTII,S$GLB,, | Performed by: STUDENT IN AN ORGANIZED HEALTH CARE EDUCATION/TRAINING PROGRAM

## 2021-11-05 PROCEDURE — 20611 LARGE JOINT ASPIRATION/INJECTION: R KNEE: ICD-10-PCS | Mod: RT,S$GLB,, | Performed by: STUDENT IN AN ORGANIZED HEALTH CARE EDUCATION/TRAINING PROGRAM

## 2021-11-05 PROCEDURE — 3008F BODY MASS INDEX DOCD: CPT | Mod: CPTII,S$GLB,, | Performed by: STUDENT IN AN ORGANIZED HEALTH CARE EDUCATION/TRAINING PROGRAM

## 2021-11-05 PROCEDURE — 3072F PR LOW RISK FOR RETINOPATHY: ICD-10-PCS | Mod: CPTII,S$GLB,, | Performed by: STUDENT IN AN ORGANIZED HEALTH CARE EDUCATION/TRAINING PROGRAM

## 2021-11-05 PROCEDURE — 3078F PR MOST RECENT DIASTOLIC BLOOD PRESSURE < 80 MM HG: ICD-10-PCS | Mod: CPTII,S$GLB,, | Performed by: STUDENT IN AN ORGANIZED HEALTH CARE EDUCATION/TRAINING PROGRAM

## 2021-11-05 PROCEDURE — 99999 PR PBB SHADOW E&M-EST. PATIENT-LVL III: ICD-10-PCS | Mod: PBBFAC,,, | Performed by: STUDENT IN AN ORGANIZED HEALTH CARE EDUCATION/TRAINING PROGRAM

## 2021-11-05 PROCEDURE — 3044F HG A1C LEVEL LT 7.0%: CPT | Mod: CPTII,S$GLB,, | Performed by: STUDENT IN AN ORGANIZED HEALTH CARE EDUCATION/TRAINING PROGRAM

## 2021-11-05 PROCEDURE — 3008F PR BODY MASS INDEX (BMI) DOCUMENTED: ICD-10-PCS | Mod: CPTII,S$GLB,, | Performed by: STUDENT IN AN ORGANIZED HEALTH CARE EDUCATION/TRAINING PROGRAM

## 2021-11-05 PROCEDURE — 3078F DIAST BP <80 MM HG: CPT | Mod: CPTII,S$GLB,, | Performed by: STUDENT IN AN ORGANIZED HEALTH CARE EDUCATION/TRAINING PROGRAM

## 2021-11-05 PROCEDURE — 1160F PR REVIEW ALL MEDS BY PRESCRIBER/CLIN PHARMACIST DOCUMENTED: ICD-10-PCS | Mod: CPTII,S$GLB,, | Performed by: STUDENT IN AN ORGANIZED HEALTH CARE EDUCATION/TRAINING PROGRAM

## 2021-11-05 PROCEDURE — 3044F PR MOST RECENT HEMOGLOBIN A1C LEVEL <7.0%: ICD-10-PCS | Mod: CPTII,S$GLB,, | Performed by: STUDENT IN AN ORGANIZED HEALTH CARE EDUCATION/TRAINING PROGRAM

## 2021-11-05 PROCEDURE — 1159F MED LIST DOCD IN RCRD: CPT | Mod: CPTII,S$GLB,, | Performed by: STUDENT IN AN ORGANIZED HEALTH CARE EDUCATION/TRAINING PROGRAM

## 2021-11-05 PROCEDURE — 20611 DRAIN/INJ JOINT/BURSA W/US: CPT | Mod: RT,S$GLB,, | Performed by: STUDENT IN AN ORGANIZED HEALTH CARE EDUCATION/TRAINING PROGRAM

## 2021-11-05 PROCEDURE — 4010F PR ACE/ARB THEARPY RXD/TAKEN: ICD-10-PCS | Mod: CPTII,S$GLB,, | Performed by: STUDENT IN AN ORGANIZED HEALTH CARE EDUCATION/TRAINING PROGRAM

## 2021-11-05 PROCEDURE — 99499 UNLISTED E&M SERVICE: CPT | Mod: S$GLB,,, | Performed by: STUDENT IN AN ORGANIZED HEALTH CARE EDUCATION/TRAINING PROGRAM

## 2021-11-05 PROCEDURE — 3072F LOW RISK FOR RETINOPATHY: CPT | Mod: CPTII,S$GLB,, | Performed by: STUDENT IN AN ORGANIZED HEALTH CARE EDUCATION/TRAINING PROGRAM

## 2021-11-05 PROCEDURE — 99499 NO LOS: ICD-10-PCS | Mod: S$GLB,,, | Performed by: STUDENT IN AN ORGANIZED HEALTH CARE EDUCATION/TRAINING PROGRAM

## 2021-11-05 PROCEDURE — 3074F SYST BP LT 130 MM HG: CPT | Mod: CPTII,S$GLB,, | Performed by: STUDENT IN AN ORGANIZED HEALTH CARE EDUCATION/TRAINING PROGRAM

## 2021-11-05 PROCEDURE — 99999 PR PBB SHADOW E&M-EST. PATIENT-LVL III: CPT | Mod: PBBFAC,,, | Performed by: STUDENT IN AN ORGANIZED HEALTH CARE EDUCATION/TRAINING PROGRAM

## 2021-11-10 ENCOUNTER — CLINICAL SUPPORT (OUTPATIENT)
Dept: REHABILITATION | Facility: HOSPITAL | Age: 66
End: 2021-11-10
Payer: COMMERCIAL

## 2021-11-10 DIAGNOSIS — R26.2 DIFFICULTY WALKING UP STAIRS: ICD-10-CM

## 2021-11-10 DIAGNOSIS — M25.561 ACUTE PAIN OF RIGHT KNEE: ICD-10-CM

## 2021-11-10 PROCEDURE — 97140 MANUAL THERAPY 1/> REGIONS: CPT | Performed by: PHYSICAL THERAPIST

## 2021-11-10 PROCEDURE — 97110 THERAPEUTIC EXERCISES: CPT | Performed by: PHYSICAL THERAPIST

## 2021-11-15 ENCOUNTER — CLINICAL SUPPORT (OUTPATIENT)
Dept: REHABILITATION | Facility: HOSPITAL | Age: 66
End: 2021-11-15
Payer: COMMERCIAL

## 2021-11-15 DIAGNOSIS — M25.561 ACUTE PAIN OF RIGHT KNEE: ICD-10-CM

## 2021-11-15 DIAGNOSIS — R26.2 DIFFICULTY WALKING UP STAIRS: ICD-10-CM

## 2021-11-15 PROCEDURE — 97140 MANUAL THERAPY 1/> REGIONS: CPT | Performed by: PHYSICAL THERAPIST

## 2021-11-15 PROCEDURE — 97110 THERAPEUTIC EXERCISES: CPT | Performed by: PHYSICAL THERAPIST

## 2021-11-18 ENCOUNTER — CLINICAL SUPPORT (OUTPATIENT)
Dept: REHABILITATION | Facility: HOSPITAL | Age: 66
End: 2021-11-18
Payer: COMMERCIAL

## 2021-11-18 DIAGNOSIS — R26.2 DIFFICULTY WALKING UP STAIRS: ICD-10-CM

## 2021-11-18 DIAGNOSIS — M25.561 ACUTE PAIN OF RIGHT KNEE: ICD-10-CM

## 2021-11-18 PROCEDURE — 97530 THERAPEUTIC ACTIVITIES: CPT | Performed by: PHYSICAL THERAPIST

## 2021-11-18 PROCEDURE — 97110 THERAPEUTIC EXERCISES: CPT | Performed by: PHYSICAL THERAPIST

## 2021-11-22 ENCOUNTER — CLINICAL SUPPORT (OUTPATIENT)
Dept: REHABILITATION | Facility: HOSPITAL | Age: 66
End: 2021-11-22
Payer: COMMERCIAL

## 2021-11-22 DIAGNOSIS — M25.561 ACUTE PAIN OF RIGHT KNEE: ICD-10-CM

## 2021-11-22 DIAGNOSIS — R26.2 DIFFICULTY WALKING UP STAIRS: ICD-10-CM

## 2021-11-22 PROCEDURE — 97110 THERAPEUTIC EXERCISES: CPT | Performed by: PHYSICAL THERAPIST

## 2021-11-22 PROCEDURE — 97140 MANUAL THERAPY 1/> REGIONS: CPT | Performed by: PHYSICAL THERAPIST

## 2021-11-26 ENCOUNTER — CLINICAL SUPPORT (OUTPATIENT)
Dept: REHABILITATION | Facility: HOSPITAL | Age: 66
End: 2021-11-26
Payer: COMMERCIAL

## 2021-11-26 DIAGNOSIS — R26.2 DIFFICULTY WALKING UP STAIRS: ICD-10-CM

## 2021-11-26 DIAGNOSIS — M25.561 ACUTE PAIN OF RIGHT KNEE: ICD-10-CM

## 2021-11-26 PROCEDURE — 97110 THERAPEUTIC EXERCISES: CPT | Performed by: PHYSICAL THERAPIST

## 2021-11-26 PROCEDURE — 97140 MANUAL THERAPY 1/> REGIONS: CPT | Performed by: PHYSICAL THERAPIST

## 2021-11-29 ENCOUNTER — CLINICAL SUPPORT (OUTPATIENT)
Dept: REHABILITATION | Facility: HOSPITAL | Age: 66
End: 2021-11-29
Payer: COMMERCIAL

## 2021-11-29 DIAGNOSIS — R26.2 DIFFICULTY WALKING UP STAIRS: ICD-10-CM

## 2021-11-29 DIAGNOSIS — M25.561 ACUTE PAIN OF RIGHT KNEE: ICD-10-CM

## 2021-11-29 PROCEDURE — 97140 MANUAL THERAPY 1/> REGIONS: CPT | Mod: CQ

## 2021-11-29 PROCEDURE — 97110 THERAPEUTIC EXERCISES: CPT | Mod: CQ

## 2021-12-03 ENCOUNTER — CLINICAL SUPPORT (OUTPATIENT)
Dept: REHABILITATION | Facility: HOSPITAL | Age: 66
End: 2021-12-03
Payer: COMMERCIAL

## 2021-12-03 ENCOUNTER — LAB VISIT (OUTPATIENT)
Dept: LAB | Facility: HOSPITAL | Age: 66
End: 2021-12-03
Attending: INTERNAL MEDICINE
Payer: COMMERCIAL

## 2021-12-03 DIAGNOSIS — E11.22 TYPE 2 DIABETES MELLITUS WITH STAGE 3A CHRONIC KIDNEY DISEASE, WITHOUT LONG-TERM CURRENT USE OF INSULIN: ICD-10-CM

## 2021-12-03 DIAGNOSIS — Z00.00 ANNUAL PHYSICAL EXAM: ICD-10-CM

## 2021-12-03 DIAGNOSIS — E11.40 TYPE 2 DIABETES MELLITUS WITH DIABETIC NEUROPATHY, WITHOUT LONG-TERM CURRENT USE OF INSULIN: ICD-10-CM

## 2021-12-03 DIAGNOSIS — I10 ESSENTIAL HYPERTENSION: ICD-10-CM

## 2021-12-03 DIAGNOSIS — R79.89 LOW VITAMIN D LEVEL: ICD-10-CM

## 2021-12-03 DIAGNOSIS — M25.561 ACUTE PAIN OF RIGHT KNEE: ICD-10-CM

## 2021-12-03 DIAGNOSIS — N18.31 TYPE 2 DIABETES MELLITUS WITH STAGE 3A CHRONIC KIDNEY DISEASE, WITHOUT LONG-TERM CURRENT USE OF INSULIN: ICD-10-CM

## 2021-12-03 DIAGNOSIS — R19.5 LOOSE STOOLS: ICD-10-CM

## 2021-12-03 DIAGNOSIS — E78.5 HYPERLIPIDEMIA, UNSPECIFIED HYPERLIPIDEMIA TYPE: ICD-10-CM

## 2021-12-03 DIAGNOSIS — R26.2 DIFFICULTY WALKING UP STAIRS: ICD-10-CM

## 2021-12-03 LAB
ANION GAP SERPL CALC-SCNC: 12 MMOL/L (ref 8–16)
BUN SERPL-MCNC: 26 MG/DL (ref 8–23)
CALCIUM SERPL-MCNC: 9.6 MG/DL (ref 8.7–10.5)
CHLORIDE SERPL-SCNC: 107 MMOL/L (ref 95–110)
CHOLEST SERPL-MCNC: 191 MG/DL (ref 120–199)
CHOLEST/HDLC SERPL: 4.7 {RATIO} (ref 2–5)
CO2 SERPL-SCNC: 21 MMOL/L (ref 23–29)
CREAT SERPL-MCNC: 1.3 MG/DL (ref 0.5–1.4)
EST. GFR  (AFRICAN AMERICAN): 49.4 ML/MIN/1.73 M^2
EST. GFR  (NON AFRICAN AMERICAN): 42.9 ML/MIN/1.73 M^2
GLUCOSE SERPL-MCNC: 132 MG/DL (ref 70–110)
HDLC SERPL-MCNC: 41 MG/DL (ref 40–75)
HDLC SERPL: 21.5 % (ref 20–50)
LDLC SERPL CALC-MCNC: 116.6 MG/DL (ref 63–159)
NONHDLC SERPL-MCNC: 150 MG/DL
POTASSIUM SERPL-SCNC: 4.6 MMOL/L (ref 3.5–5.1)
SODIUM SERPL-SCNC: 140 MMOL/L (ref 136–145)
TRIGL SERPL-MCNC: 167 MG/DL (ref 30–150)

## 2021-12-03 PROCEDURE — 97110 THERAPEUTIC EXERCISES: CPT | Performed by: PHYSICAL THERAPIST

## 2021-12-03 PROCEDURE — 36415 COLL VENOUS BLD VENIPUNCTURE: CPT | Performed by: INTERNAL MEDICINE

## 2021-12-03 PROCEDURE — 80048 BASIC METABOLIC PNL TOTAL CA: CPT | Performed by: INTERNAL MEDICINE

## 2021-12-03 PROCEDURE — 80061 LIPID PANEL: CPT | Performed by: INTERNAL MEDICINE

## 2021-12-04 ENCOUNTER — PATIENT MESSAGE (OUTPATIENT)
Dept: INTERNAL MEDICINE | Facility: CLINIC | Age: 66
End: 2021-12-04
Payer: COMMERCIAL

## 2021-12-04 DIAGNOSIS — N18.31 STAGE 3A CHRONIC KIDNEY DISEASE: Primary | ICD-10-CM

## 2021-12-06 ENCOUNTER — IMMUNIZATION (OUTPATIENT)
Dept: INTERNAL MEDICINE | Facility: CLINIC | Age: 66
End: 2021-12-06
Payer: COMMERCIAL

## 2021-12-06 ENCOUNTER — CLINICAL SUPPORT (OUTPATIENT)
Dept: REHABILITATION | Facility: HOSPITAL | Age: 66
End: 2021-12-06
Payer: COMMERCIAL

## 2021-12-06 DIAGNOSIS — R26.2 DIFFICULTY WALKING UP STAIRS: ICD-10-CM

## 2021-12-06 DIAGNOSIS — M25.561 ACUTE PAIN OF RIGHT KNEE: ICD-10-CM

## 2021-12-06 PROCEDURE — 90471 IMMUNIZATION ADMIN: CPT | Mod: S$GLB,,, | Performed by: INTERNAL MEDICINE

## 2021-12-06 PROCEDURE — 97110 THERAPEUTIC EXERCISES: CPT | Performed by: PHYSICAL THERAPIST

## 2021-12-06 PROCEDURE — 97530 THERAPEUTIC ACTIVITIES: CPT | Performed by: PHYSICAL THERAPIST

## 2021-12-06 PROCEDURE — 90694 VACC AIIV4 NO PRSRV 0.5ML IM: CPT | Mod: S$GLB,,, | Performed by: INTERNAL MEDICINE

## 2021-12-06 PROCEDURE — 90694 FLU VACCINE - QUADRIVALENT - ADJUVANTED: ICD-10-PCS | Mod: S$GLB,,, | Performed by: INTERNAL MEDICINE

## 2021-12-06 PROCEDURE — 90471 FLU VACCINE - QUADRIVALENT - ADJUVANTED: ICD-10-PCS | Mod: S$GLB,,, | Performed by: INTERNAL MEDICINE

## 2021-12-10 ENCOUNTER — CLINICAL SUPPORT (OUTPATIENT)
Dept: REHABILITATION | Facility: HOSPITAL | Age: 66
End: 2021-12-10
Payer: COMMERCIAL

## 2021-12-10 DIAGNOSIS — M25.561 ACUTE PAIN OF RIGHT KNEE: ICD-10-CM

## 2021-12-10 DIAGNOSIS — R26.2 DIFFICULTY WALKING UP STAIRS: ICD-10-CM

## 2021-12-10 PROCEDURE — 97530 THERAPEUTIC ACTIVITIES: CPT | Performed by: PHYSICAL THERAPIST

## 2021-12-10 PROCEDURE — 97110 THERAPEUTIC EXERCISES: CPT | Performed by: PHYSICAL THERAPIST

## 2021-12-13 ENCOUNTER — CLINICAL SUPPORT (OUTPATIENT)
Dept: REHABILITATION | Facility: HOSPITAL | Age: 66
End: 2021-12-13
Payer: COMMERCIAL

## 2021-12-13 DIAGNOSIS — R26.2 DIFFICULTY WALKING UP STAIRS: ICD-10-CM

## 2021-12-13 DIAGNOSIS — M25.561 ACUTE PAIN OF RIGHT KNEE: ICD-10-CM

## 2021-12-13 PROCEDURE — 97110 THERAPEUTIC EXERCISES: CPT | Performed by: PHYSICAL THERAPIST

## 2021-12-13 PROCEDURE — 97140 MANUAL THERAPY 1/> REGIONS: CPT | Performed by: PHYSICAL THERAPIST

## 2021-12-18 RX ORDER — METFORMIN HYDROCHLORIDE 500 MG/1
1000 TABLET ORAL 2 TIMES DAILY WITH MEALS
Qty: 360 TABLET | Refills: 1 | Status: SHIPPED | OUTPATIENT
Start: 2021-12-18 | End: 2022-08-19 | Stop reason: SDUPTHER

## 2021-12-29 ENCOUNTER — CLINICAL SUPPORT (OUTPATIENT)
Dept: REHABILITATION | Facility: HOSPITAL | Age: 66
End: 2021-12-29
Payer: COMMERCIAL

## 2021-12-29 DIAGNOSIS — R26.2 DIFFICULTY WALKING UP STAIRS: ICD-10-CM

## 2021-12-29 DIAGNOSIS — M25.561 ACUTE PAIN OF RIGHT KNEE: ICD-10-CM

## 2021-12-29 PROCEDURE — 97530 THERAPEUTIC ACTIVITIES: CPT | Performed by: PHYSICAL THERAPIST

## 2021-12-29 PROCEDURE — 97110 THERAPEUTIC EXERCISES: CPT | Performed by: PHYSICAL THERAPIST

## 2022-01-05 NOTE — PROGRESS NOTES
Ms. Johnson is a 63 year old woman with gastric sleeve nine years ago  who is here for evaluation of type 2 diabetes mellitus that is uncontrolled and complicated by dietary non adherence, BMI 45..   We discussed use of victoza in the past, had much nausea, which we discussed at length. She would like to try trulicity at a low dose, however I explained that she may develop nausea again. I do believe if she tolerates, even low dose, this is the ideal medication for her.   Kidney function fluctuates on chlorthalidone.       VSS  Body mass index is 45.54 kg/m².     PLAN:  Trial of trulicity  If not consider sglt 2 inhibitor at low dose and consider reducing chlorthalidone dose  OR tradjenta  DM education     Discussed plan at length.     I have personally taken the history and examined this patient and agree with the resident's note as stated above.        0

## 2022-01-06 ENCOUNTER — PATIENT MESSAGE (OUTPATIENT)
Dept: ADMINISTRATIVE | Facility: OTHER | Age: 67
End: 2022-01-06
Payer: COMMERCIAL

## 2022-01-06 ENCOUNTER — PATIENT MESSAGE (OUTPATIENT)
Dept: INTERNAL MEDICINE | Facility: CLINIC | Age: 67
End: 2022-01-06
Payer: COMMERCIAL

## 2022-01-07 ENCOUNTER — LAB VISIT (OUTPATIENT)
Dept: PRIMARY CARE CLINIC | Facility: CLINIC | Age: 67
End: 2022-01-07
Payer: COMMERCIAL

## 2022-01-07 DIAGNOSIS — Z20.822 CONTACT WITH AND (SUSPECTED) EXPOSURE TO COVID-19: ICD-10-CM

## 2022-01-07 LAB
CTP QC/QA: YES
SARS-COV-2 AG RESP QL IA.RAPID: NEGATIVE

## 2022-01-07 PROCEDURE — 87811 SARS-COV-2 COVID19 W/OPTIC: CPT

## 2022-01-14 ENCOUNTER — CLINICAL SUPPORT (OUTPATIENT)
Dept: REHABILITATION | Facility: HOSPITAL | Age: 67
End: 2022-01-14
Payer: COMMERCIAL

## 2022-01-14 DIAGNOSIS — M25.561 ACUTE PAIN OF RIGHT KNEE: ICD-10-CM

## 2022-01-14 DIAGNOSIS — R26.2 DIFFICULTY WALKING UP STAIRS: ICD-10-CM

## 2022-01-14 PROCEDURE — 97140 MANUAL THERAPY 1/> REGIONS: CPT | Performed by: PHYSICAL THERAPIST

## 2022-01-14 PROCEDURE — 97110 THERAPEUTIC EXERCISES: CPT | Performed by: PHYSICAL THERAPIST

## 2022-01-14 NOTE — PROGRESS NOTES
"  Physical Therapy Daily Treatment Note     Name: Meryl Johnson  [facility] Number: [id number]    Therapy Diagnosis:   Encounter Diagnoses   Name Primary?    Acute pain of right knee     Difficulty walking up stairs      Physician: Jun Chin MD    Visit Date: 1/14/2022  Physician Orders: PT Eval and Treat   Medical Diagnosis from Referral: M17.10 (ICD-10-CM) - Unilateral primary osteoarthritis, unspecified knee  Evaluation Date: 10/18/2021  Authorization Period Expiration: 12/31/2021  Plan of Care Expiration: 1/18/2021  Visit # / Visits authorized: 1/20  Total visits: 17    Time In: 0700  Time Out: 0800  Total Billable Time: 60 min    Precautions: Standard    Subjective     Pt reports: Knee has been hurting the past week. Notes front of the knee the worst and still lingering on sides of the knee where I got my shots. Cramps last night trying to sleep. Been working 12 hr shifts     She was compliant with home exercise program.  Response to previous treatment: decrease in pain  Functional change: no brace, cramps in bed    Pain: 0/10  Location: right knee      Objective     Meryl received therapeutic exercises to develop strength, endurance and ROM for 40 minutes including:    Knee ext isometrics 45 sec 2' rest swiss ball at 65deg  Bridges heels 2 x 15  Quad sets 30x 3" hold   Clams BTB 30x 3" hold    NT  HS curls standing 3# 2 x 15  Standing hip abduction YTB 3 x 10   Seated IR YTB 3 x 10  Heel raises 2 up 1 down on R  Paloff mini squat GTB 2 x 10 B  Clam BTB 10 x 10" hold B  Seated HS curls BTB 3 x 10 B  Seated ER BTB 30x B   LAQ 3# 90-30 deg 30 x 2-3" hold at top B   SAQ 3# 30 x 2-3" hold at top B   Bridges MTB above knees 30x     Meryl received the following manual therapy techniques: Joint mobilizations, and soft tissue mobilizations were applied to the: R knee for 20 minutes, including:    Knee reassessment   Gr II-III patellofemoral glides (sup/inf, med/lat)  Inferior patellar mobs Gr IV at end range  STM " "to distal hamstring on R knee    Meryl received the following therapeutic activities for 0 minutes to facilitate return to daily activities with no pain:    Step up 6" box 3 x 10  Sit to stands low mat 10# 3 x 10    Meryl received ice to the R knee for 0 minutes to decrease pain, inflammation, and circulation.    Home Exercises Provided and Patient Education Provided     Education provided:   - Importance of quad activation and knee flexion ROM    Written Home Exercises Provided: Patient instructed to cont prior HEP.  Exercises were reviewed and Meryl was able to demonstrate them prior to the end of the session.  Meryl demonstrated good  understanding of the education provided.     See EMR under Patient Instructions for exercises provided prior visit.    Assessment      Pt presented with right knee patellar tendinosis, relief with isometrics. Limited HS strength with painful resisted testing, improved with PROM flexion mobs. Patient progressing quad and glut activation to R knee to reduce pain. Educated on importance of sitting posture at her desk to avoid impingement    Meryl is progressing well towards her goals.   Pt prognosis is Good.     Pt will continue to benefit from skilled outpatient physical therapy to address the deficits listed in the problem list box on initial evaluation, provide pt/family education and to maximize pt's level of independence in the home and community environment.     Pt's spiritual, cultural and educational needs considered and pt agreeable to plan of care and goals.    Anticipated barriers to physical therapy: covid-19    GOALS: Short Term Goals:  4 weeks(met)  1.Report decreased knee pain  < / =  2/10  to increase tolerance for return to stairs  2. Increase knee ROM to 110 flexion in order to be able to perform ADLs without difficulty.  3. Increase strength by 1/3 MMT grade in quad  to increase tolerance for ADL and work activities.  4. Pt to tolerate HEP to improve ROM and " independence with ADL's     Long Term Goals: 8 weeks(Progressing, not met)  1.Report decreased knee pain < / = 0/10  to increase tolerance for return to work pain free  2.Patient goal: return to [employer] game without knee pain  3.Increase strength to >/= 4+/5 in quad and gluts  to increase tolerance for ADL and work activities.  4. Pt will report at CJ level (20-40% impaired) on FOTO knee to demonstrate increase in LE function with every day tasks.     Plan     Plan of care Certification: 10/18/2021 to 1/18/2021.     Outpatient Physical Therapy 2 times weekly for 10 weeks to include the following interventions: Gait Training, Manual Therapy, Moist Heat/ Ice, Neuromuscular Re-ed, Patient Education, Self Care, Therapeutic Activites and Therapeutic Exercise.     Improve hamstring strength and hip intrinsic rotators    Fabricio Tolentino, PT, DPT, OCS    Cosigned by: Estephanie Garcia, SPT

## 2022-01-26 DIAGNOSIS — E11.9 TYPE 2 DIABETES MELLITUS WITHOUT COMPLICATION: ICD-10-CM

## 2022-01-26 DIAGNOSIS — Z12.31 OTHER SCREENING MAMMOGRAM: ICD-10-CM

## 2022-01-31 ENCOUNTER — PATIENT MESSAGE (OUTPATIENT)
Dept: INTERNAL MEDICINE | Facility: CLINIC | Age: 67
End: 2022-01-31
Payer: COMMERCIAL

## 2022-02-02 ENCOUNTER — CLINICAL SUPPORT (OUTPATIENT)
Dept: REHABILITATION | Facility: HOSPITAL | Age: 67
End: 2022-02-02
Payer: COMMERCIAL

## 2022-02-02 DIAGNOSIS — M25.561 ACUTE PAIN OF RIGHT KNEE: ICD-10-CM

## 2022-02-02 DIAGNOSIS — R26.2 DIFFICULTY WALKING UP STAIRS: ICD-10-CM

## 2022-02-02 PROCEDURE — 97140 MANUAL THERAPY 1/> REGIONS: CPT | Performed by: PHYSICAL THERAPIST

## 2022-02-02 PROCEDURE — 97110 THERAPEUTIC EXERCISES: CPT | Performed by: PHYSICAL THERAPIST

## 2022-02-02 NOTE — PROGRESS NOTES
"    Physical Therapy Daily Treatment Note     Name: Meryl Johnson  [facility] Number: [id number]    Therapy Diagnosis:   Encounter Diagnoses   Name Primary?    Acute pain of right knee     Difficulty walking up stairs      Physician: Jun Chin MD    Visit Date: 2/2/2022  Physician Orders: PT Eval and Treat   Medical Diagnosis from Referral: M17.10 (ICD-10-CM) - Unilateral primary osteoarthritis, unspecified knee  Evaluation Date: 10/18/2021  Authorization Period Expiration: 12/31/2021  Plan of Care Expiration: 1/18/2021  Visit # / Visits authorized: 2/20  Total visits: 18    Time In: 0700  Time Out: 0750  Total Billable Time: 50 min    Precautions: Standard    Subjective     Pt reports: Knee is sore on the front and some tightness in the back of my knee    She was compliant with home exercise program.  Response to previous treatment: decrease in pain  Functional change: no brace, cramps in bed    Pain: 0/10  Location: right knee      Objective     Meryl received therapeutic exercises to develop strength, endurance and ROM for 40 minutes including:    Quad sets 30x 3" hold   SAQ 3# 30x  LAQ 3# 20x  Seated ER GTB 2 x 15 B   Squats behind mat 2 x 15  Standing hip abd 2 x 15    NT    Knee ext isometrics 45 sec 2' rest swiss ball at 65deg  Bridges heels 2 x 15  Clams BTB 30x 3" hold  HS curls standing 3# 2 x 15  Standing hip abduction YTB 3 x 10   Seated IR YTB 3 x 10  Heel raises 2 up 1 down on R  [place] mini squat GTB 2 x 10 B  Clam BTB 10 x 10" hold B  Seated HS curls BTB 3 x 10 B  Seated ER BTB 30x B   LAQ 3# 90-30 deg 30 x 2-3" hold at top B   SAQ 3# 30 x 2-3" hold at top B   Bridges MTB above knees 30x     Meryl received the following manual therapy techniques: Joint mobilizations, and soft tissue mobilizations were applied to the: R knee for 10 minutes, including:    Knee reassessment   Gr II-III patellofemoral glides (sup/inf, med/lat)  Inferior patellar mobs Gr IV at end range  STM to distal hamstring on R " "knee    Meryl received the following therapeutic activities for 0 minutes to facilitate return to daily activities with no pain:    Step up 6" box 3 x 10  Sit to stands low mat 10# 3 x 10    Meryl received ice to the R knee for 0 minutes to decrease pain, inflammation, and circulation.    Home Exercises Provided and Patient Education Provided     Education provided:   - Importance of quad activation and knee flexion ROM    Written Home Exercises Provided: Patient instructed to cont prior HEP.  Exercises were reviewed and Meryl was able to demonstrate them prior to the end of the session.  Meryl demonstrated good  understanding of the education provided.     See EMR under Patient Instructions for exercises provided prior visit.    Assessment      Pt presented with tenderness over patellar tendon with resisted ext and painful end range ext consistent with mensicus pathology previously seen on MRI. Fatigue by end of session     Meryl is progressing well towards her goals.   Pt prognosis is Good.     Pt will continue to benefit from skilled outpatient physical therapy to address the deficits listed in the problem list box on initial evaluation, provide pt/family education and to maximize pt's level of independence in the home and community environment.     Pt's spiritual, cultural and educational needs considered and pt agreeable to plan of care and goals.    Anticipated barriers to physical therapy: covid-19    GOALS: Short Term Goals:  4 weeks(met)  1.Report decreased knee pain  < / =  2/10  to increase tolerance for return to stairs  2. Increase knee ROM to 110 flexion in order to be able to perform ADLs without difficulty.  3. Increase strength by 1/3 MMT grade in quad  to increase tolerance for ADL and work activities.  4. Pt to tolerate HEP to improve ROM and independence with ADL's     Long Term Goals: 8 weeks(Progressing, not met)  1.Report decreased knee pain < / = 0/10  to increase tolerance for return to " work pain free  2.Patient goal: return to [employer] game without knee pain  3.Increase strength to >/= 4+/5 in quad and gluts  to increase tolerance for ADL and work activities.  4. Pt will report at CJ level (20-40% impaired) on FOTO knee to demonstrate increase in LE function with every day tasks.     Plan     Plan of care Certification: 10/18/2021 to 1/18/2021.     Outpatient Physical Therapy 2 times weekly for 10 weeks to include the following interventions: Gait Training, Manual Therapy, Moist Heat/ Ice, Neuromuscular Re-ed, Patient Education, Self Care, Therapeutic Activites and Therapeutic Exercise.     Improve hamstring strength and hip intrinsic rotators    Fabricio Tolentino, PT, DPT, OCS    Cosigned by: Estephanie Garcia, SPT

## 2022-02-18 RX ORDER — BENAZEPRIL HYDROCHLORIDE 40 MG/1
40 TABLET ORAL DAILY
Qty: 90 TABLET | Refills: 2 | Status: SHIPPED | OUTPATIENT
Start: 2022-02-18 | End: 2022-12-16 | Stop reason: SDUPTHER

## 2022-02-19 NOTE — TELEPHONE ENCOUNTER
Encounter details require adjustment(s)/ updating by OR Staff  As of this time Protocols and CDM: did not populate or display   Adjustment(s) made: Provider  CDM should display. Medication(s) delegated by the OR.  Will resend refill request encounter to P Centralized Refill Staff Pool.   [facility]   Note composed:7:02 PM 02/18/2022

## 2022-02-19 NOTE — TELEPHONE ENCOUNTER
No new care gaps identified.  Powered by [employer]. Reference number: [id number].   2/18/2022 7:03:08 PM CST

## 2022-02-19 NOTE — TELEPHONE ENCOUNTER
Refill Authorization Note   Meryl Johnson  is requesting a refill authorization.  Brief Assessment and Rationale for Refill:  Approve     Medication Therapy Plan:       Medication Reconciliation Completed: No   Comments:   --->Care Gap information included below if applicable.   Orders Placed This Encounter    benazepriL (LOTENSIN) 40 MG tablet      Requested Prescriptions   Signed Prescriptions Disp Refills    benazepriL (LOTENSIN) 40 MG tablet 90 tablet 2     Sig: Take 1 tablet (40 mg total) by mouth once daily.       Cardiovascular:  ACE Inhibitors Passed - 2/18/2022  7:02 PM        Passed - Patient is at least 18 years old        Passed - Last BP in normal range within 360 days     BP Readings from Last 1 Encounters:   11/05/21 118/72               Passed - Valid encounter within last 15 months     Recent Visits  Date Type Provider Dept   11/03/21 Office Visit Russell Martinez MD [facility]   01/13/21 Office Visit Russell Martinez MD [facility]   10/29/20 Office Visit Russell Martinez MD [facility]   08/14/20 Office Visit Russell Martinez MD [facility]   07/13/20 Office Visit Russell Martinez MD [facility]   Showing recent visits within past 720 days and meeting all other requirements  Future Appointments  No visits were found meeting these conditions.  Showing future appointments within next 150 days and meeting all other requirements      Future Appointments              In 2 weeks JW ROSEN [facility] [facility], [place]                Passed - Cr is 1.39 or below and within 360 days     Lab Results   Component Value Date    CREATININE 1.3 12/03/2021    CREATININE 1.3 11/03/2021    CREATININE 1.0 01/11/2021              Passed - K is 5.2 or below and within 360 days     Potassium   Date Value Ref Range Status   12/03/2021 4.6 3.5 - 5.1 mmol/L Final   11/03/2021 5.1 3.5 - 5.1 mmol/L Final   01/11/2021 4.4 3.5 - 5.1 mmol/L Final               Passed - eGFR within 360 days     Lab Results   Component Value Date    EGFRNONAA 42.9 (A) 12/03/2021    EGFRNONAA 42.9 (A) 11/03/2021    EGFRNONAA 59.3 (A) 01/11/2021                    Appointments  past 12m or future 3m with PCP    Date Provider   Last Visit   11/3/2021 Russell Martinez MD   Next Visit   Visit date not found Russell Martinez MD   ED visits in past 90 days: 0     Note composed:7:04 PM 02/18/2022

## 2022-02-20 RX ORDER — EMPAGLIFLOZIN 10 MG/1
10 TABLET, FILM COATED ORAL DAILY
Qty: 90 TABLET | Refills: 3 | Status: SHIPPED | OUTPATIENT
Start: 2022-02-20 | End: 2023-05-29 | Stop reason: SDUPTHER

## 2022-02-20 RX ORDER — CHLORTHALIDONE 25 MG/1
TABLET ORAL
Qty: 90 TABLET | Refills: 2 | Status: SHIPPED | OUTPATIENT
Start: 2022-02-20 | End: 2022-05-13

## 2022-02-20 RX ORDER — ATORVASTATIN CALCIUM 40 MG/1
40 TABLET, FILM COATED ORAL DAILY
Qty: 90 TABLET | Refills: 2 | Status: SHIPPED | OUTPATIENT
Start: 2022-02-20 | End: 2022-05-13

## 2022-02-20 NOTE — TELEPHONE ENCOUNTER
Refill Authorization Note   Meryl Johnson  is requesting a refill authorization.Provider Staff:     Action is required for this patient.   Please see care gap opportunities below in Care Due Message.     Thanks!  [facility]     Appointments      Date Provider   Last Visit   11/3/2021 Russell Martinez MD   Next Visit   2/18/2022 Russell Martinez MD     Note composed:10:40 AM 02/20/2022       Brief Assessment and Rationale for Refill:  Defer  Approve    -Medication-Related Problems Identified:   Requires labs  Requires appointment  Medication Therapy Plan:  approve chlorthalidone and atorvastatin / defer empagliflozin due to abnormal labs        Comments:   --->Care Gap information included below if applicable.   Orders Placed This Encounter    chlorthalidone (HYGROTEN) 25 MG Tab    atorvastatin (LIPITOR) 40 MG tablet      Requested Prescriptions   Pending Prescriptions Disp Refills    empagliflozin (JARDIANCE) 10 mg tablet 30 tablet 3     Sig: Take 1 tablet (10 mg total) by mouth once daily.       Endocrinology:  Diabetes - SGLT2 Inhibitors Failed - 2/18/2022  8:21 AM        Failed - eGFR is 45 or above and within 360 days     Lab Results   Component Value Date    EGFRNONAA 42.9 (A) 12/03/2021    EGFRNONAA 42.9 (A) 11/03/2021    EGFRNONAA 59.3 (A) 01/11/2021                Passed - Patient is at least 18 years old        Passed - BP > 90/50 mmH     BP Readings from Last 1 Encounters:   11/05/21 118/72               Passed - Valid encounter within last 15 months     Recent Visits  Date Type Provider Dept   11/03/21 Office Visit Russell Martinez MD [facility]   01/13/21 Office Visit Russell Martinez MD [facility]   10/29/20 Office Visit Russell Martinez MD [facility]   08/14/20 Office Visit Russell Martinez MD [facility]   07/13/20 Office Visit Russell Martinez MD [facility]   Showing recent visits within past 720 days and meeting all other requirements  Future  Appointments  No visits were found meeting these conditions.  Showing future appointments within next 150 days and meeting all other requirements      Future Appointments              In 2 weeks [facility] [place]                Passed - Cr is 1.39 or below and within 360 days     Lab Results   Component Value Date    CREATININE 1.3 12/03/2021    CREATININE 1.3 11/03/2021    CREATININE 1.0 01/11/2021              Passed - HBA1C within 180 days     Lab Results   Component Value Date    HGBA1C 6.7 (H) 11/03/2021    HGBA1C 7.5 (H) 01/11/2021    HGBA1C 7.5 (H) 10/28/2020               Signed Prescriptions Disp Refills    chlorthalidone (HYGROTEN) 25 MG Tab 90 tablet 2     Sig: TAKE ONE TABLET BY MOUTH ONCE DAILY       Cardiovascular: Diuretics - Thiazide Passed - 2/20/2022 10:35 AM        Passed - Patient is at least 18 years old        Passed - Last BP in normal range within 360 days     BP Readings from Last 1 Encounters:   11/05/21 118/72               Passed - Valid encounter within last 15 months     Recent Visits  Date Type Provider Dept   11/03/21 Office Visit Russell Martinez MD [facility]   01/13/21 Office Visit Russell Martinez MD [facility]   10/29/20 Office Visit Russell Martinez MD [facility]   08/14/20 Office Visit Russell Martinez MD [facility]   07/13/20 Office Visit Russell Martinez MD [facility]   Showing recent visits within past 720 days and meeting all other requirements  Future Appointments  No visits were found meeting these conditions.  Showing future appointments within next 150 days and meeting all other requirements      Future Appointments              In 2 weeks [facility] Alber Perry                Passed - Cr is 1.39 or below and within 360 days     Lab Results   Component Value Date    CREATININE 1.3 12/03/2021    CREATININE 1.3  11/03/2021    CREATININE 1.0 01/11/2021              Passed - K in normal range and within 360 days     Potassium   Date Value Ref Range Status   12/03/2021 4.6 3.5 - 5.1 mmol/L Final   11/03/2021 5.1 3.5 - 5.1 mmol/L Final   01/11/2021 4.4 3.5 - 5.1 mmol/L Final              Passed - Na is between 130 and 148 and within 360 days     Sodium   Date Value Ref Range Status   12/03/2021 140 136 - 145 mmol/L Final   11/03/2021 140 136 - 145 mmol/L Final   01/11/2021 140 136 - 145 mmol/L Final              Passed - eGFR within 360 days     Lab Results   Component Value Date    EGFRNONAA 42.9 (A) 12/03/2021    EGFRNONAA 42.9 (A) 11/03/2021    EGFRNONAA 59.3 (A) 01/11/2021                  atorvastatin (LIPITOR) 40 MG tablet 90 tablet 2     Sig: Take 1 tablet (40 mg total) by mouth once daily.       Cardiovascular:  Antilipid - Statins Passed - 2/20/2022 10:36 AM        Passed - Patient is at least 18 years old        Passed - Valid encounter within last 15 months     Recent Visits  Date Type Provider Dept   11/03/21 Office Visit Russell Martinez MD [facility]   01/13/21 Office Visit Russell Martinez MD [facility]   10/29/20 Office Visit Russell Martinez MD [facility]   08/14/20 Office Visit Russell Martinez MD [facility]   07/13/20 Office Visit Russell Martinez MD [facility]   Showing recent visits within past 720 days and meeting all other requirements  Future Appointments  No visits were found meeting these conditions.  Showing future appointments within next 150 days and meeting all other requirements      Future Appointments              In 2 weeks JW ROS[facility], Alber Mccurdy                Passed - ALT is 131 or below and within 360 days     ALT   Date Value Ref Range Status   11/03/2021 23 10 - 44 U/L Final   01/11/2021 20 10 - 44 U/L Final   10/28/2020 22 10 - 44 U/L Final              Passed - AST is 119 or below and within  360 days     AST   Date Value Ref Range Status   11/03/2021 20 10 - 40 U/L Final   01/11/2021 19 10 - 40 U/L Final   10/28/2020 23 10 - 40 U/L Final              Passed - Total Cholesterol within 360 days     Lab Results   Component Value Date    CHOL 191 12/03/2021    CHOL 237 (H) 11/03/2021    CHOL 177 01/11/2021              Passed - LDL within 360 days     LDL Cholesterol   Date Value Ref Range Status   12/03/2021 116.6 63.0 - 159.0 mg/dL Final     Comment:     The National Cholesterol Education Program (NCEP) has set the  following guidelines (reference values) for LDL Cholesterol:  Optimal.......................<130 mg/dL  Borderline High...............130-159 mg/dL  High..........................160-189 mg/dL  Very High.....................>190 mg/dL              Passed - HDL within 360 days     HDL   Date Value Ref Range Status   12/03/2021 41 40 - 75 mg/dL Final     Comment:     The National Cholesterol Education Program (NCEP) has set the  following guidelines (reference values) for HDL Cholesterol:  Low...............<40 mg/dL  Optimal...........>60 mg/dL              Passed - Triglycerides within 360 days     Lab Results   Component Value Date    TRIG 167 (H) 12/03/2021    TRIG 152 (H) 11/03/2021    TRIG 171 (H) 01/11/2021                  Appointments  past 12m or future 3m with PCP    Date Provider   Last Visit   11/3/2021 Russell Martinez MD   Next Visit   2/18/2022 Russell Martinez MD   ED visits in past 90 days: 0     Note composed:10:36 AM 02/20/2022

## 2022-03-08 ENCOUNTER — HOSPITAL ENCOUNTER (OUTPATIENT)
Dept: RADIOLOGY | Facility: HOSPITAL | Age: 67
Discharge: HOME | End: 2022-03-08
Attending: INTERNAL MEDICINE
Payer: COMMERCIAL

## 2022-03-08 DIAGNOSIS — Z12.31 OTHER SCREENING MAMMOGRAM: ICD-10-CM

## 2022-03-08 PROCEDURE — 77063 MAMMO DIGITAL SCREENING BILAT WITH TOMO: ICD-10-PCS | Mod: 26,,, | Performed by: RADIOLOGY

## 2022-03-08 PROCEDURE — 77063 BREAST TOMOSYNTHESIS BI: CPT | Mod: 26,,, | Performed by: RADIOLOGY

## 2022-03-08 PROCEDURE — 77067 MAMMO DIGITAL SCREENING BILAT WITH TOMO: ICD-10-PCS | Mod: 26,,, | Performed by: RADIOLOGY

## 2022-03-08 PROCEDURE — 77063 BREAST TOMOSYNTHESIS BI: CPT | Mod: TC

## 2022-03-08 PROCEDURE — 77067 SCR MAMMO BI INCL CAD: CPT | Mod: 26,,, | Performed by: RADIOLOGY

## 2022-03-14 ENCOUNTER — CLINICAL SUPPORT (OUTPATIENT)
Dept: REHABILITATION | Facility: HOSPITAL | Age: 67
End: 2022-03-14
Payer: COMMERCIAL

## 2022-03-14 DIAGNOSIS — R26.2 DIFFICULTY WALKING UP STAIRS: ICD-10-CM

## 2022-03-14 DIAGNOSIS — M25.561 ACUTE PAIN OF RIGHT KNEE: Primary | ICD-10-CM

## 2022-03-14 PROCEDURE — 97140 MANUAL THERAPY 1/> REGIONS: CPT | Performed by: PHYSICAL THERAPIST

## 2022-03-14 PROCEDURE — 97110 THERAPEUTIC EXERCISES: CPT | Performed by: PHYSICAL THERAPIST

## 2022-03-14 NOTE — PLAN OF CARE
"    Physical Therapy Daily Treatment Note     Name: Meryl Yo [facility] Number: [id number]    Therapy Diagnosis:   Encounter Diagnoses   Name Primary?    Acute pain of right knee Yes    Difficulty walking up stairs      Physician: Jun Chin MD    Visit Date: 3/14/2022  Physician Orders: PT Eval and Treat   Medical Diagnosis from Referral: M17.10 (ICD-10-CM) - Unilateral primary osteoarthritis, unspecified knee  Evaluation Date: 10/18/2021  Authorization Period Expiration: 12/31/2021  Plan of Care Expiration: 7/14/2022  Visit # / Visits authorized: 3/20  Total visits: 20    Time In: 0700  Time Out: 0800  Total Billable Time: 60 min    Precautions: Standard    Subjective     Pt reports: I worked 20+ overtime so more time on my feet. Anterior knee pain started Saturday, had to start wearing my brace again thurs/fri.     She was compliant with home exercise program.  Response to previous treatment: decrease in pain  Functional change: no brace, cramps in bed    Pain: 0/10  Location: right knee      Objective     3/14: Painful palpation over the distal patella tendon attachment. Medial joint line pain. Inc pain with eccentric step downs. Medial hamstring pain with relief from lateral glide to tibia     Meryl received therapeutic exercises to develop strength, endurance and ROM for 45 minutes including:    Quad sets 30x 3" hold   SAQ 3# 30x  HS set 30x 5"  Supine SLR 30x  Clam MTB 30x 5" hold supine    NT  LAQ 3# 20x  Seated ER GTB 2 x 15 B   Squats behind mat 2 x 15  Standing hip abd 2 x 15  Knee ext isometrics 45 sec 2' rest swiss ball at 65deg  Bridges heels 2 x 15  Clams BTB 30x 3" hold  HS curls standing 3# 2 x 15  Standing hip abduction YTB 3 x 10   Seated IR YTB 3 x 10  Heel raises 2 up 1 down on R  Paloff mini squat GTB 2 x 10 B       Meryl received the following manual therapy techniques: Joint mobilizations, and soft tissue mobilizations were applied to the: R knee for 15 minutes, including:    Knee " "reassessment   Gr II-III patellofemoral glides (sup/inf, med/lat)  Inferior patellar mobs Gr IV at end range  STM to distal hamstring on R knee    Meryl received the following therapeutic activities for 0 minutes to facilitate return to daily activities with no pain:    Step up 6" box 3 x 10  Sit to stands low mat 10# 3 x 10    Meryl received ice to the R knee for 0 minutes to decrease pain, inflammation, and circulation.    Home Exercises Provided and Patient Education Provided     Education provided:   - Importance of quad activation and knee flexion ROM    Written Home Exercises Provided: Patient instructed to cont prior HEP.  Exercises were reviewed and Meryl was able to demonstrate them prior to the end of the session.  Meryl demonstrated good  understanding of the education provided.     See EMR under Patient Instructions for exercises provided prior visit.    Assessment      Patient returned to therapy with increased knee pain from loading through knee at work. She is tender to patellar tendon with eccentrics, likely due to ambulating with bent knee. Relief from STM to hamstring and with     Meryl is progressing well towards her goals.   Pt prognosis is Good.     Pt will continue to benefit from skilled outpatient physical therapy to address the deficits listed in the problem list box on initial evaluation, provide pt/family education and to maximize pt's level of independence in the home and community environment.     Pt's spiritual, cultural and educational needs considered and pt agreeable to plan of care and goals.    Anticipated barriers to physical therapy: covid-19    GOALS: Short Term Goals:  4 weeks(met)  1.Report decreased knee pain  < / =  2/10  to increase tolerance for return to stairs  2. Increase knee ROM to 110 flexion in order to be able to perform ADLs without difficulty.  3. Increase strength by 1/3 MMT grade in quad  to increase tolerance for ADL and work activities.  4. Pt to " tolerate HEP to improve ROM and independence with ADL's     Long Term Goals: 8 weeks(Progressing, not met)  1.Report decreased knee pain < / = 0/10  to increase tolerance for return to work pain free  2.Patient goal: return to [employer] game without knee pain  3.Increase strength to >/= 4+/5 in quad and gluts  to increase tolerance for ADL and work activities.  4. Pt will report at CJ level (20-40% impaired) on FOTO knee to demonstrate increase in LE function with every day tasks.     Plan     Certification Period: 3/14/22 to 7/14/22  Recommended Treatment Plan: 2 times per week for 12 weeks: Gait Training, Manual Therapy, Moist Heat/ Ice, Neuromuscular Re-ed, Patient Education, Self Care, Therapeutic Activities and Therapeutic Exercise  Other Recommendations: modalities prn, ASTYM prn, Dry Needling prn    Therapist: Fabricio Tolentino, PT, DPT    I CERTIFY THE NEED FOR THESE SERVICES FURNISHED UNDER THIS PLAN OF TREATMENT AND WHILE UNDER MY CARE    Physician's comments: ________________________________________________________________________________________________________________________________________________      Physician's Name: ___________________________________

## 2022-03-14 NOTE — PROGRESS NOTES
"    Physical Therapy Daily Treatment Note     Name: Meryl Yo [facility] Number: [id number]    Therapy Diagnosis:   Encounter Diagnoses   Name Primary?    Acute pain of right knee Yes    Difficulty walking up stairs      Physician: Jun Chin MD    Visit Date: 3/14/2022  Physician Orders: PT Eval and Treat   Medical Diagnosis from Referral: M17.10 (ICD-10-CM) - Unilateral primary osteoarthritis, unspecified knee  Evaluation Date: 10/18/2021  Authorization Period Expiration: 12/31/2021  Plan of Care Expiration: 7/14/2022  Visit # / Visits authorized: 3/20  Total visits: 20    Time In: 0700  Time Out: 0800  Total Billable Time: 60 min    Precautions: Standard    Subjective     Pt reports: I worked 20+ overtime so more time on my feet. Anterior knee pain started Saturday, had to start wearing my brace again thurs/fri.     She was compliant with home exercise program.  Response to previous treatment: decrease in pain  Functional change: no brace, cramps in bed    Pain: 0/10  Location: right knee      Objective     3/14: Painful palpation over the distal patella tendon attachment. Medial joint line pain. Inc pain with eccentric step downs. Medial hamstring pain with relief from lateral glide to tibia     Meryl received therapeutic exercises to develop strength, endurance and ROM for 45 minutes including:    Quad sets 30x 3" hold   SAQ 3# 30x  HS set 30x 5"  Supine SLR 30x  Clam MTB 30x 5" hold supine    NT  LAQ 3# 20x  Seated ER GTB 2 x 15 B   Squats behind mat 2 x 15  Standing hip abd 2 x 15  Knee ext isometrics 45 sec 2' rest swiss ball at 65deg  Bridges heels 2 x 15  Clams BTB 30x 3" hold  HS curls standing 3# 2 x 15  Standing hip abduction YTB 3 x 10   Seated IR YTB 3 x 10  Heel raises 2 up 1 down on R  Paloff mini squat GTB 2 x 10 B       Meryl received the following manual therapy techniques: Joint mobilizations, and soft tissue mobilizations were applied to the: R knee for 15 minutes, including:    Knee " "reassessment   Gr II-III patellofemoral glides (sup/inf, med/lat)  Inferior patellar mobs Gr IV at end range  STM to distal hamstring on R knee    Meryl received the following therapeutic activities for 0 minutes to facilitate return to daily activities with no pain:    Step up 6" box 3 x 10  Sit to stands low mat 10# 3 x 10    Meryl received ice to the R knee for 0 minutes to decrease pain, inflammation, and circulation.    Home Exercises Provided and Patient Education Provided     Education provided:   - Importance of quad activation and knee flexion ROM    Written Home Exercises Provided: Patient instructed to cont prior HEP.  Exercises were reviewed and Meryl was able to demonstrate them prior to the end of the session.  Meryl demonstrated good  understanding of the education provided.     See EMR under Patient Instructions for exercises provided prior visit.    Assessment      Patient returned to therapy with increased knee pain from loading through knee at work. She is tender to patellar tendon with eccentrics, likely due to ambulating with bent knee. Relief from STM to hamstring and with     Meryl is progressing well towards her goals.   Pt prognosis is Good.     Pt will continue to benefit from skilled outpatient physical therapy to address the deficits listed in the problem list box on initial evaluation, provide pt/family education and to maximize pt's level of independence in the home and community environment.     Pt's spiritual, cultural and educational needs considered and pt agreeable to plan of care and goals.    Anticipated barriers to physical therapy: covid-19    GOALS: Short Term Goals:  4 weeks(met)  1.Report decreased knee pain  < / =  2/10  to increase tolerance for return to stairs  2. Increase knee ROM to 110 flexion in order to be able to perform ADLs without difficulty.  3. Increase strength by 1/3 MMT grade in quad  to increase tolerance for ADL and work activities.  4. Pt to " tolerate HEP to improve ROM and independence with ADL's     Long Term Goals: 8 weeks(Progressing, not met)  1.Report decreased knee pain < / = 0/10  to increase tolerance for return to work pain free  2.Patient goal: return to [employer] game without knee pain  3.Increase strength to >/= 4+/5 in quad and gluts  to increase tolerance for ADL and work activities.  4. Pt will report at CJ level (20-40% impaired) on FOTO knee to demonstrate increase in LE function with every day tasks.     Plan     Certification Period: 3/14/22 to 7/14/22  Recommended Treatment Plan: 2 times per week for 12 weeks: Gait Training, Manual Therapy, Moist Heat/ Ice, Neuromuscular Re-ed, Patient Education, Self Care, Therapeutic Activities and Therapeutic Exercise  Other Recommendations: modalities prn, ASTYM prn, Dry Needling prn    Therapist: Fabricio Tolentino, PT, DPT    I CERTIFY THE NEED FOR THESE SERVICES FURNISHED UNDER THIS PLAN OF TREATMENT AND WHILE UNDER MY CARE    Physician's comments: ________________________________________________________________________________________________________________________________________________      Physician's Name: ___________________________________

## 2022-03-17 ENCOUNTER — CLINICAL SUPPORT (OUTPATIENT)
Dept: REHABILITATION | Facility: HOSPITAL | Age: 67
End: 2022-03-17
Payer: COMMERCIAL

## 2022-03-17 DIAGNOSIS — M25.561 ACUTE PAIN OF RIGHT KNEE: Primary | ICD-10-CM

## 2022-03-17 DIAGNOSIS — R26.2 DIFFICULTY WALKING UP STAIRS: ICD-10-CM

## 2022-03-17 PROCEDURE — 97110 THERAPEUTIC EXERCISES: CPT | Performed by: PHYSICAL THERAPIST

## 2022-03-17 PROCEDURE — 97140 MANUAL THERAPY 1/> REGIONS: CPT | Performed by: PHYSICAL THERAPIST

## 2022-03-17 NOTE — PROGRESS NOTES
"    Physical Therapy Daily Treatment Note     Name: Meryl Johnson  Clinic Number: [id number]    Therapy Diagnosis:   Encounter Diagnoses   Name Primary?    Acute pain of right knee Yes    Difficulty walking up stairs      Physician: Jun Chin MD    Visit Date: 3/17/2022  Physician Orders: PT Eval and Treat   Medical Diagnosis from Referral: M17.10 (ICD-10-CM) - Unilateral primary osteoarthritis, unspecified knee  Evaluation Date: 10/18/2021  Authorization Period Expiration: 12/31/2021  Plan of Care Expiration: 7/14/2022  Visit # / Visits authorized: 4/20  Total visits: 21    Time In: 1700  Time Out: 1800  Total Billable Time: 30 min    Precautions: Standard    Subjective     Pt reports: I was sore after eval. Today I did a lot of up and down at the office so it's sore    She was compliant with home exercise program.  Response to previous treatment: decrease in pain  Functional change: no brace, cramps in bed    Pain: 0/10  Location: right knee      Objective     3/14: Painful palpation over the distal patella tendon attachment. Medial joint line pain. Inc pain with eccentric step downs. Medial hamstring pain with relief from lateral glide to tibia     Meryl received therapeutic exercises to develop strength, endurance and ROM for 45 minutes including:    Quad sets 30x 3" hold   Supine SLR 30x  Clam BTB 30x 5" hold sideyling  Mini squat behind mat 20  Bridges 30x supine     NT  SAQ 3# 30x  HS set 30x 5"  LAQ 3# 20x  Seated ER GTB 2 x 15 B   Squats behind mat 2 x 15  Standing hip abd 2 x 15  Knee ext isometrics 45 sec 2' rest swiss ball at 65deg  Bridges heels 2 x 15  Clams BTB 30x 3" hold  HS curls standing 3# 2 x 15  Standing hip abduction YTB 3 x 10   Seated IR YTB 3 x 10  Heel raises 2 up 1 down on R  Paloff mini squat GTB 2 x 10 B       Meryl received the following manual therapy techniques: Joint mobilizations, and soft tissue mobilizations were applied to the: R knee for 15 minutes, including:    Knee " "reassessment   Gr II-III patellofemoral glides (sup/inf, med/lat)  Inferior patellar mobs Gr IV at end range  STM to distal hamstring on R knee    Meryl received the following therapeutic activities for 0 minutes to facilitate return to daily activities with no pain:    Step up 6" box 3 x 10  Sit to stands low mat 10# 3 x 10    Meryl received ice to the R knee for 0 minutes to decrease pain, inflammation, and circulation.    Home Exercises Provided and Patient Education Provided     Education provided:   - Importance of quad activation and knee flexion ROM    Written Home Exercises Provided: Patient instructed to cont prior HEP.  Exercises were reviewed and Meryl was able to demonstrate them prior to the end of the session.  Meryl demonstrated good  understanding of the education provided.     See EMR under Patient Instructions for exercises provided prior visit.    Assessment      Patient progressing with quad and glut strengthening today. Cues mini squat to avoid knee bend and hip hinge. Good tolerance to sidelying clam BTB with full 5" hold today    Meryl is progressing well towards her goals.   Pt prognosis is Good.     Pt will continue to benefit from skilled outpatient physical therapy to address the deficits listed in the problem list box on initial evaluation, provide pt/family education and to maximize pt's level of independence in the home and community environment.     Pt's spiritual, cultural and educational needs considered and pt agreeable to plan of care and goals.    Anticipated barriers to physical therapy: covid-19    GOALS: Short Term Goals:  4 weeks(met)  1.Report decreased knee pain  < / =  2/10  to increase tolerance for return to stairs  2. Increase knee ROM to 110 flexion in order to be able to perform ADLs without difficulty.  3. Increase strength by 1/3 MMT grade in quad  to increase tolerance for ADL and work activities.  4. Pt to tolerate HEP to improve ROM and independence with " ADL's     Long Term Goals: 8 weeks(Progressing, not met)  1.Report decreased knee pain < / = 0/10  to increase tolerance for return to work pain free  2.Patient goal: return to [employer] game without knee pain  3.Increase strength to >/= 4+/5 in quad and gluts  to increase tolerance for ADL and work activities.  4. Pt will report at CJ level (20-40% impaired) on FOTO knee to demonstrate increase in LE function with every day tasks.     Plan     Certification Period: 3/14/22 to 7/14/22  Recommended Treatment Plan: 2 times per week for 12 weeks: Gait Training, Manual Therapy, Moist Heat/ Ice, Neuromuscular Re-ed, Patient Education, Self Care, Therapeutic Activities and Therapeutic Exercise  Other Recommendations: modalities prn, ASTYM prn, Dry Needling prn    Therapist: Fabricio Tolentino, PT, DPT    I CERTIFY THE NEED FOR THESE SERVICES FURNISHED UNDER THIS PLAN OF TREATMENT AND WHILE UNDER MY CARE    Physician's comments: ________________________________________________________________________________________________________________________________________________      Physician's Name: ___________________________________

## 2022-03-21 ENCOUNTER — CLINICAL SUPPORT (OUTPATIENT)
Dept: REHABILITATION | Facility: HOSPITAL | Age: 67
End: 2022-03-21
Payer: COMMERCIAL

## 2022-03-21 DIAGNOSIS — M25.561 ACUTE PAIN OF RIGHT KNEE: Primary | ICD-10-CM

## 2022-03-21 DIAGNOSIS — R26.2 DIFFICULTY WALKING UP STAIRS: ICD-10-CM

## 2022-03-21 PROCEDURE — 97110 THERAPEUTIC EXERCISES: CPT | Performed by: PHYSICAL THERAPIST

## 2022-03-21 NOTE — PROGRESS NOTES
"      Physical Therapy Daily Treatment Note     Name: Meryl Johnson  [facility] Number: [id number]    Therapy Diagnosis:   Encounter Diagnoses   Name Primary?    Acute pain of right knee Yes    Difficulty walking up stairs      Physician: Jun Chin MD    Visit Date: 3/21/2022  Physician Orders: PT Eval and Treat   Medical Diagnosis from Referral: M17.10 (ICD-10-CM) - Unilateral primary osteoarthritis, unspecified knee  Evaluation Date: 10/18/2021  Authorization Period Expiration: 12/31/2021  Plan of Care Expiration: 7/14/2022  Visit # / Visits authorized: 5/20  Total visits: 21    Time In: 1700  Time Out: 1755  Total Billable Time: 30 min    Precautions: Standard    Subjective     Pt reports: I hurt my calf yesterday. This morning was better but it started hurting again 2 hours ago     She was compliant with home exercise program.  Response to previous treatment: decrease in pain  Functional change: no brace, cramps in bed    Pain: 0/10  Location: right knee      Objective     3/21: Pain over soleus on the RLE    Meryl received therapeutic exercises to develop strength, endurance and ROM for 45 minutes including:    Seated isometrics 45 seconds 5x   Quad sets 30x 3" hold   Supine SLR 20x  Roller to gastroc soleus    NT  Clam BTB 30x 5" hold sideyling  Mini squat behind mat 20  Bridges 30x supine   SAQ 3# 30x  HS set 30x 5"  LAQ 3# 20x  Seated ER GTB 2 x 15 B   Squats behind mat 2 x 15  Standing hip abd 2 x 15  Knee ext isometrics 45 sec 2' rest swiss ball at 65deg  Bridges heels 2 x 15  Clams BTB 30x 3" hold  HS curls standing 3# 2 x 15  Standing hip abduction YTB 3 x 10   Seated IR YTB 3 x 10  Heel raises 2 up 1 down on R  Paloff mini squat GTB 2 x 10 B       Meryl received the following manual therapy techniques: Joint mobilizations, and soft tissue mobilizations were applied to the: R knee for 10 minutes, including:    Knee reassessment   Gr II-III patellofemoral glides (sup/inf, med/lat)  Inferior patellar " "mobs Gr IV at end range  STM to distal hamstring on R knee    Meryl received the following therapeutic activities for 0 minutes to facilitate return to daily activities with no pain:    Step up 6" box 3 x 10  Sit to stands low mat 10# 3 x 10    Meryl received ice to the R knee for 0 minutes to decrease pain, inflammation, and circulation.    Home Exercises Provided and Patient Education Provided     Education provided:   - Importance of quad activation and knee flexion ROM    Written Home Exercises Provided: Patient instructed to cont prior HEP.  Exercises were reviewed and Meryl was able to demonstrate them prior to the end of the session.  Meryl demonstrated good  understanding of the education provided.     See EMR under Patient Instructions for exercises provided prior visit.    Assessment      Patient with pain to soleus, began isometrics and used roller to soleus for pain relief. Able to ambulate out the clinic without sharp pain, improved gait cycle    Meryl is progressing well towards her goals.   Pt prognosis is Good.     Pt will continue to benefit from skilled outpatient physical therapy to address the deficits listed in the problem list box on initial evaluation, provide pt/family education and to maximize pt's level of independence in the home and community environment.     Pt's spiritual, cultural and educational needs considered and pt agreeable to plan of care and goals.    Anticipated barriers to physical therapy: covid-19    GOALS: Short Term Goals:  4 weeks(met)  1.Report decreased knee pain  < / =  2/10  to increase tolerance for return to stairs  2. Increase knee ROM to 110 flexion in order to be able to perform ADLs without difficulty.  3. Increase strength by 1/3 MMT grade in quad  to increase tolerance for ADL and work activities.  4. Pt to tolerate HEP to improve ROM and independence with ADL's     Long Term Goals: 8 weeks(Progressing, not met)  1.Report decreased knee pain < / = 0/10  " to increase tolerance for return to work pain free  2.Patient goal: return to [employer] game without knee pain  3.Increase strength to >/= 4+/5 in quad and gluts  to increase tolerance for ADL and work activities.  4. Pt will report at CJ level (20-40% impaired) on FOTO knee to demonstrate increase in LE function with every day tasks.     Plan     Certification Period: 3/14/22 to 7/14/22  Recommended Treatment Plan: 2 times per week for 12 weeks: Gait Training, Manual Therapy, Moist Heat/ Ice, Neuromuscular Re-ed, Patient Education, Self Care, Therapeutic Activities and Therapeutic Exercise  Other Recommendations: modalities prn, ASTYM prn, Dry Needling prn    Therapist: Fabricio Tolentino, PT, DPT    I CERTIFY THE NEED FOR THESE SERVICES FURNISHED UNDER THIS PLAN OF TREATMENT AND WHILE UNDER MY CARE    Physician's comments: ________________________________________________________________________________________________________________________________________________      Physician's Name: ___________________________________

## 2022-05-11 ENCOUNTER — LAB VISIT (OUTPATIENT)
Dept: LAB | Facility: HOSPITAL | Age: 67
End: 2022-05-11
Attending: INTERNAL MEDICINE
Payer: COMMERCIAL

## 2022-05-11 DIAGNOSIS — I10 ESSENTIAL HYPERTENSION: ICD-10-CM

## 2022-05-11 DIAGNOSIS — R73.9 ELEVATED BLOOD SUGAR: ICD-10-CM

## 2022-05-11 LAB
ALBUMIN SERPL BCP-MCNC: 3.9 G/DL (ref 3.5–5.2)
ALP SERPL-CCNC: 116 U/L (ref 55–135)
ALT SERPL W/O P-5'-P-CCNC: 11 U/L (ref 10–44)
ANION GAP SERPL CALC-SCNC: 12 MMOL/L (ref 8–16)
AST SERPL-CCNC: 16 U/L (ref 10–40)
BASOPHILS # BLD AUTO: 0.02 K/UL (ref 0–0.2)
BASOPHILS NFR BLD: 0.3 % (ref 0–1.9)
BILIRUB SERPL-MCNC: 0.4 MG/DL (ref 0.1–1)
BUN SERPL-MCNC: 30 MG/DL (ref 8–23)
CALCIUM SERPL-MCNC: 9.3 MG/DL (ref 8.7–10.5)
CHLORIDE SERPL-SCNC: 106 MMOL/L (ref 95–110)
CHOLEST SERPL-MCNC: 295 MG/DL (ref 120–199)
CHOLEST/HDLC SERPL: 6 {RATIO} (ref 2–5)
CO2 SERPL-SCNC: 21 MMOL/L (ref 23–29)
CREAT SERPL-MCNC: 1.5 MG/DL (ref 0.5–1.4)
DIFFERENTIAL METHOD: ABNORMAL
EOSINOPHIL # BLD AUTO: 0.1 K/UL (ref 0–0.5)
EOSINOPHIL NFR BLD: 2 % (ref 0–8)
ERYTHROCYTE [DISTWIDTH] IN BLOOD BY AUTOMATED COUNT: 14.2 % (ref 11.5–14.5)
EST. GFR  (AFRICAN AMERICAN): 41.6 ML/MIN/1.73 M^2
EST. GFR  (NON AFRICAN AMERICAN): 36 ML/MIN/1.73 M^2
ESTIMATED AVG GLUCOSE: 148 MG/DL (ref 68–131)
GLUCOSE SERPL-MCNC: 137 MG/DL (ref 70–110)
HBA1C MFR BLD: 6.8 % (ref 4–5.6)
HCT VFR BLD AUTO: 41.9 % (ref 37–48.5)
HDLC SERPL-MCNC: 49 MG/DL (ref 40–75)
HDLC SERPL: 16.6 % (ref 20–50)
HGB BLD-MCNC: 13.2 G/DL (ref 12–16)
IMM GRANULOCYTES # BLD AUTO: 0.01 K/UL (ref 0–0.04)
IMM GRANULOCYTES NFR BLD AUTO: 0.2 % (ref 0–0.5)
LDLC SERPL CALC-MCNC: 199.8 MG/DL (ref 63–159)
LYMPHOCYTES # BLD AUTO: 1.4 K/UL (ref 1–4.8)
LYMPHOCYTES NFR BLD: 24 % (ref 18–48)
MCH RBC QN AUTO: 28.3 PG (ref 27–31)
MCHC RBC AUTO-ENTMCNC: 31.5 G/DL (ref 32–36)
MCV RBC AUTO: 90 FL (ref 82–98)
MONOCYTES # BLD AUTO: 0.5 K/UL (ref 0.3–1)
MONOCYTES NFR BLD: 7.7 % (ref 4–15)
NEUTROPHILS # BLD AUTO: 3.9 K/UL (ref 1.8–7.7)
NEUTROPHILS NFR BLD: 65.8 % (ref 38–73)
NONHDLC SERPL-MCNC: 246 MG/DL
NRBC BLD-RTO: 0 /100 WBC
PLATELET # BLD AUTO: 280 K/UL (ref 150–450)
PMV BLD AUTO: 10.4 FL (ref 9.2–12.9)
POTASSIUM SERPL-SCNC: 4.7 MMOL/L (ref 3.5–5.1)
PROT SERPL-MCNC: 7.2 G/DL (ref 6–8.4)
RBC # BLD AUTO: 4.67 M/UL (ref 4–5.4)
SODIUM SERPL-SCNC: 139 MMOL/L (ref 136–145)
TRIGL SERPL-MCNC: 231 MG/DL (ref 30–150)
TSH SERPL DL<=0.005 MIU/L-ACNC: 3.68 UIU/ML (ref 0.4–4)
WBC # BLD AUTO: 5.96 K/UL (ref 3.9–12.7)

## 2022-05-11 PROCEDURE — 83036 HEMOGLOBIN GLYCOSYLATED A1C: CPT | Performed by: INTERNAL MEDICINE

## 2022-05-11 PROCEDURE — 80061 LIPID PANEL: CPT | Performed by: INTERNAL MEDICINE

## 2022-05-11 PROCEDURE — 84443 ASSAY THYROID STIM HORMONE: CPT | Performed by: INTERNAL MEDICINE

## 2022-05-11 PROCEDURE — 80053 COMPREHEN METABOLIC PANEL: CPT | Performed by: INTERNAL MEDICINE

## 2022-05-11 PROCEDURE — 85025 COMPLETE CBC W/AUTO DIFF WBC: CPT | Performed by: INTERNAL MEDICINE

## 2022-05-11 PROCEDURE — 36415 COLL VENOUS BLD VENIPUNCTURE: CPT | Performed by: INTERNAL MEDICINE

## 2022-05-12 NOTE — PROGRESS NOTES
HISTORY:  Type 2 diabetes with peripheral neuropathy  Hypertension.  Hyperlipidemia.  Helicobacter pylori which has been treated.  Obesity with gastric bypass surgery.  Left foot ulcer, fifth metatarsal, debridement     SOCIAL HISTORY:  Tobacco and alcohol use - none.        Medications  Benazepril 40 mg  Atorvastatin 40 mg  Chlorthalidone 25 mg  Metformin 500 mg  Two tablets bid  Jardiance 10 mg    Component      Latest Ref Rng & Units 5/11/2022   WBC      3.90 - 12.70 K/uL 5.96   RBC      4.00 - 5.40 M/uL 4.67   Hemoglobin      12.0 - 16.0 g/dL 13.2   Hematocrit      37.0 - 48.5 % 41.9   MCV      82 - 98 fL 90   MCH      27.0 - 31.0 pg 28.3   MCHC      32.0 - 36.0 g/dL 31.5 (L)   RDW      11.5 - 14.5 % 14.2   Platelets      150 - 450 K/uL 280   MPV      9.2 - 12.9 fL 10.4   Immature Granulocytes      0.0 - 0.5 % 0.2   Gran # (ANC)      1.8 - 7.7 K/uL 3.9   Immature Grans (Abs)      0.00 - 0.04 K/uL 0.01   Lymph #      1.0 - 4.8 K/uL 1.4   Mono #      0.3 - 1.0 K/uL 0.5   Eos #      0.0 - 0.5 K/uL 0.1   Baso #      0.00 - 0.20 K/uL 0.02   nRBC      0 /100 WBC 0   Gran %      38.0 - 73.0 % 65.8   Lymph %      18.0 - 48.0 % 24.0   Mono %      4.0 - 15.0 % 7.7   Eosinophil %      0.0 - 8.0 % 2.0   Basophil %      0.0 - 1.9 % 0.3   Differential Method       Automated   Sodium      136 - 145 mmol/L 139   Potassium      3.5 - 5.1 mmol/L 4.7   Chloride      95 - 110 mmol/L 106   CO2      23 - 29 mmol/L 21 (L)   Glucose      70 - 110 mg/dL 137 (H)   BUN      8 - 23 mg/dL 30 (H)   Creatinine      0.5 - 1.4 mg/dL 1.5 (H)   Calcium      8.7 - 10.5 mg/dL 9.3   PROTEIN TOTAL      6.0 - 8.4 g/dL 7.2   Albumin      3.5 - 5.2 g/dL 3.9   BILIRUBIN TOTAL      0.1 - 1.0 mg/dL 0.4   Alkaline Phosphatase      55 - 135 U/L 116   AST      10 - 40 U/L 16   ALT      10 - 44 U/L 11   Anion Gap      8 - 16 mmol/L 12   eGFR if African American      >60 mL/min/1.73 m:2 41.6 (A)   eGFR if non African American      >60 mL/min/1.73 m:2 36.0 (A)    Cholesterol      120 - 199 mg/dL 295 (H)   Triglycerides      30 - 150 mg/dL 231 (H)   HDL      40 - 75 mg/dL 49   LDL Cholesterol External      63.0 - 159.0 mg/dL 199.8 (H)   HDL/Cholesterol Ratio      20.0 - 50.0 % 16.6 (L)   Total Cholesterol/HDL Ratio      2.0 - 5.0 6.0 (H)   Non-HDL Cholesterol      mg/dL 246   Hemoglobin A1C External      4.0 - 5.6 % 6.8 (H)   Estimated Avg Glucose      68 - 131 mg/dL 148 (H)   TSH      0.400 - 4.000 uIU/mL 3.679     66-year-old female  Presents for routine follow-up visit    From the Labs noted above, it is apparent that she has not been taking atorvastatin.  But in addition she continues with chlorthalidone.  Apparently there was a communication miss yudy 6 months ago in which she was advised to stop chlorthalidone for couple weeks and repeat labs but it appears that she actually a some point, she stop the atorvastatin and continue with chlorthalidone    For 2 months she has been having recurrent sharp pain in the midthoracic region that comes and goes.  It happens randomly.  Is not on a particular circumstances but can feel it more when lying down at night.  No other symptoms    Six days ago, Sunday around 3:00 p.m., when she stood up she felt dizzy is if she was off balance and had a headache.  She had a relative taking blood glucose was 300 but she only had something the eat early in the day.  The symptoms eventually passed.  She did feel like she was going to pass out she felt like she was swaying.  There was no nausea vomiting.  No visual hearing disturbance.    Review of symptoms  Negative for chest pain, palpitations, shortness of breath, abdominal pain.  Regular bowel function, occasional loose stools.  No difficulty urinating    Examination  Weight 271 lb  Pulse 72  Blood pressure 130/72  Neck no thyromegaly no masses  Chest clear breath sounds good effort  Heart regular rate rhythm no murmurs gallops  Abdominal exam active bowel sounds soft nontender no  hepatosplenomegaly abdominal masses  This is tender discomfort while palpate over the midthoracic region.  Essentially the lumbar lordosis    Impression   Type 2 diabetes with chronic kidney disease stage III  Hypertension  Hyperlipidemia  Thoracic muscular pain     Plan  Discontinued chlorthalidone and atorvastatin  Add rosuvastatin 20 mg daily  Methocarbamol 500 mg 3 times a day for week  Referral to the digital hypertension and digital diabetic program  Increase fluid intake and will repeat basic metabolic profile in 2 weeks

## 2022-05-13 ENCOUNTER — OFFICE VISIT (OUTPATIENT)
Dept: INTERNAL MEDICINE | Facility: CLINIC | Age: 67
End: 2022-05-13
Payer: COMMERCIAL

## 2022-05-13 VITALS
WEIGHT: 271.19 LBS | SYSTOLIC BLOOD PRESSURE: 130 MMHG | HEART RATE: 74 BPM | DIASTOLIC BLOOD PRESSURE: 78 MMHG | OXYGEN SATURATION: 98 % | BODY MASS INDEX: 43.58 KG/M2 | HEIGHT: 66 IN

## 2022-05-13 DIAGNOSIS — N18.31 TYPE 2 DIABETES MELLITUS WITH STAGE 3A CHRONIC KIDNEY DISEASE, WITHOUT LONG-TERM CURRENT USE OF INSULIN: Primary | ICD-10-CM

## 2022-05-13 DIAGNOSIS — E11.22 TYPE 2 DIABETES MELLITUS WITH STAGE 3A CHRONIC KIDNEY DISEASE, WITHOUT LONG-TERM CURRENT USE OF INSULIN: Primary | ICD-10-CM

## 2022-05-13 DIAGNOSIS — G89.29 CHRONIC MIDLINE THORACIC BACK PAIN: ICD-10-CM

## 2022-05-13 DIAGNOSIS — I10 ESSENTIAL HYPERTENSION: ICD-10-CM

## 2022-05-13 DIAGNOSIS — M54.6 CHRONIC MIDLINE THORACIC BACK PAIN: ICD-10-CM

## 2022-05-13 DIAGNOSIS — E78.5 HYPERLIPIDEMIA, UNSPECIFIED HYPERLIPIDEMIA TYPE: ICD-10-CM

## 2022-05-13 PROCEDURE — 1160F PR REVIEW ALL MEDS BY PRESCRIBER/CLIN PHARMACIST DOCUMENTED: ICD-10-PCS | Mod: CPTII,S$GLB,, | Performed by: INTERNAL MEDICINE

## 2022-05-13 PROCEDURE — 99214 PR OFFICE/OUTPT VISIT, EST, LEVL IV, 30-39 MIN: ICD-10-PCS | Mod: S$GLB,,, | Performed by: INTERNAL MEDICINE

## 2022-05-13 PROCEDURE — 1159F MED LIST DOCD IN RCRD: CPT | Mod: CPTII,S$GLB,, | Performed by: INTERNAL MEDICINE

## 2022-05-13 PROCEDURE — 99214 OFFICE O/P EST MOD 30 MIN: CPT | Mod: S$GLB,,, | Performed by: INTERNAL MEDICINE

## 2022-05-13 PROCEDURE — 1160F RVW MEDS BY RX/DR IN RCRD: CPT | Mod: CPTII,S$GLB,, | Performed by: INTERNAL MEDICINE

## 2022-05-13 PROCEDURE — 3078F PR MOST RECENT DIASTOLIC BLOOD PRESSURE < 80 MM HG: ICD-10-PCS | Mod: CPTII,S$GLB,, | Performed by: INTERNAL MEDICINE

## 2022-05-13 PROCEDURE — 3075F PR MOST RECENT SYSTOLIC BLOOD PRESS GE 130-139MM HG: ICD-10-PCS | Mod: CPTII,S$GLB,, | Performed by: INTERNAL MEDICINE

## 2022-05-13 PROCEDURE — 99999 PR PBB SHADOW E&M-EST. PATIENT-LVL III: ICD-10-PCS | Mod: PBBFAC,,, | Performed by: INTERNAL MEDICINE

## 2022-05-13 PROCEDURE — 3044F HG A1C LEVEL LT 7.0%: CPT | Mod: CPTII,S$GLB,, | Performed by: INTERNAL MEDICINE

## 2022-05-13 PROCEDURE — 3044F PR MOST RECENT HEMOGLOBIN A1C LEVEL <7.0%: ICD-10-PCS | Mod: CPTII,S$GLB,, | Performed by: INTERNAL MEDICINE

## 2022-05-13 PROCEDURE — 1159F PR MEDICATION LIST DOCUMENTED IN MEDICAL RECORD: ICD-10-PCS | Mod: CPTII,S$GLB,, | Performed by: INTERNAL MEDICINE

## 2022-05-13 PROCEDURE — 99999 PR PBB SHADOW E&M-EST. PATIENT-LVL III: CPT | Mod: PBBFAC,,, | Performed by: INTERNAL MEDICINE

## 2022-05-13 PROCEDURE — 4010F PR ACE/ARB THEARPY RXD/TAKEN: ICD-10-PCS | Mod: CPTII,S$GLB,, | Performed by: INTERNAL MEDICINE

## 2022-05-13 PROCEDURE — 1126F AMNT PAIN NOTED NONE PRSNT: CPT | Mod: CPTII,S$GLB,, | Performed by: INTERNAL MEDICINE

## 2022-05-13 PROCEDURE — 3008F BODY MASS INDEX DOCD: CPT | Mod: CPTII,S$GLB,, | Performed by: INTERNAL MEDICINE

## 2022-05-13 PROCEDURE — 3075F SYST BP GE 130 - 139MM HG: CPT | Mod: CPTII,S$GLB,, | Performed by: INTERNAL MEDICINE

## 2022-05-13 PROCEDURE — 1126F PR PAIN SEVERITY QUANTIFIED, NO PAIN PRESENT: ICD-10-PCS | Mod: CPTII,S$GLB,, | Performed by: INTERNAL MEDICINE

## 2022-05-13 PROCEDURE — 4010F ACE/ARB THERAPY RXD/TAKEN: CPT | Mod: CPTII,S$GLB,, | Performed by: INTERNAL MEDICINE

## 2022-05-13 PROCEDURE — 3078F DIAST BP <80 MM HG: CPT | Mod: CPTII,S$GLB,, | Performed by: INTERNAL MEDICINE

## 2022-05-13 PROCEDURE — 3008F PR BODY MASS INDEX (BMI) DOCUMENTED: ICD-10-PCS | Mod: CPTII,S$GLB,, | Performed by: INTERNAL MEDICINE

## 2022-05-13 RX ORDER — ROSUVASTATIN CALCIUM 20 MG/1
20 TABLET, COATED ORAL DAILY
Qty: 90 TABLET | Refills: 3 | Status: SHIPPED | OUTPATIENT
Start: 2022-05-13 | End: 2023-06-12 | Stop reason: SDUPTHER

## 2022-05-13 RX ORDER — METHOCARBAMOL 500 MG/1
500 TABLET, FILM COATED ORAL 3 TIMES DAILY
Qty: 30 TABLET | Refills: 0 | Status: SHIPPED | OUTPATIENT
Start: 2022-05-13 | End: 2022-06-02

## 2022-05-16 RX ORDER — ERGOCALCIFEROL 1.25 MG/1
50000 CAPSULE ORAL
Qty: 12 CAPSULE | Refills: 1 | Status: CANCELLED | OUTPATIENT
Start: 2022-05-16

## 2022-05-18 RX ORDER — ERGOCALCIFEROL 1.25 MG/1
50000 CAPSULE ORAL
Qty: 12 CAPSULE | Refills: 1 | Status: SHIPPED | OUTPATIENT
Start: 2022-05-18 | End: 2022-12-16 | Stop reason: SDUPTHER

## 2022-05-20 ENCOUNTER — PATIENT MESSAGE (OUTPATIENT)
Dept: ADMINISTRATIVE | Facility: OTHER | Age: 67
End: 2022-05-20
Payer: COMMERCIAL

## 2022-05-20 ENCOUNTER — IMMUNIZATION (OUTPATIENT)
Dept: INTERNAL MEDICINE | Facility: CLINIC | Age: 67
End: 2022-05-20
Payer: COMMERCIAL

## 2022-05-20 DIAGNOSIS — Z23 NEED FOR VACCINATION: Primary | ICD-10-CM

## 2022-05-20 PROCEDURE — 91305 COVID-19, MRNA, LNP-S, PF, 30 MCG/0.3 ML DOSE VACCINE (PFIZER): CPT | Mod: PBBFAC | Performed by: INTERNAL MEDICINE

## 2022-05-30 ENCOUNTER — PATIENT MESSAGE (OUTPATIENT)
Dept: ADMINISTRATIVE | Facility: HOSPITAL | Age: 67
End: 2022-05-30
Payer: COMMERCIAL

## 2022-07-11 ENCOUNTER — PATIENT MESSAGE (OUTPATIENT)
Dept: ADMINISTRATIVE | Facility: HOSPITAL | Age: 67
End: 2022-07-11
Payer: COMMERCIAL

## 2022-08-19 RX ORDER — METFORMIN HYDROCHLORIDE 500 MG/1
1000 TABLET ORAL 2 TIMES DAILY WITH MEALS
Qty: 360 TABLET | Refills: 1 | Status: SHIPPED | OUTPATIENT
Start: 2022-08-19 | End: 2023-06-12 | Stop reason: SDUPTHER

## 2022-08-19 NOTE — TELEPHONE ENCOUNTER
Care Due:                  Date            Visit Type   Department     Provider  --------------------------------------------------------------------------------                                EP -                              PRIMARY      [facility] PRIMARY  Last Visit: 05-      CARE ([facility])   VARUN Martinez  Next Visit: None Scheduled  None         None Found                                                            Last  Test          Frequency    Reason                     Performed    Due Date  --------------------------------------------------------------------------------    HBA1C.......  6 months...  empagliflozin, metFORMIN.  05- 11-    [facility] Embedded Care Gaps. Reference number: [id number]. 8/19/2022   10:18:37 AM CDT

## 2022-08-19 NOTE — TELEPHONE ENCOUNTER
Refill Routing Note   Medication(s) are not appropriate for processing by [facility] for the following reason(s):      - Required laboratory values are abnormal    [facility] action(s):  Defer          Medication reconciliation completed: No     Appointments  past 12m or future 3m with PCP    Date Provider   Last Visit   5/13/2022 Russell Martinez MD   Next Visit   Visit date not found Russell Martinez MD   ED visits in past 90 days: 0        Note composed:12:22 PM 08/19/2022

## 2022-08-24 ENCOUNTER — PATIENT MESSAGE (OUTPATIENT)
Dept: ADMINISTRATIVE | Facility: HOSPITAL | Age: 67
End: 2022-08-24
Payer: COMMERCIAL

## 2022-09-07 ENCOUNTER — OFFICE VISIT (OUTPATIENT)
Dept: OPTOMETRY | Facility: CLINIC | Age: 67
End: 2022-09-07
Payer: COMMERCIAL

## 2022-09-07 DIAGNOSIS — Z13.5 SCREENING FOR EYE CONDITION: ICD-10-CM

## 2022-09-07 DIAGNOSIS — H52.221 REGULAR ASTIGMATISM, RIGHT EYE: ICD-10-CM

## 2022-09-07 DIAGNOSIS — E11.9 TYPE 2 DIABETES MELLITUS WITHOUT RETINOPATHY: ICD-10-CM

## 2022-09-07 DIAGNOSIS — H52.12 MYOPIA OF LEFT EYE: ICD-10-CM

## 2022-09-07 DIAGNOSIS — I10 HYPERTENSION, UNSPECIFIED TYPE: ICD-10-CM

## 2022-09-07 DIAGNOSIS — Z01.00 EXAMINATION OF EYES AND VISION: Primary | ICD-10-CM

## 2022-09-07 DIAGNOSIS — H52.4 PRESBYOPIA OF BOTH EYES: ICD-10-CM

## 2022-09-07 PROCEDURE — 99999 PR PBB SHADOW E&M-EST. PATIENT-LVL III: ICD-10-PCS | Mod: PBBFAC,,, | Performed by: OPTOMETRIST

## 2022-09-07 PROCEDURE — 92015 PR REFRACTION: ICD-10-PCS | Mod: S$GLB,,, | Performed by: OPTOMETRIST

## 2022-09-07 PROCEDURE — 92015 DETERMINE REFRACTIVE STATE: CPT | Mod: S$GLB,,, | Performed by: OPTOMETRIST

## 2022-09-07 PROCEDURE — 92014 COMPRE OPH EXAM EST PT 1/>: CPT | Mod: S$GLB,,, | Performed by: OPTOMETRIST

## 2022-09-07 PROCEDURE — 92014 PR EYE EXAM, EST PATIENT,COMPREHESV: ICD-10-PCS | Mod: S$GLB,,, | Performed by: OPTOMETRIST

## 2022-09-07 PROCEDURE — 99999 PR PBB SHADOW E&M-EST. PATIENT-LVL III: CPT | Mod: PBBFAC,,, | Performed by: OPTOMETRIST

## 2022-09-07 NOTE — PROGRESS NOTES
"HPI    Patient's age: 67 y.o. WF  Occupation: [facility] [employer]  Approximate date of last eye examination:  10/19/2020  Name of last eye doctor seen:   City/State: [facility]  Wears glasses? Yes      If yes, wears  Full-time or part-time?  Part time  Present glasses are: Bifocal, SV Distance, SV Reading?  Bifocals  Approximate age of present glasses:  2 yrs old  Got new glasses following last exam, or subsequently?:  Yes    Any problem with VA with glasses?  no  Wears CLs?:  No   Headaches?  No   Eye pain/discomfort?  No                                                                                      Flashes?  No   Floaters?  No   Diplopia/Double vision?  No   Patient's Ocular History:         Any eye surgeries? No          Any eye injury?  No          Any treatment for eye disease?  No   Family history of eye disease?  Sister + Diabetic   Significant patient medical history:         1. Diabetes?  Yes, pt did not check BS this morning       If yes, IDDM or NIDDM?  NIDDM    2. HBP?  Yes, controlled with medication               3. Other (describe):  No    ! OTC eyedrops currently using:  No    ! Prescription eye meds currently using:  No    ! Any history of allergy/adverse reaction to any eye meds used   previously?  No     ! Any history of allergy/adverse reaction to eyedrops used during prior   eye exam(s)? No     ! Any history of allergy/adverse reaction to Novacaine or similar meds?   No    ! Any history of allergy/adverse reaction to Epinephrine or similar meds?   No     ! Patient okay with use of anesthetic eyedrops to check eye pressure?    Yes         ! Patient okay with use of eyedrops to dilate pupils today?  Yes    !  Allergies/Medications/Medical History/Family History reviewed today?    Yes       PD =   67/63  Desired reading distance =  16.75"                                                            Last edited by Joe Dumont MA on 9/7/2022 10:31 AM.            Assessment /Plan     For exam results, " see Encounter Report.    1. Examination of eyes and vision        2. Type 2 diabetes mellitus without retinopathy        3. Hypertension, unspecified type        4. Screening for eye condition        5. Regular astigmatism, right eye        6. Myopia of left eye        7. Presbyopia of both eyes                     Early nuclear sclerosis of lens in both eyes, and early peripheral cortical cataract in both eyes.    Monitor only.    Otherwise, good ocular health in each eye.    No evidence of of diabetic or hypertensive retinal changes in either eye.  Slight simple hyperopic astigmatism in the right  eye, and slght myopia in the left eye.  Presbyopia consistent with age.  New spectacle lens Rx(s) issued for use as desired.  Recheck in 12 - 18 months.

## 2022-09-12 ENCOUNTER — OFFICE VISIT (OUTPATIENT)
Dept: SPORTS MEDICINE | Facility: CLINIC | Age: 67
End: 2022-09-12
Payer: COMMERCIAL

## 2022-09-12 ENCOUNTER — HOSPITAL ENCOUNTER (OUTPATIENT)
Dept: RADIOLOGY | Facility: HOSPITAL | Age: 67
Discharge: HOME | End: 2022-09-12
Attending: ORTHOPAEDIC SURGERY
Payer: COMMERCIAL

## 2022-09-12 VITALS
HEART RATE: 73 BPM | TEMPERATURE: 97 F | SYSTOLIC BLOOD PRESSURE: 142 MMHG | HEIGHT: 66 IN | DIASTOLIC BLOOD PRESSURE: 78 MMHG | BODY MASS INDEX: 43.55 KG/M2 | WEIGHT: 271 LBS

## 2022-09-12 DIAGNOSIS — M24.561 BILATERAL KNEE CONTRACTURES: ICD-10-CM

## 2022-09-12 DIAGNOSIS — M24.561 BILATERAL KNEE CONTRACTURES: Primary | ICD-10-CM

## 2022-09-12 DIAGNOSIS — M24.562 BILATERAL KNEE CONTRACTURES: Primary | ICD-10-CM

## 2022-09-12 DIAGNOSIS — M24.562 BILATERAL KNEE CONTRACTURES: ICD-10-CM

## 2022-09-12 DIAGNOSIS — M25.562 LEFT KNEE PAIN, UNSPECIFIED CHRONICITY: ICD-10-CM

## 2022-09-12 PROCEDURE — 1159F MED LIST DOCD IN RCRD: CPT | Mod: CPTII,S$GLB,, | Performed by: ORTHOPAEDIC SURGERY

## 2022-09-12 PROCEDURE — 99999 PR PBB SHADOW E&M-EST. PATIENT-LVL III: ICD-10-PCS | Mod: PBBFAC,,, | Performed by: ORTHOPAEDIC SURGERY

## 2022-09-12 PROCEDURE — 3044F HG A1C LEVEL LT 7.0%: CPT | Mod: CPTII,S$GLB,, | Performed by: ORTHOPAEDIC SURGERY

## 2022-09-12 PROCEDURE — 99214 OFFICE O/P EST MOD 30 MIN: CPT | Mod: S$GLB,,, | Performed by: ORTHOPAEDIC SURGERY

## 2022-09-12 PROCEDURE — 4010F PR ACE/ARB THEARPY RXD/TAKEN: ICD-10-PCS | Mod: CPTII,S$GLB,, | Performed by: ORTHOPAEDIC SURGERY

## 2022-09-12 PROCEDURE — 73564 X-RAY EXAM KNEE 4 OR MORE: CPT | Mod: TC,50

## 2022-09-12 PROCEDURE — 73564 XR KNEE ORTHO BILAT WITH FLEXION: ICD-10-PCS | Mod: 26,50,, | Performed by: INTERNAL MEDICINE

## 2022-09-12 PROCEDURE — 4010F ACE/ARB THERAPY RXD/TAKEN: CPT | Mod: CPTII,S$GLB,, | Performed by: ORTHOPAEDIC SURGERY

## 2022-09-12 PROCEDURE — 1125F PR PAIN SEVERITY QUANTIFIED, PAIN PRESENT: ICD-10-PCS | Mod: CPTII,S$GLB,, | Performed by: ORTHOPAEDIC SURGERY

## 2022-09-12 PROCEDURE — 3044F PR MOST RECENT HEMOGLOBIN A1C LEVEL <7.0%: ICD-10-PCS | Mod: CPTII,S$GLB,, | Performed by: ORTHOPAEDIC SURGERY

## 2022-09-12 PROCEDURE — 1159F PR MEDICATION LIST DOCUMENTED IN MEDICAL RECORD: ICD-10-PCS | Mod: CPTII,S$GLB,, | Performed by: ORTHOPAEDIC SURGERY

## 2022-09-12 PROCEDURE — 3077F PR MOST RECENT SYSTOLIC BLOOD PRESSURE >= 140 MM HG: ICD-10-PCS | Mod: CPTII,S$GLB,, | Performed by: ORTHOPAEDIC SURGERY

## 2022-09-12 PROCEDURE — 3008F BODY MASS INDEX DOCD: CPT | Mod: CPTII,S$GLB,, | Performed by: ORTHOPAEDIC SURGERY

## 2022-09-12 PROCEDURE — 99999 PR PBB SHADOW E&M-EST. PATIENT-LVL III: CPT | Mod: PBBFAC,,, | Performed by: ORTHOPAEDIC SURGERY

## 2022-09-12 PROCEDURE — 73564 X-RAY EXAM KNEE 4 OR MORE: CPT | Mod: 26,50,, | Performed by: INTERNAL MEDICINE

## 2022-09-12 PROCEDURE — 3288F FALL RISK ASSESSMENT DOCD: CPT | Mod: CPTII,S$GLB,, | Performed by: ORTHOPAEDIC SURGERY

## 2022-09-12 PROCEDURE — 1125F AMNT PAIN NOTED PAIN PRSNT: CPT | Mod: CPTII,S$GLB,, | Performed by: ORTHOPAEDIC SURGERY

## 2022-09-12 PROCEDURE — 1101F PT FALLS ASSESS-DOCD LE1/YR: CPT | Mod: CPTII,S$GLB,, | Performed by: ORTHOPAEDIC SURGERY

## 2022-09-12 PROCEDURE — 99214 PR OFFICE/OUTPT VISIT, EST, LEVL IV, 30-39 MIN: ICD-10-PCS | Mod: S$GLB,,, | Performed by: ORTHOPAEDIC SURGERY

## 2022-09-12 PROCEDURE — 3288F PR FALLS RISK ASSESSMENT DOCUMENTED: ICD-10-PCS | Mod: CPTII,S$GLB,, | Performed by: ORTHOPAEDIC SURGERY

## 2022-09-12 PROCEDURE — 3078F PR MOST RECENT DIASTOLIC BLOOD PRESSURE < 80 MM HG: ICD-10-PCS | Mod: CPTII,S$GLB,, | Performed by: ORTHOPAEDIC SURGERY

## 2022-09-12 PROCEDURE — 3077F SYST BP >= 140 MM HG: CPT | Mod: CPTII,S$GLB,, | Performed by: ORTHOPAEDIC SURGERY

## 2022-09-12 PROCEDURE — 3078F DIAST BP <80 MM HG: CPT | Mod: CPTII,S$GLB,, | Performed by: ORTHOPAEDIC SURGERY

## 2022-09-12 PROCEDURE — 1101F PR PT FALLS ASSESS DOC 0-1 FALLS W/OUT INJ PAST YR: ICD-10-PCS | Mod: CPTII,S$GLB,, | Performed by: ORTHOPAEDIC SURGERY

## 2022-09-12 PROCEDURE — 3008F PR BODY MASS INDEX (BMI) DOCUMENTED: ICD-10-PCS | Mod: CPTII,S$GLB,, | Performed by: ORTHOPAEDIC SURGERY

## 2022-09-12 NOTE — PROGRESS NOTES
CC: Left knee pain    67 y.o. Female with a history of Left pain that started 1 month ago, atraumatic. Pain started posterior and know whole knee is painful. No swelling.    She states that the pain is severe and not responding to any conservative care.      + mechanical symptoms (catching and popping), no instability    Is affecting ADLs.      SANE: 80    Review of Systems   Constitution: Negative. Negative for chills, fever and night sweats.   HENT: Negative for congestion and headaches.    Eyes: Negative for blurred vision, left vision loss and right vision loss.   Cardiovascular: Negative for chest pain and syncope.   Respiratory: Negative for cough and shortness of breath.    Endocrine: Negative for polydipsia, polyphagia and polyuria.   Hematologic/Lymphatic: Negative for bleeding problem. Does not bruise/bleed easily.   Skin: Negative for dry skin, itching and rash.   Musculoskeletal: Negative for falls. Positive for knee pain and muscle weakness.   Gastrointestinal: Negative for abdominal pain and bowel incontinence.   Genitourinary: Negative for bladder incontinence and nocturia.   Neurological: Negative for disturbances in coordination, loss of balance and seizures.   Psychiatric/Behavioral: Negative for depression. The patient does not have insomnia.    Allergic/Immunologic: Negative for hives and persistent infections.     PAST MEDICAL HISTORY:   Past Medical History:   Diagnosis Date    Anemia     Diabetes mellitus type II     H. pylori infection     Hyperlipidemia     Hypertension     Unspecified vitamin D deficiency 4/24/2013     PAST SURGICAL HISTORY:   Past Surgical History:   Procedure Laterality Date    ESOPHAGOGASTRODUODENOSCOPY N/A 8/8/2018    Procedure: EGD (ESOPHAGOGASTRODUODENOSCOPY);  Surgeon: Darius Gerardo MD;  Location: [address];  Service: Endoscopy;  Laterality: N/A;    GASTRIC BYPASS       FAMILY HISTORY:   Family History   Problem Relation Age of Onset    COPD Mother      Heart disease Mother     Cancer Father         liver    Heart disease Father     Cancer Sister     Diabetes Sister     Hyperlipidemia Sister     Hypothyroidism Sister     No Known Problems Brother     No Known Problems Maternal Aunt     No Known Problems Maternal Uncle     No Known Problems Paternal Aunt     No Known Problems Paternal Uncle     No Known Problems Maternal Grandmother     No Known Problems Maternal Grandfather     No Known Problems Paternal Grandmother     No Known Problems Paternal Grandfather     Amblyopia Neg Hx     Blindness Neg Hx     Cataracts Neg Hx     Glaucoma Neg Hx     Hypertension Neg Hx     Macular degeneration Neg Hx     Retinal detachment Neg Hx     Strabismus Neg Hx     Stroke Neg Hx     Thyroid disease Neg Hx     Breast cancer Neg Hx     Colon cancer Neg Hx     Ovarian cancer Neg Hx     Stomach cancer Neg Hx     Rectal cancer Neg Hx      SOCIAL HISTORY:   Social History     Socioeconomic History    Marital status: Single   Occupational History     Employer: [employer]   Tobacco Use    Smoking status: Never    Smokeless tobacco: Never   Substance and Sexual Activity    Alcohol use: No    Drug use: No       MEDICATIONS:   Current Outpatient Medications:     benazepriL (LOTENSIN) 40 MG tablet, Take 1 tablet (40 mg total) by mouth once daily., Disp: 90 tablet, Rfl: 2    blood sugar diagnostic (BLOOD GLUCOSE TEST) Strp, 1 strip by Misc.(Non-Drug; Combo Route) route 2 (two) times daily before meals. For mikki contour, Disp: 200 strip, Rfl: 3    empagliflozin (JARDIANCE) 10 mg tablet, Take 1 tablet (10 mg total) by mouth once daily., Disp: 90 tablet, Rfl: 3    ergocalciferol (VITAMIN D2) 50,000 unit Cap, Take 1 capsule (50,000 Units total) by mouth every 7 days., Disp: 12 capsule, Rfl: 1    lancets (ONE TOUCH DELICA LANCETS) 33 gauge Misc, 1 lancet by Misc.(Non-Drug; Combo Route) route 2 (two) times daily., Disp: 60 each, Rfl: 11    metFORMIN (GLUCOPHAGE) 500 MG tablet, Take 2  "tablets (1,000 mg total) by mouth 2 (two) times daily with meals., Disp: 360 tablet, Rfl: 1    rosuvastatin (CRESTOR) 20 MG tablet, Take 1 tablet (20 mg total) by mouth once daily., Disp: 90 tablet, Rfl: 3  ALLERGIES:   Review of patient's allergies indicates:   Allergen Reactions    Codeine Nausea Only    Vicodin [hydrocodone-acetaminophen] Nausea Only       VITAL SIGNS: BP (!) 142/78 (BP Location: Right arm, Patient Position: Sitting, BP Method: Large (Automatic))   Pulse 73   Temp 97 °F (36.1 °C) (Oral)   Ht 5' 6" (1.676 m)   Wt 122.9 kg (271 lb)   BMI 43.74 kg/m²      PHYSICAL EXAMINATION  VITAL SIGNS: BP (!) 142/78 (BP Location: Right arm, Patient Position: Sitting, BP Method: Large (Automatic))   Pulse 73   Temp 97 °F (36.1 °C) (Oral)   Ht 5' 6" (1.676 m)   Wt 122.9 kg (271 lb)   BMI 43.74 kg/m²    General:  The patient is alert and oriented x 3.  Mood is pleasant.  Observation of ears, eyes and nose reveal no gross abnormalities.  HEENT: NCAT, sclera nonicteric  Lungs: Respirations are equal and unlabored.    Left KNEE EXAMINATION     OBSERVATION / INSPECTION   Gait:   Nonantalgic   Alignment:  Neutral   Scars:   None   Muscle atrophy: Mild  Effusion:  None   Warmth:  None   Discoloration:   none     TENDERNESS / CREPITUS (T / C):          T / C      T / C   Patella   - / -   Lateral joint line   - / -    Peripatellar medial  -  Medial joint line    + / -    Peripatellar lateral -  Medial plica   - / -    Patellar tendon -   Popliteal fossa  - / -    Quad tendon   -   Gastrocnemius   -   Prepatellar Bursa - / -   Quadricep   -   Tibial tubercle  -  Thigh/hamstring  -   Pes anserine/HS -  Fibula    -   ITB   - / -  Tibia     -   Tib/fib joint  - / -  LCL    -     MFC   - / -   MCL: Proximal  -    LFC   - / -    Distal   -          ROM: (* = pain)  PASSIVE   ACTIVE    Left :   5 / 0 / 145   5 / 0 / 145     Right :    5 / 0 / 145   5 / 0 / 145    PATELLOFEMORAL EXAMINATION:  See above noted areas of " tenderness.   Patella position    Subluxation / dislocation: Centered           Sup. / Inf;   Normal   Crepitus (PF):    Absent   Patellar Mobility:       Medial-lateral:   Normal    Superior-inferior:  Normal    Inferior tilt   Normal    Patellar tendon:  Normal   Lateral tilt:    Normal   J-sign:     None   Patellofemoral grind:   No pain       MENISCAL SIGNS:     Pain on terminal extension:  +  Pain on terminal flexion:  +  Anatoliys maneuver:  + for pain  Squat     + posterior joint pain    LIGAMENT EXAMINATION:  ACL / Lachman:  normal (-1 to 2mm)    PCL-Post.  drawer: normal 0 to 2mm  MCL- Valgus:  normal 0 to 2mm  LCL- Varus:  normal 0 to 2mm  Pivot shift: normal (Equal)   Dial Test: difference c/w other side   At 30° flexion: normal (< 5°)    At 90° flexion: normal (< 5°)   Reverse Pivot Shift:   normal (Equal)     STRENGTH: (* = with pain) PAINFUL SIDE   Quadricep   5/5   Hamstrin/5    EXTREMITY NEURO-VASCULAR EXAMINATION:   Sensation:  Grossly intact to light touch all dermatomal regions.   Motor Function:  Fully intact motor function at hip, knee, foot and ankle    DTRs;  quadriceps and  achilles 2+.  No clonus and downgoing Babinski.    Vascular status:  DP and PT pulses 2+, brisk capillary refill, symmetric.     Other Findings:       X-rays reviewed and interpreted personally by me:  including standing, weight bearing AP and flexion bilateral knees, lateral and merchant views ordered and images interpreted  by me show:  No fracture, dislocation     ASSESSMENT:    Left Knee  Probable Meniscus tear  medial       PLAN:   MRI Left knee  Hold out of sports until MRI  All questions were answered, pt will contact us for questions or concerns in the interim.    I made the decision to obtain old records of the patient including previous notes and imaging. New imaging was ordered today of the extremity or extremities evaluated. I independently reviewed and interpreted the radiographs and/or MRIs today as  well as prior imaging.

## 2022-09-13 ENCOUNTER — PATIENT MESSAGE (OUTPATIENT)
Dept: ADMINISTRATIVE | Facility: HOSPITAL | Age: 67
End: 2022-09-13
Payer: COMMERCIAL

## 2022-09-16 ENCOUNTER — HOSPITAL ENCOUNTER (OUTPATIENT)
Dept: RADIOLOGY | Facility: HOSPITAL | Age: 67
Discharge: HOME | End: 2022-09-16
Attending: PHYSICIAN ASSISTANT
Payer: COMMERCIAL

## 2022-09-16 ENCOUNTER — OFFICE VISIT (OUTPATIENT)
Dept: SPORTS MEDICINE | Facility: CLINIC | Age: 67
End: 2022-09-16
Payer: COMMERCIAL

## 2022-09-16 VITALS
HEART RATE: 90 BPM | HEIGHT: 66 IN | BODY MASS INDEX: 44.84 KG/M2 | SYSTOLIC BLOOD PRESSURE: 128 MMHG | DIASTOLIC BLOOD PRESSURE: 74 MMHG | WEIGHT: 279 LBS

## 2022-09-16 DIAGNOSIS — G89.29 CHRONIC RIGHT SHOULDER PAIN: Primary | ICD-10-CM

## 2022-09-16 DIAGNOSIS — M25.511 CHRONIC RIGHT SHOULDER PAIN: Primary | ICD-10-CM

## 2022-09-16 DIAGNOSIS — M25.511 RIGHT SHOULDER PAIN, UNSPECIFIED CHRONICITY: ICD-10-CM

## 2022-09-16 DIAGNOSIS — M75.01 ADHESIVE CAPSULITIS OF RIGHT SHOULDER: ICD-10-CM

## 2022-09-16 PROCEDURE — 3044F PR MOST RECENT HEMOGLOBIN A1C LEVEL <7.0%: ICD-10-PCS | Mod: CPTII,S$GLB,, | Performed by: PHYSICIAN ASSISTANT

## 2022-09-16 PROCEDURE — 1125F PR PAIN SEVERITY QUANTIFIED, PAIN PRESENT: ICD-10-PCS | Mod: CPTII,S$GLB,, | Performed by: PHYSICIAN ASSISTANT

## 2022-09-16 PROCEDURE — 73030 XR SHOULDER COMPLETE 2 OR MORE VIEWS RIGHT: ICD-10-PCS | Mod: 26,RT,, | Performed by: RADIOLOGY

## 2022-09-16 PROCEDURE — 99214 PR OFFICE/OUTPT VISIT, EST, LEVL IV, 30-39 MIN: ICD-10-PCS | Mod: S$GLB,,, | Performed by: PHYSICIAN ASSISTANT

## 2022-09-16 PROCEDURE — 3008F PR BODY MASS INDEX (BMI) DOCUMENTED: ICD-10-PCS | Mod: CPTII,S$GLB,, | Performed by: PHYSICIAN ASSISTANT

## 2022-09-16 PROCEDURE — 3074F SYST BP LT 130 MM HG: CPT | Mod: CPTII,S$GLB,, | Performed by: PHYSICIAN ASSISTANT

## 2022-09-16 PROCEDURE — 1125F AMNT PAIN NOTED PAIN PRSNT: CPT | Mod: CPTII,S$GLB,, | Performed by: PHYSICIAN ASSISTANT

## 2022-09-16 PROCEDURE — 1160F RVW MEDS BY RX/DR IN RCRD: CPT | Mod: CPTII,S$GLB,, | Performed by: PHYSICIAN ASSISTANT

## 2022-09-16 PROCEDURE — 4010F PR ACE/ARB THEARPY RXD/TAKEN: ICD-10-PCS | Mod: CPTII,S$GLB,, | Performed by: PHYSICIAN ASSISTANT

## 2022-09-16 PROCEDURE — 73030 X-RAY EXAM OF SHOULDER: CPT | Mod: 26,RT,, | Performed by: RADIOLOGY

## 2022-09-16 PROCEDURE — 3288F FALL RISK ASSESSMENT DOCD: CPT | Mod: CPTII,S$GLB,, | Performed by: PHYSICIAN ASSISTANT

## 2022-09-16 PROCEDURE — 1159F PR MEDICATION LIST DOCUMENTED IN MEDICAL RECORD: ICD-10-PCS | Mod: CPTII,S$GLB,, | Performed by: PHYSICIAN ASSISTANT

## 2022-09-16 PROCEDURE — 3288F PR FALLS RISK ASSESSMENT DOCUMENTED: ICD-10-PCS | Mod: CPTII,S$GLB,, | Performed by: PHYSICIAN ASSISTANT

## 2022-09-16 PROCEDURE — 3074F PR MOST RECENT SYSTOLIC BLOOD PRESSURE < 130 MM HG: ICD-10-PCS | Mod: CPTII,S$GLB,, | Performed by: PHYSICIAN ASSISTANT

## 2022-09-16 PROCEDURE — 99999 PR PBB SHADOW E&M-EST. PATIENT-LVL IV: ICD-10-PCS | Mod: PBBFAC,,, | Performed by: PHYSICIAN ASSISTANT

## 2022-09-16 PROCEDURE — 99999 PR PBB SHADOW E&M-EST. PATIENT-LVL IV: CPT | Mod: PBBFAC,,, | Performed by: PHYSICIAN ASSISTANT

## 2022-09-16 PROCEDURE — 99214 OFFICE O/P EST MOD 30 MIN: CPT | Mod: S$GLB,,, | Performed by: PHYSICIAN ASSISTANT

## 2022-09-16 PROCEDURE — 1101F PT FALLS ASSESS-DOCD LE1/YR: CPT | Mod: CPTII,S$GLB,, | Performed by: PHYSICIAN ASSISTANT

## 2022-09-16 PROCEDURE — 3044F HG A1C LEVEL LT 7.0%: CPT | Mod: CPTII,S$GLB,, | Performed by: PHYSICIAN ASSISTANT

## 2022-09-16 PROCEDURE — 73030 X-RAY EXAM OF SHOULDER: CPT | Mod: TC,RT

## 2022-09-16 PROCEDURE — 3008F BODY MASS INDEX DOCD: CPT | Mod: CPTII,S$GLB,, | Performed by: PHYSICIAN ASSISTANT

## 2022-09-16 PROCEDURE — 1159F MED LIST DOCD IN RCRD: CPT | Mod: CPTII,S$GLB,, | Performed by: PHYSICIAN ASSISTANT

## 2022-09-16 PROCEDURE — 4010F ACE/ARB THERAPY RXD/TAKEN: CPT | Mod: CPTII,S$GLB,, | Performed by: PHYSICIAN ASSISTANT

## 2022-09-16 PROCEDURE — 3078F DIAST BP <80 MM HG: CPT | Mod: CPTII,S$GLB,, | Performed by: PHYSICIAN ASSISTANT

## 2022-09-16 PROCEDURE — 1160F PR REVIEW ALL MEDS BY PRESCRIBER/CLIN PHARMACIST DOCUMENTED: ICD-10-PCS | Mod: CPTII,S$GLB,, | Performed by: PHYSICIAN ASSISTANT

## 2022-09-16 PROCEDURE — 1101F PR PT FALLS ASSESS DOC 0-1 FALLS W/OUT INJ PAST YR: ICD-10-PCS | Mod: CPTII,S$GLB,, | Performed by: PHYSICIAN ASSISTANT

## 2022-09-16 PROCEDURE — 3078F PR MOST RECENT DIASTOLIC BLOOD PRESSURE < 80 MM HG: ICD-10-PCS | Mod: CPTII,S$GLB,, | Performed by: PHYSICIAN ASSISTANT

## 2022-09-16 NOTE — PROGRESS NOTES
CC: right shoulder pain     67 y.o. Female [employer] , who reports that the pain is moderate and not responding to any conservative care.  RHD    She reports 2-3 years ago anterior shoulder pain that was tolerable until about 2 months ago when it worsened with no new inciting injury or trauma. She reports doing PT for this shoulder about 1 year ago with some improvement but has not done more of this in over 1 year.     She describes her pain as a burning/irritated sensation.    She reports that the pain is worse with overhead activity. It also bothers her at night.    Is affecting ADLs.     Review of Systems   Constitution: Negative. Negative for chills, fever and night sweats.   HENT: Negative for congestion and headaches.    Eyes: Negative for blurred vision, left vision loss and right vision loss.   Cardiovascular: Negative for chest pain and syncope.   Respiratory: Negative for cough and shortness of breath.    Endocrine: Negative for polydipsia, polyphagia and polyuria.   Hematologic/Lymphatic: Negative for bleeding problem. Does not bruise/bleed easily.   Skin: Negative for dry skin, itching and rash.   Musculoskeletal: Negative for falls and muscle weakness.   Gastrointestinal: Negative for abdominal pain and bowel incontinence.   Genitourinary: Negative for bladder incontinence and nocturia.   Neurological: Negative for disturbances in coordination, loss of balance and seizures.   Psychiatric/Behavioral: Negative for depression. The patient does not have insomnia.    Allergic/Immunologic: Negative for hives and persistent infections.     PAST MEDICAL HISTORY:   Past Medical History:   Diagnosis Date    Anemia     Diabetes mellitus type II     H. pylori infection     Hyperlipidemia     Hypertension     Unspecified vitamin D deficiency 4/24/2013     PAST SURGICAL HISTORY:   Past Surgical History:   Procedure Laterality Date    ESOPHAGOGASTRODUODENOSCOPY N/A 8/8/2018    Procedure: EGD  (ESOPHAGOGASTRODUODENOSCOPY);  Surgeon: Darius Gerardo MD;  Location: [facility] ([address] [place] [address]);  Service: Endoscopy;  Laterality: N/A;    GASTRIC BYPASS       FAMILY HISTORY:   Family History   Problem Relation Age of Onset    COPD Mother     Heart disease Mother     Cancer Father         liver    Heart disease Father     Cancer Sister     Diabetes Sister     Hyperlipidemia Sister     Hypothyroidism Sister     No Known Problems Brother     No Known Problems Maternal Aunt     No Known Problems Maternal Uncle     No Known Problems Paternal Aunt     No Known Problems Paternal Uncle     No Known Problems Maternal Grandmother     No Known Problems Maternal Grandfather     No Known Problems Paternal Grandmother     No Known Problems Paternal Grandfather     Amblyopia Neg Hx     Blindness Neg Hx     Cataracts Neg Hx     Glaucoma Neg Hx     Hypertension Neg Hx     Macular degeneration Neg Hx     Retinal detachment Neg Hx     Strabismus Neg Hx     Stroke Neg Hx     Thyroid disease Neg Hx     Breast cancer Neg Hx     Colon cancer Neg Hx     Ovarian cancer Neg Hx     Stomach cancer Neg Hx     Rectal cancer Neg Hx      SOCIAL HISTORY:   Social History     Socioeconomic History    Marital status: Single   Occupational History     Employer: [employer]   Tobacco Use    Smoking status: Never    Smokeless tobacco: Never   Substance and Sexual Activity    Alcohol use: No    Drug use: No       MEDICATIONS:   Current Outpatient Medications:     benazepriL (LOTENSIN) 40 MG tablet, Take 1 tablet (40 mg total) by mouth once daily., Disp: 90 tablet, Rfl: 2    blood sugar diagnostic (BLOOD GLUCOSE TEST) Strp, 1 strip by Misc.(Non-Drug; Combo Route) route 2 (two) times daily before meals. For mikki contour, Disp: 200 strip, Rfl: 3    empagliflozin (JARDIANCE) 10 mg tablet, Take 1 tablet (10 mg total) by mouth once daily., Disp: 90 tablet, Rfl: 3    ergocalciferol (VITAMIN D2) 50,000 unit Cap, Take 1 capsule (50,000 Units total)  "by mouth every 7 days., Disp: 12 capsule, Rfl: 1    lancets (ONE TOUCH DELICA LANCETS) 33 gauge Misc, 1 lancet by Misc.(Non-Drug; Combo Route) route 2 (two) times daily., Disp: 60 each, Rfl: 11    metFORMIN (GLUCOPHAGE) 500 MG tablet, Take 2 tablets (1,000 mg total) by mouth 2 (two) times daily with meals., Disp: 360 tablet, Rfl: 1    rosuvastatin (CRESTOR) 20 MG tablet, Take 1 tablet (20 mg total) by mouth once daily., Disp: 90 tablet, Rfl: 3  ALLERGIES:   Review of patient's allergies indicates:   Allergen Reactions    Codeine Nausea Only    Vicodin [hydrocodone-acetaminophen] Nausea Only       VITAL SIGNS: /74   Pulse 90   Ht 5' 6" (1.676 m)   Wt 126.6 kg (279 lb)   BMI 45.03 kg/m²      PHYSICAL EXAMINATION:  General:  The patient is alert and oriented x 3.  Mood is pleasant.  Observation of ears, eyes and nose reveal no gross abnormalities.  No labored breathing observed.  Gait is coordinated. Patient can toe walk and heel walk without difficulty.      right SHOULDER / UPPER EXTREMITY EXAM    OBSERVATION:     Swelling  none  Deformity  none   Discoloration  none   Scapular winging none   Scars   none  Atrophy  none    TENDERNESS / CREPITUS (T/C):          T/C      T/C   Clavicle   -/-  SUPRAspinatus    -/-     AC Jt.    -/-  INFRAspinatus  -/-    SC Jt.    -/-  Deltoid    -/-      G. Tuberosity  -/-  LH BICEP groove  +/-   Acromion:  -/-  Midline Neck   -/-     Scapular Spine -/-  Trapezium   -/-   SMA Scapula  -/-  GH jt. line - post  -/-     Scapulothoracic  -/-         ROM: (* = with pain)  Right shoulder   Left shoulder        AROM (PROM)   AROM (PROM)   FE    140° (145°) *    170° (175°)     ER at 0°    40°  (40°)    50°  (55°)   ER at 90° ABD  70°  (75°) *   90°  (90°)   IR at 90°  ABD   NA  (40°)     NA  (40°)      IR (spine level)   L5*    L1    STRENGTH: (* = with pain) RIGHT SHOULDER  LEFT " SHOULDER   SCAPTION   5/5    5/5    IR    5/5    5/5   ER    5/5    5/5   BICEPS   5/5    5/5   Deltoid    5/5    5/5     Negative Bear hug test today.     SIGNS:  Painful side       NEER   +    OKURTS  +    HAYNES   +    SPEEDS  neg     DROP ARM   neg   BELLY PRESS neg   Superior escape none    LIFT-OFF  neg   X-Body ADD    neg    MOVING VALGUS neg        STABILITY TESTING    RIGHT SHOULDER   LEFT SHOULDER     Translation     Anterior  up face     up face    Posterior  up face    up face    Sulcus   < 10mm    < 10 mm     Signs   Apprehension   neg      neg       Relocation   no change     no change      Jerk test  neg     neg    EXTREMITY NEURO-VASCULAR EXAM    Sensation grossly intact to light touch all dermatomal regions.    DTR 2+ Biceps, Triceps, BR and Negative Claras sign   Grossly intact motor function at Elbow, Wrist and Hand   Distal pulses radial and ulnar 2+, brisk cap refill, symmetric.      NECK:  Painless FROM and spinous processes non-tender. Negative Spurlings sign.      OTHER FINDINGS:  + scapular dyskinesia    XRAYS:  Shoulder 3 view series right,  were obtained and reviewed  No convincing fracture or dislocation is noted. The osseous structures appear well mineralized and well aligned. Mild DJD          ASSESSMENT:   right:  1. Shoulder pain, acute on chronic   2.   Likely adhesive capsulitis and tight anterior shoulder.   3.   Scapular dyskinesia      PLAN:    OTC pain control with NSAIDs and ice or heat as needed.     2. PT for  adhesive capsulitis, ROM, tight internal rotation and scapular stabilization: Scapular dyskinesia   Scapular stabilization - [place] protocol, 1-3x/week x 6-8 weeks with HEP  She requests to work with Fabricio MENACyndy     3. Discussed shoulder CSI today but she reports that steroid injection usually don't do a whole lot for her previously. Will hold off for now.     RTC in 6 weeks.     All questions were answered, pt will contact us for questions or concerns in the  interim.

## 2022-09-19 ENCOUNTER — TELEPHONE (OUTPATIENT)
Dept: SPORTS MEDICINE | Facility: CLINIC | Age: 67
End: 2022-09-19
Payer: COMMERCIAL

## 2022-09-19 NOTE — TELEPHONE ENCOUNTER
----- Message from Russell Honeycutt III, PA-C sent at 9/16/2022  5:36 PM CDT -----  Regarding: call her to schedule f/u  Call to schedule f/u in about 6 weeks

## 2022-09-22 ENCOUNTER — HOSPITAL ENCOUNTER (OUTPATIENT)
Dept: RADIOLOGY | Facility: HOSPITAL | Age: 67
Discharge: HOME | End: 2022-09-22
Attending: STUDENT IN AN ORGANIZED HEALTH CARE EDUCATION/TRAINING PROGRAM
Payer: COMMERCIAL

## 2022-09-22 ENCOUNTER — TELEPHONE (OUTPATIENT)
Dept: CARDIOLOGY | Facility: CLINIC | Age: 67
End: 2022-09-22
Payer: COMMERCIAL

## 2022-09-22 DIAGNOSIS — M25.562 LEFT KNEE PAIN, UNSPECIFIED CHRONICITY: ICD-10-CM

## 2022-09-22 PROCEDURE — 73721 MRI JNT OF LWR EXTRE W/O DYE: CPT | Mod: 26,LT,, | Performed by: RADIOLOGY

## 2022-09-22 PROCEDURE — 73721 MRI JNT OF LWR EXTRE W/O DYE: CPT | Mod: TC,LT

## 2022-09-22 PROCEDURE — 73721 MRI KNEE WITHOUT CONTRAST LEFT: ICD-10-PCS | Mod: 26,LT,, | Performed by: RADIOLOGY

## 2022-09-23 ENCOUNTER — TELEPHONE (OUTPATIENT)
Dept: SPORTS MEDICINE | Facility: CLINIC | Age: 67
End: 2022-09-23
Payer: COMMERCIAL

## 2022-09-23 NOTE — TELEPHONE ENCOUNTER
I spoke with patient about her MRI results. She would like to proceed with surgery. She will check her calendar and call the office with dates:      MRI Left Knee:  Posterior horn/ root medial meniscal tear, as above.  Cartilage loss in the patellofemoral and medial compartments, as above.  Small joint effusion and bakers cyst.         ASSESSMENT:    Left Knee Meniscus tear.      she would benefit from knee arthroscopy, possible plica excision, possible chondroplasty with possible meniscectomy given the above.     PLAN: We have discussed the surgery and recovery of arthroscopic knee surgery. she understands that there may be limited weightbearing up to several weeks after surgery depending on procedures that are performed at the time of surgery.    The spectrum of treatment options were discussed with the patient, including nonoperative and operative options.  After thorough discussion, the patient has elected to undergo surgical treatment to include:  left   a. Knee arthroscopic medial meniscectomy      b. Knee arthroscopic possible plica excision   c. Knee arthroscopic possible chondroplasty    The details of the surgical procedure were explained, including the location of probable incisions and a description of likely hardware and/or grafts to be used.  The patient understands the likely convalescence after surgery.  Also, we have thoroughly discussed the risks, benefits and alternatives to surgery, including, but not limited to, the risk of infection, joint stiffness, blood clot (including DVT and/or pulmonary embolus), neurologic and vascular injury.  It was explained that, if tissue has been repaired or reconstructed, there is a chance of failure, which may require further management.

## 2022-09-26 ENCOUNTER — PATIENT MESSAGE (OUTPATIENT)
Dept: SPORTS MEDICINE | Facility: CLINIC | Age: 67
End: 2022-09-26
Payer: COMMERCIAL

## 2022-09-27 ENCOUNTER — TELEPHONE (OUTPATIENT)
Dept: SPORTS MEDICINE | Facility: CLINIC | Age: 67
End: 2022-09-27
Payer: COMMERCIAL

## 2022-09-27 DIAGNOSIS — S83.242A ACUTE MEDIAL MENISCUS TEAR OF LEFT KNEE, INITIAL ENCOUNTER: Primary | ICD-10-CM

## 2022-09-27 DIAGNOSIS — I10 ESSENTIAL HYPERTENSION: ICD-10-CM

## 2022-09-27 DIAGNOSIS — Z01.818 PRE-OP EXAMINATION: ICD-10-CM

## 2022-09-27 DIAGNOSIS — E11.22 TYPE 2 DIABETES MELLITUS WITH STAGE 3A CHRONIC KIDNEY DISEASE, WITHOUT LONG-TERM CURRENT USE OF INSULIN: Primary | ICD-10-CM

## 2022-09-27 DIAGNOSIS — N18.31 TYPE 2 DIABETES MELLITUS WITH STAGE 3A CHRONIC KIDNEY DISEASE, WITHOUT LONG-TERM CURRENT USE OF INSULIN: Primary | ICD-10-CM

## 2022-09-27 DIAGNOSIS — M67.50 PLICA SYNDROME: ICD-10-CM

## 2022-09-27 DIAGNOSIS — M94.262 CHONDROMALACIA OF LEFT KNEE: ICD-10-CM

## 2022-09-27 NOTE — TELEPHONE ENCOUNTER
I spoke to Ms. Johnson and scheduled her surgery for 10/4/22 and informed her that she needed to work on obtaining her PCP clearance now.

## 2022-09-28 ENCOUNTER — LAB VISIT (OUTPATIENT)
Dept: LAB | Facility: HOSPITAL | Age: 67
End: 2022-09-28
Attending: INTERNAL MEDICINE
Payer: COMMERCIAL

## 2022-09-28 ENCOUNTER — HOSPITAL ENCOUNTER (OUTPATIENT)
Dept: CARDIOLOGY | Facility: CLINIC | Age: 67
Discharge: HOME | End: 2022-09-28
Payer: COMMERCIAL

## 2022-09-28 DIAGNOSIS — I10 ESSENTIAL HYPERTENSION: ICD-10-CM

## 2022-09-28 DIAGNOSIS — Z01.818 PRE-OP EXAMINATION: ICD-10-CM

## 2022-09-28 DIAGNOSIS — N18.31 TYPE 2 DIABETES MELLITUS WITH STAGE 3A CHRONIC KIDNEY DISEASE, WITHOUT LONG-TERM CURRENT USE OF INSULIN: ICD-10-CM

## 2022-09-28 DIAGNOSIS — E11.22 TYPE 2 DIABETES MELLITUS WITH STAGE 3A CHRONIC KIDNEY DISEASE, WITHOUT LONG-TERM CURRENT USE OF INSULIN: ICD-10-CM

## 2022-09-28 LAB
ANION GAP SERPL CALC-SCNC: 12 MMOL/L (ref 8–16)
BASOPHILS # BLD AUTO: 0.02 K/UL (ref 0–0.2)
BASOPHILS NFR BLD: 0.4 % (ref 0–1.9)
BUN SERPL-MCNC: 32 MG/DL (ref 8–23)
CALCIUM SERPL-MCNC: 9.6 MG/DL (ref 8.7–10.5)
CHLORIDE SERPL-SCNC: 105 MMOL/L (ref 95–110)
CO2 SERPL-SCNC: 21 MMOL/L (ref 23–29)
CREAT SERPL-MCNC: 1.5 MG/DL (ref 0.5–1.4)
DIFFERENTIAL METHOD: ABNORMAL
EOSINOPHIL # BLD AUTO: 0.1 K/UL (ref 0–0.5)
EOSINOPHIL NFR BLD: 1.3 % (ref 0–8)
ERYTHROCYTE [DISTWIDTH] IN BLOOD BY AUTOMATED COUNT: 14.8 % (ref 11.5–14.5)
EST. GFR  (NO RACE VARIABLE): 38 ML/MIN/1.73 M^2
ESTIMATED AVG GLUCOSE: 160 MG/DL (ref 68–131)
GLUCOSE SERPL-MCNC: 144 MG/DL (ref 70–110)
HBA1C MFR BLD: 7.2 % (ref 4–5.6)
HCT VFR BLD AUTO: 41.5 % (ref 37–48.5)
HGB BLD-MCNC: 13 G/DL (ref 12–16)
IMM GRANULOCYTES # BLD AUTO: 0.02 K/UL (ref 0–0.04)
IMM GRANULOCYTES NFR BLD AUTO: 0.4 % (ref 0–0.5)
LYMPHOCYTES # BLD AUTO: 1.6 K/UL (ref 1–4.8)
LYMPHOCYTES NFR BLD: 30.1 % (ref 18–48)
MCH RBC QN AUTO: 28.2 PG (ref 27–31)
MCHC RBC AUTO-ENTMCNC: 31.3 G/DL (ref 32–36)
MCV RBC AUTO: 90 FL (ref 82–98)
MONOCYTES # BLD AUTO: 0.6 K/UL (ref 0.3–1)
MONOCYTES NFR BLD: 11.5 % (ref 4–15)
NEUTROPHILS # BLD AUTO: 3 K/UL (ref 1.8–7.7)
NEUTROPHILS NFR BLD: 56.3 % (ref 38–73)
NRBC BLD-RTO: 0 /100 WBC
PLATELET # BLD AUTO: 246 K/UL (ref 150–450)
PMV BLD AUTO: 10.6 FL (ref 9.2–12.9)
POTASSIUM SERPL-SCNC: 4.6 MMOL/L (ref 3.5–5.1)
RBC # BLD AUTO: 4.61 M/UL (ref 4–5.4)
SODIUM SERPL-SCNC: 138 MMOL/L (ref 136–145)
WBC # BLD AUTO: 5.38 K/UL (ref 3.9–12.7)

## 2022-09-28 PROCEDURE — 83036 HEMOGLOBIN GLYCOSYLATED A1C: CPT | Performed by: INTERNAL MEDICINE

## 2022-09-28 PROCEDURE — 93010 EKG 12-LEAD: ICD-10-PCS | Mod: S$GLB,,, | Performed by: INTERNAL MEDICINE

## 2022-09-28 PROCEDURE — 93005 ELECTROCARDIOGRAM TRACING: CPT | Mod: S$GLB,,, | Performed by: INTERNAL MEDICINE

## 2022-09-28 PROCEDURE — 80048 BASIC METABOLIC PNL TOTAL CA: CPT | Performed by: INTERNAL MEDICINE

## 2022-09-28 PROCEDURE — 36415 COLL VENOUS BLD VENIPUNCTURE: CPT | Performed by: INTERNAL MEDICINE

## 2022-09-28 PROCEDURE — 93010 ELECTROCARDIOGRAM REPORT: CPT | Mod: S$GLB,,, | Performed by: INTERNAL MEDICINE

## 2022-09-28 PROCEDURE — 85025 COMPLETE CBC W/AUTO DIFF WBC: CPT | Performed by: INTERNAL MEDICINE

## 2022-09-28 PROCEDURE — 93005 EKG 12-LEAD: ICD-10-PCS | Mod: S$GLB,,, | Performed by: INTERNAL MEDICINE

## 2022-09-29 ENCOUNTER — PATIENT MESSAGE (OUTPATIENT)
Dept: PREADMISSION TESTING | Facility: HOSPITAL | Age: 67
End: 2022-09-29
Payer: COMMERCIAL

## 2022-09-29 ENCOUNTER — OFFICE VISIT (OUTPATIENT)
Dept: INTERNAL MEDICINE | Facility: CLINIC | Age: 67
End: 2022-09-29
Payer: COMMERCIAL

## 2022-09-29 ENCOUNTER — CLINICAL SUPPORT (OUTPATIENT)
Dept: REHABILITATION | Facility: HOSPITAL | Age: 67
End: 2022-09-29
Payer: COMMERCIAL

## 2022-09-29 VITALS
BODY MASS INDEX: 43.6 KG/M2 | HEIGHT: 67 IN | HEART RATE: 86 BPM | OXYGEN SATURATION: 96 % | DIASTOLIC BLOOD PRESSURE: 60 MMHG | SYSTOLIC BLOOD PRESSURE: 98 MMHG | WEIGHT: 277.75 LBS

## 2022-09-29 DIAGNOSIS — M25.511 CHRONIC RIGHT SHOULDER PAIN: ICD-10-CM

## 2022-09-29 DIAGNOSIS — R29.898 DECREASED STRENGTH OF UPPER EXTREMITY: ICD-10-CM

## 2022-09-29 DIAGNOSIS — E11.22 TYPE 2 DIABETES MELLITUS WITH STAGE 3B CHRONIC KIDNEY DISEASE, WITHOUT LONG-TERM CURRENT USE OF INSULIN: Primary | ICD-10-CM

## 2022-09-29 DIAGNOSIS — G89.29 CHRONIC RIGHT SHOULDER PAIN: ICD-10-CM

## 2022-09-29 DIAGNOSIS — I10 ESSENTIAL HYPERTENSION: ICD-10-CM

## 2022-09-29 DIAGNOSIS — N18.32 TYPE 2 DIABETES MELLITUS WITH STAGE 3B CHRONIC KIDNEY DISEASE, WITHOUT LONG-TERM CURRENT USE OF INSULIN: Primary | ICD-10-CM

## 2022-09-29 DIAGNOSIS — M75.01 ADHESIVE CAPSULITIS OF RIGHT SHOULDER: ICD-10-CM

## 2022-09-29 DIAGNOSIS — Z01.818 PRE-OP EXAMINATION: ICD-10-CM

## 2022-09-29 PROCEDURE — 99214 OFFICE O/P EST MOD 30 MIN: CPT | Mod: S$GLB,,, | Performed by: INTERNAL MEDICINE

## 2022-09-29 PROCEDURE — 1160F RVW MEDS BY RX/DR IN RCRD: CPT | Mod: CPTII,S$GLB,, | Performed by: INTERNAL MEDICINE

## 2022-09-29 PROCEDURE — 3288F PR FALLS RISK ASSESSMENT DOCUMENTED: ICD-10-PCS | Mod: CPTII,S$GLB,, | Performed by: INTERNAL MEDICINE

## 2022-09-29 PROCEDURE — 4010F PR ACE/ARB THEARPY RXD/TAKEN: ICD-10-PCS | Mod: CPTII,S$GLB,, | Performed by: INTERNAL MEDICINE

## 2022-09-29 PROCEDURE — 1159F MED LIST DOCD IN RCRD: CPT | Mod: CPTII,S$GLB,, | Performed by: INTERNAL MEDICINE

## 2022-09-29 PROCEDURE — 1101F PR PT FALLS ASSESS DOC 0-1 FALLS W/OUT INJ PAST YR: ICD-10-PCS | Mod: CPTII,S$GLB,, | Performed by: INTERNAL MEDICINE

## 2022-09-29 PROCEDURE — 3051F HG A1C>EQUAL 7.0%<8.0%: CPT | Mod: CPTII,S$GLB,, | Performed by: INTERNAL MEDICINE

## 2022-09-29 PROCEDURE — 3078F DIAST BP <80 MM HG: CPT | Mod: CPTII,S$GLB,, | Performed by: INTERNAL MEDICINE

## 2022-09-29 PROCEDURE — 4010F ACE/ARB THERAPY RXD/TAKEN: CPT | Mod: CPTII,S$GLB,, | Performed by: INTERNAL MEDICINE

## 2022-09-29 PROCEDURE — 1126F PR PAIN SEVERITY QUANTIFIED, NO PAIN PRESENT: ICD-10-PCS | Mod: CPTII,S$GLB,, | Performed by: INTERNAL MEDICINE

## 2022-09-29 PROCEDURE — 3078F PR MOST RECENT DIASTOLIC BLOOD PRESSURE < 80 MM HG: ICD-10-PCS | Mod: CPTII,S$GLB,, | Performed by: INTERNAL MEDICINE

## 2022-09-29 PROCEDURE — 99999 PR PBB SHADOW E&M-EST. PATIENT-LVL IV: CPT | Mod: PBBFAC,,, | Performed by: INTERNAL MEDICINE

## 2022-09-29 PROCEDURE — 3074F SYST BP LT 130 MM HG: CPT | Mod: CPTII,S$GLB,, | Performed by: INTERNAL MEDICINE

## 2022-09-29 PROCEDURE — 97161 PT EVAL LOW COMPLEX 20 MIN: CPT

## 2022-09-29 PROCEDURE — 3074F PR MOST RECENT SYSTOLIC BLOOD PRESSURE < 130 MM HG: ICD-10-PCS | Mod: CPTII,S$GLB,, | Performed by: INTERNAL MEDICINE

## 2022-09-29 PROCEDURE — 1101F PT FALLS ASSESS-DOCD LE1/YR: CPT | Mod: CPTII,S$GLB,, | Performed by: INTERNAL MEDICINE

## 2022-09-29 PROCEDURE — 3008F BODY MASS INDEX DOCD: CPT | Mod: CPTII,S$GLB,, | Performed by: INTERNAL MEDICINE

## 2022-09-29 PROCEDURE — 3008F PR BODY MASS INDEX (BMI) DOCUMENTED: ICD-10-PCS | Mod: CPTII,S$GLB,, | Performed by: INTERNAL MEDICINE

## 2022-09-29 PROCEDURE — 1159F PR MEDICATION LIST DOCUMENTED IN MEDICAL RECORD: ICD-10-PCS | Mod: CPTII,S$GLB,, | Performed by: INTERNAL MEDICINE

## 2022-09-29 PROCEDURE — 3288F FALL RISK ASSESSMENT DOCD: CPT | Mod: CPTII,S$GLB,, | Performed by: INTERNAL MEDICINE

## 2022-09-29 PROCEDURE — 1160F PR REVIEW ALL MEDS BY PRESCRIBER/CLIN PHARMACIST DOCUMENTED: ICD-10-PCS | Mod: CPTII,S$GLB,, | Performed by: INTERNAL MEDICINE

## 2022-09-29 PROCEDURE — 99214 PR OFFICE/OUTPT VISIT, EST, LEVL IV, 30-39 MIN: ICD-10-PCS | Mod: S$GLB,,, | Performed by: INTERNAL MEDICINE

## 2022-09-29 PROCEDURE — 99999 PR PBB SHADOW E&M-EST. PATIENT-LVL IV: ICD-10-PCS | Mod: PBBFAC,,, | Performed by: INTERNAL MEDICINE

## 2022-09-29 PROCEDURE — 97110 THERAPEUTIC EXERCISES: CPT

## 2022-09-29 PROCEDURE — 1126F AMNT PAIN NOTED NONE PRSNT: CPT | Mod: CPTII,S$GLB,, | Performed by: INTERNAL MEDICINE

## 2022-09-29 PROCEDURE — 3051F PR MOST RECENT HEMOGLOBIN A1C LEVEL 7.0 - < 8.0%: ICD-10-PCS | Mod: CPTII,S$GLB,, | Performed by: INTERNAL MEDICINE

## 2022-09-29 NOTE — PROGRESS NOTES
HISTORY:  Type 2 diabetes with peripheral neuropathy  Hypertension.  Hyperlipidemia.  Helicobacter pylori which has been treated.  Obesity with gastric bypass surgery.  Left foot ulcer, fifth metatarsal, debridement     SOCIAL HISTORY:  Tobacco and alcohol use - none.        Medications  Benazepril 40 mg  Rosuvastatin 20 mg  Metformin 500 mg  Two tablets bid  Jardiance 10 mg    Component      Latest Ref Rng & Units 9/28/2022   WBC      3.90 - 12.70 K/uL 5.38   RBC      4.00 - 5.40 M/uL 4.61   Hemoglobin      12.0 - 16.0 g/dL 13.0   Hematocrit      37.0 - 48.5 % 41.5   MCV      82 - 98 fL 90   MCH      27.0 - 31.0 pg 28.2   MCHC      32.0 - 36.0 g/dL 31.3 (L)   RDW      11.5 - 14.5 % 14.8 (H)   Platelets      150 - 450 K/uL 246   MPV      9.2 - 12.9 fL 10.6   Immature Granulocytes      0.0 - 0.5 % 0.4   Gran # (ANC)      1.8 - 7.7 K/uL 3.0   Immature Grans (Abs)      0.00 - 0.04 K/uL 0.02   Lymph #      1.0 - 4.8 K/uL 1.6   Mono #      0.3 - 1.0 K/uL 0.6   Eos #      0.0 - 0.5 K/uL 0.1   Baso #      0.00 - 0.20 K/uL 0.02   nRBC      0 /100 WBC 0   Gran %      38.0 - 73.0 % 56.3   Lymph %      18.0 - 48.0 % 30.1   Mono %      4.0 - 15.0 % 11.5   Eosinophil %      0.0 - 8.0 % 1.3   Basophil %      0.0 - 1.9 % 0.4   Differential Method       Automated   Sodium      136 - 145 mmol/L 138   Potassium      3.5 - 5.1 mmol/L 4.6   Chloride      95 - 110 mmol/L 105   CO2      23 - 29 mmol/L 21 (L)   Glucose      70 - 110 mg/dL 144 (H)   BUN      8 - 23 mg/dL 32 (H)   Creatinine      0.5 - 1.4 mg/dL 1.5 (H)   Calcium      8.7 - 10.5 mg/dL 9.6   Anion Gap      8 - 16 mmol/L 12   eGFR      >60 mL/min/1.73 m:2 38.0 (A)   Hemoglobin A1C External      4.0 - 5.6 % 7.2 (H)   Estimated Avg Glucose      68 - 131 mg/dL 160 (H)       67-year-old female  Internal medicine evaluation and preop evaluation.  Scheduled for arthroscopic knee surgery, left knee with meniscectomy, chondroplasty, synovectomy, October 4th    Lab studies noted  above, ECG normal sinus rhythm    Reports no chest pain, palpitations, shortness of breath, abdominal pain.  Regular bowel function.  There is no difficulty urinating although is frequent    From the last visit chlorthalidone was discontinue and in rosuvastatin was added in place of atorvastatin    Reason why chlorthalidone was discontinued as was because creatinine was up to 1.5    On labs creatinine is still at 1.5 and consistent with chronic kidney disease stage IIIB.  She admits to having proper hydration.  She does not check her blood glucose.  Hemoglobin A1c is slightly up at 7.2      Examination   Weight 277   Pulse 80   Blood pressure 100/62   Chest clear breath sounds  Heart regular rate rhythm  no murmurs gallops  Abdominal exam active bowel sounds soft nontender no hepatosplenomegaly abdominal masses  2+ carotid pulses no bruits, 2+ pedal pulses    Impression   Type 2 diabetes with chronic kidney disease stage IIIB  Hypertension   Preop evaluation    Plan   Patient is cleared for anesthesia and surgery.  She was instructed to hold benazepril the morning of surgery.  Instructed to hold metformin the night before and morning of surgery.  Instructed hold Jardiance 3 days prior to procedure    Will also put in referrals to endocrinology and nephrology

## 2022-09-29 NOTE — ANESTHESIA PAT ROS NOTE
09/29/2022  Meryl Johnson is a 67 y.o., female.  All information is gathered per Chart review via Epic system only.      Pre-op Assessment          Review of Systems  Anesthesia Hx:  No problems with previous Anesthesia                Social:  Non-Smoker, No Alcohol Use       Hematology/Oncology:       -- Anemia:                                  Cardiovascular:     Hypertension           hyperlipidemia                             Hepatic/GI:        H. Pylori Infection          Endocrine:  Diabetes, type 2   A1C 7.2           Past Surgical History:   Procedure Laterality Date    ESOPHAGOGASTRODUODENOSCOPY N/A 8/8/2018    Procedure: EGD (ESOPHAGOGASTRODUODENOSCOPY);  Surgeon: Darius Gerardo MD;  Location: [facility] ([address] [place] [address]);  Service: Endoscopy;  Laterality: N/A;    GASTRIC BYPASS         Anesthesia Assessment: Preoperative EQUATION    Planned Procedure: Procedure(s) (LRB):  ARTHROSCOPY, KNEE, WITH MENISCECTOMY (Left)  CHONDROPLASTY, KNEE (Left)  SYNOVECTOMY, KNEE (Left)  Requested Anesthesia Type:General  Surgeon: Brigette Babin MD  Service: Orthopedics  Known or anticipated Date of Surgery:10/4/2022      Previous anesthesia records:No problems    Last PCP note: 3-6 months ago , within [facility]   Subspecialty notes: Hematology/Oncology  Pending PCP Clearance    EKG 9/2022  Test Reason : E11.22,N18.31,I10,Z01.818,     Vent. Rate : 091 BPM     Atrial Rate : 091 BPM      P-R Int : 170 ms          QRS Dur : 084 ms       QT Int : 354 ms       P-R-T Axes : 070 089 049 degrees      QTc Int : 435 ms     Normal sinus rhythm   Normal ECG   When compared with ECG of 07-AUG-2017 10:23,   Nonspecific T wave abnormality no longer evident in Lateral leads   Confirmed by Richard TATE, Rl WONG ([id number]) on 9/28/2022 2:31:11 PM    Stress Echo 6/2015     EKG Conclusions:     1. The EKG portion of this study is negative for ischemia  "at a peak heart rate of 146 bpm (95% of predicted).   2. Blood pressure remained stable throughout the protocol  (Presenting BP: 137/88 Peak BP: 135/78).   3. The following arrhythmias were present: rare PACs.   4. There were no symptoms of chest discomfort or significant dyspnea throughout the protocol.     5'6"  279 lbs  45.03 BMI  Vaccinated  "

## 2022-09-29 NOTE — PLAN OF CARE
[facility]  Physical Therapy Initial Evaluation    Date: 9/29/2022   Name: Meryl Johnson  Clinic Number: [id number]    Therapy Diagnosis:   Encounter Diagnoses   Name Primary?    Chronic right shoulder pain     Adhesive capsulitis of right shoulder     Decreased strength of upper extremity      Physician: Russell Honeycutt III, *    Physician Orders: PT Eval and Treat   Medical Diagnosis from Referral:   Diagnosis   M25.511,G89.29 (ICD-10-CM) - Chronic right shoulder pain   M75.01 (ICD-10-CM) - Adhesive capsulitis of right shoulder     Evaluation Date: 9/29/2022  Authorization Period Expiration: 12/31/2022  Plan of Care Expiration: 12/08/2022  Visit # / Visits authorized: 1/ 20    Time In: 140pm  Time Out: 235pm  Total Appointment Time (timed & untimed codes): 55 minutes    Precautions: Standard    Subjective   Date of onset: 2+ years  History of current condition - Meryl reports: pain in her right shoulder that has gotten worse over the past two years. She states she received therapy two years ago for a similar issue in the same shoulder that she feels she benefited a lot from therapy. She states she has not kept up with her exercises since then, and therefore is having similar issues.      Medical History:   Past Medical History:   Diagnosis Date    Anemia     Diabetes mellitus type II     H. pylori infection     Hyperlipidemia     Hypertension     Unspecified vitamin D deficiency 4/24/2013       Surgical History:   Meryl Johnson  has a past surgical history that includes Gastric bypass and Esophagogastroduodenoscopy (N/A, 8/8/2018).    Medications:   Meryl has a current medication list which includes the following prescription(s): benazepril, blood sugar diagnostic, jardiance, ergocalciferol, lancets, metformin, and rosuvastatin.    Allergies:   Review of patient's allergies indicates:   Allergen Reactions    Codeine Nausea Only    Vicodin [hydrocodone-acetaminophen] Nausea Only         Imaging, Xray: clear    Prior Therapy: yes  Occupation: Desk job  Prior Level of Function: Able to complete her workday and perform activities of daily living without pain  Current Level of Function: Pain limiting her activities of daily living and work    Pain:  Current 0/10, worst 6/10, best 0/10   Location: right shoulder    Description: Aching and Burning  Aggravating Factors: Reaching overhead  Easing Factors: rest    Pts goals: Return to pain free activities of daily living and work related activities.    Objective     Posture:    Forward head posture, rounded shoulders    Passive Range of Motion:   Shoulder Left Right   Flexion 180 170 with pain   Abduction 180 160 with pain      Active Range of Motion:   Shoulder Left Right   Flexion 180 91   Abduction 180 88     Internal rotation reaching up her back:  Left- T12 level  Right- Unable to fully reach behind her back, hand at posterior iliac crest    External rotation reaching behind her head:  Left: able to reach her cervical spine  Right: able to reach posterior aspect of head    Upper Extremity Strength  (R) UE   (L) UE     Shoulder ER 4+/5 Shoulder ER 4/5   Shoulder IR 4-/5 Shoulder IR 4-/5      Special Tests:        Rotator Cuff:       Right Left   Infraspinatus muscle Test - -         Labrum:       Right Left   Sonoma's Test - -   Biceps load 1 and 2 - -        Joint Mobility:   Right posterior joint mobility limited    Palpation:   Tender over the infraspinatus muscle belly and the biceps tendon        Limitation/Restriction for FOTO Shoulder Survey    Therapist reviewed FOTO scores for Meryl Johnson on 9/29/2022.   FOTO documents entered into [employer] - see Media section.    Limitation Score: 49%         TREATMENT   Treatment Time In: 225pm   Treatment Time Out: 235pm  Total Treatment time (time-based codes) separate from Evaluation: 10 minutes    Meryl received therapeutic exercises to develop strength, endurance, ROM, flexibility and posture  for 10 minutes including:  Internal rotation walk outs  External rotation walk outs  Scapular retractions  Scaption    Meryl received the following manual therapy techniques: Joint mobilizations, Myofacial release and Soft tissue Mobilization were applied to the: (R/L) shoulder for 0 minutes, including:      Home Exercises and Patient Education Provided    Education provided:   - Home Exercise Program, plan of care, nature of condition    Written Home Exercises Provided: yes.  Exercises were reviewed and Meryl was able to demonstrate them prior to the end of the session.  Meryl demonstrated good  understanding of the education provided.     See EMR under Patient Instructions for exercises provided 9/29/2022.    Assessment   Meryl is a 67 y.o. female referred to outpatient Physical Therapy with a medical diagnosis of Chronic right shoulder pain. Pt presents with limited and/or painful flexion, abduction, and internal rotation ROM, internal rotation weakness, tenderness/tightness of infraspinatus, and functional limitations of pain and inability to reach overhead. Signs and symptoms are consistent with a tight posterior capsule with weak subscapularis muscle. Meryl would benefit from strengthening of infraspinatus and subscapularis in addition to stretching of the posterior capsule.    Pt prognosis is Good.   Pt will benefit from skilled outpatient Physical Therapy to address the deficits stated above and in the chart below, provide pt/family education, and to maximize pt's level of independence.     Plan of care discussed with patient: Yes  Pt's spiritual, cultural and educational needs considered and patient is agreeable to the plan of care and goals as stated below:     Anticipated Barriers for therapy: Obesity, meniscus tear, chronicity    Medical Necessity is demonstrated by the following  History  Co-morbidities and personal factors that may impact the plan of care Co-morbidities:   diabetes, high BMI, and  HTN    Personal Factors:   age  lifestyle     moderate   Examination  Body Structures and Functions, activity limitations and participation restrictions that may impact the plan of care Body Regions:   upper extremities    Body Systems:    ROM  strength  motor control    Participation Restrictions:   none    Activity limitations:   Learning and applying knowledge  no deficits    General Tasks and Commands  no deficits    Communication  no deficits    Mobility  no deficits    Self care  no deficits    Domestic Life  no deficits    Interactions/Relationships  no deficits    Life Areas  no deficits    Community and Social Life  no deficits         low   Clinical Presentation stable and uncomplicated low   Decision Making/ Complexity Score: low       GOALS:   Short Term Goals:  4 weeks weeks  1.Report decreased shoulder pain < / =  5/10 at worst to increase tolerance for work   2. Increase PROM full pain free   3. Increased strength by 1/3 MMT grade in shoulder strength to increase tolerance for ADL and work activities.  4. Pt to tolerate HEP to improve ROM and independence with ADL's     Long Term Goals: 8 weeks  1.Report decreased shoulder pain  < / =  2/10 at worst  to increase tolerance for work  2.Increase AROM to full pain free  3.Increase strength to >/= 4/5 in shoulder strength to increase tolerance for ADL and work activities.  4. Pt goal: Return to pain free activities of daily living and work related activities.  5. Pt will have improved FOTO score of </= 35% for functional improvements in activities of daily living.    Plan   Plan of care Certification: 9/29/2022 to 12/8/2022.    Outpatient Physical Therapy 2 times weekly for 10 weeks to include the following interventions: Manual Therapy, Neuromuscular Re-ed, Patient Education, Therapeutic Activities, and Therapeutic Exercise.     Radha Sifuentes, PT

## 2022-09-30 ENCOUNTER — OFFICE VISIT (OUTPATIENT)
Dept: SPORTS MEDICINE | Facility: CLINIC | Age: 67
End: 2022-09-30
Payer: COMMERCIAL

## 2022-09-30 VITALS
BODY MASS INDEX: 43.47 KG/M2 | DIASTOLIC BLOOD PRESSURE: 60 MMHG | WEIGHT: 277 LBS | SYSTOLIC BLOOD PRESSURE: 98 MMHG | HEIGHT: 67 IN

## 2022-09-30 DIAGNOSIS — S83.242A ACUTE MEDIAL MENISCUS TEAR OF LEFT KNEE, INITIAL ENCOUNTER: Primary | ICD-10-CM

## 2022-09-30 PROCEDURE — 3051F HG A1C>EQUAL 7.0%<8.0%: CPT | Mod: CPTII,S$GLB,, | Performed by: PHYSICIAN ASSISTANT

## 2022-09-30 PROCEDURE — 4010F PR ACE/ARB THEARPY RXD/TAKEN: ICD-10-PCS | Mod: CPTII,S$GLB,, | Performed by: PHYSICIAN ASSISTANT

## 2022-09-30 PROCEDURE — 3078F DIAST BP <80 MM HG: CPT | Mod: CPTII,S$GLB,, | Performed by: PHYSICIAN ASSISTANT

## 2022-09-30 PROCEDURE — 1160F RVW MEDS BY RX/DR IN RCRD: CPT | Mod: CPTII,S$GLB,, | Performed by: PHYSICIAN ASSISTANT

## 2022-09-30 PROCEDURE — 1101F PR PT FALLS ASSESS DOC 0-1 FALLS W/OUT INJ PAST YR: ICD-10-PCS | Mod: CPTII,S$GLB,, | Performed by: PHYSICIAN ASSISTANT

## 2022-09-30 PROCEDURE — 99999 PR PBB SHADOW E&M-EST. PATIENT-LVL III: CPT | Mod: PBBFAC,,, | Performed by: PHYSICIAN ASSISTANT

## 2022-09-30 PROCEDURE — 1125F PR PAIN SEVERITY QUANTIFIED, PAIN PRESENT: ICD-10-PCS | Mod: CPTII,S$GLB,, | Performed by: PHYSICIAN ASSISTANT

## 2022-09-30 PROCEDURE — 3074F SYST BP LT 130 MM HG: CPT | Mod: CPTII,S$GLB,, | Performed by: PHYSICIAN ASSISTANT

## 2022-09-30 PROCEDURE — 99499 UNLISTED E&M SERVICE: CPT | Mod: S$GLB,,, | Performed by: PHYSICIAN ASSISTANT

## 2022-09-30 PROCEDURE — 4010F ACE/ARB THERAPY RXD/TAKEN: CPT | Mod: CPTII,S$GLB,, | Performed by: PHYSICIAN ASSISTANT

## 2022-09-30 PROCEDURE — 99499 NO LOS: ICD-10-PCS | Mod: S$GLB,,, | Performed by: PHYSICIAN ASSISTANT

## 2022-09-30 PROCEDURE — 3008F PR BODY MASS INDEX (BMI) DOCUMENTED: ICD-10-PCS | Mod: CPTII,S$GLB,, | Performed by: PHYSICIAN ASSISTANT

## 2022-09-30 PROCEDURE — 1101F PT FALLS ASSESS-DOCD LE1/YR: CPT | Mod: CPTII,S$GLB,, | Performed by: PHYSICIAN ASSISTANT

## 2022-09-30 PROCEDURE — 1160F PR REVIEW ALL MEDS BY PRESCRIBER/CLIN PHARMACIST DOCUMENTED: ICD-10-PCS | Mod: CPTII,S$GLB,, | Performed by: PHYSICIAN ASSISTANT

## 2022-09-30 PROCEDURE — 1159F PR MEDICATION LIST DOCUMENTED IN MEDICAL RECORD: ICD-10-PCS | Mod: CPTII,S$GLB,, | Performed by: PHYSICIAN ASSISTANT

## 2022-09-30 PROCEDURE — 3008F BODY MASS INDEX DOCD: CPT | Mod: CPTII,S$GLB,, | Performed by: PHYSICIAN ASSISTANT

## 2022-09-30 PROCEDURE — 3288F FALL RISK ASSESSMENT DOCD: CPT | Mod: CPTII,S$GLB,, | Performed by: PHYSICIAN ASSISTANT

## 2022-09-30 PROCEDURE — 3051F PR MOST RECENT HEMOGLOBIN A1C LEVEL 7.0 - < 8.0%: ICD-10-PCS | Mod: CPTII,S$GLB,, | Performed by: PHYSICIAN ASSISTANT

## 2022-09-30 PROCEDURE — 3074F PR MOST RECENT SYSTOLIC BLOOD PRESSURE < 130 MM HG: ICD-10-PCS | Mod: CPTII,S$GLB,, | Performed by: PHYSICIAN ASSISTANT

## 2022-09-30 PROCEDURE — 1159F MED LIST DOCD IN RCRD: CPT | Mod: CPTII,S$GLB,, | Performed by: PHYSICIAN ASSISTANT

## 2022-09-30 PROCEDURE — 3288F PR FALLS RISK ASSESSMENT DOCUMENTED: ICD-10-PCS | Mod: CPTII,S$GLB,, | Performed by: PHYSICIAN ASSISTANT

## 2022-09-30 PROCEDURE — 1125F AMNT PAIN NOTED PAIN PRSNT: CPT | Mod: CPTII,S$GLB,, | Performed by: PHYSICIAN ASSISTANT

## 2022-09-30 PROCEDURE — 99999 PR PBB SHADOW E&M-EST. PATIENT-LVL III: ICD-10-PCS | Mod: PBBFAC,,, | Performed by: PHYSICIAN ASSISTANT

## 2022-09-30 PROCEDURE — 3078F PR MOST RECENT DIASTOLIC BLOOD PRESSURE < 80 MM HG: ICD-10-PCS | Mod: CPTII,S$GLB,, | Performed by: PHYSICIAN ASSISTANT

## 2022-10-03 ENCOUNTER — TELEPHONE (OUTPATIENT)
Dept: SPORTS MEDICINE | Facility: CLINIC | Age: 67
End: 2022-10-03
Payer: COMMERCIAL

## 2022-10-03 RX ORDER — OXYCODONE AND ACETAMINOPHEN 5; 325 MG/1; MG/1
1 TABLET ORAL
Qty: 12 TABLET | Refills: 0 | Status: SHIPPED | OUTPATIENT
Start: 2022-10-03 | End: 2023-06-08

## 2022-10-03 RX ORDER — TRAMADOL HYDROCHLORIDE 50 MG/1
50-100 TABLET ORAL EVERY 6 HOURS PRN
Qty: 21 TABLET | Refills: 0 | Status: SHIPPED | OUTPATIENT
Start: 2022-10-03 | End: 2023-06-08

## 2022-10-03 RX ORDER — ASPIRIN 81 MG/1
81 TABLET ORAL DAILY
Qty: 28 TABLET | Refills: 0 | COMMUNITY
Start: 2022-10-03 | End: 2023-10-03

## 2022-10-03 RX ORDER — PROMETHAZINE HYDROCHLORIDE 25 MG/1
25 TABLET ORAL EVERY 6 HOURS PRN
Qty: 12 TABLET | Refills: 0 | Status: SHIPPED | OUTPATIENT
Start: 2022-10-03 | End: 2023-06-08

## 2022-10-03 RX ORDER — SODIUM CHLORIDE 9 MG/ML
INJECTION, SOLUTION INTRAVENOUS CONTINUOUS
Status: CANCELLED | OUTPATIENT
Start: 2022-10-03

## 2022-10-03 NOTE — H&P
Meryl Johnson  is here for a completion of her perioperative paperwork. she  Is scheduled to undergo     left              a. Knee arthroscopic medial meniscectomy                 b. Knee arthroscopic possible plica excision              c. Knee arthroscopic possible chondroplasty on 10/4/22.      She is a healthy individual and does need clearance for this procedure which she has received from Dr. Martinez.     PAST MEDICAL HISTORY:   Past Medical History:   Diagnosis Date    Anemia     Diabetes mellitus type II     H. pylori infection     Hyperlipidemia     Hypertension     Unspecified vitamin D deficiency 4/24/2013     PAST SURGICAL HISTORY:   Past Surgical History:   Procedure Laterality Date    ESOPHAGOGASTRODUODENOSCOPY N/A 8/8/2018    Procedure: EGD (ESOPHAGOGASTRODUODENOSCOPY);  Surgeon: Darius Gerardo MD;  Location: [address]);  Service: Endoscopy;  Laterality: N/A;    GASTRIC BYPASS       FAMILY HISTORY:   Family History   Problem Relation Age of Onset    COPD Mother     Heart disease Mother     Cancer Father         liver    Heart disease Father     Cancer Sister     Diabetes Sister     Hyperlipidemia Sister     Hypothyroidism Sister     No Known Problems Brother     No Known Problems Maternal Aunt     No Known Problems Maternal Uncle     No Known Problems Paternal Aunt     No Known Problems Paternal Uncle     No Known Problems Maternal Grandmother     No Known Problems Maternal Grandfather     No Known Problems Paternal Grandmother     No Known Problems Paternal Grandfather     Amblyopia Neg Hx     Blindness Neg Hx     Cataracts Neg Hx     Glaucoma Neg Hx     Hypertension Neg Hx     Macular degeneration Neg Hx     Retinal detachment Neg Hx     Strabismus Neg Hx     Stroke Neg Hx     Thyroid disease Neg Hx     Breast cancer Neg Hx     Colon cancer Neg Hx     Ovarian cancer Neg Hx     Stomach cancer Neg Hx     Rectal cancer Neg Hx      SOCIAL HISTORY:   Social History     Socioeconomic History     Marital status: Single   Occupational History     Employer: [employer]   Tobacco Use    Smoking status: Never    Smokeless tobacco: Never   Substance and Sexual Activity    Alcohol use: No    Drug use: No       MEDICATIONS:   Current Outpatient Medications:     benazepriL (LOTENSIN) 40 MG tablet, Take 1 tablet (40 mg total) by mouth once daily., Disp: 90 tablet, Rfl: 2    blood sugar diagnostic (BLOOD GLUCOSE TEST) Strp, 1 strip by Misc.(Non-Drug; Combo Route) route 2 (two) times daily before meals. For mikki contour, Disp: 200 strip, Rfl: 3    empagliflozin (JARDIANCE) 10 mg tablet, Take 1 tablet (10 mg total) by mouth once daily., Disp: 90 tablet, Rfl: 3    ergocalciferol (VITAMIN D2) 50,000 unit Cap, Take 1 capsule (50,000 Units total) by mouth every 7 days., Disp: 12 capsule, Rfl: 1    lancets (ONE TOUCH DELICA LANCETS) 33 gauge Misc, 1 lancet by Misc.(Non-Drug; Combo Route) route 2 (two) times daily., Disp: 60 each, Rfl: 11    metFORMIN (GLUCOPHAGE) 500 MG tablet, Take 2 tablets (1,000 mg total) by mouth 2 (two) times daily with meals., Disp: 360 tablet, Rfl: 1    rosuvastatin (CRESTOR) 20 MG tablet, Take 1 tablet (20 mg total) by mouth once daily., Disp: 90 tablet, Rfl: 3    aspirin (ECOTRIN) 81 MG EC tablet, Take 1 tablet (81 mg total) by mouth once daily. For 4 weeks starting the day after surgery., Disp: 28 tablet, Rfl: 0    oxyCODONE-acetaminophen (PERCOCET) 5-325 mg per tablet, Take 1 tablet by mouth every 4 to 6 hours as needed for Pain., Disp: 12 tablet, Rfl: 0    promethazine (PHENERGAN) 25 MG tablet, Take 1 tablet (25 mg total) by mouth every 6 (six) hours as needed for Nausea., Disp: 12 tablet, Rfl: 0    traMADoL (ULTRAM) 50 mg tablet, Take 1-2 tablets ( mg total) by mouth every 6 (six) hours as needed for Pain., Disp: 21 tablet, Rfl: 0  ALLERGIES:   Review of patient's allergies indicates:   Allergen Reactions    Codeine Nausea Only    Vicodin [hydrocodone-acetaminophen]  "Nausea Only       VITAL SIGNS: BP 98/60   Ht 5' 7" (1.702 m)   Wt 125.6 kg (277 lb)   BMI 43.38 kg/m²      Risks, indications and benefits of the surgical procedure were discussed with the patient. All questions with regard to surgery, rehab, expected return to functional activities, activities of daily living and recreational endeavors were answered to her satisfaction.    It was explained to the patient that there may be an increase in surgical risks if the patient has certain co-morbidities such as but not limited to: Obesity, Cardiovascular issues (CHF, CAD, Arrhythmias), chronic pulmonary issues, previous or current neurovascular/neurological issues, previous strokes, diabetes mellitus, previous wound healing issues, previous wound or skin infections, PVD, clotting disorders, if the patient uses chronic steroids, if the patient takes or has immune compromising medications or diseases, or has previously or currently used tobacco products.     The patient verbalized that he/she does not have any additional clotting, bleeding, or blood disorders, other than what is list in her chart on today's review.     Then a brief history and physical exam were performed.    Review of Systems   Constitution: Negative. Negative for chills, fever and night sweats.   HENT: Negative for congestion and headaches.    Eyes: Negative for blurred vision, left vision loss and right vision loss.   Cardiovascular: Negative for chest pain and syncope.   Respiratory: Negative for cough and shortness of breath.    Endocrine: Negative for polydipsia, polyphagia and polyuria.   Hematologic/Lymphatic: Negative for bleeding problem. Does not bruise/bleed easily.   Skin: Negative for dry skin, itching and rash.   Musculoskeletal: Negative for falls and muscle weakness.   Gastrointestinal: Negative for abdominal pain and bowel incontinence.   Genitourinary: Negative for bladder incontinence and nocturia.   Neurological: Negative for " disturbances in coordination, loss of balance and seizures.   Psychiatric/Behavioral: Negative for depression. The patient does not have insomnia.    Allergic/Immunologic: Negative for hives and persistent infections.     PHYSICAL EXAM:  GEN: A&Ox3, WD WN NAD  HEENT: WNL  CHEST: CTAB, no W/R/R  HEART: RRR, no M/R/G  ABD: Soft, NT ND, BS x4 QUADS  MS; See Epic  NEURO: CN II-XII intact       The surgical consent was then reviewed with the patient, who agreed with all the contents of the consent form and it was signed. she was then given the surgery packet to bring with her to [facility] for the anesthesia portion of her perioperative paperwork (if needed)   For all physicians except for Dr. Sam, we will email and possibly fax the consent forms and booking sheets to [facility].    The patient was given the opportunity to ask questions about the surgical plan and consent form, and once no other questions were asked, I proceeded with the pre-op appointment.    PHYSICAL THERAPY:  She was also instructed regarding physical therapy and will begin on  POD1. She was given a copy of the original prescription to schedule. Another copy of this prescription was also faxed to [facility].    POST OP CARE:instructions were reviewed including care of the wound and dressing after surgery and when she can shower. Patient was told not sleep or lay on there surgical extremity following surgery as this could cause repair damage, tissue damage, or nerve injury.    An extensive amount of time was spent on discussion of the following information based on what type of surgery the patient was having. Patient expressed understanding of the material below:    Shoulder surgery or upper extremity surgery requiring post-op sling:  It was explained to the patient that they should remove their arm from the sling approximately 6 times per day to do full elbow ROM (flexion and extension) and full supination and pronation of the  elbow for approximately 5 minutes at a time to help prevent elbow stiffness, nerve pain or problems, or nerve injury. They were told to contact us if they begin having numbness and tingling of there surgical extremity that persists longer then 1 day without relief.     Extremity surgery requiring a splint:   It was explained to patient on how to properly elevated position there extremity to prevent pressure ulcers from occurring. I made sure that the patient understood that that surgical site may be numb following surgery and prevent them from feeling pressure pain that they would normal feel if a pressure injury was occurring. Pressure ulcers and there causes were discussed with the patient today.     Post-operative splint:  It was explain to the patient that they can contact us at anytime if they feel that there is a problem with their splint or under their splint that needs evaluation. If there is concern, questions, or discomfort with the splint then they can present to either our clinic or the [facility] ED for removal, evaluation, and replacement of the splint.    CRUTCHES OR WALKER: It was explained to the patient that if they are having a lower extremity surgery that they will require either a walker or crutches to ambulate safely with after surgery. It was explained that a cane or other assistive devices are not sufficient to safely ambulate with after surgery. I explained to the patient that I will place an order for them to receive either crutches or a walker after surgery to go home with. It was explained that if they have crutches or a walker at home already, that they are REQUIRED to bring them to the hospital on the day of surgery. It was explained that if they do not have them at the hospital on the day of surgery that they WILL be provided a new pair or crutches or a walker to go home with to ensure ambulation will be safe if the patient needs to stop somewhere on the way home.      PAIN  MANAGEMENT: Meryl Johnson was also given a pain management regime, which includes the option of getting a TENS unit given to her by [facility] along with the education required for its use. She was also instructed regarding the Polar ice unit or gel ice packs that will be in place after surgery and her postoperative pain medications.     PAIN MEDICATION:  Percocet 5/325mg 1 po q 4-6 hours prn pain  Ultram 50 mg Take 1-2 p.o. q.6 hours p.r.n. breakthrough pain,   Phenergan 25 mg one p.o. q.6 hours p.r.n. nausea and vomiting.    DVT prophylaxis was discussed with the patient today including risk factors for developing DVTs and history of DVTs. The patient was asked if any specific recommendations were given from the doctor/s that did pre-operative surgical clearance. The patient was then given an education sheet about DVTs and PE with warning signs and symptoms of both and steps to take if they suspect either of these.    This along with the Modified Caprini risk assessment model for VTE in general surgical patients was used to determine the patient's DVT risk.     From: Reena MK, Brady DA, Shivani SM, et al. Prevention of VTE in nonorthopedic surgical patients: antithrombotic therapy and prevention of thrombosis, 9th ed: American College [employer] evidence-based clinical practical guidelines. Chest 2012; 141:e227S. Copyright © 2012. Reproduced with permission from the American College of Chest Physicians.    The below listed DVT prophylaxis regimen along with bilateral ALONDRA compression stockings will be used post-op. Length of treatment has been determined to be 10-42 days post-op by the above noted Caprini assessment model.     The patient was instructed to buy and take:  Aspirin 81mg QD x 4 weeks for DVT prophylaxis starting on the morning after surgery.  Patient will also use bilateral TEDs on lower extremities, SCDs during surgery, and early ambulation post-op. If the patient was previously taking 81mg  baby aspirin, they were told to not take it starting 5 days prior to surgery and to restart the 81mg aspirin after surgery.       Patient was also told to buy over the counter Prilosec medication if needed and take it once daily for GI protection as long as they are taking NSAIDs or Aspirin.   Patient denies history of seizures.      The patient was told that narcotic pain medications may make them drowsy and instructions were given to not sign legal documents, drive or operate heavy machinery, cars, or equipment while under the influence of narcotic medications. The patient was told and understands that narcotic pain medications should only be used as needed to control pain and that other options of pain control include TENs unit and ice packs/unit.     As there were no other questions to be asked, she was given my business card along with Brigette Babin MD business card if she has any questions or concerns prior to surgery or in the postop period.

## 2022-10-03 NOTE — TELEPHONE ENCOUNTER
I called the patient to give her the arrival time for surgery on 10/4/22. Ms. Johnson did not answer. I left a message to please call back.

## 2022-10-03 NOTE — H&P (VIEW-ONLY)
Meryl Johnson  is here for a completion of her perioperative paperwork. she  Is scheduled to undergo     left              a. Knee arthroscopic medial meniscectomy                 b. Knee arthroscopic possible plica excision              c. Knee arthroscopic possible chondroplasty on 10/4/22.      She is a healthy individual and does need clearance for this procedure which she has received from Dr. Martinez.     PAST MEDICAL HISTORY:   Past Medical History:   Diagnosis Date    Anemia     Diabetes mellitus type II     H. pylori infection     Hyperlipidemia     Hypertension     Unspecified vitamin D deficiency 4/24/2013     PAST SURGICAL HISTORY:   Past Surgical History:   Procedure Laterality Date    ESOPHAGOGASTRODUODENOSCOPY N/A 8/8/2018    Procedure: EGD (ESOPHAGOGASTRODUODENOSCOPY);  Surgeon: Darius Gerardo MD;  Location: [address]);  Service: Endoscopy;  Laterality: N/A;    GASTRIC BYPASS       FAMILY HISTORY:   Family History   Problem Relation Age of Onset    COPD Mother     Heart disease Mother     Cancer Father         liver    Heart disease Father     Cancer Sister     Diabetes Sister     Hyperlipidemia Sister     Hypothyroidism Sister     No Known Problems Brother     No Known Problems Maternal Aunt     No Known Problems Maternal Uncle     No Known Problems Paternal Aunt     No Known Problems Paternal Uncle     No Known Problems Maternal Grandmother     No Known Problems Maternal Grandfather     No Known Problems Paternal Grandmother     No Known Problems Paternal Grandfather     Amblyopia Neg Hx     Blindness Neg Hx     Cataracts Neg Hx     Glaucoma Neg Hx     Hypertension Neg Hx     Macular degeneration Neg Hx     Retinal detachment Neg Hx     Strabismus Neg Hx     Stroke Neg Hx     Thyroid disease Neg Hx     Breast cancer Neg Hx     Colon cancer Neg Hx     Ovarian cancer Neg Hx     Stomach cancer Neg Hx     Rectal cancer Neg Hx      SOCIAL HISTORY:   Social History     Socioeconomic History     Marital status: Single   Occupational History     Employer: [employer]   Tobacco Use    Smoking status: Never    Smokeless tobacco: Never   Substance and Sexual Activity    Alcohol use: No    Drug use: No       MEDICATIONS:   Current Outpatient Medications:     benazepriL (LOTENSIN) 40 MG tablet, Take 1 tablet (40 mg total) by mouth once daily., Disp: 90 tablet, Rfl: 2    blood sugar diagnostic (BLOOD GLUCOSE TEST) Strp, 1 strip by Misc.(Non-Drug; Combo Route) route 2 (two) times daily before meals. For mikki contour, Disp: 200 strip, Rfl: 3    empagliflozin (JARDIANCE) 10 mg tablet, Take 1 tablet (10 mg total) by mouth once daily., Disp: 90 tablet, Rfl: 3    ergocalciferol (VITAMIN D2) 50,000 unit Cap, Take 1 capsule (50,000 Units total) by mouth every 7 days., Disp: 12 capsule, Rfl: 1    lancets (ONE TOUCH DELICA LANCETS) 33 gauge Misc, 1 lancet by Misc.(Non-Drug; Combo Route) route 2 (two) times daily., Disp: 60 each, Rfl: 11    metFORMIN (GLUCOPHAGE) 500 MG tablet, Take 2 tablets (1,000 mg total) by mouth 2 (two) times daily with meals., Disp: 360 tablet, Rfl: 1    rosuvastatin (CRESTOR) 20 MG tablet, Take 1 tablet (20 mg total) by mouth once daily., Disp: 90 tablet, Rfl: 3    aspirin (ECOTRIN) 81 MG EC tablet, Take 1 tablet (81 mg total) by mouth once daily. For 4 weeks starting the day after surgery., Disp: 28 tablet, Rfl: 0    oxyCODONE-acetaminophen (PERCOCET) 5-325 mg per tablet, Take 1 tablet by mouth every 4 to 6 hours as needed for Pain., Disp: 12 tablet, Rfl: 0    promethazine (PHENERGAN) 25 MG tablet, Take 1 tablet (25 mg total) by mouth every 6 (six) hours as needed for Nausea., Disp: 12 tablet, Rfl: 0    traMADoL (ULTRAM) 50 mg tablet, Take 1-2 tablets ( mg total) by mouth every 6 (six) hours as needed for Pain., Disp: 21 tablet, Rfl: 0  ALLERGIES:   Review of patient's allergies indicates:   Allergen Reactions    Codeine Nausea Only    Vicodin [hydrocodone-acetaminophen]  "Nausea Only       VITAL SIGNS: BP 98/60   Ht 5' 7" (1.702 m)   Wt 125.6 kg (277 lb)   BMI 43.38 kg/m²      Risks, indications and benefits of the surgical procedure were discussed with the patient. All questions with regard to surgery, rehab, expected return to functional activities, activities of daily living and recreational endeavors were answered to her satisfaction.    It was explained to the patient that there may be an increase in surgical risks if the patient has certain co-morbidities such as but not limited to: Obesity, Cardiovascular issues (CHF, CAD, Arrhythmias), chronic pulmonary issues, previous or current neurovascular/neurological issues, previous strokes, diabetes mellitus, previous wound healing issues, previous wound or skin infections, PVD, clotting disorders, if the patient uses chronic steroids, if the patient takes or has immune compromising medications or diseases, or has previously or currently used tobacco products.     The patient verbalized that he/she does not have any additional clotting, bleeding, or blood disorders, other than what is list in her chart on today's review.     Then a brief history and physical exam were performed.    Review of Systems   Constitution: Negative. Negative for chills, fever and night sweats.   HENT: Negative for congestion and headaches.    Eyes: Negative for blurred vision, left vision loss and right vision loss.   Cardiovascular: Negative for chest pain and syncope.   Respiratory: Negative for cough and shortness of breath.    Endocrine: Negative for polydipsia, polyphagia and polyuria.   Hematologic/Lymphatic: Negative for bleeding problem. Does not bruise/bleed easily.   Skin: Negative for dry skin, itching and rash.   Musculoskeletal: Negative for falls and muscle weakness.   Gastrointestinal: Negative for abdominal pain and bowel incontinence.   Genitourinary: Negative for bladder incontinence and nocturia.   Neurological: Negative for " disturbances in coordination, loss of balance and seizures.   Psychiatric/Behavioral: Negative for depression. The patient does not have insomnia.    Allergic/Immunologic: Negative for hives and persistent infections.     PHYSICAL EXAM:  GEN: A&Ox3, WD WN NAD  HEENT: WNL  CHEST: CTAB, no W/R/R  HEART: RRR, no M/R/G  ABD: Soft, NT ND, BS x4 QUADS  MS; See Epic  NEURO: CN II-XII intact       The surgical consent was then reviewed with the patient, who agreed with all the contents of the consent form and it was signed. she was then given the surgery packet to bring with her to [facility] for the anesthesia portion of her perioperative paperwork (if needed)   For all physicians except for Dr. Sam, we will email and possibly fax the consent forms and booking sheets to [facility].    The patient was given the opportunity to ask questions about the surgical plan and consent form, and once no other questions were asked, I proceeded with the pre-op appointment.    PHYSICAL THERAPY:  She was also instructed regarding physical therapy and will begin on  POD1. She was given a copy of the original prescription to schedule. Another copy of this prescription was also faxed to [facility].    POST OP CARE:instructions were reviewed including care of the wound and dressing after surgery and when she can shower. Patient was told not sleep or lay on there surgical extremity following surgery as this could cause repair damage, tissue damage, or nerve injury.    An extensive amount of time was spent on discussion of the following information based on what type of surgery the patient was having. Patient expressed understanding of the material below:    Shoulder surgery or upper extremity surgery requiring post-op sling:  It was explained to the patient that they should remove their arm from the sling approximately 6 times per day to do full elbow ROM (flexion and extension) and full supination and pronation of the  elbow for approximately 5 minutes at a time to help prevent elbow stiffness, nerve pain or problems, or nerve injury. They were told to contact us if they begin having numbness and tingling of there surgical extremity that persists longer then 1 day without relief.     Extremity surgery requiring a splint:   It was explained to patient on how to properly elevated position there extremity to prevent pressure ulcers from occurring. I made sure that the patient understood that that surgical site may be numb following surgery and prevent them from feeling pressure pain that they would normal feel if a pressure injury was occurring. Pressure ulcers and there causes were discussed with the patient today.     Post-operative splint:  It was explain to the patient that they can contact us at anytime if they feel that there is a problem with their splint or under their splint that needs evaluation. If there is concern, questions, or discomfort with the splint then they can present to either our clinic or the [facility] ED for removal, evaluation, and replacement of the splint.    CRUTCHES OR WALKER: It was explained to the patient that if they are having a lower extremity surgery that they will require either a walker or crutches to ambulate safely with after surgery. It was explained that a cane or other assistive devices are not sufficient to safely ambulate with after surgery. I explained to the patient that I will place an order for them to receive either crutches or a walker after surgery to go home with. It was explained that if they have crutches or a walker at home already, that they are REQUIRED to bring them to the hospital on the day of surgery. It was explained that if they do not have them at the hospital on the day of surgery that they WILL be provided a new pair or crutches or a walker to go home with to ensure ambulation will be safe if the patient needs to stop somewhere on the way home.      PAIN  MANAGEMENT: Meryl Johnson was also given a pain management regime, which includes the option of getting a TENS unit given to her by [facility] along with the education required for its use. She was also instructed regarding the Polar ice unit or gel ice packs that will be in place after surgery and her postoperative pain medications.     PAIN MEDICATION:  Percocet 5/325mg 1 po q 4-6 hours prn pain  Ultram 50 mg Take 1-2 p.o. q.6 hours p.r.n. breakthrough pain,   Phenergan 25 mg one p.o. q.6 hours p.r.n. nausea and vomiting.    DVT prophylaxis was discussed with the patient today including risk factors for developing DVTs and history of DVTs. The patient was asked if any specific recommendations were given from the doctor/s that did pre-operative surgical clearance. The patient was then given an education sheet about DVTs and PE with warning signs and symptoms of both and steps to take if they suspect either of these.    This along with the Modified Caprini risk assessment model for VTE in general surgical patients was used to determine the patient's DVT risk.     From: Reena MK, Brady DA, Shivani SM, et al. Prevention of VTE in nonorthopedic surgical patients: antithrombotic therapy and prevention of thrombosis, 9th ed: American College of Chest [employer]icians evidence-based clinical practical guidelines. Chest 2012; 141:e227S. Copyright © 2012. Reproduced with permission from the American College of Chest Physicians.    The below listed DVT prophylaxis regimen along with bilateral ALONDRA compression stockings will be used post-op. Length of treatment has been determined to be 10-42 days post-op by the above noted Caprini assessment model.     The patient was instructed to buy and take:  Aspirin 81mg QD x 4 weeks for DVT prophylaxis starting on the morning after surgery.  Patient will also use bilateral TEDs on lower extremities, SCDs during surgery, and early ambulation post-op. If the patient was previously taking 81mg  baby aspirin, they were told to not take it starting 5 days prior to surgery and to restart the 81mg aspirin after surgery.       Patient was also told to buy over the counter Prilosec medication if needed and take it once daily for GI protection as long as they are taking NSAIDs or Aspirin.   Patient denies history of seizures.      The patient was told that narcotic pain medications may make them drowsy and instructions were given to not sign legal documents, drive or operate heavy machinery, cars, or equipment while under the influence of narcotic medications. The patient was told and understands that narcotic pain medications should only be used as needed to control pain and that other options of pain control include TENs unit and ice packs/unit.     As there were no other questions to be asked, she was given my business card along with Brigette Babin MD business card if she has any questions or concerns prior to surgery or in the postop period.

## 2022-10-04 ENCOUNTER — PATIENT MESSAGE (OUTPATIENT)
Dept: SPORTS MEDICINE | Facility: CLINIC | Age: 67
End: 2022-10-04
Payer: COMMERCIAL

## 2022-10-04 ENCOUNTER — HOSPITAL ENCOUNTER (OUTPATIENT)
Facility: HOSPITAL | Age: 67
Discharge: HOME | End: 2022-10-04
Attending: ORTHOPAEDIC SURGERY | Admitting: ORTHOPAEDIC SURGERY
Payer: COMMERCIAL

## 2022-10-04 ENCOUNTER — ANESTHESIA (OUTPATIENT)
Dept: SURGERY | Facility: HOSPITAL | Age: 67
End: 2022-10-04
Payer: COMMERCIAL

## 2022-10-04 ENCOUNTER — ANESTHESIA EVENT (OUTPATIENT)
Dept: SURGERY | Facility: HOSPITAL | Age: 67
End: 2022-10-04
Payer: COMMERCIAL

## 2022-10-04 VITALS
SYSTOLIC BLOOD PRESSURE: 172 MMHG | BODY MASS INDEX: 43.47 KG/M2 | OXYGEN SATURATION: 93 % | HEART RATE: 65 BPM | TEMPERATURE: 98 F | DIASTOLIC BLOOD PRESSURE: 75 MMHG | HEIGHT: 67 IN | WEIGHT: 277 LBS | RESPIRATION RATE: 16 BRPM

## 2022-10-04 DIAGNOSIS — S83.242A ACUTE MEDIAL MENISCUS TEAR OF LEFT KNEE, INITIAL ENCOUNTER: ICD-10-CM

## 2022-10-04 DIAGNOSIS — I20.9 ANGINA PECTORIS: ICD-10-CM

## 2022-10-04 LAB
POCT GLUCOSE: 145 MG/DL (ref 70–110)
POCT GLUCOSE: 146 MG/DL (ref 70–110)

## 2022-10-04 PROCEDURE — D9220A PRA ANESTHESIA: Mod: ANES,,, | Performed by: SURGERY

## 2022-10-04 PROCEDURE — D9220A PRA ANESTHESIA: ICD-10-PCS | Mod: ANES,,, | Performed by: SURGERY

## 2022-10-04 PROCEDURE — 94761 N-INVAS EAR/PLS OXIMETRY MLT: CPT

## 2022-10-04 PROCEDURE — 63600175 PHARM REV CODE 636 W HCPCS: Performed by: PHYSICIAN ASSISTANT

## 2022-10-04 PROCEDURE — 63600175 PHARM REV CODE 636 W HCPCS: Performed by: ANESTHESIOLOGY

## 2022-10-04 PROCEDURE — 25000003 PHARM REV CODE 250: Performed by: SURGERY

## 2022-10-04 PROCEDURE — 93005 ELECTROCARDIOGRAM TRACING: CPT

## 2022-10-04 PROCEDURE — 25000003 PHARM REV CODE 250: Performed by: NURSE ANESTHETIST, CERTIFIED REGISTERED

## 2022-10-04 PROCEDURE — 71000033 HC RECOVERY, INTIAL HOUR: Performed by: ORTHOPAEDIC SURGERY

## 2022-10-04 PROCEDURE — 29880 PR KNEE SCOPE MED/LAT MENISCECTOMY: ICD-10-PCS | Mod: LT,,, | Performed by: ORTHOPAEDIC SURGERY

## 2022-10-04 PROCEDURE — 29880 ARTHRS KNE SRG MNISECTMY M&L: CPT | Mod: LT,,, | Performed by: ORTHOPAEDIC SURGERY

## 2022-10-04 PROCEDURE — 93010 ELECTROCARDIOGRAM REPORT: CPT | Mod: ,,, | Performed by: INTERNAL MEDICINE

## 2022-10-04 PROCEDURE — 93041 RHYTHM ECG TRACING: CPT

## 2022-10-04 PROCEDURE — 71000015 HC POSTOP RECOV 1ST HR: Performed by: ORTHOPAEDIC SURGERY

## 2022-10-04 PROCEDURE — 29880 PR KNEE SCOPE MED/LAT MENISCECTOMY: ICD-10-PCS | Mod: AS,LT,, | Performed by: PHYSICIAN ASSISTANT

## 2022-10-04 PROCEDURE — 71000039 HC RECOVERY, EACH ADD'L HOUR: Performed by: ORTHOPAEDIC SURGERY

## 2022-10-04 PROCEDURE — 36000710: Performed by: ORTHOPAEDIC SURGERY

## 2022-10-04 PROCEDURE — 36000711: Performed by: ORTHOPAEDIC SURGERY

## 2022-10-04 PROCEDURE — 37000008 HC ANESTHESIA 1ST 15 MINUTES: Performed by: ORTHOPAEDIC SURGERY

## 2022-10-04 PROCEDURE — D9220A PRA ANESTHESIA: Mod: CRNA,,, | Performed by: NURSE ANESTHETIST, CERTIFIED REGISTERED

## 2022-10-04 PROCEDURE — 63600175 PHARM REV CODE 636 W HCPCS: Performed by: NURSE ANESTHETIST, CERTIFIED REGISTERED

## 2022-10-04 PROCEDURE — 37000009 HC ANESTHESIA EA ADD 15 MINS: Performed by: ORTHOPAEDIC SURGERY

## 2022-10-04 PROCEDURE — 63600175 PHARM REV CODE 636 W HCPCS: Performed by: ORTHOPAEDIC SURGERY

## 2022-10-04 PROCEDURE — 25000003 PHARM REV CODE 250: Performed by: PHYSICIAN ASSISTANT

## 2022-10-04 PROCEDURE — 27201423 OPTIME MED/SURG SUP & DEVICES STERILE SUPPLY: Performed by: ORTHOPAEDIC SURGERY

## 2022-10-04 PROCEDURE — 29880 ARTHRS KNE SRG MNISECTMY M&L: CPT | Mod: AS,LT,, | Performed by: PHYSICIAN ASSISTANT

## 2022-10-04 PROCEDURE — D9220A PRA ANESTHESIA: ICD-10-PCS | Mod: CRNA,,, | Performed by: NURSE ANESTHETIST, CERTIFIED REGISTERED

## 2022-10-04 PROCEDURE — 93010 EKG 12-LEAD: ICD-10-PCS | Mod: ,,, | Performed by: INTERNAL MEDICINE

## 2022-10-04 PROCEDURE — 25000003 PHARM REV CODE 250: Performed by: ORTHOPAEDIC SURGERY

## 2022-10-04 PROCEDURE — 82962 GLUCOSE BLOOD TEST: CPT | Performed by: ORTHOPAEDIC SURGERY

## 2022-10-04 PROCEDURE — 99900035 HC TECH TIME PER 15 MIN (STAT)

## 2022-10-04 RX ORDER — KETAMINE HCL IN 0.9 % NACL 50 MG/5 ML
SYRINGE (ML) INTRAVENOUS
Status: DISCONTINUED | OUTPATIENT
Start: 2022-10-04 | End: 2022-10-04

## 2022-10-04 RX ORDER — HYDROMORPHONE HYDROCHLORIDE 1 MG/ML
1 INJECTION, SOLUTION INTRAMUSCULAR; INTRAVENOUS; SUBCUTANEOUS EVERY 4 HOURS PRN
Status: DISCONTINUED | OUTPATIENT
Start: 2022-10-04 | End: 2022-10-04 | Stop reason: HOSPADM

## 2022-10-04 RX ORDER — FENTANYL CITRATE 50 UG/ML
INJECTION, SOLUTION INTRAMUSCULAR; INTRAVENOUS
Status: DISCONTINUED | OUTPATIENT
Start: 2022-10-04 | End: 2022-10-04

## 2022-10-04 RX ORDER — EPINEPHRINE 1 MG/ML
INJECTION, SOLUTION INTRACARDIAC; INTRAMUSCULAR; INTRAVENOUS; SUBCUTANEOUS
Status: DISCONTINUED | OUTPATIENT
Start: 2022-10-04 | End: 2022-10-04 | Stop reason: HOSPADM

## 2022-10-04 RX ORDER — MIDAZOLAM HYDROCHLORIDE 1 MG/ML
INJECTION, SOLUTION INTRAMUSCULAR; INTRAVENOUS
Status: DISCONTINUED | OUTPATIENT
Start: 2022-10-04 | End: 2022-10-04

## 2022-10-04 RX ORDER — ONDANSETRON 2 MG/ML
4 INJECTION INTRAMUSCULAR; INTRAVENOUS EVERY 12 HOURS PRN
Status: DISCONTINUED | OUTPATIENT
Start: 2022-10-04 | End: 2022-10-04 | Stop reason: HOSPADM

## 2022-10-04 RX ORDER — METHOCARBAMOL 500 MG/1
1000 TABLET, FILM COATED ORAL ONCE
Status: COMPLETED | OUTPATIENT
Start: 2022-10-04 | End: 2022-10-04

## 2022-10-04 RX ORDER — PROPOFOL 10 MG/ML
VIAL (ML) INTRAVENOUS
Status: DISCONTINUED | OUTPATIENT
Start: 2022-10-04 | End: 2022-10-04

## 2022-10-04 RX ORDER — LIDOCAINE HYDROCHLORIDE 20 MG/ML
INJECTION INTRAVENOUS
Status: DISCONTINUED | OUTPATIENT
Start: 2022-10-04 | End: 2022-10-04

## 2022-10-04 RX ORDER — ROPIVACAINE HYDROCHLORIDE 5 MG/ML
INJECTION, SOLUTION EPIDURAL; INFILTRATION; PERINEURAL
Status: DISCONTINUED | OUTPATIENT
Start: 2022-10-04 | End: 2022-10-04 | Stop reason: HOSPADM

## 2022-10-04 RX ORDER — KETAMINE HYDROCHLORIDE 50 MG/ML
INJECTION, SOLUTION INTRAMUSCULAR; INTRAVENOUS
Status: DISCONTINUED | OUTPATIENT
Start: 2022-10-04 | End: 2022-10-04 | Stop reason: HOSPADM

## 2022-10-04 RX ORDER — HYDROMORPHONE HYDROCHLORIDE 1 MG/ML
0.2 INJECTION, SOLUTION INTRAMUSCULAR; INTRAVENOUS; SUBCUTANEOUS EVERY 5 MIN PRN
Status: DISCONTINUED | OUTPATIENT
Start: 2022-10-04 | End: 2022-10-04 | Stop reason: HOSPADM

## 2022-10-04 RX ORDER — PROMETHAZINE HYDROCHLORIDE 25 MG/1
25 TABLET ORAL EVERY 6 HOURS PRN
Status: DISCONTINUED | OUTPATIENT
Start: 2022-10-04 | End: 2022-10-04 | Stop reason: HOSPADM

## 2022-10-04 RX ORDER — TRAMADOL HYDROCHLORIDE 50 MG/1
100 TABLET ORAL EVERY 6 HOURS PRN
Status: DISCONTINUED | OUTPATIENT
Start: 2022-10-04 | End: 2022-10-04 | Stop reason: HOSPADM

## 2022-10-04 RX ORDER — DEXAMETHASONE SODIUM PHOSPHATE 4 MG/ML
INJECTION, SOLUTION INTRA-ARTICULAR; INTRALESIONAL; INTRAMUSCULAR; INTRAVENOUS; SOFT TISSUE
Status: DISCONTINUED | OUTPATIENT
Start: 2022-10-04 | End: 2022-10-04

## 2022-10-04 RX ORDER — FAMOTIDINE 10 MG/ML
INJECTION INTRAVENOUS
Status: DISCONTINUED | OUTPATIENT
Start: 2022-10-04 | End: 2022-10-04

## 2022-10-04 RX ORDER — SODIUM CHLORIDE 9 MG/ML
INJECTION, SOLUTION INTRAVENOUS CONTINUOUS
Status: DISCONTINUED | OUTPATIENT
Start: 2022-10-04 | End: 2022-10-04 | Stop reason: HOSPADM

## 2022-10-04 RX ORDER — ROCURONIUM BROMIDE 10 MG/ML
INJECTION, SOLUTION INTRAVENOUS
Status: DISCONTINUED | OUTPATIENT
Start: 2022-10-04 | End: 2022-10-04

## 2022-10-04 RX ORDER — ONDANSETRON 2 MG/ML
INJECTION INTRAMUSCULAR; INTRAVENOUS
Status: DISCONTINUED | OUTPATIENT
Start: 2022-10-04 | End: 2022-10-04

## 2022-10-04 RX ORDER — KETOROLAC TROMETHAMINE 30 MG/ML
INJECTION, SOLUTION INTRAMUSCULAR; INTRAVENOUS
Status: DISCONTINUED | OUTPATIENT
Start: 2022-10-04 | End: 2022-10-04 | Stop reason: HOSPADM

## 2022-10-04 RX ORDER — OXYCODONE HYDROCHLORIDE 5 MG/1
10 TABLET ORAL EVERY 4 HOURS PRN
Status: DISCONTINUED | OUTPATIENT
Start: 2022-10-04 | End: 2022-10-04 | Stop reason: HOSPADM

## 2022-10-04 RX ORDER — SODIUM CHLORIDE 0.9 % (FLUSH) 0.9 %
10 SYRINGE (ML) INJECTION
Status: DISCONTINUED | OUTPATIENT
Start: 2022-10-04 | End: 2022-10-04 | Stop reason: HOSPADM

## 2022-10-04 RX ORDER — NEOSTIGMINE METHYLSULFATE 0.5 MG/ML
INJECTION, SOLUTION INTRAVENOUS
Status: DISCONTINUED | OUTPATIENT
Start: 2022-10-04 | End: 2022-10-04

## 2022-10-04 RX ADMIN — CEFAZOLIN SODIUM 3 ML: 2 SOLUTION INTRAVENOUS at 02:10

## 2022-10-04 RX ADMIN — PROPOFOL 200 MG: 10 INJECTION, EMULSION INTRAVENOUS at 02:10

## 2022-10-04 RX ADMIN — ROCURONIUM BROMIDE 50 MG: 10 INJECTION INTRAVENOUS at 02:10

## 2022-10-04 RX ADMIN — LIDOCAINE HYDROCHLORIDE 100 MG: 20 INJECTION INTRAVENOUS at 02:10

## 2022-10-04 RX ADMIN — FENTANYL CITRATE 50 MCG: 50 INJECTION, SOLUTION INTRAMUSCULAR; INTRAVENOUS at 02:10

## 2022-10-04 RX ADMIN — GLYCOPYRROLATE 0.5 MG: 0.2 INJECTION, SOLUTION INTRAMUSCULAR; INTRAVITREAL at 03:10

## 2022-10-04 RX ADMIN — HYDROMORPHONE HYDROCHLORIDE 0.2 MG: 1 INJECTION, SOLUTION INTRAMUSCULAR; INTRAVENOUS; SUBCUTANEOUS at 04:10

## 2022-10-04 RX ADMIN — SODIUM CHLORIDE, SODIUM GLUCONATE, SODIUM ACETATE, POTASSIUM CHLORIDE, MAGNESIUM CHLORIDE, SODIUM PHOSPHATE, DIBASIC, AND POTASSIUM PHOSPHATE: .53; .5; .37; .037; .03; .012; .00082 INJECTION, SOLUTION INTRAVENOUS at 02:10

## 2022-10-04 RX ADMIN — DEXAMETHASONE SODIUM PHOSPHATE 4 MG: 4 INJECTION, SOLUTION INTRAMUSCULAR; INTRAVENOUS at 02:10

## 2022-10-04 RX ADMIN — Medication 25 MG: at 02:10

## 2022-10-04 RX ADMIN — ONDANSETRON 4 MG: 2 INJECTION INTRAMUSCULAR; INTRAVENOUS at 05:10

## 2022-10-04 RX ADMIN — MIDAZOLAM HYDROCHLORIDE 2 MG: 1 INJECTION, SOLUTION INTRAMUSCULAR; INTRAVENOUS at 02:10

## 2022-10-04 RX ADMIN — FAMOTIDINE 20 MG: 10 INJECTION, SOLUTION INTRAVENOUS at 02:10

## 2022-10-04 RX ADMIN — METHOCARBAMOL 1000 MG: 500 TABLET ORAL at 04:10

## 2022-10-04 RX ADMIN — OXYCODONE 5 MG: 5 TABLET ORAL at 04:10

## 2022-10-04 RX ADMIN — SODIUM CHLORIDE: 0.9 INJECTION, SOLUTION INTRAVENOUS at 11:10

## 2022-10-04 RX ADMIN — TRAMADOL HYDROCHLORIDE 100 MG: 50 TABLET, COATED ORAL at 04:10

## 2022-10-04 RX ADMIN — NEOSTIGMINE METHYLSULFATE 5 MG: 0.5 INJECTION, SOLUTION INTRAVENOUS at 03:10

## 2022-10-04 RX ADMIN — ONDANSETRON 4 MG: 2 INJECTION INTRAMUSCULAR; INTRAVENOUS at 02:10

## 2022-10-04 NOTE — PLAN OF CARE
VSS. Patient able to tolerate oral liquids. Patient reports tolerable pain level for discharge. Patient/family received home medication per bedside delivery. Dressing intact. Polar care intact, power adapter placed with patient belongings. Thigh TEDs intact. Patient instructed not to wear ALONDRA hose without wearing closed-back shoes or  socks due to increased risk of falls, verbalized understanding. Walker at bedside for home use. No distress noted. Patient states she is ready for discharge. Discharge instructions reviewed with patient and family, verbalized understanding. IV discontinued with catheter tip intact. Staff at bedside to help patient dress. Patient voided before d/c. Patient wheeled to lobby via staff.

## 2022-10-04 NOTE — OPERATIVE NOTE ADDENDUM
Certification of Assistant at Surgery       Surgery Date: 10/4/2022     Participating Surgeons:  Surgeon(s) and Role:     * Brigette Babin MD - Primary    RONA Honeycutt PA-C - 1st Assistant     Procedures:  Procedure(s) (LRB):  ARTHROSCOPY, KNEE, WITH MENISCECTOMY (Left)  CHONDROPLASTY, KNEE (Left)  SYNOVECTOMY, KNEE (Left)    Assistant Surgeon's Certification of Necessity:  I understand that section 1842 (b) (6) (d) of the Social Security Act generally prohibits Medicare Part B reasonable charge payment for the services of assistants at surgery in teaching hospitals when qualified residents are available to furnish such services. I certify that the services for which payment is claimed were medically necessary, and that no qualified resident was available to perform the services. I further understand that these services are subject to post-payment review by the Medicare carrier.         Russell Honeycutt PA-C    10/04/2022  3:58 PM

## 2022-10-04 NOTE — TRANSFER OF CARE
"Anesthesia Transfer of Care Note    Patient: Meryl Johnson    Procedure(s) Performed: Procedure(s) (LRB):  ARTHROSCOPY, KNEE, WITH MENISCECTOMY (Left)  CHONDROPLASTY, KNEE (Left)  SYNOVECTOMY, KNEE (Left)    Patient location: PACU    Anesthesia Type: general    Transport from OR: Transported from OR on 6-10 L/min O2 by face mask with adequate spontaneous ventilation    Post pain: adequate analgesia    Post assessment: no apparent anesthetic complications    Post vital signs: stable    Level of consciousness: awake    Nausea/Vomiting: no nausea/vomiting    Complications: none    Transfer of care protocol was followed      Last vitals:   Visit Vitals  BP (!) 155/70 (BP Location: Left arm, Patient Position: Lying)   Pulse 82   Temp 36.4 °C (97.6 °F) (Oral)   Resp 16   Ht 5' 7" (1.702 m)   Wt 125.6 kg (277 lb)   SpO2 98%   Breastfeeding No   BMI 43.38 kg/m²     "

## 2022-10-04 NOTE — PLAN OF CARE
Nursing pre-op complete. Pt calm, will continue to monitor. Pt's brother-in-law at bedside. Pt's belongings placed in locker #4, 1 bag. Pt needs post-op walker.

## 2022-10-04 NOTE — DISCHARGE SUMMARY
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    DISPOSITION: Home or Self Care    FINAL DIAGNOSIS: left knee meniscus tear    FOLLOWUP: In clinic    DISCHARGE INSTRUCTIONS:    Discharge Procedure Orders   Diet general     Call MD for:  temperature >100.4     Call MD for:  persistent nausea and vomiting     Call MD for:  severe uncontrolled pain     Call MD for:  difficulty breathing, headache or visual disturbances     Call MD for:  redness, tenderness, or signs of infection (pain, swelling, redness, odor or green/yellow discharge around incision site)     Call MD for:  hives     Call MD for:  persistent dizziness or light-headedness     Keep surgical extremity elevated     Ice to affected area   Order Comments: using barrier between ice and skin (specify duration&frequency)     No driving, operating heavy equipment or signing legal documents while taking pain medication     Remove dressing in 72 hours     Shower on day dressing removed (No bath)     Weight bearing as tolerated

## 2022-10-04 NOTE — OP NOTE
DATE OF PROCEDURE: 10/04/2022    SURGEON:  Brigette Babin M.D    ASSISTANT: RONA Honeycutt PA-C  The use of an assistant was medically necessary for positioning, skin retraction, and assistance with this procedure. The procedure could not be performed without the use of an assistant.   There was no qualified resident/fellow available for assistance with this procedure.    PREOPERATIVE DIAGNOSES:   left  1. knee medial and lateral meniscus tear   2. knee plica.   3. knee possible chondromalacia  4. knee synovitis  5. Knee adhesions    POSTOPERATIVE DIAGNOSES:   left  1. knee medial and lateral meniscus tear   2. knee plica.   3. knee chondromalacia  4. knee synovitis.   5. Knee adhesions    PROCEDURE PERFORMED:   left  1. knee arthroscopic chondroplasty (CPT 70110)  2. knee arthroscopic medial and lateral (CPT 37777) meniscectomy   3. knee arthroscopic partial synovectomy/debridement (CPT 20664).   4. knee arthroscopic plica excision(CPT 26865).    5. Knee arthroscopic lysis of adhesions (CPT 43007)    ANESTHESIA: General with local 0.5% ropivicaine 30ml (5mg/ml), 60 mg ketamine, 60mg toradol (2ml toradol (30mg/ml))    BLOOD LOSS: Minimal.     DRAINS: None.     TOURNIQUET TIME: None.     COMPLICATIONS: None.     CONDITION ON TRANSFER: The patient was extubated and moved to the recovery room in stable condition, with compartments soft and capillary refill less than a   second in all digits.     BRIEF INDICATION OF MEDICAL NECESSITY: The patient is a 67 y.o. year-old female who has history and physical examination findings consistent with the above. Nonoperative versus operative options were discussed. The risks and benefits were discussed with the patient. The patient acknowledged understanding and wished to proceed with operative intervention. Informed consent was obtained prior to the procedure. Details of the surgical procedure were explained, including incisions and probable rehabilitation course. The patient  understands the likely length of convalescence after surgery; and we have explained the risks, benefits, and alternatives of surgery. Reasonable expectations and potential complications were discussed and acknowledged, including but not limited to infection, bleeding, blood clots, (DVT and/or PE), nerve injury, retear, instability, continued pain and stiffness. It was also explained that there was a chance of failure which may require further management. The patient agreed and understood and wished to proceed.     EXAMINATION UNDER ANESTHESIA of the OPERATIVE left KNEE: ROM 0-125 degrees, negative Lachman, negative pivot shift, stable to varus-valgus stress testing, negative effusion.     EXAMINATION UNDER ANESTHESIA of the NON-OPERATED right KNEE: ROM 0-125 degrees, negative Lachman, negative pivot shift, stable to varus-valgus stress testing, negative effusion.     PROCEDURE IN DETAIL: The correct operative site was marked by the operative surgeon.  The patient was then taken to the operating room and placed supine on the operating room table. General anesthesia was administered by the anesthesia team. All pressure points were carefully padded and checked. Preoperative antibiotics were administered. A well-padded tourniquet was placed high on the operative thigh. Examination was begun with the above findings. The non-operative leg was then placed a well-padded well-leg cutler, in a comfortable position. The operative leg was placed in an arthroscopic leg cutler at the level of the tourniquet. The operative leg was prepped and draped in the usual sterile fashion. After prepping and draping, the appropriate landmarks were noted on the skin.  2cc skin and sub-cutaneous tissue was infilttrated with local anesthetic mixture superolaterally at needle insufflation site. The knee was insufflated supero-laterally with saline. 9cc skin wheal and sub-cutaneous tissue and fat pad was infilttrated with local anesthetic mixture  at both portal sites; mid-lateral followed by infero-medial portals were created, and a systematic examination of the joint revealed the following:    There was no evidence of any suprapatellar pouch adhesions or compartmentalization.  There was no evidence of any loose bodies in the medial or lateral gutters.     In the patellofemoral compartment, there was chondral damage to:  Patella 15 x 20 mm grade 2  Chondroplasty was performed using arthroscopic shaver.    There was chondral damage to:  Medial femoral condyle 10 x 15 mm grade 2  Chondroplasty was performed using arthroscopic shaver.    In the medial compartment there was no evidence of chondral damage.   In the medial compartment there was no evidence of meniscal instability.   Posterior horn medial meniscus tear,  complex was debrided with arthroscopic shaver and biter.  20% was debrided over an area of 2 cm.  Root and hoop fibers remained intact.    Attention was then turned to the notch. The ACL and PCL were probed carefully and found to be stable.     There was a hypertrophic infrapatellar plica.  This was debrided using arthroscopic biter and shaver.    There was some scar about the ACL.  This was debrided in the standard fashion and lysis of adhesions was performed.    In the lateral compartment there was no evidence meniscal or chondral damage or meniscal instability.      In the lateral compartment there was no evidence of meniscal instability.   Posterior near root horn lateral meniscus tear,  parrot-beak was debrided with arthroscopic shaver and biter.  10% was debrided over an area of .5 cm.  Root and hoop fibers remained intact.    Synovitis was debrided in the knee as needed to the areas of concern in medial and lateral compartments.      The knee and incisions were then copiously irrigated and fluid was extravasated using suction.  The arthroscopic portal incisions were closed using inverted 4-0 Monocryl suture.  5cc skin and sub-cutaneous  tissue and around portals were infilttrated with local anesthetic mixture at both portal sites.  Steri-Strips were placed with Mastisol. Sterile TENS unit pads were placed which were medically necessary for pain relief. Wounds were dressed with Xeroform, 4x4s, and cast padding. ALONDRA hose was placed on the operative leg to match that of the ALONDRA hose placed preoperatively on the non-operative leg. Iceman was secured.  The patient was extubated and moved to the recovery room in stable condition with compartments soft and capillary refill less than a second in all digits.     POSTOPERATIVE PLAN: We will be following the arthroscopic partial meniscectomy guidelines with emphasis on patellar mobility.  This was discussed this with the patient's family after surgery.

## 2022-10-04 NOTE — ANESTHESIA PREPROCEDURE EVALUATION
10/04/2022  Meryl Johnson is a 67 y.o., female.  Pre-operative evaluation for Procedure(s) (LRB):  ARTHROSCOPY, KNEE, WITH MENISCECTOMY (Left)  CHONDROPLASTY, KNEE (Left)  SYNOVECTOMY, KNEE (Left)    Meryl Johnson is a 67 y.o. female     Patient Active Problem List   Diagnosis    Hypertension    Hyperlipidemia    Type 2 diabetes mellitus with diabetic neuropathy, without long-term current use of insulin    Vitamin D deficiency    Morbid obesity    Iron deficiency anemia    Closed nondisplaced fracture of head of right radius    Acute pain of right knee    Difficulty walking up stairs    Chronic right shoulder pain    Decreased strength of upper extremity       Review of patient's allergies indicates:   Allergen Reactions    Codeine Nausea Only    Vicodin [hydrocodone-acetaminophen] Nausea Only       No current facility-administered medications on file prior to encounter.     Current Outpatient Medications on File Prior to Encounter   Medication Sig Dispense Refill    benazepriL (LOTENSIN) 40 MG tablet Take 1 tablet (40 mg total) by mouth once daily. 90 tablet 2    blood sugar diagnostic (BLOOD GLUCOSE TEST) Strp 1 strip by Misc.(Non-Drug; Combo Route) route 2 (two) times daily before meals. For mikki contour 200 strip 3    empagliflozin (JARDIANCE) 10 mg tablet Take 1 tablet (10 mg total) by mouth once daily. 90 tablet 3    ergocalciferol (VITAMIN D2) 50,000 unit Cap Take 1 capsule (50,000 Units total) by mouth every 7 days. 12 capsule 1    lancets (ONE TOUCH DELICA LANCETS) 33 gauge Misc 1 lancet by Misc.(Non-Drug; Combo Route) route 2 (two) times daily. 60 each 11    metFORMIN (GLUCOPHAGE) 500 MG tablet Take 2 tablets (1,000 mg total) by mouth 2 (two) times daily with meals. 360 tablet 1    rosuvastatin (CRESTOR) 20 MG tablet Take 1 tablet (20 mg total) by mouth once daily. 90  tablet 3       Past Surgical History:   Procedure Laterality Date    ESOPHAGOGASTRODUODENOSCOPY N/A 8/8/2018    Procedure: EGD (ESOPHAGOGASTRODUODENOSCOPY);  Surgeon: Darius Gerardo MD;  Location: [address]);  Service: Endoscopy;  Laterality: N/A;    GASTRIC BYPASS         Social History     Socioeconomic History    Marital status: Single   Occupational History     Employer: [employer]   Tobacco Use    Smoking status: Never    Smokeless tobacco: Never   Substance and Sexual Activity    Alcohol use: No    Drug use: No         Pre-op Assessment    I have reviewed the Patient Summary Reports.    I have reviewed the NPO Status.   I have reviewed the Medications.     Review of Systems  Anesthesia Hx:  No problems with previous Anesthesia    Social:  Non-Smoker    Cardiovascular:   Hypertension Denies CABG/stent.   Denies Angina.    Pulmonary:  Pulmonary Normal    Hepatic/GI:   S/p gastric bypass   Neurological:  Neurology Normal    Endocrine:   Diabetes  Obesity / BMI > 30      Physical Exam  General: Well nourished, Cooperative, Alert and Oriented    Airway:  Mallampati: / II  Mouth Opening: Normal  TM Distance: Normal  Tongue: Normal    Dental:  Intact        Anesthesia Plan  Type of Anesthesia, risks & benefits discussed:    Anesthesia Type: Gen ETT, Gen Supraglottic Airway  Intra-op Monitoring Plan: Standard ASA Monitors  Post Op Pain Control Plan: multimodal analgesia  Induction:  IV  Airway Plan: Direct, Post-Induction  Informed Consent: Informed consent signed with the Patient and all parties understand the risks and agree with anesthesia plan.  All questions answered.   ASA Score: 3    Ready For Surgery From Anesthesia Perspective.     .

## 2022-10-04 NOTE — ANESTHESIA PROCEDURE NOTES
Intubation    Date/Time: 10/4/2022 2:32 PM  Performed by: Sharri Leo CRNA  Authorized by: Ignacia Jefferson MD     Intubation:     Induction:  Intravenous    Intubated:  Postinduction    Mask Ventilation:  Easy mask    Attempts:  1    Attempted By:  CRNA    Method of Intubation:  Direct    Blade:  Smart 2    Laryngeal View Grade: Grade IIA - cords partially seen      Difficult Airway Encountered?: No      Complications:  None    Airway Device:  Oral endotracheal tube    Airway Device Size:  7.0    Style/Cuff Inflation:  Cuffed    Inflation Amount (mL):  6    Tube secured:  20    Secured at:  The lips    Placement Verified By:  Capnometry    Complicating Factors:  Anterior larynx

## 2022-10-04 NOTE — ANESTHESIA POSTPROCEDURE EVALUATION
Anesthesia Post Evaluation    Patient: Meryl Jhonson    Procedure(s) Performed: Procedure(s) (LRB):  ARTHROSCOPY, KNEE, WITH MENISCECTOMY (Left)  CHONDROPLASTY, KNEE (Left)  SYNOVECTOMY, KNEE (Left)    Final Anesthesia Type: general      Patient location during evaluation: PACU  Patient participation: Yes- Able to Participate  Level of consciousness: awake and alert and oriented  Post-procedure vital signs: reviewed and stable  Pain management: adequate  Airway patency: patent  JENNI mitigation strategies: Multimodal analgesia  PONV status at discharge: No PONV  Anesthetic complications: no      Cardiovascular status: blood pressure returned to baseline and hemodynamically stable  Respiratory status: unassisted, spontaneous ventilation and room air  Hydration status: euvolemic  Follow-up not needed.          Vitals Value Taken Time   /69 10/04/22 1646   Temp 36.7 °C (98.1 °F) 10/04/22 1630   Pulse 71 10/04/22 1648   Resp 17 10/04/22 1648   SpO2 94 % 10/04/22 1648   Vitals shown include unvalidated device data.      No case tracking events are documented in the log.      Pain/Alen Score: Pain Rating Prior to Med Admin: 6 (10/4/2022  4:37 PM)  Pain Rating Post Med Admin: 6 (10/4/2022  4:37 PM)  Alen Score: 9 (10/4/2022  3:45 PM)

## 2022-10-06 ENCOUNTER — CLINICAL SUPPORT (OUTPATIENT)
Dept: REHABILITATION | Facility: HOSPITAL | Age: 67
End: 2022-10-06
Payer: COMMERCIAL

## 2022-10-06 DIAGNOSIS — G89.29 CHRONIC RIGHT SHOULDER PAIN: Primary | ICD-10-CM

## 2022-10-06 DIAGNOSIS — M25.562 ACUTE PAIN OF LEFT KNEE: ICD-10-CM

## 2022-10-06 DIAGNOSIS — R29.898 DECREASED STRENGTH OF UPPER EXTREMITY: ICD-10-CM

## 2022-10-06 DIAGNOSIS — M25.511 CHRONIC RIGHT SHOULDER PAIN: Primary | ICD-10-CM

## 2022-10-06 PROCEDURE — 97140 MANUAL THERAPY 1/> REGIONS: CPT | Performed by: PHYSICAL THERAPIST

## 2022-10-06 PROCEDURE — 97112 NEUROMUSCULAR REEDUCATION: CPT | Performed by: PHYSICAL THERAPIST

## 2022-10-06 PROCEDURE — 97110 THERAPEUTIC EXERCISES: CPT | Performed by: PHYSICAL THERAPIST

## 2022-10-07 PROBLEM — M25.562 ACUTE PAIN OF LEFT KNEE: Status: ACTIVE | Noted: 2022-10-07

## 2022-10-07 NOTE — PROGRESS NOTES
Physical Therapy Daily Treatment Note     Name: Meryl Johnson  [facility] Number: [id number]    Therapy Diagnosis:   Encounter Diagnoses   Name Primary?    Chronic right shoulder pain Yes    Decreased strength of upper extremity     Acute pain of left knee      Physician: Jun Chin MD    Visit Date: 10/6/2022  Physician Orders: PT Eval and Treat   Medical Diagnosis from Referral:   Diagnosis   M25.511,G89.29 (ICD-10-CM) - Chronic right shoulder pain   M75.01 (ICD-10-CM) - Adhesive capsulitis of right shoulder    S83.242A (ICD-10-CM) - Acute medial meniscus tear of left knee, initial encounter  Evaluation Date: 9/29/2022 shoulder, 107/2022 knee  Authorization Period Expiration: 12/31/2022  Plan of Care Expiration: 12/08/2022  Visit # / Visits authorized: 5/ 20      Time In: 1500  Time Out: 1610  Total Billable Time: 60 minutes    Precautions: Standard and Weightbearing    Subjective     Pt reports: Patient underwent a L meniscectomy yesterday. She took her first pain pill about an hour ago prior to therapy. Patient has been using the ice machine and doing ankle pumps prior to therapy. Goal is to get back into the [facility]  She was compliant with home exercise program.  Response to previous treatment: R shoulder relief  Functional change: WBAT with RW    Pain: 5/10  Location: left knee  , right shoulder    Objective     10/7/2022: Patient L knee evaluated today following meniscectomy yesterday.     Observation: Presented with L knee bandaged as expected post op with ALONDRA hose and stem pads in place      Functional Tests:  Gait: WBAT with rolling walker. Educated on heel strike and toe off gait cycle    Knee Passive Range of Motion:   Right  Left    Flexion 110 93   Extension +3 hyper 0       Quad Set: Fair, patient has been practicing at home. Fair+ after [place]ym[place] stem    Joint Mobility: Limited due to bandaging but good EOT flexion and tolerance to patellar mobilization     Palpation:  "Tender medial joint line    Sensation: Intact    Edema: min present       Meryl received therapeutic exercises to develop strength, endurance, ROM, and flexibility for 25 minutes including:  Heel prop 5'  HSS strap 30 sec 5x  Supine SLR 2 x 10  Patient education    Meryl received the following manual therapy techniques: Joint mobilizations and Soft tissue Mobilization were applied to the: L knee for 25 minutes, including:    Knee bandage assessment  Passive flexion EOT   Patellar mobilizations all planes  Gr II extension mob    Meryl participated in neuromuscular re-education activities to improve: Balance, Coordination, Sense, Proprioception, and Posture for 10 minutes. The following activities were included:  Quad sets NMES Russian 10' 10" on/off      Meryl received cold pack for 10 minutes to to decrease circulation, pain, and swelling.      Home Exercises Provided and Patient Education Provided     Education provided:   - Quad set every hour  - No sleeping with anything under the knee  - S/s of infection  - Keep wound clean and no soaking in tub  - Importance of knee extension    Written Home Exercises Provided: Patient instructed to cont prior HEP.  Exercises were reviewed and Meryl was able to demonstrate them prior to the end of the session.  Meryl demonstrated good  understanding of the education provided.     See EMR under Patient Instructions for exercises provided 10/6/2022.    Assessment     Meryl presented today for L knee evaluation following meniscectomy. She will be treated in PT simultaneously for her L knee and R shoulder pain. Patient presented with limited quad strength, ROM to the knee, gait impairments, and trouble completing ADL independently. Patient educated on condition and tolerated first treatment session very well. Excellent ROM for POD2  Meryl Is progressing well towards her goals.   Pt prognosis is Excellent.     Pt will continue to benefit from skilled outpatient physical " therapy to address the deficits listed in the problem list box on initial evaluation, provide pt/family education and to maximize pt's level of independence in the home and community environment.     Pt's spiritual, cultural and educational needs considered and pt agreeable to plan of care and goals.    Anticipated barriers to physical therapy: work schedule    GOALS:     Short Term Goals:  4 weeks weeks  1.Report decreased shoulder pain < / =  5/10 at worst to increase tolerance for work   2. Increase PROM full pain free   3. Increased strength by 1/3 MMT grade in shoulder strength to increase tolerance for ADL and work activities.  4. Pt to tolerate HEP to improve ROM and independence with ADL's     Long Term Goals: 8 weeks  1.Report decreased shoulder pain  < / =  2/10 at worst  to increase tolerance for work  2.Increase AROM to full pain free  3.Increase strength to >/= 4/5 in shoulder strength to increase tolerance for ADL and work activities.  4. Pt goal: Return to pain free activities of daily living and work related activities.  5. Pt will have improved FOTO score of </= 35% for functional improvements in activities of daily living.    New Knee Goals: 6 weeks    1.Report decreased knee pain  < / =  2/10  to increase tolerance for return to ambulation without RW  2. Increase knee ROM to full motion in order to be able to perform ADLs without difficulty.  3. Increase strength by 1/3 MMT grade in quad and glutes  to increase tolerance for ADL and work activities.  4. Pt to tolerate HEP to improve ROM and independence with ADL's      Plan     Plan of care Certification: 9/29/2022 to 12/8/2022.    Patient to be seen for knee and shoulder therapy. Continue per post op protocol and rotator cuff strengthening     Fabricio Tolentino, PT

## 2022-10-07 NOTE — PLAN OF CARE
Physical Therapy Daily Treatment Note     Name: Meryl Johnson  [facility] Number: [id number]    Therapy Diagnosis:   Encounter Diagnoses   Name Primary?    Chronic right shoulder pain Yes    Decreased strength of upper extremity     Acute pain of left knee      Physician: Jun Chin MD    Visit Date: 10/6/2022  Physician Orders: PT Eval and Treat   Medical Diagnosis from Referral:   Diagnosis   M25.511,G89.29 (ICD-10-CM) - Chronic right shoulder pain   M75.01 (ICD-10-CM) - Adhesive capsulitis of right shoulder    S83.242A (ICD-10-CM) - Acute medial meniscus tear of left knee, initial encounter  Evaluation Date: 9/29/2022 shoulder, 107/2022 knee  Authorization Period Expiration: 12/31/2022  Plan of Care Expiration: 12/08/2022  Visit # / Visits authorized: 5/ 20      Time In: 1500  Time Out: 1610  Total Billable Time: 60 minutes    Precautions: Standard and Weightbearing    Subjective     Pt reports: Patient underwent a L meniscectomy yesterday. She took her first pain pill about an hour ago prior to therapy. Patient has been using the ice machine and doing ankle pumps prior to therapy. Goal is to get back into the [facility]  She was compliant with home exercise program.  Response to previous treatment: R shoulder relief  Functional change: WBAT with RW    Pain: 5/10  Location: left knee  , right shoulder    Objective     10/7/2022: Patient L knee evaluated today following meniscectomy yesterday.     Observation: Presented with L knee bandaged as expected post op with ALONDRA hose and stem pads in place      Functional Tests:  Gait: WBAT with rolling walker. Educated on heel strike and toe off gait cycle    Knee Passive Range of Motion:   Right  Left    Flexion 110 93   Extension +3 hyper 0       Quad Set: Fair, patient has been practicing at home. Fair+ after Congolese stem    Joint Mobility: Limited due to bandaging but good EOT flexion and tolerance to patellar mobilization     Palpation:  "Tender medial joint line    Sensation: Intact    Edema: min present       Meryl received therapeutic exercises to develop strength, endurance, ROM, and flexibility for 25 minutes including:  Heel prop 5'  HSS strap 30 sec 5x  Supine SLR 2 x 10  Patient education    Meryl received the following manual therapy techniques: Joint mobilizations and Soft tissue Mobilization were applied to the: L knee for 25 minutes, including:    Knee bandage assessment  Passive flexion EOT   Patellar mobilizations all planes  Gr II extension mob    Meryl participated in neuromuscular re-education activities to improve: Balance, Coordination, Sense, Proprioception, and Posture for 10 minutes. The following activities were included:  Quad sets NMES Russian 10' 10" on/off      Meryl received cold pack for 10 minutes to to decrease circulation, pain, and swelling.      Home Exercises Provided and Patient Education Provided     Education provided:   - Quad set every hour  - No sleeping with anything under the knee  - S/s of infection  - Keep wound clean and no soaking in tub  - Importance of knee extension    Written Home Exercises Provided: Patient instructed to cont prior HEP.  Exercises were reviewed and Meryl was able to demonstrate them prior to the end of the session.  Meryl demonstrated good  understanding of the education provided.     See EMR under Patient Instructions for exercises provided 10/6/2022.    Assessment     Meryl presented today for L knee evaluation following meniscectomy. She will be treated in PT simultaneously for her L knee and R shoulder pain. Patient presented with limited quad strength, ROM to the knee, gait impairments, and trouble completing ADL independently. Patient educated on condition and tolerated first treatment session very well. Excellent ROM for POD2  Meryl Is progressing well towards her goals.   Pt prognosis is Excellent.     Pt will continue to benefit from skilled outpatient physical " therapy to address the deficits listed in the problem list box on initial evaluation, provide pt/family education and to maximize pt's level of independence in the home and community environment.     Pt's spiritual, cultural and educational needs considered and pt agreeable to plan of care and goals.    Anticipated barriers to physical therapy: work schedule    GOALS:     Short Term Goals:  4 weeks weeks  1.Report decreased shoulder pain < / =  5/10 at worst to increase tolerance for work   2. Increase PROM full pain free   3. Increased strength by 1/3 MMT grade in shoulder strength to increase tolerance for ADL and work activities.  4. Pt to tolerate HEP to improve ROM and independence with ADL's     Long Term Goals: 8 weeks  1.Report decreased shoulder pain  < / =  2/10 at worst  to increase tolerance for work  2.Increase AROM to full pain free  3.Increase strength to >/= 4/5 in shoulder strength to increase tolerance for ADL and work activities.  4. Pt goal: Return to pain free activities of daily living and work related activities.  5. Pt will have improved FOTO score of </= 35% for functional improvements in activities of daily living.    New Knee Goals: 6 weeks    1.Report decreased knee pain  < / =  2/10  to increase tolerance for return to ambulation without RW  2. Increase knee ROM to full motion in order to be able to perform ADLs without difficulty.  3. Increase strength by 1/3 MMT grade in quad and glutes  to increase tolerance for ADL and work activities.  4. Pt to tolerate HEP to improve ROM and independence with ADL's      Plan     Plan of care Certification: 9/29/2022 to 12/8/2022.    Patient to be seen for knee and shoulder therapy. Continue per post op protocol and rotator cuff strengthening     Fabricio Tolentino, PT

## 2022-10-10 ENCOUNTER — CLINICAL SUPPORT (OUTPATIENT)
Dept: REHABILITATION | Facility: HOSPITAL | Age: 67
End: 2022-10-10
Payer: COMMERCIAL

## 2022-10-10 DIAGNOSIS — R29.898 DECREASED STRENGTH OF UPPER EXTREMITY: ICD-10-CM

## 2022-10-10 DIAGNOSIS — G89.29 CHRONIC RIGHT SHOULDER PAIN: Primary | ICD-10-CM

## 2022-10-10 DIAGNOSIS — M25.562 ACUTE PAIN OF LEFT KNEE: ICD-10-CM

## 2022-10-10 DIAGNOSIS — M25.511 CHRONIC RIGHT SHOULDER PAIN: Primary | ICD-10-CM

## 2022-10-10 DIAGNOSIS — S83.242A ACUTE MEDIAL MENISCUS TEAR OF LEFT KNEE, INITIAL ENCOUNTER: ICD-10-CM

## 2022-10-10 PROCEDURE — 97112 NEUROMUSCULAR REEDUCATION: CPT | Performed by: PHYSICAL THERAPIST

## 2022-10-10 PROCEDURE — 97140 MANUAL THERAPY 1/> REGIONS: CPT | Performed by: PHYSICAL THERAPIST

## 2022-10-10 NOTE — PROGRESS NOTES
Physical Therapy Daily Treatment Note     Name: [facility] [id number]    Therapy Diagnosis:   Encounter Diagnoses   Name Primary?    Acute medial meniscus tear of left knee, initial encounter     Chronic right shoulder pain Yes    Decreased strength of upper extremity     Acute pain of left knee      Physician: Jun Chin MD    Visit Date: 10/10/2022  Physician Orders: PT Eval and Treat   Medical Diagnosis from Referral:   Diagnosis   M25.511,G89.29 (ICD-10-CM) - Chronic right shoulder pain   M75.01 (ICD-10-CM) - Adhesive capsulitis of right shoulder    S83.242A (ICD-10-CM) - Acute medial meniscus tear of left knee, initial encounter  Evaluation Date: 9/29/2022 shoulder, 107/2022 knee  Authorization Period Expiration: 12/31/2022  Plan of Care Expiration: 12/08/2022  Visit # / Visits authorized: 6/ 20      Time In: 0800  Time Out: 0900  Total Billable Time: 60 minutes    Precautions: Standard and Weightbearing    Subjective     Pt reports: I have been doing my exercises. Only using the walker in community. I can''t bend it as much myself   She was compliant with home exercise program.  Response to previous treatment: R shoulder relief  Functional change: WBAT with RW    Pain: 5/10  Location: left knee  , right shoulder    Objective       Knee Passive Range of Motion:   Right  Left    Flexion 110 93   Extension +3 hyper 0       Meryl received therapeutic exercises to develop strength, endurance, ROM, and flexibility for 25 minutes including:    HSS strap 30 sec 5x  Supine SLR 4 x 10  Sidelying hip abd with flexion 4 x 10  Seated EOT LAQ 2 x 15  Patient education    Meryl received the following manual therapy techniques: Joint mobilizations and Soft tissue Mobilization were applied to the: L knee for 20 minutes, including:    Knee assessment  Passive flexion EOT   Patellar mobilizations all planes  Gr II extension mob  STM medial HS     Meryl participated in neuromuscular re-education  "activities to improve: Balance, Coordination, Sense, Proprioception, and Posture for 15 minutes. The following activities were included:    Quad sets 40x 3" hold  HS isometrics 20x 5" hold    Meryl received cold pack for 10 minutes to to decrease circulation, pain, and swelling.      Home Exercises Provided and Patient Education Provided     Education provided:   - Quad set every hour  - No sleeping with anything under the knee  - S/s of infection  - Keep wound clean and no soaking in tub  - Importance of knee extension    Written Home Exercises Provided: Patient instructed to cont prior HEP.  Exercises were reviewed and Meryl was able to demonstrate them prior to the end of the session.  Meryl demonstrated good  understanding of the education provided.     See EMR under Patient Instructions for exercises provided 10/6/2022.    Assessment     Meryl progressed with quad strengthening in the clinic. Better activation with tactile cue but slight lag with SLR. Did well sidelying for hip abd and flexion strengthening Will progress to weightbearing next visit for improved tolerance to gait and strengthening with BW     Meryl Is progressing well towards her goals.   Pt prognosis is Excellent.     Pt will continue to benefit from skilled outpatient physical therapy to address the deficits listed in the problem list box on initial evaluation, provide pt/family education and to maximize pt's level of independence in the home and community environment.     Pt's spiritual, cultural and educational needs considered and pt agreeable to plan of care and goals.    Anticipated barriers to physical therapy: work schedule    GOALS:     Short Term Goals:  4 weeks weeks  1.Report decreased shoulder pain < / =  5/10 at worst to increase tolerance for work   2. Increase PROM full pain free   3. Increased strength by 1/3 MMT grade in shoulder strength to increase tolerance for ADL and work activities.  4. Pt to tolerate HEP to improve " ROM and independence with ADL's     Long Term Goals: 8 weeks  1.Report decreased shoulder pain  < / =  2/10 at worst  to increase tolerance for work  2.Increase AROM to full pain free  3.Increase strength to >/= 4/5 in shoulder strength to increase tolerance for ADL and work activities.  4. Pt goal: Return to pain free activities of daily living and work related activities.  5. Pt will have improved FOTO score of </= 35% for functional improvements in activities of daily living.    New Knee Goals: 6 weeks    1.Report decreased knee pain  < / =  2/10  to increase tolerance for return to ambulation without RW  2. Increase knee ROM to full motion in order to be able to perform ADLs without difficulty.  3. Increase strength by 1/3 MMT grade in quad and glutes  to increase tolerance for ADL and work activities.  4. Pt to tolerate HEP to improve ROM and independence with ADL's      Plan     Plan of care Certification: 9/29/2022 to 12/8/2022.    Patient to be seen for knee and shoulder therapy. Continue per post op protocol and rotator cuff strengthening     Fabricio Tolentino, PT

## 2022-10-12 ENCOUNTER — CLINICAL SUPPORT (OUTPATIENT)
Dept: REHABILITATION | Facility: HOSPITAL | Age: 67
End: 2022-10-12
Payer: COMMERCIAL

## 2022-10-12 DIAGNOSIS — M25.562 ACUTE PAIN OF LEFT KNEE: ICD-10-CM

## 2022-10-12 DIAGNOSIS — M25.511 CHRONIC RIGHT SHOULDER PAIN: Primary | ICD-10-CM

## 2022-10-12 DIAGNOSIS — G89.29 CHRONIC RIGHT SHOULDER PAIN: Primary | ICD-10-CM

## 2022-10-12 DIAGNOSIS — R29.898 DECREASED STRENGTH OF UPPER EXTREMITY: ICD-10-CM

## 2022-10-12 PROCEDURE — 97112 NEUROMUSCULAR REEDUCATION: CPT | Performed by: PHYSICAL THERAPIST

## 2022-10-12 PROCEDURE — 97110 THERAPEUTIC EXERCISES: CPT | Performed by: PHYSICAL THERAPIST

## 2022-10-12 PROCEDURE — 97140 MANUAL THERAPY 1/> REGIONS: CPT | Performed by: PHYSICAL THERAPIST

## 2022-10-12 NOTE — PROGRESS NOTES
"  Physical Therapy Daily Treatment Note     Name: Meryl Yo [facility] Number: [id number]    Therapy Diagnosis:   Encounter Diagnoses   Name Primary?    Chronic right shoulder pain Yes    Decreased strength of upper extremity     Acute pain of left knee      Physician: Russell Honeycutt III, *    Visit Date: 10/12/2022  Physician Orders: PT Eval and Treat   Medical Diagnosis from Referral:   Diagnosis   M25.511,G89.29 (ICD-10-CM) - Chronic right shoulder pain   M75.01 (ICD-10-CM) - Adhesive capsulitis of right shoulder    S83.242A (ICD-10-CM) - Acute medial meniscus tear of left knee, initial encounter  Evaluation Date: 9/29/2022 shoulder, 107/2022 knee  Authorization Period Expiration: 12/31/2022  Plan of Care Expiration: 12/08/2022  Visit # / Visits authorized: 1/ 20  Total visits: 4    Time In: 0700  Time Out: 0810  Total Billable Time: 60 minutes    Precautions: Standard and Weightbearing    Subjective     Pt reports: I walked in here without the walker. Most pain at night with sleeping    She was compliant with home exercise program.  Response to previous treatment: R shoulder relief  Functional change: WBAT with no AD on arrival    Pain: 5/10  Location: left knee  , right shoulder    Objective       Knee Passive Range of Motion:   Right  Left    Flexion 110 93   Extension +3 hyper 0       Meryl received therapeutic exercises to develop strength, endurance, ROM, and flexibility for 25 minutes including:     HS 5'  Quad set 10x 10 sec  SAQ 2 x 15 3" hold  Supine SLR 4 x 10  Seated EOT LAQ 2 x 15  Patient education    NT  Sidelying hip abd with flexion 4 x 10    Meryl received the following manual therapy techniques: Joint mobilizations and Soft tissue Mobilization were applied to the: L knee for 20 minutes, including:    Knee assessment  Passive flexion EOT   Patellar mobilizations all planes  Gr II extension mob  STM medial HS     Meryl participated in neuromuscular re-education activities to " "improve: Balance, Coordination, Sense, Proprioception, and Posture for 15 minutes. The following activities were included:    Quad sets 40x 3" hold  Standing mini squat 2 x 10  Standing hip abd 2 x 15    Meryl received cold pack for 10 minutes to to decrease circulation, pain, and swelling.      Home Exercises Provided and Patient Education Provided     Education provided:   - Quad set every hour  - No sleeping with anything under the knee  - S/s of infection  - Keep wound clean and no soaking in tub  - Importance of knee extension    Written Home Exercises Provided: Patient instructed to cont prior HEP.  Exercises were reviewed and Meryl was able to demonstrate them prior to the end of the session.  Meryl demonstrated good  understanding of the education provided.     See EMR under Patient Instructions for exercises provided 10/6/2022.    Assessment     Meryl tolerated exercises well today. Better SLR compliance and quad strength with SAQ. Improved knee flexion EOT and LAQ for improving quad strength. Ambulating without assistive device in clinic now    Meryl Is progressing well towards her goals.   Pt prognosis is Excellent.     Pt will continue to benefit from skilled outpatient physical therapy to address the deficits listed in the problem list box on initial evaluation, provide pt/family education and to maximize pt's level of independence in the home and community environment.     Pt's spiritual, cultural and educational needs considered and pt agreeable to plan of care and goals.    Anticipated barriers to physical therapy: work schedule    GOALS:     Short Term Goals:  4 weeks weeks  1.Report decreased shoulder pain < / =  5/10 at worst to increase tolerance for work   2. Increase PROM full pain free   3. Increased strength by 1/3 MMT grade in shoulder strength to increase tolerance for ADL and work activities.  4. Pt to tolerate HEP to improve ROM and independence with ADL's     Long Term Goals: 8 " weeks  1.Report decreased shoulder pain  < / =  2/10 at worst  to increase tolerance for work  2.Increase AROM to full pain free  3.Increase strength to >/= 4/5 in shoulder strength to increase tolerance for ADL and work activities.  4. Pt goal: Return to pain free activities of daily living and work related activities.  5. Pt will have improved FOTO score of </= 35% for functional improvements in activities of daily living.    New Knee Goals: 6 weeks    1.Report decreased knee pain  < / =  2/10  to increase tolerance for return to ambulation without RW  2. Increase knee ROM to full motion in order to be able to perform ADLs without difficulty.  3. Increase strength by 1/3 MMT grade in quad and glutes  to increase tolerance for ADL and work activities.  4. Pt to tolerate HEP to improve ROM and independence with ADL's      Plan     Plan of care Certification: 9/29/2022 to 12/8/2022.    Patient to be seen for knee and shoulder therapy. Continue per post op protocol and rotator cuff strengthening     Fabricio Tolentino, PT        Patient with PE last year; stopped Xarelto d/t pregnancy.  - speak with Dr. Elaine re: plan to restart AC

## 2022-10-14 ENCOUNTER — CLINICAL SUPPORT (OUTPATIENT)
Dept: REHABILITATION | Facility: HOSPITAL | Age: 67
End: 2022-10-14
Payer: COMMERCIAL

## 2022-10-14 DIAGNOSIS — M25.562 ACUTE PAIN OF LEFT KNEE: ICD-10-CM

## 2022-10-14 DIAGNOSIS — R29.898 DECREASED STRENGTH OF UPPER EXTREMITY: ICD-10-CM

## 2022-10-14 DIAGNOSIS — M25.511 CHRONIC RIGHT SHOULDER PAIN: Primary | ICD-10-CM

## 2022-10-14 DIAGNOSIS — G89.29 CHRONIC RIGHT SHOULDER PAIN: Primary | ICD-10-CM

## 2022-10-14 PROCEDURE — 97110 THERAPEUTIC EXERCISES: CPT | Performed by: PHYSICAL THERAPIST

## 2022-10-14 PROCEDURE — 97140 MANUAL THERAPY 1/> REGIONS: CPT | Performed by: PHYSICAL THERAPIST

## 2022-10-14 NOTE — PROGRESS NOTES
"  Physical Therapy Daily Treatment Note     Name: Meryl Yo [facility] Number: [id number]    Therapy Diagnosis:   Encounter Diagnoses   Name Primary?    Chronic right shoulder pain Yes    Decreased strength of upper extremity     Acute pain of left knee      Physician: Russell Honeycutt III, *    Visit Date: 10/14/2022  Physician Orders: PT Eval and Treat   Medical Diagnosis from Referral:   Diagnosis   M25.511,G89.29 (ICD-10-CM) - Chronic right shoulder pain   M75.01 (ICD-10-CM) - Adhesive capsulitis of right shoulder    S83.242A (ICD-10-CM) - Acute medial meniscus tear of left knee, initial encounter  Evaluation Date: 9/29/2022 shoulder, 107/2022 knee  Authorization Period Expiration: 12/31/2022  Plan of Care Expiration: 12/08/2022  Visit # / Visits authorized: 2/ 20  Total visits: 4    Time In: 0700  Time Out: 0810  Total Billable Time: 70 minutes    Precautions: Standard and Weightbearing    Subjective     Pt reports: My quad was sore especially after getting off the TENS unit.     She was compliant with home exercise program.  Response to previous treatment: R shoulder relief  Functional change: WBAT with no AD on arrival    Pain: 5/10  Location: left knee  , right shoulder    Objective       Knee Passive Range of Motion:   Right  Left    Flexion 110 98   Extension +3 hyper 0       Meryl received therapeutic exercises to develop strength, endurance, ROM, and flexibility for 45 minutes including:     HS and quad 5'  Quad set 10x 10 sec  SAQ 2 x 15 3" hold  Supine SLR 4 x 10  Bike rocking 8' for knee ROM   Seated EOT LAQ 2 x 15  Patient education    NT  Sidelying hip abd with flexion 4 x 10    Meryl received the following manual therapy techniques: Joint mobilizations and Soft tissue Mobilization were applied to the: L knee for 20 minutes, including:    Knee assessment  Passive flexion EOT   Patellar mobilizations all planes  Gr II extension mob  STM medial HS   AP tibia Gr III    Meryl " "participated in neuromuscular re-education activities to improve: Balance, Coordination, Sense, Proprioception, and Posture for 0minutes. The following activities were included:    Quad sets 40x 3" hold  Standing mini squat 2 x 10  Standing hip abd 2 x 15    Meryl received cold pack for 10 minutes to to decrease circulation, pain, and swelling.      Home Exercises Provided and Patient Education Provided     Education provided:   - Quad set every hour  - No sleeping with anything under the knee  - S/s of infection  - Keep wound clean and no soaking in tub  - Importance of knee extension    Written Home Exercises Provided: Patient instructed to cont prior HEP.  Exercises were reviewed and Meryl was able to demonstrate them prior to the end of the session.  Meryl demonstrated good  understanding of the education provided.     See EMR under Patient Instructions for exercises provided 10/6/2022.    Assessment     Meryl had improved quad activation on arrival but still a slight lag with poor endurance to SLR. Progressed on bike to improve her flexion, got better with repetitive motion. Less pain at end range knee flexion as well.     Meryl Is progressing well towards her goals.   Pt prognosis is Excellent.     Pt will continue to benefit from skilled outpatient physical therapy to address the deficits listed in the problem list box on initial evaluation, provide pt/family education and to maximize pt's level of independence in the home and community environment.     Pt's spiritual, cultural and educational needs considered and pt agreeable to plan of care and goals.    Anticipated barriers to physical therapy: work schedule    GOALS:     Short Term Goals:  4 weeks weeks  1.Report decreased shoulder pain < / =  5/10 at worst to increase tolerance for work   2. Increase PROM full pain free   3. Increased strength by 1/3 MMT grade in shoulder strength to increase tolerance for ADL and work activities.  4. Pt to tolerate " HEP to improve ROM and independence with ADL's     Long Term Goals: 8 weeks  1.Report decreased shoulder pain  < / =  2/10 at worst  to increase tolerance for work  2.Increase AROM to full pain free  3.Increase strength to >/= 4/5 in shoulder strength to increase tolerance for ADL and work activities.  4. Pt goal: Return to pain free activities of daily living and work related activities.  5. Pt will have improved FOTO score of </= 35% for functional improvements in activities of daily living.    New Knee Goals: 6 weeks    1.Report decreased knee pain  < / =  2/10  to increase tolerance for return to ambulation without RW  2. Increase knee ROM to full motion in order to be able to perform ADLs without difficulty.  3. Increase strength by 1/3 MMT grade in quad and glutes  to increase tolerance for ADL and work activities.  4. Pt to tolerate HEP to improve ROM and independence with ADL's      Plan     Plan of care Certification: 9/29/2022 to 12/8/2022.    Patient to be seen for knee and shoulder therapy. Continue per post op protocol and rotator cuff strengthening     Fabricio Tolentino, PT

## 2022-10-17 ENCOUNTER — CLINICAL SUPPORT (OUTPATIENT)
Dept: REHABILITATION | Facility: HOSPITAL | Age: 67
End: 2022-10-17
Payer: COMMERCIAL

## 2022-10-17 DIAGNOSIS — M25.562 ACUTE PAIN OF LEFT KNEE: ICD-10-CM

## 2022-10-17 DIAGNOSIS — M25.511 CHRONIC RIGHT SHOULDER PAIN: Primary | ICD-10-CM

## 2022-10-17 DIAGNOSIS — R29.898 DECREASED STRENGTH OF UPPER EXTREMITY: ICD-10-CM

## 2022-10-17 DIAGNOSIS — G89.29 CHRONIC RIGHT SHOULDER PAIN: Primary | ICD-10-CM

## 2022-10-17 PROCEDURE — 97110 THERAPEUTIC EXERCISES: CPT | Performed by: PHYSICAL THERAPIST

## 2022-10-17 PROCEDURE — 97140 MANUAL THERAPY 1/> REGIONS: CPT | Performed by: PHYSICAL THERAPIST

## 2022-10-17 NOTE — PROGRESS NOTES
"  Physical Therapy Daily Treatment Note     Name: Meryl Yo [facility] Number: [id number]    Therapy Diagnosis:   Encounter Diagnoses   Name Primary?    Chronic right shoulder pain Yes    Decreased strength of upper extremity     Acute pain of left knee      Physician: Russell Honeycutt III, *    Visit Date: 10/17/2022  Physician Orders: PT Eval and Treat   Medical Diagnosis from Referral:   Diagnosis   M25.511,G89.29 (ICD-10-CM) - Chronic right shoulder pain   M75.01 (ICD-10-CM) - Adhesive capsulitis of right shoulder    S83.242A (ICD-10-CM) - Acute medial meniscus tear of left knee, initial encounter  Evaluation Date: 9/29/2022 shoulder, 107/2022 knee  Authorization Period Expiration: 12/31/2022  Plan of Care Expiration: 12/08/2022  Visit # / Visits authorized: 3/ 20  Total visits: 4    Time In: 0700  Time Out: 0810  Total Billable Time: 70 minutes    Precautions: Standard and Weightbearing    Subjective     Pt reports: trouble sleeping still, disappointed with my progress thus far    She was compliant with home exercise program.  Response to previous treatment: R shoulder relief  Functional change: WBAT with no AD on arrival    Pain: 5/10  Location: left knee  , right shoulder    Objective       Knee Passive Range of Motion:   Right  Left    Flexion 110 98   Extension +3 hyper 0       Meryl received therapeutic exercises to develop strength, endurance, ROM, and flexibility for 45 minutes including:    Quad set 10x 10 sec  HSS strap 30 sec 5x  Sidelying hip abd with flexion 3 x 10  SAQ 2 x 15 3" hold  Supine SLR 4 x 10  Patient education    NT  Bike rocking 8' for knee ROM   Seated EOT LAQ 2 x 15    Meryl received the following manual therapy techniques: Joint mobilizations and Soft tissue Mobilization were applied to the: L knee for 15 minutes, including:    Knee assessment  Passive flexion EOT   Patellar mobilizations all planes  Gr II extension mob  STM medial HS   AP tibia Gr III  Medial gapping Gr " "III    Meryl participated in neuromuscular re-education activities to improve: Balance, Coordination, Sense, Proprioception, and Posture for 0minutes. The following activities were included:    Quad sets 40x 3" hold  Standing mini squat 2 x 10  Standing hip abd 2 x 15    Meryl received cold pack for 10 minutes to to decrease circulation, pain, and swelling.      Home Exercises Provided and Patient Education Provided     Education provided:   - Quad set every hour  - No sleeping with anything under the knee  - S/s of infection  - Keep wound clean and no soaking in tub  - Importance of knee extension    Written Home Exercises Provided: Patient instructed to cont prior HEP.  Exercises were reviewed and Meryl was able to demonstrate them prior to the end of the session.  Meryl demonstrated good  understanding of the education provided.     See EMR under Patient Instructions for exercises provided 10/6/2022.    Assessment     Continues to have discomfort over the medial tibial plateau. Improved quad activation seen with SAQ and LAQ. Lacking a couple degrees of extension. No relief with distraction today    Meryl Is progressing well towards her goals.   Pt prognosis is Excellent.     Pt will continue to benefit from skilled outpatient physical therapy to address the deficits listed in the problem list box on initial evaluation, provide pt/family education and to maximize pt's level of independence in the home and community environment.     Pt's spiritual, cultural and educational needs considered and pt agreeable to plan of care and goals.    Anticipated barriers to physical therapy: work schedule    GOALS:     Short Term Goals:  4 weeks weeks(Progressing, not met)  1.Report decreased shoulder pain < / =  5/10 at worst to increase tolerance for work   2. Increase PROM full pain free   3. Increased strength by 1/3 MMT grade in shoulder strength to increase tolerance for ADL and work activities.  4. Pt to tolerate HEP " to improve ROM and independence with ADL's     Long Term Goals: 8 weeks(Progressing, not met)    1.Report decreased shoulder pain  < / =  2/10 at worst  to increase tolerance for work  2.Increase AROM to full pain free  3.Increase strength to >/= 4/5 in shoulder strength to increase tolerance for ADL and work activities.  4. Pt goal: Return to pain free activities of daily living and work related activities.  5. Pt will have improved FOTO score of </= 35% for functional improvements in activities of daily living.    New Knee Goals: 6 weeks (Progressing, not met)    1.Report decreased knee pain  < / =  2/10  to increase tolerance for return to ambulation without RW  2. Increase knee ROM to full motion in order to be able to perform ADLs without difficulty.  3. Increase strength by 1/3 MMT grade in quad and glutes  to increase tolerance for ADL and work activities.  4. Pt to tolerate HEP to improve ROM and independence with ADL's      Plan     Plan of care Certification: 9/29/2022 to 12/8/2022.    Patient to be seen for knee and shoulder therapy. Continue per post op protocol and rotator cuff strengthening     Fabricio Tolentino, PT

## 2022-10-19 ENCOUNTER — OFFICE VISIT (OUTPATIENT)
Dept: SPORTS MEDICINE | Facility: CLINIC | Age: 67
End: 2022-10-19
Payer: COMMERCIAL

## 2022-10-19 ENCOUNTER — CLINICAL SUPPORT (OUTPATIENT)
Dept: REHABILITATION | Facility: HOSPITAL | Age: 67
End: 2022-10-19
Payer: COMMERCIAL

## 2022-10-19 VITALS
BODY MASS INDEX: 41.44 KG/M2 | SYSTOLIC BLOOD PRESSURE: 129 MMHG | HEIGHT: 67 IN | DIASTOLIC BLOOD PRESSURE: 79 MMHG | TEMPERATURE: 98 F | HEART RATE: 106 BPM | WEIGHT: 264 LBS

## 2022-10-19 DIAGNOSIS — M25.562 ACUTE PAIN OF LEFT KNEE: ICD-10-CM

## 2022-10-19 DIAGNOSIS — Z98.890 S/P ARTHROSCOPY OF LEFT KNEE: Primary | ICD-10-CM

## 2022-10-19 DIAGNOSIS — M25.511 CHRONIC RIGHT SHOULDER PAIN: Primary | ICD-10-CM

## 2022-10-19 DIAGNOSIS — G89.29 CHRONIC RIGHT SHOULDER PAIN: Primary | ICD-10-CM

## 2022-10-19 DIAGNOSIS — R29.898 DECREASED STRENGTH OF UPPER EXTREMITY: ICD-10-CM

## 2022-10-19 PROCEDURE — 3288F PR FALLS RISK ASSESSMENT DOCUMENTED: ICD-10-PCS | Mod: CPTII,S$GLB,, | Performed by: PHYSICIAN ASSISTANT

## 2022-10-19 PROCEDURE — 97112 NEUROMUSCULAR REEDUCATION: CPT

## 2022-10-19 PROCEDURE — 99999 PR PBB SHADOW E&M-EST. PATIENT-LVL IV: ICD-10-PCS | Mod: PBBFAC,,, | Performed by: PHYSICIAN ASSISTANT

## 2022-10-19 PROCEDURE — 4010F PR ACE/ARB THEARPY RXD/TAKEN: ICD-10-PCS | Mod: CPTII,S$GLB,, | Performed by: PHYSICIAN ASSISTANT

## 2022-10-19 PROCEDURE — 97110 THERAPEUTIC EXERCISES: CPT

## 2022-10-19 PROCEDURE — 99024 POSTOP FOLLOW-UP VISIT: CPT | Mod: S$GLB,,, | Performed by: PHYSICIAN ASSISTANT

## 2022-10-19 PROCEDURE — 1160F RVW MEDS BY RX/DR IN RCRD: CPT | Mod: CPTII,S$GLB,, | Performed by: PHYSICIAN ASSISTANT

## 2022-10-19 PROCEDURE — 1125F AMNT PAIN NOTED PAIN PRSNT: CPT | Mod: CPTII,S$GLB,, | Performed by: PHYSICIAN ASSISTANT

## 2022-10-19 PROCEDURE — 99024 PR POST-OP FOLLOW-UP VISIT: ICD-10-PCS | Mod: S$GLB,,, | Performed by: PHYSICIAN ASSISTANT

## 2022-10-19 PROCEDURE — 99999 PR PBB SHADOW E&M-EST. PATIENT-LVL IV: CPT | Mod: PBBFAC,,, | Performed by: PHYSICIAN ASSISTANT

## 2022-10-19 PROCEDURE — 1160F PR REVIEW ALL MEDS BY PRESCRIBER/CLIN PHARMACIST DOCUMENTED: ICD-10-PCS | Mod: CPTII,S$GLB,, | Performed by: PHYSICIAN ASSISTANT

## 2022-10-19 PROCEDURE — 3288F FALL RISK ASSESSMENT DOCD: CPT | Mod: CPTII,S$GLB,, | Performed by: PHYSICIAN ASSISTANT

## 2022-10-19 PROCEDURE — 3078F DIAST BP <80 MM HG: CPT | Mod: CPTII,S$GLB,, | Performed by: PHYSICIAN ASSISTANT

## 2022-10-19 PROCEDURE — 1159F PR MEDICATION LIST DOCUMENTED IN MEDICAL RECORD: ICD-10-PCS | Mod: CPTII,S$GLB,, | Performed by: PHYSICIAN ASSISTANT

## 2022-10-19 PROCEDURE — 1125F PR PAIN SEVERITY QUANTIFIED, PAIN PRESENT: ICD-10-PCS | Mod: CPTII,S$GLB,, | Performed by: PHYSICIAN ASSISTANT

## 2022-10-19 PROCEDURE — 1101F PR PT FALLS ASSESS DOC 0-1 FALLS W/OUT INJ PAST YR: ICD-10-PCS | Mod: CPTII,S$GLB,, | Performed by: PHYSICIAN ASSISTANT

## 2022-10-19 PROCEDURE — 3051F PR MOST RECENT HEMOGLOBIN A1C LEVEL 7.0 - < 8.0%: ICD-10-PCS | Mod: CPTII,S$GLB,, | Performed by: PHYSICIAN ASSISTANT

## 2022-10-19 PROCEDURE — 3051F HG A1C>EQUAL 7.0%<8.0%: CPT | Mod: CPTII,S$GLB,, | Performed by: PHYSICIAN ASSISTANT

## 2022-10-19 PROCEDURE — 3074F PR MOST RECENT SYSTOLIC BLOOD PRESSURE < 130 MM HG: ICD-10-PCS | Mod: CPTII,S$GLB,, | Performed by: PHYSICIAN ASSISTANT

## 2022-10-19 PROCEDURE — 1159F MED LIST DOCD IN RCRD: CPT | Mod: CPTII,S$GLB,, | Performed by: PHYSICIAN ASSISTANT

## 2022-10-19 PROCEDURE — 4010F ACE/ARB THERAPY RXD/TAKEN: CPT | Mod: CPTII,S$GLB,, | Performed by: PHYSICIAN ASSISTANT

## 2022-10-19 PROCEDURE — 1101F PT FALLS ASSESS-DOCD LE1/YR: CPT | Mod: CPTII,S$GLB,, | Performed by: PHYSICIAN ASSISTANT

## 2022-10-19 PROCEDURE — 3078F PR MOST RECENT DIASTOLIC BLOOD PRESSURE < 80 MM HG: ICD-10-PCS | Mod: CPTII,S$GLB,, | Performed by: PHYSICIAN ASSISTANT

## 2022-10-19 PROCEDURE — 97140 MANUAL THERAPY 1/> REGIONS: CPT

## 2022-10-19 PROCEDURE — 3074F SYST BP LT 130 MM HG: CPT | Mod: CPTII,S$GLB,, | Performed by: PHYSICIAN ASSISTANT

## 2022-10-19 NOTE — PROGRESS NOTES
"Physical Therapy Daily Treatment Note     Name: Meryl Yo [facility] Number: [id number]    Therapy Diagnosis:   Encounter Diagnoses   Name Primary?    Chronic right shoulder pain Yes    Decreased strength of upper extremity     Acute pain of left knee        Physician: Russell Honeycutt III, *    Visit Date: 10/19/2022  Physician Orders: PT Eval and Treat   Medical Diagnosis from Referral:   Diagnosis   M25.511,G89.29 (ICD-10-CM) - Chronic right shoulder pain   M75.01 (ICD-10-CM) - Adhesive capsulitis of right shoulder    S83.242A (ICD-10-CM) - Acute medial meniscus tear of left knee, initial encounter  Evaluation Date: 9/29/2022 shoulder, 107/2022 knee  Authorization Period Expiration: 12/31/2022  Plan of Care Expiration: 12/08/2022  Visit # / Visits authorized: 3/ 20  Total visits: 4    Time In: 1015am  Time Out: 1120  Total Billable Time: 55 minutes    Precautions: Standard and Weightbearing    Subjective     Pt reports: cramping at night in her adductors.     She was compliant with home exercise program.  Response to previous treatment: R shoulder relief  Functional change: WBAT with no AD on arrival    Pain: 5/10  Location: left knee  , right shoulder    Objective       Knee Passive Range of Motion:   Right  Left    Flexion 110 98   Extension +3 hyper 0       Meryl received therapeutic exercises to develop strength, endurance, ROM, and flexibility for 20 minutes including:    Quad set 10x 10 sec  HSS strap 30 sec 5x  Sidelying hip abd with flexion 3 x 10  SAQ 2 x 15 3" hold  Supine SLR 4 x 10  Patient education    NT  Bike rocking 8' for knee ROM   Seated EOT LAQ 2 x 15    Meryl received the following manual therapy techniques: Joint mobilizations and Soft tissue Mobilization were applied to the: L knee for 15 minutes, including:    Knee assessment  Passive flexion EOT   Patellar mobilizations all planes  Gr II extension mob  STM medial HS   AP tibia Gr III  Medial gapping Gr III    Meryl participated in " "neuromuscular re-education activities to improve: Balance, Coordination, Sense, Proprioception, and Posture for 20 minutes. The following activities were included:    Quad sets 40x 3" hold  Standing mini squat 2 x 10  Standing hip abd 2 x 15  Hurdles for gait training    Meryl received cold pack for 10 minutes to to decrease circulation, pain, and swelling.      Home Exercises Provided and Patient Education Provided     Education provided:   - Quad set every hour  - No sleeping with anything under the knee  - S/s of infection  - Keep wound clean and no soaking in tub  - Importance of knee extension    Written Home Exercises Provided: Patient instructed to cont prior HEP.  Exercises were reviewed and Meryl was able to demonstrate them prior to the end of the session.  Meryl demonstrated good  understanding of the education provided.     See EMR under Patient Instructions for exercises provided 10/6/2022.    Assessment     Patient gait cycle improved today in clinic. Again educated on extension for improved quad strength. Straight leg raise upright without quad lag. Patient closed chain strengthening improving, less ecchymosis in the knee     Meryl Is progressing well towards her goals.   Pt prognosis is Excellent.     Pt will continue to benefit from skilled outpatient physical therapy to address the deficits listed in the problem list box on initial evaluation, provide pt/family education and to maximize pt's level of independence in the home and community environment.     Pt's spiritual, cultural and educational needs considered and pt agreeable to plan of care and goals.    Anticipated barriers to physical therapy: work schedule    GOALS:     Short Term Goals:  4 weeks weeks(Progressing, not met)  1.Report decreased shoulder pain < / =  5/10 at worst to increase tolerance for work   2. Increase PROM full pain free   3. Increased strength by 1/3 MMT grade in shoulder strength to increase tolerance for ADL and " work activities.  4. Pt to tolerate HEP to improve ROM and independence with ADL's     Long Term Goals: 8 weeks(Progressing, not met)    1.Report decreased shoulder pain  < / =  2/10 at worst  to increase tolerance for work  2.Increase AROM to full pain free  3.Increase strength to >/= 4/5 in shoulder strength to increase tolerance for ADL and work activities.  4. Pt goal: Return to pain free activities of daily living and work related activities.  5. Pt will have improved FOTO score of </= 35% for functional improvements in activities of daily living.    New Knee Goals: 6 weeks (Progressing, not met)    1.Report decreased knee pain  < / =  2/10  to increase tolerance for return to ambulation without RW  2. Increase knee ROM to full motion in order to be able to perform ADLs without difficulty.  3. Increase strength by 1/3 MMT grade in quad and glutes  to increase tolerance for ADL and work activities.  4. Pt to tolerate HEP to improve ROM and independence with ADL's      Plan     Plan of care Certification: 9/29/2022 to 12/8/2022.    Patient to be seen for knee and shoulder therapy. Continue per post op protocol and rotator cuff strengthening     Radha Sifuentes, PT, DPT

## 2022-10-20 ENCOUNTER — PATIENT MESSAGE (OUTPATIENT)
Dept: SPORTS MEDICINE | Facility: CLINIC | Age: 67
End: 2022-10-20
Payer: COMMERCIAL

## 2022-10-21 ENCOUNTER — CLINICAL SUPPORT (OUTPATIENT)
Dept: REHABILITATION | Facility: HOSPITAL | Age: 67
End: 2022-10-21
Payer: COMMERCIAL

## 2022-10-21 DIAGNOSIS — M25.511 CHRONIC RIGHT SHOULDER PAIN: Primary | ICD-10-CM

## 2022-10-21 DIAGNOSIS — G89.29 CHRONIC RIGHT SHOULDER PAIN: Primary | ICD-10-CM

## 2022-10-21 DIAGNOSIS — R29.898 DECREASED STRENGTH OF UPPER EXTREMITY: ICD-10-CM

## 2022-10-21 DIAGNOSIS — M25.562 ACUTE PAIN OF LEFT KNEE: ICD-10-CM

## 2022-10-21 PROCEDURE — 97110 THERAPEUTIC EXERCISES: CPT | Performed by: PHYSICAL THERAPIST

## 2022-10-21 PROCEDURE — 97140 MANUAL THERAPY 1/> REGIONS: CPT | Performed by: PHYSICAL THERAPIST

## 2022-10-21 NOTE — PROGRESS NOTES
"Physical Therapy Daily Treatment Note     Name: Meryl Johnson  [facility] Number: [id number]    Therapy Diagnosis:   Encounter Diagnoses   Name Primary?    Chronic right shoulder pain Yes    Decreased strength of upper extremity     Acute pain of left knee        Physician: Russell Honeycutt III, *    Visit Date: 10/21/2022  Physician Orders: PT Eval and Treat   Medical Diagnosis from Referral:   Diagnosis   M25.511,G89.29 (ICD-10-CM) - Chronic right shoulder pain   M75.01 (ICD-10-CM) - Adhesive capsulitis of right shoulder    S83.242A (ICD-10-CM) - Acute medial meniscus tear of left knee, initial encounter  Evaluation Date: 9/29/2022 shoulder, 107/2022 knee  Authorization Period Expiration: 12/31/2022  Plan of Care Expiration: 12/08/2022  Visit # / Visits authorized: 5/ 20  Total visits: 4    Time In:  0800am  Time Out: 0900am  Total Billable Time: 30 minutes    Precautions: Standard and Weightbearing    Subjective     Pt reports: I am doing better back with the walker. Not as bad, but yesterday was my first day out of the house and it hurt after having it bent to get my hair done    She was compliant with home exercise program.  Response to previous treatment: R shoulder relief  Functional change: WBAT with no AD on arrival    Pain: 5/10  Location: left knee  , right shoulder    Objective       Knee Passive Range of Motion:   Right  Left    Flexion 110 98   Extension +3 hyper 0       Meryl received therapeutic exercises to develop strength, endurance, ROM, and flexibility for 45 minutes including:    Quad set 10x 10 sec  HSS strap 30 sec 5x  Supine SLR 4 x 10  LAQ 1# 3 x 15  Supine clams GTB 2 x 20 3" hold  Gait training - walker, hurry cane with adjustments and step through gait demo  Patient education    NT  Bike rocking 8' for knee ROM   Seated EOT LAQ 2 x 15    Meryl received the following manual therapy techniques: Joint mobilizations and Soft tissue Mobilization were applied to the: L knee for 15 minutes, " "including:    Knee assessment  Passive flexion EOT   Patellar mobilizations all planes  Gr II extension mob  STM medial HS   AP tibia Gr III  Medial gapping Gr III    Meryl participated in neuromuscular re-education activities to improve: Balance, Coordination, Sense, Proprioception, and Posture for 00 minutes. The following activities were included:    Quad sets 40x 3" hold  Standing mini squat 2 x 10  Standing hip abd 2 x 15  Hurdles for gait training    Meryl received cold pack for 10 minutes to to decrease circulation, pain, and swelling.      Home Exercises Provided and Patient Education Provided     Education provided:   - Quad set every hour  - No sleeping with anything under the knee  - S/s of infection  - Keep wound clean and no soaking in tub  - Importance of knee extension    Written Home Exercises Provided: Patient instructed to cont prior HEP.  Exercises were reviewed and Meryl was able to demonstrate them prior to the end of the session.  Meryl demonstrated good  understanding of the education provided.     See EMR under Patient Instructions for exercises provided 10/6/2022.    Assessment     Luis A's quad strength and activation improved this week. She was educated on proper gait cycle with walker and SPC, adjusted to prevent anterior lean and educated on step through motion.     Meryl Is progressing well towards her goals.   Pt prognosis is Excellent.     Pt will continue to benefit from skilled outpatient physical therapy to address the deficits listed in the problem list box on initial evaluation, provide pt/family education and to maximize pt's level of independence in the home and community environment.     Pt's spiritual, cultural and educational needs considered and pt agreeable to plan of care and goals.    Anticipated barriers to physical therapy: work schedule    GOALS:     Short Term Goals:  4 weeks weeks(Progressing, not met)  1.Report decreased shoulder pain < / =  5/10 at worst to " increase tolerance for work   2. Increase PROM full pain free   3. Increased strength by 1/3 MMT grade in shoulder strength to increase tolerance for ADL and work activities.  4. Pt to tolerate HEP to improve ROM and independence with ADL's     Long Term Goals: 8 weeks(Progressing, not met)    1.Report decreased shoulder pain  < / =  2/10 at worst  to increase tolerance for work  2.Increase AROM to full pain free  3.Increase strength to >/= 4/5 in shoulder strength to increase tolerance for ADL and work activities.  4. Pt goal: Return to pain free activities of daily living and work related activities.  5. Pt will have improved FOTO score of </= 35% for functional improvements in activities of daily living.    New Knee Goals: 6 weeks (Progressing, not met)    1.Report decreased knee pain  < / =  2/10  to increase tolerance for return to ambulation without RW  2. Increase knee ROM to full motion in order to be able to perform ADLs without difficulty.  3. Increase strength by 1/3 MMT grade in quad and glutes  to increase tolerance for ADL and work activities.  4. Pt to tolerate HEP to improve ROM and independence with ADL's      Plan     Plan of care Certification: 9/29/2022 to 12/8/2022.    Patient to be seen for knee and shoulder therapy. Continue per post op protocol and rotator cuff strengthening     Fabricio Tolentino, PT, DPT

## 2022-10-24 ENCOUNTER — CLINICAL SUPPORT (OUTPATIENT)
Dept: REHABILITATION | Facility: HOSPITAL | Age: 67
End: 2022-10-24
Payer: COMMERCIAL

## 2022-10-24 DIAGNOSIS — M25.562 ACUTE PAIN OF LEFT KNEE: ICD-10-CM

## 2022-10-24 DIAGNOSIS — R29.898 DECREASED STRENGTH OF UPPER EXTREMITY: ICD-10-CM

## 2022-10-24 DIAGNOSIS — G89.29 CHRONIC RIGHT SHOULDER PAIN: Primary | ICD-10-CM

## 2022-10-24 DIAGNOSIS — M25.511 CHRONIC RIGHT SHOULDER PAIN: Primary | ICD-10-CM

## 2022-10-24 PROCEDURE — 97110 THERAPEUTIC EXERCISES: CPT | Performed by: PHYSICAL THERAPIST

## 2022-10-24 PROCEDURE — 97112 NEUROMUSCULAR REEDUCATION: CPT | Performed by: PHYSICAL THERAPIST

## 2022-10-24 NOTE — PROGRESS NOTES
"Physical Therapy Daily Treatment Note     Name: Meryl Yo [facility] Number: [id number]    Therapy Diagnosis:   Encounter Diagnoses   Name Primary?    Chronic right shoulder pain Yes    Decreased strength of upper extremity     Acute pain of left knee        Physician: Russell Honeycutt III, *    Visit Date: 10/24/2022  Physician Orders: PT Eval and Treat   Medical Diagnosis from Referral:   Diagnosis   M25.511,G89.29 (ICD-10-CM) - Chronic right shoulder pain   M75.01 (ICD-10-CM) - Adhesive capsulitis of right shoulder    S83.242A (ICD-10-CM) - Acute medial meniscus tear of left knee, initial encounter  Evaluation Date: 9/29/2022 shoulder, 107/2022 knee  Authorization Period Expiration: 12/31/2022  Plan of Care Expiration: 12/08/2022  Visit # / Visits authorized: 6/ 20  Total visits: 4    Time In:  0700am  Time Out: 0805am  Total Billable Time: 55 minutes    Precautions: Standard and Weightbearing    Subjective     Pt reports: it just hurts at night, trouble sleeping still but I am doing better with the cane    She was compliant with home exercise program.  Response to previous treatment: R shoulder relief  Functional change: WBAT with no AD on arrival    Pain: 5/10  Location: left knee  , right shoulder    Objective       Knee Passive Range of Motion:   Right  Left    Flexion 110 98   Extension +3 hyper 0       Meryl received therapeutic exercises to develop strength, endurance, ROM, and flexibility for 35 minutes including:    Quad set 10x 10 sec  Supine SLR 4 x 10  LAQ  3 x 15  Bridges 10x 3" hold  Patient education    NT  Bike rocking 8' for knee ROM       Meryl received the following manual therapy techniques: Joint mobilizations and Soft tissue Mobilization were applied to the: L knee for 5 minutes, including:    Knee assessment  Passive flexion EOT   Patellar mobilizations all planes  Gr II extension mob  STM medial HS   AP tibia Gr III  Medial gapping Gr III    Meryl participated in neuromuscular " "re-education activities to improve: Balance, Coordination, Sense, Proprioception, and Posture for 15 minutes. The following activities were included:    Standing hip abd YTB2 x 15  TKE with GTB 2 x 15 3" hold    Meryl received cold pack for 10 minutes to to decrease circulation, pain, and swelling.      Home Exercises Provided and Patient Education Provided     Education provided:   - Quad set every hour  - No sleeping with anything under the knee  - S/s of infection  - Keep wound clean and no soaking in tub  - Importance of knee extension    Written Home Exercises Provided: Patient instructed to cont prior HEP.  Exercises were reviewed and Meryl was able to demonstrate them prior to the end of the session.  Meryl demonstrated good  understanding of the education provided.     See EMR under Patient Instructions for exercises provided 10/6/2022.    Assessment     Patien with the most tenderness over the pes anserine bursa. She fatigued quickly with bridges, only 10 reps today with lateral HS discomfort. TKE progession as we get closer to attempting small step up.     Meryl Is progressing well towards her goals.   Pt prognosis is Excellent.     Pt will continue to benefit from skilled outpatient physical therapy to address the deficits listed in the problem list box on initial evaluation, provide pt/family education and to maximize pt's level of independence in the home and community environment.     Pt's spiritual, cultural and educational needs considered and pt agreeable to plan of care and goals.    Anticipated barriers to physical therapy: work schedule    GOALS:     Short Term Goals:  4 weeks weeks(Progressing, not met)  1.Report decreased shoulder pain < / =  5/10 at worst to increase tolerance for work   2. Increase PROM full pain free   3. Increased strength by 1/3 MMT grade in shoulder strength to increase tolerance for ADL and work activities.  4. Pt to tolerate HEP to improve ROM and independence with " ADL's     Long Term Goals: 8 weeks(Progressing, not met)    1.Report decreased shoulder pain  < / =  2/10 at worst  to increase tolerance for work  2.Increase AROM to full pain free  3.Increase strength to >/= 4/5 in shoulder strength to increase tolerance for ADL and work activities.  4. Pt goal: Return to pain free activities of daily living and work related activities.  5. Pt will have improved FOTO score of </= 35% for functional improvements in activities of daily living.    New Knee Goals: 6 weeks (Progressing, not met)    1.Report decreased knee pain  < / =  2/10  to increase tolerance for return to ambulation without RW  2. Increase knee ROM to full motion in order to be able to perform ADLs without difficulty.  3. Increase strength by 1/3 MMT grade in quad and glutes  to increase tolerance for ADL and work activities.  4. Pt to tolerate HEP to improve ROM and independence with ADL's      Plan     Plan of care Certification: 9/29/2022 to 12/8/2022.    Patient to be seen for knee and shoulder therapy. Continue per post op protocol and rotator cuff strengthening     Fabricio Tolentino, PT, DPT

## 2022-10-24 NOTE — PROGRESS NOTES
CC:  left knee pain    History of present illness: Pt is here today for post-operative followup of knee arthroscopy.  she is doing well.  We have reviewed her findings and discussed plan of care and future treatment options.      She is doing well. Still having some knee pain.   She is no longer using a walker or cane.     Pain rated at a 6/10.     DATE OF PROCEDURE: 10/04/2022  SURGEON:  Brigette Babin M.D  PROCEDURE PERFORMED:   left  1. knee arthroscopic chondroplasty (CPT 39833)  2. knee arthroscopic medial and lateral (CPT 23754) meniscectomy   3. knee arthroscopic partial synovectomy/debridement (CPT 89683).   4. knee arthroscopic plica excision(CPT 96868).    5. Knee arthroscopic lysis of adhesions (CPT 70300)    Patella 15 x 20 mm grade 2  Chondroplasty was performed using arthroscopic shaver.     There was chondral damage to:  Medial femoral condyle 10 x 15 mm grade 2  Chondroplasty was performed using arthroscopic shaver.                                                                               PHYSICAL EXAMINATION:     Incision sites healed well  No evidence of any erythema, infection or induration  Range of motion 0-110 degrees  Minimal effusion  2+ DP pulse  No swelling, no calf tenderness  - Pat's sign  Negative medial joint line tenderness  Moderate quad atrophy                                                                               ASSESSMENT:                                                                                                                                               1. Status post above, doing well.                                                                                                                               PLAN:                                                                                                                                                     1. Continue with PT. Recommended that she go back to using a walker or cane over the next 2 weeks to  help ambulation and take some weight off her knee.   2. Emphasized quad function.  3. I have discussed return to activity in detail.  4.Patient will see us back at 6 week post-op [place]ane.                                    5. All questions were answered, surgical technique was reviewed and interpreted, and patient should contact us with any questions or concerns in the interim.

## 2022-10-28 ENCOUNTER — CLINICAL SUPPORT (OUTPATIENT)
Dept: REHABILITATION | Facility: HOSPITAL | Age: 67
End: 2022-10-28
Payer: COMMERCIAL

## 2022-10-28 DIAGNOSIS — M25.562 ACUTE PAIN OF LEFT KNEE: ICD-10-CM

## 2022-10-28 DIAGNOSIS — M25.511 CHRONIC RIGHT SHOULDER PAIN: Primary | ICD-10-CM

## 2022-10-28 DIAGNOSIS — R29.898 DECREASED STRENGTH OF UPPER EXTREMITY: ICD-10-CM

## 2022-10-28 DIAGNOSIS — G89.29 CHRONIC RIGHT SHOULDER PAIN: Primary | ICD-10-CM

## 2022-10-28 PROCEDURE — 97110 THERAPEUTIC EXERCISES: CPT | Performed by: PHYSICAL THERAPIST

## 2022-10-28 PROCEDURE — 97112 NEUROMUSCULAR REEDUCATION: CPT | Performed by: PHYSICAL THERAPIST

## 2022-10-28 NOTE — PROGRESS NOTES
"Physical Therapy Daily Treatment Note     Name: Meryl Yo [facility] Number: [id number]    Therapy Diagnosis:   Encounter Diagnoses   Name Primary?    Chronic right shoulder pain Yes    Decreased strength of upper extremity     Acute pain of left knee        Physician: Russell Honeycutt III, *    Visit Date: 10/28/2022  Physician Orders: PT Eval and Treat   Medical Diagnosis from Referral:   Diagnosis   M25.511,G89.29 (ICD-10-CM) - Chronic right shoulder pain   M75.01 (ICD-10-CM) - Adhesive capsulitis of right shoulder    S83.242A (ICD-10-CM) - Acute medial meniscus tear of left knee, initial encounter  Evaluation Date: 9/29/2022 shoulder, 107/2022 knee  Authorization Period Expiration: 12/31/2022  Plan of Care Expiration: 12/08/2022  Visit # / Visits authorized: 7/ 20  Total visits: 10    Time In:  0700am  Time Out: 0805am  Total Billable Time: 30 minutes    Precautions: Standard and Weightbearing    Subjective     Pt reports: I am getting used to using my cane. Not as painful, just feels stiff today    She was compliant with home exercise program.  Response to previous treatment: R shoulder relief  Functional change: WBAT with no AD on arrival    Pain: 5/10  Location: left knee  , right shoulder    Objective       Knee Passive Range of Motion:   Right  Left    Flexion 110 98   Extension +3 hyper 0       Meryl received therapeutic exercises to develop strength, endurance, ROM, and flexibility for 25 minutes including:    Quad set 10x 10 sec  Upright SLR 4 x 10  LAQ  3 x 15  Bridges 10x 3" hold  Patient education    NT  Bike rocking 8' for knee ROM       Meryl received the following manual therapy techniques: Joint mobilizations and Soft tissue Mobilization were applied to the: L knee for 5 minutes, including:    Knee assessment  Passive flexion EOT   Patellar mobilizations all planes  Gr II extension mob  STM medial HS   AP tibia Gr III  Medial gapping Gr III    Meryl participated in neuromuscular re-education " activities to improve: Balance, Coordination, Sense, Proprioception, and Posture for 25 minutes. The following activities were included:    Standing hip abd YTB 2 x 15  Shuttle DL 2 1/2 bands 3 x 10  Shuttle SL 1 1/2 bands 3 x 10    Meryl received cold pack for 10 minutes to to decrease circulation, pain, and swelling.      Home Exercises Provided and Patient Education Provided     Education provided:   - Quad set every hour  - No sleeping with anything under the knee  - S/s of infection  - Keep wound clean and no soaking in tub  - Importance of knee extension    Written Home Exercises Provided: Patient instructed to cont prior HEP.  Exercises were reviewed and Meryl was able to demonstrate them prior to the end of the session.  Meryl demonstrated good  understanding of the education provided.     See EMR under Patient Instructions for exercises provided 10/6/2022.    Assessment     Patient progressing to the shuttle for strengthening. More tender to the hamstring today but much improved gait cycle. Patient to return to work Monday with walker and SPC as needed in the office, light duty only.     Meryl Is progressing well towards her goals.   Pt prognosis is Excellent.     Pt will continue to benefit from skilled outpatient physical therapy to address the deficits listed in the problem list box on initial evaluation, provide pt/family education and to maximize pt's level of independence in the home and community environment.     Pt's spiritual, cultural and educational needs considered and pt agreeable to plan of care and goals.    Anticipated barriers to physical therapy: work schedule    GOALS:     Short Term Goals:  4 weeks weeks(Progressing, not met)  1.Report decreased shoulder pain < / =  5/10 at worst to increase tolerance for work   2. Increase PROM full pain free   3. Increased strength by 1/3 MMT grade in shoulder strength to increase tolerance for ADL and work activities.  4. Pt to tolerate HEP to  improve ROM and independence with ADL's     Long Term Goals: 8 weeks(Progressing, not met)    1.Report decreased shoulder pain  < / =  2/10 at worst  to increase tolerance for work  2.Increase AROM to full pain free  3.Increase strength to >/= 4/5 in shoulder strength to increase tolerance for ADL and work activities.  4. Pt goal: Return to pain free activities of daily living and work related activities.  5. Pt will have improved FOTO score of </= 35% for functional improvements in activities of daily living.    New Knee Goals: 6 weeks (Progressing, not met)    1.Report decreased knee pain  < / =  2/10  to increase tolerance for return to ambulation without RW  2. Increase knee ROM to full motion in order to be able to perform ADLs without difficulty.  3. Increase strength by 1/3 MMT grade in quad and glutes  to increase tolerance for ADL and work activities.  4. Pt to tolerate HEP to improve ROM and independence with ADL's      Plan     Plan of care Certification: 9/29/2022 to 12/8/2022.    Patient to be seen for knee and shoulder therapy. Continue per post op protocol and rotator cuff strengthening     Fabricio Tolentino, PT, DPT

## 2022-10-31 ENCOUNTER — CLINICAL SUPPORT (OUTPATIENT)
Dept: REHABILITATION | Facility: HOSPITAL | Age: 67
End: 2022-10-31
Payer: COMMERCIAL

## 2022-10-31 DIAGNOSIS — M25.562 ACUTE PAIN OF LEFT KNEE: ICD-10-CM

## 2022-10-31 DIAGNOSIS — M25.511 CHRONIC RIGHT SHOULDER PAIN: Primary | ICD-10-CM

## 2022-10-31 DIAGNOSIS — G89.29 CHRONIC RIGHT SHOULDER PAIN: Primary | ICD-10-CM

## 2022-10-31 DIAGNOSIS — R29.898 DECREASED STRENGTH OF UPPER EXTREMITY: ICD-10-CM

## 2022-10-31 PROCEDURE — 97110 THERAPEUTIC EXERCISES: CPT | Performed by: PHYSICAL THERAPIST

## 2022-10-31 PROCEDURE — 97112 NEUROMUSCULAR REEDUCATION: CPT | Performed by: PHYSICAL THERAPIST

## 2022-10-31 NOTE — PROGRESS NOTES
"Physical Therapy Daily Treatment Note     Name: Meryl Yo [facility] Number: [id number]    Therapy Diagnosis:   Encounter Diagnoses   Name Primary?    Chronic right shoulder pain Yes    Decreased strength of upper extremity     Acute pain of left knee        Physician: Russell Honeycutt III, *    Visit Date: 10/31/2022  Physician Orders: PT Eval and Treat   Medical Diagnosis from Referral:   Diagnosis   M25.511,G89.29 (ICD-10-CM) - Chronic right shoulder pain   M75.01 (ICD-10-CM) - Adhesive capsulitis of right shoulder    S83.242A (ICD-10-CM) - Acute medial meniscus tear of left knee, initial encounter  Evaluation Date: 9/29/2022 shoulder, 107/2022 knee  Authorization Period Expiration: 12/31/2022  Plan of Care Expiration: 12/08/2022  Visit # / Visits authorized: 8/ 20  Total visits: 11    Time In:  0800am  Time Out: 0905am  Total Billable Time: 55 minutes    Precautions: Standard and Weightbearing    Subjective     Pt reports: My knee started feeling better this weekend, I think the shuttle helped me     She was compliant with home exercise program.  Response to previous treatment: R shoulder relief  Functional change: WBAT with no AD on arrival    Pain: 5/10  Location: left knee  , right shoulder    Objective       Knee Passive Range of Motion:   Right  Left    Flexion 110 98   Extension +3 hyper 0       Meryl received therapeutic exercises to develop strength, endurance, ROM, and flexibility for 35 minutes including:    Quad set 10x 10 sec  Upright SLR 4 x 10  LAQ 3# 3 x 15  Bridges 10x 3" hold  HS curlc GTB 20x 5"  Patient education    NT  Bike rocking 8' for knee ROM       Meryl received the following manual therapy techniques: Joint mobilizations and Soft tissue Mobilization were applied to the: L knee for 5 minutes, including:    Knee assessment  Passive flexion EOT   Patellar mobilizations all planes  Gr II extension mob  STM medial HS   AP tibia Gr III  Medial gapping Gr III    Meryl participated in " neuromuscular re-education activities to improve: Balance, Coordination, Sense, Proprioception, and Posture for 15 minutes. The following activities were included:    Shuttle DL 2 1/2 bands 4 x 10  Shuttle SL 1 1/2 bands 4 x 10    Meryl received cold pack for 10 minutes to to decrease circulation, pain, and swelling.      Home Exercises Provided and Patient Education Provided     Education provided:   - Quad set every hour  - No sleeping with anything under the knee  - S/s of infection  - Keep wound clean and no soaking in tub  - Importance of knee extension    Written Home Exercises Provided: Patient instructed to cont prior HEP.  Exercises were reviewed and Meryl was able to demonstrate them prior to the end of the session.  Meryl demonstrated good  understanding of the education provided.     See EMR under Patient Instructions for exercises provided 10/6/2022.    Assessment     Patient progressed with HS curls and shuttle today, increased sets and reps today. Less edema and better fat pad mobility to the knee.  Progressed quad strength with weight on LAQ. To progress to stairs later this week    Meryl Is progressing well towards her goals.   Pt prognosis is Excellent.     Pt will continue to benefit from skilled outpatient physical therapy to address the deficits listed in the problem list box on initial evaluation, provide pt/family education and to maximize pt's level of independence in the home and community environment.     Pt's spiritual, cultural and educational needs considered and pt agreeable to plan of care and goals.    Anticipated barriers to physical therapy: work schedule    GOALS:     Short Term Goals:  4 weeks weeks(Progressing, not met)  1.Report decreased shoulder pain < / =  5/10 at worst to increase tolerance for work   2. Increase PROM full pain free   3. Increased strength by 1/3 MMT grade in shoulder strength to increase tolerance for ADL and work activities.  4. Pt to tolerate HEP to  improve ROM and independence with ADL's     Long Term Goals: 8 weeks(Progressing, not met)    1.Report decreased shoulder pain  < / =  2/10 at worst  to increase tolerance for work  2.Increase AROM to full pain free  3.Increase strength to >/= 4/5 in shoulder strength to increase tolerance for ADL and work activities.  4. Pt goal: Return to pain free activities of daily living and work related activities.  5. Pt will have improved FOTO score of </= 35% for functional improvements in activities of daily living.    New Knee Goals: 6 weeks (Progressing, not met)    1.Report decreased knee pain  < / =  2/10  to increase tolerance for return to ambulation without RW  2. Increase knee ROM to full motion in order to be able to perform ADLs without difficulty.  3. Increase strength by 1/3 MMT grade in quad and glutes  to increase tolerance for ADL and work activities.  4. Pt to tolerate HEP to improve ROM and independence with ADL's      Plan     Plan of care Certification: 9/29/2022 to 12/8/2022.    Patient to be seen for knee and shoulder therapy. Continue per post op protocol and rotator cuff strengthening     Fabricio Tolentino, PT, DPT

## 2022-11-02 ENCOUNTER — CLINICAL SUPPORT (OUTPATIENT)
Dept: REHABILITATION | Facility: HOSPITAL | Age: 67
End: 2022-11-02
Payer: COMMERCIAL

## 2022-11-02 DIAGNOSIS — M25.511 CHRONIC RIGHT SHOULDER PAIN: Primary | ICD-10-CM

## 2022-11-02 DIAGNOSIS — G89.29 CHRONIC RIGHT SHOULDER PAIN: Primary | ICD-10-CM

## 2022-11-02 DIAGNOSIS — M25.562 ACUTE PAIN OF LEFT KNEE: ICD-10-CM

## 2022-11-02 DIAGNOSIS — R29.898 DECREASED STRENGTH OF UPPER EXTREMITY: ICD-10-CM

## 2022-11-02 PROCEDURE — 97140 MANUAL THERAPY 1/> REGIONS: CPT

## 2022-11-02 PROCEDURE — 97112 NEUROMUSCULAR REEDUCATION: CPT

## 2022-11-02 PROCEDURE — 97110 THERAPEUTIC EXERCISES: CPT

## 2022-11-02 NOTE — PROGRESS NOTES
"Physical Therapy Daily Treatment Note     Name: Meryl Yo [facility] Number: [id number]    Therapy Diagnosis:   Encounter Diagnoses   Name Primary?    Chronic right shoulder pain Yes    Decreased strength of upper extremity     Acute pain of left knee          Physician: Russell Honeycutt III, *    Visit Date: 11/2/2022  Physician Orders: PT Eval and Treat   Medical Diagnosis from Referral:   Diagnosis   M25.511,G89.29 (ICD-10-CM) - Chronic right shoulder pain   M75.01 (ICD-10-CM) - Adhesive capsulitis of right shoulder    S83.242A (ICD-10-CM) - Acute medial meniscus tear of left knee, initial encounter  Evaluation Date: 9/29/2022 shoulder, 107/2022 knee  Authorization Period Expiration: 12/31/2022  Plan of Care Expiration: 12/08/2022  Visit # / Visits authorized: 9/ 20  Total visits: 11    Time In:  0650am  Time Out: 0755am  Total Billable Time: 55 minutes    Precautions: Standard and Weightbearing    Subjective     Pt reports: feeling better, hurting after going back to work on Monday and Tuesday, wants to go to the [employer] game on Monday. Feels tight at the end of the day.    She was compliant with home exercise program.  Response to previous treatment: R shoulder relief  Functional change: WBAT with no AD on arrival    Pain: 5/10  Location: left knee  , right shoulder    Objective       Knee Passive Range of Motion:   Right  Left    Flexion 110 98   Extension +3 hyper 0       Meryl received therapeutic exercises to develop strength, endurance, ROM, and flexibility for 25 minutes including:    Upright SLR 4 x 10  LAQ 3# 3 x 15  Patient education on progress    NT  Bike rocking 8' for knee ROM   Bridges 10x 3" hold  HS curlc GTB 20x 5"  Quad set 10x 10 sec      Meryl received the following manual therapy techniques: Joint mobilizations and Soft tissue Mobilization were applied to the: L knee for 15 minutes, including:    Knee assessment  Passive flexion EOT   Patellar mobilizations all planes  Gr II extension " "mob  STM medial HS   AP tibia Gr III    Not Performed Today:  Medial gapping Gr III    Meryl participated in neuromuscular re-education activities to improve: Balance, Coordination, Sense, Proprioception, and Posture for 15 minutes. The following activities were included:    Shuttle DL 2 1/2 bands 4 x 10  Shuttle SL 1 1/2 bands 4 x 10  Step training x 30 3" and 4" step overs    Meryl received cold pack for 10 minutes to to decrease circulation, pain, and swelling.      Home Exercises Provided and Patient Education Provided     Education provided:   - Quad set every hour  - No sleeping with anything under the knee  - S/s of infection  - Keep wound clean and no soaking in tub  - Importance of knee extension    Written Home Exercises Provided: Patient instructed to cont prior HEP.  Exercises were reviewed and Meryl was able to demonstrate them prior to the end of the session.  Meryl demonstrated good  understanding of the education provided.     See EMR under Patient Instructions for exercises provided 10/6/2022.    Assessment     Patient progressed with step training today, increased sets and reps today. Less edema and better fat pad mobility to the knee.  Progress step training to increase tolerance in anticipation [place] [employer] game.    Meryl Is progressing well towards her goals.   Pt prognosis is Excellent.     Pt will continue to benefit from skilled outpatient physical therapy to address the deficits listed in the problem list box on initial evaluation, provide pt/family education and to maximize pt's level of independence in the home and community environment.     Pt's spiritual, cultural and educational needs considered and pt agreeable to plan of care and goals.    Anticipated barriers to physical therapy: work schedule    GOALS:     Short Term Goals:  4 weeks weeks(Progressing, not met)  1.Report decreased shoulder pain < / =  5/10 at worst to increase tolerance for work   2. Increase PROM full pain free "   3. Increased strength by 1/3 MMT grade in shoulder strength to increase tolerance for ADL and work activities.  4. Pt to tolerate HEP to improve ROM and independence with ADL's     Long Term Goals: 8 weeks(Progressing, not met)    1.Report decreased shoulder pain  < / =  2/10 at worst  to increase tolerance for work  2.Increase AROM to full pain free  3.Increase strength to >/= 4/5 in shoulder strength to increase tolerance for ADL and work activities.  4. Pt goal: Return to pain free activities of daily living and work related activities.  5. Pt will have improved FOTO score of </= 35% for functional improvements in activities of daily living.    New Knee Goals: 6 weeks (Progressing, not met)    1.Report decreased knee pain  < / =  2/10  to increase tolerance for return to ambulation without RW  2. Increase knee ROM to full motion in order to be able to perform ADLs without difficulty.  3. Increase strength by 1/3 MMT grade in quad and glutes  to increase tolerance for ADL and work activities.  4. Pt to tolerate HEP to improve ROM and independence with ADL's      Plan     Plan of care Certification: 9/29/2022 to 12/8/2022.    Patient to be seen for knee and shoulder therapy. Continue per post op protocol and rotator cuff strengthening     Radha Sifuentes, PT, DPT

## 2022-11-03 ENCOUNTER — TELEPHONE (OUTPATIENT)
Dept: SPORTS MEDICINE | Facility: CLINIC | Age: 67
End: 2022-11-03
Payer: COMMERCIAL

## 2022-11-03 ENCOUNTER — CLINICAL SUPPORT (OUTPATIENT)
Dept: REHABILITATION | Facility: HOSPITAL | Age: 67
End: 2022-11-03
Payer: COMMERCIAL

## 2022-11-03 DIAGNOSIS — M25.511 CHRONIC RIGHT SHOULDER PAIN: Primary | ICD-10-CM

## 2022-11-03 DIAGNOSIS — M25.562 ACUTE PAIN OF LEFT KNEE: ICD-10-CM

## 2022-11-03 DIAGNOSIS — G89.29 CHRONIC RIGHT SHOULDER PAIN: Primary | ICD-10-CM

## 2022-11-03 DIAGNOSIS — R29.898 DECREASED STRENGTH OF UPPER EXTREMITY: ICD-10-CM

## 2022-11-03 PROCEDURE — 97110 THERAPEUTIC EXERCISES: CPT | Performed by: PHYSICAL THERAPIST

## 2022-11-03 PROCEDURE — 97112 NEUROMUSCULAR REEDUCATION: CPT | Performed by: PHYSICAL THERAPIST

## 2022-11-03 NOTE — PROGRESS NOTES
"Physical Therapy Daily Treatment Note     Name: Meryl Yo [facility] Number: [id number]    Therapy Diagnosis:   Encounter Diagnoses   Name Primary?    Chronic right shoulder pain Yes    Decreased strength of upper extremity     Acute pain of left knee          Physician: Russell Honeycutt III, *    Visit Date: 11/3/2022  Physician Orders: PT Eval and Treat   Medical Diagnosis from Referral:   Diagnosis   M25.511,G89.29 (ICD-10-CM) - Chronic right shoulder pain   M75.01 (ICD-10-CM) - Adhesive capsulitis of right shoulder    S83.242A (ICD-10-CM) - Acute medial meniscus tear of left knee, initial encounter  Evaluation Date: 9/29/2022 shoulder, 107/2022 knee  Authorization Period Expiration: 12/31/2022  Plan of Care Expiration: 12/08/2022  Visit # / Visits authorized: 10/ 20  Total visits: 11    Time In:  0250pm  Time Out: 4000pm  Total Billable Time: 55 minutes    Precautions: Standard and Weightbearing    Subjective     Pt reports: knee hurts off and on at hurt, sitting with it bent is tough to get up and straighten it back out.     She was compliant with home exercise program.  Response to previous treatment: R shoulder relief  Functional change: WBAT with no AD on arrival    Pain: 5/10  Location: left knee  , right shoulder    Objective       Knee Passive Range of Motion:   Right  Left    Flexion 110 105   Extension +3 hyper 0       Meryl received therapeutic exercises to develop strength, endurance, ROM, and flexibility for 25 minutes including:    Quad set 10x 10 sec  Upright SLR 4 x 10  Bridges 10x 3" hold  Standing HS curl 3# 2 x 10  Patient education on progress    NT  Bike rocking 8' for knee ROM   HS curlc GTB 20x 5"        Meryl received the following manual therapy techniques: Joint mobilizations and Soft tissue Mobilization were applied to the: L knee for 15 minutes, including:    Knee assessment  Passive flexion EOT   Patellar mobilizations all planes  Gr II extension mob  STM medial HS   AP tibia Gr " "III    Not Performed Today:  Medial gapping Gr III    Meryl participated in neuromuscular re-education activities to improve: Balance, Coordination, Sense, Proprioception, and Posture for 15 minutes. The following activities were included:    Shuttle DL 2 1/2 bands 4 x 10  Shuttle SL 1 1/2 bands 4 x 10  Step training x 30 5" step overs    Meryl received cold pack for 10 minutes to to decrease circulation, pain, and swelling.      Home Exercises Provided and Patient Education Provided     Education provided:   - Stair training       Written Home Exercises Provided: Patient instructed to cont prior HEP.  Exercises were reviewed and Meryl was able to demonstrate them prior to the end of the session.  Meryl demonstrated good  understanding of the education provided.     See EMR under Patient Instructions for exercises provided 10/6/2022.    Assessment     Patient progressing with quad and strength training in order to return to functional exercises such as stair climbig. Patient goal to return to [employer] game on Monday as we cont progress step height today to 5"    Meryl Is progressing well towards her goals.   Pt prognosis is Excellent.     Pt will continue to benefit from skilled outpatient physical therapy to address the deficits listed in the problem list box on initial evaluation, provide pt/family education and to maximize pt's level of independence in the home and community environment.     Pt's spiritual, cultural and educational needs considered and pt agreeable to plan of care and goals.    Anticipated barriers to physical therapy: work schedule    GOALS:     Short Term Goals:  4 weeks weeks(Progressing, not met)  1.Report decreased shoulder pain < / =  5/10 at worst to increase tolerance for work   2. Increase PROM full pain free   3. Increased strength by 1/3 MMT grade in shoulder strength to increase tolerance for ADL and work activities.  4. Pt to tolerate HEP to improve ROM and independence with " ADL's     Long Term Goals: 8 weeks(Progressing, not met)    1.Report decreased shoulder pain  < / =  2/10 at worst  to increase tolerance for work  2.Increase AROM to full pain free  3.Increase strength to >/= 4/5 in shoulder strength to increase tolerance for ADL and work activities.  4. Pt goal: Return to pain free activities of daily living and work related activities.  5. Pt will have improved FOTO score of </= 35% for functional improvements in activities of daily living.    New Knee Goals: 6 weeks (Progressing, not met)    1.Report decreased knee pain  < / =  2/10  to increase tolerance for return to ambulation without RW  2. Increase knee ROM to full motion in order to be able to perform ADLs without difficulty.  3. Increase strength by 1/3 MMT grade in quad and glutes  to increase tolerance for ADL and work activities.  4. Pt to tolerate HEP to improve ROM and independence with ADL's      Plan     Plan of care Certification: 9/29/2022 to 12/8/2022.    Patient to be seen for knee and shoulder therapy. Continue per post op protocol and rotator cuff strengthening     Fabricio Tolentino, PT, DPT

## 2022-11-04 ENCOUNTER — PATIENT MESSAGE (OUTPATIENT)
Dept: SPORTS MEDICINE | Facility: CLINIC | Age: 67
End: 2022-11-04
Payer: COMMERCIAL

## 2022-11-04 ENCOUNTER — PATIENT MESSAGE (OUTPATIENT)
Dept: REHABILITATION | Facility: HOSPITAL | Age: 67
End: 2022-11-04
Payer: COMMERCIAL

## 2022-11-07 ENCOUNTER — CLINICAL SUPPORT (OUTPATIENT)
Dept: REHABILITATION | Facility: HOSPITAL | Age: 67
End: 2022-11-07
Payer: COMMERCIAL

## 2022-11-07 DIAGNOSIS — G89.29 CHRONIC RIGHT SHOULDER PAIN: Primary | ICD-10-CM

## 2022-11-07 DIAGNOSIS — R29.898 DECREASED STRENGTH OF UPPER EXTREMITY: ICD-10-CM

## 2022-11-07 DIAGNOSIS — M25.511 CHRONIC RIGHT SHOULDER PAIN: Primary | ICD-10-CM

## 2022-11-07 DIAGNOSIS — M25.562 ACUTE PAIN OF LEFT KNEE: ICD-10-CM

## 2022-11-07 PROCEDURE — 97110 THERAPEUTIC EXERCISES: CPT | Performed by: PHYSICAL THERAPIST

## 2022-11-07 PROCEDURE — 97112 NEUROMUSCULAR REEDUCATION: CPT | Performed by: PHYSICAL THERAPIST

## 2022-11-07 PROCEDURE — 97140 MANUAL THERAPY 1/> REGIONS: CPT | Performed by: PHYSICAL THERAPIST

## 2022-11-07 NOTE — PROGRESS NOTES
"Physical Therapy Daily Treatment Note     Name: Meryl Yo [facility] Number: [id number]    Therapy Diagnosis:   Encounter Diagnoses   Name Primary?    Chronic right shoulder pain Yes    Decreased strength of upper extremity     Acute pain of left knee      Physician: Russell Honeycutt III, *    Visit Date: 11/7/2022  Physician Orders: PT Eval and Treat   Medical Diagnosis from Referral:   Diagnosis   M25.511,G89.29 (ICD-10-CM) - Chronic right shoulder pain   M75.01 (ICD-10-CM) - Adhesive capsulitis of right shoulder    S83.242A (ICD-10-CM) - Acute medial meniscus tear of left knee, initial encounter  Evaluation Date: 9/29/2022 shoulder, 107/2022 knee  Authorization Period Expiration: 12/31/2022  Plan of Care Expiration: 12/08/2022  Visit # / Visits authorized: 11/ 20  Total visits: 14    Time In:  0700am  Time Out: 0810 am  Total Billable Time: 60 minutes    Precautions: Standard and Weightbearing    Subjective     Pt reports: I didn't do much this weekend so the knee feels good. I am going to a wedding on friday    She was compliant with home exercise program.  Response to previous treatment: R shoulder relief  Functional change: WBAT with no AD on arrival    Pain: 5/10  Location: left knee  , right shoulder    Objective       Knee Passive Range of Motion:   Right  Left    Flexion 110 105   Extension +3 hyper 0       Meryl received therapeutic exercises to develop strength, endurance, ROM, and flexibility for 35 minutes including:    Upright SLR 2 x 10  LAQ 4# 3 x 15  HS curls BTB 20x 5"  Standing hip abd GTB 20x B   Patient education on progress    NT  Bridges 10x 3" hold  Bike rocking 8' for knee ROM     Meryl received the following manual therapy techniques: Joint mobilizations and Soft tissue Mobilization were applied to the: L knee for 10 minutes, including:    Knee assessment  Passive flexion EOT   Patellar mobilizations all planes  Gr II extension mob  STM medial HS   AP tibia Gr III    Not Performed " "Today:  Medial gapping Gr III    Meryl participated in neuromuscular re-education activities to improve: Balance, Coordination, Sense, Proprioception, and Posture for 15 minutes. The following activities were included:    Shuttle DL 2 1/2 bands 4 x 10  Shuttle SL 2 bands 4 x 10  Step training x 30 6" step overs    Meryl received cold pack for 10 minutes to to decrease circulation, pain, and swelling.      Home Exercises Provided and Patient Education Provided     Education provided:   - Stair training       Written Home Exercises Provided: Patient instructed to cont prior HEP.  Exercises were reviewed and Meryl was able to demonstrate them prior to the end of the session.  Meryl demonstrated good  understanding of the education provided.     See EMR under Patient Instructions for exercises provided 10/6/2022.    Assessment     Patient progressed with step ups using affected leg on 3" step. Minimal crepitus to the knee but she feels it with LAQ. Able to perform increased weights on the shuttle with SL squat and LAQ    Meryl Is progressing well towards her goals.   Pt prognosis is Excellent.     Pt will continue to benefit from skilled outpatient physical therapy to address the deficits listed in the problem list box on initial evaluation, provide pt/family education and to maximize pt's level of independence in the home and community environment.     Pt's spiritual, cultural and educational needs considered and pt agreeable to plan of care and goals.    Anticipated barriers to physical therapy: work schedule    GOALS:     Short Term Goals:  4 weeks weeks(Progressing, not met)  1.Report decreased shoulder pain < / =  5/10 at worst to increase tolerance for work   2. Increase PROM full pain free   3. Increased strength by 1/3 MMT grade in shoulder strength to increase tolerance for ADL and work activities.  4. Pt to tolerate HEP to improve ROM and independence with ADL's     Long Term Goals: 8 weeks(Progressing, " not met)    1.Report decreased shoulder pain  < / =  2/10 at worst  to increase tolerance for work  2.Increase AROM to full pain free  3.Increase strength to >/= 4/5 in shoulder strength to increase tolerance for ADL and work activities.  4. Pt goal: Return to pain free activities of daily living and work related activities.  5. Pt will have improved FOTO score of </= 35% for functional improvements in activities of daily living.    New Knee Goals: 6 weeks (Progressing, not met)    1.Report decreased knee pain  < / =  2/10  to increase tolerance for return to ambulation without RW  2. Increase knee ROM to full motion in order to be able to perform ADLs without difficulty.  3. Increase strength by 1/3 MMT grade in quad and glutes  to increase tolerance for ADL and work activities.  4. Pt to tolerate HEP to improve ROM and independence with ADL's      Plan     Plan of care Certification: 9/29/2022 to 12/8/2022.    Progress closed chain quad/glute strengthening     Fabricio Tolentino, PT, DPT, OCS

## 2022-11-09 ENCOUNTER — CLINICAL SUPPORT (OUTPATIENT)
Dept: REHABILITATION | Facility: HOSPITAL | Age: 67
End: 2022-11-09
Payer: OTHER MISCELLANEOUS

## 2022-11-09 DIAGNOSIS — M25.562 ACUTE PAIN OF LEFT KNEE: ICD-10-CM

## 2022-11-09 DIAGNOSIS — G89.29 CHRONIC RIGHT SHOULDER PAIN: Primary | ICD-10-CM

## 2022-11-09 DIAGNOSIS — M25.511 CHRONIC RIGHT SHOULDER PAIN: Primary | ICD-10-CM

## 2022-11-09 DIAGNOSIS — R29.898 DECREASED STRENGTH OF UPPER EXTREMITY: ICD-10-CM

## 2022-11-09 PROCEDURE — 97140 MANUAL THERAPY 1/> REGIONS: CPT | Performed by: PHYSICAL THERAPIST

## 2022-11-09 PROCEDURE — 97110 THERAPEUTIC EXERCISES: CPT | Performed by: PHYSICAL THERAPIST

## 2022-11-09 NOTE — PROGRESS NOTES
"Physical Therapy Daily Treatment Note     Name: Meryl Yo [facility] Number: [id number]    Therapy Diagnosis:   Encounter Diagnoses   Name Primary?    Chronic right shoulder pain Yes    Decreased strength of upper extremity     Acute pain of left knee        Physician: Russell Honeycutt III, *    Visit Date: 11/9/2022  Physician Orders: PT Eval and Treat   Medical Diagnosis from Referral:   Diagnosis   M25.511,G89.29 (ICD-10-CM) - Chronic right shoulder pain   M75.01 (ICD-10-CM) - Adhesive capsulitis of right shoulder    S83.242A (ICD-10-CM) - Acute medial meniscus tear of left knee, initial encounter  Evaluation Date: 9/29/2022 shoulder, 107/2022 knee  Authorization Period Expiration: 12/31/2022  Plan of Care Expiration: 12/08/2022  Visit # / Visits authorized: 12/ 20  Total visits: 14    Time In:  0700am  Time Out: 0805 am  Total Billable Time: 55 minutes    Precautions: Standard and Weightbearing    Subjective     Pt reports: I'm tired today, but the knee is doing pretty well. Pain yesterday after walking to get my breakfast    She was compliant with home exercise program.  Response to previous treatment: R shoulder relief  Functional change: WBAT with no AD on arrival    Pain: 5/10  Location: left knee  , right shoulder    Objective       Knee Passive Range of Motion:   Right  Left    Flexion 110 105   Extension +3 hyper 0       Meryl received therapeutic exercises to develop strength, endurance, ROM, and flexibility for 35 minutes including:    LAQ 4# 3 x 15  Standing heel raise 2 x 15 behind mat   IR/ER walkouts OTB 2 x 10   Patient education on progress    NT  Upright SLR 2 x 10  HS curls BTB 20x 5"  Standing hip abd GTB 20x B   Bridges 10x 3" hold  Bike rocking 8' for knee ROM     Meryl received the following manual therapy techniques: Joint mobilizations and Soft tissue Mobilization were applied to the: L knee for 10 minutes, including:    Knee assessment  Passive flexion EOT   Patellar mobilizations all " "planes  Gr II extension mob  STM medial HS   AP tibia Gr III    Not Performed Today:  Medial gapping Gr III    Meryl participated in neuromuscular re-education activities to improve: Balance, Coordination, Sense, Proprioception, and Posture for 10 minutes. The following activities were included:    Shuttle DL 2 1/2 bands 4 x 10-nt  Shuttle SL 2 bands 4 x 10-nt  Step training x 30 6" B   Sit to stands 30x     Meryl received cold pack for 10 minutes to to decrease circulation, pain, and swelling.      Home Exercises Provided and Patient Education Provided     Education provided:   - Stair training       Written Home Exercises Provided: Patient instructed to cont prior HEP.  Exercises were reviewed and Meryl was able to demonstrate them prior to the end of the session.  Meryl demonstrated good  understanding of the education provided.     See EMR under Patient Instructions for exercises provided 10/6/2022.    Assessment     Patient progressed up to 6" step. Improved closed chain strength and glute med strengthening. She has improved knee flexion ROM but still limited endurance on her feet    Meryl Is progressing well towards her goals.   Pt prognosis is Excellent.     Pt will continue to benefit from skilled outpatient physical therapy to address the deficits listed in the problem list box on initial evaluation, provide pt/family education and to maximize pt's level of independence in the home and community environment.     Pt's spiritual, cultural and educational needs considered and pt agreeable to plan of care and goals.    Anticipated barriers to physical therapy: work schedule    GOALS:     Short Term Goals:  4 weeks weeks(Progressing, not met)  1.Report decreased shoulder pain < / =  5/10 at worst to increase tolerance for work   2. Increase PROM full pain free   3. Increased strength by 1/3 MMT grade in shoulder strength to increase tolerance for ADL and work activities.  4. Pt to tolerate HEP to improve " ROM and independence with ADL's     Long Term Goals: 8 weeks(Progressing, not met)    1.Report decreased shoulder pain  < / =  2/10 at worst  to increase tolerance for work  2.Increase AROM to full pain free  3.Increase strength to >/= 4/5 in shoulder strength to increase tolerance for ADL and work activities.  4. Pt goal: Return to pain free activities of daily living and work related activities.  5. Pt will have improved FOTO score of </= 35% for functional improvements in activities of daily living.    New Knee Goals: 6 weeks (Progressing, not met)    1.Report decreased knee pain  < / =  2/10  to increase tolerance for return to ambulation without RW  2. Increase knee ROM to full motion in order to be able to perform ADLs without difficulty.  3. Increase strength by 1/3 MMT grade in quad and glutes  to increase tolerance for ADL and work activities.  4. Pt to tolerate HEP to improve ROM and independence with ADL's      Plan     Plan of care Certification: 9/29/2022 to 12/8/2022.    Progress closed chain quad/glute strengthening     Fabricio Tolentino, PT, DPT, OCS

## 2022-11-14 ENCOUNTER — CLINICAL SUPPORT (OUTPATIENT)
Dept: REHABILITATION | Facility: HOSPITAL | Age: 67
End: 2022-11-14
Payer: OTHER MISCELLANEOUS

## 2022-11-14 DIAGNOSIS — M25.562 ACUTE PAIN OF LEFT KNEE: ICD-10-CM

## 2022-11-14 DIAGNOSIS — G89.29 CHRONIC RIGHT SHOULDER PAIN: Primary | ICD-10-CM

## 2022-11-14 DIAGNOSIS — R29.898 DECREASED STRENGTH OF UPPER EXTREMITY: ICD-10-CM

## 2022-11-14 DIAGNOSIS — M25.511 CHRONIC RIGHT SHOULDER PAIN: Primary | ICD-10-CM

## 2022-11-14 PROCEDURE — 97110 THERAPEUTIC EXERCISES: CPT | Performed by: PHYSICAL THERAPIST

## 2022-11-14 PROCEDURE — 97140 MANUAL THERAPY 1/> REGIONS: CPT | Performed by: PHYSICAL THERAPIST

## 2022-11-14 PROCEDURE — 97112 NEUROMUSCULAR REEDUCATION: CPT | Performed by: PHYSICAL THERAPIST

## 2022-11-14 NOTE — PROGRESS NOTES
"Physical Therapy Daily Treatment Note     Name: Meryl Yo [facility] Number: [id number]    Therapy Diagnosis:   Encounter Diagnoses   Name Primary?    Chronic right shoulder pain Yes    Decreased strength of upper extremity     Acute pain of left knee        Physician: Russell Honeycutt III, *    Visit Date: 11/14/2022  Physician Orders: PT Eval and Treat   Medical Diagnosis from Referral:   Diagnosis   M25.511,G89.29 (ICD-10-CM) - Chronic right shoulder pain   M75.01 (ICD-10-CM) - Adhesive capsulitis of right shoulder    S83.242A (ICD-10-CM) - Acute medial meniscus tear of left knee, initial encounter  Evaluation Date: 9/29/2022 shoulder, 107/2022 knee  Authorization Period Expiration: 12/31/2022  Plan of Care Expiration: 12/08/2022  Visit # / Visits authorized: 13/ 20  Total visits: 16    Time In:  0655am  Time Out: 0745 am  Total Billable Time: 50 minutes    Precautions: Standard and Weightbearing    Subjective     Pt reports: I went to a wedding and a crab boil. Reports some difficulty getting up large steps at the wedding but overall did very well, brought the SPC in case    She was compliant with home exercise program.  Response to previous treatment: Improved patellar mobility and scar mob  Functional change: WBAT    Pain: 5/10  Location: left knee  , right shoulder    Objective       Knee Passive Range of Motion:   Right  Left    Flexion 110 110   Extension +3 hyper 0       Meryl received therapeutic exercises to develop strength, endurance, ROM, and flexibility for 22 minutes including:    LAQ 7.5# 3 x 15  Standing heel raise 20x DL  -2 up 1 down SL 10x B   Standing hip abd GTB 20x B   Patient education    NT  Upright SLR 2 x 10  HS curls BTB 20x 5"  Bridges 10x 3" hold  Bike rocking 8' for knee ROM     Meryl received the following manual therapy techniques: Joint mobilizations and Soft tissue Mobilization were applied to the: L knee for 10 minutes, including:    Knee assessment  Passive flexion EOT "   Patellar mobilizations all planes  Gr II extension mob  STM medial HS   AP tibia Gr III    Not Performed Today:  Medial gapping Gr III    Meryl participated in neuromuscular re-education activities to improve: Balance, Coordination, Sense, Proprioception, and Posture for 18 minutes. The following activities were included:    Shuttle DL 3 bands 4 x 10-nt  Shuttle SL 2 1/2  bands 4 x 10-nt  Sit to stands 30x   Standing TKE GTB 30x     Meryl received cold pack for 10 minutes to to decrease circulation, pain, and swelling.      Home Exercises Provided and Patient Education Provided     Education provided:   - Stair training       Written Home Exercises Provided: Patient instructed to cont prior HEP.  Exercises were reviewed and Meryl was able to demonstrate them prior to the end of the session.  Meryl demonstrated good  understanding of the education provided.     See EMR under Patient Instructions for exercises provided 10/6/2022.    Assessment     Patient progressing strength. On arrival weakness to the quad and unable to SL heel raise so performed 2 up and 1 down. Increased load on the shuttle today without adverse effect    Meryl Is progressing well towards her goals.   Pt prognosis is Excellent.     Pt will continue to benefit from skilled outpatient physical therapy to address the deficits listed in the problem list box on initial evaluation, provide pt/family education and to maximize pt's level of independence in the home and community environment.     Pt's spiritual, cultural and educational needs considered and pt agreeable to plan of care and goals.    Anticipated barriers to physical therapy: work schedule    GOALS:     Short Term Goals:  4 weeks weeks(Progressing, not met)  1.Report decreased shoulder pain < / =  5/10 at worst to increase tolerance for work   2. Increase PROM full pain free   3. Increased strength by 1/3 MMT grade in shoulder strength to increase tolerance for ADL and work  activities.  4. Pt to tolerate HEP to improve ROM and independence with ADL's     Long Term Goals: 8 weeks(Progressing, not met)    1.Report decreased shoulder pain  < / =  2/10 at worst  to increase tolerance for work  2.Increase AROM to full pain free  3.Increase strength to >/= 4/5 in shoulder strength to increase tolerance for ADL and work activities.  4. Pt goal: Return to pain free activities of daily living and work related activities.  5. Pt will have improved FOTO score of </= 35% for functional improvements in activities of daily living.    New Knee Goals: 6 weeks (Progressing, not met)    1.Report decreased knee pain  < / =  2/10  to increase tolerance for return to ambulation without RW  2. Increase knee ROM to full motion in order to be able to perform ADLs without difficulty.  3. Increase strength by 1/3 MMT grade in quad and glutes  to increase tolerance for ADL and work activities.  4. Pt to tolerate HEP to improve ROM and independence with ADL's      Plan     Plan of care Certification: 9/29/2022 to 12/8/2022.    Progress closed chain quad/glute strengthening     Fabricio Tolentino, PT, DPT, OCS

## 2022-11-16 ENCOUNTER — CLINICAL SUPPORT (OUTPATIENT)
Dept: REHABILITATION | Facility: HOSPITAL | Age: 67
End: 2022-11-16
Payer: COMMERCIAL

## 2022-11-16 ENCOUNTER — OFFICE VISIT (OUTPATIENT)
Dept: SPORTS MEDICINE | Facility: CLINIC | Age: 67
End: 2022-11-16
Payer: COMMERCIAL

## 2022-11-16 VITALS
BODY MASS INDEX: 41.91 KG/M2 | DIASTOLIC BLOOD PRESSURE: 86 MMHG | WEIGHT: 267 LBS | HEIGHT: 67 IN | HEART RATE: 59 BPM | SYSTOLIC BLOOD PRESSURE: 142 MMHG

## 2022-11-16 DIAGNOSIS — Z98.890 S/P ARTHROSCOPY OF LEFT KNEE: Primary | ICD-10-CM

## 2022-11-16 DIAGNOSIS — M25.511 CHRONIC RIGHT SHOULDER PAIN: Primary | ICD-10-CM

## 2022-11-16 DIAGNOSIS — M25.562 ACUTE PAIN OF LEFT KNEE: ICD-10-CM

## 2022-11-16 DIAGNOSIS — R29.898 DECREASED STRENGTH OF UPPER EXTREMITY: ICD-10-CM

## 2022-11-16 DIAGNOSIS — G89.29 CHRONIC RIGHT SHOULDER PAIN: Primary | ICD-10-CM

## 2022-11-16 DIAGNOSIS — G89.29 CHRONIC PAIN OF LEFT KNEE: ICD-10-CM

## 2022-11-16 DIAGNOSIS — M25.562 CHRONIC PAIN OF LEFT KNEE: ICD-10-CM

## 2022-11-16 DIAGNOSIS — M17.12 PRIMARY OSTEOARTHRITIS OF LEFT KNEE: ICD-10-CM

## 2022-11-16 PROCEDURE — 3051F HG A1C>EQUAL 7.0%<8.0%: CPT | Mod: CPTII,S$GLB,, | Performed by: ORTHOPAEDIC SURGERY

## 2022-11-16 PROCEDURE — 3288F FALL RISK ASSESSMENT DOCD: CPT | Mod: CPTII,S$GLB,, | Performed by: ORTHOPAEDIC SURGERY

## 2022-11-16 PROCEDURE — 3051F PR MOST RECENT HEMOGLOBIN A1C LEVEL 7.0 - < 8.0%: ICD-10-PCS | Mod: CPTII,S$GLB,, | Performed by: ORTHOPAEDIC SURGERY

## 2022-11-16 PROCEDURE — 3077F SYST BP >= 140 MM HG: CPT | Mod: CPTII,S$GLB,, | Performed by: ORTHOPAEDIC SURGERY

## 2022-11-16 PROCEDURE — 1101F PT FALLS ASSESS-DOCD LE1/YR: CPT | Mod: CPTII,S$GLB,, | Performed by: ORTHOPAEDIC SURGERY

## 2022-11-16 PROCEDURE — 4010F PR ACE/ARB THEARPY RXD/TAKEN: ICD-10-PCS | Mod: CPTII,S$GLB,, | Performed by: ORTHOPAEDIC SURGERY

## 2022-11-16 PROCEDURE — 3288F PR FALLS RISK ASSESSMENT DOCUMENTED: ICD-10-PCS | Mod: CPTII,S$GLB,, | Performed by: ORTHOPAEDIC SURGERY

## 2022-11-16 PROCEDURE — 99999 PR PBB SHADOW E&M-EST. PATIENT-LVL III: ICD-10-PCS | Mod: PBBFAC,,, | Performed by: ORTHOPAEDIC SURGERY

## 2022-11-16 PROCEDURE — 99999 PR PBB SHADOW E&M-EST. PATIENT-LVL III: CPT | Mod: PBBFAC,,, | Performed by: ORTHOPAEDIC SURGERY

## 2022-11-16 PROCEDURE — 3077F PR MOST RECENT SYSTOLIC BLOOD PRESSURE >= 140 MM HG: ICD-10-PCS | Mod: CPTII,S$GLB,, | Performed by: ORTHOPAEDIC SURGERY

## 2022-11-16 PROCEDURE — 1125F AMNT PAIN NOTED PAIN PRSNT: CPT | Mod: CPTII,S$GLB,, | Performed by: ORTHOPAEDIC SURGERY

## 2022-11-16 PROCEDURE — 3008F BODY MASS INDEX DOCD: CPT | Mod: CPTII,S$GLB,, | Performed by: ORTHOPAEDIC SURGERY

## 2022-11-16 PROCEDURE — 3079F PR MOST RECENT DIASTOLIC BLOOD PRESSURE 80-89 MM HG: ICD-10-PCS | Mod: CPTII,S$GLB,, | Performed by: ORTHOPAEDIC SURGERY

## 2022-11-16 PROCEDURE — 4010F ACE/ARB THERAPY RXD/TAKEN: CPT | Mod: CPTII,S$GLB,, | Performed by: ORTHOPAEDIC SURGERY

## 2022-11-16 PROCEDURE — 3008F PR BODY MASS INDEX (BMI) DOCUMENTED: ICD-10-PCS | Mod: CPTII,S$GLB,, | Performed by: ORTHOPAEDIC SURGERY

## 2022-11-16 PROCEDURE — 99024 PR POST-OP FOLLOW-UP VISIT: ICD-10-PCS | Mod: S$GLB,,, | Performed by: ORTHOPAEDIC SURGERY

## 2022-11-16 PROCEDURE — 97110 THERAPEUTIC EXERCISES: CPT | Performed by: PHYSICAL THERAPIST

## 2022-11-16 PROCEDURE — 97112 NEUROMUSCULAR REEDUCATION: CPT | Performed by: PHYSICAL THERAPIST

## 2022-11-16 PROCEDURE — 3079F DIAST BP 80-89 MM HG: CPT | Mod: CPTII,S$GLB,, | Performed by: ORTHOPAEDIC SURGERY

## 2022-11-16 PROCEDURE — 1101F PR PT FALLS ASSESS DOC 0-1 FALLS W/OUT INJ PAST YR: ICD-10-PCS | Mod: CPTII,S$GLB,, | Performed by: ORTHOPAEDIC SURGERY

## 2022-11-16 PROCEDURE — 1159F MED LIST DOCD IN RCRD: CPT | Mod: CPTII,S$GLB,, | Performed by: ORTHOPAEDIC SURGERY

## 2022-11-16 PROCEDURE — 1159F PR MEDICATION LIST DOCUMENTED IN MEDICAL RECORD: ICD-10-PCS | Mod: CPTII,S$GLB,, | Performed by: ORTHOPAEDIC SURGERY

## 2022-11-16 PROCEDURE — 99024 POSTOP FOLLOW-UP VISIT: CPT | Mod: S$GLB,,, | Performed by: ORTHOPAEDIC SURGERY

## 2022-11-16 PROCEDURE — 1125F PR PAIN SEVERITY QUANTIFIED, PAIN PRESENT: ICD-10-PCS | Mod: CPTII,S$GLB,, | Performed by: ORTHOPAEDIC SURGERY

## 2022-11-16 NOTE — PROGRESS NOTES
"Physical Therapy Daily Treatment Note     Name: Meryl Yo [facility] Number: [id number]    Therapy Diagnosis:   Encounter Diagnoses   Name Primary?    Chronic right shoulder pain Yes    Decreased strength of upper extremity     Acute pain of left knee        Physician: Russell Honeycutt III, *    Visit Date: 11/16/2022  Physician Orders: PT Eval and Treat   Medical Diagnosis from Referral:   Diagnosis   M25.511,G89.29 (ICD-10-CM) - Chronic right shoulder pain   M75.01 (ICD-10-CM) - Adhesive capsulitis of right shoulder    S83.242A (ICD-10-CM) - Acute medial meniscus tear of left knee, initial encounter  Evaluation Date: 9/29/2022 shoulder, 107/2022 knee  Authorization Period Expiration: 12/31/2022  Plan of Care Expiration: 12/08/2022  Visit # / Visits authorized: 14/ 20    Total visits: 17    Time In:  0755am  Time Out: 0900 am  Total Billable Time: 50 minutes    Precautions: Standard and Weightbearing    Subjective     Pt reports: I still have the same pain when I go walking to get my breakfast    She was compliant with home exercise program.  Response to previous treatment: Improved patellar mobility and scar mob  Functional change: WBAT    Pain: 5/10  Location: left knee  , right shoulder    Objective       Knee Passive Range of Motion:   Right  Left    Flexion 110 110   Extension +3 hyper 0       Meryl received therapeutic exercises to develop strength, endurance, ROM, and flexibility for 34 minutes including:    LAQ 7.5# 3 x 15  Standing heel raise 20x DL  -2 up 1 down SL 10x B   Standing hip abd GTB 20x B   Upright SLR 2# 2 x 10  Patient education    NT    HS curls BTB 20x 5"  Bridges 10x 3" hold  Bike rocking 8' for knee ROM     Meryl received the following manual therapy techniques: Joint mobilizations and Soft tissue Mobilization were applied to the: L knee for 0 minutes, including:    Knee assessment  Passive flexion EOT   Patellar mobilizations all planes  Gr II extension mob  STM medial HS   AP tibia " Gr III    Not Performed Today:  Medial gapping Gr III    Meryl participated in neuromuscular re-education activities to improve: Balance, Coordination, Sense, Proprioception, and Posture for 16 minutes. The following activities were included:    Shuttle DL 3 bands 4 x 10-nt  Shuttle SL 2 1/2  bands 4 x 10-nt  Sit to stands 30x   Standing TKE GTB 30x-nt     Meryl received cold pack for 10 minutes to to decrease circulation, pain, and swelling.      Home Exercises Provided and Patient Education Provided     Education provided:   - Stair training       Written Home Exercises Provided: Patient instructed to cont prior HEP.  Exercises were reviewed and Meryl was able to demonstrate them prior to the end of the session.  Meryl demonstrated good  understanding of the education provided.     See EMR under Patient Instructions for exercises provided 10/6/2022.    Assessment     Patient tolerated quad loading well last visit so continued to strengthen with SLR today. She is progressing well, just need to improve closed chain endurance on her feet to improve exercise capacity and reduce pain with community ambulation    Meryl Is progressing well towards her goals.   Pt prognosis is Excellent.     Pt will continue to benefit from skilled outpatient physical therapy to address the deficits listed in the problem list box on initial evaluation, provide pt/family education and to maximize pt's level of independence in the home and community environment.     Pt's spiritual, cultural and educational needs considered and pt agreeable to plan of care and goals.    Anticipated barriers to physical therapy: work schedule    GOALS:     Short Term Goals:  4 weeks weeks(Progressing, not met)  1.Report decreased shoulder pain < / =  5/10 at worst to increase tolerance for work   2. Increase PROM full pain free   3. Increased strength by 1/3 MMT grade in shoulder strength to increase tolerance for ADL and work activities.  4. Pt to  tolerate HEP to improve ROM and independence with ADL's     Long Term Goals: 8 weeks(Progressing, not met)    1.Report decreased shoulder pain  < / =  2/10 at worst  to increase tolerance for work  2.Increase AROM to full pain free  3.Increase strength to >/= 4/5 in shoulder strength to increase tolerance for ADL and work activities.  4. Pt goal: Return to pain free activities of daily living and work related activities.  5. Pt will have improved FOTO score of </= 35% for functional improvements in activities of daily living.    New Knee Goals: 6 weeks (Progressing, not met)    1.Report decreased knee pain  < / =  2/10  to increase tolerance for return to ambulation without RW  2. Increase knee ROM to full motion in order to be able to perform ADLs without difficulty.  3. Increase strength by 1/3 MMT grade in quad and glutes  to increase tolerance for ADL and work activities.  4. Pt to tolerate HEP to improve ROM and independence with ADL's      Plan     Plan of care Certification: 9/29/2022 to 12/8/2022.    Progress closed chain quad/glute strengthening     Fabricio Tolentino, PT, DPT, OCS

## 2022-11-16 NOTE — PROGRESS NOTES
CC:  left knee pain    History of present illness: Pt is here today for post-operative followup of knee arthroscopy.  she is doing well.  We have reviewed her findings and discussed plan of care and future treatment options.      Patient has been attending physical therapy at the [facility] location, working with Fabricio MENA.    Patient notes using a cane for longer walks     SANE post op: 75  SANE pre op: 80    Pain today 0/10    DATE OF PROCEDURE: 10/04/2022  SURGEON:  Brigette Babin M.D  PROCEDURE PERFORMED:   left  1. knee arthroscopic chondroplasty (CPT 42468)  2. knee arthroscopic medial and lateral (CPT 46746) meniscectomy   3. knee arthroscopic partial synovectomy/debridement (CPT 42837).   4. knee arthroscopic plica excision(CPT 69704).    5. Knee arthroscopic lysis of adhesions (CPT 37239)    Patella 15 x 20 mm grade 2  Chondroplasty was performed using arthroscopic shaver.     There was chondral damage to:  Medial femoral condyle 10 x 15 mm grade 2  Chondroplasty was performed using arthroscopic shaver.                                                                               PHYSICAL EXAMINATION:     Incision sites healed well  No evidence of any erythema, infection or induration  Range of motion 0-125 degrees  No effusion  2+ DP pulse  No swelling, no calf tenderness  - Pat's sign  + mild medial joint line tenderness  Moderate quad atrophy                                                                               ASSESSMENT:                                                                                                                                               1. Status post above, doing well.                                                                                                                               PLAN:                                                                                                                                                     1. Continue with PT.    2. Emphasized quad function.  3. I have discussed return to activity in detail.  4.Patient will see us back as needed.                                    5. All questions were answered, surgical technique was reviewed and interpreted, and patient should contact us with any questions or concerns in the interim.            6. Iovera order placed                    6. Iovera candidate for continued medial joint pain

## 2022-11-17 ENCOUNTER — PATIENT MESSAGE (OUTPATIENT)
Dept: SPORTS MEDICINE | Facility: CLINIC | Age: 67
End: 2022-11-17
Payer: COMMERCIAL

## 2022-11-19 RX ORDER — ERGOCALCIFEROL 1.25 MG/1
50000 CAPSULE ORAL
Qty: 12 CAPSULE | Refills: 1 | Status: CANCELLED | OUTPATIENT
Start: 2022-11-19

## 2022-11-21 ENCOUNTER — CLINICAL SUPPORT (OUTPATIENT)
Dept: REHABILITATION | Facility: HOSPITAL | Age: 67
End: 2022-11-21
Payer: OTHER MISCELLANEOUS

## 2022-11-21 DIAGNOSIS — R29.898 DECREASED STRENGTH OF UPPER EXTREMITY: ICD-10-CM

## 2022-11-21 DIAGNOSIS — G89.29 CHRONIC RIGHT SHOULDER PAIN: Primary | ICD-10-CM

## 2022-11-21 DIAGNOSIS — M25.562 ACUTE PAIN OF LEFT KNEE: ICD-10-CM

## 2022-11-21 DIAGNOSIS — M25.511 CHRONIC RIGHT SHOULDER PAIN: Primary | ICD-10-CM

## 2022-11-21 PROCEDURE — 97112 NEUROMUSCULAR REEDUCATION: CPT | Performed by: PHYSICAL THERAPIST

## 2022-11-21 PROCEDURE — 97110 THERAPEUTIC EXERCISES: CPT | Performed by: PHYSICAL THERAPIST

## 2022-11-21 NOTE — PROGRESS NOTES
"Physical Therapy Daily Treatment Note     Name: Meryl Yo [facility] Number: [id number]    Therapy Diagnosis:   Encounter Diagnoses   Name Primary?    Chronic right shoulder pain Yes    Decreased strength of upper extremity     Acute pain of left knee        Physician: Russell Honeycutt III, *    Visit Date: 11/21/2022  Physician Orders: PT Eval and Treat   Medical Diagnosis from Referral:   Diagnosis   M25.511,G89.29 (ICD-10-CM) - Chronic right shoulder pain   M75.01 (ICD-10-CM) - Adhesive capsulitis of right shoulder    S83.242A (ICD-10-CM) - Acute medial meniscus tear of left knee, initial encounter  Evaluation Date: 9/29/2022 shoulder, 107/2022 knee  Authorization Period Expiration: 12/31/2022  Plan of Care Expiration: 12/08/2022  Visit # / Visits authorized: 15/ 20    Total visits: 17    Time In:  1500  Time Out: 1600  Total Billable Time: 50 minutes    Precautions: Standard and Weightbearing    Subjective     Pt reports: I had a lot of pain on Friday so I stayed off it this weekend.     She was compliant with home exercise program.  Response to previous treatment: Improved patellar mobility and scar mob  Functional change: WBAT    Pain: 5/10  Location: left knee  , right shoulder    Objective       Knee Passive Range of Motion:   Right  Left    Flexion 110 110   Extension +3 hyper 0       Meryl received therapeutic exercises to develop strength, endurance, ROM, and flexibility for 25 minutes including:    LAQ 5# 3 x 15  Upright SLR 2# 2 x 10  Sidelying hip abd 3 x 15  Patient education    NT  HS curls BTB 20x 5"  Bridges 10x 3" hold  Bike rocking 8' for knee ROM     Meryl received the following manual therapy techniques: Joint mobilizations and Soft tissue Mobilization were applied to the: L knee for 0 minutes, including:    Knee assessment  Passive flexion EOT   Patellar mobilizations all planes  Gr II extension mob  STM medial HS   AP tibia Gr III    Not Performed Today:  Medial gapping Gr III    Meryl " "participated in neuromuscular re-education activities to improve: Balance, Coordination, Sense, Proprioception, and Posture for 25 minutes. The following activities were included:    Shuttle DL 3 bands 4 x 10  Shuttle SL 2 1/2  bands 4 x 10  Sit to stands 30x   Step ups 6" 30x    Meryl received cold pack for 0 minutes to to decrease circulation, pain, and swelling.      Home Exercises Provided and Patient Education Provided     Education provided:   - Stair training       Written Home Exercises Provided: Patient instructed to cont prior HEP.  Exercises were reviewed and Meryl was able to demonstrate them prior to the end of the session.  Meryl demonstrated good  understanding of the education provided.     See EMR under Patient Instructions for exercises provided 10/6/2022.    Assessment     Patient progressing closed chain strengthening. Has crepitus in the knee with extension but not painful. Needs continue strengthen glute med and quad hypertrophy to ambulate without assistive device     Meryl Is progressing well towards her goals.   Pt prognosis is Excellent.     Pt will continue to benefit from skilled outpatient physical therapy to address the deficits listed in the problem list box on initial evaluation, provide pt/family education and to maximize pt's level of independence in the home and community environment.     Pt's spiritual, cultural and educational needs considered and pt agreeable to plan of care and goals.    Anticipated barriers to physical therapy: work schedule    GOALS:     Short Term Goals:  4 weeks weeks(Progressing, not met)  1.Report decreased shoulder pain < / =  5/10 at worst to increase tolerance for work   2. Increase PROM full pain free   3. Increased strength by 1/3 MMT grade in shoulder strength to increase tolerance for ADL and work activities.  4. Pt to tolerate HEP to improve ROM and independence with ADL's     Long Term Goals: 8 weeks(Progressing, not met)    1.Report " decreased shoulder pain  < / =  2/10 at worst  to increase tolerance for work  2.Increase AROM to full pain free  3.Increase strength to >/= 4/5 in shoulder strength to increase tolerance for ADL and work activities.  4. Pt goal: Return to pain free activities of daily living and work related activities.  5. Pt will have improved FOTO score of </= 35% for functional improvements in activities of daily living.    New Knee Goals: 6 weeks (Progressing, not met)    1.Report decreased knee pain  < / =  2/10  to increase tolerance for return to ambulation without RW  2. Increase knee ROM to full motion in order to be able to perform ADLs without difficulty.  3. Increase strength by 1/3 MMT grade in quad and glutes  to increase tolerance for ADL and work activities.  4. Pt to tolerate HEP to improve ROM and independence with ADL's      Plan     Plan of care Certification: 9/29/2022 to 12/8/2022.    Progress closed chain quad/glute strengthening     Fabricio Tolentino, PT, DPT, OCS

## 2022-11-25 ENCOUNTER — CLINICAL SUPPORT (OUTPATIENT)
Dept: REHABILITATION | Facility: HOSPITAL | Age: 67
End: 2022-11-25
Payer: COMMERCIAL

## 2022-11-25 DIAGNOSIS — M25.511 CHRONIC RIGHT SHOULDER PAIN: Primary | ICD-10-CM

## 2022-11-25 DIAGNOSIS — R29.898 DECREASED STRENGTH OF UPPER EXTREMITY: ICD-10-CM

## 2022-11-25 DIAGNOSIS — G89.29 CHRONIC RIGHT SHOULDER PAIN: Primary | ICD-10-CM

## 2022-11-25 DIAGNOSIS — M25.562 ACUTE PAIN OF LEFT KNEE: ICD-10-CM

## 2022-11-25 PROCEDURE — 97112 NEUROMUSCULAR REEDUCATION: CPT | Performed by: PHYSICAL THERAPIST

## 2022-11-25 PROCEDURE — 97110 THERAPEUTIC EXERCISES: CPT | Performed by: PHYSICAL THERAPIST

## 2022-11-25 NOTE — PROGRESS NOTES
"Physical Therapy Daily Treatment Note     Name: Meryl Johnson  [facility] Number: [id number]    Therapy Diagnosis:   Encounter Diagnoses   Name Primary?    Chronic right shoulder pain Yes    Decreased strength of upper extremity     Acute pain of left knee        Physician: Russell Honeycutt III, *    Visit Date: 11/25/2022  Physician Orders: PT Eval and Treat   Medical Diagnosis from Referral:   Diagnosis   M25.511,G89.29 (ICD-10-CM) - Chronic right shoulder pain   M75.01 (ICD-10-CM) - Adhesive capsulitis of right shoulder    S83.242A (ICD-10-CM) - Acute medial meniscus tear of left knee, initial encounter  Evaluation Date: 9/29/2022 shoulder, 107/2022 knee  Authorization Period Expiration: 12/31/2022  Plan of Care Expiration: 12/08/2022  Visit # / Visits authorized: 16/ 20    Total visits: 18    Time In:  830  Time Out: 0935  Total Billable Time: 65 minutes    Precautions: Standard and Weightbearing    Subjective     Pt reports: I didn't use the cane today, in the car and yesterday I didn't use it either    She was compliant with home exercise program.  Response to previous treatment: Improved patellar mobility and scar mob  Functional change: WBAT    Pain: 5/10  Location: left knee  , right shoulder    Objective       Knee Passive Range of Motion:   Right  Left    Flexion 110 110   Extension +3 hyper 0       Meryl received therapeutic exercises to develop strength, endurance, ROM, and flexibility for 20 minutes including:    LAQ 7.5# 3 x 15  Standing hip abd YTB 3 x 15  Patient education    NT  Upright SLR 2# 2 x 10  HS curls BTB 20x 5"  Bridges 10x 3" hold  Bike rocking 8' for knee ROM     Meryl received the following manual therapy techniques: Joint mobilizations and Soft tissue Mobilization were applied to the: L knee for 0 minutes, including:    Knee assessment  Passive flexion EOT   Patellar mobilizations all planes  Gr II extension mob  STM medial HS   AP tibia Gr III    Not Performed Today:  Medial gapping Gr " "III    Meryl participated in neuromuscular re-education activities to improve: Balance, Coordination, Sense, Proprioception, and Posture for 25 minutes. The following activities were included:    Shuttle DL 3 bands 4 x 10  Shuttle SL 2 1/2  bands 4 x 10  Sit to stands 30x   Step ups 6" 30x    Meryl received cold pack for 0 minutes to to decrease circulation, pain, and swelling.      Home Exercises Provided and Patient Education Provided     Education provided:   - Stair training       Written Home Exercises Provided: Patient instructed to cont prior HEP.  Exercises were reviewed and Meryl was able to demonstrate them prior to the end of the session.  Meryl demonstrated good  understanding of the education provided.     See EMR under Patient Instructions for exercises provided 10/6/2022.    Assessment     Patient presented without assistive device today, limited knee flexion in gait cycle. Better strength on the shuttle, needs to continue quad and HS strength    Meryl Is progressing well towards her goals.   Pt prognosis is Excellent.     Pt will continue to benefit from skilled outpatient physical therapy to address the deficits listed in the problem list box on initial evaluation, provide pt/family education and to maximize pt's level of independence in the home and community environment.     Pt's spiritual, cultural and educational needs considered and pt agreeable to plan of care and goals.    Anticipated barriers to physical therapy: work schedule    GOALS:     Short Term Goals:  4 weeks weeks(Progressing, not met)  1.Report decreased shoulder pain < / =  5/10 at worst to increase tolerance for work   2. Increase PROM full pain free   3. Increased strength by 1/3 MMT grade in shoulder strength to increase tolerance for ADL and work activities.  4. Pt to tolerate HEP to improve ROM and independence with ADL's     Long Term Goals: 8 weeks(Progressing, not met)    1.Report decreased shoulder pain  < / =  2/10 " at worst  to increase tolerance for work  2.Increase AROM to full pain free  3.Increase strength to >/= 4/5 in shoulder strength to increase tolerance for ADL and work activities.  4. Pt goal: Return to pain free activities of daily living and work related activities.  5. Pt will have improved FOTO score of </= 35% for functional improvements in activities of daily living.    New Knee Goals: 6 weeks (Progressing, not met)    1.Report decreased knee pain  < / =  2/10  to increase tolerance for return to ambulation without RW  2. Increase knee ROM to full motion in order to be able to perform ADLs without difficulty.  3. Increase strength by 1/3 MMT grade in quad and glutes  to increase tolerance for ADL and work activities.  4. Pt to tolerate HEP to improve ROM and independence with ADL's      Plan     Plan of care Certification: 9/29/2022 to 12/8/2022.    Progress closed chain quad/glute strengthening     Fabricio Tolentino, PT, DPT, OCS

## 2022-11-29 RX ORDER — ERGOCALCIFEROL 1.25 MG/1
50000 CAPSULE ORAL
Qty: 12 CAPSULE | Refills: 1 | Status: CANCELLED | OUTPATIENT
Start: 2022-11-19

## 2022-11-30 ENCOUNTER — CLINICAL SUPPORT (OUTPATIENT)
Dept: REHABILITATION | Facility: HOSPITAL | Age: 67
End: 2022-11-30
Payer: COMMERCIAL

## 2022-11-30 DIAGNOSIS — R29.898 DECREASED STRENGTH OF UPPER EXTREMITY: ICD-10-CM

## 2022-11-30 DIAGNOSIS — M25.511 CHRONIC RIGHT SHOULDER PAIN: Primary | ICD-10-CM

## 2022-11-30 DIAGNOSIS — G89.29 CHRONIC RIGHT SHOULDER PAIN: Primary | ICD-10-CM

## 2022-11-30 DIAGNOSIS — M25.562 ACUTE PAIN OF LEFT KNEE: ICD-10-CM

## 2022-11-30 PROCEDURE — 97140 MANUAL THERAPY 1/> REGIONS: CPT | Performed by: PHYSICAL THERAPIST

## 2022-11-30 PROCEDURE — 97110 THERAPEUTIC EXERCISES: CPT | Performed by: PHYSICAL THERAPIST

## 2022-11-30 PROCEDURE — 97112 NEUROMUSCULAR REEDUCATION: CPT | Performed by: PHYSICAL THERAPIST

## 2022-11-30 NOTE — PROGRESS NOTES
"Physical Therapy Daily Treatment Note     Name: Meryl Johnson  [facility] Number: [id number]    Therapy Diagnosis:   Encounter Diagnoses   Name Primary?    Chronic right shoulder pain Yes    Decreased strength of upper extremity     Acute pain of left knee        Physician: Russell Honeycutt III, *    Visit Date: 11/30/2022  Physician Orders: PT Eval and Treat   Medical Diagnosis from Referral:   Diagnosis   M25.511,G89.29 (ICD-10-CM) - Chronic right shoulder pain   M75.01 (ICD-10-CM) - Adhesive capsulitis of right shoulder    S83.242A (ICD-10-CM) - Acute medial meniscus tear of left knee, initial encounter  Evaluation Date: 9/29/2022 shoulder, 107/2022 knee  Authorization Period Expiration: 12/31/2022  Plan of Care Expiration: 12/08/2022  Visit # / Visits authorized: 17/ 20    Total visits: 20    Time In:  0700  Time Out: 0810  Total Billable Time: 60 minutes    Precautions: Standard and Weightbearing    Subjective     Pt reports: Yesterday I went to [facility] and walked all over without the cane and almost had to take a tramadol to sleep last night. Notes tightness and pain in the back of the knee    She was compliant with home exercise program.  Response to previous treatment: Improved patellar mobility and scar mob  Functional change: WBAT    Pain: 5/10  Location: left knee  , right shoulder    Objective       Knee Passive Range of Motion:   Right  Left    Flexion 110 110   Extension +3 hyper 0       Meryl received therapeutic exercises to develop strength, endurance, ROM, and flexibility for 35 minutes including:    LAQ 7.5# 3 x 15  Bridges 10x 3" hold  Heel slide with strap 30x   HSS 30 sec 3x  Patient education    NT  Upright SLR 2# 2 x 10  HS curls BTB 20x 5"    Bike rocking 8' for knee ROM     Meryl received the following manual therapy techniques: Joint mobilizations and Soft tissue Mobilization were applied to the: L knee for 10 minutes, including:    Knee assessment  Passive flexion EOT   Patellar " mobilizations all planes  STM medial HS       Not Performed Today:  Medial gapping Gr III    Meryl participated in neuromuscular re-education activities to improve: Balance, Coordination, Sense, Proprioception, and Posture for 20 minutes. The following activities were included:    Shuttle DL 3 bands 4 x 10  Shuttle SL 2 1/2  bands 4 x 10    Meryl received cold pack for 10 minutes to to decrease circulation, pain, and swelling.      Home Exercises Provided and Patient Education Provided     Education provided:   - Stair training       Written Home Exercises Provided: Patient instructed to cont prior HEP.  Exercises were reviewed and Meryl was able to demonstrate them prior to the end of the session.  Meryl demonstrated good  understanding of the education provided.     See EMR under Patient Instructions for exercises provided 10/6/2022.    Assessment     Patient continues to get increased pain with too much load added to the knee, likely due to increased distance walked without assistive device. Notes quad fatigue lateral side of the knee after shuttle and less strength felt pushing today.     Meryl Is progressing well towards her goals.   Pt prognosis is Excellent.     Pt will continue to benefit from skilled outpatient physical therapy to address the deficits listed in the problem list box on initial evaluation, provide pt/family education and to maximize pt's level of independence in the home and community environment.     Pt's spiritual, cultural and educational needs considered and pt agreeable to plan of care and goals.    Anticipated barriers to physical therapy: work schedule    GOALS:     Short Term Goals:  4 weeks weeks(Progressing, not met)  1.Report decreased shoulder pain < / =  5/10 at worst to increase tolerance for work   2. Increase PROM full pain free   3. Increased strength by 1/3 MMT grade in shoulder strength to increase tolerance for ADL and work activities.  4. Pt to tolerate HEP to  improve ROM and independence with ADL's     Long Term Goals: 8 weeks(Progressing, not met)    1.Report decreased shoulder pain  < / =  2/10 at worst  to increase tolerance for work  2.Increase AROM to full pain free  3.Increase strength to >/= 4/5 in shoulder strength to increase tolerance for ADL and work activities.  4. Pt goal: Return to pain free activities of daily living and work related activities.  5. Pt will have improved FOTO score of </= 35% for functional improvements in activities of daily living.    New Knee Goals: 6 weeks (Progressing, not met)    1.Report decreased knee pain  < / =  2/10  to increase tolerance for return to ambulation without RW  2. Increase knee ROM to full motion in order to be able to perform ADLs without difficulty.  3. Increase strength by 1/3 MMT grade in quad and glutes  to increase tolerance for ADL and work activities.  4. Pt to tolerate HEP to improve ROM and independence with ADL's      Plan     Plan of care Certification: 9/29/2022 to 12/8/2022.    Progress closed chain quad/glute strengthening     Fabricio Tolentino, PT, DPT, OCS

## 2022-12-02 ENCOUNTER — CLINICAL SUPPORT (OUTPATIENT)
Dept: REHABILITATION | Facility: HOSPITAL | Age: 67
End: 2022-12-02
Payer: COMMERCIAL

## 2022-12-02 DIAGNOSIS — R29.898 DECREASED STRENGTH OF UPPER EXTREMITY: ICD-10-CM

## 2022-12-02 DIAGNOSIS — M25.511 CHRONIC RIGHT SHOULDER PAIN: Primary | ICD-10-CM

## 2022-12-02 DIAGNOSIS — G89.29 CHRONIC RIGHT SHOULDER PAIN: Primary | ICD-10-CM

## 2022-12-02 DIAGNOSIS — M25.562 ACUTE PAIN OF LEFT KNEE: ICD-10-CM

## 2022-12-02 PROCEDURE — 97110 THERAPEUTIC EXERCISES: CPT | Performed by: PHYSICAL THERAPIST

## 2022-12-02 PROCEDURE — 97140 MANUAL THERAPY 1/> REGIONS: CPT | Performed by: PHYSICAL THERAPIST

## 2022-12-02 NOTE — PROGRESS NOTES
"Physical Therapy Daily Treatment Note     Name: Meryl Johnson  [facility] Number: [id number]    Therapy Diagnosis:   Encounter Diagnoses   Name Primary?    Chronic right shoulder pain Yes    Decreased strength of upper extremity     Acute pain of left knee        Physician: Jun Chin MD    Visit Date: 12/2/2022  Physician Orders: PT Eval and Treat   Medical Diagnosis from Referral:   Diagnosis   M25.511,G89.29 (ICD-10-CM) - Chronic right shoulder pain   M75.01 (ICD-10-CM) - Adhesive capsulitis of right shoulder    S83.242A (ICD-10-CM) - Acute medial meniscus tear of left knee, initial encounter  Evaluation Date: 9/29/2022 shoulder, 107/2022 knee  Authorization Period Expiration: 12/31/2022  Plan of Care Expiration: 12/08/2022  Visit # / Visits authorized: 6/ 20    Total visits: 21    Time In:  0800  Time Out: 0910  Total Billable Time: 60 minutes    Precautions: Standard and Weightbearing    Subjective     Pt reports: I got a cramp last night. I have been having pain last few days, hope I didn't mess anything up. Using the SPC again    She was compliant with home exercise program.  Response to previous treatment: Improved patellar mobility and scar mob  Functional change: WBAT    Pain: 5/10  Location: left knee  , right shoulder    Objective       Knee Passive Range of Motion:   Right  Left    Flexion 110 110   Extension +3 hyper 0       Meryl received therapeutic exercises to develop strength, endurance, ROM, and flexibility for 50 minutes including:    LAQ 7.5# 3 x 15  HS curls BTB 20x 5"  Standing hip abd 30x B   Standing heel raise 30x  Sciatic nerve gliders 2 x 20 B   Clam BTB 2 x 20  Patient education    NT  Upright SLR 2# 2 x 10  Bike rocking 8' for knee ROM     Meryl received the following manual therapy techniques: Joint mobilizations and Soft tissue Mobilization were applied to the: L knee for 10 minutes, including:    Knee assessment  Passive flexion EOT   Patellar mobilizations all planes  STM medial " HS     Not Performed Today:  Medial gapping Gr III    Meryl participated in neuromuscular re-education activities to improve: Balance, Coordination, Sense, Proprioception, and Posture for 0 minutes. The following activities were included:    Shuttle DL 3 bands 4 x 10  Shuttle SL 2 1/2  bands 4 x 10    Meryl received cold pack for 10 minutes to to decrease circulation, pain, and swelling.      Home Exercises Provided and Patient Education Provided     Education provided:   - Stair training       Written Home Exercises Provided: Patient instructed to cont prior HEP.  Exercises were reviewed and Meryl was able to demonstrate them prior to the end of the session.  Meryl demonstrated good  understanding of the education provided.     See EMR under Patient Instructions for exercises provided 10/6/2022.    Assessment     Patient presented with increased pain, back to using her SPC for pain relief. Indicating increased neural tension down the LE. Improved HS strength with BTB today. Added glute work for HEP this weekend    Meryl Is progressing well towards her goals.   Pt prognosis is Excellent.     Pt will continue to benefit from skilled outpatient physical therapy to address the deficits listed in the problem list box on initial evaluation, provide pt/family education and to maximize pt's level of independence in the home and community environment.     Pt's spiritual, cultural and educational needs considered and pt agreeable to plan of care and goals.    Anticipated barriers to physical therapy: work schedule    GOALS:     Short Term Goals:  4 weeks weeks(Progressing, not met)  1.Report decreased shoulder pain < / =  5/10 at worst to increase tolerance for work   2. Increase PROM full pain free   3. Increased strength by 1/3 MMT grade in shoulder strength to increase tolerance for ADL and work activities.  4. Pt to tolerate HEP to improve ROM and independence with ADL's     Long Term Goals: 8 weeks(Progressing, not  met)    1.Report decreased shoulder pain  < / =  2/10 at worst  to increase tolerance for work  2.Increase AROM to full pain free  3.Increase strength to >/= 4/5 in shoulder strength to increase tolerance for ADL and work activities.  4. Pt goal: Return to pain free activities of daily living and work related activities.  5. Pt will have improved FOTO score of </= 35% for functional improvements in activities of daily living.    New Knee Goals: 6 weeks (Progressing, not met)    1.Report decreased knee pain  < / =  2/10  to increase tolerance for return to ambulation without RW  2. Increase knee ROM to full motion in order to be able to perform ADLs without difficulty.  3. Increase strength by 1/3 MMT grade in quad and glutes  to increase tolerance for ADL and work activities.  4. Pt to tolerate HEP to improve ROM and independence with ADL's      Plan     Plan of care Certification: 9/29/2022 to 12/8/2022.    Progress closed chain quad/glute strengthening     Fabricio Tolentino, PT, DPT, OCS

## 2022-12-07 ENCOUNTER — CLINICAL SUPPORT (OUTPATIENT)
Dept: REHABILITATION | Facility: HOSPITAL | Age: 67
End: 2022-12-07
Payer: COMMERCIAL

## 2022-12-07 DIAGNOSIS — M25.511 CHRONIC RIGHT SHOULDER PAIN: Primary | ICD-10-CM

## 2022-12-07 DIAGNOSIS — G89.29 CHRONIC RIGHT SHOULDER PAIN: Primary | ICD-10-CM

## 2022-12-07 DIAGNOSIS — M25.562 ACUTE PAIN OF LEFT KNEE: ICD-10-CM

## 2022-12-07 DIAGNOSIS — R29.898 DECREASED STRENGTH OF UPPER EXTREMITY: ICD-10-CM

## 2022-12-07 PROCEDURE — 97140 MANUAL THERAPY 1/> REGIONS: CPT | Performed by: PHYSICAL THERAPIST

## 2022-12-07 PROCEDURE — 97112 NEUROMUSCULAR REEDUCATION: CPT | Performed by: PHYSICAL THERAPIST

## 2022-12-07 PROCEDURE — 97110 THERAPEUTIC EXERCISES: CPT | Performed by: PHYSICAL THERAPIST

## 2022-12-07 NOTE — PROGRESS NOTES
"Physical Therapy Daily Treatment Note     Name: Meryl Yo [facility] Number: [id number]    Therapy Diagnosis:   Encounter Diagnoses   Name Primary?    Chronic right shoulder pain Yes    Decreased strength of upper extremity     Acute pain of left knee        Physician: Russell Honeycutt III, *    Visit Date: 12/7/2022  Physician Orders: PT Eval and Treat   Medical Diagnosis from Referral:   Diagnosis   M25.511,G89.29 (ICD-10-CM) - Chronic right shoulder pain   M75.01 (ICD-10-CM) - Adhesive capsulitis of right shoulder    S83.242A (ICD-10-CM) - Acute medial meniscus tear of left knee, initial encounter  Evaluation Date: 9/29/2022 shoulder, 107/2022 knee  Authorization Period Expiration: 12/31/2022  Plan of Care Expiration: 12/08/2022  Visit # / Visits authorized: 18/ 20    Total visits: 22    Time In:  0650  Time Out: 0745  Total Billable Time: 55 minutes    Precautions: Standard and Weightbearing    Subjective     Pt reports: I have been walking without the SPC. Trying to ignore the knee discomfort    She was compliant with home exercise program.  Response to previous treatment: Improved patellar mobility and scar mob  Functional change: WBAT    Pain: 5/10  Location: left knee  , right shoulder    Objective       Knee Passive Range of Motion:   Right  Left    Flexion 110 110   Extension +3 hyper 0       Meryl received therapeutic exercises to develop strength, endurance, ROM, and flexibility for 35 minutes including:    LAQ 7.5# 3 x 15  Standing hip abd 30x B   Standing heel raise 30x  TKE GTB 2 x 20 3" hold  Sit to stand low table 2 x 15  Patient education    NT  Sciatic nerve gliders 2 x 20 B   Clam BTB 2 x 20  Upright SLR 2# 2 x 10  Bike rocking 8' for knee ROM     Meryl received the following manual therapy techniques: Joint mobilizations and Soft tissue Mobilization were applied to the: L knee for 10 minutes, including:    Knee assessment  Passive flexion EOT   Patellar mobilizations all planes  STM medial HS "     Not Performed Today:  Medial gapping Gr III    Meryl participated in neuromuscular re-education activities to improve: Balance, Coordination, Sense, Proprioception, and Posture for 10 minutes. The following activities were included:    Shuttle DL 3 bands 4 x 10  Shuttle SL 2 1/2  bands 4 x 10    Meryl received cold pack for 10 minutes to to decrease circulation, pain, and swelling.      Home Exercises Provided and Patient Education Provided     Education provided:   - Stair training       Written Home Exercises Provided: Patient instructed to cont prior HEP.  Exercises were reviewed and Meryl was able to demonstrate them prior to the end of the session.  Meryl demonstrated good  understanding of the education provided.     See EMR under Patient Instructions for exercises provided 10/6/2022.    Assessment     Patient back to ambulating with assistive device. Able to perform sit to stand from lowest table setting. Progressed closed chain TKE to improve stair climbing to get back into the [facility]     Meryl Is progressing well towards her goals.   Pt prognosis is Excellent.     Pt will continue to benefit from skilled outpatient physical therapy to address the deficits listed in the problem list box on initial evaluation, provide pt/family education and to maximize pt's level of independence in the home and community environment.     Pt's spiritual, cultural and educational needs considered and pt agreeable to plan of care and goals.    Anticipated barriers to physical therapy: work schedule    GOALS:     Short Term Goals:  4 weeks weeks(Progressing, not met)  1.Report decreased shoulder pain < / =  5/10 at worst to increase tolerance for work   2. Increase PROM full pain free   3. Increased strength by 1/3 MMT grade in shoulder strength to increase tolerance for ADL and work activities.  4. Pt to tolerate HEP to improve ROM and independence with ADL's     Long Term Goals: 8 weeks(Progressing, not  met)    1.Report decreased shoulder pain  < / =  2/10 at worst  to increase tolerance for work  2.Increase AROM to full pain free  3.Increase strength to >/= 4/5 in shoulder strength to increase tolerance for ADL and work activities.  4. Pt goal: Return to pain free activities of daily living and work related activities.  5. Pt will have improved FOTO score of </= 35% for functional improvements in activities of daily living.    New Knee Goals: 6 weeks (Progressing, not met)    1.Report decreased knee pain  < / =  2/10  to increase tolerance for return to ambulation without RW  2. Increase knee ROM to full motion in order to be able to perform ADLs without difficulty.  3. Increase strength by 1/3 MMT grade in quad and glutes  to increase tolerance for ADL and work activities.  4. Pt to tolerate HEP to improve ROM and independence with ADL's      Plan     Plan of care Certification: 9/29/2022 to 12/8/2022.    Progress closed chain quad/glute strengthening     Fabricio Tolentino, PT, DPT, OCS

## 2022-12-12 ENCOUNTER — CLINICAL SUPPORT (OUTPATIENT)
Dept: REHABILITATION | Facility: HOSPITAL | Age: 67
End: 2022-12-12
Payer: COMMERCIAL

## 2022-12-12 DIAGNOSIS — M25.562 ACUTE PAIN OF LEFT KNEE: ICD-10-CM

## 2022-12-12 DIAGNOSIS — G89.29 CHRONIC RIGHT SHOULDER PAIN: Primary | ICD-10-CM

## 2022-12-12 DIAGNOSIS — R29.898 DECREASED STRENGTH OF UPPER EXTREMITY: ICD-10-CM

## 2022-12-12 DIAGNOSIS — M25.511 CHRONIC RIGHT SHOULDER PAIN: Primary | ICD-10-CM

## 2022-12-12 PROCEDURE — 97110 THERAPEUTIC EXERCISES: CPT | Performed by: PHYSICAL THERAPIST

## 2022-12-12 PROCEDURE — 97140 MANUAL THERAPY 1/> REGIONS: CPT | Performed by: PHYSICAL THERAPIST

## 2022-12-12 NOTE — PROGRESS NOTES
"Physical Therapy Daily Treatment Note     Name: Meryl Yo [facility] Number: [id number]    Therapy Diagnosis:   Encounter Diagnoses   Name Primary?    Chronic right shoulder pain Yes    Decreased strength of upper extremity     Acute pain of left knee        Physician: Jun Chin MD    Visit Date: 12/12/2022  Physician Orders: PT Eval and Treat   Medical Diagnosis from Referral:   Diagnosis   M25.511,G89.29 (ICD-10-CM) - Chronic right shoulder pain   M75.01 (ICD-10-CM) - Adhesive capsulitis of right shoulder    S83.242A (ICD-10-CM) - Acute medial meniscus tear of left knee, initial encounter  Evaluation Date: 9/29/2022 shoulder, 107/2022 knee  Authorization Period Expiration: 12/31/2022  Plan of Care Expiration: 12/08/2022  Visit # / Visits authorized: 7/ 20    Total visits: 23    Time In:  0658  Time Out: 0756  Total Billable Time: 58 minutes    Precautions: Standard and Weightbearing    Subjective     Pt reports: I went to the [employer] game Friday and was doing pretty well Saturday     She was compliant with home exercise program.  Response to previous treatment: Improved patellar mobility and scar mob  Functional change: WBAT    Pain: 5/10  Location: left knee  , right shoulder    Objective       Knee Passive Range of Motion:   Right  Left    Flexion 110 110   Extension +3 hyper 0       Meryl received therapeutic exercises to develop strength, endurance, ROM, and flexibility for 48 minutes including:    LAQ 7.5# 3 x 15  HS curls BTB seated EOT 30x B  SLR 1# 3 x 10 B   Lateral walks YTB 5 laps at table   Sit to stand low table 2 x 15  Patient education    NT  Standing hip abd 30x B   Standing heel raise 30x  TKE GTB 2 x 20 3" hold  Sciatic nerve gliders 2 x 20 B   Clam BTB 2 x 20  Upright SLR 2# 2 x 10  Bike rocking 8' for knee ROM     Meryl received the following manual therapy techniques: Joint mobilizations and Soft tissue Mobilization were applied to the: L knee for 10 minutes, including:    Knee " assessment  Passive flexion EOT   Patellar mobilizations all planes  STM medial HS     Not Performed Today:  Medial gapping Gr III    Meryl participated in neuromuscular re-education activities to improve: Balance, Coordination, Sense, Proprioception, and Posture for 0 minutes. The following activities were included:    Shuttle DL 3 bands 4 x 10  Shuttle SL 2 1/2  bands 4 x 10    Meryl received cold pack for 10 minutes to to decrease circulation, pain, and swelling.      Home Exercises Provided and Patient Education Provided     Education provided:   - Stair training       Written Home Exercises Provided: Patient instructed to cont prior HEP.  Exercises were reviewed and Meryl was able to demonstrate them prior to the end of the session.  Meryl demonstrated good  understanding of the education provided.     See EMR under Patient Instructions for exercises provided 10/6/2022.    Assessment     Patient presented with limited knee patellar mobility on the L sup/inf. Difficulty performing SLR on the right in supine, just muscle fatigue. Return to basketball and football games this weekend     Meryl Is progressing well towards her goals.   Pt prognosis is Excellent.     Pt will continue to benefit from skilled outpatient physical therapy to address the deficits listed in the problem list box on initial evaluation, provide pt/family education and to maximize pt's level of independence in the home and community environment.     Pt's spiritual, cultural and educational needs considered and pt agreeable to plan of care and goals.    Anticipated barriers to physical therapy: work schedule    GOALS:     Short Term Goals:  4 weeks weeks(Progressing, not met)  1.Report decreased shoulder pain < / =  5/10 at worst to increase tolerance for work   2. Increase PROM full pain free   3. Increased strength by 1/3 MMT grade in shoulder strength to increase tolerance for ADL and work activities.  4. Pt to tolerate HEP to improve  ROM and independence with ADL's     Long Term Goals: 8 weeks(Progressing, not met)    1.Report decreased shoulder pain  < / =  2/10 at worst  to increase tolerance for work  2.Increase AROM to full pain free  3.Increase strength to >/= 4/5 in shoulder strength to increase tolerance for ADL and work activities.  4. Pt goal: Return to pain free activities of daily living and work related activities.  5. Pt will have improved FOTO score of </= 35% for functional improvements in activities of daily living.    New Knee Goals: 6 weeks (Progressing, not met)    1.Report decreased knee pain  < / =  2/10  to increase tolerance for return to ambulation without RW  2. Increase knee ROM to full motion in order to be able to perform ADLs without difficulty.  3. Increase strength by 1/3 MMT grade in quad and glutes  to increase tolerance for ADL and work activities.  4. Pt to tolerate HEP to improve ROM and independence with ADL's      Plan     Plan of care Certification: 9/29/2022 to 12/8/2022.    Progress closed chain quad/glute strengthening     Fabricio Tolentino, PT, DPT, OCS

## 2022-12-14 ENCOUNTER — CLINICAL SUPPORT (OUTPATIENT)
Dept: REHABILITATION | Facility: HOSPITAL | Age: 67
End: 2022-12-14
Payer: COMMERCIAL

## 2022-12-14 DIAGNOSIS — M25.511 CHRONIC RIGHT SHOULDER PAIN: Primary | ICD-10-CM

## 2022-12-14 DIAGNOSIS — M25.562 ACUTE PAIN OF LEFT KNEE: ICD-10-CM

## 2022-12-14 DIAGNOSIS — G89.29 CHRONIC RIGHT SHOULDER PAIN: Primary | ICD-10-CM

## 2022-12-14 DIAGNOSIS — R29.898 DECREASED STRENGTH OF UPPER EXTREMITY: ICD-10-CM

## 2022-12-14 PROCEDURE — 97112 NEUROMUSCULAR REEDUCATION: CPT | Performed by: PHYSICAL THERAPIST

## 2022-12-14 PROCEDURE — 97110 THERAPEUTIC EXERCISES: CPT | Performed by: PHYSICAL THERAPIST

## 2022-12-14 NOTE — PROGRESS NOTES
"Physical Therapy Daily Treatment Note     Name: Meryl Yo [facility] Number: [id number]    Therapy Diagnosis:   Encounter Diagnoses   Name Primary?    Chronic right shoulder pain Yes    Decreased strength of upper extremity     Acute pain of left knee      Physician: Russell Honeycutt III, *    Visit Date: 12/14/2022  Physician Orders: PT Eval and Treat   Medical Diagnosis from Referral:   Diagnosis   M25.511,G89.29 (ICD-10-CM) - Chronic right shoulder pain   M75.01 (ICD-10-CM) - Adhesive capsulitis of right shoulder    S83.242A (ICD-10-CM) - Acute medial meniscus tear of left knee, initial encounter  Evaluation Date: 9/29/2022 shoulder, 107/2022 knee  Authorization Period Expiration: 12/31/2022  Plan of Care Expiration: 12/08/2022  Visit # / Visits authorized: 19/ 21    Total visits: 23    Time In:  0658  Time Out: 0752  Total Billable Time: 24 minutes    Precautions: Standard and Weightbearing    Subjective     Pt reports: I am doing pretty well, no increase symptoms and I felt pretty good after last session    She was compliant with home exercise program.  Response to previous treatment: Improved patellar mobility and scar mob  Functional change: WBAT    Pain: 5/10  Location: left knee  , right shoulder    Objective       Knee Passive Range of Motion:   Right  Left    Flexion 110 110   Extension +3 hyper 0       Meryl received therapeutic exercises to develop strength, endurance, ROM, and flexibility for 48 minutes including:    LAQ 7.5# 3 x 15  SLR upright 3 x 10 B   Lateral walks YTB 5 laps at table   Clam YTB 2 x 20  Patient education    NT  Sit to stand low table 2 x 15  HS curls BTB seated EOT 30x B  Standing hip abd 30x B   Standing heel raise 30x  TKE GTB 2 x 20 3" hold  Sciatic nerve gliders 2 x 20 B   Bike rocking 8' for knee ROM     Meryl received the following manual therapy techniques: Joint mobilizations and Soft tissue Mobilization were applied to the: L knee for 10 minutes, including:    Knee " assessment  Passive flexion EOT   Patellar mobilizations all planes  STM medial HS     Not Performed Today:  Medial gapping Gr III    Meryl participated in neuromuscular re-education activities to improve: Balance, Coordination, Sense, Proprioception, and Posture for 10 minutes. The following activities were included:    Shuttle DL 3 bands 4 x 10  Shuttle SL 2 1/2  bands 4 x 10    Meryl received cold pack for 10 minutes to to decrease circulation, pain, and swelling.      Home Exercises Provided and Patient Education Provided     Education provided:   - Stair training     Written Home Exercises Provided: Patient instructed to cont prior HEP.  Exercises were reviewed and Meryl was able to demonstrate them prior to the end of the session.  Meryl demonstrated good  understanding of the education provided.     See EMR under Patient Instructions for exercises provided 10/6/2022.    Assessment     Patient presented today with improved strength to the quad, less patellar crepitus and improved inferior glide. Plan for nerve block on 1/18. Patient progressing well today    Meryl Is progressing well towards her goals.   Pt prognosis is Excellent.     Pt will continue to benefit from skilled outpatient physical therapy to address the deficits listed in the problem list box on initial evaluation, provide pt/family education and to maximize pt's level of independence in the home and community environment.     Pt's spiritual, cultural and educational needs considered and pt agreeable to plan of care and goals.    Anticipated barriers to physical therapy: work schedule    GOALS:     Short Term Goals:  4 weeks weeks(Progressing, not met)  1.Report decreased shoulder pain < / =  5/10 at worst to increase tolerance for work   2. Increase PROM full pain free   3. Increased strength by 1/3 MMT grade in shoulder strength to increase tolerance for ADL and work activities.  4. Pt to tolerate HEP to improve ROM and independence with  ADL's     Long Term Goals: 8 weeks(Progressing, not met)    1.Report decreased shoulder pain  < / =  2/10 at worst  to increase tolerance for work  2.Increase AROM to full pain free  3.Increase strength to >/= 4/5 in shoulder strength to increase tolerance for ADL and work activities.  4. Pt goal: Return to pain free activities of daily living and work related activities.  5. Pt will have improved FOTO score of </= 35% for functional improvements in activities of daily living.    New Knee Goals: 6 weeks (Progressing, not met)    1.Report decreased knee pain  < / =  2/10  to increase tolerance for return to ambulation without RW  2. Increase knee ROM to full motion in order to be able to perform ADLs without difficulty.  3. Increase strength by 1/3 MMT grade in quad and glutes  to increase tolerance for ADL and work activities.  4. Pt to tolerate HEP to improve ROM and independence with ADL's      Plan     Plan of care Certification: 9/29/2022 to 12/8/2022.    Progress closed chain quad/glute strengthening     Fabricio Tolentino, PT, DPT, OCS

## 2022-12-16 RX ORDER — BENAZEPRIL HYDROCHLORIDE 40 MG/1
40 TABLET ORAL DAILY
Qty: 90 TABLET | Refills: 2 | Status: CANCELLED | OUTPATIENT
Start: 2022-12-16

## 2022-12-16 RX ORDER — BENAZEPRIL HYDROCHLORIDE 40 MG/1
40 TABLET ORAL DAILY
Qty: 90 TABLET | Refills: 2 | Status: SHIPPED | OUTPATIENT
Start: 2022-12-16 | End: 2023-09-15

## 2022-12-16 NOTE — TELEPHONE ENCOUNTER
Refill Routing Note   Medication(s) are not appropriate for processing by [facility] for the following reason(s):      - Required laboratory values are abnormal  - Required vitals are abnormal    [facility] action(s):  Defer          Medication reconciliation completed: No     Appointments  past 12m or future 3m with PCP    Date Provider   Last Visit   9/29/2022 Russell Martinez MD   Next Visit   Visit date not found Russell Martinez MD   ED visits in past 90 days: 0        Note composed:4:51 PM 12/16/2022

## 2022-12-16 NOTE — TELEPHONE ENCOUNTER
No new care gaps identified.  [facility] Embedded Care Gaps. Reference number: [id number]. 12/16/2022   4:05:59 PM CST

## 2022-12-16 NOTE — TELEPHONE ENCOUNTER
No new care gaps identified.  [facility] Embedded Care Gaps. Reference number: [id number]. 12/16/2022   7:48:43 AM CST

## 2022-12-19 ENCOUNTER — CLINICAL SUPPORT (OUTPATIENT)
Dept: REHABILITATION | Facility: HOSPITAL | Age: 67
End: 2022-12-19
Payer: COMMERCIAL

## 2022-12-19 DIAGNOSIS — G89.29 CHRONIC RIGHT SHOULDER PAIN: Primary | ICD-10-CM

## 2022-12-19 DIAGNOSIS — M25.562 ACUTE PAIN OF LEFT KNEE: ICD-10-CM

## 2022-12-19 DIAGNOSIS — R29.898 DECREASED STRENGTH OF UPPER EXTREMITY: ICD-10-CM

## 2022-12-19 DIAGNOSIS — M25.511 CHRONIC RIGHT SHOULDER PAIN: Primary | ICD-10-CM

## 2022-12-19 PROCEDURE — 97112 NEUROMUSCULAR REEDUCATION: CPT | Performed by: PHYSICAL THERAPIST

## 2022-12-19 PROCEDURE — 97110 THERAPEUTIC EXERCISES: CPT | Performed by: PHYSICAL THERAPIST

## 2022-12-19 NOTE — PROGRESS NOTES
"Physical Therapy Daily Treatment Note     Name: Meryl Yo [facility] Number: [id number]    Therapy Diagnosis:   Encounter Diagnoses   Name Primary?    Chronic right shoulder pain Yes    Decreased strength of upper extremity     Acute pain of left knee      Physician: Russell Honeycutt III, *    Visit Date: 12/19/2022  Physician Orders: PT Eval and Treat   Medical Diagnosis from Referral:   Diagnosis   M25.511,G89.29 (ICD-10-CM) - Chronic right shoulder pain   M75.01 (ICD-10-CM) - Adhesive capsulitis of right shoulder    S83.242A (ICD-10-CM) - Acute medial meniscus tear of left knee, initial encounter  Evaluation Date: 9/29/2022 shoulder, 107/2022 knee  Authorization Period Expiration: 12/31/2022  Plan of Care Expiration: 12/08/2022  Visit # / Visits authorized: 20/ 21    Total visits: 23    Time In:  0658  Time Out: 0745  Total Billable Time: 47 minutes    Precautions: Standard and Weightbearing    Subjective     Pt reports: I went to the [employer] game and it didn't hurt     She was compliant with home exercise program.  Response to previous treatment: Improved patellar mobility and scar mob  Functional change: WBAT    Pain: 5/10  Location: left knee  , right shoulder    Objective       Knee Passive Range of Motion:   Right  Left    Flexion 110 110   Extension +3 hyper 0       Meryl received therapeutic exercises to develop strength, endurance, ROM, and flexibility for 37 minutes including:    LAQ 7.5# 3 x 15  SLR upright 3 x 10 B   Lateral walks YTB 5 laps at table   Standing heel raise 30x  Patient education    NT  Clam YTB 2 x 20  Sit to stand low table 2 x 15  HS curls BTB seated EOT 30x B  Standing hip abd 30x B   TKE GTB 2 x 20 3" hold  Sciatic nerve gliders 2 x 20 B   Bike rocking 8' for knee ROM     Meryl received the following manual therapy techniques: Joint mobilizations and Soft tissue Mobilization were applied to the: L knee for 0 minutes, including:    Knee assessment  Passive flexion EOT   Patellar " mobilizations all planes  STM medial HS     Not Performed Today:  Medial gapping Gr III    Meryl participated in neuromuscular re-education activities to improve: Balance, Coordination, Sense, Proprioception, and Posture for 10 minutes. The following activities were included:    Shuttle DL 3 bands 4 x 10  Shuttle SL 2 1/2  bands 4 x 10    Meryl received cold pack for 10 minutes to to decrease circulation, pain, and swelling.      Home Exercises Provided and Patient Education Provided     Education provided:   - Stair training     Written Home Exercises Provided: Patient instructed to cont prior HEP.  Exercises were reviewed and Meryl was able to demonstrate them prior to the end of the session.  Meryl demonstrated good  understanding of the education provided.     See EMR under Patient Instructions for exercises provided 10/6/2022.    Assessment     Patient was pain free on arrival, just crepitus in the knee She progressed her shuttle endurance and demonstrates improved walking tolerance in community. Better eccentric control on stairs at game    Meryl Is progressing well towards her goals.   Pt prognosis is Excellent.     Pt will continue to benefit from skilled outpatient physical therapy to address the deficits listed in the problem list box on initial evaluation, provide pt/family education and to maximize pt's level of independence in the home and community environment.     Pt's spiritual, cultural and educational needs considered and pt agreeable to plan of care and goals.    Anticipated barriers to physical therapy: work schedule    GOALS:     Short Term Goals:  4 weeks weeks(Progressing, not met)  1.Report decreased shoulder pain < / =  5/10 at worst to increase tolerance for work   2. Increase PROM full pain free   3. Increased strength by 1/3 MMT grade in shoulder strength to increase tolerance for ADL and work activities.  4. Pt to tolerate HEP to improve ROM and independence with ADL's     Long Term  Goals: 8 weeks(Progressing, not met)    1.Report decreased shoulder pain  < / =  2/10 at worst  to increase tolerance for work  2.Increase AROM to full pain free  3.Increase strength to >/= 4/5 in shoulder strength to increase tolerance for ADL and work activities.  4. Pt goal: Return to pain free activities of daily living and work related activities.  5. Pt will have improved FOTO score of </= 35% for functional improvements in activities of daily living.    New Knee Goals: 6 weeks (Progressing, not met)    1.Report decreased knee pain  < / =  2/10  to increase tolerance for return to ambulation without RW  2. Increase knee ROM to full motion in order to be able to perform ADLs without difficulty.  3. Increase strength by 1/3 MMT grade in quad and glutes  to increase tolerance for ADL and work activities.  4. Pt to tolerate HEP to improve ROM and independence with ADL's      Plan     Plan of care Certification: 9/29/2022 to 12/8/2022.    Progress closed chain quad/glute strengthening     Fabricio Tolentino, PT, DPT, OCS

## 2022-12-20 RX ORDER — ERGOCALCIFEROL 1.25 MG/1
50000 CAPSULE ORAL
Qty: 12 CAPSULE | Refills: 1 | Status: SHIPPED | OUTPATIENT
Start: 2022-12-20 | End: 2023-07-22 | Stop reason: SDUPTHER

## 2022-12-21 ENCOUNTER — CLINICAL SUPPORT (OUTPATIENT)
Dept: REHABILITATION | Facility: HOSPITAL | Age: 67
End: 2022-12-21
Payer: COMMERCIAL

## 2022-12-21 DIAGNOSIS — G89.29 CHRONIC RIGHT SHOULDER PAIN: Primary | ICD-10-CM

## 2022-12-21 DIAGNOSIS — M25.511 CHRONIC RIGHT SHOULDER PAIN: Primary | ICD-10-CM

## 2022-12-21 DIAGNOSIS — R29.898 DECREASED STRENGTH OF UPPER EXTREMITY: ICD-10-CM

## 2022-12-21 DIAGNOSIS — M25.562 ACUTE PAIN OF LEFT KNEE: ICD-10-CM

## 2022-12-21 PROCEDURE — 97110 THERAPEUTIC EXERCISES: CPT | Performed by: PHYSICAL THERAPIST

## 2022-12-21 PROCEDURE — 97140 MANUAL THERAPY 1/> REGIONS: CPT | Performed by: PHYSICAL THERAPIST

## 2022-12-21 NOTE — PROGRESS NOTES
"Physical Therapy Daily Treatment Note     Name: Meryl Yo [facility] Number: [id number]    Therapy Diagnosis:   Encounter Diagnoses   Name Primary?    Chronic right shoulder pain Yes    Decreased strength of upper extremity     Acute pain of left knee      Physician: Russell Honeycutt III, *    Visit Date: 12/21/2022  Physician Orders: PT Eval and Treat   Medical Diagnosis from Referral:   Diagnosis   M25.511,G89.29 (ICD-10-CM) - Chronic right shoulder pain   M75.01 (ICD-10-CM) - Adhesive capsulitis of right shoulder    S83.242A (ICD-10-CM) - Acute medial meniscus tear of left knee, initial encounter  Evaluation Date: 9/29/2022 shoulder, 107/2022 knee  Authorization Period Expiration: 12/31/2022  Plan of Care Expiration: 12/08/2022  Visit # / Visits authorized: 21/ 21    Total visits: 23    Time In:  0658  Time Out: 0755  Total Billable Time: 57 minutes    Precautions: Standard and Weightbearing    Subjective     Pt reports: The cold weather is bothering it a little bit but overall I am doing well    She was compliant with home exercise program.  Response to previous treatment: Improved patellar mobility and scar mob  Functional change: WBAT    Pain: 5/10  Location: left knee  , right shoulder    Objective       Knee Passive Range of Motion:   Right  Left    Flexion 110 110   Extension +3 hyper 0       Meryl received therapeutic exercises to develop strength, endurance, ROM, and flexibility for 47 minutes including:    LAQ 7.5# 3 x 15  SLR upright 3 x 10 B   Clam BTB 2 x 20  Sidelying hip abd 3 x 10  Sit to stands 30x  Patient education    NT  Lateral walks YTB 5 laps at table   Standing heel raise 30x    Sit to stand low table 2 x 15  HS curls BTB seated EOT 30x B  Standing hip abd 30x B   TKE GTB 2 x 20 3" hold  Sciatic nerve gliders 2 x 20 B   Bike rocking 8' for knee ROM     Meryl received the following manual therapy techniques: Joint mobilizations and Soft tissue Mobilization were applied to the: L knee for " 0 minutes, including:    Knee assessment  Passive flexion EOT   Patellar mobilizations all planes  STM medial HS     Not Performed Today:  Medial gapping Gr III    Meryl participated in neuromuscular re-education activities to improve: Balance, Coordination, Sense, Proprioception, and Posture for 10 minutes. The following activities were included:    Shuttle DL 3 bands 4 x 10  Shuttle SL 2 1/2  bands 4 x 10    Meryl received cold pack for 10 minutes to to decrease circulation, pain, and swelling.      Home Exercises Provided and Patient Education Provided     Education provided:   - Stair training     Written Home Exercises Provided: Patient instructed to cont prior HEP.  Exercises were reviewed and Meryl was able to demonstrate them prior to the end of the session.  Meryl demonstrated good  understanding of the education provided.     See EMR under Patient Instructions for exercises provided 10/6/2022.    Assessment     Patient hip strengthening improving, able to do in sidelying and better tolerance to transfers in clinic. Ambulating with chore in communiy without AD.    Meryl Is progressing well towards her goals.   Pt prognosis is Excellent.     Pt will continue to benefit from skilled outpatient physical therapy to address the deficits listed in the problem list box on initial evaluation, provide pt/family education and to maximize pt's level of independence in the home and community environment.     Pt's spiritual, cultural and educational needs considered and pt agreeable to plan of care and goals.    Anticipated barriers to physical therapy: work schedule    GOALS:     Short Term Goals:  4 weeks weeks(Progressing, not met)  1.Report decreased shoulder pain < / =  5/10 at worst to increase tolerance for work   2. Increase PROM full pain free   3. Increased strength by 1/3 MMT grade in shoulder strength to increase tolerance for ADL and work activities.  4. Pt to tolerate HEP to improve ROM and  independence with ADL's     Long Term Goals: 8 weeks(Progressing, not met)    1.Report decreased shoulder pain  < / =  2/10 at worst  to increase tolerance for work  2.Increase AROM to full pain free  3.Increase strength to >/= 4/5 in shoulder strength to increase tolerance for ADL and work activities.  4. Pt goal: Return to pain free activities of daily living and work related activities.  5. Pt will have improved FOTO score of </= 35% for functional improvements in activities of daily living.    New Knee Goals: 6 weeks (Progressing, not met)    1.Report decreased knee pain  < / =  2/10  to increase tolerance for return to ambulation without RW  2. Increase knee ROM to full motion in order to be able to perform ADLs without difficulty.  3. Increase strength by 1/3 MMT grade in quad and glutes  to increase tolerance for ADL and work activities.  4. Pt to tolerate HEP to improve ROM and independence with ADL's      Plan     Plan of care Certification: 9/29/2022 to 12/8/2022.    Progress closed chain quad/glute strengthening     Fabricio Tolentino, PT, DPT, OCS

## 2022-12-27 ENCOUNTER — CLINICAL SUPPORT (OUTPATIENT)
Dept: REHABILITATION | Facility: HOSPITAL | Age: 67
End: 2022-12-27
Payer: COMMERCIAL

## 2022-12-27 DIAGNOSIS — G89.29 CHRONIC RIGHT SHOULDER PAIN: Primary | ICD-10-CM

## 2022-12-27 DIAGNOSIS — M25.511 CHRONIC RIGHT SHOULDER PAIN: Primary | ICD-10-CM

## 2022-12-27 DIAGNOSIS — M25.562 ACUTE PAIN OF LEFT KNEE: ICD-10-CM

## 2022-12-27 DIAGNOSIS — R29.898 DECREASED STRENGTH OF UPPER EXTREMITY: ICD-10-CM

## 2022-12-27 PROCEDURE — 97140 MANUAL THERAPY 1/> REGIONS: CPT | Performed by: PHYSICAL THERAPIST

## 2022-12-27 PROCEDURE — 97110 THERAPEUTIC EXERCISES: CPT | Performed by: PHYSICAL THERAPIST

## 2022-12-27 PROCEDURE — 97161 PT EVAL LOW COMPLEX 20 MIN: CPT | Performed by: PHYSICAL THERAPIST

## 2022-12-27 NOTE — PROGRESS NOTES
"Physical Therapy Daily Treatment Note     Name: Meryl Yo [facility] Number: [id number]    Therapy Diagnosis:   Encounter Diagnoses   Name Primary?    Chronic right shoulder pain Yes    Decreased strength of upper extremity     Acute pain of left knee      Physician: Jun Chin MD    Visit Date: 12/27/2022  Physician Orders: PT Eval and Treat   Medical Diagnosis from Referral:   Diagnosis   M25.511,G89.29 (ICD-10-CM) - Chronic right shoulder pain   M75.01 (ICD-10-CM) - Adhesive capsulitis of right shoulder    S83.242A (ICD-10-CM) - Acute medial meniscus tear of left knee, initial encounter  Evaluation Date: 9/29/2022 shoulder, 107/2022 knee  Authorization Period Expiration: 12/31/2022  Plan of Care Expiration: 12/08/2022  Visit # / Visits authorized: 8/20    Total visits: 27    Time In:  0900  Time Out: 1010  Total Billable Time: 60 minutes    Precautions: Standard and Weightbearing    Subjective     Pt reports: My knees started hurting on Friday, unsure the cause but just really bothering me again    She was compliant with home exercise program.  Response to previous treatment: Improved patellar mobility and scar mob  Functional change: WBAT    Pain: 5/10  Location: left knee  , right shoulder    Objective       Knee Passive Range of Motion:   Right  Left    Flexion 110 110   Extension +3 hyper 0       Meryl received therapeutic exercises to develop strength, endurance, ROM, and flexibility for 40 minutes including:    LAQ 7.5# 3 x 15  Quad sets   SLR upright 3 x 10 B   Bridges GTB above knee 30x   Patient education    NT  Sit to stands 30x  Sidelying hip abd 3 x 10  Lateral walks YTB 5 laps at table   Standing heel raise 30x  Clam BTB 2 x 20  Sit to stand low table 2 x 15  HS curls BTB seated EOT 30x B  Standing hip abd 30x B   TKE GTB 2 x 20 3" hold  Sciatic nerve gliders 2 x 20 B   Bike rocking 8' for knee ROM     Meryl received the following manual therapy techniques: Joint mobilizations and Soft tissue " Mobilization were applied to the: L knee for 10 minutes, including:    Knee assessment  Passive flexion EOT   Patellar mobilizations all planes  STM medial HS     Not Performed Today:  Medial gapping Gr III    Meryl participated in neuromuscular re-education activities to improve: Balance, Coordination, Sense, Proprioception, and Posture for 10 minutes. The following activities were included:    Shuttle DL 3 bands 4 x 10  Shuttle SL 2 bands 4 x 10    Meryl received cold pack for 10 minutes to to decrease circulation, pain, and swelling.      Home Exercises Provided and Patient Education Provided     Education provided:   - Stair training     Written Home Exercises Provided: Patient instructed to cont prior HEP.  Exercises were reviewed and Meryl was able to demonstrate them prior to the end of the session.  Meryl demonstrated good  understanding of the education provided.     See EMR under Patient Instructions for exercises provided 10/6/2022.    Assessment     Patient progressed hamstring strengthening in supine. Tenderness to the gastroc today on the L. Ice at end due to pain bilaterally. Decreased weight on shuttle today    Meryl Is progressing well towards her goals.   Pt prognosis is Excellent.     Pt will continue to benefit from skilled outpatient physical therapy to address the deficits listed in the problem list box on initial evaluation, provide pt/family education and to maximize pt's level of independence in the home and community environment.     Pt's spiritual, cultural and educational needs considered and pt agreeable to plan of care and goals.    Anticipated barriers to physical therapy: work schedule    GOALS:     Short Term Goals:  4 weeks weeks(Progressing, not met)  1.Report decreased shoulder pain < / =  5/10 at worst to increase tolerance for work   2. Increase PROM full pain free   3. Increased strength by 1/3 MMT grade in shoulder strength to increase tolerance for ADL and work  activities.  4. Pt to tolerate HEP to improve ROM and independence with ADL's     Long Term Goals: 8 weeks(Progressing, not met)    1.Report decreased shoulder pain  < / =  2/10 at worst  to increase tolerance for work  2.Increase AROM to full pain free  3.Increase strength to >/= 4/5 in shoulder strength to increase tolerance for ADL and work activities.  4. Pt goal: Return to pain free activities of daily living and work related activities.  5. Pt will have improved FOTO score of </= 35% for functional improvements in activities of daily living.    New Knee Goals: 6 weeks (Progressing, not met)    1.Report decreased knee pain  < / =  2/10  to increase tolerance for return to ambulation without RW  2. Increase knee ROM to full motion in order to be able to perform ADLs without difficulty.  3. Increase strength by 1/3 MMT grade in quad and glutes  to increase tolerance for ADL and work activities.  4. Pt to tolerate HEP to improve ROM and independence with ADL's      Plan     Plan of care Certification: 9/29/2022 to 12/8/2022.    Progress closed chain quad/glute strengthening     Fabricio Tolentino, PT, DPT, OCS

## 2022-12-29 ENCOUNTER — CLINICAL SUPPORT (OUTPATIENT)
Dept: REHABILITATION | Facility: HOSPITAL | Age: 67
End: 2022-12-29
Payer: COMMERCIAL

## 2022-12-29 ENCOUNTER — PATIENT MESSAGE (OUTPATIENT)
Dept: SPORTS MEDICINE | Facility: CLINIC | Age: 67
End: 2022-12-29
Payer: COMMERCIAL

## 2022-12-29 DIAGNOSIS — M25.511 CHRONIC RIGHT SHOULDER PAIN: Primary | ICD-10-CM

## 2022-12-29 DIAGNOSIS — R29.898 DECREASED STRENGTH OF UPPER EXTREMITY: ICD-10-CM

## 2022-12-29 DIAGNOSIS — M25.562 ACUTE PAIN OF LEFT KNEE: ICD-10-CM

## 2022-12-29 DIAGNOSIS — G89.29 CHRONIC RIGHT SHOULDER PAIN: Primary | ICD-10-CM

## 2022-12-29 PROCEDURE — 97110 THERAPEUTIC EXERCISES: CPT | Performed by: PHYSICAL THERAPIST

## 2022-12-29 PROCEDURE — 97140 MANUAL THERAPY 1/> REGIONS: CPT | Performed by: PHYSICAL THERAPIST

## 2022-12-29 NOTE — PROGRESS NOTES
"Physical Therapy Daily Treatment Note     Name: Meryl Yo [facility] Number: [id number]    Therapy Diagnosis:   Encounter Diagnoses   Name Primary?    Chronic right shoulder pain Yes    Decreased strength of upper extremity     Acute pain of left knee      Physician: Jun Chin MD    Visit Date: 12/29/2022  Physician Orders: PT Eval and Treat   Medical Diagnosis from Referral:   Diagnosis   M25.511,G89.29 (ICD-10-CM) - Chronic right shoulder pain   M75.01 (ICD-10-CM) - Adhesive capsulitis of right shoulder    S83.242A (ICD-10-CM) - Acute medial meniscus tear of left knee, initial encounter  Evaluation Date: 9/29/2022 shoulder, 107/2022 knee  Authorization Period Expiration: 12/31/2022  Plan of Care Expiration: 12/08/2022  Visit # / Visits authorized: 9/20    Total visits: 28    Time In:  1000  Time Out: 1100  Total Billable Time: 60 minutes    Precautions: Standard and Weightbearing    Subjective     Pt reports: I am ready to give up it just hurts so much    She was compliant with home exercise program.  Response to previous treatment: Improved patellar mobility and scar mob  Functional change: WBAT    Pain: 5/10  Location: left knee  , right shoulder    Objective       Knee Passive Range of Motion:   Right  Left    Flexion 110 110   Extension +3 hyper 0       Meryl received therapeutic exercises to develop strength, endurance, ROM, and flexibility for 45 minutes including:    LAQ 73 x 15  SAQ 30x  Quad sets 30x  SLR upright 3 x 10 B   Seated ER 2 x 10   Standing heel raise 20x   Patient education    NT  Bridges GTB above knee 30x   Sit to stands 30x  Sidelying hip abd 3 x 10  Lateral walks YTB 5 laps at table   Standing heel raise 30x  Clam BTB 2 x 20  Sit to stand low table 2 x 15  HS curls BTB seated EOT 30x B  Standing hip abd 30x B   TKE GTB 2 x 20 3" hold  Sciatic nerve gliders 2 x 20 B   Bike rocking 8' for knee ROM     Meryl received the following manual therapy techniques: Joint mobilizations and " Soft tissue Mobilization were applied to the: L knee for 15 minutes, including:    Knee assessment  Passive flexion EOT   Patellar mobilizations all planes  STM medial HS     Not Performed Today:  Medial gapping Gr III    Meryl participated in neuromuscular re-education activities to improve: Balance, Coordination, Sense, Proprioception, and Posture for 0 minutes. The following activities were included:    Shuttle DL 3 bands 4 x 10  Shuttle SL 2 bands 4 x 10    Meryl received cold pack for 10 minutes to to decrease circulation, pain, and swelling.      Home Exercises Provided and Patient Education Provided     Education provided:   - Stair training     Written Home Exercises Provided: Patient instructed to cont prior HEP.  Exercises were reviewed and Meryl was able to demonstrate them prior to the end of the session.  Meryl demonstrated good  understanding of the education provided.     See EMR under Patient Instructions for exercises provided 10/6/2022.    Assessment     Patient progressed with quad activation and SAQ today, limited weight due to pain. Patient with improved mobility to patella and limited by chondromalacia     Meryl Is progressing well towards her goals.   Pt prognosis is Excellent.     Pt will continue to benefit from skilled outpatient physical therapy to address the deficits listed in the problem list box on initial evaluation, provide pt/family education and to maximize pt's level of independence in the home and community environment.     Pt's spiritual, cultural and educational needs considered and pt agreeable to plan of care and goals.    Anticipated barriers to physical therapy: work schedule    GOALS:     Short Term Goals:  4 weeks weeks(Progressing, not met)  1.Report decreased shoulder pain < / =  5/10 at worst to increase tolerance for work   2. Increase PROM full pain free   3. Increased strength by 1/3 MMT grade in shoulder strength to increase tolerance for ADL and work  activities.  4. Pt to tolerate HEP to improve ROM and independence with ADL's     Long Term Goals: 8 weeks(Progressing, not met)    1.Report decreased shoulder pain  < / =  2/10 at worst  to increase tolerance for work  2.Increase AROM to full pain free  3.Increase strength to >/= 4/5 in shoulder strength to increase tolerance for ADL and work activities.  4. Pt goal: Return to pain free activities of daily living and work related activities.  5. Pt will have improved FOTO score of </= 35% for functional improvements in activities of daily living.    New Knee Goals: 6 weeks (Progressing, not met)    1.Report decreased knee pain  < / =  2/10  to increase tolerance for return to ambulation without RW  2. Increase knee ROM to full motion in order to be able to perform ADLs without difficulty.  3. Increase strength by 1/3 MMT grade in quad and glutes  to increase tolerance for ADL and work activities.  4. Pt to tolerate HEP to improve ROM and independence with ADL's      Plan     Plan of care Certification: 9/29/2022 to 12/8/2022.    Progress closed chain quad/glute strengthening     Fabricio Tolentino, PT, DPT, OCS

## 2023-01-03 ENCOUNTER — CLINICAL SUPPORT (OUTPATIENT)
Dept: REHABILITATION | Facility: HOSPITAL | Age: 68
End: 2023-01-03
Payer: COMMERCIAL

## 2023-01-03 DIAGNOSIS — M25.511 CHRONIC RIGHT SHOULDER PAIN: Primary | ICD-10-CM

## 2023-01-03 DIAGNOSIS — M25.562 ACUTE PAIN OF LEFT KNEE: ICD-10-CM

## 2023-01-03 DIAGNOSIS — G89.29 CHRONIC RIGHT SHOULDER PAIN: Primary | ICD-10-CM

## 2023-01-03 DIAGNOSIS — R29.898 DECREASED STRENGTH OF UPPER EXTREMITY: ICD-10-CM

## 2023-01-03 PROCEDURE — 97112 NEUROMUSCULAR REEDUCATION: CPT

## 2023-01-03 PROCEDURE — 97110 THERAPEUTIC EXERCISES: CPT

## 2023-01-03 PROCEDURE — 97140 MANUAL THERAPY 1/> REGIONS: CPT

## 2023-01-03 NOTE — PROGRESS NOTES
"Physical Therapy Daily Treatment Note     Name: Meryl Yo [facility] Number: [id number]    Therapy Diagnosis:   Encounter Diagnoses   Name Primary?    Chronic right shoulder pain Yes    Decreased strength of upper extremity     Acute pain of left knee        Physician: Russell Honeycutt III, *    Visit Date: 1/3/2023  Physician Orders: PT Eval and Treat   Medical Diagnosis from Referral:   Diagnosis   M25.511,G89.29 (ICD-10-CM) - Chronic right shoulder pain   M75.01 (ICD-10-CM) - Adhesive capsulitis of right shoulder    S83.242A (ICD-10-CM) - Acute medial meniscus tear of left knee, initial encounter  Evaluation Date: 9/29/2022 shoulder, 107/2022 knee  Authorization Period Expiration: 12/31/2022  Plan of Care Expiration: 12/08/2022  Visit # / Visits authorized: 9/20    Total visits: 28    Time In:  145pm  Time Out: 310pm  Total Billable Time: 75 minutes    Precautions: Standard and Weightbearing    Subjective     Pt reports: still hurts, just living with the pain now, not as bad as last week though. I take tylenol but it doesn't help.    She was compliant with home exercise program.  Response to previous treatment: Improved patellar mobility and scar mob  Functional change: WBAT    Pain: 4/10  Location: left knee  , right shoulder    Objective       Knee Passive Range of Motion:   Right  Left    Flexion 110 110   Extension +3 hyper 0       Meryl received therapeutic exercises to develop strength, endurance, ROM, and flexibility for 45 minutes including:    LAQ 73 x 15  SAQ 30x  Quad sets 30x  SLR upright 3 x 10 B   Seated ER 2 x 10   Standing heel raise 20x   Patient education    NT  Bridges GTB above knee 30x   Sit to stands 30x  Sidelying hip abd 3 x 10  Lateral walks YTB 5 laps at table   Standing heel raise 30x  Clam BTB 2 x 20  Sit to stand low table 2 x 15  HS curls BTB seated EOT 30x B  Standing hip abd 30x B   TKE GTB 2 x 20 3" hold  Sciatic nerve gliders 2 x 20 B   Bike rocking 8' for knee ROM     Meryl " received the following manual therapy techniques: Joint mobilizations and Soft tissue Mobilization were applied to the: L knee for 15 minutes, including:    Knee assessment  Passive flexion EOT   Patellar mobilizations all planes  STM medial HS     Not Performed Today:  Medial gapping Gr III    Meryl participated in neuromuscular re-education activities to improve: Balance, Coordination, Sense, Proprioception, and Posture for 15 minutes. The following activities were included:    Shuttle DL 3 bands 4 x 10  Shuttle SL 2 bands 4 x 10    Meryl received cold pack for 10 minutes to to decrease circulation, pain, and swelling.      Home Exercises Provided and Patient Education Provided     Education provided:   - Stair training     Written Home Exercises Provided: Patient instructed to cont prior HEP.  Exercises were reviewed and Meryl was able to demonstrate them prior to the end of the session.  Meryl demonstrated good  understanding of the education provided.     See EMR under Patient Instructions for exercises provided 10/6/2022.    Assessment     Meryl tolerated treatment well today with less pain than last week. She is progressing with strength within sessions. Progress as tolerated.    Mreyl Is progressing well towards her goals.   Pt prognosis is Excellent.     Pt will continue to benefit from skilled outpatient physical therapy to address the deficits listed in the problem list box on initial evaluation, provide pt/family education and to maximize pt's level of independence in the home and community environment.     Pt's spiritual, cultural and educational needs considered and pt agreeable to plan of care and goals.    Anticipated barriers to physical therapy: work schedule    GOALS:     Short Term Goals:  4 weeks weeks(Progressing, not met)  1.Report decreased shoulder pain < / =  5/10 at worst to increase tolerance for work   2. Increase PROM full pain free   3. Increased strength by 1/3 MMT grade in  shoulder strength to increase tolerance for ADL and work activities.  4. Pt to tolerate HEP to improve ROM and independence with ADL's     Long Term Goals: 8 weeks(Progressing, not met)    1.Report decreased shoulder pain  < / =  2/10 at worst  to increase tolerance for work  2.Increase AROM to full pain free  3.Increase strength to >/= 4/5 in shoulder strength to increase tolerance for ADL and work activities.  4. Pt goal: Return to pain free activities of daily living and work related activities.  5. Pt will have improved FOTO score of </= 35% for functional improvements in activities of daily living.    New Knee Goals: 6 weeks (Progressing, not met)    1.Report decreased knee pain  < / =  2/10  to increase tolerance for return to ambulation without RW  2. Increase knee ROM to full motion in order to be able to perform ADLs without difficulty.  3. Increase strength by 1/3 MMT grade in quad and glutes  to increase tolerance for ADL and work activities.  4. Pt to tolerate HEP to improve ROM and independence with ADL's      Plan     Plan of care Certification: 9/29/2022 to 12/8/2022.    Progress closed chain quad/glute strengthening     Radha Sifuentes, PT, DPT

## 2023-01-05 ENCOUNTER — CLINICAL SUPPORT (OUTPATIENT)
Dept: REHABILITATION | Facility: HOSPITAL | Age: 68
End: 2023-01-05
Payer: COMMERCIAL

## 2023-01-05 DIAGNOSIS — G89.29 CHRONIC RIGHT SHOULDER PAIN: Primary | ICD-10-CM

## 2023-01-05 DIAGNOSIS — M25.511 CHRONIC RIGHT SHOULDER PAIN: Primary | ICD-10-CM

## 2023-01-05 DIAGNOSIS — M25.562 ACUTE PAIN OF LEFT KNEE: ICD-10-CM

## 2023-01-05 DIAGNOSIS — R29.898 DECREASED STRENGTH OF UPPER EXTREMITY: ICD-10-CM

## 2023-01-05 PROCEDURE — 97140 MANUAL THERAPY 1/> REGIONS: CPT | Performed by: PHYSICAL THERAPIST

## 2023-01-05 PROCEDURE — 97110 THERAPEUTIC EXERCISES: CPT | Performed by: PHYSICAL THERAPIST

## 2023-01-06 NOTE — PROGRESS NOTES
"Physical Therapy Daily Treatment Note     Name: Meryl Yo [facility] Number: [id number]    Therapy Diagnosis:   Encounter Diagnoses   Name Primary?    Chronic right shoulder pain Yes    Decreased strength of upper extremity     Acute pain of left knee        Physician: Russell Honeycutt III, *    Visit Date: 1/5/2023  Physician Orders: PT Eval and Treat   Medical Diagnosis from Referral:   Diagnosis   M25.511,G89.29 (ICD-10-CM) - Chronic right shoulder pain   M75.01 (ICD-10-CM) - Adhesive capsulitis of right shoulder    S83.242A (ICD-10-CM) - Acute medial meniscus tear of left knee, initial encounter  Evaluation Date: 9/29/2022 shoulder, 107/2022 knee  Authorization Period Expiration: 12/31/2023  Plan of Care Expiration: 4/1/2023  Visit # / Visits authorized: 2/20    Total visits: 30    Time In:  255pm  Time Out: 345pm  Total Billable Time: 50 minutes    Precautions: Standard and Weightbearing    Subjective     Pt reports: my knee got really stiff on Tuesday     She was compliant with home exercise program.  Response to previous treatment: Improved patellar mobility and scar mob  Functional change: WBAT    Pain: 4/10  Location: left knee  , right shoulder    Objective       Knee Passive Range of Motion:   Right  Left    Flexion 110 110   Extension +3 hyper 0       Meryl received therapeutic exercises to develop strength, endurance, ROM, and flexibility for 40 minutes including:    LAQ 5# 3 x 15  SAQ 30x  Quad sets 30x  SLR upright 3 x 10 B   Seated ER 5# 2 x 10   Lateral walks YTB 5 laps at table   Patient education    NT  Bridges GTB above knee 30x   Sit to stands 30x  Sidelying hip abd 3 x 10  Standing heel raise 30x  Clam BTB 2 x 20  Sit to stand low table 2 x 15  HS curls BTB seated EOT 30x B  Standing hip abd 30x B   TKE GTB 2 x 20 3" hold  Sciatic nerve gliders 2 x 20 B   Bike rocking 8' for knee ROM     Meryl received the following manual therapy techniques: Joint mobilizations and Soft tissue Mobilization " were applied to the: L knee for 10 minutes, including:    Knee assessment  Passive flexion EOT   Patellar mobilizations all planes  STM medial HS     Not Performed Today:  Medial gapping Gr III    Meryl participated in neuromuscular re-education activities to improve: Balance, Coordination, Sense, Proprioception, and Posture for 0 minutes. The following activities were included:    Shuttle DL 3 bands 4 x 10  Shuttle SL 2 bands 4 x 10    Meryl received cold pack for 10 minutes to to decrease circulation, pain, and swelling.      Home Exercises Provided and Patient Education Provided     Education provided:   - Stair training     Written Home Exercises Provided: Patient instructed to cont prior HEP.  Exercises were reviewed and Meryl was able to demonstrate them prior to the end of the session.  Meryl demonstrated good  understanding of the education provided.     See EMR under Patient Instructions for exercises provided 10/6/2022.    Assessment     Meryl notes her pain was down post treatment following exercise. Need to continue strengthening for improved patellar mobility and stabilization    Meryl Is progressing well towards her goals.   Pt prognosis is Excellent.     Pt will continue to benefit from skilled outpatient physical therapy to address the deficits listed in the problem list box on initial evaluation, provide pt/family education and to maximize pt's level of independence in the home and community environment.     Pt's spiritual, cultural and educational needs considered and pt agreeable to plan of care and goals.    Anticipated barriers to physical therapy: work schedule    GOALS:     Short Term Goals:  4 weeks weeks(Progressing, not met)  1.Report decreased shoulder pain < / =  5/10 at worst to increase tolerance for work   2. Increase PROM full pain free   3. Increased strength by 1/3 MMT grade in shoulder strength to increase tolerance for ADL and work activities.  4. Pt to tolerate HEP to  improve ROM and independence with ADL's     Long Term Goals: 8 weeks(Progressing, not met)    1.Report decreased shoulder pain  < / =  2/10 at worst  to increase tolerance for work  2.Increase AROM to full pain free  3.Increase strength to >/= 4/5 in shoulder strength to increase tolerance for ADL and work activities.  4. Pt goal: Return to pain free activities of daily living and work related activities.  5. Pt will have improved FOTO score of </= 35% for functional improvements in activities of daily living.    New Knee Goals: 6 weeks (Progressing, not met)    1.Report decreased knee pain  < / =  2/10  to increase tolerance for return to ambulation without RW  2. Increase knee ROM to full motion in order to be able to perform ADLs without difficulty.  3. Increase strength by 1/3 MMT grade in quad and glutes  to increase tolerance for ADL and work activities.  4. Pt to tolerate HEP to improve ROM and independence with ADL's      Plan     Plan of care Certification: 9/29/2022 to 12/8/2022.    Progress closed chain quad/glute strengthening     Fabricio Tolentino, PT, DPT

## 2023-01-09 ENCOUNTER — CLINICAL SUPPORT (OUTPATIENT)
Dept: REHABILITATION | Facility: HOSPITAL | Age: 68
End: 2023-01-09
Attending: PHYSICIAN ASSISTANT
Payer: COMMERCIAL

## 2023-01-09 DIAGNOSIS — G89.29 CHRONIC RIGHT SHOULDER PAIN: Primary | ICD-10-CM

## 2023-01-09 DIAGNOSIS — R29.898 DECREASED STRENGTH OF UPPER EXTREMITY: ICD-10-CM

## 2023-01-09 DIAGNOSIS — M25.562 ACUTE PAIN OF LEFT KNEE: ICD-10-CM

## 2023-01-09 DIAGNOSIS — M25.511 CHRONIC RIGHT SHOULDER PAIN: Primary | ICD-10-CM

## 2023-01-09 PROCEDURE — 97110 THERAPEUTIC EXERCISES: CPT | Performed by: PHYSICAL THERAPIST

## 2023-01-09 NOTE — PROGRESS NOTES
"Physical Therapy Daily Treatment Note     Name: Mreyl Yo [facility] Number: [id number]    Therapy Diagnosis:   Encounter Diagnoses   Name Primary?    Chronic right shoulder pain Yes    Decreased strength of upper extremity     Acute pain of left knee        Physician: Russell Honeycutt III, *    Visit Date: 1/9/2023  Physician Orders: PT Eval and Treat   Medical Diagnosis from Referral:   Diagnosis   M25.511,G89.29 (ICD-10-CM) - Chronic right shoulder pain   M75.01 (ICD-10-CM) - Adhesive capsulitis of right shoulder    S83.242A (ICD-10-CM) - Acute medial meniscus tear of left knee, initial encounter  Evaluation Date: 9/29/2022 shoulder, 107/2022 knee  Authorization Period Expiration: 12/31/2023  Plan of Care Expiration: 4/1/2023  Visit # / Visits authorized: 3/20    Total visits: 31    Time In:  700am  Time Out: 745am  Total Billable Time: 45 minutes    Precautions: Standard and Weightbearing    Subjective     Pt reports: I went to the game yesterday and both knees were sore after but I was able to sleep last night    She was compliant with home exercise program.  Response to previous treatment: Improved patellar mobility and scar mob  Functional change: WBAT    Pain: 4/10  Location: left knee  , right shoulder    Objective       Knee Passive Range of Motion:   Right  Left    Flexion 110 110   Extension +3 hyper 0       Meryl received therapeutic exercises to develop strength, endurance, ROM, and flexibility for 45 minutes including:    Seated heel raise 40x B   LAQ 7.5# 3 x 15  SAQ 4# 30x  Sidelying hip abd 3 x 10 B   SLR upright 3 x 10 B   Patient education    NT  Bridges GTB above knee 30x   Sit to stands 30x  Sidelying hip abd 3 x 10  Standing heel raise 30x  Clam BTB 2 x 20  Sit to stand low table 2 x 15  HS curls BTB seated EOT 30x B  Standing hip abd 30x B   TKE GTB 2 x 20 3" hold  Sciatic nerve gliders 2 x 20 B   Bike rocking 8' for knee ROM     Meryl received the following manual therapy techniques: " Joint mobilizations and Soft tissue Mobilization were applied to the: L knee for 0 minutes, including:    Knee assessment  Passive flexion EOT   Patellar mobilizations all planes  STM medial HS     Not Performed Today:  Medial gapping Gr III    Meryl participated in neuromuscular re-education activities to improve: Balance, Coordination, Sense, Proprioception, and Posture for 0 minutes. The following activities were included:    Shuttle DL 3 bands 4 x 10  Shuttle SL 2 bands 4 x 10    Meryl received cold pack for 10 minutes to to decrease circulation, pain, and swelling.      Home Exercises Provided and Patient Education Provided     Education provided:   - Stair training     Written Home Exercises Provided: Patient instructed to cont prior HEP.  Exercises were reviewed and Meryl was able to demonstrate them prior to the end of the session.  Meryl demonstrated good  understanding of the education provided.     See EMR under Patient Instructions for exercises provided 10/6/2022.    Assessment     Meryl was able to return to stairs at [employer] game and notes equal discomfort bilaterally. Scheduled for an injection in a wee for the knee pain. Will continue to work on improving tolerance to loading at home and work    Meryl Is progressing well towards her goals.   Pt prognosis is Excellent.     Pt will continue to benefit from skilled outpatient physical therapy to address the deficits listed in the problem list box on initial evaluation, provide pt/family education and to maximize pt's level of independence in the home and community environment.     Pt's spiritual, cultural and educational needs considered and pt agreeable to plan of care and goals.    Anticipated barriers to physical therapy: work schedule    GOALS:     Short Term Goals:  4 weeks weeks(Progressing, not met)  1.Report decreased shoulder pain < / =  5/10 at worst to increase tolerance for work   2. Increase PROM full pain free   3. Increased strength  by 1/3 MMT grade in shoulder strength to increase tolerance for ADL and work activities.  4. Pt to tolerate HEP to improve ROM and independence with ADL's     Long Term Goals: 8 weeks(Progressing, not met)    1.Report decreased shoulder pain  < / =  2/10 at worst  to increase tolerance for work  2.Increase AROM to full pain free  3.Increase strength to >/= 4/5 in shoulder strength to increase tolerance for ADL and work activities.  4. Pt goal: Return to pain free activities of daily living and work related activities.  5. Pt will have improved FOTO score of </= 35% for functional improvements in activities of daily living.    New Knee Goals: 6 weeks (Progressing, not met)    1.Report decreased knee pain  < / =  2/10  to increase tolerance for return to ambulation without RW  2. Increase knee ROM to full motion in order to be able to perform ADLs without difficulty.  3. Increase strength by 1/3 MMT grade in quad and glutes  to increase tolerance for ADL and work activities.  4. Pt to tolerate HEP to improve ROM and independence with ADL's      Plan     Plan of care Certification: 9/29/2022 to 12/8/2022.    Progress closed chain quad/glute strengthening     Fabricio Tolentino, PT, DPT

## 2023-01-11 ENCOUNTER — CLINICAL SUPPORT (OUTPATIENT)
Dept: REHABILITATION | Facility: HOSPITAL | Age: 68
End: 2023-01-11
Payer: COMMERCIAL

## 2023-01-11 DIAGNOSIS — M25.511 CHRONIC RIGHT SHOULDER PAIN: Primary | ICD-10-CM

## 2023-01-11 DIAGNOSIS — M25.562 ACUTE PAIN OF LEFT KNEE: ICD-10-CM

## 2023-01-11 DIAGNOSIS — G89.29 CHRONIC RIGHT SHOULDER PAIN: Primary | ICD-10-CM

## 2023-01-11 DIAGNOSIS — R29.898 DECREASED STRENGTH OF UPPER EXTREMITY: ICD-10-CM

## 2023-01-11 PROCEDURE — 97110 THERAPEUTIC EXERCISES: CPT | Performed by: PHYSICAL THERAPIST

## 2023-01-11 PROCEDURE — 97112 NEUROMUSCULAR REEDUCATION: CPT | Performed by: PHYSICAL THERAPIST

## 2023-01-11 NOTE — PROGRESS NOTES
"Physical Therapy Daily Treatment Note     Name: Meryl Yo [facility] Number: [id number]    Therapy Diagnosis:   Encounter Diagnoses   Name Primary?    Chronic right shoulder pain Yes    Decreased strength of upper extremity     Acute pain of left knee      Physician: Russell Honeycutt III, *    Visit Date: 1/11/2023  Physician Orders: PT Eval and Treat   Medical Diagnosis from Referral:   Diagnosis   M25.511,G89.29 (ICD-10-CM) - Chronic right shoulder pain   M75.01 (ICD-10-CM) - Adhesive capsulitis of right shoulder    S83.242A (ICD-10-CM) - Acute medial meniscus tear of left knee, initial encounter  Evaluation Date: 9/29/2022 shoulder, 107/2022 knee  Authorization Period Expiration: 12/31/2023  Plan of Care Expiration: 4/1/2023  Visit # / Visits authorized: 3/20    Total visits: 32    Time In:  700am  Time Out: 750am  Total Billable Time: 50 minutes    Precautions: Standard and Weightbearing    Subjective     Pt reports: I still feel my knee but its bearable    She was compliant with home exercise program.  Response to previous treatment: Improved patellar mobility and scar mob  Functional change: WBAT    Pain: 4/10  Location: left knee  , right shoulder    Objective       Knee Passive Range of Motion:   Right  Left    Flexion 110 110   Extension +3 hyper 0       Meryl received therapeutic exercises to develop strength, endurance, ROM, and flexibility for 35 minutes including:    Seated HS curl BTB 2 x 20  LAQ 7.5# 3 x 15  SAQ 4# 30x  SLR upright 3 x 10 B   Patient education    NT  Bridges GTB above knee 30x   Sit to stands 30x  Sidelying hip abd 3 x 10  Standing heel raise 30x  Clam BTB 2 x 20  Sit to stand low table 2 x 15  HS curls BTB seated EOT 30x B  Standing hip abd 30x B   TKE GTB 2 x 20 3" hold  Sciatic nerve gliders 2 x 20 B   Bike rocking 8' for knee ROM     Meryl received the following manual therapy techniques: Joint mobilizations and Soft tissue Mobilization were applied to the: L knee for 5 " minutes, including:    Knee assessment  Passive flexion EOT   Patellar mobilizations all planes  STM medial HS     Not Performed Today:  Medial gapping Gr III    Meryl participated in neuromuscular re-education activities to improve: Balance, Coordination, Sense, Proprioception, and Posture for 10 minutes. The following activities were included:    Shuttle DL 3 bands 4 x 10  Shuttle SL 2 bands 4 x 10    Meryl received cold pack for 10 minutes to to decrease circulation, pain, and swelling.      Home Exercises Provided and Patient Education Provided     Education provided:   - Stair training     Written Home Exercises Provided: Patient instructed to cont prior HEP.  Exercises were reviewed and Meryl was able to demonstrate them prior to the end of the session.  Meryl demonstrated good  understanding of the education provided.     See EMR under Patient Instructions for exercises provided 10/6/2022.    Assessment     Meryl presented with decreased knee pain on arrival, still painful to resisted HS curl. She was able to return to functional squat on shuttle. Procedure next week for nerve pain in knee    Meryl Is progressing well towards her goals.   Pt prognosis is Excellent.     Pt will continue to benefit from skilled outpatient physical therapy to address the deficits listed in the problem list box on initial evaluation, provide pt/family education and to maximize pt's level of independence in the home and community environment.     Pt's spiritual, cultural and educational needs considered and pt agreeable to plan of care and goals.    Anticipated barriers to physical therapy: work schedule    GOALS:     Short Term Goals:  4 weeks weeks(Progressing, not met)  1.Report decreased shoulder pain < / =  5/10 at worst to increase tolerance for work   2. Increase PROM full pain free   3. Increased strength by 1/3 MMT grade in shoulder strength to increase tolerance for ADL and work activities.  4. Pt to tolerate HEP  to improve ROM and independence with ADL's     Long Term Goals: 8 weeks(Progressing, not met)    1.Report decreased shoulder pain  < / =  2/10 at worst  to increase tolerance for work  2.Increase AROM to full pain free  3.Increase strength to >/= 4/5 in shoulder strength to increase tolerance for ADL and work activities.  4. Pt goal: Return to pain free activities of daily living and work related activities.  5. Pt will have improved FOTO score of </= 35% for functional improvements in activities of daily living.    New Knee Goals: 6 weeks (Progressing, not met)    1.Report decreased knee pain  < / =  2/10  to increase tolerance for return to ambulation without RW  2. Increase knee ROM to full motion in order to be able to perform ADLs without difficulty.  3. Increase strength by 1/3 MMT grade in quad and glutes  to increase tolerance for ADL and work activities.  4. Pt to tolerate HEP to improve ROM and independence with ADL's      Plan     Plan of care Certification: 9/29/2022 to 12/8/2022.    Progress closed chain quad/glute strengthening     Fabricio Tolentino, PT, DPT

## 2023-01-17 ENCOUNTER — PATIENT MESSAGE (OUTPATIENT)
Dept: SPORTS MEDICINE | Facility: CLINIC | Age: 68
End: 2023-01-17
Payer: COMMERCIAL

## 2023-01-19 ENCOUNTER — PATIENT MESSAGE (OUTPATIENT)
Dept: SPORTS MEDICINE | Facility: CLINIC | Age: 68
End: 2023-01-19
Payer: COMMERCIAL

## 2023-01-24 ENCOUNTER — PATIENT MESSAGE (OUTPATIENT)
Dept: SPORTS MEDICINE | Facility: CLINIC | Age: 68
End: 2023-01-24
Payer: COMMERCIAL

## 2023-01-25 ENCOUNTER — PATIENT MESSAGE (OUTPATIENT)
Dept: INTERNAL MEDICINE | Facility: CLINIC | Age: 68
End: 2023-01-25
Payer: COMMERCIAL

## 2023-01-25 ENCOUNTER — LAB VISIT (OUTPATIENT)
Dept: LAB | Facility: HOSPITAL | Age: 68
End: 2023-01-25
Attending: INTERNAL MEDICINE
Payer: COMMERCIAL

## 2023-01-25 DIAGNOSIS — R05.9 COUGH: ICD-10-CM

## 2023-01-25 LAB — SARS-COV-2 RNA RESP QL NAA+PROBE: NOT DETECTED

## 2023-01-25 PROCEDURE — U0005 INFEC AGEN DETEC AMPLI PROBE: HCPCS | Performed by: INTERNAL MEDICINE

## 2023-01-30 ENCOUNTER — PROCEDURE VISIT (OUTPATIENT)
Dept: SPORTS MEDICINE | Facility: CLINIC | Age: 68
End: 2023-01-30
Payer: COMMERCIAL

## 2023-01-30 VITALS
WEIGHT: 266.38 LBS | SYSTOLIC BLOOD PRESSURE: 117 MMHG | HEIGHT: 67 IN | DIASTOLIC BLOOD PRESSURE: 73 MMHG | HEART RATE: 82 BPM | BODY MASS INDEX: 41.81 KG/M2

## 2023-01-30 DIAGNOSIS — G89.29 CHRONIC PAIN OF LEFT KNEE: ICD-10-CM

## 2023-01-30 DIAGNOSIS — M25.562 CHRONIC PAIN OF LEFT KNEE: ICD-10-CM

## 2023-01-30 DIAGNOSIS — M17.12 PRIMARY OSTEOARTHRITIS OF LEFT KNEE: ICD-10-CM

## 2023-01-30 PROCEDURE — 99499 NO LOS: ICD-10-PCS | Mod: S$GLB,,, | Performed by: ORTHOPAEDIC SURGERY

## 2023-01-30 PROCEDURE — 64640 PR DESTRUCT BY NEURO AGENT; OTHER PERIPH NERVE: ICD-10-PCS | Mod: LT,S$GLB,, | Performed by: ORTHOPAEDIC SURGERY

## 2023-01-30 PROCEDURE — 64640 INJECTION TREATMENT OF NERVE: CPT | Mod: LT,S$GLB,, | Performed by: ORTHOPAEDIC SURGERY

## 2023-01-30 PROCEDURE — 99499 UNLISTED E&M SERVICE: CPT | Mod: S$GLB,,, | Performed by: ORTHOPAEDIC SURGERY

## 2023-01-30 NOTE — PROGRESS NOTES
CC: left knee pain    67 y.o. Female presents today for Iovera procedure for left knee pain.    INFORMED CONSENT: I verify that I personally obtained Meryl Johnson's consent which was signed and uploaded into [employer].     PAIN SCORE:  Pre-procedure:  3/10  Gait pre-procedure: non antalgic     Inspection/Palpation:   +Skin warm and dry without open wounds or erythema  -Rubor   -Calor    Procedure Note Iovera:    DATE OF PROCEDURE:  01/30/2023    PREOPERATIVE DIAGNOSIS: left knee pain.     POSTOPERATIVE DIAGNOSIS: left knee pain.     PROCEDURE: Iovera treatment of anterior femoral cutaneous nerve, Lateral femoral cut nerve, and superior and inferior branches of infrapatellar saphenous nerve using at least 4+ different punctures to treat all 4 nerves. (cpt 64640 x4)    COMPLICATIONS: None.     IMPLANTS:  None    ESTIMATED BLOOD LOSS:  < 5cc    SPECIMENS REMOVED:  None    ANESTHESIA: 20cc Local xylocaine with epinephrine    INDICATIONS FOR PROCEDURE: This is a 67 y.o. female with longstanding knee pain. They have failed non operative management including injections.I discussed a new treatment therapy called Iovera, which is cryotherapy, to provide symptomatic relief along the sensory distribution of the infrapatellar tendon branch of the saphenous nerve  and AFCN. The patient elected to move forward with this  We did discuss the fact that this is a fairly novel procedure and there is very limited scientific data around this.  However, it FDA approved.  The patient was given patient information and literature to review prior to the procedure as well.  Based on this, the patient agreed to move forward with doing the procedure.    Time was spent discussing the cryoanalgesia procedure Iovera with the patient.  Risks and benefits were also discussed with patient.  X-rays were reviewed to use as a diagram to explain procedure. A detailed description of landmarks and placement of anesthetic used during procedure were also  explained to patient.  Contraindications for procedure were discussed with patient.    CONSENT:  Verbal and written consent were obtained from the patient.     PROCEDURE:    A surgical time out was performed, including verification of patient ID, procedure to be performed, site and side, availability of information and equipment, review of safety issues, and the patients agreement and consent.  The patient was placed supine on the exam table and the proximal medial aspect of the left tibia and anterior aspect of distal femur was prepped with sterile Chlorhexidine and alcohol.  A line was drawn extending approximately 5 cm medial to inferior pole of the patella distally to a point approximately 5 cm medial to the tibial tubercle.  A second line was drawn in a medial to lateral direction the width of the patella approximately 7 cm proximal to the patella. We then infiltrated the skin with lidocaine with epinephrine along both lines using a 25g needle. We then introduced the Iovera device along these lines and this device penetrated the skin, creating cryotherapy to the superior and inferior branches of the infrapatellar saphenous nerve, a third treatment to the anterior femoral cutaneous nerve, and fourth LFCN. 7 punctures of the skin were made to treat the superior and inferior branches of the ISN and another 7 punctures were made to treat the AFCN and LFCN. There were a total of 4 nerves treated with iovera. The patient tolerated the procedure well with no problems.    PAIN SCORES:  Pre-procedure:  3/10  Post-procedure:  0/10    Pre-procedure stool step: 4/10  Post-procedure stool step: 0/10    Pre-procedure tender to palpation (medial): 7/10  Post -procedure tender to palpation (medial): 2/10    Gait post procedure: non antalgic       ASSESSMENT:      ICD-10-CM ICD-9-CM   1. Chronic pain of left knee  M25.562 719.46    G89.29 338.29   2. Primary osteoarthritis of left knee  M17.12 715.16         PLAN:    All  questions were answered to the best of my ability and all concerns were addressed at this time.    This note is dictated using the M*Modal Fluency Direct word recognition program. There are word recognition mistakes that are occasionally missed on review.

## 2023-02-01 DIAGNOSIS — Z78.0 MENOPAUSE: ICD-10-CM

## 2023-02-01 NOTE — PROCEDURES
CC: left knee pain     67 y.o. Female presents today for Iovera procedure for left knee pain.     INFORMED CONSENT: I verify that I personally obtained Meryl Johnson's consent which was signed and uploaded into [employer].      PAIN SCORE:  Pre-procedure:  3/10  Gait pre-procedure: non antalgic      Inspection/Palpation:   +Skin warm and dry without open wounds or erythema  -Rubor   -Calor     Procedure Note Iovera:     DATE OF PROCEDURE:  01/30/2023     PREOPERATIVE DIAGNOSIS: left knee pain.      POSTOPERATIVE DIAGNOSIS: left knee pain.      PROCEDURE: Iovera treatment of anterior femoral cutaneous nerve, Lateral femoral cut nerve, and superior and inferior branches of infrapatellar saphenous nerve using at least 4+ different punctures to treat all 4 nerves. (cpt 64640 x4)     COMPLICATIONS: None.      IMPLANTS:  None     ESTIMATED BLOOD LOSS:  < 5cc     SPECIMENS REMOVED:  None     ANESTHESIA: 20cc Local xylocaine with epinephrine     INDICATIONS FOR PROCEDURE: This is a 67 y.o. female with longstanding knee pain. They have failed non operative management including injections.I discussed a new treatment therapy called Iovera, which is cryotherapy, to provide symptomatic relief along the sensory distribution of the infrapatellar tendon branch of the saphenous nerve  and AFCN. The patient elected to move forward with this  We did discuss the fact that this is a fairly novel procedure and there is very limited scientific data around this.  However, it FDA approved.  The patient was given patient information and literature to review prior to the procedure as well.  Based on this, the patient agreed to move forward with doing the procedure.     Time was spent discussing the cryoanalgesia procedure Iovera with the patient.  Risks and benefits were also discussed with patient.  X-rays were reviewed to use as a diagram to explain procedure. A detailed description of landmarks and placement of anesthetic used during  procedure were also explained to patient.  Contraindications for procedure were discussed with patient.     CONSENT:  Verbal and written consent were obtained from the patient.      PROCEDURE:    A surgical time out was performed, including verification of patient ID, procedure to be performed, site and side, availability of information and equipment, review of safety issues, and the patients agreement and consent.  The patient was placed supine on the exam table and the proximal medial aspect of the left tibia and anterior aspect of distal femur was prepped with sterile Chlorhexidine and alcohol.  A line was drawn extending approximately 5 cm medial to inferior pole of the patella distally to a point approximately 5 cm medial to the tibial tubercle.  A second line was drawn in a medial to lateral direction the width of the patella approximately 7 cm proximal to the patella. We then infiltrated the skin with lidocaine with epinephrine along both lines using a 25g needle. We then introduced the Iovera device along these lines and this device penetrated the skin, creating cryotherapy to the superior and inferior branches of the infrapatellar saphenous nerve, a third treatment to the anterior femoral cutaneous nerve, and fourth LFCN. 7 punctures of the skin were made to treat the superior and inferior branches of the ISN and another 7 punctures were made to treat the AFCN and LFCN. There were a total of 4 nerves treated with iovera. The patient tolerated the procedure well with no problems.     PAIN SCORES:  Pre-procedure:  3/10  Post-procedure:  0/10     Pre-procedure stool step: 4/10  Post-procedure stool step: 0/10     Pre-procedure tender to palpation (medial): 7/10  Post -procedure tender to palpation (medial): 2/10     Gait post procedure: non antalgic         ASSESSMENT:        ICD-10-CM ICD-9-CM   1. Chronic pain of left knee  M25.562 719.46     G89.29 338.29   2. Primary osteoarthritis of left knee  M17.12  715.16            PLAN:     All questions were answered to the best of my ability and all concerns were addressed at this time.     This note is dictated using the M*Modal Fluency Direct word recognition program. There are word recognition mistakes that are occasionally missed on review.

## 2023-02-06 ENCOUNTER — CLINICAL SUPPORT (OUTPATIENT)
Dept: REHABILITATION | Facility: HOSPITAL | Age: 68
End: 2023-02-06
Payer: COMMERCIAL

## 2023-02-06 ENCOUNTER — PATIENT MESSAGE (OUTPATIENT)
Dept: SPORTS MEDICINE | Facility: CLINIC | Age: 68
End: 2023-02-06
Payer: COMMERCIAL

## 2023-02-06 DIAGNOSIS — M25.511 CHRONIC RIGHT SHOULDER PAIN: Primary | ICD-10-CM

## 2023-02-06 DIAGNOSIS — M25.562 ACUTE PAIN OF LEFT KNEE: ICD-10-CM

## 2023-02-06 DIAGNOSIS — G89.29 CHRONIC RIGHT SHOULDER PAIN: Primary | ICD-10-CM

## 2023-02-06 DIAGNOSIS — R29.898 DECREASED STRENGTH OF UPPER EXTREMITY: ICD-10-CM

## 2023-02-06 PROCEDURE — 97140 MANUAL THERAPY 1/> REGIONS: CPT | Performed by: PHYSICAL THERAPIST

## 2023-02-06 PROCEDURE — 97110 THERAPEUTIC EXERCISES: CPT | Performed by: PHYSICAL THERAPIST

## 2023-02-07 NOTE — PROGRESS NOTES
"Physical Therapy Daily Treatment Note     Name: Meryl Yo [facility] Number: [id number]    Therapy Diagnosis:   Encounter Diagnoses   Name Primary?    Chronic right shoulder pain Yes    Decreased strength of upper extremity     Acute pain of left knee      Physician: Russell Honeycutt III, *    Visit Date: 2/6/2023  Physician Orders: PT Eval and Treat   Medical Diagnosis from Referral:   Diagnosis   M25.511,G89.29 (ICD-10-CM) - Chronic right shoulder pain   M75.01 (ICD-10-CM) - Adhesive capsulitis of right shoulder    S83.242A (ICD-10-CM) - Acute medial meniscus tear of left knee, initial encounter  Evaluation Date: 9/29/2022 shoulder, 107/2022 knee  Authorization Period Expiration: 12/31/2023  Plan of Care Expiration: 4/1/2023  Visit # / Visits authorized: 5/20    Total visits: 33    Time In:  1500  Time Out: 1555  Total Billable Time: 55 minutes    Precautions: Standard and Weightbearing    Subjective     Pt reports: I have about equal knee pain. Most difficulty with stair climbing. Stiff in the mornings     She was compliant with home exercise program.  Response to previous treatment: Improved patellar mobility and scar mob  Functional change: WBAT    Pain: 4/10  Location: left knee  , right shoulder    Objective       Knee Passive Range of Motion:   Right  Left    Flexion 110 110   Extension +3 hyper 0       Meryl received therapeutic exercises to develop strength, endurance, ROM, and flexibility for 45 minutes including:    Step up 6" alt LE 30x each  HS curl machine 15# 3 x 15  LAQ on hammer 2.5# L/5# R 3 x 15  DL heel raises 30x  Patient education    NT  Bridges GTB above knee 30x   Sit to stands 30x  Sidelying hip abd 3 x 10  Standing heel raise 30x  Clam BTB 2 x 20  Sit to stand low table 2 x 15  HS curls BTB seated EOT 30x B  Standing hip abd 30x B   TKE GTB 2 x 20 3" hold  Sciatic nerve gliders 2 x 20 B   Bike rocking 8' for knee ROM     Meryl received the following manual therapy techniques: Joint " mobilizations and Soft tissue Mobilization were applied to the: L knee for 10 minutes, including:    Knee assessment  Passive flexion EOT   Patellar mobilizations all planes  STM medial HS     Not Performed Today:  Medial gapping Gr III    Meryl participated in neuromuscular re-education activities to improve: Balance, Coordination, Sense, Proprioception, and Posture for 0 minutes. The following activities were included:    Shuttle DL 3 bands 4 x 10  Shuttle SL 2 bands 4 x 10    Meryl received cold pack for 10 minutes to to decrease circulation, pain, and swelling.      Home Exercises Provided and Patient Education Provided     Education provided:   - Stair training     Written Home Exercises Provided: Patient instructed to cont prior HEP.  Exercises were reviewed and Meryl was able to demonstrate them prior to the end of the session.  Meryl demonstrated good  understanding of the education provided.     See EMR under Patient Instructions for exercises provided 10/6/2022.    Assessment     Meryl progressed with HS and LAQ strengthening. Began with step training before fatigued, equal fatigue bilaterally. Stiffness consistent with increased tendon loading     Meryl Is progressing well towards her goals.   Pt prognosis is Excellent.     Pt will continue to benefit from skilled outpatient physical therapy to address the deficits listed in the problem list box on initial evaluation, provide pt/family education and to maximize pt's level of independence in the home and community environment.     Pt's spiritual, cultural and educational needs considered and pt agreeable to plan of care and goals.    Anticipated barriers to physical therapy: work schedule    GOALS:     Short Term Goals:  4 weeks weeks(Progressing, not met)  1.Report decreased shoulder pain < / =  5/10 at worst to increase tolerance for work   2. Increase PROM full pain free   3. Increased strength by 1/3 MMT grade in shoulder strength to increase  tolerance for ADL and work activities.  4. Pt to tolerate HEP to improve ROM and independence with ADL's     Long Term Goals: 8 weeks(Progressing, not met)    1.Report decreased shoulder pain  < / =  2/10 at worst  to increase tolerance for work  2.Increase AROM to full pain free  3.Increase strength to >/= 4/5 in shoulder strength to increase tolerance for ADL and work activities.  4. Pt goal: Return to pain free activities of daily living and work related activities.  5. Pt will have improved FOTO score of </= 35% for functional improvements in activities of daily living.    New Knee Goals: 6 weeks (Progressing, not met)    1.Report decreased knee pain  < / =  2/10  to increase tolerance for return to ambulation without RW  2. Increase knee ROM to full motion in order to be able to perform ADLs without difficulty.  3. Increase strength by 1/3 MMT grade in quad and glutes  to increase tolerance for ADL and work activities.  4. Pt to tolerate HEP to improve ROM and independence with ADL's      Plan     Plan of care Certification: 9/29/2022 to 12/8/2022.    Progress closed chain quad/glute strengthening     Fabricio Tolentino, PT, DPT

## 2023-02-08 ENCOUNTER — PATIENT MESSAGE (OUTPATIENT)
Dept: SPORTS MEDICINE | Facility: CLINIC | Age: 68
End: 2023-02-08
Payer: COMMERCIAL

## 2023-02-09 ENCOUNTER — CLINICAL SUPPORT (OUTPATIENT)
Dept: REHABILITATION | Facility: HOSPITAL | Age: 68
End: 2023-02-09
Payer: COMMERCIAL

## 2023-02-09 DIAGNOSIS — R29.898 DECREASED STRENGTH OF UPPER EXTREMITY: ICD-10-CM

## 2023-02-09 DIAGNOSIS — M25.511 CHRONIC RIGHT SHOULDER PAIN: Primary | ICD-10-CM

## 2023-02-09 DIAGNOSIS — G89.29 CHRONIC RIGHT SHOULDER PAIN: Primary | ICD-10-CM

## 2023-02-09 DIAGNOSIS — M25.562 ACUTE PAIN OF LEFT KNEE: ICD-10-CM

## 2023-02-09 PROCEDURE — 97140 MANUAL THERAPY 1/> REGIONS: CPT | Performed by: PHYSICAL THERAPIST

## 2023-02-09 PROCEDURE — 97110 THERAPEUTIC EXERCISES: CPT | Performed by: PHYSICAL THERAPIST

## 2023-02-09 NOTE — PROGRESS NOTES
"Physical Therapy Daily Treatment Note     Name: Meryl Yo [facility] Number: [id number]    Therapy Diagnosis:   Encounter Diagnoses   Name Primary?    Chronic right shoulder pain Yes    Decreased strength of upper extremity     Acute pain of left knee      Physician: Russell Honeycutt III, *    Visit Date: 2/9/2023  Physician Orders: PT Eval and Treat   Medical Diagnosis from Referral:   Diagnosis   M25.511,G89.29 (ICD-10-CM) - Chronic right shoulder pain   M75.01 (ICD-10-CM) - Adhesive capsulitis of right shoulder    S83.242A (ICD-10-CM) - Acute medial meniscus tear of left knee, initial encounter  Evaluation Date: 9/29/2022 shoulder, 107/2022 knee  Authorization Period Expiration: 12/31/2023  Plan of Care Expiration: 4/1/2023  Visit # / Visits authorized: 6/20    Total visits: 34    Time In:  1500  Time Out: 1555  Total Billable Time: 55 minutes    Precautions: Standard and Weightbearing    Subjective     Pt reports: I have about equal pain still, worse with climbing stairs into my house with hand support    She was compliant with home exercise program.  Response to previous treatment: Improved patellar mobility and scar mob  Functional change: WBAT    Pain: 4/10  Location: left knee  , right shoulder    Objective       Knee Passive Range of Motion:   Right  Left    Flexion 110 110   Extension +3 hyper 0       Meryl received therapeutic exercises to develop strength, endurance, ROM, and flexibility for 45 minutes including:    Recumbent bik 8' for knee motion and cardio endurance training  Step up 6" alt LE 30x each  Sit to stands lowest mat 30x  SLR upright 3 x 10 B   Patient education    NT  HS curl machine 15# 3 x 15  LAQ on hammer 2.5# L/5# R 3 x 15  DL heel raises 30x  Bridges GTB above knee 30x   Sidelying hip abd 3 x 10  Standing heel raise 30x      Meryl received the following manual therapy techniques: Joint mobilizations and Soft tissue Mobilization were applied to the: L knee for 10 minutes, " including:    Knee assessment  Passive flexion EOT   Patellar mobilizations all planes  STM medial HS     Not Performed Today:  Medial gapping Gr III    Meryl participated in neuromuscular re-education activities to improve: Balance, Coordination, Sense, Proprioception, and Posture for 0 minutes. The following activities were included:    Shuttle DL 3 bands 4 x 10  Shuttle SL 2 bands 4 x 10    Meryl received cold pack for 10 minutes to to decrease circulation, pain, and swelling.      Home Exercises Provided and Patient Education Provided     Education provided:   - Stair training     Written Home Exercises Provided: Patient instructed to cont prior HEP.  Exercises were reviewed and Meryl was able to demonstrate them prior to the end of the session.  Meryl demonstrated good  understanding of the education provided.     See EMR under Patient Instructions for exercises provided 10/6/2022.    Assessment     Meryl presented painful palpation to the right knee with knee extended, better in flexed position. Better step tolerance today, limited UE support needed    Meryl Is progressing well towards her goals.   Pt prognosis is Excellent.     Pt will continue to benefit from skilled outpatient physical therapy to address the deficits listed in the problem list box on initial evaluation, provide pt/family education and to maximize pt's level of independence in the home and community environment.     Pt's spiritual, cultural and educational needs considered and pt agreeable to plan of care and goals.    Anticipated barriers to physical therapy: work schedule    GOALS:     Short Term Goals:  4 weeks weeks(Progressing, not met)  1.Report decreased shoulder pain < / =  5/10 at worst to increase tolerance for work   2. Increase PROM full pain free   3. Increased strength by 1/3 MMT grade in shoulder strength to increase tolerance for ADL and work activities.  4. Pt to tolerate HEP to improve ROM and independence with  ADL's     Long Term Goals: 8 weeks(Progressing, not met)    1.Report decreased shoulder pain  < / =  2/10 at worst  to increase tolerance for work  2.Increase AROM to full pain free  3.Increase strength to >/= 4/5 in shoulder strength to increase tolerance for ADL and work activities.  4. Pt goal: Return to pain free activities of daily living and work related activities.  5. Pt will have improved FOTO score of </= 35% for functional improvements in activities of daily living.    New Knee Goals: 6 weeks (Progressing, not met)    1.Report decreased knee pain  < / =  2/10  to increase tolerance for return to ambulation without RW  2. Increase knee ROM to full motion in order to be able to perform ADLs without difficulty.  3. Increase strength by 1/3 MMT grade in quad and glutes  to increase tolerance for ADL and work activities.  4. Pt to tolerate HEP to improve ROM and independence with ADL's      Plan     Plan of care Certification: 9/29/2022 to 12/8/2022.    Progress closed chain quad/glute strengthening     Fabricio Tolentino, PT, DPT

## 2023-02-13 ENCOUNTER — CLINICAL SUPPORT (OUTPATIENT)
Dept: REHABILITATION | Facility: HOSPITAL | Age: 68
End: 2023-02-13
Payer: COMMERCIAL

## 2023-02-13 DIAGNOSIS — G89.29 CHRONIC RIGHT SHOULDER PAIN: Primary | ICD-10-CM

## 2023-02-13 DIAGNOSIS — M25.562 ACUTE PAIN OF LEFT KNEE: ICD-10-CM

## 2023-02-13 DIAGNOSIS — M25.511 CHRONIC RIGHT SHOULDER PAIN: Primary | ICD-10-CM

## 2023-02-13 DIAGNOSIS — R29.898 DECREASED STRENGTH OF UPPER EXTREMITY: ICD-10-CM

## 2023-02-13 PROCEDURE — 97110 THERAPEUTIC EXERCISES: CPT | Performed by: PHYSICAL THERAPIST

## 2023-02-13 NOTE — PROGRESS NOTES
"Physical Therapy Daily Treatment Note     Name: Meryl Yo [facility] Number: [id number]    Therapy Diagnosis:   Encounter Diagnoses   Name Primary?    Chronic right shoulder pain Yes    Decreased strength of upper extremity     Acute pain of left knee      Physician: Russell Honeycutt III, *    Visit Date: 2/13/2023  Physician Orders: PT Eval and Treat   Medical Diagnosis from Referral:   Diagnosis   M25.511,G89.29 (ICD-10-CM) - Chronic right shoulder pain   M75.01 (ICD-10-CM) - Adhesive capsulitis of right shoulder    S83.242A (ICD-10-CM) - Acute medial meniscus tear of left knee, initial encounter  Evaluation Date: 9/29/2022 shoulder, 107/2022 knee  Authorization Period Expiration: 12/31/2023  Plan of Care Expiration: 4/1/2023  Visit # / Visits authorized: 7/20    Total visits: 35    Time In:  700  Time Out: 0758  Total Billable Time: 58 minutes    Precautions: Standard and Weightbearing    FOTO 42%, down from 51%    Subjective     Pt reports: I feel good in the mornings, just get stiff end of the day    She was compliant with home exercise program.  Response to previous treatment: Improved patellar mobility and scar mob  Functional change: WBAT    Pain: 4/10  Location: left knee, right shoulder    Objective     Knee Passive Range of Motion:   Right  Left    Flexion 110 110   Extension +3 hyper 0       Meryl received therapeutic exercises to develop strength, endurance, ROM, and flexibility for 58 minutes including:    Recumbent bike 8' for knee motion and cardio endurance training  LAQ 5# 30x  Step up 6" alt LE 15x each  Lateral step ups 6" 15x  SLR upright 3 x 10 B   Patient education    NT  Sit to stands lowest mat 30x  HS curl machine 15# 3 x 15  LAQ on hammer 2.5# L/5# R 3 x 15  DL heel raises 30x  Bridges GTB above knee 30x   Sidelying hip abd 3 x 10  Standing heel raise 30x    Meryl received the following manual therapy techniques: Joint mobilizations and Soft tissue Mobilization were applied to the: L " knee for 0 minutes, including:    Knee assessment  Passive flexion EOT   Patellar mobilizations all planes  STM medial HS     Not Performed Today:  Medial gapping Gr III    Meryl participated in neuromuscular re-education activities to improve: Balance, Coordination, Sense, Proprioception, and Posture for 0 minutes. The following activities were included:    Shuttle DL 3 bands 4 x 10  Shuttle SL 2 bands 4 x 10    Meryl received cold pack for 10 minutes to to decrease circulation, pain, and swelling.      Home Exercises Provided and Patient Education Provided     Education provided:   - Stair training     Written Home Exercises Provided: Patient instructed to cont prior HEP.  Exercises were reviewed and Meryl was able to demonstrate them prior to the end of the session.  Meryl demonstrated good  understanding of the education provided.     See EMR under Patient Instructions for exercises provided 10/6/2022.    Assessment     Meryl progressed with quad and glute strengthening with forward/lateral step ups. Limited quad activation, lag with SLR at the end of session    Meryl Is progressing well towards her goals.   Pt prognosis is Excellent.     Pt will continue to benefit from skilled outpatient physical therapy to address the deficits listed in the problem list box on initial evaluation, provide pt/family education and to maximize pt's level of independence in the home and community environment.     Pt's spiritual, cultural and educational needs considered and pt agreeable to plan of care and goals.    Anticipated barriers to physical therapy: work schedule    GOALS:     Short Term Goals:  4 weeks weeks(Progressing, not met)  1.Report decreased shoulder pain < / =  5/10 at worst to increase tolerance for work   2. Increase PROM full pain free   3. Increased strength by 1/3 MMT grade in shoulder strength to increase tolerance for ADL and work activities.  4. Pt to tolerate HEP to improve ROM and independence  with ADL's     Long Term Goals: 8 weeks(Progressing, not met)    1.Report decreased shoulder pain  < / =  2/10 at worst  to increase tolerance for work  2.Increase AROM to full pain free  3.Increase strength to >/= 4/5 in shoulder strength to increase tolerance for ADL and work activities.  4. Pt goal: Return to pain free activities of daily living and work related activities.  5. Pt will have improved FOTO score of </= 35% for functional improvements in activities of daily living.    New Knee Goals: 6 weeks (Progressing, not met)    1.Report decreased knee pain  < / =  2/10  to increase tolerance for return to ambulation without RW  2. Increase knee ROM to full motion in order to be able to perform ADLs without difficulty.  3. Increase strength by 1/3 MMT grade in quad and glutes  to increase tolerance for ADL and work activities.  4. Pt to tolerate HEP to improve ROM and independence with ADL's      Plan     Plan of care Certification: 9/29/2022 to 12/8/2022.    Progress closed chain quad/glute strengthening     Fabricio Tolentino, PT, DPT

## 2023-02-17 ENCOUNTER — CLINICAL SUPPORT (OUTPATIENT)
Dept: REHABILITATION | Facility: HOSPITAL | Age: 68
End: 2023-02-17
Payer: COMMERCIAL

## 2023-02-17 DIAGNOSIS — G89.29 CHRONIC RIGHT SHOULDER PAIN: Primary | ICD-10-CM

## 2023-02-17 DIAGNOSIS — M25.511 CHRONIC RIGHT SHOULDER PAIN: Primary | ICD-10-CM

## 2023-02-17 DIAGNOSIS — R29.898 DECREASED STRENGTH OF UPPER EXTREMITY: ICD-10-CM

## 2023-02-17 DIAGNOSIS — M25.562 ACUTE PAIN OF LEFT KNEE: ICD-10-CM

## 2023-02-17 PROCEDURE — 97110 THERAPEUTIC EXERCISES: CPT | Performed by: PHYSICAL THERAPIST

## 2023-02-17 PROCEDURE — 97112 NEUROMUSCULAR REEDUCATION: CPT | Performed by: PHYSICAL THERAPIST

## 2023-02-17 NOTE — PROGRESS NOTES
"Physical Therapy Daily Treatment Note     Name: Meryl Yo [facility] Number: [id number]    Therapy Diagnosis:   Encounter Diagnoses   Name Primary?    Chronic right shoulder pain Yes    Decreased strength of upper extremity     Acute pain of left knee      Physician: Russell Honeycutt III, *    Visit Date: 2/17/2023  Physician Orders: PT Eval and Treat   Medical Diagnosis from Referral:   Diagnosis   M25.511,G89.29 (ICD-10-CM) - Chronic right shoulder pain   M75.01 (ICD-10-CM) - Adhesive capsulitis of right shoulder    S83.242A (ICD-10-CM) - Acute medial meniscus tear of left knee, initial encounter  Evaluation Date: 9/29/2022 shoulder, 107/2022 knee  Authorization Period Expiration: 12/31/2023  Plan of Care Expiration: 4/1/2023  Visit # / Visits authorized: 8/20    Total visits: 35    Time In:  700  Time Out: 0758  Total Billable Time: 58 minutes    Precautions: Standard and Weightbearing    FOTO 42%, down from 51%    Subjective     Pt reports: I feel good in the mornings, just get stiff end of the day    She was compliant with home exercise program.  Response to previous treatment: Improved patellar mobility and scar mob  Functional change: WBAT    Pain: 4/10  Location: left knee, right shoulder    Objective     Knee Passive Range of Motion:   Right  Left    Flexion 110 110   Extension +3 hyper 0       Meryl received therapeutic exercises to develop strength, endurance, ROM, and flexibility for 48 minutes including:    LAQ 7.5# 30x  Lateral walk YTB 20x  Sit to stands lowest mat 30x  Recumbent bike 8' for knee motion and cardio endurance training  Patient education    NT  Step up 6" alt LE 15x each  Lateral step ups 6" 15x  SLR upright 3 x 10 B   HS curl machine 15# 3 x 15  LAQ on hammer 2.5# L/5# R 3 x 15  DL heel raises 30x  Bridges GTB above knee 30x   Sidelying hip abd 3 x 10  Standing heel raise 30x    Meryl received the following manual therapy techniques: Joint mobilizations and Soft tissue Mobilization " were applied to the: L knee for 0 minutes, including:    Knee assessment  Passive flexion EOT   Patellar mobilizations all planes  STM medial HS     Not Performed Today:  Medial gapping Gr III    Meryl participated in neuromuscular re-education activities to improve: Balance, Coordination, Sense, Proprioception, and Posture for 10 minutes. The following activities were included:    Shuttle DL 3 bands 4 x 10  Shuttle SL 2 bands 4 x 10    Meryl received cold pack for 10 minutes to to decrease circulation, pain, and swelling.      Home Exercises Provided and Patient Education Provided     Education provided:   - Stair training     Written Home Exercises Provided: Patient instructed to cont prior HEP.  Exercises were reviewed and Meryl was able to demonstrate them prior to the end of the session.  Meryl demonstrated good  understanding of the education provided.     See EMR under Patient Instructions for exercises provided 10/6/2022.    Assessment     Meryl did well with shuttle and bike, doing better with cardio on bike. Needs to continue progressing strength in the RLE as pain increased with walking recently on non op side    Meryl Is progressing well towards her goals.   Pt prognosis is Excellent.     Pt will continue to benefit from skilled outpatient physical therapy to address the deficits listed in the problem list box on initial evaluation, provide pt/family education and to maximize pt's level of independence in the home and community environment.     Pt's spiritual, cultural and educational needs considered and pt agreeable to plan of care and goals.    Anticipated barriers to physical therapy: work schedule    GOALS:     Short Term Goals:  4 weeks weeks(Progressing, not met)  1.Report decreased shoulder pain < / =  5/10 at worst to increase tolerance for work   2. Increase PROM full pain free   3. Increased strength by 1/3 MMT grade in shoulder strength to increase tolerance for ADL and work  activities.  4. Pt to tolerate HEP to improve ROM and independence with ADL's     Long Term Goals: 8 weeks(Progressing, not met)    1.Report decreased shoulder pain  < / =  2/10 at worst  to increase tolerance for work  2.Increase AROM to full pain free  3.Increase strength to >/= 4/5 in shoulder strength to increase tolerance for ADL and work activities.  4. Pt goal: Return to pain free activities of daily living and work related activities.  5. Pt will have improved FOTO score of </= 35% for functional improvements in activities of daily living.    New Knee Goals: 6 weeks (Progressing, not met)    1.Report decreased knee pain  < / =  2/10  to increase tolerance for return to ambulation without RW  2. Increase knee ROM to full motion in order to be able to perform ADLs without difficulty.  3. Increase strength by 1/3 MMT grade in quad and glutes  to increase tolerance for ADL and work activities.  4. Pt to tolerate HEP to improve ROM and independence with ADL's      Plan     Plan of care Certification: 9/29/2022 to 12/8/2022.    Progress closed chain quad/glute strengthening     Fabricio Tolentino, PT, DPT

## 2023-02-22 ENCOUNTER — CLINICAL SUPPORT (OUTPATIENT)
Dept: REHABILITATION | Facility: HOSPITAL | Age: 68
End: 2023-02-22
Payer: COMMERCIAL

## 2023-02-22 DIAGNOSIS — G89.29 CHRONIC RIGHT SHOULDER PAIN: Primary | ICD-10-CM

## 2023-02-22 DIAGNOSIS — R29.898 DECREASED STRENGTH OF UPPER EXTREMITY: ICD-10-CM

## 2023-02-22 DIAGNOSIS — M25.562 ACUTE PAIN OF LEFT KNEE: ICD-10-CM

## 2023-02-22 DIAGNOSIS — M25.511 CHRONIC RIGHT SHOULDER PAIN: Primary | ICD-10-CM

## 2023-02-22 PROCEDURE — 97110 THERAPEUTIC EXERCISES: CPT | Performed by: PHYSICAL THERAPIST

## 2023-02-22 NOTE — PROGRESS NOTES
"Physical Therapy Daily Treatment Note     Name: Meryl Yo [facility] Number: [id number]    Therapy Diagnosis:   Encounter Diagnoses   Name Primary?    Chronic right shoulder pain Yes    Decreased strength of upper extremity     Acute pain of left knee      Physician: Russell Honeycutt III, *    Visit Date: 2/22/2023  Physician Orders: PT Eval and Treat   Medical Diagnosis from Referral:   Diagnosis   M25.511,G89.29 (ICD-10-CM) - Chronic right shoulder pain   M75.01 (ICD-10-CM) - Adhesive capsulitis of right shoulder    S83.242A (ICD-10-CM) - Acute medial meniscus tear of left knee, initial encounter  Evaluation Date: 9/29/2022 shoulder, 107/2022 knee  Authorization Period Expiration: 12/31/2023  Plan of Care Expiration: 4/1/2023  Visit # / Visits authorized: 9/20    Total visits: 35    Time In:  700  Time Out: 0754  Total Billable Time: 54 minutes    Precautions: Standard and Weightbearing    FOTO 42%, down from 51%    Subjective     Pt reports: I am having more trouble with the right knee, want the injections in it again    She was compliant with home exercise program.  Response to previous treatment: Improved patellar mobility and scar mob  Functional change: WBAT    Pain: 4/10  Location: left knee, right shoulder    Objective     Knee Passive Range of Motion:   Right  Left    Flexion 110 110   Extension +3 hyper 0       Meryl received therapeutic exercises to develop strength, endurance, ROM, and flexibility for 54 minutes including:    LAQ 7.5# 30x  Recumbent bike 8' for knee motion and cardio endurance training  Lateral walk GTB 20x  HS curl seated BTB 2 x 15  Step up 6" alt LE 15x each  Patient education    NT  Sit to stands lowest mat 30x  Lateral step ups 6" 15x  SLR upright 3 x 10 B   HS curl machine 15# 3 x 15  LAQ on hammer 2.5# L/5# R 3 x 15  DL heel raises 30x  Bridges GTB above knee 30x   Sidelying hip abd 3 x 10  Standing heel raise 30x    Meryl received the following manual therapy techniques: " Joint mobilizations and Soft tissue Mobilization were applied to the: L knee for 0 minutes, including:    Knee assessment  Passive flexion EOT   Patellar mobilizations all planes  STM medial HS     Not Performed Today:  Medial gapping Gr III    Meryl participated in neuromuscular re-education activities to improve: Balance, Coordination, Sense, Proprioception, and Posture for 0 minutes. The following activities were included:    Shuttle DL 3 bands 4 x 10  Shuttle SL 2 bands 4 x 10    Meryl received cold pack for 10 minutes to to decrease circulation, pain, and swelling.      Home Exercises Provided and Patient Education Provided     Education provided:   - Stair training     Written Home Exercises Provided: Patient instructed to cont prior HEP.  Exercises were reviewed and Meryl was able to demonstrate them prior to the end of the session.  Meryl demonstrated good  understanding of the education provided.     See EMR under Patient Instructions for exercises provided 10/6/2022.    Assessment     Meryl presented with R>L pain today. Progressed with stair climbing but rest break needed detention. Increased HS activation with BTB curls seated edge of table.     Meryl Is progressing well towards her goals.   Pt prognosis is Excellent.     Pt will continue to benefit from skilled outpatient physical therapy to address the deficits listed in the problem list box on initial evaluation, provide pt/family education and to maximize pt's level of independence in the home and community environment.     Pt's spiritual, cultural and educational needs considered and pt agreeable to plan of care and goals.    Anticipated barriers to physical therapy: work schedule    GOALS:     Short Term Goals:  4 weeks weeks(Progressing, not met)  1.Report decreased shoulder pain < / =  5/10 at worst to increase tolerance for work   2. Increase PROM full pain free   3. Increased strength by 1/3 MMT grade in shoulder strength to increase  tolerance for ADL and work activities.  4. Pt to tolerate HEP to improve ROM and independence with ADL's     Long Term Goals: 8 weeks(Progressing, not met)    1.Report decreased shoulder pain  < / =  2/10 at worst  to increase tolerance for work  2.Increase AROM to full pain free  3.Increase strength to >/= 4/5 in shoulder strength to increase tolerance for ADL and work activities.  4. Pt goal: Return to pain free activities of daily living and work related activities.  5. Pt will have improved FOTO score of </= 35% for functional improvements in activities of daily living.    New Knee Goals: 6 weeks (Progressing, not met)    1.Report decreased knee pain  < / =  2/10  to increase tolerance for return to ambulation without RW  2. Increase knee ROM to full motion in order to be able to perform ADLs without difficulty.  3. Increase strength by 1/3 MMT grade in quad and glutes  to increase tolerance for ADL and work activities.  4. Pt to tolerate HEP to improve ROM and independence with ADL's      Plan     Plan of care Certification: 9/29/2022 to 12/8/2022.    Progress closed chain quad/glute strengthening     Fabricio Tolentino, PT, DPT

## 2023-02-27 ENCOUNTER — CLINICAL SUPPORT (OUTPATIENT)
Dept: REHABILITATION | Facility: HOSPITAL | Age: 68
End: 2023-02-27
Payer: COMMERCIAL

## 2023-02-27 DIAGNOSIS — M25.511 CHRONIC RIGHT SHOULDER PAIN: Primary | ICD-10-CM

## 2023-02-27 DIAGNOSIS — M25.562 ACUTE PAIN OF LEFT KNEE: ICD-10-CM

## 2023-02-27 DIAGNOSIS — R29.898 DECREASED STRENGTH OF UPPER EXTREMITY: ICD-10-CM

## 2023-02-27 DIAGNOSIS — G89.29 CHRONIC RIGHT SHOULDER PAIN: Primary | ICD-10-CM

## 2023-02-27 PROCEDURE — 97110 THERAPEUTIC EXERCISES: CPT | Performed by: PHYSICAL THERAPIST

## 2023-02-27 NOTE — PROGRESS NOTES
"Physical Therapy Daily Treatment Note     Name: Meryl Yo [facility] Number: [id number]    Therapy Diagnosis:   Encounter Diagnoses   Name Primary?    Chronic right shoulder pain Yes    Decreased strength of upper extremity     Acute pain of left knee      Physician: Russell Honeycutt III, *    Visit Date: 2/27/2023  Physician Orders: PT Eval and Treat   Medical Diagnosis from Referral:   Diagnosis   M25.511,G89.29 (ICD-10-CM) - Chronic right shoulder pain   M75.01 (ICD-10-CM) - Adhesive capsulitis of right shoulder    S83.242A (ICD-10-CM) - Acute medial meniscus tear of left knee, initial encounter  Evaluation Date: 9/29/2022 shoulder, 107/2022 knee  Authorization Period Expiration: 12/31/2023  Plan of Care Expiration: 4/1/2023  Visit # / Visits authorized: 10/20    Total visits: 38    Time In:  700  Time Out: 0745  Total Billable Time: 30 minutes    Precautions: Standard and Weightbearing    FOTO 42%, down from 51%    Subjective     Pt reports: The knees are doing okay, nothing new to report. Just kind of used to it now    She was compliant with home exercise program.  Response to previous treatment: Improved patellar mobility and scar mob  Functional change: WBAT    Pain: 4/10  Location: left knee, right shoulder    Objective     Knee Passive Range of Motion:   Right  Left    Flexion 110 110   Extension +3 hyper 0       Meryl received therapeutic exercises to develop strength, endurance, ROM, and flexibility for 45 minutes including:    LAQ 7.5# 30x  Recumbent bike 8' for knee motion and cardio endurance training  Lateral walk GTB 20x  HS curl seated BTB 2 x 15  Step up 6" alt LE 15x each  Patient education    NT  Sit to stands lowest mat 30x  Lateral step ups 6" 15x  SLR upright 3 x 10 B   HS curl machine 15# 3 x 15  LAQ on hammer 2.5# L/5# R 3 x 15  DL heel raises 30x  Bridges GTB above knee 30x   Sidelying hip abd 3 x 10  Standing heel raise 30x    Meryl received the following manual therapy techniques: " Joint mobilizations and Soft tissue Mobilization were applied to the: L knee for 0 minutes, including:    Knee assessment  Passive flexion EOT   Patellar mobilizations all planes  STM medial HS     Not Performed Today:  Medial gapping Gr III    Meryl participated in neuromuscular re-education activities to improve: Balance, Coordination, Sense, Proprioception, and Posture for 0 minutes. The following activities were included:    Shuttle DL 3 bands 4 x 10  Shuttle SL 2 bands 4 x 10    Meryl received cold pack for 10 minutes to to decrease circulation, pain, and swelling.      Home Exercises Provided and Patient Education Provided     Education provided:   - Stair training     Written Home Exercises Provided: Patient instructed to cont prior HEP.  Exercises were reviewed and Meryl was able to demonstrate them prior to the end of the session.  Meryl demonstrated good  understanding of the education provided.     See EMR under Patient Instructions for exercises provided 10/6/2022.    Assessment     Meryl progressed with bike level 3 today no increased symptoms. Improved tolerance to BTB HS curls just fatigues on the last two reps     Meryl Is progressing well towards her goals.   Pt prognosis is Excellent.     Pt will continue to benefit from skilled outpatient physical therapy to address the deficits listed in the problem list box on initial evaluation, provide pt/family education and to maximize pt's level of independence in the home and community environment.     Pt's spiritual, cultural and educational needs considered and pt agreeable to plan of care and goals.    Anticipated barriers to physical therapy: work schedule    GOALS:     Short Term Goals:  4 weeks weeks(Progressing, not met)  1.Report decreased shoulder pain < / =  5/10 at worst to increase tolerance for work   2. Increase PROM full pain free   3. Increased strength by 1/3 MMT grade in shoulder strength to increase tolerance for ADL and work  activities.  4. Pt to tolerate HEP to improve ROM and independence with ADL's     Long Term Goals: 8 weeks(Progressing, not met)    1.Report decreased shoulder pain  < / =  2/10 at worst  to increase tolerance for work  2.Increase AROM to full pain free  3.Increase strength to >/= 4/5 in shoulder strength to increase tolerance for ADL and work activities.  4. Pt goal: Return to pain free activities of daily living and work related activities.  5. Pt will have improved FOTO score of </= 35% for functional improvements in activities of daily living.    New Knee Goals: 6 weeks (Progressing, not met)    1.Report decreased knee pain  < / =  2/10  to increase tolerance for return to ambulation without RW  2. Increase knee ROM to full motion in order to be able to perform ADLs without difficulty.  3. Increase strength by 1/3 MMT grade in quad and glutes  to increase tolerance for ADL and work activities.  4. Pt to tolerate HEP to improve ROM and independence with ADL's      Plan     Plan of care Certification: 9/29/2022 to 12/8/2022.    Progress closed chain quad/glute strengthening     Fabricio Tolentino, PT, DPT

## 2023-03-03 ENCOUNTER — CLINICAL SUPPORT (OUTPATIENT)
Dept: REHABILITATION | Facility: HOSPITAL | Age: 68
End: 2023-03-03
Payer: COMMERCIAL

## 2023-03-03 DIAGNOSIS — M25.511 CHRONIC RIGHT SHOULDER PAIN: Primary | ICD-10-CM

## 2023-03-03 DIAGNOSIS — M25.562 ACUTE PAIN OF LEFT KNEE: ICD-10-CM

## 2023-03-03 DIAGNOSIS — G89.29 CHRONIC RIGHT SHOULDER PAIN: Primary | ICD-10-CM

## 2023-03-03 DIAGNOSIS — R29.898 DECREASED STRENGTH OF UPPER EXTREMITY: ICD-10-CM

## 2023-03-03 PROCEDURE — 97112 NEUROMUSCULAR REEDUCATION: CPT | Performed by: PHYSICAL THERAPIST

## 2023-03-03 PROCEDURE — 97110 THERAPEUTIC EXERCISES: CPT | Performed by: PHYSICAL THERAPIST

## 2023-03-03 NOTE — PROGRESS NOTES
"Physical Therapy Daily Treatment Note     Name: Meryl Yo [facility] Number: [id number]    Therapy Diagnosis:   Encounter Diagnoses   Name Primary?    Chronic right shoulder pain Yes    Decreased strength of upper extremity     Acute pain of left knee      Physician: Russell Honeycutt III, *    Visit Date: 3/3/2023  Physician Orders: PT Eval and Treat   Medical Diagnosis from Referral:   Diagnosis   M25.511,G89.29 (ICD-10-CM) - Chronic right shoulder pain   M75.01 (ICD-10-CM) - Adhesive capsulitis of right shoulder    S83.242A (ICD-10-CM) - Acute medial meniscus tear of left knee, initial encounter  Evaluation Date: 9/29/2022 shoulder, 107/2022 knee  Authorization Period Expiration: 12/31/2023  Plan of Care Expiration: 4/1/2023  Visit # / Visits authorized: 11/20    Total visits: 39    Time In:  700  Time Out: 0750  Total Billable Time: 50 minutes    Precautions: Standard and Weightbearing    FOTO 42%, down from 51%    Subjective     Pt reports: Tightness in my L hamstring. Right knee is a little swollen today    She was compliant with home exercise program.  Response to previous treatment: Improved patellar mobility and scar mob  Functional change: WBAT    Pain: 4/10  Location: left knee, right shoulder    Objective     Knee Passive Range of Motion:   Right  Left    Flexion 110 110   Extension +3 hyper 0       Meryl received therapeutic exercises to develop strength, endurance, ROM, and flexibility for 30 minutes including:    HSS 30" 5x on L   Recumbent bike 8' level 3 for knee motion and cardio endurance training  Heel raise 30x   Patient education    NT  LAQ 7.5# 30x  Lateral walk GTB 20x  HS curl seated BTB 2 x 15  Step up 6" alt LE 15x each  Sit to stands lowest mat 30x  Lateral step ups 6" 15x  SLR upright 3 x 10 B   HS curl machine 15# 3 x 15  LAQ on hammer 2.5# L/5# R 3 x 15  DL heel raises 30x  Bridges GTB above knee 30x   Sidelying hip abd 3 x 10  Standing heel raise 30x    Meryl received the following " manual therapy techniques: Joint mobilizations and Soft tissue Mobilization were applied to the: L knee for 5 minutes, including:    Knee assessment  Passive flexion EOT   Patellar mobilizations all planes  STM medial HS     Not Performed Today:  Medial gapping Gr III    Meryl participated in neuromuscular re-education activities to improve: Balance, Coordination, Sense, Proprioception, and Posture for 15 minutes. The following activities were included:    Shuttle DL 3 bands 4 x 10  Shuttle SL 2 bands 4 x 10    Meryl received cold pack for 10 minutes to to decrease circulation, pain, and swelling.      Home Exercises Provided and Patient Education Provided     Education provided:   - Stair training     Written Home Exercises Provided: Patient instructed to cont prior HEP.  Exercises were reviewed and Meryl was able to demonstrate them prior to the end of the session.  Meryl demonstrated good  understanding of the education provided.     See EMR under Patient Instructions for exercises provided 10/6/2022.    Assessment     Meryl progressed with HS loading using the strap in long sitting. Shuttle with improved range of motion. Notes less cramping in legs but gastroc/soleus fatigue with heel raises    Meryl Is progressing well towards her goals.   Pt prognosis is Excellent.     Pt will continue to benefit from skilled outpatient physical therapy to address the deficits listed in the problem list box on initial evaluation, provide pt/family education and to maximize pt's level of independence in the home and community environment.     Pt's spiritual, cultural and educational needs considered and pt agreeable to plan of care and goals.    Anticipated barriers to physical therapy: work schedule    GOALS:     Short Term Goals:  4 weeks weeks(Progressing, not met)  1.Report decreased shoulder pain < / =  5/10 at worst to increase tolerance for work   2. Increase PROM full pain free   3. Increased strength by 1/3 MMT  grade in shoulder strength to increase tolerance for ADL and work activities.  4. Pt to tolerate HEP to improve ROM and independence with ADL's     Long Term Goals: 8 weeks(Progressing, not met)    1.Report decreased shoulder pain  < / =  2/10 at worst  to increase tolerance for work  2.Increase AROM to full pain free  3.Increase strength to >/= 4/5 in shoulder strength to increase tolerance for ADL and work activities.  4. Pt goal: Return to pain free activities of daily living and work related activities.  5. Pt will have improved FOTO score of </= 35% for functional improvements in activities of daily living.    New Knee Goals: 6 weeks (Progressing, not met)    1.Report decreased knee pain  < / =  2/10  to increase tolerance for return to ambulation without RW  2. Increase knee ROM to full motion in order to be able to perform ADLs without difficulty.  3. Increase strength by 1/3 MMT grade in quad and glutes  to increase tolerance for ADL and work activities.  4. Pt to tolerate HEP to improve ROM and independence with ADL's      Plan     Plan of care Certification: 9/29/2022 to 12/8/2022.    Progress closed chain quad/glute strengthening     Fabricio Tolentino, PT, DPT

## 2023-03-06 ENCOUNTER — PATIENT MESSAGE (OUTPATIENT)
Dept: INTERNAL MEDICINE | Facility: CLINIC | Age: 68
End: 2023-03-06
Payer: COMMERCIAL

## 2023-03-06 ENCOUNTER — CLINICAL SUPPORT (OUTPATIENT)
Dept: REHABILITATION | Facility: HOSPITAL | Age: 68
End: 2023-03-06
Payer: COMMERCIAL

## 2023-03-06 DIAGNOSIS — G89.29 CHRONIC RIGHT SHOULDER PAIN: Primary | ICD-10-CM

## 2023-03-06 DIAGNOSIS — M25.562 ACUTE PAIN OF LEFT KNEE: ICD-10-CM

## 2023-03-06 DIAGNOSIS — R29.898 DECREASED STRENGTH OF UPPER EXTREMITY: ICD-10-CM

## 2023-03-06 DIAGNOSIS — M25.511 CHRONIC RIGHT SHOULDER PAIN: Primary | ICD-10-CM

## 2023-03-06 PROCEDURE — 97112 NEUROMUSCULAR REEDUCATION: CPT | Performed by: PHYSICAL THERAPIST

## 2023-03-06 PROCEDURE — 97110 THERAPEUTIC EXERCISES: CPT | Performed by: PHYSICAL THERAPIST

## 2023-03-06 NOTE — PROGRESS NOTES
"Physical Therapy Daily Treatment Note     Name: Meryl Yo [facility] Number: [id number]    Therapy Diagnosis:   Encounter Diagnoses   Name Primary?    Chronic right shoulder pain Yes    Decreased strength of upper extremity     Acute pain of left knee      Physician: Russell Honeycutt III, *    Visit Date: 3/6/2023  Physician Orders: PT Eval and Treat   Medical Diagnosis from Referral:   Diagnosis   M25.511,G89.29 (ICD-10-CM) - Chronic right shoulder pain   M75.01 (ICD-10-CM) - Adhesive capsulitis of right shoulder    S83.242A (ICD-10-CM) - Acute medial meniscus tear of left knee, initial encounter  Evaluation Date: 9/29/2022 shoulder, 107/2022 knee  Authorization Period Expiration: 12/31/2023  Plan of Care Expiration: 4/1/2023  Visit # / Visits authorized: 12/20    Total visits: 40    Time In:  700  Time Out: 0746  Total Billable Time: 46 minutes    Precautions: Standard and Weightbearing    FOTO 42%, down from 51%    Subjective     Pt reports: I was sore on Friday, but this weekend my knee felt great     She was compliant with home exercise program.  Response to previous treatment: Improved patellar mobility and scar mob  Functional change: WBAT    Pain: 4/10  Location: left knee, right shoulder    Objective     Knee Passive Range of Motion:   Right  Left    Flexion 110 110   Extension +3 hyper 0       Meryl received therapeutic exercises to develop strength, endurance, ROM, and flexibility for 17 minutes including:    Recumbent bike 10' level 3 for knee motion and cardio endurance training  HS curls behind table 3# 30x   Patient education    NT  LAQ 7.5# 30x  Lateral walk GTB 20x  HS curl seated BTB 2 x 15  Step up 6" alt LE 15x each  Sit to stands lowest mat 30x  Lateral step ups 6" 15x  SLR upright 3 x 10 B   HS curl machine 15# 3 x 15  LAQ on hammer 2.5# L/5# R 3 x 15  DL heel raises 30x  Bridges GTB above knee 30x   Sidelying hip abd 3 x 10  Standing heel raise 30x    Meryl received the following manual " therapy techniques: Joint mobilizations and Soft tissue Mobilization were applied to the: L knee for 5 minutes, including:    Knee assessment  Passive flexion EOT   Patellar mobilizations all planes  STM medial HS     Not Performed Today:  Medial gapping Gr III    Meryl participated in neuromuscular re-education activities to improve: Balance, Coordination, Sense, Proprioception, and Posture for 24 minutes. The following activities were included:    Shuttle DL 3 bands 4 x 10  Shuttle SL 2 bands 4 x 10  Shuttle heel raises 2 bands 4 x 10    Meryl received cold pack for 10 minutes to to decrease circulation, pain, and swelling.      Home Exercises Provided and Patient Education Provided     Education provided:   - Stair training     Written Home Exercises Provided: Patient instructed to cont prior HEP.  Exercises were reviewed and Meryl was able to demonstrate them prior to the end of the session.  Meryl demonstrated good  understanding of the education provided.     See EMR under Patient Instructions for exercises provided 10/6/2022.    Assessment     Meryl got relief from shuttle and bike last session, improved endurance in the clinic with exercise. Still medial HS tightness, improved following standing HS curls     Meryl Is progressing well towards her goals.   Pt prognosis is Excellent.     Pt will continue to benefit from skilled outpatient physical therapy to address the deficits listed in the problem list box on initial evaluation, provide pt/family education and to maximize pt's level of independence in the home and community environment.     Pt's spiritual, cultural and educational needs considered and pt agreeable to plan of care and goals.    Anticipated barriers to physical therapy: work schedule    GOALS:     Short Term Goals:  4 weeks weeks(Progressing, not met)  1.Report decreased shoulder pain < / =  5/10 at worst to increase tolerance for work   2. Increase PROM full pain free   3. Increased  strength by 1/3 MMT grade in shoulder strength to increase tolerance for ADL and work activities.  4. Pt to tolerate HEP to improve ROM and independence with ADL's     Long Term Goals: 8 weeks(Progressing, not met)    1.Report decreased shoulder pain  < / =  2/10 at worst  to increase tolerance for work  2.Increase AROM to full pain free  3.Increase strength to >/= 4/5 in shoulder strength to increase tolerance for ADL and work activities.  4. Pt goal: Return to pain free activities of daily living and work related activities.  5. Pt will have improved FOTO score of </= 35% for functional improvements in activities of daily living.    New Knee Goals: 6 weeks (Progressing, not met)    1.Report decreased knee pain  < / =  2/10  to increase tolerance for return to ambulation without RW  2. Increase knee ROM to full motion in order to be able to perform ADLs without difficulty.  3. Increase strength by 1/3 MMT grade in quad and glutes  to increase tolerance for ADL and work activities.  4. Pt to tolerate HEP to improve ROM and independence with ADL's      Plan     Plan of care Certification: 9/29/2022 to 12/8/2022.    Progress closed chain quad/glute strengthening     Fabricio Tolentino, PT, DPT

## 2023-03-10 DIAGNOSIS — E11.40 TYPE 2 DIABETES MELLITUS WITH DIABETIC NEUROPATHY, WITHOUT LONG-TERM CURRENT USE OF INSULIN: Primary | ICD-10-CM

## 2023-03-10 NOTE — TELEPHONE ENCOUNTER
Refill Routing Note   Medication(s) are not appropriate for processing by [facility] for the following reason(s):       Required labs abnormal    [facility] action(s):  Defer   Requires labs : Yes         Appointments  past 12m or future 3m with PCP    Date Provider   Last Visit   9/29/2022 Russell Martinez MD   Next Visit   Visit date not found Russell Martinez MD   ED visits in past 90 days: 0        Note composed:2:29 PM 03/10/2023

## 2023-03-10 NOTE — TELEPHONE ENCOUNTER
Care Due:                  Date            Visit Type   Department     Provider  --------------------------------------------------------------------------------                                EP -                              PRIMARY      [facility] PRIMARY  Last Visit: 09-      CARE ([facility])   VARUN Martinez  Next Visit: None Scheduled  None         None Found                                                            Last  Test          Frequency    Reason                     Performed    Due Date  --------------------------------------------------------------------------------    CMP.........  12 months..  rosuvastatin.............  05- 05-    HBA1C.......  6 months...  empagliflozin, metFORMIN.  09- 03-    Lipid Panel.  12 months..  rosuvastatin.............  05- 05-    [facility] Embedded Care Gaps. Reference number: [id number]. 3/10/2023   10:17:11 AM CST

## 2023-03-10 NOTE — TELEPHONE ENCOUNTER
Refill Routing Note   Medication(s) are not appropriate for processing by [facility] for the following reason(s):         No active prescription written by PCP: rx  22  Required labs abnormal    [facility] action(s):  Defer   Requires labs: Yes (CMP, A1C, lipid panel)        Appointments  past 12m or future 3m with PCP    Date Provider   Last Visit   2022 Russell Martinez MD   Next Visit   Visit date not found Russell Martinez MD   ED visits in past 90 days: 0        Note composed:2:32 PM 03/10/2023

## 2023-03-16 DIAGNOSIS — Z12.31 OTHER SCREENING MAMMOGRAM: ICD-10-CM

## 2023-03-21 ENCOUNTER — CLINICAL SUPPORT (OUTPATIENT)
Dept: REHABILITATION | Facility: HOSPITAL | Age: 68
End: 2023-03-21
Payer: COMMERCIAL

## 2023-03-21 DIAGNOSIS — R29.898 DECREASED STRENGTH OF UPPER EXTREMITY: ICD-10-CM

## 2023-03-21 DIAGNOSIS — M25.511 CHRONIC RIGHT SHOULDER PAIN: Primary | ICD-10-CM

## 2023-03-21 DIAGNOSIS — M25.562 ACUTE PAIN OF LEFT KNEE: ICD-10-CM

## 2023-03-21 DIAGNOSIS — G89.29 CHRONIC RIGHT SHOULDER PAIN: Primary | ICD-10-CM

## 2023-03-21 PROCEDURE — 97112 NEUROMUSCULAR REEDUCATION: CPT | Performed by: PHYSICAL THERAPIST

## 2023-03-21 PROCEDURE — 97110 THERAPEUTIC EXERCISES: CPT | Performed by: PHYSICAL THERAPIST

## 2023-03-21 NOTE — PROGRESS NOTES
"Physical Therapy Daily Treatment Note     Name: Meryl Yo [facility] Number: [id number]    Therapy Diagnosis:   Encounter Diagnoses   Name Primary?    Chronic right shoulder pain Yes    Decreased strength of upper extremity     Acute pain of left knee      Physician: Russell Honeycutt III, *    Visit Date: 3/21/2023  Physician Orders: PT Eval and Treat   Medical Diagnosis from Referral:   Diagnosis   M25.511,G89.29 (ICD-10-CM) - Chronic right shoulder pain   M75.01 (ICD-10-CM) - Adhesive capsulitis of right shoulder    S83.242A (ICD-10-CM) - Acute medial meniscus tear of left knee, initial encounter  Evaluation Date: 9/29/2022 shoulder, 107/2022 knee  Authorization Period Expiration: 12/31/2023  Plan of Care Expiration: 4/1/2023  Visit # / Visits authorized: 13/20    Total visits: 40    Time In:  1300  Time Out: 1357  Total Billable Time: 57 minutes    Precautions: Standard and Weightbearing    FOTO 42%, down from 51%    Subjective     Pt reports: I can alternate small steps now. Went to two games. My knees have been doing really well    She was compliant with home exercise program.  Response to previous treatment: Improved patellar mobility and scar mob  Functional change: WBAT    Pain: 4/10  Location: left knee, right shoulder    Objective     Knee Passive Range of Motion:   Right  Left    Flexion 110 110   Extension +3 hyper 0       Meryl received therapeutic exercises to develop strength, endurance, ROM, and flexibility for 28 minutes including:    Recumbent bike 10' level 3 for knee motion and cardio endurance training  Step up 6" alt LE 15x each  Standing hip abduction 2 x 15  Patient education    NT  LAQ 7.5# 30x  Lateral walk GTB 20x  HS curl seated BTB 2 x 15  Sit to stands lowest mat 30x  Lateral step ups 6" 15x  SLR upright 3 x 10 B   HS curl machine 15# 3 x 15  LAQ on hammer 2.5# L/5# R 3 x 15  DL heel raises 30x  Bridges GTB above knee 30x   Sidelying hip abd 3 x 10  Standing heel raise 30x    Meryl " received the following manual therapy techniques: Joint mobilizations and Soft tissue Mobilization were applied to the: L knee for 5 minutes, including:    Knee assessment  Passive flexion EOT   Patellar mobilizations all planes  STM medial HS     Not Performed Today:  Medial gapping Gr III    Meryl participated in neuromuscular re-education activities to improve: Balance, Coordination, Sense, Proprioception, and Posture for 24 minutes. The following activities were included:    Shuttle DL 3 bands 4 x 10  Shuttle SL 2 bands 4 x 10  Shuttle heel raises 2 bands 4 x 10    Meryl received cold pack for 10 minutes to to decrease circulation, pain, and swelling.      Home Exercises Provided and Patient Education Provided     Education provided:   - Stair training     Written Home Exercises Provided: Patient instructed to cont prior HEP.  Exercises were reviewed and Meryl was able to demonstrate them prior to the end of the session.  Meryl demonstrated good  understanding of the education provided.     See EMR under Patient Instructions for exercises provided 10/6/2022.    Assessment     Meryl had less pain and tightness to the hamstring. Progressing strength with step ups and standing closed chain glute med strengthening. Will continue functional strengthening     Meryl Is progressing well towards her goals.   Pt prognosis is Excellent.     Pt will continue to benefit from skilled outpatient physical therapy to address the deficits listed in the problem list box on initial evaluation, provide pt/family education and to maximize pt's level of independence in the home and community environment.     Pt's spiritual, cultural and educational needs considered and pt agreeable to plan of care and goals.    Anticipated barriers to physical therapy: work schedule    GOALS:     Short Term Goals:  4 weeks weeks(Progressing, not met)  1.Report decreased shoulder pain < / =  5/10 at worst to increase tolerance for work   2.  Increase PROM full pain free   3. Increased strength by 1/3 MMT grade in shoulder strength to increase tolerance for ADL and work activities.  4. Pt to tolerate HEP to improve ROM and independence with ADL's     Long Term Goals: 8 weeks(Progressing, not met)    1.Report decreased shoulder pain  < / =  2/10 at worst  to increase tolerance for work  2.Increase AROM to full pain free  3.Increase strength to >/= 4/5 in shoulder strength to increase tolerance for ADL and work activities.  4. Pt goal: Return to pain free activities of daily living and work related activities.  5. Pt will have improved FOTO score of </= 35% for functional improvements in activities of daily living.    New Knee Goals: 6 weeks (Progressing, not met)    1.Report decreased knee pain  < / =  2/10  to increase tolerance for return to ambulation without RW  2. Increase knee ROM to full motion in order to be able to perform ADLs without difficulty.  3. Increase strength by 1/3 MMT grade in quad and glutes  to increase tolerance for ADL and work activities.  4. Pt to tolerate HEP to improve ROM and independence with ADL's      Plan     Plan of care Certification: 9/29/2022 to 12/8/2022.    Progress closed chain quad/glute strengthening     Fabricio Tolentino, PT, DPT

## 2023-03-22 ENCOUNTER — PATIENT MESSAGE (OUTPATIENT)
Dept: ADMINISTRATIVE | Facility: HOSPITAL | Age: 68
End: 2023-03-22
Payer: COMMERCIAL

## 2023-03-23 ENCOUNTER — CLINICAL SUPPORT (OUTPATIENT)
Dept: REHABILITATION | Facility: HOSPITAL | Age: 68
End: 2023-03-23
Payer: COMMERCIAL

## 2023-03-23 DIAGNOSIS — R29.898 DECREASED STRENGTH OF UPPER EXTREMITY: ICD-10-CM

## 2023-03-23 DIAGNOSIS — M25.562 ACUTE PAIN OF LEFT KNEE: ICD-10-CM

## 2023-03-23 DIAGNOSIS — M25.511 CHRONIC RIGHT SHOULDER PAIN: Primary | ICD-10-CM

## 2023-03-23 DIAGNOSIS — G89.29 CHRONIC RIGHT SHOULDER PAIN: Primary | ICD-10-CM

## 2023-03-23 PROCEDURE — 97110 THERAPEUTIC EXERCISES: CPT | Performed by: PHYSICAL THERAPIST

## 2023-03-23 RX ORDER — EMPAGLIFLOZIN 10 MG/1
10 TABLET, FILM COATED ORAL DAILY
Qty: 90 TABLET | Refills: 0 | Status: CANCELLED | OUTPATIENT
Start: 2023-03-10

## 2023-03-23 NOTE — TELEPHONE ENCOUNTER
No new care gaps identified.  [facility] Embedded Care Gaps. Reference number: [id number]. 3/23/2023   1:46:09 PM CDT

## 2023-03-23 NOTE — PROGRESS NOTES
"Physical Therapy Daily Treatment Note     Name: Meryl Johnson  [facility] Number: [id number]    Therapy Diagnosis:   Encounter Diagnoses   Name Primary?    Chronic right shoulder pain Yes    Decreased strength of upper extremity     Acute pain of left knee      Physician: Russell Honeycutt III, *    Visit Date: 3/23/2023  Physician Orders: PT Eval and Treat   Medical Diagnosis from Referral:   Diagnosis   M25.511,G89.29 (ICD-10-CM) - Chronic right shoulder pain   M75.01 (ICD-10-CM) - Adhesive capsulitis of right shoulder    S83.242A (ICD-10-CM) - Acute medial meniscus tear of left knee, initial encounter  Evaluation Date: 9/29/2022 shoulder, 107/2022 knee  Authorization Period Expiration: 12/31/2023  Plan of Care Expiration: 4/1/2023  Visit # / Visits authorized: 14/20    Total visits: 40    Time In:  1300  Time Out: 1325  Total Billable Time: 25 minutes    Precautions: Standard and Weightbearing    FOTO 42%, down from 51%    Subjective     Pt reports: I was really sore after last time. Had me hurting bad yesterday, a little better today    She was compliant with home exercise program.  Response to previous treatment: Improved patellar mobility and scar mob  Functional change: WBAT    Pain: 4/10  Location: left knee, right shoulder    Objective     Knee Passive Range of Motion:   Right  Left    Flexion 110 110   Extension +3 hyper 0       Meryl received therapeutic exercises to develop strength, endurance, ROM, and flexibility for 25 minutes including:     3'  NMES IFC pain relief quad sets and SAQ 10' each   Patient education    NT  Recumbent bike 10' level 3 for knee motion and cardio endurance training  Step up 6" alt LE 15x each  Standing hip abduction 2 x 15  LAQ 7.5# 30x  Lateral walk GTB 20x  HS curl seated BTB 2 x 15  Sit to stands lowest mat 30x  Lateral step ups 6" 15x  SLR upright 3 x 10 B   HS curl machine 15# 3 x 15  LAQ on hammer 2.5# L/5# R 3 x 15  DL heel raises 30x  Bridges GTB above knee " 30x   Sidelying hip abd 3 x 10  Standing heel raise 30x    Meryl received the following manual therapy techniques: Joint mobilizations and Soft tissue Mobilization were applied to the: L knee for 0 minutes, including:    Knee assessment  Passive flexion EOT   Patellar mobilizations all planes  STM medial HS     Not Performed Today:  Medial gapping Gr III    Meryl participated in neuromuscular re-education activities to improve: Balance, Coordination, Sense, Proprioception, and Posture for 0 minutes. The following activities were included:    Shuttle DL 3 bands 4 x 10  Shuttle SL 2 bands 4 x 10  Shuttle heel raises 2 bands 4 x 10    Meryl received cold pack for 10 minutes to to decrease circulation, pain, and swelling.      Home Exercises Provided and Patient Education Provided     Education provided:   - Stair training     Written Home Exercises Provided: Patient instructed to cont prior HEP.  Exercises were reviewed and Meryl was able to demonstrate them prior to the end of the session.  Meryl demonstrated good  understanding of the education provided.     See EMR under Patient Instructions for exercises provided 10/6/2022.    Assessment     Meryl had increased pain today with arrival, used NMES for pain relief with quad set and SAQ. Patient plans to attend [facility] and discussed with Rachele correa in the R knee     Meryl Is progressing well towards her goals.   Pt prognosis is Excellent.     Pt will continue to benefit from skilled outpatient physical therapy to address the deficits listed in the problem list box on initial evaluation, provide pt/family education and to maximize pt's level of independence in the home and community environment.     Pt's spiritual, cultural and educational needs considered and pt agreeable to plan of care and goals.    Anticipated barriers to physical therapy: work schedule    GOALS:     Short Term Goals:  4 weeks weeks(Progressing, not met)  1.Report decreased  shoulder pain < / =  5/10 at worst to increase tolerance for work   2. Increase PROM full pain free   3. Increased strength by 1/3 MMT grade in shoulder strength to increase tolerance for ADL and work activities.  4. Pt to tolerate HEP to improve ROM and independence with ADL's     Long Term Goals: 8 weeks(Progressing, not met)    1.Report decreased shoulder pain  < / =  2/10 at worst  to increase tolerance for work  2.Increase AROM to full pain free  3.Increase strength to >/= 4/5 in shoulder strength to increase tolerance for ADL and work activities.  4. Pt goal: Return to pain free activities of daily living and work related activities.  5. Pt will have improved FOTO score of </= 35% for functional improvements in activities of daily living.    New Knee Goals: 6 weeks (Progressing, not met)    1.Report decreased knee pain  < / =  2/10  to increase tolerance for return to ambulation without RW  2. Increase knee ROM to full motion in order to be able to perform ADLs without difficulty.  3. Increase strength by 1/3 MMT grade in quad and glutes  to increase tolerance for ADL and work activities.  4. Pt to tolerate HEP to improve ROM and independence with ADL's      Plan     Plan of care Certification: 9/29/2022 to 12/8/2022.    Progress closed chain quad/glute strengthening     Fabricio Tolentino, PT, DPT

## 2023-03-28 ENCOUNTER — CLINICAL SUPPORT (OUTPATIENT)
Dept: REHABILITATION | Facility: HOSPITAL | Age: 68
End: 2023-03-28
Payer: COMMERCIAL

## 2023-03-28 DIAGNOSIS — G89.29 CHRONIC RIGHT SHOULDER PAIN: Primary | ICD-10-CM

## 2023-03-28 DIAGNOSIS — M25.562 ACUTE PAIN OF LEFT KNEE: ICD-10-CM

## 2023-03-28 DIAGNOSIS — M25.511 CHRONIC RIGHT SHOULDER PAIN: Primary | ICD-10-CM

## 2023-03-28 DIAGNOSIS — R29.898 DECREASED STRENGTH OF UPPER EXTREMITY: ICD-10-CM

## 2023-03-28 PROCEDURE — 97110 THERAPEUTIC EXERCISES: CPT | Performed by: PHYSICAL THERAPIST

## 2023-03-28 NOTE — PROGRESS NOTES
"Physical Therapy Daily Treatment Note     Name: Meryl Johnson  [facility] Number: [id number]    Therapy Diagnosis:   Encounter Diagnoses   Name Primary?    Chronic right shoulder pain Yes    Decreased strength of upper extremity     Acute pain of left knee      Physician: Russell Honeycutt III, *    Visit Date: 3/28/2023  Physician Orders: PT Eval and Treat   Medical Diagnosis from Referral:   Diagnosis   M25.511,G89.29 (ICD-10-CM) - Chronic right shoulder pain   M75.01 (ICD-10-CM) - Adhesive capsulitis of right shoulder    S83.242A (ICD-10-CM) - Acute medial meniscus tear of left knee, initial encounter  Evaluation Date: 9/29/2022 shoulder, 107/2022 knee  Authorization Period Expiration: 12/31/2023  Plan of Care Expiration: 4/1/2023  Visit # / Visits authorized: 15/20    Total visits: 43    Time In:  1500  Time Out: 1600  Total Billable Time: 15 minutes    Precautions: Standard and Weightbearing    FOTO 42%, down from 51%    Subjective     Pt reports: Sore in the muscle on top of my knee    She was compliant with home exercise program.  Response to previous treatment: Improved patellar mobility and scar mob  Functional change: WBAT    Pain: 4/10  Location: left knee, right shoulder    Objective     Knee Passive Range of Motion:   Right  Left    Flexion 110 110   Extension +3 hyper 0       Meryl received therapeutic exercises to develop strength, endurance, ROM, and flexibility for 50 minutes including:    GSS stretch strap 30" 3x  SAQ supine 30x  Sidelying hip abduction 3 x 10  Sit to stands lowest mat 30x  Seated HS curls 20x GTB   Patient education    NT  Recumbent bike 10' level 3 for knee motion and cardio endurance training  Step up 6" alt LE 15x each  Standing hip abduction 2 x 15  LAQ 7.5# 30x  Lateral walk GTB 20x  HS curl seated BTB 2 x 15    Lateral step ups 6" 15x  SLR upright 3 x 10 B   HS curl machine 15# 3 x 15  LAQ on hammer 2.5# L/5# R 3 x 15  DL heel raises 30x  Bridges GTB above knee 30x   Sidelying " hip abd 3 x 10  Standing heel raise 30x    Meryl received the following manual therapy techniques: Joint mobilizations and Soft tissue Mobilization were applied to the: L knee for 10 minutes, including:    Knee assessment  Passive flexion EOT   Patellar mobilizations all planes  STM medial HS     Not Performed Today:  Medial gapping Gr III    Meryl participated in neuromuscular re-education activities to improve: Balance, Coordination, Sense, Proprioception, and Posture for 0 minutes. The following activities were included:    Shuttle DL 3 bands 4 x 10  Shuttle SL 2 bands 4 x 10  Shuttle heel raises 2 bands 4 x 10    Meryl received cold pack for 10 minutes to to decrease circulation, pain, and swelling.      Home Exercises Provided and Patient Education Provided     Education provided:   - Stair training     Written Home Exercises Provided: Patient instructed to cont prior HEP.  Exercises were reviewed and Meryl was able to demonstrate them prior to the end of the session.  Meryl demonstrated good  understanding of the education provided.     See EMR under Patient Instructions for exercises provided 10/6/2022.    Assessment     Meryl progressed with HS strengthening, weakness on the L side compared to the R. Ice at end of session to see if it helped with her knee pain    Meryl Is progressing well towards her goals.   Pt prognosis is Excellent.     Pt will continue to benefit from skilled outpatient physical therapy to address the deficits listed in the problem list box on initial evaluation, provide pt/family education and to maximize pt's level of independence in the home and community environment.     Pt's spiritual, cultural and educational needs considered and pt agreeable to plan of care and goals.    Anticipated barriers to physical therapy: work schedule    GOALS:     Short Term Goals:  4 weeks weeks(Progressing, not met)  1.Report decreased shoulder pain < / =  5/10 at worst to increase tolerance for  work   2. Increase PROM full pain free   3. Increased strength by 1/3 MMT grade in shoulder strength to increase tolerance for ADL and work activities.  4. Pt to tolerate HEP to improve ROM and independence with ADL's     Long Term Goals: 8 weeks(Progressing, not met)    1.Report decreased shoulder pain  < / =  2/10 at worst  to increase tolerance for work  2.Increase AROM to full pain free  3.Increase strength to >/= 4/5 in shoulder strength to increase tolerance for ADL and work activities.  4. Pt goal: Return to pain free activities of daily living and work related activities.  5. Pt will have improved FOTO score of </= 35% for functional improvements in activities of daily living.    New Knee Goals: 6 weeks (Progressing, not met)    1.Report decreased knee pain  < / =  2/10  to increase tolerance for return to ambulation without RW  2. Increase knee ROM to full motion in order to be able to perform ADLs without difficulty.  3. Increase strength by 1/3 MMT grade in quad and glutes  to increase tolerance for ADL and work activities.  4. Pt to tolerate HEP to improve ROM and independence with ADL's      Plan     Plan of care Certification: 9/29/2022 to 12/8/2022.    Progress closed chain quad/glute strengthening     Fabricio Tolentino, PT, DPT

## 2023-03-30 ENCOUNTER — CLINICAL SUPPORT (OUTPATIENT)
Dept: REHABILITATION | Facility: HOSPITAL | Age: 68
End: 2023-03-30
Payer: COMMERCIAL

## 2023-03-30 DIAGNOSIS — R29.898 DECREASED STRENGTH OF UPPER EXTREMITY: ICD-10-CM

## 2023-03-30 DIAGNOSIS — M25.562 ACUTE PAIN OF LEFT KNEE: ICD-10-CM

## 2023-03-30 DIAGNOSIS — M25.511 CHRONIC RIGHT SHOULDER PAIN: Primary | ICD-10-CM

## 2023-03-30 DIAGNOSIS — G89.29 CHRONIC RIGHT SHOULDER PAIN: Primary | ICD-10-CM

## 2023-03-30 PROCEDURE — 97140 MANUAL THERAPY 1/> REGIONS: CPT | Performed by: PHYSICAL THERAPIST

## 2023-03-30 PROCEDURE — 97110 THERAPEUTIC EXERCISES: CPT | Performed by: PHYSICAL THERAPIST

## 2023-03-30 NOTE — PROGRESS NOTES
"Physical Therapy Daily Treatment Note     Name: Meryl Johnson  [facility] Number: [id number]    Therapy Diagnosis:   Encounter Diagnoses   Name Primary?    Chronic right shoulder pain Yes    Decreased strength of upper extremity     Acute pain of left knee      Physician: Russell Honeycutt III, *    Visit Date: 3/30/2023  Physician Orders: PT Eval and Treat   Medical Diagnosis from Referral:   Diagnosis   M25.511,G89.29 (ICD-10-CM) - Chronic right shoulder pain   M75.01 (ICD-10-CM) - Adhesive capsulitis of right shoulder    S83.242A (ICD-10-CM) - Acute medial meniscus tear of left knee, initial encounter  Evaluation Date: 9/29/2022 shoulder, 107/2022 knee  Authorization Period Expiration: 12/31/2023  Plan of Care Expiration: 4/1/2023  Visit # / Visits authorized: 16/20    Total visits: 43    Time In:  1500  Time Out: 1600  Total Billable Time: 60 minutes    Precautions: Standard and Weightbearing    FOTO 42%, down from 51%    Subjective     Pt reports: Itches on the side of my knee. Notes bilateral UE soreness, tight in her calf     She was compliant with home exercise program.  Response to previous treatment: Improved patellar mobility and scar mob  Functional change: WBAT    Pain: 4/10  Location: left knee, right shoulder    Objective     Knee Passive Range of Motion:   Right  Left    Flexion 110 110   Extension +3 hyper 0       Meryl received therapeutic exercises to develop strength, endurance, ROM, and flexibility for 50 minutes including:    Recumbent bike 10' level 3 for knee motion and cardio endurance training  Sidelying hip abduction holds 3 x 10 3"  Seated HS curls 20x GTB   Seated ER GTB 20x B   Patient education    NT  Sit to stands lowest mat 30x  Step up 6" alt LE 15x each  Standing hip abduction 2 x 15  LAQ 7.5# 30x  Lateral walk GTB 20x  HS curl seated BTB 2 x 15  Lateral step ups 6" 15x  SLR upright 3 x 10 B   HS curl machine 15# 3 x 15  LAQ on hammer 2.5# L/5# R 3 x 15  DL heel raises 30x  Bridges GTB " above knee 30x   Sidelying hip abd 3 x 10  Standing heel raise 30x    Meryl received the following manual therapy techniques: Joint mobilizations and Soft tissue Mobilization were applied to the: L knee for 10 minutes, including:    Knee assessment  Passive flexion EOT   Patellar mobilizations all planes  STM medial HS     Not Performed Today:  Medial gapping Gr III    Meryl participated in neuromuscular re-education activities to improve: Balance, Coordination, Sense, Proprioception, and Posture for 0 minutes. The following activities were included:    Shuttle DL 3 bands 4 x 10  Shuttle SL 2 bands 4 x 10  Shuttle heel raises 2 bands 4 x 10    Meryl received cold pack for 10 minutes to to decrease circulation, pain, and swelling.      Home Exercises Provided and Patient Education Provided     Education provided:   - Stair training     Written Home Exercises Provided: Patient instructed to cont prior HEP.  Exercises were reviewed and Meryl was able to demonstrate them prior to the end of the session.  Meryl demonstrated good  understanding of the education provided.     See EMR under Patient Instructions for exercises provided 10/6/2022.    Assessment     Meryl progressing out of closed chain for pain relief. Calf was tight on arrival but improved within session, was planning to DN but not needed.     Meryl Is progressing well towards her goals.   Pt prognosis is Excellent.     Pt will continue to benefit from skilled outpatient physical therapy to address the deficits listed in the problem list box on initial evaluation, provide pt/family education and to maximize pt's level of independence in the home and community environment.     Pt's spiritual, cultural and educational needs considered and pt agreeable to plan of care and goals.    Anticipated barriers to physical therapy: work schedule    GOALS:     Short Term Goals:  4 weeks weeks(Progressing, not met)  1.Report decreased shoulder pain < / =  5/10 at  worst to increase tolerance for work   2. Increase PROM full pain free   3. Increased strength by 1/3 MMT grade in shoulder strength to increase tolerance for ADL and work activities.  4. Pt to tolerate HEP to improve ROM and independence with ADL's     Long Term Goals: 8 weeks(Progressing, not met)    1.Report decreased shoulder pain  < / =  2/10 at worst  to increase tolerance for work  2.Increase AROM to full pain free  3.Increase strength to >/= 4/5 in shoulder strength to increase tolerance for ADL and work activities.  4. Pt goal: Return to pain free activities of daily living and work related activities.  5. Pt will have improved FOTO score of </= 35% for functional improvements in activities of daily living.    New Knee Goals: 6 weeks (Progressing, not met)    1.Report decreased knee pain  < / =  2/10  to increase tolerance for return to ambulation without RW  2. Increase knee ROM to full motion in order to be able to perform ADLs without difficulty.  3. Increase strength by 1/3 MMT grade in quad and glutes  to increase tolerance for ADL and work activities.  4. Pt to tolerate HEP to improve ROM and independence with ADL's      Plan     Plan of care Certification: 9/29/2022 to 12/8/2022.    Progress closed chain quad/glute strengthening     Fabricio Tolentino, PT, DPT

## 2023-03-31 DIAGNOSIS — E11.9 TYPE 2 DIABETES MELLITUS WITHOUT COMPLICATION: ICD-10-CM

## 2023-04-03 ENCOUNTER — PATIENT MESSAGE (OUTPATIENT)
Dept: ADMINISTRATIVE | Facility: HOSPITAL | Age: 68
End: 2023-04-03
Payer: COMMERCIAL

## 2023-04-03 ENCOUNTER — CLINICAL SUPPORT (OUTPATIENT)
Dept: REHABILITATION | Facility: HOSPITAL | Age: 68
End: 2023-04-03
Payer: COMMERCIAL

## 2023-04-03 DIAGNOSIS — M25.511 CHRONIC RIGHT SHOULDER PAIN: Primary | ICD-10-CM

## 2023-04-03 DIAGNOSIS — R29.898 DECREASED STRENGTH OF UPPER EXTREMITY: ICD-10-CM

## 2023-04-03 DIAGNOSIS — G89.29 CHRONIC RIGHT SHOULDER PAIN: Primary | ICD-10-CM

## 2023-04-03 DIAGNOSIS — M25.562 ACUTE PAIN OF LEFT KNEE: ICD-10-CM

## 2023-04-03 PROCEDURE — 97140 MANUAL THERAPY 1/> REGIONS: CPT | Performed by: PHYSICAL THERAPIST

## 2023-04-03 PROCEDURE — 97112 NEUROMUSCULAR REEDUCATION: CPT | Performed by: PHYSICAL THERAPIST

## 2023-04-03 PROCEDURE — 97110 THERAPEUTIC EXERCISES: CPT | Performed by: PHYSICAL THERAPIST

## 2023-04-03 NOTE — PROGRESS NOTES
"Physical Therapy Daily Treatment Note     Name: Meryl Johnson  [facility] Number: [id number]    Therapy Diagnosis:   Encounter Diagnoses   Name Primary?    Chronic right shoulder pain Yes    Decreased strength of upper extremity     Acute pain of left knee      Physician: Russell Honeycutt III, *    Visit Date: 4/3/2023  Physician Orders: PT Eval and Treat   Medical Diagnosis from Referral:   Diagnosis   M25.511,G89.29 (ICD-10-CM) - Chronic right shoulder pain   M75.01 (ICD-10-CM) - Adhesive capsulitis of right shoulder    S83.242A (ICD-10-CM) - Acute medial meniscus tear of left knee, initial encounter  Evaluation Date: 9/29/2022 shoulder, 107/2022 knee  Authorization Period Expiration: 12/31/2023  Plan of Care Expiration: 4/1/2023  Visit # / Visits authorized: 17/20    Total visits: 45    Time In:  700am  Time Out: 800am  Total Billable Time: 60 minutes    Precautions: Standard and Weightbearing    FOTO 1 51%  FOTO 2 42%  FOTO 3 52%    Subjective     Pt reports: Knee was alright this weekend, hasnt changed much    She was compliant with home exercise program.  Response to previous treatment: Improved patellar mobility and scar mob  Functional change: WBAT    Pain: 4/10  Location: left knee, right shoulder    Objective     Knee Passive Range of Motion:   Right  Left    Flexion 110 110   Extension +3 hyper 0       Meryl received therapeutic exercises to develop strength, endurance, ROM, and flexibility for 35 minutes including:    Seated HS curls 20x BTB   LAQ 7.5# 30x  SAQ 30x  Patient education    NT  Recumbent bike 10' level 3 for knee motion and cardio endurance training  Sidelying hip abduction holds 3 x 10 3"  Sit to stands lowest mat 30x  Step up 6" alt LE 15x each  Standing hip abduction 2 x 15  Seated ER GTB 20x B   Lateral walk GTB 20x  HS curl seated BTB 2 x 15  Lateral step ups 6" 15x  SLR upright 3 x 10 B   HS curl machine 15# 3 x 15  LAQ on hammer 2.5# L/5# R 3 x 15  DL heel raises 30x  Bridges GTB above " knee 30x   Sidelying hip abd 3 x 10  Standing heel raise 30x    Meryl received the following manual therapy techniques: Joint mobilizations and Soft tissue Mobilization were applied to the: L knee for 10 minutes, including:    Knee assessment  Passive flexion EOT   Patellar mobilizations all planes  STM medial HS     Not Performed Today:  Medial gapping Gr III    Meryl participated in neuromuscular re-education activities to improve: Balance, Coordination, Sense, Proprioception, and Posture for 15 minutes. The following activities were included:    Shuttle DL 2 1/2 bands 4 x 10  Shuttle SL 2 bands 4 x 10  Shuttle heel raises 2 bands 4 x 10    Meryl received cold pack for 0 minutes to to decrease circulation, pain, and swelling.      Home Exercises Provided and Patient Education Provided     Education provided:   - Stair training     Written Home Exercises Provided: Patient instructed to cont prior HEP.  Exercises were reviewed and Meryl was able to demonstrate them prior to the end of the session.  Meryl demonstrated good  understanding of the education provided.     See EMR under Patient Instructions for exercises provided 10/6/2022.    Assessment     Meryl did well with progression today, with some radicular symptoms down the RLE. Strength slowly improving each week     Meryl Is progressing well towards her goals.   Pt prognosis is Excellent.     Pt will continue to benefit from skilled outpatient physical therapy to address the deficits listed in the problem list box on initial evaluation, provide pt/family education and to maximize pt's level of independence in the home and community environment.     Pt's spiritual, cultural and educational needs considered and pt agreeable to plan of care and goals.    Anticipated barriers to physical therapy: work schedule    GOALS:     Short Term Goals:  4 weeks weeks(Progressing, not met)  1.Report decreased shoulder pain < / =  5/10 at worst to increase tolerance for  work   2. Increase PROM full pain free   3. Increased strength by 1/3 MMT grade in shoulder strength to increase tolerance for ADL and work activities.  4. Pt to tolerate HEP to improve ROM and independence with ADL's     Long Term Goals: 8 weeks(Progressing, not met)    1.Report decreased shoulder pain  < / =  2/10 at worst  to increase tolerance for work  2.Increase AROM to full pain free  3.Increase strength to >/= 4/5 in shoulder strength to increase tolerance for ADL and work activities.  4. Pt goal: Return to pain free activities of daily living and work related activities.  5. Pt will have improved FOTO score of </= 35% for functional improvements in activities of daily living.    New Knee Goals: 6 weeks (Progressing, not met)    1.Report decreased knee pain  < / =  2/10  to increase tolerance for return to ambulation without RW  2. Increase knee ROM to full motion in order to be able to perform ADLs without difficulty.  3. Increase strength by 1/3 MMT grade in quad and glutes  to increase tolerance for ADL and work activities.  4. Pt to tolerate HEP to improve ROM and independence with ADL's      Plan     Plan of care Certification: 9/29/2022 to 12/8/2022.    Progress closed chain quad/glute strengthening     Fabricio Tolentino, PT, DPT

## 2023-04-05 ENCOUNTER — HOSPITAL ENCOUNTER (OUTPATIENT)
Dept: RADIOLOGY | Facility: HOSPITAL | Age: 68
Discharge: HOME | End: 2023-04-05
Attending: INTERNAL MEDICINE
Payer: COMMERCIAL

## 2023-04-05 DIAGNOSIS — Z12.31 OTHER SCREENING MAMMOGRAM: ICD-10-CM

## 2023-04-05 PROCEDURE — 77063 MAMMO DIGITAL SCREENING BILAT WITH TOMO: ICD-10-PCS | Mod: 26,,, | Performed by: RADIOLOGY

## 2023-04-05 PROCEDURE — 77067 SCR MAMMO BI INCL CAD: CPT | Mod: TC

## 2023-04-05 PROCEDURE — 77063 BREAST TOMOSYNTHESIS BI: CPT | Mod: 26,,, | Performed by: RADIOLOGY

## 2023-04-05 PROCEDURE — 77067 MAMMO DIGITAL SCREENING BILAT WITH TOMO: ICD-10-PCS | Mod: 26,,, | Performed by: RADIOLOGY

## 2023-04-05 PROCEDURE — 77067 SCR MAMMO BI INCL CAD: CPT | Mod: 26,,, | Performed by: RADIOLOGY

## 2023-04-06 ENCOUNTER — PATIENT MESSAGE (OUTPATIENT)
Dept: ADMINISTRATIVE | Facility: HOSPITAL | Age: 68
End: 2023-04-06
Payer: COMMERCIAL

## 2023-04-12 ENCOUNTER — CLINICAL SUPPORT (OUTPATIENT)
Dept: REHABILITATION | Facility: HOSPITAL | Age: 68
End: 2023-04-12
Payer: COMMERCIAL

## 2023-04-12 ENCOUNTER — PATIENT MESSAGE (OUTPATIENT)
Dept: ADMINISTRATIVE | Facility: HOSPITAL | Age: 68
End: 2023-04-12
Payer: COMMERCIAL

## 2023-04-12 DIAGNOSIS — G89.29 CHRONIC RIGHT SHOULDER PAIN: Primary | ICD-10-CM

## 2023-04-12 DIAGNOSIS — M25.511 CHRONIC RIGHT SHOULDER PAIN: Primary | ICD-10-CM

## 2023-04-12 DIAGNOSIS — M25.562 ACUTE PAIN OF LEFT KNEE: ICD-10-CM

## 2023-04-12 DIAGNOSIS — R29.898 DECREASED STRENGTH OF UPPER EXTREMITY: ICD-10-CM

## 2023-04-12 DIAGNOSIS — Z12.11 SCREENING FOR COLON CANCER: ICD-10-CM

## 2023-04-12 PROCEDURE — 97110 THERAPEUTIC EXERCISES: CPT | Performed by: PHYSICAL THERAPIST

## 2023-04-12 PROCEDURE — 97140 MANUAL THERAPY 1/> REGIONS: CPT | Performed by: PHYSICAL THERAPIST

## 2023-04-12 NOTE — PROGRESS NOTES
"Physical Therapy Daily Treatment Note     Name: Meryl Johnson  [facility] Number: [id number]    Therapy Diagnosis:   Encounter Diagnoses   Name Primary?    Chronic right shoulder pain Yes    Decreased strength of upper extremity     Acute pain of left knee      Physician: Russell Honeycutt III, *    Visit Date: 4/12/2023  Physician Orders: PT Eval and Treat   Medical Diagnosis from Referral:   Diagnosis   M25.511,G89.29 (ICD-10-CM) - Chronic right shoulder pain   M75.01 (ICD-10-CM) - Adhesive capsulitis of right shoulder    S83.242A (ICD-10-CM) - Acute medial meniscus tear of left knee, initial encounter  Evaluation Date: 9/29/2022 shoulder, 107/2022 knee  Authorization Period Expiration: 12/31/2023  Plan of Care Expiration: 7/12/2023  Visit # / Visits authorized: 18/20    Total visits: 46    Time In:  700am  Time Out: 745am  Total Billable Time: 30 minutes    Precautions: Standard and Weightbearing    FOTO 1 51%  FOTO 2 42%  FOTO 3 52%    Subjective     Pt reports: Knee is doing okay, just dont want it to hurt too bad tonight at the [employer] game    She was compliant with home exercise program.  Response to previous treatment: Improved patellar mobility and scar mob  Functional change: WBAT    Pain: 4/10  Location: left knee, right shoulder    Objective     Knee Passive Range of Motion:   Right  Left    Flexion 110 110   Extension +3 hyper 0       Meryl received therapeutic exercises to develop strength, endurance, ROM, and flexibility for 25 minutes including:    LAQ 7.5# 30x  SAQ 3# 30x  ER no money OTB 2 x 15  Patient education    NT  Recumbent bike 10' level 3 for knee motion and cardio endurance training  Sidelying hip abduction holds 3 x 10 3"  Sit to stands lowest mat 30x  Step up 6" alt LE 15x each  Standing hip abduction 2 x 15  Seated ER GTB 20x B   Lateral walk GTB 20x  HS curl seated BTB 2 x 15  Lateral step ups 6" 15x  SLR upright 3 x 10 B   HS curl machine 15# 3 x 15  LAQ on hammer 2.5# L/5# R 3 x 15  DL " heel raises 30x  Bridges GTB above knee 30x   Sidelying hip abd 3 x 10  Standing heel raise 30x    Meryl received the following manual therapy techniques: Joint mobilizations and Soft tissue Mobilization were applied to the: L knee for 20 minutes, including:    Knee assessment  Passive flexion EOT   Patellar mobilizations all planes  STM medial HS   Gr I-II mob R shoulder pain relief  Centering glide to humeral head Gr III  Gr III flexion to the R shoulder    Not Performed Today:  Medial gapping Gr III    Meryl participated in neuromuscular re-education activities to improve: Balance, Coordination, Sense, Proprioception, and Posture for 0 minutes. The following activities were included:    Shuttle DL 2 1/2 bands 4 x 10  Shuttle SL 2 bands 4 x 10  Shuttle heel raises 2 bands 4 x 10    Meryl received cold pack for 0 minutes to to decrease circulation, pain, and swelling.      Home Exercises Provided and Patient Education Provided     Education provided:   - Stair training     Written Home Exercises Provided: Patient instructed to cont prior HEP.  Exercises were reviewed and Meryl was able to demonstrate them prior to the end of the session.  Meryl demonstrated good  understanding of the education provided.     See EMR under Patient Instructions for exercises provided 10/6/2022.    Assessment     Meryl presented with better ambulation, not too painful with return to games tonight with stairs. Began more R shoulder therapy for pain relief, noted she has less ER to the R shoulder than L. Did well with no money exercise, given OTB for HEP    Meryl Is progressing well towards her goals.   Pt prognosis is Excellent.     Pt will continue to benefit from skilled outpatient physical therapy to address the deficits listed in the problem list box on initial evaluation, provide pt/family education and to maximize pt's level of independence in the home and community environment.     Pt's spiritual, cultural and educational  needs considered and pt agreeable to plan of care and goals.    Anticipated barriers to physical therapy: work schedule    GOALS:     Short Term Goals:  4 weeks weeks(Progressing, not met)  1.Report decreased shoulder pain < / =  5/10 at worst to increase tolerance for work   2. Increase PROM full pain free   3. Increased strength by 1/3 MMT grade in shoulder strength to increase tolerance for ADL and work activities.  4. Pt to tolerate HEP to improve ROM and independence with ADL's     Long Term Goals: 8 weeks(Progressing, not met)    1.Report decreased shoulder pain  < / =  2/10 at worst  to increase tolerance for work  2.Increase AROM to full pain free  3.Increase strength to >/= 4/5 in shoulder strength to increase tolerance for ADL and work activities.  4. Pt goal: Return to pain free activities of daily living and work related activities.  5. Pt will have improved FOTO score of </= 35% for functional improvements in activities of daily living.    New Knee Goals: 6 weeks (Progressing, not met)    1.Report decreased knee pain  < / =  2/10  to increase tolerance for return to ambulation without RW  2. Increase knee ROM to full motion in order to be able to perform ADLs without difficulty.  3. Increase strength by 1/3 MMT grade in quad and glutes  to increase tolerance for ADL and work activities.  4. Pt to tolerate HEP to improve ROM and independence with ADL's      Plan     Certification Period: 4/12/2023 to 7/12/2023  Recommended Treatment Plan: 2 times per week for 12 weeks: Manual Therapy, Moist Heat/ Ice, Neuromuscular Re-ed, Patient Education, Self Care, Therapeutic Activities, and Therapeutic Exercise  Other Recommendations: modalities prn, ASTYM prn, Dry Needling prn    Therapist: Fabricio Tolentino, PT, DPT    I CERTIFY THE NEED FOR THESE SERVICES FURNISHED UNDER THIS PLAN OF TREATMENT AND WHILE UNDER MY CARE    Physician's comments:  ________________________________________________________________________________________________________________________________________________      Physician's Name: ___________________________________

## 2023-04-13 DIAGNOSIS — E11.9 TYPE 2 DIABETES MELLITUS WITHOUT COMPLICATION: ICD-10-CM

## 2023-04-14 ENCOUNTER — HOSPITAL ENCOUNTER (OUTPATIENT)
Dept: RADIOLOGY | Facility: CLINIC | Age: 68
Discharge: HOME | End: 2023-04-14
Attending: INTERNAL MEDICINE
Payer: COMMERCIAL

## 2023-04-14 ENCOUNTER — PATIENT OUTREACH (OUTPATIENT)
Dept: ADMINISTRATIVE | Facility: HOSPITAL | Age: 68
End: 2023-04-14
Payer: COMMERCIAL

## 2023-04-14 DIAGNOSIS — Z78.0 MENOPAUSE: ICD-10-CM

## 2023-04-14 PROCEDURE — 77080 DXA BONE DENSITY AXIAL: CPT | Mod: TC

## 2023-04-14 PROCEDURE — 77080 DEXA BONE DENSITY SPINE HIP: ICD-10-PCS | Mod: 26,,, | Performed by: INTERNAL MEDICINE

## 2023-04-14 PROCEDURE — 77080 DXA BONE DENSITY AXIAL: CPT | Mod: 26,,, | Performed by: INTERNAL MEDICINE

## 2023-04-14 NOTE — PROGRESS NOTES
Health Maintenance Due   Topic Date Due    Shingles Vaccine (1 of 2) Never done    Foot Exam  02/26/2016    Colorectal Cancer Screening  07/13/2018    Diabetes Urine Screening  07/09/2019    COVID-19 Vaccine (5 - Booster for Pfizer series) 07/15/2022    DEXA Scan  12/03/2022    Hemoglobin A1c  03/28/2023     Chart review done. HM updated. Immunizations reviewed & updated. Care Everywhere updated.  Mammogram reported confirmed in Health Maintenance.

## 2023-04-17 ENCOUNTER — PATIENT MESSAGE (OUTPATIENT)
Dept: ADMINISTRATIVE | Facility: HOSPITAL | Age: 68
End: 2023-04-17
Payer: COMMERCIAL

## 2023-04-19 ENCOUNTER — CLINICAL SUPPORT (OUTPATIENT)
Dept: REHABILITATION | Facility: HOSPITAL | Age: 68
End: 2023-04-19
Payer: COMMERCIAL

## 2023-04-19 DIAGNOSIS — G89.29 CHRONIC RIGHT SHOULDER PAIN: Primary | ICD-10-CM

## 2023-04-19 DIAGNOSIS — M25.511 CHRONIC RIGHT SHOULDER PAIN: Primary | ICD-10-CM

## 2023-04-19 DIAGNOSIS — R29.898 DECREASED STRENGTH OF UPPER EXTREMITY: ICD-10-CM

## 2023-04-19 DIAGNOSIS — M25.562 ACUTE PAIN OF LEFT KNEE: ICD-10-CM

## 2023-04-19 PROCEDURE — 97110 THERAPEUTIC EXERCISES: CPT | Performed by: PHYSICAL THERAPIST

## 2023-04-19 PROCEDURE — 97140 MANUAL THERAPY 1/> REGIONS: CPT | Performed by: PHYSICAL THERAPIST

## 2023-04-19 NOTE — PROGRESS NOTES
"Physical Therapy Daily Treatment Note     Name: Meryl Johnson  [facility] Number: [id number]    Therapy Diagnosis:   Encounter Diagnoses   Name Primary?    Chronic right shoulder pain Yes    Decreased strength of upper extremity     Acute pain of left knee      Physician: Russell Honeycutt III, *    Visit Date: 4/19/2023  Physician Orders: PT Eval and Treat   Medical Diagnosis from Referral:   Diagnosis   M25.511,G89.29 (ICD-10-CM) - Chronic right shoulder pain   M75.01 (ICD-10-CM) - Adhesive capsulitis of right shoulder    S83.242A (ICD-10-CM) - Acute medial meniscus tear of left knee, initial encounter  Evaluation Date: 9/29/2022 shoulder, 107/2022 knee  Authorization Period Expiration: 12/31/2023  Plan of Care Expiration: 7/12/2023  Visit # / Visits authorized: 19/40    Total visits: 47    Time In:  700am  Time Out: 0758am  Total Billable Time: 30 minutes    Precautions: Standard and Weightbearing    FOTO 1 51%  FOTO 2 42%  FOTO 3 52%    Subjective     Pt reports: My knee just hurts when I go up the stairs. Shoulder is sore but I'm falling apart and forget about it    She was compliant with home exercise program.  Response to previous treatment: Min soreness to the shoulder  Functional change: difficulty ascending stairs    Pain: 4/10  Location: left knee, right shoulder    Objective     Knee Passive Range of Motion:   Right  Left    Flexion 110 110   Extension +3 hyper 0       Meryl received therapeutic exercises to develop strength, endurance, ROM, and flexibility for 50 minutes including:    LAQ 7.5# 30x  ER no money GTB 2 x 15  Tricep extension 2# 3 x 15  Rows with GTB 3 x 15  HS curls GTB 3 x 15  Patient education    NT  Recumbent bike 10' level 3 for knee motion and cardio endurance training  Sidelying hip abduction holds 3 x 10 3"  Sit to stands lowest mat 30x  Step up 6" alt LE 15x each  Standing hip abduction 2 x 15  Seated ER GTB 20x B   Lateral walk GTB 20x  HS curl seated BTB 2 x 15  Lateral step ups 6" " 15x  SLR upright 3 x 10 B   HS curl machine 15# 3 x 15  LAQ on hammer 2.5# L/5# R 3 x 15  DL heel raises 30x  Bridges GTB above knee 30x   Sidelying hip abd 3 x 10  Standing heel raise 30x    Meryl received the following manual therapy techniques: Joint mobilizations and Soft tissue Mobilization were applied to the: L knee for 10 minutes, including:    Knee assessment  Passive flexion EOT   Patellar mobilizations all planes  STM medial HS   Gr I-II mob R shoulder pain relief  Centering glide to humeral head Gr III  Gr III flexion to the R shoulder    Not Performed Today:  Medial gapping Gr III    Meryl participated in neuromuscular re-education activities to improve: Balance, Coordination, Sense, Proprioception, and Posture for 0 minutes. The following activities were included:    Shuttle DL 2 1/2 bands 4 x 10  Shuttle SL 2 bands 4 x 10  Shuttle heel raises 2 bands 4 x 10    Meryl received cold pack for 0 minutes to to decrease circulation, pain, and swelling.      Home Exercises Provided and Patient Education Provided     Education provided:   - Stair training     Written Home Exercises Provided: Patient instructed to cont prior HEP.  Exercises were reviewed and Meryl was able to demonstrate them prior to the end of the session.  Meryl demonstrated good  understanding of the education provided.     See EMR under Patient Instructions for exercises provided 10/6/2022.    Assessment     Meryl progressed with scap stabilization to rows and tricep. Relief with alternating shoulder and knee exercises today. Will continue strengthening cuff as part of HEP    Meryl Is progressing well towards her goals.   Pt prognosis is Excellent.     Pt will continue to benefit from skilled outpatient physical therapy to address the deficits listed in the problem list box on initial evaluation, provide pt/family education and to maximize pt's level of independence in the home and community environment.     Pt's spiritual, cultural  and educational needs considered and pt agreeable to plan of care and goals.    Anticipated barriers to physical therapy: work schedule    GOALS:     Short Term Goals:  4 weeks weeks(Progressing, not met)  1.Report decreased shoulder pain < / =  5/10 at worst to increase tolerance for work   2. Increase PROM full pain free   3. Increased strength by 1/3 MMT grade in shoulder strength to increase tolerance for ADL and work activities.  4. Pt to tolerate HEP to improve ROM and independence with ADL's     Long Term Goals: 8 weeks(Progressing, not met)    1.Report decreased shoulder pain  < / =  2/10 at worst  to increase tolerance for work  2.Increase AROM to full pain free  3.Increase strength to >/= 4/5 in shoulder strength to increase tolerance for ADL and work activities.  4. Pt goal: Return to pain free activities of daily living and work related activities.  5. Pt will have improved FOTO score of </= 35% for functional improvements in activities of daily living.    New Knee Goals: 6 weeks (Progressing, not met)    1.Report decreased knee pain  < / =  2/10  to increase tolerance for return to ambulation without RW  2. Increase knee ROM to full motion in order to be able to perform ADLs without difficulty.  3. Increase strength by 1/3 MMT grade in quad and glutes  to increase tolerance for ADL and work activities.  4. Pt to tolerate HEP to improve ROM and independence with ADL's      Plan     Certification Period: 4/12/2023 to 7/12/2023    Improve scap stabilization and posterior chain strengthening    Therapist: Fabricio Tolentino, PT, DPT

## 2023-04-21 ENCOUNTER — PATIENT MESSAGE (OUTPATIENT)
Dept: ADMINISTRATIVE | Facility: HOSPITAL | Age: 68
End: 2023-04-21
Payer: COMMERCIAL

## 2023-04-21 ENCOUNTER — CLINICAL SUPPORT (OUTPATIENT)
Dept: REHABILITATION | Facility: HOSPITAL | Age: 68
End: 2023-04-21
Payer: COMMERCIAL

## 2023-04-21 DIAGNOSIS — R29.898 DECREASED STRENGTH OF UPPER EXTREMITY: ICD-10-CM

## 2023-04-21 DIAGNOSIS — G89.29 CHRONIC RIGHT SHOULDER PAIN: Primary | ICD-10-CM

## 2023-04-21 DIAGNOSIS — M25.511 CHRONIC RIGHT SHOULDER PAIN: Primary | ICD-10-CM

## 2023-04-21 DIAGNOSIS — M25.562 ACUTE PAIN OF LEFT KNEE: ICD-10-CM

## 2023-04-21 PROCEDURE — 97140 MANUAL THERAPY 1/> REGIONS: CPT | Performed by: PHYSICAL THERAPIST

## 2023-04-21 PROCEDURE — 97110 THERAPEUTIC EXERCISES: CPT | Performed by: PHYSICAL THERAPIST

## 2023-04-21 NOTE — PROGRESS NOTES
"Physical Therapy Daily Treatment Note     Name: Meryl Yo [facility] Number: [id number]    Therapy Diagnosis:   Encounter Diagnoses   Name Primary?    Chronic right shoulder pain Yes    Decreased strength of upper extremity     Acute pain of left knee      Physician: Russell Honeycutt III, *    Visit Date: 4/21/2023  Physician Orders: PT Eval and Treat   Medical Diagnosis from Referral:   Diagnosis   M25.511,G89.29 (ICD-10-CM) - Chronic right shoulder pain   M75.01 (ICD-10-CM) - Adhesive capsulitis of right shoulder    S83.242A (ICD-10-CM) - Acute medial meniscus tear of left knee, initial encounter  Evaluation Date: 9/29/2022 shoulder, 107/2022 knee  Authorization Period Expiration: 12/31/2023  Plan of Care Expiration: 7/12/2023  Visit # / Visits authorized: 20/40    Total visits: 48    Time In:  700am  Time Out: 0758am  Total Billable Time: 30 minutes    Precautions: Standard and Weightbearing    FOTO 1 51%  FOTO 2 42%  FOTO 3 52%    Subjective     Pt reports: Cramping in my legs last night, and actually my left shoulder was more fatigued than the right    She was compliant with home exercise program.  Response to previous treatment: Min soreness to the shoulder  Functional change: difficulty ascending stairs    Pain: 4/10  Location: left knee, right shoulder    Objective     Knee Passive Range of Motion:   Right  Left    Flexion 110 110   Extension +3 hyper 0       Meryl received therapeutic exercises to develop strength, endurance, ROM, and flexibility for 50 minutes including:    LAQ 7.5# 30x  ER no money GTB 2 x 15  Seated heel raise 2 x 20  Rows with ER GTB 3 x 15  IR/ER GTB 2 X 15  HS curl seated GTB 2 x 15  Patient education    NT  Tricep extension 2# 3 x 15  HS curls GTB 3 x 15  Recumbent bike 10' level 3 for knee motion and cardio endurance training  Sidelying hip abduction holds 3 x 10 3"  Sit to stands lowest mat 30x  Step up 6" alt LE 15x each  Standing hip abduction 2 x 15  Seated ER GTB 20x B " "  Lateral walk GTB 20x    Lateral step ups 6" 15x  SLR upright 3 x 10 B   HS curl machine 15# 3 x 15  LAQ on hammer 2.5# L/5# R 3 x 15  DL heel raises 30x  Bridges GTB above knee 30x   Sidelying hip abd 3 x 10  Standing heel raise 30x    Meryl received the following manual therapy techniques: Joint mobilizations and Soft tissue Mobilization were applied to the: L knee for 10 minutes, including:    Knee assessment  Passive flexion EOT   Patellar mobilizations all planes  STM medial HS   Gr I-II mob R shoulder pain relief  Centering glide to humeral head Gr III  Gr III flexion to the R shoulder    Not Performed Today:  Medial gapping Gr III    Meryl participated in neuromuscular re-education activities to improve: Balance, Coordination, Sense, Proprioception, and Posture for 0 minutes. The following activities were included:    Shuttle DL 2 1/2 bands 4 x 10  Shuttle SL 2 bands 4 x 10  Shuttle heel raises 2 bands 4 x 10    Meryl received cold pack for 0 minutes to to decrease circulation, pain, and swelling.      Home Exercises Provided and Patient Education Provided     Education provided:   - Stair training     Written Home Exercises Provided: Patient instructed to cont prior HEP.  Exercises were reviewed and Meryl was able to demonstrate them prior to the end of the session.  Meryl demonstrated good  understanding of the education provided.     See EMR under Patient Instructions for exercises provided 10/6/2022.    Assessment     Meryl presented with shoulder fatigue but less pain on the R. Bilateral knees about equal discomfort, more tolerable now. Progressing with cuff and scap stabilization and postural retraining    Meryl Is progressing well towards her goals.   Pt prognosis is Excellent.     Pt will continue to benefit from skilled outpatient physical therapy to address the deficits listed in the problem list box on initial evaluation, provide pt/family education and to maximize pt's level of " independence in the home and community environment.     Pt's spiritual, cultural and educational needs considered and pt agreeable to plan of care and goals.    Anticipated barriers to physical therapy: work schedule    GOALS:     Short Term Goals:  4 weeks weeks(Progressing, not met)  1.Report decreased shoulder pain < / =  5/10 at worst to increase tolerance for work   2. Increase PROM full pain free   3. Increased strength by 1/3 MMT grade in shoulder strength to increase tolerance for ADL and work activities.  4. Pt to tolerate HEP to improve ROM and independence with ADL's     Long Term Goals: 8 weeks(Progressing, not met)    1.Report decreased shoulder pain  < / =  2/10 at worst  to increase tolerance for work  2.Increase AROM to full pain free  3.Increase strength to >/= 4/5 in shoulder strength to increase tolerance for ADL and work activities.  4. Pt goal: Return to pain free activities of daily living and work related activities.  5. Pt will have improved FOTO score of </= 35% for functional improvements in activities of daily living.    New Knee Goals: 6 weeks (Progressing, not met)    1.Report decreased knee pain  < / =  2/10  to increase tolerance for return to ambulation without RW  2. Increase knee ROM to full motion in order to be able to perform ADLs without difficulty.  3. Increase strength by 1/3 MMT grade in quad and glutes  to increase tolerance for ADL and work activities.  4. Pt to tolerate HEP to improve ROM and independence with ADL's      Plan     Certification Period: 4/12/2023 to 7/12/2023    Improve scap stabilization and posterior chain strengthening    Therapist: Fabricio Tolentino, PT, DPT

## 2023-04-24 ENCOUNTER — CLINICAL SUPPORT (OUTPATIENT)
Dept: REHABILITATION | Facility: HOSPITAL | Age: 68
End: 2023-04-24
Payer: OTHER MISCELLANEOUS

## 2023-04-24 DIAGNOSIS — M25.562 ACUTE PAIN OF LEFT KNEE: ICD-10-CM

## 2023-04-24 DIAGNOSIS — M25.511 CHRONIC RIGHT SHOULDER PAIN: Primary | ICD-10-CM

## 2023-04-24 DIAGNOSIS — R29.898 DECREASED STRENGTH OF UPPER EXTREMITY: ICD-10-CM

## 2023-04-24 DIAGNOSIS — G89.29 CHRONIC RIGHT SHOULDER PAIN: Primary | ICD-10-CM

## 2023-04-24 PROCEDURE — 97110 THERAPEUTIC EXERCISES: CPT | Performed by: PHYSICAL THERAPIST

## 2023-04-24 NOTE — PROGRESS NOTES
"Physical Therapy Daily Treatment Note     Name: Meryl Johnson  [facility] Number: [id number]    Therapy Diagnosis:   Encounter Diagnoses   Name Primary?    Chronic right shoulder pain Yes    Decreased strength of upper extremity     Acute pain of left knee      Physician: Russell Honeycutt III, *    Visit Date: 4/24/2023  Physician Orders: PT Eval and Treat   Medical Diagnosis from Referral:   Diagnosis   M25.511,G89.29 (ICD-10-CM) - Chronic right shoulder pain   M75.01 (ICD-10-CM) - Adhesive capsulitis of right shoulder    S83.242A (ICD-10-CM) - Acute medial meniscus tear of left knee, initial encounter  Evaluation Date: 9/29/2022 shoulder, 107/2022 knee  Authorization Period Expiration: 12/31/2023  Plan of Care Expiration: 7/12/2023  Visit # / Visits authorized: 21/40    Total visits: 49    Time In:  700am  Time Out: 0748am  Total Billable Time: 48 minutes    Precautions: Standard and Weightbearing    FOTO 1 51%  FOTO 2 42%  FOTO 3 52%    Subjective     Pt reports: I wa sick yesterday. Shoulder not as sore after friday    She was compliant with home exercise program.  Response to previous treatment: Min soreness to the shoulder  Functional change: difficulty ascending stairs    Pain: 4/10  Location: left knee, right shoulder    Objective     Knee Passive Range of Motion:   Right  Left    Flexion 110 110   Extension +3 hyper 0     Meryl received therapeutic exercises to develop strength, endurance, ROM, and flexibility for 48 minutes including:    LAQ 7.5# 30x  ER no money GTB 2 x 15  Seated heel raise 15# KB on knee 2 x 20  Rows with ER GTB 3 x 15  Scap ext BTB 2 x 15  Sidelying hip abduction 2 x 15   Patient education    NT  HS curl seated GTB 2 x 15  Tricep extension 2# 3 x 15  HS curls GTB 3 x 15  Recumbent bike 10' level 3 for knee motion and cardio endurance training  Sidelying hip abduction holds 3 x 10 3"  Sit to stands lowest mat 30x  Step up 6" alt LE 15x each  Standing hip abduction 2 x 15  Seated ER GTB 20x " "B   Lateral walk GTB 20x    Lateral step ups 6" 15x  SLR upright 3 x 10 B   HS curl machine 15# 3 x 15  LAQ on hammer 2.5# L/5# R 3 x 15  DL heel raises 30x  Bridges GTB above knee 30x   Sidelying hip abd 3 x 10  Standing heel raise 30x    Meryl received the following manual therapy techniques: Joint mobilizations and Soft tissue Mobilization were applied to the: L knee for 0 minutes, including:    Knee assessment  Passive flexion EOT   Patellar mobilizations all planes  STM medial HS   Gr I-II mob R shoulder pain relief  Centering glide to humeral head Gr III  Gr III flexion to the R shoulder    Not Performed Today:  Medial gapping Gr III    Meryl participated in neuromuscular re-education activities to improve: Balance, Coordination, Sense, Proprioception, and Posture for 0 minutes. The following activities were included:    Shuttle DL 2 1/2 bands 4 x 10  Shuttle SL 2 bands 4 x 10  Shuttle heel raises 2 bands 4 x 10    Meryl received cold pack for 0 minutes to to decrease circulation, pain, and swelling.      Home Exercises Provided and Patient Education Provided     Education provided:   - Stair training     Written Home Exercises Provided: Patient instructed to cont prior HEP.  Exercises were reviewed and Meryl was able to demonstrate them prior to the end of the session.  Meryl demonstrated good  understanding of the education provided.     See EMR under Patient Instructions for exercises provided 10/6/2022.    Assessment     Meryl progressed GSS strength with KB and cuff strengthening. Progressed to BTB extension and tolerated it very well. Most fatigue with hip abd sidelying    Meryl Is progressing well towards her goals.   Pt prognosis is Excellent.     Pt will continue to benefit from skilled outpatient physical therapy to address the deficits listed in the problem list box on initial evaluation, provide pt/family education and to maximize pt's level of independence in the home and community " environment.     Pt's spiritual, cultural and educational needs considered and pt agreeable to plan of care and goals.    Anticipated barriers to physical therapy: work schedule    GOALS:     Short Term Goals:  4 weeks weeks(Progressing, not met)  1.Report decreased shoulder pain < / =  5/10 at worst to increase tolerance for work   2. Increase PROM full pain free   3. Increased strength by 1/3 MMT grade in shoulder strength to increase tolerance for ADL and work activities.  4. Pt to tolerate HEP to improve ROM and independence with ADL's     Long Term Goals: 8 weeks(Progressing, not met)    1.Report decreased shoulder pain  < / =  2/10 at worst  to increase tolerance for work  2.Increase AROM to full pain free  3.Increase strength to >/= 4/5 in shoulder strength to increase tolerance for ADL and work activities.  4. Pt goal: Return to pain free activities of daily living and work related activities.  5. Pt will have improved FOTO score of </= 35% for functional improvements in activities of daily living.    New Knee Goals: 6 weeks (Progressing, not met)    1.Report decreased knee pain  < / =  2/10  to increase tolerance for return to ambulation without RW  2. Increase knee ROM to full motion in order to be able to perform ADLs without difficulty.  3. Increase strength by 1/3 MMT grade in quad and glutes  to increase tolerance for ADL and work activities.  4. Pt to tolerate HEP to improve ROM and independence with ADL's      Plan     Certification Period: 4/12/2023 to 7/12/2023    Improve scap stabilization and posterior chain strengthening    Therapist: Fabricio Tolentino, PT, DPT

## 2023-04-26 ENCOUNTER — CLINICAL SUPPORT (OUTPATIENT)
Dept: REHABILITATION | Facility: HOSPITAL | Age: 68
End: 2023-04-26
Payer: COMMERCIAL

## 2023-04-26 DIAGNOSIS — R29.898 DECREASED STRENGTH OF UPPER EXTREMITY: ICD-10-CM

## 2023-04-26 DIAGNOSIS — M25.562 ACUTE PAIN OF LEFT KNEE: ICD-10-CM

## 2023-04-26 DIAGNOSIS — G89.29 CHRONIC RIGHT SHOULDER PAIN: Primary | ICD-10-CM

## 2023-04-26 DIAGNOSIS — M25.511 CHRONIC RIGHT SHOULDER PAIN: Primary | ICD-10-CM

## 2023-04-26 PROCEDURE — 97110 THERAPEUTIC EXERCISES: CPT | Performed by: PHYSICAL THERAPIST

## 2023-04-26 NOTE — PROGRESS NOTES
Physical Therapy Daily Treatment Note     Name: Meryl Johnson  [facility] Number: [id number]    Therapy Diagnosis:   Encounter Diagnoses   Name Primary?    Chronic right shoulder pain Yes    Decreased strength of upper extremity     Acute pain of left knee      Physician: Russell Honeycutt III, *    Visit Date: 4/26/2023  Physician Orders: PT Eval and Treat   Medical Diagnosis from Referral:   Diagnosis   M25.511,G89.29 (ICD-10-CM) - Chronic right shoulder pain   M75.01 (ICD-10-CM) - Adhesive capsulitis of right shoulder    S83.242A (ICD-10-CM) - Acute medial meniscus tear of left knee, initial encounter  Evaluation Date: 9/29/2022 shoulder, 107/2022 knee  Authorization Period Expiration: 12/31/2023  Plan of Care Expiration: 7/12/2023  Visit # / Visits authorized: 22/40    Total visits: 49    Time In:  700am  Time Out: 752 am  Total Billable Time: 52 minutes    Precautions: Standard and Weightbearing    FOTO 1 51%  FOTO 2 42%  FOTO 3 52%    Subjective     Pt reports: One spot on the R knee but not any worse. L shoulder woke me up sleeping on it last night     She was compliant with home exercise program.  Response to previous treatment: Min soreness to the shoulder  Functional change: difficulty ascending stairs    Pain: 4/10  Location: left knee, right shoulder    Objective     Knee Passive Range of Motion:   Right  Left    Flexion 110 110   Extension +3 hyper 0     Meryl received therapeutic exercises to develop strength, endurance, ROM, and flexibility for 52 minutes including:    LAQ 7.5# 30x  Serratus wall slides YTB 3 x 15  Upright SLR 3 x 15  Rows BTB 30x  IR/ER GTB 2 x 15  Patient education    NT  ER no money GTB 2 x 15  Seated heel raise 15# KB on knee 2 x 20  Rows with ER GTB 3 x 15  Scap ext BTB 2 x 15  Sidelying hip abduction 2 x 15   HS curl seated GTB 2 x 15  Tricep extension 2# 3 x 15  HS curls GTB 3 x 15  Recumbent bike 10' level 3 for knee motion and cardio endurance training  Sidelying hip abduction  "holds 3 x 10 3"    Meryl received the following manual therapy techniques: Joint mobilizations and Soft tissue Mobilization were applied to the: L knee for 0 minutes, including:    Knee assessment  Passive flexion EOT   Patellar mobilizations all planes  STM medial HS   Gr I-II mob R shoulder pain relief  Centering glide to humeral head Gr III  Gr III flexion to the R shoulder    Not Performed Today:  Medial gapping Gr III    Meryl participated in neuromuscular re-education activities to improve: Balance, Coordination, Sense, Proprioception, and Posture for 0 minutes. The following activities were included:    Shuttle DL 2 1/2 bands 4 x 10  Shuttle SL 2 bands 4 x 10  Shuttle heel raises 2 bands 4 x 10    Meryl received cold pack for 0 minutes to to decrease circulation, pain, and swelling.      Home Exercises Provided and Patient Education Provided     Education provided:   - Stair training     Written Home Exercises Provided: Patient instructed to cont prior HEP.  Exercises were reviewed and Meryl was able to demonstrate them prior to the end of the session.  Meryl demonstrated good  understanding of the education provided.     See EMR under Patient Instructions for exercises provided 10/6/2022.    Assessment     Meryl ervin scap progression and increased resistance on band well today. She reports fatigue end of session. Better quad strength seen with SLR and improved endurance    Meryl Is progressing well towards her goals.   Pt prognosis is Excellent.     Pt will continue to benefit from skilled outpatient physical therapy to address the deficits listed in the problem list box on initial evaluation, provide pt/family education and to maximize pt's level of independence in the home and community environment.     Pt's spiritual, cultural and educational needs considered and pt agreeable to plan of care and goals.    Anticipated barriers to physical therapy: work schedule    GOALS:     Short Term Goals:  4 " weeks weeks(Progressing, not met)  1.Report decreased shoulder pain < / =  5/10 at worst to increase tolerance for work   2. Increase PROM full pain free   3. Increased strength by 1/3 MMT grade in shoulder strength to increase tolerance for ADL and work activities.  4. Pt to tolerate HEP to improve ROM and independence with ADL's     Long Term Goals: 8 weeks(Progressing, not met)    1.Report decreased shoulder pain  < / =  2/10 at worst  to increase tolerance for work  2.Increase AROM to full pain free  3.Increase strength to >/= 4/5 in shoulder strength to increase tolerance for ADL and work activities.  4. Pt goal: Return to pain free activities of daily living and work related activities.  5. Pt will have improved FOTO score of </= 35% for functional improvements in activities of daily living.    New Knee Goals: 6 weeks (Progressing, not met)    1.Report decreased knee pain  < / =  2/10  to increase tolerance for return to ambulation without RW  2. Increase knee ROM to full motion in order to be able to perform ADLs without difficulty.  3. Increase strength by 1/3 MMT grade in quad and glutes  to increase tolerance for ADL and work activities.  4. Pt to tolerate HEP to improve ROM and independence with ADL's      Plan     Certification Period: 4/12/2023 to 7/12/2023    Improve scap stabilization and posterior chain strengthening    Therapist: Fabricio Tolentino, PT, DPT

## 2023-05-01 ENCOUNTER — PATIENT MESSAGE (OUTPATIENT)
Dept: SPORTS MEDICINE | Facility: CLINIC | Age: 68
End: 2023-05-01
Payer: COMMERCIAL

## 2023-05-10 ENCOUNTER — CLINICAL SUPPORT (OUTPATIENT)
Dept: REHABILITATION | Facility: HOSPITAL | Age: 68
End: 2023-05-10
Payer: COMMERCIAL

## 2023-05-10 DIAGNOSIS — G89.29 CHRONIC RIGHT SHOULDER PAIN: Primary | ICD-10-CM

## 2023-05-10 DIAGNOSIS — R29.898 DECREASED STRENGTH OF UPPER EXTREMITY: ICD-10-CM

## 2023-05-10 DIAGNOSIS — M25.562 ACUTE PAIN OF LEFT KNEE: ICD-10-CM

## 2023-05-10 DIAGNOSIS — M25.511 CHRONIC RIGHT SHOULDER PAIN: Primary | ICD-10-CM

## 2023-05-10 PROCEDURE — 97110 THERAPEUTIC EXERCISES: CPT | Performed by: PHYSICAL THERAPIST

## 2023-05-10 NOTE — PROGRESS NOTES
Physical Therapy Daily Treatment Note     Name: Meryl Johnson  [facility] Number: [id number]    Therapy Diagnosis:   Encounter Diagnoses   Name Primary?    Chronic right shoulder pain Yes    Decreased strength of upper extremity     Acute pain of left knee      Physician: Russell Honeycutt III, *    Visit Date: 5/10/2023  Physician Orders: PT Eval and Treat   Medical Diagnosis from Referral:   Diagnosis   M25.511,G89.29 (ICD-10-CM) - Chronic right shoulder pain   M75.01 (ICD-10-CM) - Adhesive capsulitis of right shoulder    S83.242A (ICD-10-CM) - Acute medial meniscus tear of left knee, initial encounter  Evaluation Date: 9/29/2022 shoulder, 107/2022 knee  Authorization Period Expiration: 12/31/2023  Plan of Care Expiration: 7/12/2023  Visit # / Visits authorized: 23/40    Total visits: 49    Time In:  700am  Time Out: 755 am  Total Billable Time: 55 minutes    Precautions: Standard and Weightbearing    FOTO 1 51%  FOTO 2 42%  FOTO 3 52%    Subjective     Pt reports: Been at the hospital with my brother-in-law, just one spot on my right foot    She was compliant with home exercise program.  Response to previous treatment: Min soreness to the shoulder  Functional change: difficulty ascending stairs    Pain: 4/10  Location: left knee, right shoulder    Objective     Knee Passive Range of Motion:   Right  Left    Flexion 110 110   Extension +3 hyper 0     Meryl received therapeutic exercises to develop strength, endurance, ROM, and flexibility for 55 minutes including:    LAQ 7.5# 30x  Sidelying clam BTB 3 x 10  Seated heel raise 15# KB on knee 2 x 20  No money OTB 3 x 15  Tricep ext GTB 3 x 10  Rows GTB 30x  Patient education    NT  ER no money GTB 2 x 15  Serratus wall slides YTB 3 x 15  Upright SLR 3 x 15    IR/ER GTB 2 x 15  Rows with ER GTB 3 x 15  Scap ext BTB 2 x 15  Sidelying hip abduction 2 x 15   HS curl seated GTB 2 x 15  Tricep extension 2# 3 x 15  HS curls GTB 3 x 15  Recumbent bike 10' level 3 for knee motion  "and cardio endurance training  Sidelying hip abduction holds 3 x 10 3"    Meryl received the following manual therapy techniques: Joint mobilizations and Soft tissue Mobilization were applied to the: L knee for 0 minutes, including:    Knee assessment  Passive flexion EOT   Patellar mobilizations all planes  STM medial HS   Gr I-II mob R shoulder pain relief  Centering glide to humeral head Gr III  Gr III flexion to the R shoulder    Not Performed Today:  Medial gapping Gr III    Meryl participated in neuromuscular re-education activities to improve: Balance, Coordination, Sense, Proprioception, and Posture for 0 minutes. The following activities were included:    Shuttle DL 2 1/2 bands 4 x 10  Shuttle SL 2 bands 4 x 10  Shuttle heel raises 2 bands 4 x 10    Meryl received cold pack for 0 minutes to to decrease circulation, pain, and swelling.      Home Exercises Provided and Patient Education Provided     Education provided:   - Stair training     Written Home Exercises Provided: Patient instructed to cont prior HEP.  Exercises were reviewed and Meryl was able to demonstrate them prior to the end of the session.  Meryl demonstrated good  understanding of the education provided.     See EMR under Patient Instructions for exercises provided 10/6/2022.    Assessment     Meryl noted lat discomfort yesterday, better today but able to complete exercises. Progressing glute med with BTB and continued periscapular strengthening    Meryl Is progressing well towards her goals.   Pt prognosis is Excellent.     Pt will continue to benefit from skilled outpatient physical therapy to address the deficits listed in the problem list box on initial evaluation, provide pt/family education and to maximize pt's level of independence in the home and community environment.     Pt's spiritual, cultural and educational needs considered and pt agreeable to plan of care and goals.    Anticipated barriers to physical therapy: work " schedule    GOALS:     Short Term Goals:  4 weeks weeks(Progressing, not met)  1.Report decreased shoulder pain < / =  5/10 at worst to increase tolerance for work   2. Increase PROM full pain free   3. Increased strength by 1/3 MMT grade in shoulder strength to increase tolerance for ADL and work activities.  4. Pt to tolerate HEP to improve ROM and independence with ADL's     Long Term Goals: 8 weeks(Progressing, not met)    1.Report decreased shoulder pain  < / =  2/10 at worst  to increase tolerance for work  2.Increase AROM to full pain free  3.Increase strength to >/= 4/5 in shoulder strength to increase tolerance for ADL and work activities.  4. Pt goal: Return to pain free activities of daily living and work related activities.  5. Pt will have improved FOTO score of </= 35% for functional improvements in activities of daily living.    New Knee Goals: 6 weeks (Progressing, not met)    1.Report decreased knee pain  < / =  2/10  to increase tolerance for return to ambulation without RW  2. Increase knee ROM to full motion in order to be able to perform ADLs without difficulty.  3. Increase strength by 1/3 MMT grade in quad and glutes  to increase tolerance for ADL and work activities.  4. Pt to tolerate HEP to improve ROM and independence with ADL's      Plan     Certification Period: 4/12/2023 to 7/12/2023    Improve scap stabilization and posterior chain strengthening    Therapist: Fabricio Tolentino, PT, DPT

## 2023-05-12 ENCOUNTER — CLINICAL SUPPORT (OUTPATIENT)
Dept: REHABILITATION | Facility: HOSPITAL | Age: 68
End: 2023-05-12
Payer: COMMERCIAL

## 2023-05-12 DIAGNOSIS — G89.29 CHRONIC RIGHT SHOULDER PAIN: Primary | ICD-10-CM

## 2023-05-12 DIAGNOSIS — M25.511 CHRONIC RIGHT SHOULDER PAIN: Primary | ICD-10-CM

## 2023-05-12 DIAGNOSIS — M25.562 ACUTE PAIN OF LEFT KNEE: ICD-10-CM

## 2023-05-12 DIAGNOSIS — R29.898 DECREASED STRENGTH OF UPPER EXTREMITY: ICD-10-CM

## 2023-05-12 PROCEDURE — 97110 THERAPEUTIC EXERCISES: CPT | Performed by: PHYSICAL THERAPIST

## 2023-05-12 NOTE — PROGRESS NOTES
"Physical Therapy Daily Treatment Note     Name: Meryl Yo [facility] Number: [id number]    Therapy Diagnosis:   Encounter Diagnoses   Name Primary?    Chronic right shoulder pain Yes    Decreased strength of upper extremity     Acute pain of left knee      Physician: Russell Honeycutt III, *    Visit Date: 5/12/2023  Physician Orders: PT Eval and Treat   Medical Diagnosis from Referral:   Diagnosis   M25.511,G89.29 (ICD-10-CM) - Chronic right shoulder pain   M75.01 (ICD-10-CM) - Adhesive capsulitis of right shoulder    S83.242A (ICD-10-CM) - Acute medial meniscus tear of left knee, initial encounter  Evaluation Date: 9/29/2022 shoulder, 107/2022 knee  Authorization Period Expiration: 12/31/2023  Plan of Care Expiration: 7/12/2023  Visit # / Visits authorized: 24/40    Time In:  700am  Time Out: 755 am  Total Billable Time: 55 minutes    Precautions: Standard and Weightbearing    FOTO 1 51%  FOTO 2 42%  FOTO 3 52%    Subjective     Pt reports: A little pain when I reach with the L arm into the fridge     She was compliant with home exercise program.  Response to previous treatment: Min soreness to the shoulder  Functional change: difficulty ascending stairs    Pain: 4/10  Location: left knee, right shoulder    Objective     Knee Passive Range of Motion:   Right  Left    Flexion 110 110   Extension +3 hyper 0     Meryl received therapeutic exercises to develop strength, endurance, ROM, and flexibility for 55 minutes including:    LAQ 5# 30x  Serratus press OTB 2 x 15  ER with yellow loop 20x 3" hold  Standing heel raises 3  x 10 3" at top  Rows BTB 30x  Patient education    NT  Sidelying clam BTB 3 x 10  Seated heel raise 15# KB on knee 2 x 20  No money OTB 3 x 15  Tricep ext GTB 3 x 10  ER no money GTB 2 x 15  Serratus wall slides YTB 3 x 15  Upright SLR 3 x 15    Meryl received the following manual therapy techniques: Joint mobilizations and Soft tissue Mobilization were applied to the: L knee for 0 minutes, " including:    Knee assessment  Passive flexion EOT   Patellar mobilizations all planes  STM medial HS   Gr I-II mob R shoulder pain relief  Centering glide to humeral head Gr III  Gr III flexion to the R shoulder    Not Performed Today:  Medial gapping Gr III    Meryl participated in neuromuscular re-education activities to improve: Balance, Coordination, Sense, Proprioception, and Posture for 0 minutes. The following activities were included:    Shuttle DL 2 1/2 bands 4 x 10  Shuttle SL 2 bands 4 x 10  Shuttle heel raises 2 bands 4 x 10    Meryl received cold pack for 0 minutes to to decrease circulation, pain, and swelling.      Home Exercises Provided and Patient Education Provided     Education provided:   - Stair training     Written Home Exercises Provided: Patient instructed to cont prior HEP.  Exercises were reviewed and Meryl was able to demonstrate them prior to the end of the session.  Meryl demonstrated good  understanding of the education provided.     See EMR under Patient Instructions for exercises provided 10/6/2022.    Assessment     Meryl progressed standing heel raises today. Notes lower back pain with standing for prolonged periods of time. Decreased knee pain on post op knee    Meryl Is progressing well towards her goals.   Pt prognosis is Excellent.     Pt will continue to benefit from skilled outpatient physical therapy to address the deficits listed in the problem list box on initial evaluation, provide pt/family education and to maximize pt's level of independence in the home and community environment.     Pt's spiritual, cultural and educational needs considered and pt agreeable to plan of care and goals.    Anticipated barriers to physical therapy: work schedule    GOALS:     Short Term Goals:  4 weeks weeks(Progressing, not met)  1.Report decreased shoulder pain < / =  5/10 at worst to increase tolerance for work   2. Increase PROM full pain free   3. Increased strength by 1/3 MMT  grade in shoulder strength to increase tolerance for ADL and work activities.  4. Pt to tolerate HEP to improve ROM and independence with ADL's     Long Term Goals: 8 weeks(Progressing, not met)    1.Report decreased shoulder pain  < / =  2/10 at worst  to increase tolerance for work  2.Increase AROM to full pain free  3.Increase strength to >/= 4/5 in shoulder strength to increase tolerance for ADL and work activities.  4. Pt goal: Return to pain free activities of daily living and work related activities.  5. Pt will have improved FOTO score of </= 35% for functional improvements in activities of daily living.    New Knee Goals: 6 weeks (Progressing, not met)    1.Report decreased knee pain  < / =  2/10  to increase tolerance for return to ambulation without RW  2. Increase knee ROM to full motion in order to be able to perform ADLs without difficulty.  3. Increase strength by 1/3 MMT grade in quad and glutes  to increase tolerance for ADL and work activities.  4. Pt to tolerate HEP to improve ROM and independence with ADL's      Plan     Certification Period: 4/12/2023 to 7/12/2023    Improve scap stabilization and posterior chain strengthening    Therapist: Fabricio Tolentino, PT, DPT

## 2023-05-15 ENCOUNTER — CLINICAL SUPPORT (OUTPATIENT)
Dept: REHABILITATION | Facility: HOSPITAL | Age: 68
End: 2023-05-15
Payer: COMMERCIAL

## 2023-05-15 DIAGNOSIS — M25.562 ACUTE PAIN OF LEFT KNEE: ICD-10-CM

## 2023-05-15 DIAGNOSIS — G89.29 CHRONIC RIGHT SHOULDER PAIN: Primary | ICD-10-CM

## 2023-05-15 DIAGNOSIS — R29.898 DECREASED STRENGTH OF UPPER EXTREMITY: ICD-10-CM

## 2023-05-15 DIAGNOSIS — M25.511 CHRONIC RIGHT SHOULDER PAIN: Primary | ICD-10-CM

## 2023-05-15 PROCEDURE — 97110 THERAPEUTIC EXERCISES: CPT | Performed by: PHYSICAL THERAPIST

## 2023-05-15 NOTE — PROGRESS NOTES
"Physical Therapy Daily Treatment Note     Name: Meryl Yo [facility] Number: [id number]    Therapy Diagnosis:   Encounter Diagnoses   Name Primary?    Chronic right shoulder pain Yes    Decreased strength of upper extremity     Acute pain of left knee      Physician: Russell Honeycutt III, *    Visit Date: 5/15/2023  Physician Orders: PT Eval and Treat   Medical Diagnosis from Referral:   Diagnosis   M25.511,G89.29 (ICD-10-CM) - Chronic right shoulder pain   M75.01 (ICD-10-CM) - Adhesive capsulitis of right shoulder    S83.242A (ICD-10-CM) - Acute medial meniscus tear of left knee, initial encounter  Evaluation Date: 9/29/2022 shoulder, 107/2022 knee  Authorization Period Expiration: 12/31/2023  Plan of Care Expiration: 7/12/2023  Visit # / Visits authorized: 25/40    Time In:  700am  Time Out: 755 am  Total Billable Time: 55 minutes    Precautions: Standard and Weightbearing    FOTO 1 51%  FOTO 2 42%  FOTO 3 52%    Subjective     Pt reports: Knees and shoulder are doing okay.    She was compliant with home exercise program.  Response to previous treatment: Min soreness to the shoulder  Functional change: difficulty ascending stairs    Pain: 4/10  Location: left knee, right shoulder    Objective     Knee Passive Range of Motion:   Right  Left    Flexion 110 110   Extension +3 hyper 0     Meryl received therapeutic exercises to develop strength, endurance, ROM, and flexibility for 55 minutes including:    LAQ 7.5# 30x  ER with yellow loop 20x 3" hold  Seated heel raise 26# KB on knee 2 x 20  S/L clams GTB 2 X 15  Shoulder ext GTB 30x 3"  Patient education    NT  Serratus press OTB 2 x 15  Standing heel raises 3  x 10 3" at top  Rows BTB 30x  Sidelying clam BTB 3 x 10  No money OTB 3 x 15  Tricep ext GTB 3 x 10  ER no money GTB 2 x 15  Serratus wall slides YTB 3 x 15  Upright SLR 3 x 15    Meryl received the following manual therapy techniques: Joint mobilizations and Soft tissue Mobilization were applied to the: L " knee for 0 minutes, including:    Knee assessment  Passive flexion EOT   Patellar mobilizations all planes  STM medial HS   Gr I-II mob R shoulder pain relief  Centering glide to humeral head Gr III  Gr III flexion to the R shoulder    Not Performed Today:  Medial gapping Gr III    Meryl participated in neuromuscular re-education activities to improve: Balance, Coordination, Sense, Proprioception, and Posture for 0 minutes. The following activities were included:    Shuttle DL 2 1/2 bands 4 x 10  Shuttle SL 2 bands 4 x 10  Shuttle heel raises 2 bands 4 x 10    Meryl received cold pack for 0 minutes to to decrease circulation, pain, and swelling.      Home Exercises Provided and Patient Education Provided     Education provided:   - Stair training     Written Home Exercises Provided: Patient instructed to cont prior HEP.  Exercises were reviewed and Meryl was able to demonstrate them prior to the end of the session.  Meryl demonstrated good  understanding of the education provided.     See EMR under Patient Instructions for exercises provided 10/6/2022.    Assessment     Meryl progressing with glute med and scap stabilization. Cuff fatigue with ER.. Doing well at this point, will be out end of week/early next week due to scheduling    Meryl Is progressing well towards her goals.   Pt prognosis is Excellent.     Pt will continue to benefit from skilled outpatient physical therapy to address the deficits listed in the problem list box on initial evaluation, provide pt/family education and to maximize pt's level of independence in the home and community environment.     Pt's spiritual, cultural and educational needs considered and pt agreeable to plan of care and goals.    Anticipated barriers to physical therapy: work schedule    GOALS:     Short Term Goals:  4 weeks weeks(Progressing, not met)  1.Report decreased shoulder pain < / =  5/10 at worst to increase tolerance for work   2. Increase PROM full pain free    3. Increased strength by 1/3 MMT grade in shoulder strength to increase tolerance for ADL and work activities.  4. Pt to tolerate HEP to improve ROM and independence with ADL's     Long Term Goals: 8 weeks(Progressing, not met)    1.Report decreased shoulder pain  < / =  2/10 at worst  to increase tolerance for work  2.Increase AROM to full pain free  3.Increase strength to >/= 4/5 in shoulder strength to increase tolerance for ADL and work activities.  4. Pt goal: Return to pain free activities of daily living and work related activities.  5. Pt will have improved FOTO score of </= 35% for functional improvements in activities of daily living.    New Knee Goals: 6 weeks (Progressing, not met)    1.Report decreased knee pain  < / =  2/10  to increase tolerance for return to ambulation without RW  2. Increase knee ROM to full motion in order to be able to perform ADLs without difficulty.  3. Increase strength by 1/3 MMT grade in quad and glutes  to increase tolerance for ADL and work activities.  4. Pt to tolerate HEP to improve ROM and independence with ADL's      Plan     Certification Period: 4/12/2023 to 7/12/2023    Improve scap stabilization and posterior chain strengthening    Therapist: Fabricio Tolentino, PT, DPT

## 2023-05-17 ENCOUNTER — CLINICAL SUPPORT (OUTPATIENT)
Dept: REHABILITATION | Facility: HOSPITAL | Age: 68
End: 2023-05-17
Payer: COMMERCIAL

## 2023-05-17 DIAGNOSIS — M25.562 ACUTE PAIN OF LEFT KNEE: ICD-10-CM

## 2023-05-17 DIAGNOSIS — R29.898 DECREASED STRENGTH OF UPPER EXTREMITY: ICD-10-CM

## 2023-05-17 DIAGNOSIS — G89.29 CHRONIC RIGHT SHOULDER PAIN: Primary | ICD-10-CM

## 2023-05-17 DIAGNOSIS — M25.511 CHRONIC RIGHT SHOULDER PAIN: Primary | ICD-10-CM

## 2023-05-17 PROCEDURE — 97110 THERAPEUTIC EXERCISES: CPT | Performed by: PHYSICAL THERAPIST

## 2023-05-17 NOTE — PROGRESS NOTES
"Physical Therapy Daily Treatment Note     Name: eMryl Yo [facility] Number: [id number]    Therapy Diagnosis:   Encounter Diagnoses   Name Primary?    Chronic right shoulder pain Yes    Decreased strength of upper extremity     Acute pain of left knee      Physician: Russell Honeycutt III, *    Visit Date: 5/17/2023  Physician Orders: PT Eval and Treat   Medical Diagnosis from Referral:   Diagnosis   M25.511,G89.29 (ICD-10-CM) - Chronic right shoulder pain   M75.01 (ICD-10-CM) - Adhesive capsulitis of right shoulder    S83.242A (ICD-10-CM) - Acute medial meniscus tear of left knee, initial encounter  Evaluation Date: 9/29/2022 shoulder, 107/2022 knee  Authorization Period Expiration: 12/31/2023  Plan of Care Expiration: 7/12/2023  Visit # / Visits authorized: 26/40    Time In:  700am  Time Out: 745 am  Total Billable Time: 45 minutes    Precautions: Standard and Weightbearing    FOTO 1 51%  FOTO 2 42%  FOTO 3 52%    Subjective     Pt reports: My shoulder and my right knee are feeling better. I could go to games right now if I needed     She was compliant with home exercise program.  Response to previous treatment: Min soreness to the shoulder  Functional change: difficulty ascending stairs    Pain: 4/10  Location: left knee, right shoulder    Objective     Knee Passive Range of Motion:   Right  Left    Flexion 110 110   Extension +3 hyper 0     Meryl received therapeutic exercises to develop strength, endurance, ROM, and flexibility for 45 minutes including:    LAQ 7.5# 30x  ER with yellow loop 20x 3" hold  ER with flexion 20x  Serratus press GTB 2 x 15  Sit to stand low mat 20x  Patient education    NT  Seated heel raise 26# KB on knee 2 x 20  S/L clams GTB 2 X 15  Shoulder ext GTB 30x 3"  Standing heel raises 3  x 10 3" at top  Rows BTB 30x  Sidelying clam BTB 3 x 10  No money OTB 3 x 15  Tricep ext GTB 3 x 10  ER no money GTB 2 x 15  Serratus wall slides YTB 3 x 15  Upright SLR 3 x 15    Meryl received the " following manual therapy techniques: Joint mobilizations and Soft tissue Mobilization were applied to the: L knee for 0 minutes, including:    Knee assessment  Passive flexion EOT   Patellar mobilizations all planes  STM medial HS   Gr I-II mob R shoulder pain relief  Centering glide to humeral head Gr III  Gr III flexion to the R shoulder    Not Performed Today:  Medial gapping Gr III    Meryl participated in neuromuscular re-education activities to improve: Balance, Coordination, Sense, Proprioception, and Posture for 0 minutes. The following activities were included:    Shuttle DL 2 1/2 bands 4 x 10  Shuttle SL 2 bands 4 x 10  Shuttle heel raises 2 bands 4 x 10    Meryl received cold pack for 0 minutes to to decrease circulation, pain, and swelling.      Home Exercises Provided and Patient Education Provided     Education provided:   - Stair training     Written Home Exercises Provided: Patient instructed to cont prior HEP.  Exercises were reviewed and Meryl was able to demonstrate them prior to the end of the session.  Meryl demonstrated good  understanding of the education provided.     See EMR under Patient Instructions for exercises provided 10/6/2022.    Assessment     Meryl progressed with strengthening to the cuff with lifting.today. Sit to stands to low table. Will continue strengthening, notes she would be able to attend games at this time    Meryl Is progressing well towards her goals.   Pt prognosis is Excellent.     Pt will continue to benefit from skilled outpatient physical therapy to address the deficits listed in the problem list box on initial evaluation, provide pt/family education and to maximize pt's level of independence in the home and community environment.     Pt's spiritual, cultural and educational needs considered and pt agreeable to plan of care and goals.    Anticipated barriers to physical therapy: work schedule    GOALS:     Short Term Goals:  4 weeks weeks(Progressing, not  met)  1.Report decreased shoulder pain < / =  5/10 at worst to increase tolerance for work   2. Increase PROM full pain free   3. Increased strength by 1/3 MMT grade in shoulder strength to increase tolerance for ADL and work activities.  4. Pt to tolerate HEP to improve ROM and independence with ADL's     Long Term Goals: 8 weeks(Progressing, not met)    1.Report decreased shoulder pain  < / =  2/10 at worst  to increase tolerance for work  2.Increase AROM to full pain free  3.Increase strength to >/= 4/5 in shoulder strength to increase tolerance for ADL and work activities.  4. Pt goal: Return to pain free activities of daily living and work related activities.  5. Pt will have improved FOTO score of </= 35% for functional improvements in activities of daily living.    New Knee Goals: 6 weeks (Progressing, not met)    1.Report decreased knee pain  < / =  2/10  to increase tolerance for return to ambulation without RW  2. Increase knee ROM to full motion in order to be able to perform ADLs without difficulty.  3. Increase strength by 1/3 MMT grade in quad and glutes  to increase tolerance for ADL and work activities.  4. Pt to tolerate HEP to improve ROM and independence with ADL's      Plan     Certification Period: 4/12/2023 to 7/12/2023    Improve scap stabilization and posterior chain strengthening    Therapist: Fabricio Tolentino, PT, DPT

## 2023-05-18 DIAGNOSIS — E11.9 TYPE 2 DIABETES MELLITUS WITHOUT COMPLICATION: ICD-10-CM

## 2023-05-24 RX ORDER — EMPAGLIFLOZIN 10 MG/1
10 TABLET, FILM COATED ORAL DAILY
Qty: 90 TABLET | Refills: 3 | Status: CANCELLED | OUTPATIENT
Start: 2023-05-24 | End: 2024-05-23

## 2023-05-24 NOTE — TELEPHONE ENCOUNTER
Care Due:                  Date            Visit Type   Department     Provider  --------------------------------------------------------------------------------                                EP -                              PRIMARY      [facility] PRIMARY  Last Visit: 09-      CARE ([facility])   VARUN Martinez  Next Visit: None Scheduled  None         None Found                                                            Last  Test          Frequency    Reason                     Performed    Due Date  --------------------------------------------------------------------------------    CMP.........  12 months..  rosuvastatin.............  05- 05-    HBA1C.......  6 months...  metFORMIN................  09- 03-    Lipid Panel.  12 months..  rosuvastatin.............  05- 05-    [employer] Embedded Care Due Messages. Reference number: [id number].   5/24/2023 9:32:25 AM CDT

## 2023-05-26 ENCOUNTER — PATIENT MESSAGE (OUTPATIENT)
Dept: INTERNAL MEDICINE | Facility: CLINIC | Age: 68
End: 2023-05-26
Payer: COMMERCIAL

## 2023-05-26 ENCOUNTER — CLINICAL SUPPORT (OUTPATIENT)
Dept: REHABILITATION | Facility: HOSPITAL | Age: 68
End: 2023-05-26
Payer: COMMERCIAL

## 2023-05-26 DIAGNOSIS — M25.562 ACUTE PAIN OF LEFT KNEE: ICD-10-CM

## 2023-05-26 DIAGNOSIS — G89.29 CHRONIC RIGHT SHOULDER PAIN: Primary | ICD-10-CM

## 2023-05-26 DIAGNOSIS — R29.898 DECREASED STRENGTH OF UPPER EXTREMITY: ICD-10-CM

## 2023-05-26 DIAGNOSIS — M25.511 CHRONIC RIGHT SHOULDER PAIN: Primary | ICD-10-CM

## 2023-05-26 PROCEDURE — 97110 THERAPEUTIC EXERCISES: CPT | Performed by: PHYSICAL THERAPIST

## 2023-05-26 NOTE — PROGRESS NOTES
"Physical Therapy Daily Treatment Note     Name: Meryl Yo [facility] Number: [id number]    Therapy Diagnosis:   Encounter Diagnoses   Name Primary?    Chronic right shoulder pain Yes    Decreased strength of upper extremity     Acute pain of left knee      Physician: Russell Honeycutt III, *    Visit Date: 5/26/2023  Physician Orders: PT Eval and Treat   Medical Diagnosis from Referral:   Diagnosis   M25.511,G89.29 (ICD-10-CM) - Chronic right shoulder pain   M75.01 (ICD-10-CM) - Adhesive capsulitis of right shoulder    S83.242A (ICD-10-CM) - Acute medial meniscus tear of left knee, initial encounter  Evaluation Date: 9/29/2022 shoulder, 107/2022 knee  Authorization Period Expiration: 12/31/2023  Plan of Care Expiration: 7/12/2023  Visit # / Visits authorized: 27/40    Time In:  700am  Time Out: 755 am  Total Billable Time: 55 minutes    Precautions: Standard and Weightbearing    FOTO 1 51%  FOTO 2 42%  FOTO 3 52%    Subjective     Pt reports: I've been okay, just busy taking care off family    She was compliant with home exercise program.  Response to previous treatment: Min soreness to the shoulder  Functional change: difficulty ascending stairs    Pain: 4/10  Location: left knee, right shoulder    Objective     Knee Passive Range of Motion:   Right  Left    Flexion 110 110   Extension +3 hyper 0     Meryl received therapeutic exercises to develop strength, endurance, ROM, and flexibility for 55 minutes including:    LAQ 7.5# 30x  ER with yellow loop 20x 3" hold  Scaptions 1# 3 x 15  Serratus press GTB 2 x 15  Wand press 6# 30x  Patient education    NT  Sit to stand low mat 20x  ER with flexion 20x  Seated heel raise 26# KB on knee 2 x 20  S/L clams GTB 2 X 15  Shoulder ext GTB 30x 3"  Standing heel raises 3  x 10 3" at top  Rows BTB 30x  Sidelying clam BTB 3 x 10  No money OTB 3 x 15  Tricep ext GTB 3 x 10  ER no money GTB 2 x 15  Serratus wall slides YTB 3 x 15  Upright SLR 3 x 15    Meryl received the " following manual therapy techniques: Joint mobilizations and Soft tissue Mobilization were applied to the: L knee for 0 minutes, including:    Knee assessment  Passive flexion EOT   Patellar mobilizations all planes  STM medial HS   Gr I-II mob R shoulder pain relief  Centering glide to humeral head Gr III  Gr III flexion to the R shoulder    Not Performed Today:  Medial gapping Gr III    Meryl participated in neuromuscular re-education activities to improve: Balance, Coordination, Sense, Proprioception, and Posture for 0 minutes. The following activities were included:    Shuttle DL 2 1/2 bands 4 x 10  Shuttle SL 2 bands 4 x 10  Shuttle heel raises 2 bands 4 x 10    Meryl received cold pack for 0 minutes to to decrease circulation, pain, and swelling.      Home Exercises Provided and Patient Education Provided     Education provided:   - Stair training     Written Home Exercises Provided: Patient instructed to cont prior HEP.  Exercises were reviewed and Meryl was able to demonstrate them prior to the end of the session.  Meryl demonstrated good  understanding of the education provided.     See EMR under Patient Instructions for exercises provided 10/6/2022.    Assessment     Meryl notes the knees feeling much better. Right shoulder strength improved but the left is bothering me a little. Will progress with chest press weight next visit     Meryl Is progressing well towards her goals.   Pt prognosis is Excellent.     Pt will continue to benefit from skilled outpatient physical therapy to address the deficits listed in the problem list box on initial evaluation, provide pt/family education and to maximize pt's level of independence in the home and community environment.     Pt's spiritual, cultural and educational needs considered and pt agreeable to plan of care and goals.    Anticipated barriers to physical therapy: work schedule    GOALS:     Short Term Goals:  4 weeks weeks(Progressing, not met)  1.Report  decreased shoulder pain < / =  5/10 at worst to increase tolerance for work   2. Increase PROM full pain free   3. Increased strength by 1/3 MMT grade in shoulder strength to increase tolerance for ADL and work activities.  4. Pt to tolerate HEP to improve ROM and independence with ADL's     Long Term Goals: 8 weeks(Progressing, not met)    1.Report decreased shoulder pain  < / =  2/10 at worst  to increase tolerance for work  2.Increase AROM to full pain free  3.Increase strength to >/= 4/5 in shoulder strength to increase tolerance for ADL and work activities.  4. Pt goal: Return to pain free activities of daily living and work related activities.  5. Pt will have improved FOTO score of </= 35% for functional improvements in activities of daily living.    New Knee Goals: 6 weeks (Progressing, not met)    1.Report decreased knee pain  < / =  2/10  to increase tolerance for return to ambulation without RW  2. Increase knee ROM to full motion in order to be able to perform ADLs without difficulty.  3. Increase strength by 1/3 MMT grade in quad and glutes  to increase tolerance for ADL and work activities.  4. Pt to tolerate HEP to improve ROM and independence with ADL's      Plan     Certification Period: 4/12/2023 to 7/12/2023    Improve scap stabilization and posterior chain strengthening    Therapist: Fabricio Tolentino, PT, DPT

## 2023-05-29 ENCOUNTER — PATIENT MESSAGE (OUTPATIENT)
Dept: INTERNAL MEDICINE | Facility: CLINIC | Age: 68
End: 2023-05-29
Payer: COMMERCIAL

## 2023-06-02 ENCOUNTER — CLINICAL SUPPORT (OUTPATIENT)
Dept: REHABILITATION | Facility: HOSPITAL | Age: 68
End: 2023-06-02
Payer: COMMERCIAL

## 2023-06-02 DIAGNOSIS — R29.898 DECREASED STRENGTH OF UPPER EXTREMITY: ICD-10-CM

## 2023-06-02 DIAGNOSIS — M25.562 ACUTE PAIN OF LEFT KNEE: ICD-10-CM

## 2023-06-02 DIAGNOSIS — G89.29 CHRONIC RIGHT SHOULDER PAIN: Primary | ICD-10-CM

## 2023-06-02 DIAGNOSIS — M25.511 CHRONIC RIGHT SHOULDER PAIN: Primary | ICD-10-CM

## 2023-06-02 PROCEDURE — 97110 THERAPEUTIC EXERCISES: CPT | Performed by: PHYSICAL THERAPIST

## 2023-06-02 NOTE — PROGRESS NOTES
"Physical Therapy Daily Treatment Note     Name: Meryl Yo [facility] Number: [id number]    Therapy Diagnosis:   Encounter Diagnoses   Name Primary?    Chronic right shoulder pain Yes    Decreased strength of upper extremity     Acute pain of left knee      Physician: Russell Honeycutt III, *    Visit Date: 6/2/2023  Physician Orders: PT Eval and Treat   Medical Diagnosis from Referral:   Diagnosis   M25.511,G89.29 (ICD-10-CM) - Chronic right shoulder pain   M75.01 (ICD-10-CM) - Adhesive capsulitis of right shoulder    S83.242A (ICD-10-CM) - Acute medial meniscus tear of left knee, initial encounter  Evaluation Date: 9/29/2022 shoulder, 107/2022 knee  Authorization Period Expiration: 12/31/2023  Plan of Care Expiration: 7/12/2023  Visit # / Visits authorized: 28/40    Time In:  700am  Time Out: 754 am  Total Billable Time: 54 minutes    Precautions: Standard and Weightbearing    FOTO 1 51%  FOTO 2 42%  FOTO 3 52%    Subjective     Pt reports: Shoulder hurt when I reach to the back of the fridge    She was compliant with home exercise program.  Response to previous treatment: Min soreness to the shoulder  Functional change: difficulty ascending stairs    Pain: 4/10  Location: left knee, right shoulder    Objective     Knee Passive Range of Motion:   Right  Left    Flexion 110 110   Extension +3 hyper 0     Meryl received therapeutic exercises to develop strength, endurance, ROM, and flexibility for 54 minutes including:    LAQ 7.5# 30x  ER with green TB 20x 3" hold  Scaptions 1# 3 x 15  Serratus press GTB 2 x 15  Sidelying hip abd 2 x 15  Patient education    NT  Wand press 6# 30x  Sit to stand low mat 20x  ER with flexion 20x  Seated heel raise 26# KB on knee 2 x 20  S/L clams GTB 2 X 15  Shoulder ext GTB 30x 3"  Standing heel raises 3  x 10 3" at top  Rows BTB 30x  Sidelying clam BTB 3 x 10  No money OTB 3 x 15  Tricep ext GTB 3 x 10  ER no money GTB 2 x 15  Serratus wall slides YTB 3 x 15  Upright SLR 3 x " 15    Meryl received the following manual therapy techniques: Joint mobilizations and Soft tissue Mobilization were applied to the: L knee for 0 minutes, including:    Knee assessment  Passive flexion EOT   Patellar mobilizations all planes  STM medial HS   Gr I-II mob R shoulder pain relief  Centering glide to humeral head Gr III  Gr III flexion to the R shoulder    Not Performed Today:  Medial gapping Gr III    Meryl participated in neuromuscular re-education activities to improve: Balance, Coordination, Sense, Proprioception, and Posture for 0 minutes. The following activities were included:    Shuttle DL 2 1/2 bands 4 x 10  Shuttle SL 2 bands 4 x 10  Shuttle heel raises 2 bands 4 x 10    Meryl received cold pack for 0 minutes to to decrease circulation, pain, and swelling.      Home Exercises Provided and Patient Education Provided     Education provided:   - Stair training     Written Home Exercises Provided: Patient instructed to cont prior HEP.  Exercises were reviewed and Meryl was able to demonstrate them prior to the end of the session.  Meryl demonstrated good  understanding of the education provided.     See EMR under Patient Instructions for exercises provided 10/6/2022.    Assessment     Meryl demonstrates improved knee strength and glute med. Lacks serratus and low/mid trap strength with impingement. Will continue progressing functional exercises     Meryl Is progressing well towards her goals.   Pt prognosis is Excellent.     Pt will continue to benefit from skilled outpatient physical therapy to address the deficits listed in the problem list box on initial evaluation, provide pt/family education and to maximize pt's level of independence in the home and community environment.     Pt's spiritual, cultural and educational needs considered and pt agreeable to plan of care and goals.    Anticipated barriers to physical therapy: work schedule    GOALS:     Short Term Goals:  4 weeks  weeks(Progressing, not met)  1.Report decreased shoulder pain < / =  5/10 at worst to increase tolerance for work   2. Increase PROM full pain free   3. Increased strength by 1/3 MMT grade in shoulder strength to increase tolerance for ADL and work activities.  4. Pt to tolerate HEP to improve ROM and independence with ADL's     Long Term Goals: 8 weeks(Progressing, not met)    1.Report decreased shoulder pain  < / =  2/10 at worst  to increase tolerance for work  2.Increase AROM to full pain free  3.Increase strength to >/= 4/5 in shoulder strength to increase tolerance for ADL and work activities.  4. Pt goal: Return to pain free activities of daily living and work related activities.  5. Pt will have improved FOTO score of </= 35% for functional improvements in activities of daily living.    New Knee Goals: 6 weeks (Progressing, not met)    1.Report decreased knee pain  < / =  2/10  to increase tolerance for return to ambulation without RW  2. Increase knee ROM to full motion in order to be able to perform ADLs without difficulty.  3. Increase strength by 1/3 MMT grade in quad and glutes  to increase tolerance for ADL and work activities.  4. Pt to tolerate HEP to improve ROM and independence with ADL's      Plan     Certification Period: 4/12/2023 to 7/12/2023    Improve scap stabilization and posterior chain strengthening    Therapist: Fabricio Tolentino, PT, DPT

## 2023-06-06 ENCOUNTER — CLINICAL SUPPORT (OUTPATIENT)
Dept: REHABILITATION | Facility: HOSPITAL | Age: 68
End: 2023-06-06
Payer: COMMERCIAL

## 2023-06-06 DIAGNOSIS — M25.511 CHRONIC RIGHT SHOULDER PAIN: Primary | ICD-10-CM

## 2023-06-06 DIAGNOSIS — R29.898 DECREASED STRENGTH OF UPPER EXTREMITY: ICD-10-CM

## 2023-06-06 DIAGNOSIS — G89.29 CHRONIC RIGHT SHOULDER PAIN: Primary | ICD-10-CM

## 2023-06-06 DIAGNOSIS — M25.562 ACUTE PAIN OF LEFT KNEE: ICD-10-CM

## 2023-06-06 PROCEDURE — 97112 NEUROMUSCULAR REEDUCATION: CPT | Performed by: PHYSICAL THERAPIST

## 2023-06-06 PROCEDURE — 97110 THERAPEUTIC EXERCISES: CPT | Performed by: PHYSICAL THERAPIST

## 2023-06-06 NOTE — PROGRESS NOTES
"Physical Therapy Daily Treatment Note     Name: Meryl Yo [facility] Number: [id number]    Therapy Diagnosis:   Encounter Diagnoses   Name Primary?    Chronic right shoulder pain Yes    Decreased strength of upper extremity     Acute pain of left knee      Physician: Russell Honeycutt III, *    Visit Date: 6/6/2023  Physician Orders: PT Eval and Treat   Medical Diagnosis from Referral:   Diagnosis   M25.511,G89.29 (ICD-10-CM) - Chronic right shoulder pain   M75.01 (ICD-10-CM) - Adhesive capsulitis of right shoulder    S83.242A (ICD-10-CM) - Acute medial meniscus tear of left knee, initial encounter  Evaluation Date: 9/29/2022 shoulder, 107/2022 knee  Authorization Period Expiration: 12/31/2023  Plan of Care Expiration: 7/12/2023  Visit # / Visits authorized: 29/40    Time In:  1400   Time Out: 1500  Total Billable Time: 60 minutes    Precautions: Standard and Weightbearing    FOTO 1 51%  FOTO 2 42%  FOTO 3 52%    Subjective     Pt reports: Knee and shoulder doing well, I am just trying to figure out where I left my card    She was compliant with home exercise program.  Response to previous treatment: Min soreness to the shoulder  Functional change: pain reaching back of the fridge    Pain: 4/10  Location: left knee, right shoulder    Objective     Knee Passive Range of Motion:   Right  Left    Flexion 110 110   Extension +3 hyper 0     Meryl received therapeutic exercises to develop strength, endurance, ROM, and flexibility for 50 minutes including:    LAQ 7.5# 30x  ER with green TB 20x 3" hold  Scaptions 2# 3 x 15  Serratus press GTB 2 x 15  Rows BTB 30x  Patient education    NT  Wand press 6# 30x  Sit to stand low mat 20x  ER with flexion 20x  Seated heel raise 26# KB on knee 2 x 20  S/L clams GTB 2 X 15  Shoulder ext GTB 30x 3"  Standing heel raises 3  x 10 3" at top  Sidelying clam BTB 3 x 10  Tricep ext GTB 3 x 10  ER no money GTB 2 x 15  Serratus wall slides YTB 3 x 15  Upright SLR 3 x 15    Meryl received " "the following manual therapy techniques: Joint mobilizations and Soft tissue Mobilization were applied to the: L knee for 0 minutes, including:    Knee assessment  Passive flexion EOT   Patellar mobilizations all planes  STM medial HS   Gr I-II mob R shoulder pain relief  Centering glide to humeral head Gr III  Gr III flexion to the R shoulder    Not Performed Today:  Medial gapping Gr III    Meryl participated in neuromuscular re-education activities to improve: Balance, Coordination, Sense, Proprioception, and Posture for 10 minutes. The following activities were included:    Step ups 5" forward/side 30x ea  No money GTB 3 x 15    NT  Shuttle DL 2 1/2 bands 4 x 10  Shuttle SL 2 bands 4 x 10  Shuttle heel raises 2 bands 4 x 10    Meryl received cold pack for 0 minutes to to decrease circulation, pain, and swelling.      Home Exercises Provided and Patient Education Provided     Education provided:   - Stair training     Written Home Exercises Provided: Patient instructed to cont prior HEP.  Exercises were reviewed and Meryl was able to demonstrate them prior to the end of the session.  Meryl demonstrated good  understanding of the education provided.     See EMR under Patient Instructions for exercises provided 10/6/2022.    Assessment     Meryl progressed with strengthening in functional plane. Better strength with step ups and progressed with scaption to 2#. Strength improving well, will progress endurance training     Meryl Is progressing well towards her goals.   Pt prognosis is Excellent.     Pt will continue to benefit from skilled outpatient physical therapy to address the deficits listed in the problem list box on initial evaluation, provide pt/family education and to maximize pt's level of independence in the home and community environment.     Pt's spiritual, cultural and educational needs considered and pt agreeable to plan of care and goals.    Anticipated barriers to physical therapy: work " schedule    GOALS:     Short Term Goals:  4 weeks weeks(Progressing, not met)  1.Report decreased shoulder pain < / =  5/10 at worst to increase tolerance for work   2. Increase PROM full pain free   3. Increased strength by 1/3 MMT grade in shoulder strength to increase tolerance for ADL and work activities.  4. Pt to tolerate HEP to improve ROM and independence with ADL's     Long Term Goals: 8 weeks(Progressing, not met)    1.Report decreased shoulder pain  < / =  2/10 at worst  to increase tolerance for work  2.Increase AROM to full pain free  3.Increase strength to >/= 4/5 in shoulder strength to increase tolerance for ADL and work activities.  4. Pt goal: Return to pain free activities of daily living and work related activities.  5. Pt will have improved FOTO score of </= 35% for functional improvements in activities of daily living.    New Knee Goals: 6 weeks (Progressing, not met)    1.Report decreased knee pain  < / =  2/10  to increase tolerance for return to ambulation without RW  2. Increase knee ROM to full motion in order to be able to perform ADLs without difficulty.  3. Increase strength by 1/3 MMT grade in quad and glutes  to increase tolerance for ADL and work activities.  4. Pt to tolerate HEP to improve ROM and independence with ADL's      Plan     Certification Period: 4/12/2023 to 7/12/2023    Improve scap stabilization and posterior chain strengthening    Therapist: Fabricio Tolentino, PT, DPT

## 2023-06-07 ENCOUNTER — PATIENT MESSAGE (OUTPATIENT)
Dept: INTERNAL MEDICINE | Facility: CLINIC | Age: 68
End: 2023-06-07
Payer: COMMERCIAL

## 2023-06-08 ENCOUNTER — OFFICE VISIT (OUTPATIENT)
Dept: INTERNAL MEDICINE | Facility: CLINIC | Age: 68
End: 2023-06-08
Payer: COMMERCIAL

## 2023-06-08 ENCOUNTER — LAB VISIT (OUTPATIENT)
Dept: LAB | Facility: HOSPITAL | Age: 68
End: 2023-06-08
Attending: INTERNAL MEDICINE
Payer: COMMERCIAL

## 2023-06-08 ENCOUNTER — PATIENT MESSAGE (OUTPATIENT)
Dept: INTERNAL MEDICINE | Facility: CLINIC | Age: 68
End: 2023-06-08

## 2023-06-08 VITALS
HEART RATE: 107 BPM | HEIGHT: 67 IN | BODY MASS INDEX: 42.32 KG/M2 | TEMPERATURE: 99 F | SYSTOLIC BLOOD PRESSURE: 134 MMHG | OXYGEN SATURATION: 97 % | DIASTOLIC BLOOD PRESSURE: 92 MMHG | WEIGHT: 269.63 LBS

## 2023-06-08 DIAGNOSIS — N39.0 URINARY TRACT INFECTION WITHOUT HEMATURIA, SITE UNSPECIFIED: Primary | ICD-10-CM

## 2023-06-08 DIAGNOSIS — I10 ESSENTIAL HYPERTENSION: ICD-10-CM

## 2023-06-08 DIAGNOSIS — E11.22 TYPE 2 DIABETES MELLITUS WITH STAGE 3B CHRONIC KIDNEY DISEASE, WITHOUT LONG-TERM CURRENT USE OF INSULIN: ICD-10-CM

## 2023-06-08 DIAGNOSIS — N18.32 TYPE 2 DIABETES MELLITUS WITH STAGE 3B CHRONIC KIDNEY DISEASE, WITHOUT LONG-TERM CURRENT USE OF INSULIN: ICD-10-CM

## 2023-06-08 DIAGNOSIS — N39.0 URINARY TRACT INFECTION WITHOUT HEMATURIA, SITE UNSPECIFIED: ICD-10-CM

## 2023-06-08 LAB
ANION GAP SERPL CALC-SCNC: 11 MMOL/L (ref 8–16)
BACTERIA #/AREA URNS AUTO: ABNORMAL /HPF
BASOPHILS # BLD AUTO: 0.02 K/UL (ref 0–0.2)
BASOPHILS NFR BLD: 0.4 % (ref 0–1.9)
BILIRUB UR QL STRIP: NEGATIVE
BUN SERPL-MCNC: 20 MG/DL (ref 8–23)
CALCIUM SERPL-MCNC: 9.4 MG/DL (ref 8.7–10.5)
CHLORIDE SERPL-SCNC: 105 MMOL/L (ref 95–110)
CLARITY UR REFRACT.AUTO: ABNORMAL
CO2 SERPL-SCNC: 20 MMOL/L (ref 23–29)
COLOR UR AUTO: YELLOW
CREAT SERPL-MCNC: 1.2 MG/DL (ref 0.5–1.4)
DIFFERENTIAL METHOD: ABNORMAL
EOSINOPHIL # BLD AUTO: 0 K/UL (ref 0–0.5)
EOSINOPHIL NFR BLD: 0 % (ref 0–8)
ERYTHROCYTE [DISTWIDTH] IN BLOOD BY AUTOMATED COUNT: 14.3 % (ref 11.5–14.5)
EST. GFR  (NO RACE VARIABLE): 49.6 ML/MIN/1.73 M^2
ESTIMATED AVG GLUCOSE: 163 MG/DL (ref 68–131)
GLUCOSE SERPL-MCNC: 136 MG/DL (ref 70–110)
GLUCOSE UR QL STRIP: ABNORMAL
HBA1C MFR BLD: 7.3 % (ref 4–5.6)
HCT VFR BLD AUTO: 44.5 % (ref 37–48.5)
HGB BLD-MCNC: 13.4 G/DL (ref 12–16)
HGB UR QL STRIP: ABNORMAL
HYALINE CASTS UR QL AUTO: 0 /LPF
IMM GRANULOCYTES # BLD AUTO: 0.01 K/UL (ref 0–0.04)
IMM GRANULOCYTES NFR BLD AUTO: 0.2 % (ref 0–0.5)
KETONES UR QL STRIP: NEGATIVE
LEUKOCYTE ESTERASE UR QL STRIP: ABNORMAL
LYMPHOCYTES # BLD AUTO: 0.9 K/UL (ref 1–4.8)
LYMPHOCYTES NFR BLD: 16.3 % (ref 18–48)
MCH RBC QN AUTO: 26.8 PG (ref 27–31)
MCHC RBC AUTO-ENTMCNC: 30.1 G/DL (ref 32–36)
MCV RBC AUTO: 89 FL (ref 82–98)
MICROSCOPIC COMMENT: ABNORMAL
MONOCYTES # BLD AUTO: 0.5 K/UL (ref 0.3–1)
MONOCYTES NFR BLD: 9.3 % (ref 4–15)
NEUTROPHILS # BLD AUTO: 4 K/UL (ref 1.8–7.7)
NEUTROPHILS NFR BLD: 73.8 % (ref 38–73)
NITRITE UR QL STRIP: POSITIVE
NRBC BLD-RTO: 0 /100 WBC
PH UR STRIP: 5 [PH] (ref 5–8)
PLATELET # BLD AUTO: 204 K/UL (ref 150–450)
PMV BLD AUTO: 11.3 FL (ref 9.2–12.9)
POTASSIUM SERPL-SCNC: 4.3 MMOL/L (ref 3.5–5.1)
PROT UR QL STRIP: ABNORMAL
RBC # BLD AUTO: 5 M/UL (ref 4–5.4)
RBC #/AREA URNS AUTO: 9 /HPF (ref 0–4)
SODIUM SERPL-SCNC: 136 MMOL/L (ref 136–145)
SP GR UR STRIP: 1.01 (ref 1–1.03)
SQUAMOUS #/AREA URNS AUTO: 1 /HPF
URN SPEC COLLECT METH UR: ABNORMAL
WBC # BLD AUTO: 5.47 K/UL (ref 3.9–12.7)
WBC #/AREA URNS AUTO: >100 /HPF (ref 0–5)
YEAST UR QL AUTO: ABNORMAL

## 2023-06-08 PROCEDURE — 99999 PR PBB SHADOW E&M-EST. PATIENT-LVL III: CPT | Mod: PBBFAC,,, | Performed by: INTERNAL MEDICINE

## 2023-06-08 PROCEDURE — 87088 URINE BACTERIA CULTURE: CPT | Performed by: INTERNAL MEDICINE

## 2023-06-08 PROCEDURE — 3072F PR LOW RISK FOR RETINOPATHY: ICD-10-PCS | Mod: CPTII,S$GLB,, | Performed by: INTERNAL MEDICINE

## 2023-06-08 PROCEDURE — 3075F PR MOST RECENT SYSTOLIC BLOOD PRESS GE 130-139MM HG: ICD-10-PCS | Mod: CPTII,S$GLB,, | Performed by: INTERNAL MEDICINE

## 2023-06-08 PROCEDURE — 3072F LOW RISK FOR RETINOPATHY: CPT | Mod: CPTII,S$GLB,, | Performed by: INTERNAL MEDICINE

## 2023-06-08 PROCEDURE — 87186 SC STD MICRODIL/AGAR DIL: CPT | Performed by: INTERNAL MEDICINE

## 2023-06-08 PROCEDURE — 1125F AMNT PAIN NOTED PAIN PRSNT: CPT | Mod: CPTII,S$GLB,, | Performed by: INTERNAL MEDICINE

## 2023-06-08 PROCEDURE — 3288F PR FALLS RISK ASSESSMENT DOCUMENTED: ICD-10-PCS | Mod: CPTII,S$GLB,, | Performed by: INTERNAL MEDICINE

## 2023-06-08 PROCEDURE — 99214 OFFICE O/P EST MOD 30 MIN: CPT | Mod: S$GLB,,, | Performed by: INTERNAL MEDICINE

## 2023-06-08 PROCEDURE — 81001 URINALYSIS AUTO W/SCOPE: CPT | Performed by: INTERNAL MEDICINE

## 2023-06-08 PROCEDURE — 87086 URINE CULTURE/COLONY COUNT: CPT | Performed by: INTERNAL MEDICINE

## 2023-06-08 PROCEDURE — 1159F PR MEDICATION LIST DOCUMENTED IN MEDICAL RECORD: ICD-10-PCS | Mod: CPTII,S$GLB,, | Performed by: INTERNAL MEDICINE

## 2023-06-08 PROCEDURE — 3075F SYST BP GE 130 - 139MM HG: CPT | Mod: CPTII,S$GLB,, | Performed by: INTERNAL MEDICINE

## 2023-06-08 PROCEDURE — 1101F PT FALLS ASSESS-DOCD LE1/YR: CPT | Mod: CPTII,S$GLB,, | Performed by: INTERNAL MEDICINE

## 2023-06-08 PROCEDURE — 80048 BASIC METABOLIC PNL TOTAL CA: CPT | Performed by: INTERNAL MEDICINE

## 2023-06-08 PROCEDURE — 99214 PR OFFICE/OUTPT VISIT, EST, LEVL IV, 30-39 MIN: ICD-10-PCS | Mod: S$GLB,,, | Performed by: INTERNAL MEDICINE

## 2023-06-08 PROCEDURE — 36415 COLL VENOUS BLD VENIPUNCTURE: CPT | Performed by: INTERNAL MEDICINE

## 2023-06-08 PROCEDURE — 99999 PR PBB SHADOW E&M-EST. PATIENT-LVL III: ICD-10-PCS | Mod: PBBFAC,,, | Performed by: INTERNAL MEDICINE

## 2023-06-08 PROCEDURE — 85025 COMPLETE CBC W/AUTO DIFF WBC: CPT | Performed by: INTERNAL MEDICINE

## 2023-06-08 PROCEDURE — 83036 HEMOGLOBIN GLYCOSYLATED A1C: CPT | Performed by: INTERNAL MEDICINE

## 2023-06-08 PROCEDURE — 1159F MED LIST DOCD IN RCRD: CPT | Mod: CPTII,S$GLB,, | Performed by: INTERNAL MEDICINE

## 2023-06-08 PROCEDURE — 3288F FALL RISK ASSESSMENT DOCD: CPT | Mod: CPTII,S$GLB,, | Performed by: INTERNAL MEDICINE

## 2023-06-08 PROCEDURE — 87077 CULTURE AEROBIC IDENTIFY: CPT | Performed by: INTERNAL MEDICINE

## 2023-06-08 PROCEDURE — 3080F DIAST BP >= 90 MM HG: CPT | Mod: CPTII,S$GLB,, | Performed by: INTERNAL MEDICINE

## 2023-06-08 PROCEDURE — 3008F BODY MASS INDEX DOCD: CPT | Mod: CPTII,S$GLB,, | Performed by: INTERNAL MEDICINE

## 2023-06-08 PROCEDURE — 4010F ACE/ARB THERAPY RXD/TAKEN: CPT | Mod: CPTII,S$GLB,, | Performed by: INTERNAL MEDICINE

## 2023-06-08 PROCEDURE — 3080F PR MOST RECENT DIASTOLIC BLOOD PRESSURE >= 90 MM HG: ICD-10-PCS | Mod: CPTII,S$GLB,, | Performed by: INTERNAL MEDICINE

## 2023-06-08 PROCEDURE — 4010F PR ACE/ARB THEARPY RXD/TAKEN: ICD-10-PCS | Mod: CPTII,S$GLB,, | Performed by: INTERNAL MEDICINE

## 2023-06-08 PROCEDURE — 3008F PR BODY MASS INDEX (BMI) DOCUMENTED: ICD-10-PCS | Mod: CPTII,S$GLB,, | Performed by: INTERNAL MEDICINE

## 2023-06-08 PROCEDURE — 1125F PR PAIN SEVERITY QUANTIFIED, PAIN PRESENT: ICD-10-PCS | Mod: CPTII,S$GLB,, | Performed by: INTERNAL MEDICINE

## 2023-06-08 PROCEDURE — 1101F PR PT FALLS ASSESS DOC 0-1 FALLS W/OUT INJ PAST YR: ICD-10-PCS | Mod: CPTII,S$GLB,, | Performed by: INTERNAL MEDICINE

## 2023-06-08 RX ORDER — CIPROFLOXACIN 500 MG/1
500 TABLET ORAL 2 TIMES DAILY
Qty: 14 TABLET | Refills: 0 | Status: SHIPPED | OUTPATIENT
Start: 2023-06-08 | End: 2023-06-15

## 2023-06-08 NOTE — PROGRESS NOTES
HISTORY:  Type 2 diabetes with peripheral neuropathy  Hypertension.  Hyperlipidemia.  Helicobacter pylori which has been treated.  Obesity with gastric bypass surgery.  Left foot ulcer, fifth metatarsal, debridement     SOCIAL HISTORY:  Tobacco and alcohol use - none.        Medications  Benazepril 40 mg  Rosuvastatin 20 mg  Metformin 500 mg  Two tablets bid  Jardiance 10 mg    67-year-old female  Since yesterday afternoon she has been having subjective fever.  Body aches.  Chills.  And instead on yesterday evening urinary urgency where there has been urge incontinence.  There is no dysuria.  She feels that she may have some degree of low back pain.    Complains of a dry throat but no throat pain.  No nasal head congestion.  No shortness of breath or abdominal pain    Examination   Weight 269 lb   Pulse 100   Temperature 99°  Tympanic membranes normal   Nasal mucosa is clear  Oropharynx no abnormal findings no exudate erythema   Neck no palpable adenopathy   Chest clear breath sounds   Heart regular rate and rhythm  Abdominal exam soft nontender no hepatosplenomegaly abdominal masses   tenderness on both flank regions per worse on the right    Urine dip 2+ leukocytes 1+ blood    Impression  UTI with possible pyelonephritis   Type 2 diabetes   Hypertension    Plan   Send urine off for UA urine C&S  CBC basic metabolic profile hemoglobin A1c  Ciprofloxacin 5 mg twice a day for the next 7 days prescribed

## 2023-06-09 ENCOUNTER — PATIENT MESSAGE (OUTPATIENT)
Dept: INTERNAL MEDICINE | Facility: CLINIC | Age: 68
End: 2023-06-09
Payer: COMMERCIAL

## 2023-06-10 LAB — BACTERIA UR CULT: ABNORMAL

## 2023-06-11 NOTE — TELEPHONE ENCOUNTER
Care Due:                  Date            Visit Type   Department     Provider  --------------------------------------------------------------------------------                                EP -                              PRIMARY      [facility] PRIMARY  Last Visit: 11-      CARE ([facility])   VARUN Martinez  Next Visit: None Scheduled  None         None Found                                                            Last  Test          Frequency    Reason                     Performed    Due Date  --------------------------------------------------------------------------------    HBA1C.......  6 months...  empagliflozin, metFORMIN.  11- 05-    Powered by [employer]. Reference number: [id number].   2/18/2022 8:22:16 AM CST   Goal Outcome Evaluation:      Plan of Care Reviewed With: patient    Overall Patient Progress: no changeOverall Patient Progress: no change      Outcome Evaluation: Pt denies pain. NG to low-intermittent suction, 250cc output since admission. Tele SB, pulse julianne ranging 40s-60s, Provider notified. VSS, afebrile. . Pt reports nausea & dry heaving this AM, x1 prn Zofran given, Pt reports relief. Ambulating with SBA. AM INR 2.03

## 2023-06-12 NOTE — TELEPHONE ENCOUNTER
No care due was identified.  [facility] Embedded Care Due Messages. Reference number: [id number].   6/12/2023 4:15:13 PM CDT

## 2023-06-14 ENCOUNTER — CLINICAL SUPPORT (OUTPATIENT)
Dept: REHABILITATION | Facility: HOSPITAL | Age: 68
End: 2023-06-14
Payer: COMMERCIAL

## 2023-06-14 DIAGNOSIS — G89.29 CHRONIC RIGHT SHOULDER PAIN: Primary | ICD-10-CM

## 2023-06-14 DIAGNOSIS — M25.511 CHRONIC RIGHT SHOULDER PAIN: Primary | ICD-10-CM

## 2023-06-14 DIAGNOSIS — M25.562 ACUTE PAIN OF LEFT KNEE: ICD-10-CM

## 2023-06-14 DIAGNOSIS — R29.898 DECREASED STRENGTH OF UPPER EXTREMITY: ICD-10-CM

## 2023-06-14 PROCEDURE — 97112 NEUROMUSCULAR REEDUCATION: CPT | Performed by: PHYSICAL THERAPIST

## 2023-06-14 PROCEDURE — 97110 THERAPEUTIC EXERCISES: CPT | Performed by: PHYSICAL THERAPIST

## 2023-06-14 NOTE — PROGRESS NOTES
"Physical Therapy Daily Treatment Note     Name: Meryl Yo [facility] Number: [id number]    Therapy Diagnosis:   Encounter Diagnoses   Name Primary?    Chronic right shoulder pain Yes    Decreased strength of upper extremity     Acute pain of left knee      Physician: Russell Honeycutt III, *    Visit Date: 6/14/2023  Physician Orders: PT Eval and Treat   Medical Diagnosis from Referral:   Diagnosis   M25.511,G89.29 (ICD-10-CM) - Chronic right shoulder pain   M75.01 (ICD-10-CM) - Adhesive capsulitis of right shoulder    S83.242A (ICD-10-CM) - Acute medial meniscus tear of left knee, initial encounter  Evaluation Date: 9/29/2022 shoulder, 107/2022 knee  Authorization Period Expiration: 12/31/2023  Plan of Care Expiration: 7/12/2023  Visit # / Visits authorized: 30/40    Time In:  700   Time Out: 745  Total Billable Time: 45 minutes    Precautions: Standard and Weightbearing    FOTO 1 51%  FOTO 2 42%  FOTO 3 52%    Subjective     Pt reports: My L knee buckled the other day but it just hurts if I press over the front of the knee    She was compliant with home exercise program.  Response to previous treatment: Min soreness to the shoulder  Functional change: pain reaching back of the fridge    Pain: 4/10  Location: left knee, right shoulder    Objective     Knee Passive Range of Motion:   Right  Left    Flexion 110 110   Extension +3 hyper 0     Meryl received therapeutic exercises to develop strength, endurance, ROM, and flexibility for 35 minutes including:    LAQ 7.5# 30x  ER with green TB 20x 3" hold  Scaptions 2# 3 x 15  Patient education    NT  Serratus press GTB 2 x 15  Rows BTB 30x  Wand press 6# 30x  Sit to stand low mat 20x  ER with flexion 20x  Seated heel raise 26# KB on knee 2 x 20  S/L clams GTB 2 X 15  Shoulder ext GTB 30x 3"  Standing heel raises 3  x 10 3" at top  Sidelying clam BTB 3 x 10  Tricep ext GTB 3 x 10  ER no money GTB 2 x 15  Serratus wall slides YTB 3 x 15  Upright SLR 3 x 15    Meryl " "received the following manual therapy techniques: Joint mobilizations and Soft tissue Mobilization were applied to the: L knee for 0 minutes, including:    Knee assessment  Passive flexion EOT   Patellar mobilizations all planes  STM medial HS   Gr I-II mob R shoulder pain relief  Centering glide to humeral head Gr III  Gr III flexion to the R shoulder    Not Performed Today:  Medial gapping Gr III    Meryl participated in neuromuscular re-education activities to improve: Balance, Coordination, Sense, Proprioception, and Posture for 10 minutes. The following activities were included:    Sit to stands 30x low mat       NT  Step ups 5" forward/side 30x ea  No money GTB 3 x 15  Shuttle DL 2 1/2 bands 4 x 10  Shuttle SL 2 bands 4 x 10  Shuttle heel raises 2 bands 4 x 10    Meryl received cold pack for 0 minutes to to decrease circulation, pain, and swelling.      Home Exercises Provided and Patient Education Provided     Education provided:   - Stair training     Written Home Exercises Provided: Patient instructed to cont prior HEP.  Exercises were reviewed and Meryl was able to demonstrate them prior to the end of the session.  Meryl demonstrated good  understanding of the education provided.     See EMR under Patient Instructions for exercises provided 10/6/2022.    Assessment     Meryl progressed with functional sit to stands, good tolerance and strength. Minimal pain noted today and the past week, plan to discharge next week  Meryl Is progressing well towards her goals.   Pt prognosis is Excellent.     Pt will continue to benefit from skilled outpatient physical therapy to address the deficits listed in the problem list box on initial evaluation, provide pt/family education and to maximize pt's level of independence in the home and community environment.     Pt's spiritual, cultural and educational needs considered and pt agreeable to plan of care and goals.    Anticipated barriers to physical therapy: work " schedule    GOALS:     Short Term Goals:  4 weeks weeks(Progressing, not met)  1.Report decreased shoulder pain < / =  5/10 at worst to increase tolerance for work   2. Increase PROM full pain free   3. Increased strength by 1/3 MMT grade in shoulder strength to increase tolerance for ADL and work activities.  4. Pt to tolerate HEP to improve ROM and independence with ADL's     Long Term Goals: 8 weeks(Progressing, not met)    1.Report decreased shoulder pain  < / =  2/10 at worst  to increase tolerance for work  2.Increase AROM to full pain free  3.Increase strength to >/= 4/5 in shoulder strength to increase tolerance for ADL and work activities.  4. Pt goal: Return to pain free activities of daily living and work related activities.  5. Pt will have improved FOTO score of </= 35% for functional improvements in activities of daily living.    New Knee Goals: 6 weeks (Progressing, not met)    1.Report decreased knee pain  < / =  2/10  to increase tolerance for return to ambulation without RW  2. Increase knee ROM to full motion in order to be able to perform ADLs without difficulty.  3. Increase strength by 1/3 MMT grade in quad and glutes  to increase tolerance for ADL and work activities.  4. Pt to tolerate HEP to improve ROM and independence with ADL's      Plan     Certification Period: 4/12/2023 to 7/12/2023    Improve scap stabilization and posterior chain strengthening    Therapist: Fabricio Tolentino, PT, DPT

## 2023-06-21 ENCOUNTER — CLINICAL SUPPORT (OUTPATIENT)
Dept: REHABILITATION | Facility: HOSPITAL | Age: 68
End: 2023-06-21
Payer: COMMERCIAL

## 2023-06-21 DIAGNOSIS — R29.898 DECREASED STRENGTH OF UPPER EXTREMITY: ICD-10-CM

## 2023-06-21 DIAGNOSIS — M25.562 ACUTE PAIN OF LEFT KNEE: ICD-10-CM

## 2023-06-21 DIAGNOSIS — G89.29 CHRONIC RIGHT SHOULDER PAIN: Primary | ICD-10-CM

## 2023-06-21 DIAGNOSIS — M25.511 CHRONIC RIGHT SHOULDER PAIN: Primary | ICD-10-CM

## 2023-06-21 PROCEDURE — 97110 THERAPEUTIC EXERCISES: CPT | Performed by: PHYSICAL THERAPIST

## 2023-06-22 NOTE — PROGRESS NOTES
"Physical Therapy Daily Treatment Note     Name: Meryl Yo [facility] Number: [id number]    Therapy Diagnosis:   Encounter Diagnoses   Name Primary?    Chronic right shoulder pain Yes    Decreased strength of upper extremity     Acute pain of left knee      Physician: Russell Honeycutt III, *    Visit Date: 6/21/2023  Physician Orders: PT Eval and Treat   Medical Diagnosis from Referral:   Diagnosis   M25.511,G89.29 (ICD-10-CM) - Chronic right shoulder pain   M75.01 (ICD-10-CM) - Adhesive capsulitis of right shoulder    S83.242A (ICD-10-CM) - Acute medial meniscus tear of left knee, initial encounter  Evaluation Date: 9/29/2022 shoulder, 107/2022 knee  Authorization Period Expiration: 12/31/2023  Plan of Care Expiration: 7/12/2023  Visit # / Visits authorized: 31/40    Time In:  700   Time Out: 745  Total Billable Time: 45 minutes    Precautions: Standard and Weightbearing    FOTO 1 51%  FOTO 2 42%  FOTO 3 52%    Subjective     Pt reports: My knees have been feeling well. Going to discharge and do my shoulder exercises at my cousins gym    She was compliant with home exercise program.  Response to previous treatment: Min soreness to the shoulder  Functional change: pain reaching back of the fridge    Pain: 4/10  Location: left knee, right shoulder    Objective     Knee Passive Range of Motion:   Right  Left    Flexion 110 110   Extension +3 hyper 0     Meryl received therapeutic exercises to develop strength, endurance, ROM, and flexibility for 45 minutes including:    LAQ 7.5# 30x  ER with green TB 20x 3" hold  Scaptions 1# 3 x 15  Serratus press GTB 2 x 15  HEP review  Patient education    NT  Rows BTB 30x  Wand press 6# 30x  Sit to stand low mat 20x  ER with flexion 20x  Seated heel raise 26# KB on knee 2 x 20  S/L clams GTB 2 X 15  Shoulder ext GTB 30x 3"  Standing heel raises 3  x 10 3" at top  Sidelying clam BTB 3 x 10  Tricep ext GTB 3 x 10  ER no money GTB 2 x 15  Serratus wall slides YTB 3 x 15  Upright SLR " "3 x 15    Meryl received the following manual therapy techniques: Joint mobilizations and Soft tissue Mobilization were applied to the: L knee for 0 minutes, including:    Knee assessment  Passive flexion EOT   Patellar mobilizations all planes  STM medial HS   Gr I-II mob R shoulder pain relief  Centering glide to humeral head Gr III  Gr III flexion to the R shoulder    Not Performed Today:  Medial gapping Gr III    Meryl participated in neuromuscular re-education activities to improve: Balance, Coordination, Sense, Proprioception, and Posture for 0 minutes. The following activities were included:    Sit to stands 30x low mat       NT  Step ups 5" forward/side 30x ea  No money GTB 3 x 15  Shuttle DL 2 1/2 bands 4 x 10  Shuttle SL 2 bands 4 x 10  Shuttle heel raises 2 bands 4 x 10    Meryl received cold pack for 0 minutes to to decrease circulation, pain, and swelling.      Home Exercises Provided and Patient Education Provided     Education provided:   - Stair training     Written Home Exercises Provided: Patient instructed to cont prior HEP.  Exercises were reviewed and Meryl was able to demonstrate them prior to the end of the session.  Meryl demonstrated good  understanding of the education provided.     See EMR under Patient Instructions for exercises provided 10/6/2022.    Assessment     Meryl presented with decreased knee pain today, ready for discharge with knee goals met. Given new bands to continue HEP and discussed how to perform at her family's place.     Meryl Is progressing well towards her goals.   Pt prognosis is Excellent.     Pt will continue to benefit from skilled outpatient physical therapy to address the deficits listed in the problem list box on initial evaluation, provide pt/family education and to maximize pt's level of independence in the home and community environment.     Pt's spiritual, cultural and educational needs considered and pt agreeable to plan of care and " goals.    Anticipated barriers to physical therapy: work schedule    GOALS:     Short Term Goals:  4 weeks weeks(Progressing, not met)  1.Report decreased shoulder pain < / =  5/10 at worst to increase tolerance for work   2. Increase PROM full pain free   3. Increased strength by 1/3 MMT grade in shoulder strength to increase tolerance for ADL and work activities.  4. Pt to tolerate HEP to improve ROM and independence with ADL's     Long Term Goals: 8 weeks(Progressing, not met)    1.Report decreased shoulder pain  < / =  2/10 at worst  to increase tolerance for work  2.Increase AROM to full pain free  3.Increase strength to >/= 4/5 in shoulder strength to increase tolerance for ADL and work activities.  4. Pt goal: Return to pain free activities of daily living and work related activities.  5. Pt will have improved FOTO score of </= 35% for functional improvements in activities of daily living.    New Knee Goals: 6 weeks (Progressing, not met)    1.Report decreased knee pain  < / =  2/10  to increase tolerance for return to ambulation without RW  2. Increase knee ROM to full motion in order to be able to perform ADLs without difficulty.  3. Increase strength by 1/3 MMT grade in quad and glutes  to increase tolerance for ADL and work activities.  4. Pt to tolerate HEP to improve ROM and independence with ADL's      Plan     Certification Period: 4/12/2023 to 7/12/2023    Improve scap stabilization and posterior chain strengthening    Therapist: Fabricio Tolentino, PT, DPT

## 2023-06-26 ENCOUNTER — PATIENT MESSAGE (OUTPATIENT)
Dept: INTERNAL MEDICINE | Facility: CLINIC | Age: 68
End: 2023-06-26
Payer: COMMERCIAL

## 2023-06-27 ENCOUNTER — TELEPHONE (OUTPATIENT)
Dept: OPHTHALMOLOGY | Facility: CLINIC | Age: 68
End: 2023-06-27
Payer: COMMERCIAL

## 2023-06-27 NOTE — TELEPHONE ENCOUNTER
Called patient lvm regarding her concerns     ----- Message from Iain Baron sent at 6/27/2023  3:42 PM CDT -----  Consult/Advisory    Name Of Caller:Meryl       Contact Preference:[phone]    Nature of call: Pt has to change her appt due to making badges for the new residents she will like something sooner oct if possible.

## 2023-07-24 ENCOUNTER — PATIENT MESSAGE (OUTPATIENT)
Dept: INTERNAL MEDICINE | Facility: CLINIC | Age: 68
End: 2023-07-24
Payer: COMMERCIAL

## 2023-07-24 ENCOUNTER — LAB VISIT (OUTPATIENT)
Dept: LAB | Facility: HOSPITAL | Age: 68
End: 2023-07-24
Attending: INTERNAL MEDICINE
Payer: COMMERCIAL

## 2023-07-24 DIAGNOSIS — N39.0 URINARY TRACT INFECTION WITHOUT HEMATURIA, SITE UNSPECIFIED: ICD-10-CM

## 2023-07-24 DIAGNOSIS — N39.0 URINARY TRACT INFECTION WITHOUT HEMATURIA, SITE UNSPECIFIED: Primary | ICD-10-CM

## 2023-07-24 LAB
BACTERIA #/AREA URNS AUTO: ABNORMAL /HPF
BILIRUB UR QL STRIP: NEGATIVE
CLARITY UR REFRACT.AUTO: CLEAR
COLOR UR AUTO: YELLOW
GLUCOSE UR QL STRIP: ABNORMAL
HGB UR QL STRIP: NEGATIVE
KETONES UR QL STRIP: NEGATIVE
LEUKOCYTE ESTERASE UR QL STRIP: ABNORMAL
MICROSCOPIC COMMENT: ABNORMAL
NITRITE UR QL STRIP: POSITIVE
PH UR STRIP: 5 [PH] (ref 5–8)
PROT UR QL STRIP: ABNORMAL
RBC #/AREA URNS AUTO: 2 /HPF (ref 0–4)
SP GR UR STRIP: 1.02 (ref 1–1.03)
SQUAMOUS #/AREA URNS AUTO: 1 /HPF
URN SPEC COLLECT METH UR: ABNORMAL
WBC #/AREA URNS AUTO: >100 /HPF (ref 0–5)
YEAST UR QL AUTO: ABNORMAL

## 2023-07-24 PROCEDURE — 87186 SC STD MICRODIL/AGAR DIL: CPT | Performed by: INTERNAL MEDICINE

## 2023-07-24 PROCEDURE — 87086 URINE CULTURE/COLONY COUNT: CPT | Performed by: INTERNAL MEDICINE

## 2023-07-24 PROCEDURE — 87077 CULTURE AEROBIC IDENTIFY: CPT | Performed by: INTERNAL MEDICINE

## 2023-07-24 PROCEDURE — 81001 URINALYSIS AUTO W/SCOPE: CPT | Performed by: INTERNAL MEDICINE

## 2023-07-24 PROCEDURE — 87088 URINE BACTERIA CULTURE: CPT | Performed by: INTERNAL MEDICINE

## 2023-07-24 RX ORDER — METFORMIN HYDROCHLORIDE 1000 MG/1
1000 TABLET ORAL 2 TIMES DAILY WITH MEALS
Qty: 180 TABLET | Refills: 3 | Status: SHIPPED | OUTPATIENT
Start: 2023-07-24

## 2023-07-24 RX ORDER — SEMAGLUTIDE 0.68 MG/ML
INJECTION, SOLUTION SUBCUTANEOUS
Qty: 3 ML | Refills: 0 | Status: CANCELLED | OUTPATIENT
Start: 2023-07-24 | End: 2023-09-04

## 2023-07-24 RX ORDER — CIPROFLOXACIN 500 MG/1
500 TABLET ORAL 2 TIMES DAILY
Qty: 10 TABLET | Refills: 0 | Status: SHIPPED | OUTPATIENT
Start: 2023-07-24 | End: 2023-07-29

## 2023-07-24 RX ORDER — ERGOCALCIFEROL 1.25 MG/1
50000 CAPSULE ORAL
Qty: 12 CAPSULE | Refills: 1 | Status: SHIPPED | OUTPATIENT
Start: 2023-07-24

## 2023-07-24 RX ORDER — ROSUVASTATIN CALCIUM 20 MG/1
20 TABLET, COATED ORAL DAILY
Qty: 90 TABLET | Refills: 3 | Status: SHIPPED | OUTPATIENT
Start: 2023-07-24 | End: 2024-07-23

## 2023-07-24 NOTE — TELEPHONE ENCOUNTER
Care Due:                  Date            Visit Type   Department     Provider  --------------------------------------------------------------------------------                                EP -                              PRIMARY      [facility] PRIMARY  Last Visit: 06-      CARE ([facility])   VARUN Martinez  Next Visit: None Scheduled  None         None Found                                                            Last  Test          Frequency    Reason                     Performed    Due Date  --------------------------------------------------------------------------------    CMP.........  12 months..  rosuvastatin.............  05- 05-    Lipid Panel.  12 months..  rosuvastatin.............  05- 05-    [employer] Embedded Care Due Messages. Reference number: [id number].   7/24/2023 9:02:16 AM CDT

## 2023-07-26 ENCOUNTER — PATIENT MESSAGE (OUTPATIENT)
Dept: INTERNAL MEDICINE | Facility: CLINIC | Age: 68
End: 2023-07-26
Payer: COMMERCIAL

## 2023-07-26 LAB — BACTERIA UR CULT: ABNORMAL

## 2023-09-07 ENCOUNTER — TELEPHONE (OUTPATIENT)
Dept: SPORTS MEDICINE | Facility: CLINIC | Age: 68
End: 2023-09-07
Payer: COMMERCIAL

## 2023-09-07 NOTE — TELEPHONE ENCOUNTER
----- Message from Elizabeth Ho sent at 9/7/2023  6:07 AM CDT -----  Regarding: FW: Euflexxa Injection  Please call the patient to schedule her a follow up appointment with Clayton for there right knee pain, she will have to be seen for this knee again before we can order the injections since it has been over a year since we saw her for the right knee.     You can schedule her a separate appointment with Dr. Babin for her elbow and just let her know that she will need a separate appointment for each body part.     Elizabeth Ho   Clinical assistant to Dr. Brigette Babin    ----- Message -----  From: Fabricio Tolentino, PT  Sent: 9/6/2023   5:04 PM CDT  To: Elizabeth Ho  Subject: Euflexxa Injection                               A[place] Elizabeth,    Can you please schedule Meryl for right knee Euflexxa series of injections? This is her non-operative knee. She is fine seeing Clayton or Dr. Babin for the injections.    She also wants to have her right elbow evaluated. She is reporting pain with press just distal to the elbow. She was previously seen by Dr. Babin for her wrist/shoulder a couple years ago.    Thanks!  Fabricio

## 2023-09-11 ENCOUNTER — HOSPITAL ENCOUNTER (OUTPATIENT)
Dept: RADIOLOGY | Facility: HOSPITAL | Age: 68
Discharge: HOME | End: 2023-09-11
Attending: ORTHOPAEDIC SURGERY
Payer: COMMERCIAL

## 2023-09-11 ENCOUNTER — OFFICE VISIT (OUTPATIENT)
Dept: SPORTS MEDICINE | Facility: CLINIC | Age: 68
End: 2023-09-11
Payer: COMMERCIAL

## 2023-09-11 VITALS
SYSTOLIC BLOOD PRESSURE: 126 MMHG | HEART RATE: 85 BPM | BODY MASS INDEX: 42.85 KG/M2 | WEIGHT: 273.56 LBS | DIASTOLIC BLOOD PRESSURE: 81 MMHG

## 2023-09-11 DIAGNOSIS — M25.521 RIGHT ELBOW PAIN: ICD-10-CM

## 2023-09-11 DIAGNOSIS — S50.11XA CONTUSION OF RIGHT FOREARM, INITIAL ENCOUNTER: Primary | ICD-10-CM

## 2023-09-11 PROCEDURE — 73080 X-RAY EXAM OF ELBOW: CPT | Mod: TC,RT

## 2023-09-11 PROCEDURE — 1126F AMNT PAIN NOTED NONE PRSNT: CPT | Mod: CPTII,S$GLB,, | Performed by: ORTHOPAEDIC SURGERY

## 2023-09-11 PROCEDURE — 3074F SYST BP LT 130 MM HG: CPT | Mod: CPTII,S$GLB,, | Performed by: ORTHOPAEDIC SURGERY

## 2023-09-11 PROCEDURE — 1159F PR MEDICATION LIST DOCUMENTED IN MEDICAL RECORD: ICD-10-PCS | Mod: CPTII,S$GLB,, | Performed by: ORTHOPAEDIC SURGERY

## 2023-09-11 PROCEDURE — 3288F FALL RISK ASSESSMENT DOCD: CPT | Mod: CPTII,S$GLB,, | Performed by: ORTHOPAEDIC SURGERY

## 2023-09-11 PROCEDURE — 3072F LOW RISK FOR RETINOPATHY: CPT | Mod: CPTII,S$GLB,, | Performed by: ORTHOPAEDIC SURGERY

## 2023-09-11 PROCEDURE — 99999 PR PBB SHADOW E&M-EST. PATIENT-LVL III: ICD-10-PCS | Mod: PBBFAC,,, | Performed by: ORTHOPAEDIC SURGERY

## 2023-09-11 PROCEDURE — 73080 X-RAY EXAM OF ELBOW: CPT | Mod: 26,RT,, | Performed by: RADIOLOGY

## 2023-09-11 PROCEDURE — 1159F MED LIST DOCD IN RCRD: CPT | Mod: CPTII,S$GLB,, | Performed by: ORTHOPAEDIC SURGERY

## 2023-09-11 PROCEDURE — 99999 PR PBB SHADOW E&M-EST. PATIENT-LVL III: CPT | Mod: PBBFAC,,, | Performed by: ORTHOPAEDIC SURGERY

## 2023-09-11 PROCEDURE — 3051F PR MOST RECENT HEMOGLOBIN A1C LEVEL 7.0 - < 8.0%: ICD-10-PCS | Mod: CPTII,S$GLB,, | Performed by: ORTHOPAEDIC SURGERY

## 2023-09-11 PROCEDURE — 4010F ACE/ARB THERAPY RXD/TAKEN: CPT | Mod: CPTII,S$GLB,, | Performed by: ORTHOPAEDIC SURGERY

## 2023-09-11 PROCEDURE — 3288F PR FALLS RISK ASSESSMENT DOCUMENTED: ICD-10-PCS | Mod: CPTII,S$GLB,, | Performed by: ORTHOPAEDIC SURGERY

## 2023-09-11 PROCEDURE — 3074F PR MOST RECENT SYSTOLIC BLOOD PRESSURE < 130 MM HG: ICD-10-PCS | Mod: CPTII,S$GLB,, | Performed by: ORTHOPAEDIC SURGERY

## 2023-09-11 PROCEDURE — 3008F PR BODY MASS INDEX (BMI) DOCUMENTED: ICD-10-PCS | Mod: CPTII,S$GLB,, | Performed by: ORTHOPAEDIC SURGERY

## 2023-09-11 PROCEDURE — 1101F PR PT FALLS ASSESS DOC 0-1 FALLS W/OUT INJ PAST YR: ICD-10-PCS | Mod: CPTII,S$GLB,, | Performed by: ORTHOPAEDIC SURGERY

## 2023-09-11 PROCEDURE — 3008F BODY MASS INDEX DOCD: CPT | Mod: CPTII,S$GLB,, | Performed by: ORTHOPAEDIC SURGERY

## 2023-09-11 PROCEDURE — 3051F HG A1C>EQUAL 7.0%<8.0%: CPT | Mod: CPTII,S$GLB,, | Performed by: ORTHOPAEDIC SURGERY

## 2023-09-11 PROCEDURE — 99213 OFFICE O/P EST LOW 20 MIN: CPT | Mod: S$GLB,,, | Performed by: ORTHOPAEDIC SURGERY

## 2023-09-11 PROCEDURE — 73080 XR ELBOW COMPLETE 3 VIEW RIGHT: ICD-10-PCS | Mod: 26,RT,, | Performed by: RADIOLOGY

## 2023-09-11 PROCEDURE — 3072F PR LOW RISK FOR RETINOPATHY: ICD-10-PCS | Mod: CPTII,S$GLB,, | Performed by: ORTHOPAEDIC SURGERY

## 2023-09-11 PROCEDURE — 1126F PR PAIN SEVERITY QUANTIFIED, NO PAIN PRESENT: ICD-10-PCS | Mod: CPTII,S$GLB,, | Performed by: ORTHOPAEDIC SURGERY

## 2023-09-11 PROCEDURE — 1101F PT FALLS ASSESS-DOCD LE1/YR: CPT | Mod: CPTII,S$GLB,, | Performed by: ORTHOPAEDIC SURGERY

## 2023-09-11 PROCEDURE — 3079F DIAST BP 80-89 MM HG: CPT | Mod: CPTII,S$GLB,, | Performed by: ORTHOPAEDIC SURGERY

## 2023-09-11 PROCEDURE — 99213 PR OFFICE/OUTPT VISIT, EST, LEVL III, 20-29 MIN: ICD-10-PCS | Mod: S$GLB,,, | Performed by: ORTHOPAEDIC SURGERY

## 2023-09-11 PROCEDURE — 3079F PR MOST RECENT DIASTOLIC BLOOD PRESSURE 80-89 MM HG: ICD-10-PCS | Mod: CPTII,S$GLB,, | Performed by: ORTHOPAEDIC SURGERY

## 2023-09-11 PROCEDURE — 4010F PR ACE/ARB THEARPY RXD/TAKEN: ICD-10-PCS | Mod: CPTII,S$GLB,, | Performed by: ORTHOPAEDIC SURGERY

## 2023-09-11 NOTE — PROGRESS NOTES
CC Right elbow pain, works in [employer], self referral     HPI:   Meryl Johnson is a pleasant 68 y.o. patient who reports to clinic with right elbow pain for 2-3 weeks. 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 She denies any trauma. She reports the pain is really only when she touches the spot. Denies tingling or radiation.     affecting ADLS    SANE: 80  VAS 5/10    Review of Systems   Constitution: Negative. Negative for chills, fever and night sweats.   HENT: Negative for congestion and headaches.    Eyes: Negative for blurred vision, left vision loss and right vision loss.   Cardiovascular: Negative for chest pain and syncope.   Respiratory: Negative for cough and shortness of breath.    Endocrine: Negative for polydipsia, polyphagia and polyuria.   Hematologic/Lymphatic: Negative for bleeding problem. Does not bruise/bleed easily.   Skin: Negative for dry skin, itching and rash.   Musculoskeletal: Negative for falls and muscle weakness.   Gastrointestinal: Negative for abdominal pain and bowel incontinence.   Genitourinary: Negative for bladder incontinence and nocturia.   Neurological: Negative for disturbances in coordination, loss of balance and seizures.   Psychiatric/Behavioral: Negative for depression. The patient does not have insomnia.    Allergic/Immunologic: Negative for hives and persistent infections.     PAST MEDICAL HISTORY:   Past Medical History:   Diagnosis Date    Anemia     Diabetes mellitus type II     H. pylori infection     Hyperlipidemia     Hypertension     Unspecified vitamin D deficiency 4/24/2013     PAST SURGICAL HISTORY:   Past Surgical History:   Procedure Laterality Date    CHONDROPLASTY OF KNEE Left 10/4/2022    Procedure: CHONDROPLASTY, KNEE;  Surgeon: Brigette Babin MD;  Location: [facility];  Service: Orthopedics;  Laterality: Left;    ESOPHAGOGASTRODUODENOSCOPY N/A 8/8/2018    Procedure: EGD  (ESOPHAGOGASTRODUODENOSCOPY);  Surgeon: Darius Gerardo MD;  [facility]  Service: Endoscopy;  Laterality: N/A;    GASTRIC BYPASS      KNEE ARTHROSCOPY W/ MENISCECTOMY Left 10/4/2022    Procedure: ARTHROSCOPY, KNEE, WITH MENISCECTOMY;  Surgeon: Brigette Babin MD;  Location: [facility];  Service: Orthopedics;  Laterality: Left;    SYNOVECTOMY OF KNEE Left 10/4/2022    Procedure: SYNOVECTOMY, KNEE;  Surgeon: Brigette Babin MD;  Location: [facility];  Service: Orthopedics;  Laterality: Left;     FAMILY HISTORY:   Family History   Problem Relation Age of Onset    COPD Mother     Heart disease Mother     Cancer Father         liver    Heart disease Father     Cancer Sister     Diabetes Sister     Hyperlipidemia Sister     Hypothyroidism Sister     No Known Problems Brother     No Known Problems Maternal Aunt     No Known Problems Maternal Uncle     No Known Problems Paternal Aunt     No Known Problems Paternal Uncle     No Known Problems Maternal Grandmother     No Known Problems Maternal Grandfather     No Known Problems Paternal Grandmother     No Known Problems Paternal Grandfather     Amblyopia Neg Hx     Blindness Neg Hx     Cataracts Neg Hx     Glaucoma Neg Hx     Hypertension Neg Hx     Macular degeneration Neg Hx     Retinal detachment Neg Hx     Strabismus Neg Hx     Stroke Neg Hx     Thyroid disease Neg Hx     Breast cancer Neg Hx     Colon cancer Neg Hx     Ovarian cancer Neg Hx     Stomach cancer Neg Hx     Rectal cancer Neg Hx      SOCIAL HISTORY:   Social History     Socioeconomic History    Marital status: Single   Occupational History     Employer: [employer]   Tobacco Use    Smoking status: Never    Smokeless tobacco: Never   Substance and Sexual Activity    Alcohol use: No    Drug use: No       MEDICATIONS:   Current Outpatient Medications:     aspirin (ECOTRIN) 81 MG EC tablet, Take 1 tablet (81 mg total) by mouth once daily. For 4 weeks starting the day after surgery., Disp: 28  tablet, Rfl: 0    empagliflozin (JARDIANCE) 10 mg tablet, Take 1 tablet (10 mg total) by mouth once daily., Disp: 90 tablet, Rfl: 1    ergocalciferol (VITAMIN D2) 50,000 unit Cap, Take 1 capsule (50,000 Units total) by mouth every 7 days., Disp: 12 capsule, Rfl: 1    metFORMIN (GLUCOPHAGE) 1000 MG tablet, Take 1 tablet (1,000 mg total) by mouth 2 (two) times daily with meals., Disp: 180 tablet, Rfl: 3    rosuvastatin (CRESTOR) 20 MG tablet, Take 1 tablet (20 mg total) by mouth once daily., Disp: 90 tablet, Rfl: 3    semaglutide (OZEMPIC) 0.25 mg or 0.5 mg (2 mg/3 mL) pen injector, Inject 0.5 mg into the skin every 7 days., Disp: 3 mL, Rfl: 1    benazepriL (LOTENSIN) 40 MG tablet, Take 1 tablet (40 mg total) by mouth once daily., Disp: 90 tablet, Rfl: 2    blood sugar diagnostic (BLOOD GLUCOSE TEST) Strp, 1 strip by Misc.(Non-Drug; Combo Route) route 2 (two) times daily before meals. For mikki contour (Patient not taking: Reported on 9/11/2023), Disp: 200 strip, Rfl: 3    lancets (ONE TOUCH DELICA LANCETS) 33 gauge Misc, 1 lancet by Misc.(Non-Drug; Combo Route) route 2 (two) times daily. (Patient not taking: Reported on 9/11/2023), Disp: 60 each, Rfl: 11  ALLERGIES:   Review of patient's allergies indicates:   Allergen Reactions    Codeine Nausea Only    Vicodin [hydrocodone-acetaminophen] Nausea Only       VITAL SIGNS: /81   Pulse 85   Wt 124.1 kg (273 lb 9.5 oz)   BMI 42.85 kg/m²        PHYSICAL EXAM:  General: Patient appears alert and oriented x 3.  Mood is pleasant.  Observation of ears, eyes and nose reveal no gross abnormalities. HEENT: NCAT, sclera nonicteric  Lungs: Respirations are equal and unlabored.  Well nourished, in no acute distress and ambulates with a non-antalgic gait with no assistive devices.    Skin: Skin intact bilaterally. Sensation intact bilaterally. Compartments soft. No evidence of edema, infection, or induration.     Right ELBOW / WRIST EXTREMITY EXAM:    OBSERVATION /  INSPECTION    Swelling  none    Deformity  none  Discoloration  none     Scars   none    Atrophy  none    TENDERNESS / CREPITUS (T / C):           T / C        Medial epicondyle   - / -    Med. (Ulnar) collateral ligament  - / -    Flexor pronator Musculature   - / -   Biceps tendon    - / -   Head of radius    - / -    Lateral epicondyle   - / -    Extensor Musculature   + / -   Brachioradialis   - / -   Triceps tendon   - / -   Triceps muscle   - / -   Olecranon    - / -   Olecranon bursa   - / -   Cubital fossa    - / -   Anterior jointline   - / -   Radial tunnel    -/ -             ROM: ('*' = with pain)    Right Elbow  AROM (PROM)     Extension   0 deg  (5 deg)   Flexion   145 deg (145 deg)         Pronation  90 deg  (90 deg)      Supination   80 deg  (80 deg)                 Left Elbow  AROM (PROM)     Extension   0 deg  (5 deg)   Flexion   145 deg (145 deg)         Pronation  90 deg  (90 deg)      Supination   80 deg  (80 deg)            Right Wrist  AROM (PROM)   Extension   80 deg (85 deg)   Flexion   80 deg (85 deg)         Ulnar Deviation   35 deg (40 deg)  Radial Deviation 35 deg (40 deg)             Left Wrist   AROM (PROM)     Extension   80 deg (85 deg)   Flexion   80 deg (85 deg)         Ulnar Deviation   35 deg (40 deg)  Radial Deviation 35 deg (40 deg)        STRENGTH: ('*' = with pain)    Elbow Flexion:   5/5  Elbow Extension:  5/5  Wrist Flexion:   5/5  Wrist Extension:  5/5  :    5/5  Intrinsics:   5/5  EPL (Ext. pollicis longus): 5/5  Pinch Mechanism:  5/5    ELBOW EXAMINATION:  See above noted areas of tenderness.   Test for Ligamentous Instability - UCL normal  Test for Ligamentous Instability - LUCL normal  PLRI       neg  Tinel's (Percussion) Test - Cubital  neg  Tennis Elbow Test    neg  Golfer's Elbow Test    neg  Radial Capitellar Grind Test   neg  Valgus/Extension Overload Test  neg  Resisted Long Finger Extension Pain neg  Moving Valgus    neg  Forearm pain with resisted  supination neg  Yeargeson' s (elbow pain)   neg  Hook test     neg    WRIST EXAMINATION:  See above noted areas of tenderness.   Finkelstein's Test   neg  Tinel's Test - Carpal Tunnel  neg  Phalen's Test    neg  Median Nerve Compression Test neg  Ulnar-sided Compression Test neg  LT Ballottment Test   neg  Snuff box tenderness   neg  Lau's Test   neg  LT Instability    neg  Hook of Hamate Tenderness  neg     EXTREMITY NEURO-VASCULAR EXAMINATION: Sensation grossly intact to light touch all dermatomal regions. DTR 2+ Biceps, Triceps, BR and Negative Clara's sign. Grossly intact motor function at Elbow, Wrist and Hand. Distal pulses radial and ulnar 2+, brisk cap refill, symmetric.    Other Findings:  Point tenderness over the proximal subcutaneous border of the ulna     Xrays: (AP, lateral, oblique) Right elbow ordered and reviewed by me personally today: no evidence of fracture or dislocation.  Osseous structures appear intact.        ASSESSMENT:  Right forearm contusion       PLAN:  Rest   Activity modification - advised to not contact her arm against hard objects  NSAIDS prn   Follow up prn     I made the decision to obtain old records of the patient including previous notes and imaging. New imaging was ordered today of the extremity or extremities evaluated. I independently reviewed and interpreted the radiographs and/or MRIs today as well as prior imaging.

## 2023-09-15 ENCOUNTER — HOSPITAL ENCOUNTER (OUTPATIENT)
Dept: RADIOLOGY | Facility: HOSPITAL | Age: 68
Discharge: HOME | End: 2023-09-15
Attending: PHYSICIAN ASSISTANT
Payer: COMMERCIAL

## 2023-09-15 ENCOUNTER — OFFICE VISIT (OUTPATIENT)
Dept: SPORTS MEDICINE | Facility: CLINIC | Age: 68
End: 2023-09-15
Payer: COMMERCIAL

## 2023-09-15 VITALS
HEIGHT: 67 IN | HEART RATE: 96 BPM | BODY MASS INDEX: 42.85 KG/M2 | WEIGHT: 273 LBS | DIASTOLIC BLOOD PRESSURE: 83 MMHG | SYSTOLIC BLOOD PRESSURE: 118 MMHG

## 2023-09-15 DIAGNOSIS — M17.11 OSTEOARTHRITIS OF RIGHT KNEE, UNSPECIFIED OSTEOARTHRITIS TYPE: ICD-10-CM

## 2023-09-15 DIAGNOSIS — M25.561 RIGHT KNEE PAIN, UNSPECIFIED CHRONICITY: ICD-10-CM

## 2023-09-15 DIAGNOSIS — G89.29 CHRONIC PAIN OF RIGHT KNEE: Primary | ICD-10-CM

## 2023-09-15 DIAGNOSIS — M25.561 CHRONIC PAIN OF RIGHT KNEE: Primary | ICD-10-CM

## 2023-09-15 PROCEDURE — 3074F SYST BP LT 130 MM HG: CPT | Mod: CPTII,S$GLB,, | Performed by: PHYSICIAN ASSISTANT

## 2023-09-15 PROCEDURE — 3051F PR MOST RECENT HEMOGLOBIN A1C LEVEL 7.0 - < 8.0%: ICD-10-PCS | Mod: CPTII,S$GLB,, | Performed by: PHYSICIAN ASSISTANT

## 2023-09-15 PROCEDURE — 3008F BODY MASS INDEX DOCD: CPT | Mod: CPTII,S$GLB,, | Performed by: PHYSICIAN ASSISTANT

## 2023-09-15 PROCEDURE — 3288F PR FALLS RISK ASSESSMENT DOCUMENTED: ICD-10-PCS | Mod: CPTII,S$GLB,, | Performed by: PHYSICIAN ASSISTANT

## 2023-09-15 PROCEDURE — 3288F FALL RISK ASSESSMENT DOCD: CPT | Mod: CPTII,S$GLB,, | Performed by: PHYSICIAN ASSISTANT

## 2023-09-15 PROCEDURE — 4010F ACE/ARB THERAPY RXD/TAKEN: CPT | Mod: CPTII,S$GLB,, | Performed by: PHYSICIAN ASSISTANT

## 2023-09-15 PROCEDURE — 99999 PR PBB SHADOW E&M-EST. PATIENT-LVL III: ICD-10-PCS | Mod: PBBFAC,,, | Performed by: PHYSICIAN ASSISTANT

## 2023-09-15 PROCEDURE — 99214 OFFICE O/P EST MOD 30 MIN: CPT | Mod: S$GLB,,, | Performed by: PHYSICIAN ASSISTANT

## 2023-09-15 PROCEDURE — 1159F MED LIST DOCD IN RCRD: CPT | Mod: CPTII,S$GLB,, | Performed by: PHYSICIAN ASSISTANT

## 2023-09-15 PROCEDURE — 73564 X-RAY EXAM KNEE 4 OR MORE: CPT | Mod: 26,50,, | Performed by: RADIOLOGY

## 2023-09-15 PROCEDURE — 73564 X-RAY EXAM KNEE 4 OR MORE: CPT | Mod: TC,50

## 2023-09-15 PROCEDURE — 3079F DIAST BP 80-89 MM HG: CPT | Mod: CPTII,S$GLB,, | Performed by: PHYSICIAN ASSISTANT

## 2023-09-15 PROCEDURE — 1101F PT FALLS ASSESS-DOCD LE1/YR: CPT | Mod: CPTII,S$GLB,, | Performed by: PHYSICIAN ASSISTANT

## 2023-09-15 PROCEDURE — 1101F PR PT FALLS ASSESS DOC 0-1 FALLS W/OUT INJ PAST YR: ICD-10-PCS | Mod: CPTII,S$GLB,, | Performed by: PHYSICIAN ASSISTANT

## 2023-09-15 PROCEDURE — 3079F PR MOST RECENT DIASTOLIC BLOOD PRESSURE 80-89 MM HG: ICD-10-PCS | Mod: CPTII,S$GLB,, | Performed by: PHYSICIAN ASSISTANT

## 2023-09-15 PROCEDURE — 1159F PR MEDICATION LIST DOCUMENTED IN MEDICAL RECORD: ICD-10-PCS | Mod: CPTII,S$GLB,, | Performed by: PHYSICIAN ASSISTANT

## 2023-09-15 PROCEDURE — 99214 PR OFFICE/OUTPT VISIT, EST, LEVL IV, 30-39 MIN: ICD-10-PCS | Mod: S$GLB,,, | Performed by: PHYSICIAN ASSISTANT

## 2023-09-15 PROCEDURE — 4010F PR ACE/ARB THEARPY RXD/TAKEN: ICD-10-PCS | Mod: CPTII,S$GLB,, | Performed by: PHYSICIAN ASSISTANT

## 2023-09-15 PROCEDURE — 1160F PR REVIEW ALL MEDS BY PRESCRIBER/CLIN PHARMACIST DOCUMENTED: ICD-10-PCS | Mod: CPTII,S$GLB,, | Performed by: PHYSICIAN ASSISTANT

## 2023-09-15 PROCEDURE — 3072F PR LOW RISK FOR RETINOPATHY: ICD-10-PCS | Mod: CPTII,S$GLB,, | Performed by: PHYSICIAN ASSISTANT

## 2023-09-15 PROCEDURE — 3072F LOW RISK FOR RETINOPATHY: CPT | Mod: CPTII,S$GLB,, | Performed by: PHYSICIAN ASSISTANT

## 2023-09-15 PROCEDURE — 1126F PR PAIN SEVERITY QUANTIFIED, NO PAIN PRESENT: ICD-10-PCS | Mod: CPTII,S$GLB,, | Performed by: PHYSICIAN ASSISTANT

## 2023-09-15 PROCEDURE — 1160F RVW MEDS BY RX/DR IN RCRD: CPT | Mod: CPTII,S$GLB,, | Performed by: PHYSICIAN ASSISTANT

## 2023-09-15 PROCEDURE — 3074F PR MOST RECENT SYSTOLIC BLOOD PRESSURE < 130 MM HG: ICD-10-PCS | Mod: CPTII,S$GLB,, | Performed by: PHYSICIAN ASSISTANT

## 2023-09-15 PROCEDURE — 3051F HG A1C>EQUAL 7.0%<8.0%: CPT | Mod: CPTII,S$GLB,, | Performed by: PHYSICIAN ASSISTANT

## 2023-09-15 PROCEDURE — 3008F PR BODY MASS INDEX (BMI) DOCUMENTED: ICD-10-PCS | Mod: CPTII,S$GLB,, | Performed by: PHYSICIAN ASSISTANT

## 2023-09-15 PROCEDURE — 1126F AMNT PAIN NOTED NONE PRSNT: CPT | Mod: CPTII,S$GLB,, | Performed by: PHYSICIAN ASSISTANT

## 2023-09-15 PROCEDURE — 73564 XR KNEE ORTHO BILAT WITH FLEXION: ICD-10-PCS | Mod: 26,50,, | Performed by: RADIOLOGY

## 2023-09-15 PROCEDURE — 99999 PR PBB SHADOW E&M-EST. PATIENT-LVL III: CPT | Mod: PBBFAC,,, | Performed by: PHYSICIAN ASSISTANT

## 2023-09-15 NOTE — PROGRESS NOTES
CC: right knee pain    68 y.o. Female [employer] who reports anterior medial and anterior knee pain refractory to conservative mgmt.    Pain in the knee has been bothering her for years and recently worsened. Pain is worse with increased walking and activity. She had euflexxa knee injections about 2 years ago with good knee pain improvement.     She has tried NSAIDs, tylenol, and ice with no pain improvement.   She has DM2.     She reports that the pain is worse with steps.  It also bothers her at night.    Is affecting ADLs.     No mechanical symptoms, no instability    Review of Systems   Constitution: Negative. Negative for chills, fever and night sweats.   HENT: Negative for congestion and headaches.    Eyes: Negative for blurred vision, left vision loss and right vision loss.   Cardiovascular: Negative for chest pain and syncope.   Respiratory: Negative for cough and shortness of breath.    Endocrine: Negative for polydipsia, polyphagia and polyuria.   Hematologic/Lymphatic: Negative for bleeding problem. Does not bruise/bleed easily.   Skin: Negative for dry skin, itching and rash.   Musculoskeletal: Negative for falls and muscle weakness.   Gastrointestinal: Negative for abdominal pain and bowel incontinence.   Genitourinary: Negative for bladder incontinence and nocturia.   Neurological: Negative for disturbances in coordination, loss of balance and seizures.   Psychiatric/Behavioral: Negative for depression. The patient does not have insomnia.    Allergic/Immunologic: Negative for hives and persistent infections.   All other systems negative      PAST MEDICAL HISTORY:   Past Medical History:   Diagnosis Date    Anemia     Diabetes mellitus type II     H. pylori infection     Hyperlipidemia     Hypertension     Unspecified vitamin D deficiency 4/24/2013     PAST SURGICAL HISTORY:   Past Surgical History:   Procedure Laterality Date    CHONDROPLASTY OF KNEE Left 10/4/2022    Procedure: CHONDROPLASTY,  KNEE;  Surgeon: Brigette Babin MD;  Location: [facility];  Service: Orthopedics;  Laterality: Left;    ESOPHAGOGASTRODUODENOSCOPY N/A 8/8/2018    Procedure: EGD (ESOPHAGOGASTRODUODENOSCOPY);  Surgeon: Darius Gerardo MD;  Location: [address]);  Service: Endoscopy;  Laterality: N/A;    GASTRIC BYPASS      KNEE ARTHROSCOPY W/ MENISCECTOMY Left 10/4/2022    Procedure: ARTHROSCOPY, KNEE, WITH MENISCECTOMY;  Surgeon: Brigette Babin MD;  Location: [facility];  Service: Orthopedics;  Laterality: Left;    SYNOVECTOMY OF KNEE Left 10/4/2022    Procedure: SYNOVECTOMY, KNEE;  Surgeon: Brigette Babin MD;  Location: [facility];  Service: Orthopedics;  Laterality: Left;     FAMILY HISTORY:   Family History   Problem Relation Age of Onset    COPD Mother     Heart disease Mother     Cancer Father         liver    Heart disease Father     Cancer Sister     Diabetes Sister     Hyperlipidemia Sister     Hypothyroidism Sister     No Known Problems Brother     No Known Problems Maternal Aunt     No Known Problems Maternal Uncle     No Known Problems Paternal Aunt     No Known Problems Paternal Uncle     No Known Problems Maternal Grandmother     No Known Problems Maternal Grandfather     No Known Problems Paternal Grandmother     No Known Problems Paternal Grandfather     Amblyopia Neg Hx     Blindness Neg Hx     Cataracts Neg Hx     Glaucoma Neg Hx     Hypertension Neg Hx     Macular degeneration Neg Hx     Retinal detachment Neg Hx     Strabismus Neg Hx     Stroke Neg Hx     Thyroid disease Neg Hx     Breast cancer Neg Hx     Colon cancer Neg Hx     Ovarian cancer Neg Hx     Stomach cancer Neg Hx     Rectal cancer Neg Hx      SOCIAL HISTORY:   Social History     Socioeconomic History    Marital status: Single   Occupational History     Employer: [employer]   Tobacco Use    Smoking status: Never    Smokeless tobacco: Never   Substance and Sexual Activity    Alcohol use: No    Drug use: No       MEDICATIONS:   Current Outpatient  "Medications:     aspirin (ECOTRIN) 81 MG EC tablet, Take 1 tablet (81 mg total) by mouth once daily. For 4 weeks starting the day after surgery., Disp: 28 tablet, Rfl: 0    empagliflozin (JARDIANCE) 10 mg tablet, Take 1 tablet (10 mg total) by mouth once daily., Disp: 90 tablet, Rfl: 1    ergocalciferol (VITAMIN D2) 50,000 unit Cap, Take 1 capsule (50,000 Units total) by mouth every 7 days., Disp: 12 capsule, Rfl: 1    metFORMIN (GLUCOPHAGE) 1000 MG tablet, Take 1 tablet (1,000 mg total) by mouth 2 (two) times daily with meals., Disp: 180 tablet, Rfl: 3    rosuvastatin (CRESTOR) 20 MG tablet, Take 1 tablet (20 mg total) by mouth once daily., Disp: 90 tablet, Rfl: 3    semaglutide (OZEMPIC) 0.25 mg or 0.5 mg (2 mg/3 mL) pen injector, Inject 0.5 mg into the skin every 7 days., Disp: 3 mL, Rfl: 1  ALLERGIES:   Review of patient's allergies indicates:   Allergen Reactions    Codeine Nausea Only    Vicodin [hydrocodone-acetaminophen] Nausea Only       VITAL SIGNS: /83   Pulse 96   Ht 5' 7" (1.702 m)   Wt 123.8 kg (273 lb)   BMI 42.76 kg/m²      PHYSICAL EXAMINATION    General:  The patient is alert and oriented x 3.  Mood is pleasant.  Observation of ears, eyes and nose reveal no gross abnormalities.  HEENT: NCAT, sclera nonicteric  Lungs: Respirations are equal and unlabored.    right  KNEE EXAMINATION     OBSERVATION / INSPECTION   Gait:   Nonantalgic   Alignment:  Neutral   Scars:   None   Muscle atrophy: Mild  Effusion:  None   Warmth:  None   Discoloration:   none     TENDERNESS / CREPITUS (T / C):          T / C      T / C   Patella   - / -   Lateral joint line   - / -      Peripatellar medial  +  Medial joint line    - / -   Peripatellar lateral -  Medial plica   - / -   Patellar tendon +   Popliteal fossa  - / -   Quad tendon   -   Gastrocnemius   -   Prepatellar Bursa - / -   Quadricep   -   Tibial tubercle  -  Thigh/hamstring  -   Pes anserine/HS -  Fibula    -   ITB   - / -  Tibia     -   Tib/fib " joint  - / -  LCL    -     MFC   - / -   MCL: Proximal  -    LFC   - / -    Distal   -          ROM: (* = pain)  PASSIVE   ACTIVE    Left :   0 / 0 / 130   0 / 0 / 130    Right :    0 / 0 / 130   0 / 0 / 130    Patellofemoral examination:  See above noted areas of tenderness.   Patella position    Subluxation / dislocation: Centered           Sup. / Inf;   Normal   Crepitus (PF):    Absent   Patellar Mobility:       Medial-lateral:   Normal    Superior-inferior:  Normal    Inferior tilt   Normal    Patellar tendon:  Normal   Lateral tilt:    Normal   J-sign:     None   Patellofemoral grind:   +       MENISCAL SIGNS:     Pain on terminal extension:  -  Pain on terminal flexion:  -  Anatoliys maneuver:  -  Squat     NT    LIGAMENT EXAMINATION:  ACL / Lachman:  normal (-1 to 2mm)    PCL-Post.  drawer: normal 0 to 2mm  MCL- Valgus:  normal 0 to 2mm  LCL- Varus:  normal 0 to 2mm        STRENGTH: (* = with pain) PAINFUL SIDE   Quadricep   5/5   Hamstrin/5    EXTREMITY NEURO-VASCULAR EXAMINATION:   Sensation:  Grossly intact to light touch all dermatomal regions.   Motor Function:  Fully intact motor function at hip, knee, foot and ankle    DTRs;  quadriceps and  achilles 2+.  No clonus and downgoing Babinski.    Vascular status:  DP and PT pulses 2+, brisk capillary refill, symmetric.     Other Findings:      Xrays:  Xrays of the bilateral knees with flexion were ordered and reviewed by me today. No fracture, subluxation. Tricompartmental DJD noted of the right knee. Kellgren Leonard 2 or greater noted.      ASSESSMENT:    1. Right Knee pain, chronic  2. Right knee osteoarthritis   Bilateral hip abd/core weakness    PLAN:    1. Euflexxa prior auth placed.   2. Ice compresses prn pain          Medical Necessary Criteria for Requested Treatment    This patient has radiologic confirmation of Kellgren-Leonard Scale score of grade 2 or greater on most recent imaging for the affected knee.        This patient has  had inadequate response to TWO or more of the following conservative nonpharmacologic therapy which is signified with an [X]:        __X_: Cardiovascular (aerobic) activity, such as: walking, biking, stationary bike, aquatic exercise      ___: Resistance exercise      __X_: Weight reduction (for persons who are overweight)      ___: Participation in self-management programs      ___: Wear of medially directed patellar taping      ___: Wear of wedged insoles      _X__: Thermal agents      ___: Walking aids      ___: Physical therapy      ___: Occupational therapy                  Inadequate response, intolerance, or contraindication to TWO or more of the following which is signified with an [X]:        __X_: Acetaminophen      __X_: Oral NSAIDs      ___: Topical NSAIDs                  contraindication to intra-articular      steroid injections due to DM2    All patients questions were answered. Patient was advised to call us with any concerns or questions.

## 2023-09-20 ENCOUNTER — TELEPHONE (OUTPATIENT)
Dept: SPORTS MEDICINE | Facility: CLINIC | Age: 68
End: 2023-09-20
Payer: COMMERCIAL

## 2023-09-20 ENCOUNTER — PATIENT MESSAGE (OUTPATIENT)
Dept: ADMINISTRATIVE | Facility: HOSPITAL | Age: 68
End: 2023-09-20
Payer: COMMERCIAL

## 2023-09-20 DIAGNOSIS — G89.29 CHRONIC PAIN OF RIGHT KNEE: Primary | ICD-10-CM

## 2023-09-20 DIAGNOSIS — M25.561 CHRONIC PAIN OF RIGHT KNEE: Primary | ICD-10-CM

## 2023-09-20 DIAGNOSIS — M17.11 OSTEOARTHRITIS OF RIGHT KNEE, UNSPECIFIED OSTEOARTHRITIS TYPE: ICD-10-CM

## 2023-09-20 NOTE — TELEPHONE ENCOUNTER
----- Message from Cara Charles MA sent at 9/20/2023 11:56 AM CDT -----  Euflexxa referral needed- right knee

## 2023-09-22 ENCOUNTER — TELEPHONE (OUTPATIENT)
Dept: SPORTS MEDICINE | Facility: CLINIC | Age: 68
End: 2023-09-22
Payer: COMMERCIAL

## 2023-09-22 DIAGNOSIS — M25.561 CHRONIC PAIN OF RIGHT KNEE: Primary | ICD-10-CM

## 2023-09-22 DIAGNOSIS — G89.29 CHRONIC PAIN OF RIGHT KNEE: Primary | ICD-10-CM

## 2023-09-22 DIAGNOSIS — M17.11 OSTEOARTHRITIS OF RIGHT KNEE, UNSPECIFIED OSTEOARTHRITIS TYPE: ICD-10-CM

## 2023-09-22 NOTE — TELEPHONE ENCOUNTER
----- Message from Cara Charles MA sent at 9/22/2023  3:09 PM CDT -----  Euflexxa referral needed

## 2023-09-26 ENCOUNTER — TELEPHONE (OUTPATIENT)
Dept: INTERNAL MEDICINE | Facility: CLINIC | Age: 68
End: 2023-09-26
Payer: COMMERCIAL

## 2023-09-26 DIAGNOSIS — E11.22 TYPE 2 DIABETES MELLITUS WITH STAGE 3B CHRONIC KIDNEY DISEASE, WITHOUT LONG-TERM CURRENT USE OF INSULIN: ICD-10-CM

## 2023-09-26 DIAGNOSIS — E78.5 HYPERLIPIDEMIA, UNSPECIFIED HYPERLIPIDEMIA TYPE: ICD-10-CM

## 2023-09-26 DIAGNOSIS — N18.32 TYPE 2 DIABETES MELLITUS WITH STAGE 3B CHRONIC KIDNEY DISEASE, WITHOUT LONG-TERM CURRENT USE OF INSULIN: ICD-10-CM

## 2023-09-26 DIAGNOSIS — I10 ESSENTIAL HYPERTENSION: ICD-10-CM

## 2023-09-26 DIAGNOSIS — Z00.00 ANNUAL PHYSICAL EXAM: Primary | ICD-10-CM

## 2023-10-04 ENCOUNTER — OFFICE VISIT (OUTPATIENT)
Dept: OPTOMETRY | Facility: CLINIC | Age: 68
End: 2023-10-04
Payer: COMMERCIAL

## 2023-10-04 DIAGNOSIS — E11.9 TYPE 2 DIABETES MELLITUS WITHOUT RETINOPATHY: ICD-10-CM

## 2023-10-04 DIAGNOSIS — I10 HYPERTENSION, UNSPECIFIED TYPE: ICD-10-CM

## 2023-10-04 DIAGNOSIS — H01.009 BLEPHARITIS OF BOTH UPPER AND LOWER EYELID: ICD-10-CM

## 2023-10-04 DIAGNOSIS — H52.221 REGULAR ASTIGMATISM, RIGHT EYE: ICD-10-CM

## 2023-10-04 DIAGNOSIS — Z01.00 EXAMINATION OF EYES AND VISION: Primary | ICD-10-CM

## 2023-10-04 DIAGNOSIS — H52.12 MYOPIA OF LEFT EYE: ICD-10-CM

## 2023-10-04 DIAGNOSIS — Z13.5 SCREENING FOR EYE CONDITION: ICD-10-CM

## 2023-10-04 DIAGNOSIS — H52.4 PRESBYOPIA OF BOTH EYES: ICD-10-CM

## 2023-10-04 PROCEDURE — 99999 PR PBB SHADOW E&M-EST. PATIENT-LVL III: CPT | Mod: PBBFAC,,, | Performed by: OPTOMETRIST

## 2023-10-04 PROCEDURE — 92014 PR EYE EXAM, EST PATIENT,COMPREHESV: ICD-10-PCS | Mod: S$GLB,,, | Performed by: OPTOMETRIST

## 2023-10-04 PROCEDURE — 92014 COMPRE OPH EXAM EST PT 1/>: CPT | Mod: S$GLB,,, | Performed by: OPTOMETRIST

## 2023-10-04 PROCEDURE — 92015 PR REFRACTION: ICD-10-PCS | Mod: S$GLB,,, | Performed by: OPTOMETRIST

## 2023-10-04 PROCEDURE — 99999 PR PBB SHADOW E&M-EST. PATIENT-LVL III: ICD-10-PCS | Mod: PBBFAC,,, | Performed by: OPTOMETRIST

## 2023-10-04 PROCEDURE — 92015 DETERMINE REFRACTIVE STATE: CPT | Mod: S$GLB,,, | Performed by: OPTOMETRIST

## 2023-10-04 RX ORDER — ERYTHROMYCIN 5 MG/G
OINTMENT OPHTHALMIC
Qty: 3.5 G | Refills: 3 | Status: SHIPPED | OUTPATIENT
Start: 2023-10-04 | End: 2024-03-22

## 2023-10-04 NOTE — PATIENT INSTRUCTIONS
Early nuclear sclerosis of lens in both eyes, and early peripheral cortical cataract in both eyes.    No need for cataract surgery in either eyel  Monitor only.     Otherwise, good ocular health in each eye.     No evidence of of diabetic or hypertensive retinal changes in either eye.    Slight simple hyperopic astigmatism in the right  eye, and slght myopia in the left eye.  Presbyopia consistent with age.  New spectacle lens Rx entered into record. for use as desired, but no need to change lenses at this time.  Recheck in 12 - 18 months.       Recent diagnosis of symptomatic blepharitis, which has improved with lid scrubs with baby shampoo.  Gave Blepharitis information sheet.  Daily lid scrubs every evening.  Issued sample Erythromycin ophthalmic ointment following lid scrubs in the evening for seven nights in succession every other week.  Rx for erythromycin ophthalmic ointment issued for later use.

## 2023-10-04 NOTE — PROGRESS NOTES
HPI    Diabetic eye exam  Recent bothersome symptoms.  Saw Dr. Martinez, who diagnosed blepharitis.    Recommended lid scrubs with baby shampoo. And signs/symptoms resolved.    Patient's age: 68 y.o. WF  Occupation: [employer]  Approximate date of last eye examination:  09/07/2022  Name of last eye doctor seen:   City/State: [facility]  Wears glasses? Yes      If yes, wears  Full-time or part-time?  Part time  Present glasses are: Bifocal, SV Distance, SV Reading?  Bifocals  Approximate age of present glasses:  2-3 yrs old  Got new glasses following last exam, or subsequently?:  No    Any problem with VA with glasses?  No  Wears CLs?:  No   Headaches?  No   Eye pain/discomfort?  No                                                                                      Flashes?  No   Floaters?  No   Diplopia/Double vision?  No   Patient's Ocular History:         Any eye surgeries? No          Any eye injury?  No         Any treatment for eye disease?  No   Family history of eye disease?  Sister + Diabetic   Significant patient medical history:         1. Diabetes?  Yes, pt did not check BS this morning       If yes, IDDM or NIDDM?  NIDDM    2. HBP?  Yes, controlled with medication               3. Other (describe):  No    ! OTC eyedrops currently using:  No    ! Prescription eye meds currently using:  No    ! Any history of allergy/adverse reaction to any eye meds used   previously?  No     ! Any history of allergy/adverse reaction to eyedrops used during prior   eye exam(s)? No     ! Any history of allergy/adverse reaction to Novacaine or similar meds?   No    ! Any history of allergy/adverse reaction to Epinephrine or similar meds?   No     ! Patient okay with use of anesthetic eyedrops to check eye pressure?    Yes         ! Patient okay with use of eyedrops to dilate pupils today?  Yes    !  Allergies/Medications/Medical History/Family History reviewed today?    Yes       PD =   67/63  Desired reading distance =   "16.75"                                                            Last edited by Charles Harris, OD on 10/4/2023  9:35 AM.            Assessment /Plan     For exam results, see Encounter Report.    1. Examination of eyes and vision        2. Type 2 diabetes mellitus without retinopathy        3. Hypertension, unspecified type        4. Screening for eye condition        5. Blepharitis of both upper and lower eyelid  erythromycin (ROMYCIN) ophthalmic ointment      6. Regular astigmatism, right eye        7. Myopia of left eye        8. Presbyopia of both eyes                       Early nuclear sclerosis of lens in both eyes, and early peripheral cortical cataract in both eyes.    No need for cataract surgery in either eyel  Monitor only.     Otherwise, good ocular health in each eye.     No evidence of of diabetic or hypertensive retinal changes in either eye.    Slight simple hyperopic astigmatism in the right  eye, and slght myopia in the left eye.  Presbyopia consistent with age.  New spectacle lens Rx entered into record. for use as desired, but no need to change lenses at this time.  Recheck in 12 - 18 months.       Recent diagnosis of symptomatic blepharitis, which has improved with lid scrubs with baby shampoo.  Gave Blepharitis information sheet.  Daily lid scrubs every evening.  Issued sample Erythromycin ophthalmic ointment following lid scrubs in the evening for seven nights in succession every other week.  Rx for erythromycin ophthalmic ointment issued for later use.       "

## 2023-10-06 ENCOUNTER — PATIENT MESSAGE (OUTPATIENT)
Dept: ADMINISTRATIVE | Facility: OTHER | Age: 68
End: 2023-10-06
Payer: COMMERCIAL

## 2023-10-09 ENCOUNTER — OFFICE VISIT (OUTPATIENT)
Dept: SPORTS MEDICINE | Facility: CLINIC | Age: 68
End: 2023-10-09
Payer: COMMERCIAL

## 2023-10-09 VITALS
BODY MASS INDEX: 42.78 KG/M2 | HEART RATE: 96 BPM | DIASTOLIC BLOOD PRESSURE: 75 MMHG | WEIGHT: 273.13 LBS | SYSTOLIC BLOOD PRESSURE: 134 MMHG

## 2023-10-09 DIAGNOSIS — M25.561 CHRONIC PAIN OF RIGHT KNEE: Primary | ICD-10-CM

## 2023-10-09 DIAGNOSIS — M17.11 OSTEOARTHRITIS OF RIGHT KNEE, UNSPECIFIED OSTEOARTHRITIS TYPE: ICD-10-CM

## 2023-10-09 DIAGNOSIS — G89.29 CHRONIC PAIN OF RIGHT KNEE: Primary | ICD-10-CM

## 2023-10-09 PROCEDURE — 99999 PR PBB SHADOW E&M-EST. PATIENT-LVL III: CPT | Mod: PBBFAC,,, | Performed by: PHYSICIAN ASSISTANT

## 2023-10-09 PROCEDURE — 99999 PR PBB SHADOW E&M-EST. PATIENT-LVL III: ICD-10-PCS | Mod: PBBFAC,,, | Performed by: PHYSICIAN ASSISTANT

## 2023-10-09 PROCEDURE — 20610 DRAIN/INJ JOINT/BURSA W/O US: CPT | Mod: RT,S$GLB,, | Performed by: PHYSICIAN ASSISTANT

## 2023-10-09 PROCEDURE — 20610 PR DRAIN/INJECT LARGE JOINT/BURSA: ICD-10-PCS | Mod: RT,S$GLB,, | Performed by: PHYSICIAN ASSISTANT

## 2023-10-09 PROCEDURE — 99499 NO LOS: ICD-10-PCS | Mod: S$GLB,,, | Performed by: PHYSICIAN ASSISTANT

## 2023-10-09 PROCEDURE — 99499 UNLISTED E&M SERVICE: CPT | Mod: S$GLB,,, | Performed by: PHYSICIAN ASSISTANT

## 2023-10-09 NOTE — PROGRESS NOTES
Patient is here for follow up of Right knee arthritis. Pt is requesting Euflexxa injection #1.  PMFH reviewed per encounter record. Has failed other conservative modalities including NSAIDS, activity modification, weight loss.    She has received good relief with injections in the past.     The prior shots were tolerated well.    PHYSICAL EXAMINATION:     General: The patient is alert and oriented x 3. Mood is pleasant.   Observation of ears, eyes and nose reveals no gross abnormalities. No   labored breathing observed.     No signs of infection or adverse reaction to knee.        Euflexxa Injection Procedure #1 Right     A time out was performed, including verification of patient ID, procedure, site and side, availability of information and equipment, review of safety issues, and agreement with consent, the procedure site was marked.    The patient was prepped aseptically with povidone-iodine swabsticks. A diagnostic and therapeutic injection of 2cc Euflexxa was given under sterile technique using a 21.5g x 1.5 needle from the anterolateral aspect of the right knee Joint with the patient in the seated position.     Meryl Johnson had no adverse reactions to the medication. Pain decreased. female was instructed to apply ice to the joint for 20 minutes and avoid strenuous activities for 24-36 hours following the injection. female was warned of possible blood pressure changes during that time. Following that time, female can resume activities as prior to the injection.    female was reminded to call the clinic immediately for any adverse side effects as explained in clinic today.    RTC in 1 week for injection #2  All patients questions were answered. Patient was advised to call us with any concerns or questions.

## 2023-10-23 ENCOUNTER — OFFICE VISIT (OUTPATIENT)
Dept: SPORTS MEDICINE | Facility: CLINIC | Age: 68
End: 2023-10-23
Payer: COMMERCIAL

## 2023-10-23 ENCOUNTER — PATIENT MESSAGE (OUTPATIENT)
Dept: SPORTS MEDICINE | Facility: CLINIC | Age: 68
End: 2023-10-23

## 2023-10-23 VITALS
SYSTOLIC BLOOD PRESSURE: 153 MMHG | DIASTOLIC BLOOD PRESSURE: 92 MMHG | HEART RATE: 103 BPM | WEIGHT: 275.69 LBS | BODY MASS INDEX: 43.18 KG/M2

## 2023-10-23 DIAGNOSIS — M25.561 CHRONIC PAIN OF RIGHT KNEE: Primary | ICD-10-CM

## 2023-10-23 DIAGNOSIS — G89.29 CHRONIC PAIN OF RIGHT KNEE: Primary | ICD-10-CM

## 2023-10-23 DIAGNOSIS — M17.11 OSTEOARTHRITIS OF RIGHT KNEE, UNSPECIFIED OSTEOARTHRITIS TYPE: ICD-10-CM

## 2023-10-23 PROCEDURE — 20610 PR DRAIN/INJECT LARGE JOINT/BURSA: ICD-10-PCS | Mod: RT,S$GLB,, | Performed by: PHYSICIAN ASSISTANT

## 2023-10-23 PROCEDURE — 20610 DRAIN/INJ JOINT/BURSA W/O US: CPT | Mod: RT,S$GLB,, | Performed by: PHYSICIAN ASSISTANT

## 2023-10-23 PROCEDURE — 99999 PR PBB SHADOW E&M-EST. PATIENT-LVL III: ICD-10-PCS | Mod: PBBFAC,,, | Performed by: PHYSICIAN ASSISTANT

## 2023-10-23 PROCEDURE — 99499 NO LOS: ICD-10-PCS | Mod: S$GLB,,, | Performed by: PHYSICIAN ASSISTANT

## 2023-10-23 PROCEDURE — 99999 PR PBB SHADOW E&M-EST. PATIENT-LVL III: CPT | Mod: PBBFAC,,, | Performed by: PHYSICIAN ASSISTANT

## 2023-10-23 PROCEDURE — 99499 UNLISTED E&M SERVICE: CPT | Mod: S$GLB,,, | Performed by: PHYSICIAN ASSISTANT

## 2023-10-24 NOTE — PROGRESS NOTES
Patient is here for follow up of Right knee arthritis. Pt is requesting Euflexxa injection #2.  PMFH reviewed per encounter record. Has failed other conservative modalities including NSAIDS, activity modification, weight loss.    She has received good relief with injections in the past.     The prior shots were tolerated well.    PHYSICAL EXAMINATION:     General: The patient is alert and oriented x 3. Mood is pleasant.   Observation of ears, eyes and nose reveals no gross abnormalities. No   labored breathing observed.     No signs of infection or adverse reaction to knee.        Euflexxa Injection Procedure #2 Right     A time out was performed, including verification of patient ID, procedure, site and side, availability of information and equipment, review of safety issues, and agreement with consent, the procedure site was marked.    The patient was prepped aseptically with povidone-iodine swabsticks. A diagnostic and therapeutic injection of 2cc Euflexxa was given under sterile technique using a 21.5g x 1.5 needle from the anterolateral aspect of the right knee Joint with the patient in the seated position.     Meryl Johnson had no adverse reactions to the medication. Pain decreased. female was instructed to apply ice to the joint for 20 minutes and avoid strenuous activities for 24-36 hours following the injection. female was warned of possible blood pressure changes during that time. Following that time, female can resume activities as prior to the injection.    female was reminded to call the clinic immediately for any adverse side effects as explained in clinic today.    RTC in 1 week for injection #3  All patients questions were answered. Patient was advised to call us with any concerns or questions.

## 2023-11-06 ENCOUNTER — PATIENT MESSAGE (OUTPATIENT)
Dept: ADMINISTRATIVE | Facility: OTHER | Age: 68
End: 2023-11-06
Payer: COMMERCIAL

## 2023-11-06 ENCOUNTER — PATIENT MESSAGE (OUTPATIENT)
Dept: SPORTS MEDICINE | Facility: CLINIC | Age: 68
End: 2023-11-06

## 2023-11-06 ENCOUNTER — OFFICE VISIT (OUTPATIENT)
Dept: SPORTS MEDICINE | Facility: CLINIC | Age: 68
End: 2023-11-06
Payer: COMMERCIAL

## 2023-11-06 VITALS
HEIGHT: 67 IN | WEIGHT: 275 LBS | SYSTOLIC BLOOD PRESSURE: 138 MMHG | DIASTOLIC BLOOD PRESSURE: 87 MMHG | BODY MASS INDEX: 43.16 KG/M2 | HEART RATE: 86 BPM

## 2023-11-06 DIAGNOSIS — M25.561 CHRONIC PAIN OF RIGHT KNEE: Primary | ICD-10-CM

## 2023-11-06 DIAGNOSIS — M17.11 OSTEOARTHRITIS OF RIGHT KNEE, UNSPECIFIED OSTEOARTHRITIS TYPE: ICD-10-CM

## 2023-11-06 DIAGNOSIS — G89.29 CHRONIC PAIN OF RIGHT KNEE: Primary | ICD-10-CM

## 2023-11-06 PROCEDURE — 99999 PR PBB SHADOW E&M-EST. PATIENT-LVL III: ICD-10-PCS | Mod: PBBFAC,,, | Performed by: PHYSICIAN ASSISTANT

## 2023-11-06 PROCEDURE — 99499 UNLISTED E&M SERVICE: CPT | Mod: S$GLB,,, | Performed by: PHYSICIAN ASSISTANT

## 2023-11-06 PROCEDURE — 99999 PR PBB SHADOW E&M-EST. PATIENT-LVL III: CPT | Mod: PBBFAC,,, | Performed by: PHYSICIAN ASSISTANT

## 2023-11-06 PROCEDURE — 20610 PR DRAIN/INJECT LARGE JOINT/BURSA: ICD-10-PCS | Mod: RT,S$GLB,, | Performed by: PHYSICIAN ASSISTANT

## 2023-11-06 PROCEDURE — 99499 NO LOS: ICD-10-PCS | Mod: S$GLB,,, | Performed by: PHYSICIAN ASSISTANT

## 2023-11-06 PROCEDURE — 20610 DRAIN/INJ JOINT/BURSA W/O US: CPT | Mod: RT,S$GLB,, | Performed by: PHYSICIAN ASSISTANT

## 2023-11-07 NOTE — PROGRESS NOTES
Patient is here for follow up of Right knee arthritis. Pt is requesting Euflexxa injection #3.  PMFH reviewed per encounter record. Has failed other conservative modalities including NSAIDS, activity modification, weight loss.    She has received good relief with injections in the past.     The prior shots were tolerated well.    PHYSICAL EXAMINATION:     General: The patient is alert and oriented x 3. Mood is pleasant.   Observation of ears, eyes and nose reveals no gross abnormalities. No   labored breathing observed.     No signs of infection or adverse reaction to knee.        Euflexxa Injection Procedure #3 Right     A time out was performed, including verification of patient ID, procedure, site and side, availability of information and equipment, review of safety issues, and agreement with consent, the procedure site was marked.    The patient was prepped aseptically with povidone-iodine swabsticks. A diagnostic and therapeutic injection of 2cc Euflexxa was given under sterile technique using a 21.5g x 1.5 needle from the anterolateral aspect of the right knee Joint with the patient in the seated position.     Meryl Johnson had no adverse reactions to the medication. Pain decreased. female was instructed to apply ice to the joint for 20 minutes and avoid strenuous activities for 24-36 hours following the injection. female was warned of possible blood pressure changes during that time. Following that time, female can resume activities as prior to the injection.    female was reminded to call the clinic immediately for any adverse side effects as explained in clinic today.    RTC as needed prn knee pain  Would be a good candidate for right knee iovera if no pain relief from euflexxa.   All patients questions were answered. Patient was advised to call us with any concerns or questions.

## 2023-12-18 ENCOUNTER — OFFICE VISIT (OUTPATIENT)
Dept: PRIMARY CARE CLINIC | Facility: CLINIC | Age: 68
End: 2023-12-18
Payer: COMMERCIAL

## 2023-12-18 VITALS
WEIGHT: 274.5 LBS | TEMPERATURE: 98 F | HEIGHT: 67 IN | HEART RATE: 108 BPM | SYSTOLIC BLOOD PRESSURE: 134 MMHG | RESPIRATION RATE: 18 BRPM | BODY MASS INDEX: 43.08 KG/M2 | OXYGEN SATURATION: 97 % | DIASTOLIC BLOOD PRESSURE: 72 MMHG

## 2023-12-18 DIAGNOSIS — J06.9 UPPER RESPIRATORY TRACT INFECTION, UNSPECIFIED TYPE: Primary | ICD-10-CM

## 2023-12-18 LAB
CTP QC/QA: YES
CTP QC/QA: YES
POC MOLECULAR INFLUENZA A AGN: NEGATIVE
POC MOLECULAR INFLUENZA B AGN: NEGATIVE
SARS-COV-2 AG RESP QL IA.RAPID: POSITIVE

## 2023-12-18 PROCEDURE — 87811 SARS CORONAVIRUS 2 ANTIGEN POCT, MANUAL READ: ICD-10-PCS | Mod: QW,S$GLB,, | Performed by: INTERNAL MEDICINE

## 2023-12-18 PROCEDURE — 87811 SARS-COV-2 COVID19 W/OPTIC: CPT | Mod: QW,S$GLB,, | Performed by: INTERNAL MEDICINE

## 2023-12-18 PROCEDURE — 99499 NO LOS: ICD-10-PCS | Mod: S$GLB,,, | Performed by: INTERNAL MEDICINE

## 2023-12-18 PROCEDURE — 87502 INFLUENZA DNA AMP PROBE: CPT | Mod: QW,,, | Performed by: INTERNAL MEDICINE

## 2023-12-18 PROCEDURE — 87502 POCT INFLUENZA A/B MOLECULAR: ICD-10-PCS | Mod: QW,,, | Performed by: INTERNAL MEDICINE

## 2023-12-18 PROCEDURE — 99499 UNLISTED E&M SERVICE: CPT | Mod: S$GLB,,, | Performed by: INTERNAL MEDICINE

## 2023-12-18 NOTE — PROGRESS NOTES
[age] year old woman with a history of disease of the heart (hypertension, hyperlipidemia), diabetes mellitus (chronic kidney disease), elevated BMI (42.99 kilograms/meter squared), vitamin-D deficiency, with a chief complaint of URI.    History of present illness and pertinent review of systems:  The patient has been ill for approximately 2 days.  She had no known exposure to COVID or flu though she was out and about over the weekend and not wearing a mask.  The patient [place] the [employer] game.  The patient has felt feverish with chills but no sweats.  The patient has had no headache or earache.  She has had rhinorrhea, nasal congestion, and a postnasal drip.  He has not lost her sense of taste or smell.  She has had no toothache or facial swelling.  She has a sore throat but no swollen gland the patient notes a cough which is nonproductive.  She notes no wheeze, pleurisy, or hemoptysis.  The patient has had no diarrhea, myalgias, or rash.  The patient notes some mild malaise but not unexpected considering she has a cold.    Problem list, medication list, and allergies were reviewed.  The patient has no true drug allergies but is intolerant of codeine and hydrocodone.    Remaining review of systems is otherwise negative    Physical Exam  Vitals and nursing note reviewed.   Constitutional:       General: She is not in acute distress.     Appearance: She is not ill-appearing, toxic-appearing or diaphoretic.   HENT:      Head: Atraumatic.      Right Ear: Tympanic membrane, ear canal and external ear normal.      Left Ear: Tympanic membrane, ear canal and external ear normal.      Nose: Congestion and rhinorrhea present.      Mouth/Throat:      Mouth: Mucous membranes are moist.      Pharynx: Oropharynx is clear. Posterior oropharyngeal erythema present. No oropharyngeal exudate.   Eyes:      General: No scleral icterus.     Conjunctiva/sclera: Conjunctivae normal.   Neck:      Vascular: No carotid bruit.    Cardiovascular:      Rate and Rhythm: Normal rate and regular rhythm.      Pulses: Normal pulses.      Heart sounds: Normal heart sounds. No murmur heard.     No gallop.   Pulmonary:      Effort: No respiratory distress.      Breath sounds: No wheezing, rhonchi or rales.   Abdominal:      General: Bowel sounds are normal.      Palpations: Abdomen is soft.      Tenderness: There is no abdominal tenderness.      Comments: No hepatosplenomegaly   Musculoskeletal:         General: No tenderness.      Cervical back: No tenderness.      Right lower leg: No edema.      Left lower leg: No edema.   Lymphadenopathy:      Cervical: No cervical adenopathy.   Skin:     Coloration: Skin is not jaundiced or pale.   Neurological:      General: No focal deficit present.      Mental Status: She is alert and oriented to person, place, and time.   Psychiatric:         Mood and Affect: Mood normal.         Behavior: Behavior normal.     POCT testing   COVID:  Positive   Influenza a:  Negative  Influenza B:  Negative    Assessment/plan:   URI secondary to COVID:  The patient has URI symptoms that are mild.  She is not short of breath and her physical exam is benign except for findings of a URI.  However given her weight, diabetes associated with chronic kidney disease, hypertension and hyperlipidemia, and age she is at higher risk.  Plan: Explanation of the above   Treat symptomatically for now with Zyrtec 10 mg at bedtime  Mucinex DM at night   Saline nasal spray 4 squirts each nostril every 2 hours   For sore throat and cough the patient can use 1-2 tbsp of honey  The patient needs to be fastidious with her diet because of the nature of this illness in the fact that her diabetes could go out of control.    Hydration   Sleep with your head elevated   Because of the increased risk of clotting the patient should take 1325 mg aspirin per day   Acetaminophen 650 mg extended release 1-2 tablets every 8 hours for general discomfort.  The  patient should observe 5 days of isolation with an additional 5 days of masking.

## 2023-12-18 NOTE — PATIENT INSTRUCTIONS
Thank you for coming to visit today.  As you are aware you have tested positive for COVID and will not be able to return to work until next week December 26th.  Your flu testing was negative.  Please mask around others in leaves wash your hands carefully and keep dishes in towel separate for many 1 that lives in your home.      The following are my recommendations:  Zyrtec 10 mg at bedtime   Saline nasal spray 4 squirts each nostril every 2 hours  Mucinex DM at night for your cough  For sore throat and cough during the day I recommend honey 1-2 tbsp  Please watch your diet carefully as with a serious illness potentially your diabetes can spike and make problems worse.  Sleep with your head elevated   For any general discomfort Tylenol arthritis or Tylenol extended release 650 mg 1-2 tablets every 8 hours  Please take 1 aspirin daily to decrease the risk of clotting  Please drink enough fluid to keep your urine a pale yellow  The patient needs to isolate until Saturday which will be 5 days but continue to mask for an additional 5 days

## 2023-12-27 ENCOUNTER — TELEPHONE (OUTPATIENT)
Dept: INTERNAL MEDICINE | Facility: CLINIC | Age: 68
End: 2023-12-27
Payer: COMMERCIAL

## 2023-12-27 RX ORDER — PROMETHAZINE HYDROCHLORIDE AND DEXTROMETHORPHAN HYDROBROMIDE 6.25; 15 MG/5ML; MG/5ML
5 SYRUP ORAL EVERY 4 HOURS PRN
Qty: 240 ML | Refills: 0 | Status: SHIPPED | OUTPATIENT
Start: 2023-12-27 | End: 2024-01-23

## 2023-12-27 NOTE — TELEPHONE ENCOUNTER
Called patient back,    Tested positive for covid on 12/18 with dr. Moore, didn't receive any medication. Still testing positive today. Still coughing, low grade fever, highest 99.3, SOB, runny nose. Asking for advice, has been feeling lethargic, all she wants to do is sleep. Said she hasn't been around anyone either, so concerned that [facility] test she bought today was giving her false negative. Asking if you think she should be seen again, or if there's medicine that you would recommend.     If meds are called in, she would prefer it be sent to [facility] in [place].

## 2023-12-27 NOTE — TELEPHONE ENCOUNTER
----- Message from Aidee Bain sent at 12/27/2023 12:26 PM CST -----  Contact: [phone]  [email or web address]     Patient is calling for Medical Advice regarding:tested positive for covid     How long has patient had these symptoms:12/18     Pharmacy name and phone#:    Would like response via [employer]:  no     Comments:  Pt is calling she states she is not getting any better and is asking if the dr can see her or what shouls she do please give return call

## 2024-01-12 ENCOUNTER — PATIENT MESSAGE (OUTPATIENT)
Dept: SPORTS MEDICINE | Facility: CLINIC | Age: 69
End: 2024-01-12
Payer: COMMERCIAL

## 2024-01-12 ENCOUNTER — TELEPHONE (OUTPATIENT)
Dept: SPORTS MEDICINE | Facility: CLINIC | Age: 69
End: 2024-01-12
Payer: COMMERCIAL

## 2024-01-12 ENCOUNTER — HOSPITAL ENCOUNTER (EMERGENCY)
Facility: HOSPITAL | Age: 69
Discharge: HOME | End: 2024-01-12
Attending: EMERGENCY MEDICINE
Payer: OTHER MISCELLANEOUS

## 2024-01-12 VITALS
RESPIRATION RATE: 16 BRPM | BODY MASS INDEX: 43.07 KG/M2 | OXYGEN SATURATION: 96 % | DIASTOLIC BLOOD PRESSURE: 70 MMHG | HEART RATE: 86 BPM | TEMPERATURE: 98 F | SYSTOLIC BLOOD PRESSURE: 139 MMHG | WEIGHT: 275 LBS

## 2024-01-12 DIAGNOSIS — S50.01XA CONTUSION OF RIGHT ELBOW, INITIAL ENCOUNTER: ICD-10-CM

## 2024-01-12 DIAGNOSIS — S93.601A SPRAIN OF RIGHT FOOT, INITIAL ENCOUNTER: ICD-10-CM
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
## 2024-01-12 DIAGNOSIS — R03.0 ELEVATED BLOOD PRESSURE READING: Primary | ICD-10-CM

## 2024-01-12 PROCEDURE — 25000003 PHARM REV CODE 250: Performed by: PHYSICIAN ASSISTANT

## 2024-01-12 PROCEDURE — 99284 EMERGENCY DEPT VISIT MOD MDM: CPT

## 2024-01-12 RX ORDER — LISINOPRIL 20 MG/1
40 TABLET ORAL
Status: COMPLETED | OUTPATIENT
Start: 2024-01-12 | End: 2024-01-12

## 2024-01-12 RX ORDER — BENAZEPRIL HYDROCHLORIDE 40 MG/1
40 TABLET ORAL DAILY
Qty: 30 TABLET | Refills: 0 | Status: SHIPPED | OUTPATIENT
Start: 2024-01-12 | End: 2024-01-23

## 2024-01-12 RX ORDER — ACETAMINOPHEN 325 MG/1
650 TABLET ORAL
Status: COMPLETED | OUTPATIENT
Start: 2024-01-12 | End: 2024-01-12

## 2024-01-12 RX ADMIN — ACETAMINOPHEN 650 MG: 325 TABLET ORAL at 08:01

## 2024-01-12 RX ADMIN — LISINOPRIL 40 MG: 20 TABLET ORAL at 08:01

## 2024-01-12 NOTE — DISCHARGE INSTRUCTIONS
Your x-rays do not show any fractures or dislocations.    You can take acetaminophen/tylenol 650 mg every 6 hours or 1000 mg every 8 hours for added relief.  Apply ice to the area for 10-20 minutes every 4 hours. You can apply heat 2 days after for the same duration and frequency.  Follow up with PCP.  Return to the ER for new or worsening symptoms.  No future appointments.  Imaging Results              X-Ray Foot Complete Right (Final result)  Result time 01/12/24 09:18:40      Final result by Dominique Webb MD (01/12/24 09:18:40)                   Impression:      No acute process seen      Electronically signed by: Dominique Webb MD  Date:    01/12/2024  Time:    09:18               Narrative:    EXAMINATION:  XR FOOT COMPLETE 3 VIEW RIGHT    CLINICAL HISTORY:  . Unspecified fall, initial encounter    TECHNIQUE:  AP, lateral, and oblique views of the right foot were performed.    COMPARISON:  None.    FINDINGS:  Normal alignment.  Normal mineralization.  No fracture.  No osseous lesions.  No advanced degenerative change.  Prominent posterior and inferior calcaneal spurs.  The soft tissues appear normal.                                       X-Ray Elbow Complete Right (Final result)  Result time 01/12/24 09:17:35      Final result by Dominique Webb MD (01/12/24 09:17:35)                   Impression:      No acute process seen.      Electronically signed by: Dominique Webb MD  Date:    01/12/2024  Time:    09:17               Narrative:    EXAMINATION:  XR ELBOW COMPLETE 3 VIEW RIGHT    CLINICAL HISTORY:  . Unspecified fall, initial encounter    TECHNIQUE:  AP, lateral, and oblique views of the right elbow were performed.    COMPARISON:  09/11/2023    FINDINGS:  Normal alignment.  No acute fracture seen, no osseous lesion seen.  No joint effusion.  Tiny well corticated ossicle adjacent to the lateral epicondyle is unchanged and could relate to a prior injury.  No advanced degenerative  change.  The soft tissues appear normal.

## 2024-01-12 NOTE — Clinical Note
"Meryl"Tata Johnson was seen and treated in our emergency department on 1/12/2024.  She may return to work on 01/12/2024.  ?     If you have any questions or concerns, please don't hesitate to call.      Mckenna Harmon PA-C"

## 2024-01-12 NOTE — TELEPHONE ENCOUNTER
----- Message from Russell Honeycutt III, PA-C sent at 1/12/2024  1:08 PM CST -----  Regarding: FW: Patient Visit  Please schedule her for whatever she needs below.     Thank you  ----- Message -----  From: Fabricio Tolentino, PT  Sent: 1/12/2024   1:02 PM CST  To: Russell Honeycutt III, PA-C  Subject: Patient Visit                                    [place],    Can you please schedule to see Meryl Wednesday or Friday next week. She had a fall today walking out of the elevator at work. She fell and hit her previously fractured elbow and also notes knee/foot pain. Xrays negative in the ED. All her pain on the right side, and she is requesting to see you just to be sure everything is okay.    Thanks,  Fabricio

## 2024-01-12 NOTE — ED NOTES
Meryl Johnson, a 68 y.o. female presents to the ED w/ complaint of fall after getting out of the [facility], pt is an employee here.  C/o right foot right elbow pain and right knee pain.  Right elbow is the bruised    Triage note:  Chief Complaint   Patient presents with    Fall     Right elbow, right knee, and right foot pain, fall today at [facility], denies LOC, did not hit head      Review of patient's allergies indicates:   Allergen Reactions    Codeine Nausea Only    Vicodin [hydrocodone-acetaminophen] Nausea Only     Past Medical History:   Diagnosis Date    Anemia     Diabetes mellitus type II     H. pylori infection     Hyperlipidemia     Hypertension     Unspecified vitamin D deficiency 4/24/2013

## 2024-01-12 NOTE — ED PROVIDER NOTES
Encounter Date: 1/12/2024       History     Chief Complaint   Patient presents with    Fall     Right elbow, right knee, and right foot pain, fall today at [facility], denies LOC, did not hit head      8:17 AM  Patient is a pleasant 68-year-old female who presents to [facility] for emergent evaluation for right elbow and right foot pain status post fall.    Patient was walking out of the elevator here when she believes her foot got caught on the ground.  She did not trip on anything obvious.  She denies any prodromal symptoms and felt fine.  She states she landed on her right and injured her right elbow, knee, and foot.  Most of her pain is to her elbow and foot.  She endorses 7-9/10 pain.  She is still ambulatory without obvious gait and requires no assistance.  She is concerned because she has a previous history of right elbow surgery.  She denies head trauma or LOC.  Denies any other extremity pain, chest pain, abdominal pain, nausea, vomiting, dizziness, lightheadedness, or headache.  She did not take anything for her pain prior to arrival.        Review of patient's allergies indicates:   Allergen Reactions    Codeine Nausea Only    Vicodin [hydrocodone-acetaminophen] Nausea Only     Past Medical History:   Diagnosis Date    Anemia     Diabetes mellitus type II     H. pylori infection     Hyperlipidemia     Hypertension     Unspecified vitamin D deficiency 4/24/2013     Past Surgical History:   Procedure Laterality Date    CHONDROPLASTY OF KNEE Left 10/4/2022    Procedure: CHONDROPLASTY, KNEE;  Surgeon: Brigette Babin MD;  Location: [facility];  Service: Orthopedics;  Laterality: Left;    ESOPHAGOGASTRODUODENOSCOPY N/A 8/8/2018    Procedure: EGD (ESOPHAGOGASTRODUODENOSCOPY);  Surgeon: Darius Gerardo MD;  Location: [address];  Service: Endoscopy;  Laterality: N/A;    GASTRIC BYPASS      KNEE ARTHROSCOPY W/ MENISCECTOMY Left 10/4/2022    Procedure: ARTHROSCOPY, KNEE, WITH MENISCECTOMY;  Surgeon:  Brigette Babin MD;  Location: [facility] OR;  Service: Orthopedics;  Laterality: Left;    SYNOVECTOMY OF KNEE Left 10/4/2022    Procedure: SYNOVECTOMY, KNEE;  Surgeon: Brigette Babin MD;  Location: [facility] OR;  Service: Orthopedics;  Laterality: Left;     Family History   Problem Relation Age of Onset    COPD Mother     Heart disease Mother     Cancer Father         liver    Heart disease Father     Cancer Sister     Diabetes Sister     Hyperlipidemia Sister     Hypothyroidism Sister     No Known Problems Brother     No Known Problems Maternal Aunt     No Known Problems Maternal Uncle     No Known Problems Paternal Aunt     No Known Problems Paternal Uncle     No Known Problems Maternal Grandmother     No Known Problems Maternal Grandfather     No Known Problems Paternal Grandmother     No Known Problems Paternal Grandfather     Amblyopia Neg Hx     Blindness Neg Hx     Cataracts Neg Hx     Glaucoma Neg Hx     Hypertension Neg Hx     Macular degeneration Neg Hx     Retinal detachment Neg Hx     Strabismus Neg Hx     Stroke Neg Hx     Thyroid disease Neg Hx     Breast cancer Neg Hx     Colon cancer Neg Hx     Ovarian cancer Neg Hx     Stomach cancer Neg Hx     Rectal cancer Neg Hx      Social History     Tobacco Use    Smoking status: Never    Smokeless tobacco: Never   Substance Use Topics    Alcohol use: No    Drug use: No     Review of Systems   Constitutional:  Negative for activity change, appetite change and fever.   HENT:  Negative for sore throat.    Respiratory:  Negative for shortness of breath.    Cardiovascular:  Negative for chest pain.   Gastrointestinal:  Negative for nausea.   Genitourinary:  Negative for dysuria.   Musculoskeletal:  Positive for arthralgias and myalgias. Negative for back pain and gait problem.   Skin:  Positive for wound. Negative for rash.   Neurological:  Negative for weakness.   Hematological:  Does not bruise/bleed easily.       Physical Exam     Initial Vitals   BP Pulse Resp Temp SpO2    01/12/24 0758 01/12/24 0758 01/12/24 0758 01/12/24 0759 01/12/24 0758   (!) 212/100 100 18 98.4 °F (36.9 °C) 95 %      MAP       --                Physical Exam    Vitals reviewed.  Constitutional: She appears well-developed and well-nourished. She is not diaphoretic. She is cooperative.  Non-toxic appearance. She does not have a sickly appearance. She does not appear ill. No distress.   HENT:   Head: Normocephalic and atraumatic. Head is without raccoon's eyes, without Arora's sign, without abrasion and without contusion. Hair is normal.   Nose: Nose normal. No epistaxis.   Mouth/Throat: No trismus in the jaw.   Eyes: Conjunctivae and EOM are normal.   Neck:   Normal range of motion.  Cardiovascular:  Normal rate.           Pulses:       Radial pulses are 2+ on the right side and 2+ on the left side.        Dorsalis pedis pulses are 2+ on the right side.   Pulmonary/Chest: No accessory muscle usage. No tachypnea. No respiratory distress.   Abdominal: She exhibits no distension.   Musculoskeletal:         General: Normal range of motion.      Right shoulder: Normal range of motion.      Left shoulder: Normal range of motion.      Right elbow: Swelling present. Normal range of motion. Tenderness present.      Left elbow: Normal range of motion. No tenderness.      Right wrist: No bony tenderness. Normal range of motion.      Left wrist: No bony tenderness. Normal range of motion.      Right hand: No bony tenderness. Normal range of motion. Normal strength.      Left hand: No bony tenderness. Normal range of motion. Normal strength.      Cervical back: Normal range of motion. No rigidity, tenderness or bony tenderness. No spinous process tenderness or muscular tenderness. Normal range of motion.      Thoracic back: No bony tenderness. Normal range of motion.      Lumbar back: No bony tenderness. Normal range of motion.      Right hip: Normal range of motion.      Left hip: Normal range of motion.      Right knee:  No bony tenderness. Normal range of motion. No tenderness. Normal alignment, normal meniscus and normal patellar mobility.      Left knee: Normal range of motion.      Right ankle: No tenderness. Normal range of motion.      Left ankle: No tenderness. Normal range of motion.      Right foot: Normal range of motion. Tenderness present. No bony tenderness.      Left foot: Normal range of motion. No bony tenderness.      Comments: No difficulty bearing weight or ambulating without assistance.  Sensations grossly intact.  She has a small superficial abrasion to her right elbow with mild hematoma. No lacerations or bleeding.     Neurological: She is alert. She has normal strength.   Skin: Skin is warm and dry. No erythema. No pallor.         ED Course   Procedures  Labs Reviewed - No data to display         Imaging Results              X-Ray Foot Complete Right (Final result)  Result time 01/12/24 09:18:40      Final result by Dominique Webb MD (01/12/24 09:18:40)                   Impression:      No acute process seen      Electronically signed by: Dominique Webb MD  Date:    01/12/2024  Time:    09:18               Narrative:    EXAMINATION:  XR FOOT COMPLETE 3 VIEW RIGHT    CLINICAL HISTORY:  . Unspecified fall, initial encounter    TECHNIQUE:  AP, lateral, and oblique views of the right foot were performed.    COMPARISON:  None.    FINDINGS:  Normal alignment.  Normal mineralization.  No fracture.  No osseous lesions.  No advanced degenerative change.  Prominent posterior and inferior calcaneal spurs.  The soft tissues appear normal.                                       X-Ray Elbow Complete Right (Final result)  Result time 01/12/24 09:17:35      Final result by Dominique Webb MD (01/12/24 09:17:35)                   Impression:      No acute process seen.      Electronically signed by: Dominique Webb MD  Date:    01/12/2024  Time:    09:17               Narrative:    EXAMINATION:  XR  ELBOW COMPLETE 3 VIEW RIGHT    CLINICAL HISTORY:  . Unspecified fall, initial encounter    TECHNIQUE:  AP, lateral, and oblique views of the right elbow were performed.    COMPARISON:  09/11/2023    FINDINGS:  Normal alignment.  No acute fracture seen, no osseous lesion seen.  No joint effusion.  Tiny well corticated ossicle adjacent to the lateral epicondyle is unchanged and could relate to a prior injury.  No advanced degenerative change.  The soft tissues appear normal.                                       Medications   lisinopriL tablet 40 mg (40 mg Oral Given 1/12/24 0821)   acetaminophen tablet 650 mg (650 mg Oral Given 1/12/24 0821)     Medical Decision Making  Patient is a pleasant 68-year-old female who presents to [facility] ED for emergent evaluation for right elbow and right foot pain status post fall.    Differential diagnosis includes but is not limited to soft tissue contusion, bony contusion, strain, sprain, DJD, abrasion, no laceration seen, hematoma, less likely fractures or dislocations given no decreased range of motion inability to bear weight without assistance.    Will initiate workup including obtaining x-rays, give oral acetaminophen, give ice, and reassess.  Her last tetanus shot was updated in 2020.    Amount and/or Complexity of Data Reviewed  External Data Reviewed: labs.     Details: I have reviewed last Cr.  Radiology: ordered.    Risk  OTC drugs.  Prescription drug management.               ED Course as of 01/13/24 1632   Fri Jan 12, 2024   0802 BP(!): 212/100  She did not take HTN meds this AM. [CL]   0802 Temp: 98.4 °F (36.9 °C) [CL]   0802 Pulse: 100 [CL]   0802 Resp: 18 [CL]   0802 SpO2: 95 % [CL]   0946 BP: 139/70  Lowered. [CL]      ED Course User Index  [CL] Mckenna Harmon PA-C          X-rays without acute process. Continue RICE therapy. OTC medication for pain relief. Follow up with PCP for new or worsening symptoms. Return to ER precautions given.                 Clinical  Impression:  Final diagnoses:  [R03.0] Elevated blood pressure reading (Primary)  [W19.XXXA] Fall  [S50.01XA] Contusion of right elbow, initial encounter  [S93.601A] Sprain of right foot, initial encounter          ED Disposition Condition    Discharge Stable          ED Prescriptions       Medication Sig Dispense Start Date End Date Auth. Provider    benazepriL (LOTENSIN) 40 MG tablet Take 1 tablet (40 mg total) by mouth once daily. 30 tablet 1/12/2024 -- Mckenna Harmon PA-C          Follow-up Information       Follow up With Specialties Details Why Contact Info    Russell Martinez MD Internal Medicine Schedule an appointment as soon as possible for a visit   [address], [address]  [facility] [address]  [phone]      [facility] - Emergency Dept Emergency Medicine  If symptoms worsen [address] [id number] [phone]          Future Appointments   Date Time Provider Department Center   1/19/2024  8:30 AM Russell Honeycutt III, PA-C [facility] [place]          Mckenna Harmon PA-C  01/13/24 9014

## 2024-01-19 ENCOUNTER — OFFICE VISIT (OUTPATIENT)
Dept: SPORTS MEDICINE | Facility: CLINIC | Age: 69
End: 2024-01-19
Payer: COMMERCIAL

## 2024-01-19 ENCOUNTER — HOSPITAL ENCOUNTER (OUTPATIENT)
Dept: RADIOLOGY | Facility: HOSPITAL | Age: 69
Discharge: HOME | End: 2024-01-19
Attending: PHYSICIAN ASSISTANT
Payer: COMMERCIAL

## 2024-01-19 VITALS
DIASTOLIC BLOOD PRESSURE: 71 MMHG | HEIGHT: 67 IN | WEIGHT: 275 LBS | BODY MASS INDEX: 43.16 KG/M2 | SYSTOLIC BLOOD PRESSURE: 130 MMHG | HEART RATE: 86 BPM

## 2024-01-19 DIAGNOSIS — M25.521 RIGHT ELBOW PAIN: ICD-10-CM

## 2024-01-19 DIAGNOSIS — M25.561 RIGHT KNEE PAIN, UNSPECIFIED CHRONICITY: ICD-10-CM

## 2024-01-19 DIAGNOSIS — M79.671 RIGHT FOOT PAIN: ICD-10-CM

## 2024-01-19 DIAGNOSIS — M25.561 ACUTE PAIN OF RIGHT KNEE: Primary | ICD-10-CM

## 2024-01-19 PROCEDURE — 3075F SYST BP GE 130 - 139MM HG: CPT | Mod: CPTII,S$GLB,, | Performed by: PHYSICIAN ASSISTANT

## 2024-01-19 PROCEDURE — 73080 X-RAY EXAM OF ELBOW: CPT | Mod: 26,RT,, | Performed by: RADIOLOGY

## 2024-01-19 PROCEDURE — 73080 X-RAY EXAM OF ELBOW: CPT | Mod: TC,RT

## 2024-01-19 PROCEDURE — 73562 X-RAY EXAM OF KNEE 3: CPT | Mod: TC,LT

## 2024-01-19 PROCEDURE — 3078F DIAST BP <80 MM HG: CPT | Mod: CPTII,S$GLB,, | Performed by: PHYSICIAN ASSISTANT

## 2024-01-19 PROCEDURE — 3072F LOW RISK FOR RETINOPATHY: CPT | Mod: CPTII,S$GLB,, | Performed by: PHYSICIAN ASSISTANT

## 2024-01-19 PROCEDURE — 4010F ACE/ARB THERAPY RXD/TAKEN: CPT | Mod: CPTII,S$GLB,, | Performed by: PHYSICIAN ASSISTANT

## 2024-01-19 PROCEDURE — 99214 OFFICE O/P EST MOD 30 MIN: CPT | Mod: S$GLB,,, | Performed by: PHYSICIAN ASSISTANT

## 2024-01-19 PROCEDURE — 73630 X-RAY EXAM OF FOOT: CPT | Mod: 26,RT,, | Performed by: RADIOLOGY

## 2024-01-19 PROCEDURE — 73564 X-RAY EXAM KNEE 4 OR MORE: CPT | Mod: 26,RT,, | Performed by: RADIOLOGY

## 2024-01-19 PROCEDURE — 1125F AMNT PAIN NOTED PAIN PRSNT: CPT | Mod: CPTII,S$GLB,, | Performed by: PHYSICIAN ASSISTANT

## 2024-01-19 PROCEDURE — 99999 PR PBB SHADOW E&M-EST. PATIENT-LVL IV: CPT | Mod: PBBFAC,,, | Performed by: PHYSICIAN ASSISTANT

## 2024-01-19 PROCEDURE — 1160F RVW MEDS BY RX/DR IN RCRD: CPT | Mod: CPTII,S$GLB,, | Performed by: PHYSICIAN ASSISTANT

## 2024-01-19 PROCEDURE — 73630 X-RAY EXAM OF FOOT: CPT | Mod: TC,RT

## 2024-01-19 PROCEDURE — 1159F MED LIST DOCD IN RCRD: CPT | Mod: CPTII,S$GLB,, | Performed by: PHYSICIAN ASSISTANT

## 2024-01-19 PROCEDURE — 73562 X-RAY EXAM OF KNEE 3: CPT | Mod: 26,LT,, | Performed by: RADIOLOGY

## 2024-01-19 PROCEDURE — 3008F BODY MASS INDEX DOCD: CPT | Mod: CPTII,S$GLB,, | Performed by: PHYSICIAN ASSISTANT

## 2024-03-07 ENCOUNTER — PATIENT MESSAGE (OUTPATIENT)
Dept: ADMINISTRATIVE | Facility: HOSPITAL | Age: 69
End: 2024-03-07
Payer: COMMERCIAL

## 2024-03-07 DIAGNOSIS — E11.9 TYPE 2 DIABETES MELLITUS WITHOUT COMPLICATION, UNSPECIFIED WHETHER LONG TERM INSULIN USE: ICD-10-CM

## 2024-03-19 ENCOUNTER — LAB VISIT (OUTPATIENT)
Dept: LAB | Facility: HOSPITAL | Age: 69
End: 2024-03-19
Attending: INTERNAL MEDICINE
Payer: COMMERCIAL

## 2024-03-19 DIAGNOSIS — Z00.00 ANNUAL PHYSICAL EXAM: ICD-10-CM

## 2024-03-19 DIAGNOSIS — I10 ESSENTIAL HYPERTENSION: ICD-10-CM

## 2024-03-19 DIAGNOSIS — E11.22 TYPE 2 DIABETES MELLITUS WITH STAGE 3B CHRONIC KIDNEY DISEASE, WITHOUT LONG-TERM CURRENT USE OF INSULIN: ICD-10-CM

## 2024-03-19 DIAGNOSIS — E78.5 HYPERLIPIDEMIA, UNSPECIFIED HYPERLIPIDEMIA TYPE: ICD-10-CM

## 2024-03-19 DIAGNOSIS — N18.32 TYPE 2 DIABETES MELLITUS WITH STAGE 3B CHRONIC KIDNEY DISEASE, WITHOUT LONG-TERM CURRENT USE OF INSULIN: ICD-10-CM

## 2024-03-19 LAB
25(OH)D3+25(OH)D2 SERPL-MCNC: 12 NG/ML (ref 30–96)
ALBUMIN SERPL BCP-MCNC: 3.9 G/DL (ref 3.5–5.2)
ALP SERPL-CCNC: 119 U/L (ref 55–135)
ALT SERPL W/O P-5'-P-CCNC: 16 U/L (ref 10–44)
ANION GAP SERPL CALC-SCNC: 11 MMOL/L (ref 8–16)
AST SERPL-CCNC: 19 U/L (ref 10–40)
BASOPHILS # BLD AUTO: 0.02 K/UL (ref 0–0.2)
BASOPHILS NFR BLD: 0.3 % (ref 0–1.9)
BILIRUB SERPL-MCNC: 0.4 MG/DL (ref 0.1–1)
BUN SERPL-MCNC: 20 MG/DL (ref 8–23)
CALCIUM SERPL-MCNC: 9.6 MG/DL (ref 8.7–10.5)
CHLORIDE SERPL-SCNC: 110 MMOL/L (ref 95–110)
CHOLEST SERPL-MCNC: 189 MG/DL (ref 120–199)
CHOLEST/HDLC SERPL: 3.3 {RATIO} (ref 2–5)
CO2 SERPL-SCNC: 19 MMOL/L (ref 23–29)
CREAT SERPL-MCNC: 1.1 MG/DL (ref 0.5–1.4)
DIFFERENTIAL METHOD BLD: ABNORMAL
EOSINOPHIL # BLD AUTO: 0.1 K/UL (ref 0–0.5)
EOSINOPHIL NFR BLD: 1.4 % (ref 0–8)
ERYTHROCYTE [DISTWIDTH] IN BLOOD BY AUTOMATED COUNT: 14.6 % (ref 11.5–14.5)
EST. GFR  (NO RACE VARIABLE): 54.7 ML/MIN/1.73 M^2
ESTIMATED AVG GLUCOSE: 180 MG/DL (ref 68–131)
GLUCOSE SERPL-MCNC: 146 MG/DL (ref 70–110)
HBA1C MFR BLD: 7.9 % (ref 4–5.6)
HCT VFR BLD AUTO: 46.1 % (ref 37–48.5)
HDLC SERPL-MCNC: 57 MG/DL (ref 40–75)
HDLC SERPL: 30.2 % (ref 20–50)
HGB BLD-MCNC: 13.9 G/DL (ref 12–16)
IMM GRANULOCYTES # BLD AUTO: 0.02 K/UL (ref 0–0.04)
IMM GRANULOCYTES NFR BLD AUTO: 0.3 % (ref 0–0.5)
LDLC SERPL CALC-MCNC: 99.8 MG/DL (ref 63–159)
LYMPHOCYTES # BLD AUTO: 1.5 K/UL (ref 1–4.8)
LYMPHOCYTES NFR BLD: 23.2 % (ref 18–48)
MCH RBC QN AUTO: 27.3 PG (ref 27–31)
MCHC RBC AUTO-ENTMCNC: 30.2 G/DL (ref 32–36)
MCV RBC AUTO: 90 FL (ref 82–98)
MONOCYTES # BLD AUTO: 0.4 K/UL (ref 0.3–1)
MONOCYTES NFR BLD: 6.1 % (ref 4–15)
NEUTROPHILS # BLD AUTO: 4.3 K/UL (ref 1.8–7.7)
NEUTROPHILS NFR BLD: 68.7 % (ref 38–73)
NONHDLC SERPL-MCNC: 132 MG/DL
NRBC BLD-RTO: 0 /100 WBC
PLATELET # BLD AUTO: 278 K/UL (ref 150–450)
PMV BLD AUTO: 11.5 FL (ref 9.2–12.9)
POTASSIUM SERPL-SCNC: 4.8 MMOL/L (ref 3.5–5.1)
PROT SERPL-MCNC: 7.2 G/DL (ref 6–8.4)
RBC # BLD AUTO: 5.1 M/UL (ref 4–5.4)
SODIUM SERPL-SCNC: 140 MMOL/L (ref 136–145)
TRIGL SERPL-MCNC: 161 MG/DL (ref 30–150)
TSH SERPL DL<=0.005 MIU/L-ACNC: 3.8 UIU/ML (ref 0.4–4)
WBC # BLD AUTO: 6.24 K/UL (ref 3.9–12.7)

## 2024-03-19 PROCEDURE — 82306 VITAMIN D 25 HYDROXY: CPT | Performed by: INTERNAL MEDICINE

## 2024-03-19 PROCEDURE — 80061 LIPID PANEL: CPT | Performed by: INTERNAL MEDICINE

## 2024-03-19 PROCEDURE — 84443 ASSAY THYROID STIM HORMONE: CPT | Performed by: INTERNAL MEDICINE

## 2024-03-19 PROCEDURE — 36415 COLL VENOUS BLD VENIPUNCTURE: CPT | Performed by: INTERNAL MEDICINE

## 2024-03-19 PROCEDURE — 83036 HEMOGLOBIN GLYCOSYLATED A1C: CPT | Performed by: INTERNAL MEDICINE

## 2024-03-19 PROCEDURE — 85025 COMPLETE CBC W/AUTO DIFF WBC: CPT | Performed by: INTERNAL MEDICINE

## 2024-03-19 PROCEDURE — 80053 COMPREHEN METABOLIC PANEL: CPT | Performed by: INTERNAL MEDICINE

## 2024-03-21 NOTE — PROGRESS NOTES
HISTORY:  Type 2 diabetes with peripheral neuropathy  Hypertension.  Hyperlipidemia.  Helicobacter pylori which has been treated.  Obesity with gastric bypass surgery.  Left foot ulcer, fifth metatarsal, debridement     SOCIAL HISTORY:  Tobacco and alcohol use - none.   FAMILY HISTORY:  Mother is , COPD, heart disease.  Father is , liver cancer, heart disease.  Two sisters, but one sister recently passed away from metastatic liver disease, the primary is unknown; the other sister has diabetes and hypothyroid disease.      Medications  Rosuvastatin 20 mg  Metformin 500 mg  Two tablets bid  Jardiance 10 mg     Component      Latest Ref Rng 3/19/2024   WBC      3.90 - 12.70 K/uL 6.24    RBC      4.00 - 5.40 M/uL 5.10    Hemoglobin      12.0 - 16.0 g/dL 13.9    Hematocrit      37.0 - 48.5 % 46.1    MCV      82 - 98 fL 90    MCH      27.0 - 31.0 pg 27.3    MCHC      32.0 - 36.0 g/dL 30.2 (L)    RDW      11.5 - 14.5 % 14.6 (H)    Platelet Count      150 - 450 K/uL 278    MPV      9.2 - 12.9 fL 11.5    Immature Granulocytes      0.0 - 0.5 % 0.3    Gran # (ANC)      1.8 - 7.7 K/uL 4.3    Immature Grans (Abs)      0.00 - 0.04 K/uL 0.02    Lymph #      1.0 - 4.8 K/uL 1.5    Mono #      0.3 - 1.0 K/uL 0.4    Eos #      0.0 - 0.5 K/uL 0.1    Baso #      0.00 - 0.20 K/uL 0.02    nRBC      0 /100 WBC 0    Gran %      38.0 - 73.0 % 68.7    Lymph %      18.0 - 48.0 % 23.2    Mono %      4.0 - 15.0 % 6.1    Eos %      0.0 - 8.0 % 1.4    Basophil %      0.0 - 1.9 % 0.3    Differential Method Automated    Sodium      136 - 145 mmol/L 140    Potassium      3.5 - 5.1 mmol/L 4.8    Chloride      95 - 110 mmol/L 110    CO2      23 - 29 mmol/L 19 (L)    Glucose      70 - 110 mg/dL 146 (H)    BUN      8 - 23 mg/dL 20    Creatinine      0.5 - 1.4 mg/dL 1.1    Calcium      8.7 - 10.5 mg/dL 9.6    PROTEIN TOTAL      6.0 - 8.4 g/dL 7.2    Albumin      3.5 - 5.2 g/dL 3.9    BILIRUBIN TOTAL      0.1 - 1.0 mg/dL 0.4    ALP      55  - 135 U/L 119    AST      10 - 40 U/L 19    ALT      10 - 44 U/L 16    eGFR      >60 mL/min/1.73 m^2 54.7 !    Anion Gap      8 - 16 mmol/L 11    Cholesterol Total      120 - 199 mg/dL 189    Triglycerides      30 - 150 mg/dL 161 (H)    HDL      40 - 75 mg/dL 57    LDL Cholesterol      63.0 - 159.0 mg/dL 99.8    HDL/Cholesterol Ratio      20.0 - 50.0 % 30.2    Total Cholesterol/HDL Ratio      2.0 - 5.0  3.3    Non-HDL Cholesterol      mg/dL 132    Hemoglobin A1C External      4.0 - 5.6 % 7.9 (H)    Estimated Avg Glucose      68 - 131 mg/dL 180 (H)    TSH      0.400 - 4.000 uIU/mL 3.800    Vitamin D      30 - 96 ng/mL 12 (L)       68-year-old female   Presents for annual routine visit    In regard to diabetes she was been said that has been dietary indiscretion.  For the past 10 months she has been taking care of a brother-in-law who recently passed away from glioblastoma., her sister/wife of the brother-in-law passed away years ago from cancer, and because such she got out of routine or taking care of herself and maintain appropriate diet habits    For the past 4 days she has been having right posterior neck pain.  That will go to the scapula.  She felt that she slept wrong.    For the past month she was notice a rash on both elbows and left forearm.  It is an area of dry skin and then erythema and pruritic      Review of symptoms  Reports no chest pain, palpitations shortness for breath, abdominal pain.  Regular bowel function.  No difficulty urinating but is frequent in urgent.  Nocturia x2.  No indigestion heartburn no chronic headaches      Regarding diabetes she does not check blood glucose.  Also she attempted Trulicity and Ozempic but could cause significant nausea and could not tolerate the medication    Examination   Weight 268 lb   Pulse 80   Blood pressure 138/82  HEENT exam no abnormal findings  Neck no thyromegaly no masses  Chest clear breath sounds  Heart regular rate rhythm  Abdominal exam soft  nontender no hepatosplenomegaly abdominal masses  Pulses 2+ carotid pulses no bruits 2+ dorsalis pedal pulses  Extremities no edema   Lymph gland, no palpable adenopathy  Musculoskeletal no joint deformities   Skin area of dry erythematous spots on left forearm and both elbows    Impression   General exam   Type 2 diabetes with chronic kidney disease stage IIIA.  Hypertension  Hyperlipidemia  Low vitamin-D  Dermatitis  Muscular cervical pain    Plan   Diprolene triamcinolone ointment   Increase Jardiance to 25 mg  Referral to diabetes in regard to medication advisement as well as she may benefit from glucose continuous monitoring.  The main thing is to get back to proper dietary habits  Methocarbamol 500 mg 3 times a day for the next 5 days   Urine microalbumin  Return appointment 4 months

## 2024-03-22 ENCOUNTER — OFFICE VISIT (OUTPATIENT)
Dept: INTERNAL MEDICINE | Facility: CLINIC | Age: 69
End: 2024-03-22
Payer: COMMERCIAL

## 2024-03-22 VITALS
OXYGEN SATURATION: 99 % | DIASTOLIC BLOOD PRESSURE: 84 MMHG | BODY MASS INDEX: 42.18 KG/M2 | HEART RATE: 81 BPM | HEIGHT: 67 IN | SYSTOLIC BLOOD PRESSURE: 132 MMHG | WEIGHT: 268.75 LBS

## 2024-03-22 DIAGNOSIS — Z00.00 ANNUAL PHYSICAL EXAM: Primary | ICD-10-CM

## 2024-03-22 DIAGNOSIS — N18.31 TYPE 2 DIABETES MELLITUS WITH STAGE 3A CHRONIC KIDNEY DISEASE, WITHOUT LONG-TERM CURRENT USE OF INSULIN: ICD-10-CM

## 2024-03-22 DIAGNOSIS — E78.5 HYPERLIPIDEMIA, UNSPECIFIED HYPERLIPIDEMIA TYPE: ICD-10-CM

## 2024-03-22 DIAGNOSIS — M54.2 CERVICAL PAIN: ICD-10-CM

## 2024-03-22 DIAGNOSIS — I10 PRIMARY HYPERTENSION: ICD-10-CM

## 2024-03-22 DIAGNOSIS — E11.22 TYPE 2 DIABETES MELLITUS WITH STAGE 3A CHRONIC KIDNEY DISEASE, WITHOUT LONG-TERM CURRENT USE OF INSULIN: ICD-10-CM

## 2024-03-22 DIAGNOSIS — L30.9 DERMATITIS: ICD-10-CM

## 2024-03-22 DIAGNOSIS — R79.89 LOW VITAMIN D LEVEL: ICD-10-CM

## 2024-03-22 LAB
ALBUMIN/CREAT UR: 52.7 UG/MG (ref 0–30)
CREAT UR-MCNC: 110 MG/DL (ref 15–325)
MICROALBUMIN UR DL<=1MG/L-MCNC: 58 UG/ML

## 2024-03-22 PROCEDURE — 99999 PR PBB SHADOW E&M-EST. PATIENT-LVL III: CPT | Mod: PBBFAC,,, | Performed by: INTERNAL MEDICINE

## 2024-03-22 PROCEDURE — 1159F MED LIST DOCD IN RCRD: CPT | Mod: CPTII,S$GLB,, | Performed by: INTERNAL MEDICINE

## 2024-03-22 PROCEDURE — 82043 UR ALBUMIN QUANTITATIVE: CPT | Performed by: INTERNAL MEDICINE

## 2024-03-22 PROCEDURE — 3051F HG A1C>EQUAL 7.0%<8.0%: CPT | Mod: CPTII,S$GLB,, | Performed by: INTERNAL MEDICINE

## 2024-03-22 PROCEDURE — 3072F LOW RISK FOR RETINOPATHY: CPT | Mod: CPTII,S$GLB,, | Performed by: INTERNAL MEDICINE

## 2024-03-22 PROCEDURE — 3288F FALL RISK ASSESSMENT DOCD: CPT | Mod: CPTII,S$GLB,, | Performed by: INTERNAL MEDICINE

## 2024-03-22 PROCEDURE — 3079F DIAST BP 80-89 MM HG: CPT | Mod: CPTII,S$GLB,, | Performed by: INTERNAL MEDICINE

## 2024-03-22 PROCEDURE — 3075F SYST BP GE 130 - 139MM HG: CPT | Mod: CPTII,S$GLB,, | Performed by: INTERNAL MEDICINE

## 2024-03-22 PROCEDURE — 4010F ACE/ARB THERAPY RXD/TAKEN: CPT | Mod: CPTII,S$GLB,, | Performed by: INTERNAL MEDICINE

## 2024-03-22 PROCEDURE — 3008F BODY MASS INDEX DOCD: CPT | Mod: CPTII,S$GLB,, | Performed by: INTERNAL MEDICINE

## 2024-03-22 PROCEDURE — 99397 PER PM REEVAL EST PAT 65+ YR: CPT | Mod: S$GLB,,, | Performed by: INTERNAL MEDICINE

## 2024-03-22 PROCEDURE — 1101F PT FALLS ASSESS-DOCD LE1/YR: CPT | Mod: CPTII,S$GLB,, | Performed by: INTERNAL MEDICINE

## 2024-03-22 PROCEDURE — 1126F AMNT PAIN NOTED NONE PRSNT: CPT | Mod: CPTII,S$GLB,, | Performed by: INTERNAL MEDICINE

## 2024-03-22 PROCEDURE — 1160F RVW MEDS BY RX/DR IN RCRD: CPT | Mod: CPTII,S$GLB,, | Performed by: INTERNAL MEDICINE

## 2024-03-22 RX ORDER — METHOCARBAMOL 500 MG/1
500 TABLET, FILM COATED ORAL 3 TIMES DAILY
Qty: 15 TABLET | Refills: 0 | Status: SHIPPED | OUTPATIENT
Start: 2024-03-22 | End: 2024-03-22 | Stop reason: SDUPTHER

## 2024-03-22 RX ORDER — METHOCARBAMOL 500 MG/1
500 TABLET, FILM COATED ORAL 3 TIMES DAILY
Qty: 15 TABLET | Refills: 0 | Status: SHIPPED | OUTPATIENT
Start: 2024-03-22 | End: 2024-03-27

## 2024-03-22 RX ORDER — TRIAMCINOLONE ACETONIDE 1 MG/G
CREAM TOPICAL 2 TIMES DAILY
Qty: 45 G | Refills: 1 | Status: SHIPPED | OUTPATIENT
Start: 2024-03-22

## 2024-03-22 RX ORDER — TRIAMCINOLONE ACETONIDE 1 MG/G
CREAM TOPICAL 2 TIMES DAILY
Qty: 45 G | Refills: 1 | Status: SHIPPED | OUTPATIENT
Start: 2024-03-22 | End: 2024-03-22 | Stop reason: SDUPTHER

## 2024-03-22 RX ORDER — LOSARTAN POTASSIUM 25 MG/1
25 TABLET ORAL DAILY
Qty: 90 TABLET | Refills: 3 | Status: SHIPPED | OUTPATIENT
Start: 2024-03-22 | End: 2025-03-22

## 2024-03-22 RX ORDER — LOSARTAN POTASSIUM 25 MG/1
25 TABLET ORAL DAILY
Qty: 90 TABLET | Refills: 3 | Status: SHIPPED | OUTPATIENT
Start: 2024-03-22 | End: 2024-03-22 | Stop reason: SDUPTHER

## 2024-03-25 RX ORDER — SEMAGLUTIDE 0.68 MG/ML
INJECTION, SOLUTION SUBCUTANEOUS
Qty: 3 ML | Refills: 0 | OUTPATIENT
Start: 2024-03-25 | End: 2024-05-06

## 2024-03-25 NOTE — TELEPHONE ENCOUNTER
No care due was identified.  Health [facility] Embedded Care Due Messages. Reference number: [id number].   3/25/2024 10:43:21 AM CDT

## 2024-04-25 ENCOUNTER — PATIENT MESSAGE (OUTPATIENT)
Dept: INTERNAL MEDICINE | Facility: CLINIC | Age: 69
End: 2024-04-25
Payer: COMMERCIAL

## 2024-05-03 ENCOUNTER — PATIENT MESSAGE (OUTPATIENT)
Dept: SPORTS MEDICINE | Facility: CLINIC | Age: 69
End: 2024-05-03
Payer: COMMERCIAL

## 2024-05-06 ENCOUNTER — OFFICE VISIT (OUTPATIENT)
Dept: SPORTS MEDICINE | Facility: CLINIC | Age: 69
End: 2024-05-06
Payer: COMMERCIAL

## 2024-05-06 ENCOUNTER — HOSPITAL ENCOUNTER (OUTPATIENT)
Dept: RADIOLOGY | Facility: HOSPITAL | Age: 69
Discharge: HOME | End: 2024-05-06
Attending: PHYSICIAN ASSISTANT
Payer: COMMERCIAL

## 2024-05-06 VITALS
HEIGHT: 67 IN | HEART RATE: 74 BPM | BODY MASS INDEX: 42.53 KG/M2 | SYSTOLIC BLOOD PRESSURE: 128 MMHG | WEIGHT: 270.94 LBS | DIASTOLIC BLOOD PRESSURE: 79 MMHG

## 2024-05-06 DIAGNOSIS — M25.511 ACUTE PAIN OF RIGHT SHOULDER: Primary | ICD-10-CM

## 2024-05-06 DIAGNOSIS — M25.511 RIGHT SHOULDER PAIN, UNSPECIFIED CHRONICITY: ICD-10-CM

## 2024-05-06 PROCEDURE — 1125F AMNT PAIN NOTED PAIN PRSNT: CPT | Mod: CPTII,S$GLB,, | Performed by: PHYSICIAN ASSISTANT

## 2024-05-06 PROCEDURE — 3051F HG A1C>EQUAL 7.0%<8.0%: CPT | Mod: CPTII,S$GLB,, | Performed by: PHYSICIAN ASSISTANT

## 2024-05-06 PROCEDURE — 73030 X-RAY EXAM OF SHOULDER: CPT | Mod: 26,RT,, | Performed by: RADIOLOGY

## 2024-05-06 PROCEDURE — 99999 PR PBB SHADOW E&M-EST. PATIENT-LVL IV: CPT | Mod: PBBFAC,,, | Performed by: PHYSICIAN ASSISTANT

## 2024-05-06 PROCEDURE — 3060F POS MICROALBUMINURIA REV: CPT | Mod: CPTII,S$GLB,, | Performed by: PHYSICIAN ASSISTANT

## 2024-05-06 PROCEDURE — 3008F BODY MASS INDEX DOCD: CPT | Mod: CPTII,S$GLB,, | Performed by: PHYSICIAN ASSISTANT

## 2024-05-06 PROCEDURE — 3078F DIAST BP <80 MM HG: CPT | Mod: CPTII,S$GLB,, | Performed by: PHYSICIAN ASSISTANT

## 2024-05-06 PROCEDURE — 4010F ACE/ARB THERAPY RXD/TAKEN: CPT | Mod: CPTII,S$GLB,, | Performed by: PHYSICIAN ASSISTANT

## 2024-05-06 PROCEDURE — 73030 X-RAY EXAM OF SHOULDER: CPT | Mod: TC,RT

## 2024-05-06 PROCEDURE — 99214 OFFICE O/P EST MOD 30 MIN: CPT | Mod: S$GLB,,, | Performed by: PHYSICIAN ASSISTANT

## 2024-05-06 PROCEDURE — 3072F LOW RISK FOR RETINOPATHY: CPT | Mod: CPTII,S$GLB,, | Performed by: PHYSICIAN ASSISTANT

## 2024-05-06 PROCEDURE — 3288F FALL RISK ASSESSMENT DOCD: CPT | Mod: CPTII,S$GLB,, | Performed by: PHYSICIAN ASSISTANT

## 2024-05-06 PROCEDURE — 1159F MED LIST DOCD IN RCRD: CPT | Mod: CPTII,S$GLB,, | Performed by: PHYSICIAN ASSISTANT

## 2024-05-06 PROCEDURE — 3066F NEPHROPATHY DOC TX: CPT | Mod: CPTII,S$GLB,, | Performed by: PHYSICIAN ASSISTANT

## 2024-05-06 PROCEDURE — 3074F SYST BP LT 130 MM HG: CPT | Mod: CPTII,S$GLB,, | Performed by: PHYSICIAN ASSISTANT

## 2024-05-06 PROCEDURE — 1160F RVW MEDS BY RX/DR IN RCRD: CPT | Mod: CPTII,S$GLB,, | Performed by: PHYSICIAN ASSISTANT

## 2024-05-06 PROCEDURE — 1101F PT FALLS ASSESS-DOCD LE1/YR: CPT | Mod: CPTII,S$GLB,, | Performed by: PHYSICIAN ASSISTANT

## 2024-05-06 RX ORDER — CYCLOBENZAPRINE HCL 10 MG
10 TABLET ORAL NIGHTLY
Qty: 5 TABLET | Refills: 0 | Status: SHIPPED | OUTPATIENT
Start: 2024-05-06 | End: 2024-05-16

## 2024-05-06 RX ORDER — CELECOXIB 200 MG/1
200 CAPSULE ORAL 2 TIMES DAILY
Qty: 20 CAPSULE | Refills: 0 | Status: SHIPPED | OUTPATIENT
Start: 2024-05-06 | End: 2024-05-22 | Stop reason: SDUPTHER

## 2024-05-06 RX ORDER — SEMAGLUTIDE 0.68 MG/ML
0.5 INJECTION, SOLUTION SUBCUTANEOUS
Qty: 3 ML | Refills: 1 | Status: CANCELLED | OUTPATIENT
Start: 2024-05-06

## 2024-05-06 NOTE — TELEPHONE ENCOUNTER
No care due was identified.  Health [facility] Embedded Care Due Messages. Reference number: [id number].   5/06/2024 11:58:51 AM CDT

## 2024-05-09 ENCOUNTER — CLINICAL SUPPORT (OUTPATIENT)
Dept: REHABILITATION | Facility: HOSPITAL | Age: 69
End: 2024-05-09
Payer: COMMERCIAL

## 2024-05-09 DIAGNOSIS — S29.012A STRAIN OF RIGHT LATISSIMUS DORSI MUSCLE: Primary | ICD-10-CM

## 2024-05-09 DIAGNOSIS — M25.511 RIGHT SHOULDER PAIN, UNSPECIFIED CHRONICITY: ICD-10-CM

## 2024-05-09 PROBLEM — R29.898 DECREASED STRENGTH OF UPPER EXTREMITY: Status: RESOLVED | Noted: 2022-09-29 | Resolved: 2024-05-09

## 2024-05-09 PROBLEM — M25.562 ACUTE PAIN OF LEFT KNEE: Status: RESOLVED | Noted: 2022-10-07 | Resolved: 2024-05-09

## 2024-05-09 PROBLEM — G89.29 CHRONIC RIGHT SHOULDER PAIN: Status: RESOLVED | Noted: 2022-09-29 | Resolved: 2024-05-09

## 2024-05-09 PROCEDURE — 97112 NEUROMUSCULAR REEDUCATION: CPT | Performed by: PHYSICAL THERAPIST

## 2024-05-09 PROCEDURE — 97161 PT EVAL LOW COMPLEX 20 MIN: CPT | Performed by: PHYSICAL THERAPIST

## 2024-05-09 NOTE — PROGRESS NOTES
[facility]   Physical Therapy Initial Evaluation      Name: Meryl Johnson  [facility] Number: [id number]    Therapy Diagnosis:   Encounter Diagnoses   Name Primary?    Right shoulder pain, unspecified chronicity     Strain of right latissimus dorsi muscle Yes        Physician: Russell Honeycutt III, *    Physician Orders: PT Eval and Treat   Medical Diagnosis from Referral:   Diagnosis   M25.511 (ICD-10-CM) - Right shoulder pain, unspecified chronicity     Evaluation Date: 5/9/2024  Authorization Period Expiration: 12/31/2024  Plan of Care Expiration: 11/9/2024  Progress Note Due: 8/9/2024  Visit # / Visits authorized: 1/ 1   FOTO: 1/3    Precautions: Standard     Time In: 1700  Time Out: 1755  Total Appointment Time (timed & untimed codes): 55 minutes    Subjective     Date of onset: Last Friday  History of current condition - Meryl reports: Patient reports a history of right shoulder pain but last Friday it really began to hurt. She saw the PA early this week who prescribed her a muscle relaxer which has relieved some symptoms, but they did return again yesterday morning. She notes the pain was present years ago as well, and she is getting some lower back pain/knee pain with it as a result.   Falls: 1 in January    Imaging: xray: FINDINGS:  Mild DJD AC joint, minimal mild DJD glenohumeral joint particularly inferiorly, 2 subcortical tiny cyst versus synovial erosion superior greater tuberosity region.     Impression:     No fracture dislocation.  Additional findings above    Prior Therapy: Yes knees and shoulder/elbow  Social History: She lives with their family  Occupation: [employer] - [employer] access  Prior Level of Function: Independent  Current Level of Function: Ind pain with work and trouble sleeping and ADL    Pain:  Current 4/10, worst 8/10, best 2/10   Location: right shoulder   Description: Aching, Grabbing, Deep, and Sharp  Aggravating Factors: Morning and Lifting  Easing Factors:  rest    Patients goals: return to work and ADL without pain      Medical History:   Past Medical History:   Diagnosis Date    Anemia     Diabetes mellitus type II     H. pylori infection     Hyperlipidemia     Hypertension     Unspecified vitamin D deficiency 4/24/2013       Surgical History:   Meryl Johnson  has a past surgical history that includes Gastric bypass; Esophagogastroduodenoscopy (N/A, 8/8/2018); Knee arthroscopy w/ meniscectomy (Left, 10/4/2022); Chondroplasty of knee (Left, 10/4/2022); and Synovectomy of knee (Left, 10/4/2022).    Medications:   Meryl has a current medication list which includes the following prescription(s): aspirin, celecoxib, cyclobenzaprine, empagliflozin, ergocalciferol, losartan, metformin, rosuvastatin, and triamcinolone acetonide 0.1%.    Allergies:   Review of patient's allergies indicates:   Allergen Reactions    Codeine Nausea Only    Vicodin [hydrocodone-acetaminophen] Nausea Only        Objective      Observation: Patient is a 68 y.o. female alert and oriented    Posture: increased lumbar lordosis     Passive Range of Motion:   Shoulder Right Left   Flexion 160 160   Abduction 160 160   ER at 0 50 45   ER at 90 80 70   IR 50 50      Active Range of Motion:   Shoulder Right Left   Flexion 150 150   Abduction 140 140   ER at 0 35 30   ER at 90 70 70   IR 40 40   Reach behind head T1 T1   Reach behind back  L3 L2     Strength:  Shoulder Right Left   Flexion 4 5   Abduction 5 5   ER 4+ 5   IR 4 5   Serratus Anterior 4- 4   Middle Trap 4- 4   Low Trap 4- 4   Pain with resisted shoulder extension     Special Tests:  Impingement Cluster:   Right Left   Hawkin's Kenndy - -   Painful Arc - -   Resisted ER - -     Rotator Cuff Tear   Right Left   Painful Arc - -   Drop Arm test - -   Resisted ER - -     Instability:   Right Left   Anterior Apprehension Test - -   Relocation test - -   Posterior Apprehension Test -- -   Sulcus Sign - -     SLAP:   Right Left   Biceps Load II -  -   O'Feliciano's Test - -     Other:   Right Left   Subscapularis Lift Off Test - -   Empty Can - -     Joint Mobility: Limited inferior glide to the shoulder    Palpation: tender to lat attachment    Sensation: intact    Flexibility:   Lat: R short and painful ; L normal   Pec Minor: R short ; L short   Intake Outcome Measure for FOTO Shoulder Survey    Therapist reviewed FOTO scores for Meryl Johnson on 5/9/2024.   FOTO documents entered into [employer] - see Media section.    Intake Score: 52%       Treatment     Total Treatment time (time-based codes) separate from Evaluation: 10 minutes     Meryl received the treatments listed below:        neuromuscular re-education activities to improve: Proprioception and Posture for 10 minutes. The following activities were included:  Wand flexion 2# with low abd focus 2 x 10  Lat extensions OTB 2 x 10        Patient Education and Home Exercises     Education provided:   - improve lat mobility    Written Home Exercises Provided: yes. Exercises were reviewed and Meryl was able to demonstrate them prior to the end of the session.  Meryl demonstrated good understanding of the education provided. See EMR under Patient Instructions for exercises provided during therapy sessions.    Assessment     Meryl is a 68 y.o. female referred to outpatient Physical Therapy with a medical diagnosis of right shoulder pain. Patient presents with pain with overhead motion, strength deficits, tender to the touch at her lat causing lower back pain and gait deficits leading to knee pain, trouble completing work and ADL without pain     Patient prognosis is Good.   Patient will benefit from skilled outpatient Physical Therapy to address the deficits stated above and in the chart below, provide patient /family education, and to maximize patientt's level of independence.     Plan of care discussed with patient: Yes  Patient's spiritual, cultural and educational needs considered and patient is agreeable  to the plan of care and goals as stated below:     Anticipated Barriers for therapy: none    Medical Necessity is demonstrated by the following  History  Co-morbidities and personal factors that may impact the plan of care [x] LOW: no personal factors / co-morbidities  [] MODERATE: 1-2 personal factors / co-morbidities  [] HIGH: 3+ personal factors / co-morbidities    Moderate / High Support Documentation:   Co-morbidities affecting plan of care:     Personal Factors:   no deficits     Examination  Body Structures and Functions, activity limitations and participation restrictions that may impact the plan of care [x] LOW: addressing 1-2 elements  [] MODERATE: 3+ elements  [] HIGH: 4+ elements (please support below)    Moderate / High Support Documentation:      Clinical Presentation [x] LOW: stable  [] MODERATE: Evolving  [] HIGH: Unstable     Decision Making/ Complexity Score: Low       GOALS: Short Term Goals:  4 weeks  1.Report decreased shoulder pain < / =  2/10  to increase tolerance for return to work without pains  2. Increase PROM full overhead flexion without shoulder pain   3. Increased strength by 1/3 MMT grade in lat to increase tolerance for ADL and work activities.  4. Pt to tolerate HEP to improve ROM and independence with ADL's    Long Term Goals: 8 weeks  1.Report decreased shoulder pain  < / =  0 /10  to increase tolerance for return to [employer]  2.Increase AROM to full motion without pain  3.Increase strength to >/= 4/5 in lat and cuff to increase tolerance for ADL and work activities.  4. Pt goal: return to work and ADL pain free  5. Pt will have improved gcode of CJ (20-40% limited) on FOTO shoulder in order to demonstrate true functional improvement.   Plan     Plan of care Certification: 5/9/2024 to 11/9/2024.    Outpatient Physical Therapy 2 times weekly for 10 weeks to include the following interventions: Cervical/Lumbar Traction, Manual Therapy, Moist Heat/ Ice, Neuromuscular Re-ed,  Patient Education, Self Care, Therapeutic Activities, Therapeutic Exercise, and Dry Needling .     Fabricio Tolentino, PT, DPT, OCS, FAAOMPT

## 2024-05-10 NOTE — PLAN OF CARE
[facility]   Physical Therapy Initial Evaluation      Name: Meryl Johnson  [facility] Number: [id number]    Therapy Diagnosis:   Encounter Diagnoses   Name Primary?    Right shoulder pain, unspecified chronicity     Strain of right latissimus dorsi muscle Yes        Physician: Russell Honeycutt III, *    Physician Orders: PT Eval and Treat   Medical Diagnosis from Referral:   Diagnosis   M25.511 (ICD-10-CM) - Right shoulder pain, unspecified chronicity     Evaluation Date: 5/9/2024  Authorization Period Expiration: 12/31/2024  Plan of Care Expiration: 11/9/2024  Progress Note Due: 8/9/2024  Visit # / Visits authorized: 1/ 1   FOTO: 1/3    Precautions: Standard     Time In: 1700  Time Out: 1755  Total Appointment Time (timed & untimed codes): 55 minutes    Subjective     Date of onset: Last Friday  History of current condition - Meryl reports: Patient reports a history of right shoulder pain but last Friday it really began to hurt. She saw the PA early this week who prescribed her a muscle relaxer which has relieved some symptoms, but they did return again yesterday morning. She notes the pain was present years ago as well, and she is getting some lower back pain/knee pain with it as a result.   Falls: 1 in January    Imaging: xray: FINDINGS:  Mild DJD AC joint, minimal mild DJD glenohumeral joint particularly inferiorly, 2 subcortical tiny cyst versus synovial erosion superior greater tuberosity region.     Impression:     No fracture dislocation.  Additional findings above    Prior Therapy: Yes knees and shoulder/elbow  Social History: She lives with their family  Occupation: [employer] - [employer] access  Prior Level of Function: Independent  Current Level of Function: Ind pain with work and trouble sleeping and ADL    Pain:  Current 4/10, worst 8/10, best 2/10   Location: right shoulder   Description: Aching, Grabbing, Deep, and Sharp  Aggravating Factors: Morning and Lifting  Easing Factors:  rest    Patients goals: return to work and ADL without pain      Medical History:   Past Medical History:   Diagnosis Date    Anemia     Diabetes mellitus type II     H. pylori infection     Hyperlipidemia     Hypertension     Unspecified vitamin D deficiency 4/24/2013       Surgical History:   Meryl Johnson  has a past surgical history that includes Gastric bypass; Esophagogastroduodenoscopy (N/A, 8/8/2018); Knee arthroscopy w/ meniscectomy (Left, 10/4/2022); Chondroplasty of knee (Left, 10/4/2022); and Synovectomy of knee (Left, 10/4/2022).    Medications:   Meryl has a current medication list which includes the following prescription(s): aspirin, celecoxib, cyclobenzaprine, empagliflozin, ergocalciferol, losartan, metformin, rosuvastatin, and triamcinolone acetonide 0.1%.    Allergies:   Review of patient's allergies indicates:   Allergen Reactions    Codeine Nausea Only    Vicodin [hydrocodone-acetaminophen] Nausea Only        Objective      Observation: Patient is a 68 y.o. female alert and oriented    Posture: increased lumbar lordosis     Passive Range of Motion:   Shoulder Right Left   Flexion 160 160   Abduction 160 160   ER at 0 50 45   ER at 90 80 70   IR 50 50      Active Range of Motion:   Shoulder Right Left   Flexion 150 150   Abduction 140 140   ER at 0 35 30   ER at 90 70 70   IR 40 40   Reach behind head T1 T1   Reach behind back  L3 L2     Strength:  Shoulder Right Left   Flexion 4 5   Abduction 5 5   ER 4+ 5   IR 4 5   Serratus Anterior 4- 4   Middle Trap 4- 4   Low Trap 4- 4   Pain with resisted shoulder extension     Special Tests:  Impingement Cluster:   Right Left   Hawkin's Kenndy - -   Painful Arc - -   Resisted ER - -     Rotator Cuff Tear   Right Left   Painful Arc - -   Drop Arm test - -   Resisted ER - -     Instability:   Right Left   Anterior Apprehension Test - -   Relocation test - -   Posterior Apprehension Test -- -   Sulcus Sign - -     SLAP:   Right Left   Biceps Load II -  -   O'Feliciano's Test - -     Other:   Right Left   Subscapularis Lift Off Test - -   Empty Can - -     Joint Mobility: Limited inferior glide to the shoulder    Palpation: tender to lat attachment    Sensation: intact    Flexibility:   Lat: R short and painful ; L normal   Pec Minor: R short ; L short   Intake Outcome Measure for FOTO Shoulder Survey    Therapist reviewed FOTO scores for Meryl Johnson on 5/9/2024.   FOTO documents entered into [employer] - see Media section.    Intake Score: 52%       Treatment     Total Treatment time (time-based codes) separate from Evaluation: 10 minutes     Meryl received the treatments listed below:        neuromuscular re-education activities to improve: Proprioception and Posture for 10 minutes. The following activities were included:  Wand flexion 2# with low abd focus 2 x 10  Lat extensions OTB 2 x 10        Patient Education and Home Exercises     Education provided:   - improve lat mobility    Written Home Exercises Provided: yes. Exercises were reviewed and Meryl was able to demonstrate them prior to the end of the session.  Meryl demonstrated good understanding of the education provided. See EMR under Patient Instructions for exercises provided during therapy sessions.    Assessment     Meryl is a 68 y.o. female referred to outpatient Physical Therapy with a medical diagnosis of right shoulder pain. Patient presents with pain with overhead motion, strength deficits, tender to the touch at her lat causing lower back pain and gait deficits leading to knee pain, trouble completing work and ADL without pain     Patient prognosis is Good.   Patient will benefit from skilled outpatient Physical Therapy to address the deficits stated above and in the chart below, provide patient /family education, and to maximize patientt's level of independence.     Plan of care discussed with patient: Yes  Patient's spiritual, cultural and educational needs considered and patient is agreeable  to the plan of care and goals as stated below:     Anticipated Barriers for therapy: none    Medical Necessity is demonstrated by the following  History  Co-morbidities and personal factors that may impact the plan of care [x] LOW: no personal factors / co-morbidities  [] MODERATE: 1-2 personal factors / co-morbidities  [] HIGH: 3+ personal factors / co-morbidities    Moderate / High Support Documentation:   Co-morbidities affecting plan of care:     Personal Factors:   no deficits     Examination  Body Structures and Functions, activity limitations and participation restrictions that may impact the plan of care [x] LOW: addressing 1-2 elements  [] MODERATE: 3+ elements  [] HIGH: 4+ elements (please support below)    Moderate / High Support Documentation:      Clinical Presentation [x] LOW: stable  [] MODERATE: Evolving  [] HIGH: Unstable     Decision Making/ Complexity Score: Low       GOALS: Short Term Goals:  4 weeks  1.Report decreased shoulder pain < / =  2/10  to increase tolerance for return to work without pains  2. Increase PROM full overhead flexion without shoulder pain   3. Increased strength by 1/3 MMT grade in lat to increase tolerance for ADL and work activities.  4. Pt to tolerate HEP to improve ROM and independence with ADL's    Long Term Goals: 8 weeks  1.Report decreased shoulder pain  < / =  0 /10  to increase tolerance for return to [employer]  2.Increase AROM to full motion without pain  3.Increase strength to >/= 4/5 in lat and cuff to increase tolerance for ADL and work activities.  4. Pt goal: return to work and ADL pain free  5. Pt will have improved gcode of CJ (20-40% limited) on FOTO shoulder in order to demonstrate true functional improvement.   Plan     Plan of care Certification: 5/9/2024 to 11/9/2024.    Outpatient Physical Therapy 2 times weekly for 10 weeks to include the following interventions: Cervical/Lumbar Traction, Manual Therapy, Moist Heat/ Ice, Neuromuscular Re-ed,  Patient Education, Self Care, Therapeutic Activities, Therapeutic Exercise, and Dry Needling.     Fabricio Tolentino, PT, DPT, OCS, FAAOMPT

## 2024-05-13 ENCOUNTER — PATIENT MESSAGE (OUTPATIENT)
Dept: ADMINISTRATIVE | Facility: OTHER | Age: 69
End: 2024-05-13
Payer: COMMERCIAL

## 2024-05-13 ENCOUNTER — PATIENT OUTREACH (OUTPATIENT)
Dept: OTHER | Facility: OTHER | Age: 69
End: 2024-05-13
Payer: COMMERCIAL

## 2024-05-13 NOTE — PROGRESS NOTES
Connected Back: Patient Outreach    Yellow Flag - Patient cleared by clinical team as eligible for connected back program.        Patient Outreach from 5/13/2024 in [employer]    5/13/2024    1149       Switch patient path to kneeling program No, this patient can perform kneeling movments.   After clinical review, please document if patient is eligible or ineligible for program. Eligible

## 2024-05-14 ENCOUNTER — CLINICAL SUPPORT (OUTPATIENT)
Dept: REHABILITATION | Facility: HOSPITAL | Age: 69
End: 2024-05-14
Payer: COMMERCIAL

## 2024-05-14 DIAGNOSIS — S29.012A STRAIN OF RIGHT LATISSIMUS DORSI MUSCLE: Primary | ICD-10-CM

## 2024-05-14 PROCEDURE — 97530 THERAPEUTIC ACTIVITIES: CPT | Performed by: PHYSICAL THERAPIST

## 2024-05-14 PROCEDURE — 97112 NEUROMUSCULAR REEDUCATION: CPT | Performed by: PHYSICAL THERAPIST

## 2024-05-14 PROCEDURE — 97140 MANUAL THERAPY 1/> REGIONS: CPT | Performed by: PHYSICAL THERAPIST

## 2024-05-14 NOTE — TELEPHONE ENCOUNTER
Refill Routing Note   Medication(s) are not appropriate for processing by [facility] for the following reason(s):        Outside of protocol    [facility] action(s):  Route               Appointments  past 12m or future 3m with PCP    Date Provider   Last Visit   3/22/2024 Russell Martinez MD   Next Visit   Visit date not found Russell Martinez MD   ED visits in past 90 days: 0        Note composed:4:09 PM 05/14/2024

## 2024-05-14 NOTE — PROGRESS NOTES
"[facility]   Physical Therapy Treatment Note      Name: Meryl Johnson  [facility] Number: [id number]    Therapy Diagnosis:   Encounter Diagnosis   Name Primary?    Strain of right latissimus dorsi muscle Yes     Physician: Russell Honeycutt III, *    Visit Date: 5/14/2024    Physician Orders: PT Eval and Treat   Medical Diagnosis from Referral:   Diagnosis   M25.511 (ICD-10-CM) - Right shoulder pain, unspecified chronicity      Evaluation Date: 5/9/2024  Authorization Period Expiration: 12/31/2024  Plan of Care Expiration: 11/9/2024  Progress Note Due: 8/9/2024  Visit # / Visits authorized: 1/ 20   FOTO: 1/3    PTA Visit #: 0/5     Time In: 1400  Time Out: 1458  Total Billable Time: 58 minutes    Subjective     Pt reports: I almost tripped and fell yesterday but caught myself. Notes her back and knee hurt afterwards.  She was compliant with home exercise program.  Response to previous treatment: no change  Functional change: no changes     Pain: 6/10  Location: right shoulder      Objective      Objective Measures updated at progress report unless specified.     Treatment     Meryl received the treatments listed below:      therapeutic exercises to develop strength, endurance, ROM, flexibility, posture, and core stabilization for 0 minutes including:      manual therapy techniques: Joint mobilizations and Soft tissue Mobilization were applied to the: R shoulder for 10 minutes, including:  Gr I-II oscillations  Gr IV flexion  ER centering glide Gr III    neuromuscular re-education activities to improve: Balance, Coordination, Kinesthetic, Sense, Proprioception, and Posture for 33 minutes. The following activities were included:  Lat extensions GTB 2 x 15  No money GTB 20x 3"  Palloff press GTB 20x B     therapeutic activities to improve functional performance for 15  minutes, including:  Scaptions 1# 3 x 8  Med ball chop 4# 2 x 8    hot pack for 10 minutes to R knee.    Patient Education and " Home Exercises       Education provided:   - - improve lat mobility     Written Home Exercises Provided: YES. Exercises were reviewed and Meryl was able to demonstrate them prior to the end of the session.  Meryl demonstrated good understanding of the education provided. See EMR under Patient Instructions for exercises provided during therapy sessions    Assessment     Meryl tolerated first treatment session well. She reports fatigue with exercise and soreness to the posterior shoulder. Worked multiple planes of motion with stabilization    Meryl Is progressing well towards her goals.   Pt prognosis is Excellent.     Pt will continue to benefit from skilled outpatient physical therapy to address the deficits listed in the problem list box on initial evaluation, provide pt/family education and to maximize pt's level of independence in the home and community environment.     Pt's spiritual, cultural and educational needs considered and pt agreeable to plan of care and goals.     Anticipated barriers to physical therapy: none    GOALS: Short Term Goals:  4 weeks(Progressing, not met)  1.Report decreased shoulder pain < / =  2/10  to increase tolerance for return to work without pains  2. Increase PROM full overhead flexion without shoulder pain   3. Increased strength by 1/3 MMT grade in lat to increase tolerance for ADL and work activities.  4. Pt to tolerate HEP to improve ROM and independence with ADL's     Long Term Goals: 8 weeks(Progressing, not met)  1.Report decreased shoulder pain  < / =  0 /10  to increase tolerance for return to [employer]  2.Increase AROM to full motion without pain  3.Increase strength to >/= 4/5 in lat and cuff to increase tolerance for ADL and work activities.  4. Pt goal: return to work and ADL pain free  5. Pt will have improved gcode of CJ (20-40% limited) on FOTO shoulder in order to demonstrate true functional improvement.     Plan     Plan of care Certification: 5/9/2024 to  11/9/2024.     Improve lat strength and mobiltiy  Fabricio Tolentino, PT, DPT, OCS, FAAOMPT

## 2024-05-14 NOTE — TELEPHONE ENCOUNTER
No care due was identified.  [facility] Embedded Care Due Messages. Reference number: [id number].   5/14/2024 3:07:31 PM CDT

## 2024-05-15 RX ORDER — ERGOCALCIFEROL 1.25 MG/1
50000 CAPSULE ORAL
Qty: 12 CAPSULE | Refills: 1 | Status: SHIPPED | OUTPATIENT
Start: 2024-05-15

## 2024-05-16 ENCOUNTER — CLINICAL SUPPORT (OUTPATIENT)
Dept: REHABILITATION | Facility: HOSPITAL | Age: 69
End: 2024-05-16
Payer: COMMERCIAL

## 2024-05-16 ENCOUNTER — PATIENT OUTREACH (OUTPATIENT)
Dept: OTHER | Facility: OTHER | Age: 69
End: 2024-05-16
Payer: COMMERCIAL

## 2024-05-16 DIAGNOSIS — S29.012A STRAIN OF RIGHT LATISSIMUS DORSI MUSCLE: Primary | ICD-10-CM

## 2024-05-16 PROCEDURE — 97112 NEUROMUSCULAR REEDUCATION: CPT | Performed by: PHYSICAL THERAPIST

## 2024-05-16 NOTE — PROGRESS NOTES
Connected Back: Patient Outreach    Patient switched to kneeling category due to knee surgery. Can not perform kneeling movements.

## 2024-05-16 NOTE — PROGRESS NOTES
[facility]   Physical Therapy Treatment Note      Name: Meryl Yo [facility] Number: [id number]    Therapy Diagnosis:   Encounter Diagnosis   Name Primary?    Strain of right latissimus dorsi muscle Yes     Physician: Russell Honeycutt III, *    Visit Date: 5/16/2024    Physician Orders: PT Eval and Treat   Medical Diagnosis from Referral:   Diagnosis   M25.511 (ICD-10-CM) - Right shoulder pain, unspecified chronicity      Evaluation Date: 5/9/2024  Authorization Period Expiration: 12/31/2024  Plan of Care Expiration: 11/9/2024  Progress Note Due: 8/9/2024  Visit # / Visits authorized: 2/20  FOTO: 1/3    PTA Visit #: 0/5     Time In: 1500  Time Out: 1600  Total Billable Time: 60 minutes    Subjective     Pt reports: I was so sore, felt it in [place] with lifting the cases of water afterwards.  She was compliant with home exercise program.  Response to previous treatment: sore  Functional change: no change    Pain: 4/10  Location: right shoulder      Objective      Objective Measures updated at progress report unless specified.     Treatment     Meryl received the treatments listed below:      therapeutic exercises to develop strength, endurance, ROM, flexibility, posture, and core stabilization for 0 minutes including:      manual therapy techniques: Joint mobilizations and Soft tissue Mobilization were applied to the: R shoulder for 0 minutes, including:      neuromuscular re-education activities to improve: Balance, Coordination, Sense, Proprioception, and Posture for 60 minutes. The following activities were included:  Handcuffs YTB 30x  Wand 3# lat stretch 30x  No money OTB 3 x 15  Supine clam GTB 3 x 15    therapeutic activities to improve functional performance for 0  minutes, including:        Patient Education and Home Exercises       Education provided:   - improve lat mobility    Written Home Exercises Provided: YES. Exercises were reviewed and Meryl was able to demonstrate  them prior to the end of the session.  Meryl demonstrated good understanding of the education provided. See EMR under Patient Instructions for exercises provided during therapy sessions    Assessment     Progressed with glut loading to reduce loading. She did well with OH motion in shoulder with resistance of the band. Will continue to load the serratus and lat    Meryl Is progressing well towards her goals.   Pt prognosis is Excellent.     Pt will continue to benefit from skilled outpatient physical therapy to address the deficits listed in the problem list box on initial evaluation, provide pt/family education and to maximize pt's level of independence in the home and community environment.     Pt's spiritual, cultural and educational needs considered and pt agreeable to plan of care and goals.     Anticipated barriers to physical therapy: none    GOALS: Short Term Goals:  4 weeks(Progressing, not met)  1.Report decreased shoulder pain < / =  2/10  to increase tolerance for return to work without pains  2. Increase PROM full overhead flexion without shoulder pain   3. Increased strength by 1/3 MMT grade in lat to increase tolerance for ADL and work activities.  4. Pt to tolerate HEP to improve ROM and independence with ADL's     Long Term Goals: 8 weeks(Progressing, not met)  1.Report decreased shoulder pain  < / =  0 /10  to increase tolerance for return to [employer]  2.Increase AROM to full motion without pain  3.Increase strength to >/= 4/5 in lat and cuff to increase tolerance for ADL and work activities.  4. Pt goal: return to work and ADL pain free  5. Pt will have improved gcode of CJ (20-40% limited) on FOTO shoulder in order to demonstrate true functional improvement.     Plan     Plan of care Certification: 5/9/2024 to 11/9/2024.     Improve lat strength    Fabricio Tolentino, PT, DPT, OCS, FAAOMPT

## 2024-05-17 ENCOUNTER — PATIENT OUTREACH (OUTPATIENT)
Dept: OTHER | Facility: OTHER | Age: 69
End: 2024-05-17
Payer: COMMERCIAL

## 2024-05-17 NOTE — PROGRESS NOTES
Connected Back: Patient Outreach    Welcome Call - Left . Explained program change to kneeling program, per physical therapist orders.

## 2024-05-20 ENCOUNTER — PATIENT OUTREACH (OUTPATIENT)
Dept: OTHER | Facility: OTHER | Age: 69
End: 2024-05-20
Payer: COMMERCIAL

## 2024-05-20 NOTE — PROGRESS NOTES
Connected Back: Patient Outreach    Welcome Call - Spoke with patient on 5/20. No questions at this time. Educated on program exercises etc. Closing out task.

## 2024-05-22 ENCOUNTER — CLINICAL SUPPORT (OUTPATIENT)
Dept: REHABILITATION | Facility: HOSPITAL | Age: 69
End: 2024-05-22
Payer: COMMERCIAL

## 2024-05-22 ENCOUNTER — TELEPHONE (OUTPATIENT)
Dept: SPORTS MEDICINE | Facility: CLINIC | Age: 69
End: 2024-05-22
Payer: COMMERCIAL

## 2024-05-22 ENCOUNTER — PATIENT MESSAGE (OUTPATIENT)
Dept: SPORTS MEDICINE | Facility: CLINIC | Age: 69
End: 2024-05-22
Payer: COMMERCIAL

## 2024-05-22 DIAGNOSIS — S29.012A STRAIN OF RIGHT LATISSIMUS DORSI MUSCLE: Primary | ICD-10-CM

## 2024-05-22 DIAGNOSIS — M25.511 ACUTE PAIN OF RIGHT SHOULDER: ICD-10-CM

## 2024-05-22 PROCEDURE — 97112 NEUROMUSCULAR REEDUCATION: CPT | Performed by: PHYSICAL THERAPIST

## 2024-05-22 RX ORDER — CELECOXIB 200 MG/1
200 CAPSULE ORAL 2 TIMES DAILY
Qty: 60 CAPSULE | Refills: 1 | Status: SHIPPED | OUTPATIENT
Start: 2024-05-22

## 2024-05-22 NOTE — PROGRESS NOTES
CC: right shoulder pain     68 y.o. Female [employer] employee who reports that the pain is severe and not responding to any conservative care.      Pain has been on and off for a couple of years and returned/started 1 week ago. Pain is sharp and located of the medial shoulder blade and right mid back and rib cage. Pain is worse with movement. She denies numbness, tingling, paresthesias.     She reports that the pain is worse with overhead activity. It also bothers her at night.    Is affecting ADLs.     Review of Systems   Constitution: Negative. Negative for chills, fever and night sweats.   HENT: Negative for congestion and headaches.    Eyes: Negative for blurred vision, left vision loss and right vision loss.   Cardiovascular: Negative for chest pain and syncope.   Respiratory: Negative for cough and shortness of breath.    Endocrine: Negative for polydipsia, polyphagia and polyuria.   Hematologic/Lymphatic: Negative for bleeding problem. Does not bruise/bleed easily.   Skin: Negative for dry skin, itching and rash.   Musculoskeletal: Negative for falls and muscle weakness.   Gastrointestinal: Negative for abdominal pain and bowel incontinence.   Genitourinary: Negative for bladder incontinence and nocturia.   Neurological: Negative for disturbances in coordination, loss of balance and seizures.   Psychiatric/Behavioral: Negative for depression. The patient does not have insomnia.    Allergic/Immunologic: Negative for hives and persistent infections.     PAST MEDICAL HISTORY:   Past Medical History:   Diagnosis Date    Anemia     Diabetes mellitus type II     H. pylori infection     Hyperlipidemia     Hypertension     Unspecified vitamin D deficiency 4/24/2013     PAST SURGICAL HISTORY:   Past Surgical History:   Procedure Laterality Date    CHONDROPLASTY OF KNEE Left 10/4/2022    Procedure: CHONDROPLASTY, KNEE;  Surgeon: Brigette Babin MD;  Location: [facility];  Service: Orthopedics;  Laterality: Left;     ESOPHAGOGASTRODUODENOSCOPY N/A 8/8/2018    Procedure: EGD (ESOPHAGOGASTRODUODENOSCOPY);  Surgeon: Darius Gerardo MD;  Location: [address]);  Service: Endoscopy;  Laterality: N/A;    GASTRIC BYPASS      KNEE ARTHROSCOPY W/ MENISCECTOMY Left 10/4/2022    Procedure: ARTHROSCOPY, KNEE, WITH MENISCECTOMY;  Surgeon: Brigette Babin MD;  Location: [facility];  Service: Orthopedics;  Laterality: Left;    SYNOVECTOMY OF KNEE Left 10/4/2022    Procedure: SYNOVECTOMY, KNEE;  Surgeon: Brigette Babin MD;  Location: [facility];  Service: Orthopedics;  Laterality: Left;     FAMILY HISTORY:   Family History   Problem Relation Name Age of Onset    COPD Mother      Heart disease Mother      Cancer Father          liver    Heart disease Father      Cancer Sister      Diabetes Sister      Hyperlipidemia Sister      Hypothyroidism Sister      No Known Problems Brother      No Known Problems Maternal Aunt      No Known Problems Maternal Uncle      No Known Problems Paternal Aunt      No Known Problems Paternal Uncle      No Known Problems Maternal Grandmother      No Known Problems Maternal Grandfather      No Known Problems Paternal Grandmother      No Known Problems Paternal Grandfather      Amblyopia Neg Hx      Blindness Neg Hx      Cataracts Neg Hx      Glaucoma Neg Hx      Hypertension Neg Hx      Macular degeneration Neg Hx      Retinal detachment Neg Hx      Strabismus Neg Hx      Stroke Neg Hx      Thyroid disease Neg Hx      Breast cancer Neg Hx      Colon cancer Neg Hx      Ovarian cancer Neg Hx      Stomach cancer Neg Hx      Rectal cancer Neg Hx       SOCIAL HISTORY:   Social History     Socioeconomic History    Marital status: Single   Occupational History     Employer: [employer]   Tobacco Use    Smoking status: Never    Smokeless tobacco: Never   Substance and Sexual Activity    Alcohol use: No    Drug use: No     Social Determinants of Health     Financial Resource Strain: Patient Declined (1/16/2024)     Overall Financial Resource Strain (CARDIA)     Difficulty of Paying Living Expenses: Patient declined   Food Insecurity: Patient Declined (1/16/2024)    Hunger Vital Sign     Worried About Running Out of Food in the Last Year: Patient declined     Ran Out of Food in the Last Year: Patient declined   Transportation Needs: No Transportation Needs (1/16/2024)    PRAPARE - Transportation     Lack of Transportation (Medical): No     Lack of Transportation (Non-Medical): No   Physical Activity: Insufficiently Active (1/16/2024)    Exercise Vital Sign     Days of Exercise per Week: 1 day     Minutes of Exercise per Session: 10 min   Stress: No Stress Concern Present (1/16/2024)    [employer] - Occupational Stress Questionnaire     Feeling of Stress : Only a little   Housing Stability: Low Risk  (1/16/2024)    Housing Stability Vital Sign     Unable to Pay for Housing in the Last Year: No     Number of Places Lived in the Last Year: 1     Unstable Housing in the Last Year: No       MEDICATIONS:   Current Outpatient Medications:     empagliflozin (JARDIANCE) 25 mg tablet, Take 1 tablet (25 mg total) by mouth once daily., Disp: 90 tablet, Rfl: 1    losartan (COZAAR) 25 MG tablet, Take 1 tablet (25 mg total) by mouth once daily., Disp: 90 tablet, Rfl: 3    metFORMIN (GLUCOPHAGE) 1000 MG tablet, Take 1 tablet (1,000 mg total) by mouth 2 (two) times daily with meals., Disp: 180 tablet, Rfl: 3    rosuvastatin (CRESTOR) 20 MG tablet, Take 1 tablet (20 mg total) by mouth once daily., Disp: 90 tablet, Rfl: 3    triamcinolone acetonide 0.1% (KENALOG) 0.1 % cream, Apply topically 2 (two) times daily., Disp: 45 g, Rfl: 1    aspirin (ECOTRIN) 81 MG EC tablet, Take 1 tablet (81 mg total) by mouth once daily. For 4 weeks starting the day after surgery., Disp: 28 tablet, Rfl: 0    celecoxib (CELEBREX) 200 MG capsule, Take 1 capsule (200 mg total) by mouth 2 (two) times daily. Take with food., Disp: 20 capsule,  "Rfl: 0    ergocalciferol (VITAMIN D2) 50,000 unit Cap, Take 1 capsule (50,000 Units total) by mouth every 7 days., Disp: 12 capsule, Rfl: 1  ALLERGIES:   Review of patient's allergies indicates:   Allergen Reactions    Codeine Nausea Only    Vicodin [hydrocodone-acetaminophen] Nausea Only       VITAL SIGNS: /79   Pulse 74   Ht 5' 7" (1.702 m)   Wt 122.9 kg (270 lb 15.1 oz)   BMI 42.44 kg/m²      PHYSICAL EXAMINATION:  General:  The patient is alert and oriented x 3.  Mood is pleasant.  Observation of ears, eyes and nose reveal no gross abnormalities.  No labored breathing observed.  Gait is coordinated. Patient can toe walk and heel walk without difficulty.      right SHOULDER / UPPER EXTREMITY EXAM    OBSERVATION:     Swelling  none  Deformity  none   Discoloration  none   Scapular winging none   Scars   none  Atrophy  none    TENDERNESS / CREPITUS (T/C):          T/C      T/C   Clavicle   -/-  SUPRAspinatus    -/-     AC Jt.    -/-  INFRAspinatus  -/-    SC Jt.    -/-  Deltoid    -/-      G. Tuberosity  -/-  LH BICEP groove  +/-   Acromion:  -/-  Midline Neck   -/-     Scapular Spine -/-  Trapezium   -/-   SMA Scapula  -/-  GH jt. line - post  -/-     Scapulothoracic  +/-         ROM: (* = with pain)  Right shoulder   Left shoulder        AROM (PROM)   AROM (PROM)   FE    170° (175°)     170° (175°)     ER at 0°    60°  (65°)    60°  (65°)   ER at 90° ABD  90°  (90°)    90°  (90°)   IR at 90°  ABD   NA  (40°)     NA  (40°)      IR (spine level)   T10     T10    STRENGTH: (* = with pain) RIGHT SHOULDER  LEFT SHOULDER   SCAPTION   5/5    5/5    IR    5/5    5/5   ER    5/5    5/5   BICEPS   5/5    5/5   Deltoid    5/5    5/5     SIGNS:  Painful side       NEER   +    OKURTS  neg    HAYNES   neg    SPEEDS  neg     DROP ARM   neg   BELLY PRESS neg   Superior escape none    LIFT-OFF  neg   X-Body ADD    neg    MOVING VALGUS neg        STABILITY TESTING    RIGHT SHOULDER   LEFT SHOULDER  "    Translation     Anterior  up face     up face    Posterior  up face    up face    Sulcus   < 10mm    < 10 mm     Signs   Apprehension   neg      neg       Relocation   no change     no change      Jerk test  neg     neg    EXTREMITY NEURO-VASCULAR EXAM    Sensation grossly intact to light touch all dermatomal regions.    DTR 2+ Biceps, Triceps, BR and Negative Claras sign   Grossly intact motor function at Elbow, Wrist and Hand   Distal pulses radial and ulnar 2+, brisk cap refill, symmetric.      NECK:  Painless FROM and spinous processes non-tender. Negative Spurlings sign.      OTHER FINDINGS:  + scapular dyskinesia    XRAYS:  Shoulder 3 view series right,  were obtained and reviewed  No convincing fracture or dislocation is noted. The osseous structures appear well mineralized and well aligned      ASSESSMENT:   right:  1. Shoulder pain, acute on chronic   2.   Pain origin either scapula vs back.   3.   Scapular dyskinesia      PLAN:    PT order placed. PT for right mid back and shoulder blade pain. Eval and treat with dry needling if needed. With Fabricio Falgout   Also for scapular stabilization: Scapular dyskinesia   Scapular stabilization - [place] protocol, 1-3x/week x 6-8 weeks with HEP    2. Celebrex bid with food. Do not take with other OTC NSAIDs.   3. Flexeril as needed at night.   4. Activity modifications discussed.   Rtc as needed.     All questions were answered, pt will contact us for questions or concerns in the interim.

## 2024-05-22 NOTE — PROGRESS NOTES
[facility]   Physical Therapy Treatment Note      Name: Meryl Yo [facility] Number: [id number]    Therapy Diagnosis:   Encounter Diagnosis   Name Primary?    Strain of right latissimus dorsi muscle Yes     Physician: Russell Honeycutt III, *    Visit Date: 5/22/2024    Physician Orders: PT Eval and Treat   Medical Diagnosis from Referral:   Diagnosis   M25.511 (ICD-10-CM) - Right shoulder pain, unspecified chronicity      Evaluation Date: 5/9/2024  Authorization Period Expiration: 12/31/2024  Plan of Care Expiration: 11/9/2024  Progress Note Due: 8/9/2024  Visit # / Visits authorized: 2/20  FOTO: 1/3    PTA Visit #: 0/5     Time In: 700  Time Out: 800  Total Billable Time: 60 minutes    Subjective     Pt reports: Notes the celebrex helping her but she is almost out. Worst pain when sitting at her desk      She was compliant with home exercise program.  Response to previous treatment: sore  Functional change: no change    Pain: 4/10  Location: right shoulder      Objective      Objective Measures updated at progress report unless specified.     Treatment     Meryl received the treatments listed below:      therapeutic exercises to develop strength, endurance, ROM, flexibility, posture, and core stabilization for 0 minutes including:      manual therapy techniques: Joint mobilizations and Soft tissue Mobilization were applied to the: R shoulder for 0 minutes, including:      neuromuscular re-education activities to improve: Balance, Coordination, Sense, Proprioception, and Posture for 60 minutes. The following activities were included:    Chops 4# 3 x 15  Wand 5# press 3 x 15  No money OTB 3 x 15  Scaptions 2# 3 x 15  Bruggers YTB 3 x 15    therapeutic activities to improve functional performance for 0  minutes, including:        Patient Education and Home Exercises       Education provided:   - improve lat mobility    Written Home Exercises Provided: YES. Exercises were reviewed and  Meryl was able to demonstrate them prior to the end of the session.  Meryl demonstrated good understanding of the education provided. See EMR under Patient Instructions for exercises provided during therapy sessions    Assessment     Meryl progressed with loading to the lat today, incorporated more overhead mobility to the shoulder. Educated she can message the MD about getting more celebrex     Meryl Is progressing well towards her goals.   Pt prognosis is Excellent.     Pt will continue to benefit from skilled outpatient physical therapy to address the deficits listed in the problem list box on initial evaluation, provide pt/family education and to maximize pt's level of independence in the home and community environment.     Pt's spiritual, cultural and educational needs considered and pt agreeable to plan of care and goals.     Anticipated barriers to physical therapy: none    GOALS: Short Term Goals:  4 weeks(Progressing, not met)  1.Report decreased shoulder pain < / =  2/10  to increase tolerance for return to work without pains  2. Increase PROM full overhead flexion without shoulder pain   3. Increased strength by 1/3 MMT grade in lat to increase tolerance for ADL and work activities.  4. Pt to tolerate HEP to improve ROM and independence with ADL's     Long Term Goals: 8 weeks(Progressing, not met)  1.Report decreased shoulder pain  < / =  0 /10  to increase tolerance for return to [employer]  2.Increase AROM to full motion without pain  3.Increase strength to >/= 4/5 in lat and cuff to increase tolerance for ADL and work activities.  4. Pt goal: return to work and ADL pain free  5. Pt will have improved gcode of CJ (20-40% limited) on FOTO shoulder in order to demonstrate true functional improvement.     Plan     Plan of care Certification: 5/9/2024 to 11/9/2024.     Improve lat strength    Fabricio Tolentino, PT, DPT, OCS, FAAOMPT

## 2024-05-24 ENCOUNTER — CLINICAL SUPPORT (OUTPATIENT)
Dept: REHABILITATION | Facility: HOSPITAL | Age: 69
End: 2024-05-24
Payer: COMMERCIAL

## 2024-05-24 DIAGNOSIS — S29.012A STRAIN OF RIGHT LATISSIMUS DORSI MUSCLE: Primary | ICD-10-CM

## 2024-05-24 PROCEDURE — 97112 NEUROMUSCULAR REEDUCATION: CPT | Performed by: PHYSICAL THERAPIST

## 2024-05-24 NOTE — PROGRESS NOTES
[facility]   Physical Therapy Treatment Note      Name: Meryl Yo [facility] Number: [id number]    Therapy Diagnosis:   Encounter Diagnosis   Name Primary?    Strain of right latissimus dorsi muscle Yes     Physician: Russell Honeycutt III, *    Visit Date: 5/24/2024    Physician Orders: PT Eval and Treat   Medical Diagnosis from Referral:   Diagnosis   M25.511 (ICD-10-CM) - Right shoulder pain, unspecified chronicity      Evaluation Date: 5/9/2024  Authorization Period Expiration: 12/31/2024  Plan of Care Expiration: 11/9/2024  Progress Note Due: 8/9/2024  Visit # / Visits authorized: 3/20  FOTO: 1/3    PTA Visit #: 0/5     Time In: 700  Time Out: 745  Total Billable Time: 45 minutes    Subjective     Pt reports: Soreness in the right lat area, but I did get my celebrex order back in       She was compliant with home exercise program.  Response to previous treatment: sore  Functional change: no change    Pain: 4/10  Location: right shoulder      Objective      Objective Measures updated at progress report unless specified.     Treatment     Meryl received the treatments listed below:      therapeutic exercises to develop strength, endurance, ROM, flexibility, posture, and core stabilization for 0 minutes including:      manual therapy techniques: Joint mobilizations and Soft tissue Mobilization were applied to the: R shoulder for 0 minutes, including:      neuromuscular re-education activities to improve: Balance, Coordination, Sense, Proprioception, and Posture for 45 minutes. The following activities were included:    Wand lat stretch 3# 30x  Serratus press upright 3 x 15  Open books on R 2 x 15  Scaptions 2# 3 x 15  Thoracic rotations 30x     NT  Chops 4# 3 x 15  Wand 5# press 3 x 15  No money OTB 3 x 15      therapeutic activities to improve functional performance for 0  minutes, including:        Patient Education and Home Exercises       Education provided:   - improve lat  mobility    Written Home Exercises Provided: YES. Exercises were reviewed and Meryl was able to demonstrate them prior to the end of the session.  Meryl demonstrated good understanding of the education provided. See EMR under Patient Instructions for exercises provided during therapy sessions    Assessment     Meryl noted to have pain in thoracic pain. Started more thoracic mobility and serratus exercises to target muscles attaching to these levels of the spine    Meryl Is progressing well towards her goals.   Pt prognosis is Excellent.     Pt will continue to benefit from skilled outpatient physical therapy to address the deficits listed in the problem list box on initial evaluation, provide pt/family education and to maximize pt's level of independence in the home and community environment.     Pt's spiritual, cultural and educational needs considered and pt agreeable to plan of care and goals.     Anticipated barriers to physical therapy: none    GOALS: Short Term Goals:  4 weeks(Progressing, not met)  1.Report decreased shoulder pain < / =  2/10  to increase tolerance for return to work without pains  2. Increase PROM full overhead flexion without shoulder pain   3. Increased strength by 1/3 MMT grade in lat to increase tolerance for ADL and work activities.  4. Pt to tolerate HEP to improve ROM and independence with ADL's     Long Term Goals: 8 weeks(Progressing, not met)  1.Report decreased shoulder pain  < / =  0 /10  to increase tolerance for return to [employer]  2.Increase AROM to full motion without pain  3.Increase strength to >/= 4/5 in lat and cuff to increase tolerance for ADL and work activities.  4. Pt goal: return to work and ADL pain free  5. Pt will have improved gcode of CJ (20-40% limited) on FOTO shoulder in order to demonstrate true functional improvement.     Plan     Plan of care Certification: 5/9/2024 to 11/9/2024.     Improve lat strength    Fabricio Tolentino, PT, DPT, OCS,  FAAOMPT

## 2024-05-27 ENCOUNTER — CLINICAL SUPPORT (OUTPATIENT)
Dept: REHABILITATION | Facility: HOSPITAL | Age: 69
End: 2024-05-27
Payer: COMMERCIAL

## 2024-05-27 ENCOUNTER — PATIENT MESSAGE (OUTPATIENT)
Dept: SPORTS MEDICINE | Facility: CLINIC | Age: 69
End: 2024-05-27
Payer: COMMERCIAL

## 2024-05-27 DIAGNOSIS — S29.012A STRAIN OF RIGHT LATISSIMUS DORSI MUSCLE: Primary | ICD-10-CM

## 2024-05-27 PROCEDURE — 97112 NEUROMUSCULAR REEDUCATION: CPT | Performed by: PHYSICAL THERAPIST

## 2024-05-27 NOTE — PROGRESS NOTES
[facility]   Physical Therapy Treatment Note      Name: Meryl Yo [facility] Number: [id number]    Therapy Diagnosis:   Encounter Diagnosis   Name Primary?    Strain of right latissimus dorsi muscle Yes     Physician: Russell Honeycutt III, *    Visit Date: 5/27/2024    Physician Orders: PT Eval and Treat   Medical Diagnosis from Referral:   Diagnosis   M25.511 (ICD-10-CM) - Right shoulder pain, unspecified chronicity      Evaluation Date: 5/9/2024  Authorization Period Expiration: 12/31/2024  Plan of Care Expiration: 11/9/2024  Progress Note Due: 8/9/2024  Visit # / Visits authorized: 4/20  FOTO: 1/3    PTA Visit #: 0/5     Time In: 700  Time Out: 800  Total Billable Time: 60 minutes    Subjective     Pt reports: Not as sore after the session on Friday      She was compliant with home exercise program.  Response to previous treatment: sore  Functional change: no change    Pain: 4/10  Location: right shoulder      Objective      Objective Measures updated at progress report unless specified.     Treatment     Meryl received the treatments listed below:      therapeutic exercises to develop strength, endurance, ROM, flexibility, posture, and core stabilization for 0 minutes including:      manual therapy techniques: Joint mobilizations and Soft tissue Mobilization were applied to the: R shoulder for 0 minutes, including:      neuromuscular re-education activities to improve: Balance, Coordination, Sense, Proprioception, and Posture for 60 minutes. The following activities were included:    Wand lat stretch 5# 30x  Serratus press upright 3 x 15  Open books on R 2 x 15  No money GTB loop 3 x 15  Med ball lift 4# 30x  Lat extensions OTB 20x    NT  Chops 4# 3 x 15  Wand 5# press 3 x 15    therapeutic activities to improve functional performance for 0  minutes, including:        Patient Education and Home Exercises       Education provided:   - improve lat mobility    Written Home Exercises  Provided: YES. Exercises were reviewed and Meryl was able to demonstrate them prior to the end of the session.  Meryl demonstrated good understanding of the education provided. See EMR under Patient Instructions for exercises provided during therapy sessions    Assessment     Meryl returned to clinic with improved shoulder motion and lat length. Strength improving slowly, relief within session but continues to get sore    Meryl Is progressing well towards her goals.   Pt prognosis is Excellent.     Pt will continue to benefit from skilled outpatient physical therapy to address the deficits listed in the problem list box on initial evaluation, provide pt/family education and to maximize pt's level of independence in the home and community environment.     Pt's spiritual, cultural and educational needs considered and pt agreeable to plan of care and goals.     Anticipated barriers to physical therapy: none    GOALS: Short Term Goals:  4 weeks(Progressing, not met)  1.Report decreased shoulder pain < / =  2/10  to increase tolerance for return to work without pains  2. Increase PROM full overhead flexion without shoulder pain   3. Increased strength by 1/3 MMT grade in lat to increase tolerance for ADL and work activities.  4. Pt to tolerate HEP to improve ROM and independence with ADL's     Long Term Goals: 8 weeks(Progressing, not met)  1.Report decreased shoulder pain  < / =  0 /10  to increase tolerance for return to [employer]  2.Increase AROM to full motion without pain  3.Increase strength to >/= 4/5 in lat and cuff to increase tolerance for ADL and work activities.  4. Pt goal: return to work and ADL pain free  5. Pt will have improved gcode of CJ (20-40% limited) on FOTO shoulder in order to demonstrate true functional improvement.     Plan     Plan of care Certification: 5/9/2024 to 11/9/2024.     Improve lat strength    Fabricio Tolentino, PT, DPT, OCS, FAAOMPT

## 2024-05-31 ENCOUNTER — CLINICAL SUPPORT (OUTPATIENT)
Dept: REHABILITATION | Facility: HOSPITAL | Age: 69
End: 2024-05-31
Payer: COMMERCIAL

## 2024-05-31 DIAGNOSIS — S29.012A STRAIN OF RIGHT LATISSIMUS DORSI MUSCLE: Primary | ICD-10-CM

## 2024-05-31 PROCEDURE — 97112 NEUROMUSCULAR REEDUCATION: CPT | Performed by: PHYSICAL THERAPIST

## 2024-05-31 NOTE — PROGRESS NOTES
[facility]   Physical Therapy Treatment Note      Name: Meryl Yo [facility] Number: [id number]    Therapy Diagnosis:   Encounter Diagnosis   Name Primary?    Strain of right latissimus dorsi muscle Yes     Physician: Russell Honeycutt III, *    Visit Date: 5/31/2024    Physician Orders: PT Eval and Treat   Medical Diagnosis from Referral:   Diagnosis   M25.511 (ICD-10-CM) - Right shoulder pain, unspecified chronicity      Evaluation Date: 5/9/2024  Authorization Period Expiration: 12/31/2024  Plan of Care Expiration: 11/9/2024  Progress Note Due: 8/9/2024  Visit # / Visits authorized: 6/20  FOTO: 1/3    PTA Visit #: 0/5     Time In: 700  Time Out: 800  Total Billable Time: 60 minutes    Subjective     Pt reports: Hurting today, more muscle pain side       She was compliant with home exercise program.  Response to previous treatment: sore  Functional change: no change    Pain: 4/10  Location: right shoulder      Objective      Objective Measures updated at progress report unless specified.     Treatment     Meryl received the treatments listed below:      therapeutic exercises to develop strength, endurance, ROM, flexibility, posture, and core stabilization for 0 minutes including:      manual therapy techniques: Joint mobilizations and Soft tissue Mobilization were applied to the: R shoulder for 0 minutes, including:      neuromuscular re-education activities to improve: Balance, Coordination, Sense, Proprioception, and Posture for 60 minutes. The following activities were included:    Wand lat stretch 5# 30x  Serratus press upright 3 x 15  Med ball lift 4# 30x  Lat extensions OTB 20x  Palloff rotations OTB 2 x 15    NT  Open books on R 2 x 15  No money GTB loop 3 x 15  Chops 4# 3 x 15  Wand 5# press 3 x 15    therapeutic activities to improve functional performance for 0  minutes, including:    MH 10' lat    Patient Education and Home Exercises       Education provided:   - improve  lat mobility    Written Home Exercises Provided: YES. Exercises were reviewed and Meryl was able to demonstrate them prior to the end of the session.  Meryl demonstrated good understanding of the education provided. See EMR under Patient Instructions for exercises provided during therapy sessions    Assessment     Progressed with serratus slides up the wall YTB. Notes muscle fatigue with exercise. Added in rotations with palloff press     Meryl Is progressing well towards her goals.   Pt prognosis is Excellent.     Pt will continue to benefit from skilled outpatient physical therapy to address the deficits listed in the problem list box on initial evaluation, provide pt/family education and to maximize pt's level of independence in the home and community environment.     Pt's spiritual, cultural and educational needs considered and pt agreeable to plan of care and goals.     Anticipated barriers to physical therapy: none    GOALS: Short Term Goals:  4 weeks(Progressing, not met)  1.Report decreased shoulder pain < / =  2/10  to increase tolerance for return to work without pains  2. Increase PROM full overhead flexion without shoulder pain   3. Increased strength by 1/3 MMT grade in lat to increase tolerance for ADL and work activities.  4. Pt to tolerate HEP to improve ROM and independence with ADL's     Long Term Goals: 8 weeks(Progressing, not met)  1.Report decreased shoulder pain  < / =  0 /10  to increase tolerance for return to [employer]  2.Increase AROM to full motion without pain  3.Increase strength to >/= 4/5 in lat and cuff to increase tolerance for ADL and work activities.  4. Pt goal: return to work and ADL pain free  5. Pt will have improved gcode of CJ (20-40% limited) on FOTO shoulder in order to demonstrate true functional improvement.     Plan     Plan of care Certification: 5/9/2024 to 11/9/2024.     Improve lat strength    Fabricio Tolentino, PT, DPT, OCS, FAAOMPT

## 2024-06-04 ENCOUNTER — CLINICAL SUPPORT (OUTPATIENT)
Dept: REHABILITATION | Facility: HOSPITAL | Age: 69
End: 2024-06-04
Payer: COMMERCIAL

## 2024-06-04 DIAGNOSIS — S29.012A STRAIN OF RIGHT LATISSIMUS DORSI MUSCLE: Primary | ICD-10-CM

## 2024-06-04 PROCEDURE — 97112 NEUROMUSCULAR REEDUCATION: CPT | Performed by: PHYSICAL THERAPIST

## 2024-06-04 NOTE — PROGRESS NOTES
[facility]   Physical Therapy Treatment Note      Name: Meryl Yo [facility] Number: [id number]    Therapy Diagnosis:   Encounter Diagnosis   Name Primary?    Strain of right latissimus dorsi muscle Yes     Physician: Russell Honeycutt III, *    Visit Date: 6/4/2024    Physician Orders: PT Eval and Treat   Medical Diagnosis from Referral:   Diagnosis   M25.511 (ICD-10-CM) - Right shoulder pain, unspecified chronicity      Evaluation Date: 5/9/2024  Authorization Period Expiration: 12/31/2024  Plan of Care Expiration: 11/9/2024  Progress Note Due: 8/9/2024  Visit # / Visits authorized: 7/20  FOTO: 1/3    PTA Visit #: 0/5     Time In: 1600  Time Out: 1700  Total Billable Time: 60 minutes    Subjective     Pt reports: Hurts when I press down to get myself up    She was compliant with home exercise program.  Response to previous treatment: sore  Functional change: no change    Pain: 4/10  Location: right shoulder      Objective      Objective Measures updated at progress report unless specified.     Treatment     Meryl received the treatments listed below:      therapeutic exercises to develop strength, endurance, ROM, flexibility, posture, and core stabilization for 0 minutes including:      manual therapy techniques: Joint mobilizations and Soft tissue Mobilization were applied to the: R shoulder for 0 minutes, including:      neuromuscular re-education activities to improve: Balance, Coordination, Sense, Proprioception, and Posture for 60 minutes. The following activities were included:    Wand lat stretch 5# 30x  Lat extensions GTB 20x  Posterior tilt low abd contraction 30x  No money yellow loop 30x  Rows BTB 30x  Tens unit education    NT  Serratus press upright 3 x 15  Med ball lift 4# 30x  Palloff rotations OTB 2 x 15  Open books on R 2 x 15  No money GTB loop 3 x 15  Chops 4# 3 x 15  Wand 5# press 3 x 15    therapeutic activities to improve functional performance for 0  minutes,  including:     0' lat    Patient Education and Home Exercises       Education provided:   - improve lat mobility    Written Home Exercises Provided: YES. Exercises were reviewed and Meryl was able to demonstrate them prior to the end of the session.  Meryl demonstrated good understanding of the education provided. See EMR under Patient Instructions for exercises provided during therapy sessions    Assessment     TTP over the lat, she reports good tolerance to exercise and reeducated on how to jacqueline the tens unit. She notes no pain with palpation to serratus and resisted cuff exercise     Meryl Is progressing well towards her goals.   Pt prognosis is Excellent.     Pt will continue to benefit from skilled outpatient physical therapy to address the deficits listed in the problem list box on initial evaluation, provide pt/family education and to maximize pt's level of independence in the home and community environment.     Pt's spiritual, cultural and educational needs considered and pt agreeable to plan of care and goals.     Anticipated barriers to physical therapy: none    GOALS: Short Term Goals:  4 weeks(Progressing, not met)  1.Report decreased shoulder pain < / =  2/10  to increase tolerance for return to work without pains  2. Increase PROM full overhead flexion without shoulder pain   3. Increased strength by 1/3 MMT grade in lat to increase tolerance for ADL and work activities.  4. Pt to tolerate HEP to improve ROM and independence with ADL's     Long Term Goals: 8 weeks(Progressing, not met)  1.Report decreased shoulder pain  < / =  0 /10  to increase tolerance for return to [employer]  2.Increase AROM to full motion without pain  3.Increase strength to >/= 4/5 in lat and cuff to increase tolerance for ADL and work activities.  4. Pt goal: return to work and ADL pain free  5. Pt will have improved gcode of CJ (20-40% limited) on FOTO shoulder in order to demonstrate true functional improvement.      Plan     Plan of care Certification: 5/9/2024 to 11/9/2024.     Improve lat strength    Fabricio Tolentino, PT, DPT, OCS, FAAOMPT

## 2024-06-07 ENCOUNTER — CLINICAL SUPPORT (OUTPATIENT)
Dept: REHABILITATION | Facility: HOSPITAL | Age: 69
End: 2024-06-07
Payer: COMMERCIAL

## 2024-06-07 DIAGNOSIS — S29.012A STRAIN OF RIGHT LATISSIMUS DORSI MUSCLE: Primary | ICD-10-CM

## 2024-06-07 PROCEDURE — 97112 NEUROMUSCULAR REEDUCATION: CPT | Performed by: PHYSICAL THERAPIST

## 2024-06-07 NOTE — PROGRESS NOTES
[facility]   Physical Therapy Treatment Note      Name: Meryl Yo [facility] Number: [id number]    Therapy Diagnosis:   Encounter Diagnosis   Name Primary?    Strain of right latissimus dorsi muscle Yes     Physician: Russell Honeycutt III, *    Visit Date: 6/7/2024    Physician Orders: PT Eval and Treat   Medical Diagnosis from Referral:   Diagnosis   M25.511 (ICD-10-CM) - Right shoulder pain, unspecified chronicity      Evaluation Date: 5/9/2024  Authorization Period Expiration: 12/31/2024  Plan of Care Expiration: 11/9/2024  Progress Note Due: 8/9/2024  Visit # / Visits authorized: 8/20  FOTO: 1/3    PTA Visit #: 0/5     Time In: 700  Time Out: 800  Total Billable Time: 60 minutes    Subjective     Pt reports: No meds this morning so it is sore but it's a different soreness    She was compliant with home exercise program.  Response to previous treatment: sore  Functional change: no change    Pain: 4/10  Location: right shoulder      Objective      Objective Measures updated at progress report unless specified.     Treatment     Meryl received the treatments listed below:      therapeutic exercises to develop strength, endurance, ROM, flexibility, posture, and core stabilization for 0 minutes including:      manual therapy techniques: Joint mobilizations and Soft tissue Mobilization were applied to the: R shoulder for 0 minutes, including:      neuromuscular re-education activities to improve: Balance, Coordination, Sense, Proprioception, and Posture for 60 minutes. The following activities were included:    Wand lat stretch 5# 30x  Posterior tilt low abd contraction 30x  No money yellow loop 30x  Rows BTB 30x  Seated scaptions RTB 30x      NT  Tens unit education  Serratus press upright 3 x 15  Med ball lift 4# 30x  Palloff rotations OTB 2 x 15  Open books on R 2 x 15  No money GTB loop 3 x 15  Chops 4# 3 x 15  Wand 5# press 3 x 15    therapeutic activities to improve functional  performance for 0  minutes, including:    MH 10' lat    Patient Education and Home Exercises       Education provided:   - improve lat mobility    Written Home Exercises Provided: YES. Exercises were reviewed and Meryl was able to demonstrate them prior to the end of the session.  Meryl demonstrated good understanding of the education provided. See EMR under Patient Instructions for exercises provided during therapy sessions    Assessment     Progressed with lumbar stabilization and low abd/lumbar control with Oh motion to prevent lat causing anterior tilt to the scapula    Meryl Is progressing well towards her goals.   Pt prognosis is Excellent.     Pt will continue to benefit from skilled outpatient physical therapy to address the deficits listed in the problem list box on initial evaluation, provide pt/family education and to maximize pt's level of independence in the home and community environment.     Pt's spiritual, cultural and educational needs considered and pt agreeable to plan of care and goals.     Anticipated barriers to physical therapy: none    GOALS: Short Term Goals:  4 weeks(Progressing, not met)  1.Report decreased shoulder pain < / =  2/10  to increase tolerance for return to work without pains  2. Increase PROM full overhead flexion without shoulder pain   3. Increased strength by 1/3 MMT grade in lat to increase tolerance for ADL and work activities.  4. Pt to tolerate HEP to improve ROM and independence with ADL's     Long Term Goals: 8 weeks(Progressing, not met)  1.Report decreased shoulder pain  < / =  0 /10  to increase tolerance for return to [employer]  2.Increase AROM to full motion without pain  3.Increase strength to >/= 4/5 in lat and cuff to increase tolerance for ADL and work activities.  4. Pt goal: return to work and ADL pain free  5. Pt will have improved gcode of CJ (20-40% limited) on FOTO shoulder in order to demonstrate true functional improvement.     Plan     Plan of  care Certification: 5/9/2024 to 11/9/2024.     Improve lat strength    Fabricio Tolentino, PT, DPT, OCS, FAAOMPT

## 2024-06-10 ENCOUNTER — PATIENT MESSAGE (OUTPATIENT)
Dept: INTERNAL MEDICINE | Facility: CLINIC | Age: 69
End: 2024-06-10
Payer: COMMERCIAL

## 2024-06-11 ENCOUNTER — CLINICAL SUPPORT (OUTPATIENT)
Dept: REHABILITATION | Facility: HOSPITAL | Age: 69
End: 2024-06-11
Attending: INTERNAL MEDICINE
Payer: COMMERCIAL

## 2024-06-11 DIAGNOSIS — S29.012A STRAIN OF RIGHT LATISSIMUS DORSI MUSCLE: Primary | ICD-10-CM

## 2024-06-11 PROCEDURE — 97112 NEUROMUSCULAR REEDUCATION: CPT | Performed by: PHYSICAL THERAPIST

## 2024-06-11 NOTE — PROGRESS NOTES
[facility]   Physical Therapy Treatment Note      Name: Meryl Johnson  [facility] Number: [id number]    Therapy Diagnosis:   Encounter Diagnosis   Name Primary?    Strain of right latissimus dorsi muscle Yes     Physician: Russell Honeycutt III, *    Visit Date: 6/11/2024    Physician Orders: PT Eval and Treat   Medical Diagnosis from Referral:   Diagnosis   M25.511 (ICD-10-CM) - Right shoulder pain, unspecified chronicity      Evaluation Date: 5/9/2024  Authorization Period Expiration: 12/31/2024  Plan of Care Expiration: 11/9/2024  Progress Note Due: 8/9/2024  Visit # / Visits authorized: 8/20  FOTO: 1/3    PTA Visit #: 0/5     Time In: 1600  Time Out: 1700  Total Billable Time: 60 minutes    Subjective     Pt reports: My back was really hurting after working the [employer] event this Saturday, sat for about 5-6 hours. Better this morning that yesterday but still painful    She was compliant with home exercise program.  Response to previous treatment: sore  Functional change: no change    Pain: 4/10  Location: right shoulder      Objective      Objective Measures updated at progress report unless specified.     Treatment     Meryl received the treatments listed below:      therapeutic exercises to develop strength, endurance, ROM, flexibility, posture, and core stabilization for 0 minutes including:      manual therapy techniques: Joint mobilizations and Soft tissue Mobilization were applied to the: R shoulder for 0 minutes, including:      neuromuscular re-education activities to improve: Balance, Coordination, Sense, Proprioception, and Posture for 45 minutes. The following activities were included:    Posterior tilt low abd contraction 30x  Seated 4# med ball press 30x  Posterior tilt with BKFO 30x  Posterior tilt with Marching 30x      NT  No money yellow loop 30x  Rows BTB 30x  Seated scaptions RTB 30x  Wand lat stretch 5# 30x  Tens unit education  Serratus press upright 3 x 15  Med ball  lift 4# 30x  Palloff rotations OTB 2 x 15  Open books on R 2 x 15  No money GTB loop 3 x 15  Chops 4# 3 x 15  Wand 5# press 3 x 15    therapeutic activities to improve functional performance for 0  minutes, including:    MH 10' lumbar spine and shoulders    Patient Education and Home Exercises       Education provided:   - improve lat mobility    Written Home Exercises Provided: YES. Exercises were reviewed and Meryl was able to demonstrate them prior to the end of the session.  Meryl demonstrated good understanding of the education provided. See EMR under Patient Instructions for exercises provided during therapy sessions    Assessment     Meryl progressed today with stabilization to lumbar spine. Educated on the need to improve lumbar stability to prevent lat/lumbar extension. Good motor control to posterior tilt    Meryl Is progressing well towards her goals.   Pt prognosis is Excellent.     Pt will continue to benefit from skilled outpatient physical therapy to address the deficits listed in the problem list box on initial evaluation, provide pt/family education and to maximize pt's level of independence in the home and community environment.     Pt's spiritual, cultural and educational needs considered and pt agreeable to plan of care and goals.     Anticipated barriers to physical therapy: none    GOALS: Short Term Goals:  4 weeks(Progressing, not met)  1.Report decreased shoulder pain < / =  2/10  to increase tolerance for return to work without pains  2. Increase PROM full overhead flexion without shoulder pain   3. Increased strength by 1/3 MMT grade in lat to increase tolerance for ADL and work activities.  4. Pt to tolerate HEP to improve ROM and independence with ADL's     Long Term Goals: 8 weeks(Progressing, not met)  1.Report decreased shoulder pain  < / =  0 /10  to increase tolerance for return to [employer]  2.Increase AROM to full motion without pain  3.Increase strength to >/= 4/5 in lat  and cuff to increase tolerance for ADL and work activities.  4. Pt goal: return to work and ADL pain free  5. Pt will have improved gcode of CJ (20-40% limited) on FOTO shoulder in order to demonstrate true functional improvement.     Plan     Plan of care Certification: 5/9/2024 to 11/9/2024.     Improve lat strength    Fabricio Tolentino, PT, DPT, OCS, FAAOMPT

## 2024-06-14 ENCOUNTER — CLINICAL SUPPORT (OUTPATIENT)
Dept: REHABILITATION | Facility: HOSPITAL | Age: 69
End: 2024-06-14
Payer: COMMERCIAL

## 2024-06-14 DIAGNOSIS — S29.012A STRAIN OF RIGHT LATISSIMUS DORSI MUSCLE: Primary | ICD-10-CM

## 2024-06-14 PROCEDURE — 97112 NEUROMUSCULAR REEDUCATION: CPT | Performed by: PHYSICAL THERAPIST

## 2024-06-14 NOTE — PROGRESS NOTES
"[facility]   Physical Therapy Treatment Note      Name: Meryl Johnson  [facility] Number: [id number]    Therapy Diagnosis:   Encounter Diagnosis   Name Primary?    Strain of right latissimus dorsi muscle Yes     Physician: Russell Honeycutt III, *    Visit Date: 6/14/2024    Physician Orders: PT Eval and Treat   Medical Diagnosis from Referral:   Diagnosis   M25.511 (ICD-10-CM) - Right shoulder pain, unspecified chronicity      Evaluation Date: 5/9/2024  Authorization Period Expiration: 12/31/2024  Plan of Care Expiration: 11/9/2024  Progress Note Due: 8/9/2024  Visit # / Visits authorized: 10/20  FOTO: 1/3    PTA Visit #: 0/5     Time In: 700  Time Out: 745  Total Billable Time: 45 minutes    Subjective     Pt reports: Notes cramping in gastroc and anterior tib last night. Back pain back to just the right side    She was compliant with home exercise program.  Response to previous treatment: sore  Functional change: no change    Pain: 4/10  Location: right shoulder      Objective      Objective Measures updated at progress report unless specified.     Treatment     Meryl received the treatments listed below:      therapeutic exercises to develop strength, endurance, ROM, flexibility, posture, and core stabilization for 0 minutes including:      manual therapy techniques: Joint mobilizations and Soft tissue Mobilization were applied to the: R shoulder for 0 minutes, including:      neuromuscular re-education activities to improve: Balance, Coordination, Sense, Proprioception, and Posture for 45 minutes. The following activities were included:    All performed on :  Supine clam holds BTB 30x 3"  Seated 4# med ball press 30x  No money YTB 30x      NT  Posterior tilt low abd contraction 30x  Posterior tilt with BKFO 30x  Posterior tilt with Marching 30x  No money yellow loop 30x  Rows BTB 30x  Seated scaptions RTB 30x  Wand lat stretch 5# 30x  Tens unit education  Serratus press upright 3 x " 15  Med ball lift 4# 30x  Palloff rotations OTB 2 x 15  Open books on R 2 x 15  No money GTB loop 3 x 15  Chops 4# 3 x 15  Wand 5# press 3 x 15    therapeutic activities to improve functional performance for 0  minutes, including:     10' lumbar spine and shoulders    Patient Education and Home Exercises       Education provided:   - improve lat mobility    Written Home Exercises Provided: YES. Exercises were reviewed and Meryl was able to demonstrate them prior to the end of the session.  Meryl demonstrated good understanding of the education provided. See EMR under Patient Instructions for exercises provided during therapy sessions    Assessment     Better mobility within clinic and less pain across lumbar spine. Gets relief with heat to lat due to muscular nature of the injury. Began presses overhead and will continue abd stabilization    Meryl Is progressing well towards her goals.   Pt prognosis is Excellent.     Pt will continue to benefit from skilled outpatient physical therapy to address the deficits listed in the problem list box on initial evaluation, provide pt/family education and to maximize pt's level of independence in the home and community environment.     Pt's spiritual, cultural and educational needs considered and pt agreeable to plan of care and goals.     Anticipated barriers to physical therapy: none    GOALS: Short Term Goals:  4 weeks(Progressing, not met)  1.Report decreased shoulder pain < / =  2/10  to increase tolerance for return to work without pains  2. Increase PROM full overhead flexion without shoulder pain   3. Increased strength by 1/3 MMT grade in lat to increase tolerance for ADL and work activities.  4. Pt to tolerate HEP to improve ROM and independence with ADL's     Long Term Goals: 8 weeks(Progressing, not met)  1.Report decreased shoulder pain  < / =  0 /10  to increase tolerance for return to [employer]  2.Increase AROM to full motion without pain  3.Increase  strength to >/= 4/5 in lat and cuff to increase tolerance for ADL and work activities.  4. Pt goal: return to work and ADL pain free  5. Pt will have improved gcode of CJ (20-40% limited) on FOTO shoulder in order to demonstrate true functional improvement.     Plan     Plan of care Certification: 5/9/2024 to 11/9/2024.     Improve lat strength    Fabricio Tolentino, PT, DPT, OCS, FAAOMPT

## 2024-06-17 ENCOUNTER — CLINICAL SUPPORT (OUTPATIENT)
Dept: REHABILITATION | Facility: HOSPITAL | Age: 69
End: 2024-06-17
Payer: COMMERCIAL

## 2024-06-17 DIAGNOSIS — S29.012A STRAIN OF RIGHT LATISSIMUS DORSI MUSCLE: Primary | ICD-10-CM

## 2024-06-17 PROCEDURE — 97112 NEUROMUSCULAR REEDUCATION: CPT | Performed by: PHYSICAL THERAPIST

## 2024-06-17 NOTE — PROGRESS NOTES
"[facility]   Physical Therapy Treatment Note      Name: Meryl Johnson  [facility] Number: [id number]    Therapy Diagnosis:   Encounter Diagnosis   Name Primary?    Strain of right latissimus dorsi muscle Yes     Physician: Russell Honeycutt III, *    Visit Date: 6/17/2024    Physician Orders: PT Eval and Treat   Medical Diagnosis from Referral:   Diagnosis   M25.511 (ICD-10-CM) - Right shoulder pain, unspecified chronicity      Evaluation Date: 5/9/2024  Authorization Period Expiration: 12/31/2024  Plan of Care Expiration: 11/9/2024  Progress Note Due: 8/9/2024  Visit # / Visits authorized: 11/20  FOTO: 1/3    PTA Visit #: 0/5     Time In: 650  Time Out: 746  Total Billable Time: 56 minutes    Subjective     Pt reports: Back was not hurting as bad this weekend     She was compliant with home exercise program.  Response to previous treatment: sore  Functional change: no change    Pain: 4/10  Location: right shoulder      Objective      Objective Measures updated at progress report unless specified.     Treatment     Meryl received the treatments listed below:      therapeutic exercises to develop strength, endurance, ROM, flexibility, posture, and core stabilization for 0 minutes including:      manual therapy techniques: Joint mobilizations and Soft tissue Mobilization were applied to the: R shoulder for 0 minutes, including:      neuromuscular re-education activities to improve: Balance, Coordination, Sense, Proprioception, and Posture for 56 minutes. The following activities were included:    All performed on MH:  Seated 4# med ball press 30x  No money OTB 30x  Post tilt with BKFO and marching 30x ea  Wand flexion OH 2# 30x    NT  Supine clam holds BTB 30x 3"  Posterior tilt low abd contraction 30x  Posterior tilt with BKFO 30x  Posterior tilt with Marching 30x  No money yellow loop 30x  Rows BTB 30x  Seated scaptions RTB 30x  Wand lat stretch 5# 30x  Tens unit education  Serratus press " upright 3 x 15  Med ball lift 4# 30x  Palloff rotations OTB 2 x 15  Open books on R 2 x 15  No money GTB loop 3 x 15  Chops 4# 3 x 15  Wand 5# press 3 x 15    therapeutic activities to improve functional performance for 0  minutes, including:     10' lumbar spine and shoulders    Patient Education and Home Exercises       Education provided:   - improve lat mobility    Written Home Exercises Provided: YES. Exercises were reviewed and Meryl was able to demonstrate them prior to the end of the session.  Meryl demonstrated good understanding of the education provided. See EMR under Patient Instructions for exercises provided during therapy sessions    Assessment     Less pain to the lat today. Progressed today wuth 2# press wand with stretch but no pain to the lat. Doing better since initiating lumbar spine stabilization exercises    Meryl Is progressing well towards her goals.   Pt prognosis is Excellent.     Pt will continue to benefit from skilled outpatient physical therapy to address the deficits listed in the problem list box on initial evaluation, provide pt/family education and to maximize pt's level of independence in the home and community environment.     Pt's spiritual, cultural and educational needs considered and pt agreeable to plan of care and goals.     Anticipated barriers to physical therapy: none    GOALS: Short Term Goals:  4 weeks(Progressing, not met)  1.Report decreased shoulder pain < / =  2/10  to increase tolerance for return to work without pains  2. Increase PROM full overhead flexion without shoulder pain   3. Increased strength by 1/3 MMT grade in lat to increase tolerance for ADL and work activities.  4. Pt to tolerate HEP to improve ROM and independence with ADL's     Long Term Goals: 8 weeks(Progressing, not met)  1.Report decreased shoulder pain  < / =  0 /10  to increase tolerance for return to [employer]  2.Increase AROM to full motion without pain  3.Increase strength to >/=  4/5 in lat and cuff to increase tolerance for ADL and work activities.  4. Pt goal: return to work and ADL pain free  5. Pt will have improved gcode of CJ (20-40% limited) on FOTO shoulder in order to demonstrate true functional improvement.     Plan     Plan of care Certification: 5/9/2024 to 11/9/2024.     Improve lat strength    Fabricio Tolentino, PT, DPT, OCS, FAAOMPT

## 2024-06-24 ENCOUNTER — PATIENT MESSAGE (OUTPATIENT)
Dept: INTERNAL MEDICINE | Facility: CLINIC | Age: 69
End: 2024-06-24
Payer: COMMERCIAL

## 2024-06-30 NOTE — PROGRESS NOTES
CHIEF COMPLAINT: Type 2 Diabetes     HPI: Ms. Meryl Johnson is a 68 y.o. female who was diagnosed with Type 2 DM in 2001, after mother's death.  Single, lives alone.  On insulin in the past. Stopped years ago.   Interested in cgm.      Being seen by me for the first time.  Lab Results   Component Value Date    HGBA1C 7.9 (H) 03/19/2024   Overdue for labs.  Referred by pcp .  H/o hypoglycemia < 54 mg/dl, correction with oj     Works in security.     Dietary habits:  Breakfast  Dinner     Drinks: sugar free ice tea, diet dr pepper, water, coffee (1)  Snacking: dark chocolate     Exercise: no formal  PT lat muscle inflamed    PREVIOUS DIABETES MEDICATIONS TRIED  Ozempic- gi issues  Trulicity- gi issues  Metformin   Jardiance   Novolog     CURRENT DIABETIC MEDS: jardiance 25 mg daily, metformin 1000 mg twice a day   Makes frequent changes to his/her insulin regimen on the basis of blood glucose data  Testing 4 x a day max  Patient is willing and able to use the device  Demonstrated an understanding of the technology and is motivated to use CGM  Patient expected to adhere to a comprehensive diabetes treatment plan and patient has adequate medical supervision    Has multiple impaired awareness of hypoglycemia (hypoglycemia unawareness)    Diabetes Management Status    Statin: Taking  ACE/ARB: Taking    Screening or Prevention Patient's value Goal Complete/Controlled?   HgA1C Testing and Control   Lab Results   Component Value Date    HGBA1C 7.9 (H) 03/19/2024      Annually/Less than 8% Yes   Lipid profile : 03/19/2024 Annually Yes   LDL control Lab Results   Component Value Date    LDLCALC 99.8 03/19/2024    Annually/Less than 100 mg/dl  Yes   Nephropathy screening Lab Results   Component Value Date    LABMICR 58.0 03/22/2024     Lab Results   Component Value Date    PROTEINUA Trace (A) 07/24/2023    Annually Yes   Blood pressure BP Readings from Last 1 Encounters:   05/06/24 128/79    Less than 140/90 Yes   Dilated  "retinal exam : 10/04/2023 Annually Yes   Foot exam   Most Recent Foot Exam Date: Not Found Annually No     REVIEW OF SYSTEMS  General: no weakness, fatigue, + weight changes 2 # loss  Eyes: no double or blurred vision, eye pain, or redness  Cardiovascular: no chest pain, palpitations, edema, or murmurs.   Respiratory: no cough or dyspnea.   GI: no heartburn, nausea, or changes in bowel patterns; good appetite.   Skin: no rashes, dryness, itching, or reactions at insulin injection sites.  Neuro: no numbness, tingling, tremors, or vertigo. +muscle cramps   Endocrine: no polyuria, polydipsia, polyphagia, heat or cold intolerance.     Vital Signs  /82 (BP Location: Left arm, Patient Position: Sitting, BP Method: Large (Manual))   Pulse 86   Ht 5' 7" (1.702 m)   Wt 124.2 kg (273 lb 13 oz)   SpO2 97%   BMI 42.88 kg/m²     Hemoglobin A1C   Date Value Ref Range Status   03/19/2024 7.9 (H) 4.0 - 5.6 % Final     Comment:     ADA Screening Guidelines:  5.7-6.4%  Consistent with prediabetes  >or=6.5%  Consistent with diabetes    High levels of fetal hemoglobin interfere with the HbA1C  assay. Heterozygous hemoglobin variants (HbS, HgC, etc)do  not significantly interfere with this assay.   However, presence of multiple variants may affect accuracy.     06/08/2023 7.3 (H) 4.0 - 5.6 % Final     Comment:     ADA Screening Guidelines:  5.7-6.4%  Consistent with prediabetes  >or=6.5%  Consistent with diabetes    High levels of fetal hemoglobin interfere with the HbA1C  assay. Heterozygous hemoglobin variants (HbS, HgC, etc)do  not significantly interfere with this assay.   However, presence of multiple variants may affect accuracy.     09/28/2022 7.2 (H) 4.0 - 5.6 % Final     Comment:     ADA Screening Guidelines:  5.7-6.4%  Consistent with prediabetes  >or=6.5%  Consistent with diabetes    High levels of fetal hemoglobin interfere with the HbA1C  assay. Heterozygous hemoglobin variants (HbS, HgC, etc)do  not " significantly interfere with this assay.   However, presence of multiple variants may affect accuracy.          Chemistry        Component Value Date/Time     03/19/2024 0719    K 4.8 03/19/2024 0719     03/19/2024 0719    CO2 19 (L) 03/19/2024 0719    BUN 20 03/19/2024 0719    CREATININE 1.1 03/19/2024 0719     (H) 03/19/2024 0719        Component Value Date/Time    CALCIUM 9.6 03/19/2024 0719    ALKPHOS 119 03/19/2024 0719    AST 19 03/19/2024 0719    ALT 16 03/19/2024 0719    BILITOT 0.4 03/19/2024 0719    ESTGFRAFRICA 41.6 (A) 05/11/2022 0703    EGFRNONAA 36.0 (A) 05/11/2022 0703           Lab Results   Component Value Date    TSH 3.800 03/19/2024      Lab Results   Component Value Date    CHOL 189 03/19/2024    CHOL 295 (H) 05/11/2022    CHOL 191 12/03/2021     Lab Results   Component Value Date    HDL 57 03/19/2024    HDL 49 05/11/2022    HDL 41 12/03/2021     Lab Results   Component Value Date    LDLCALC 99.8 03/19/2024    LDLCALC 199.8 (H) 05/11/2022    LDLCALC 116.6 12/03/2021     Lab Results   Component Value Date    TRIG 161 (H) 03/19/2024    TRIG 231 (H) 05/11/2022    TRIG 167 (H) 12/03/2021     Lab Results   Component Value Date    CHOLHDL 30.2 03/19/2024    CHOLHDL 16.6 (L) 05/11/2022    CHOLHDL 21.5 12/03/2021         PHYSICAL EXAMINATION  Constitutional: Appears well, no distress. Reviewed vitals above.  Eyes: conjunctivae & lids intact; PERRLA, EOMs intact; optic discs   Neck: Supple, trachea midline.   Respiratory: CTA without wheezes, even and unlabored, upon palpation wnl, percussion wnl  Cardiovascular: RRR;  no edema or varicosities  Lymph: no lymphadenopathy palpated  Skin: warm and dry; no injection site reactions, no acanthosis nigracans observed.  Neuro:patient alert and cooperative, normal affect; steady gait.  Psychiatric: judgement & insight intact, orientation of time, place & person intact, memory; mood & affect wnl     Diabetes Foot Exam:   Protective Sensation (w/  10 gram monofilament):  Right: Intact 7/7  Left: Intact 7/7     Visual Inspection:  Normal -  Bilateral and Dry Skin -  Bilateral    Pedal Pulses:   Right: Present  Left: Present    Posterior Tibialis Pulses:   Right:Present  Left: Present      Assessment/Plan  1. Type 2 diabetes mellitus with stage 3a chronic kidney disease, without long-term current use of insulin  Hemoglobin A1C    Ambulatory referral/consult to Diabetic Advanced Practice Providers (Medical Management)    Ambulatory referral/consult to Diabetes Education      2. Morbid obesity        3. Other iron deficiency anemia        4. Hypertension, unspecified type        5. Hyperlipidemia, unspecified hyperlipidemia type        6. Encounter for screening mammogram for malignant neoplasm of breast  Mammo Digital Screening Bilat        Follow up in 4 months w /me, continue regimen above, add dexcom g7, DE placed, has iphone, apple watch, did not do well with glp1a  Body mass index is 42.88 kg/m². May increase insulin resistance  May skew A1c   Bp managed per pcp, doing well   On statin   Mammogram 2024     FOLLOW UP  In 4 months

## 2024-07-01 ENCOUNTER — OFFICE VISIT (OUTPATIENT)
Dept: INTERNAL MEDICINE | Facility: CLINIC | Age: 69
End: 2024-07-01
Payer: COMMERCIAL

## 2024-07-01 ENCOUNTER — CLINICAL SUPPORT (OUTPATIENT)
Dept: REHABILITATION | Facility: HOSPITAL | Age: 69
End: 2024-07-01
Payer: COMMERCIAL

## 2024-07-01 VITALS
BODY MASS INDEX: 42.98 KG/M2 | SYSTOLIC BLOOD PRESSURE: 124 MMHG | DIASTOLIC BLOOD PRESSURE: 82 MMHG | HEART RATE: 86 BPM | WEIGHT: 273.81 LBS | HEIGHT: 67 IN | OXYGEN SATURATION: 97 %

## 2024-07-01 DIAGNOSIS — E78.5 HYPERLIPIDEMIA, UNSPECIFIED HYPERLIPIDEMIA TYPE: ICD-10-CM

## 2024-07-01 DIAGNOSIS — E11.22 TYPE 2 DIABETES MELLITUS WITH STAGE 3A CHRONIC KIDNEY DISEASE, WITHOUT LONG-TERM CURRENT USE OF INSULIN: Primary | ICD-10-CM

## 2024-07-01 DIAGNOSIS — I10 HYPERTENSION, UNSPECIFIED TYPE: ICD-10-CM

## 2024-07-01 DIAGNOSIS — S29.012A STRAIN OF RIGHT LATISSIMUS DORSI MUSCLE: Primary | ICD-10-CM

## 2024-07-01 DIAGNOSIS — E66.01 MORBID OBESITY: ICD-10-CM

## 2024-07-01 DIAGNOSIS — Z12.31 ENCOUNTER FOR SCREENING MAMMOGRAM FOR MALIGNANT NEOPLASM OF BREAST: ICD-10-CM

## 2024-07-01 DIAGNOSIS — N18.31 TYPE 2 DIABETES MELLITUS WITH STAGE 3A CHRONIC KIDNEY DISEASE, WITHOUT LONG-TERM CURRENT USE OF INSULIN: Primary | ICD-10-CM

## 2024-07-01 DIAGNOSIS — D50.8 OTHER IRON DEFICIENCY ANEMIA: ICD-10-CM

## 2024-07-01 PROBLEM — I20.9 ANGINA PECTORIS: Status: ACTIVE | Noted: 2024-07-01

## 2024-07-01 PROBLEM — N18.32 TYPE 2 DIABETES MELLITUS WITH STAGE 3B CHRONIC KIDNEY DISEASE, WITHOUT LONG-TERM CURRENT USE OF INSULIN: Status: ACTIVE | Noted: 2024-07-01

## 2024-07-01 PROCEDURE — 3074F SYST BP LT 130 MM HG: CPT | Mod: CPTII,S$GLB,, | Performed by: NURSE PRACTITIONER

## 2024-07-01 PROCEDURE — 3288F FALL RISK ASSESSMENT DOCD: CPT | Mod: CPTII,S$GLB,, | Performed by: NURSE PRACTITIONER

## 2024-07-01 PROCEDURE — 4010F ACE/ARB THERAPY RXD/TAKEN: CPT | Mod: CPTII,S$GLB,, | Performed by: NURSE PRACTITIONER

## 2024-07-01 PROCEDURE — 97112 NEUROMUSCULAR REEDUCATION: CPT | Performed by: PHYSICAL THERAPIST

## 2024-07-01 PROCEDURE — 3079F DIAST BP 80-89 MM HG: CPT | Mod: CPTII,S$GLB,, | Performed by: NURSE PRACTITIONER

## 2024-07-01 PROCEDURE — 3066F NEPHROPATHY DOC TX: CPT | Mod: CPTII,S$GLB,, | Performed by: NURSE PRACTITIONER

## 2024-07-01 PROCEDURE — 3072F LOW RISK FOR RETINOPATHY: CPT | Mod: CPTII,S$GLB,, | Performed by: NURSE PRACTITIONER

## 2024-07-01 PROCEDURE — 3060F POS MICROALBUMINURIA REV: CPT | Mod: CPTII,S$GLB,, | Performed by: NURSE PRACTITIONER

## 2024-07-01 PROCEDURE — 1159F MED LIST DOCD IN RCRD: CPT | Mod: CPTII,S$GLB,, | Performed by: NURSE PRACTITIONER

## 2024-07-01 PROCEDURE — 99999 PR PBB SHADOW E&M-EST. PATIENT-LVL V: CPT | Mod: PBBFAC,,, | Performed by: NURSE PRACTITIONER

## 2024-07-01 PROCEDURE — 99214 OFFICE O/P EST MOD 30 MIN: CPT | Mod: S$GLB,,, | Performed by: NURSE PRACTITIONER

## 2024-07-01 PROCEDURE — 1101F PT FALLS ASSESS-DOCD LE1/YR: CPT | Mod: CPTII,S$GLB,, | Performed by: NURSE PRACTITIONER

## 2024-07-01 PROCEDURE — 3051F HG A1C>EQUAL 7.0%<8.0%: CPT | Mod: CPTII,S$GLB,, | Performed by: NURSE PRACTITIONER

## 2024-07-01 PROCEDURE — 1160F RVW MEDS BY RX/DR IN RCRD: CPT | Mod: CPTII,S$GLB,, | Performed by: NURSE PRACTITIONER

## 2024-07-01 PROCEDURE — 1126F AMNT PAIN NOTED NONE PRSNT: CPT | Mod: CPTII,S$GLB,, | Performed by: NURSE PRACTITIONER

## 2024-07-01 PROCEDURE — 3008F BODY MASS INDEX DOCD: CPT | Mod: CPTII,S$GLB,, | Performed by: NURSE PRACTITIONER

## 2024-07-01 RX ORDER — BLOOD-GLUCOSE SENSOR
EACH MISCELLANEOUS
Qty: 3 EACH | Refills: 11 | Status: SHIPPED | OUTPATIENT
Start: 2024-07-01

## 2024-07-01 NOTE — PROGRESS NOTES
"[facility]   Physical Therapy Treatment Note      Name: Meryl Johnson  [facility] Number: [id number]    Therapy Diagnosis:   Encounter Diagnosis   Name Primary?    Strain of right latissimus dorsi muscle Yes     Physician: Russell Honeycutt III, *    Visit Date: 7/1/2024    Physician Orders: PT Eval and Treat   Medical Diagnosis from Referral:   Diagnosis   M25.511 (ICD-10-CM) - Right shoulder pain, unspecified chronicity      Evaluation Date: 5/9/2024  Authorization Period Expiration: 12/31/2024  Plan of Care Expiration: 11/9/2024  Progress Note Due: 8/9/2024  Visit # / Visits authorized: 12/20  FOTO: 1/3    PTA Visit #: 0/5     Time In: 701  Time Out: 757  Total Billable Time: 56 minutes    Subjective     Pt reports: I felt better while I was working the past 2 weeks, Got stiff laying in bed on Saturday relaxing    She was compliant with home exercise program.  Response to previous treatment: sore  Functional change: no change    Pain: 4/10  Location: right shoulder      Objective      Objective Measures updated at progress report unless specified.     Treatment     Meryl received the treatments listed below:      therapeutic exercises to develop strength, endurance, ROM, flexibility, posture, and core stabilization for 0 minutes including:      manual therapy techniques: Joint mobilizations and Soft tissue Mobilization were applied to the: R shoulder for 0 minutes, including:      neuromuscular re-education activities to improve: Balance, Coordination, Sense, Proprioception, and Posture for 56 minutes. The following activities were included:    Scaptions 2# 3 x 15  Handcuffs green [id number]  Post tilt with BKFO and marching 30x ea  Wand flexion OH 2# 30x  Moist heat:   -BKFO and marching    NT  Supine clam holds BTB 30x 3"  Posterior tilt low abd contraction 30x  Posterior tilt with BKFO 30x  Posterior tilt with Marching 30x  No money yellow loop 30x  Rows BTB 30x  Seated scaptions RTB " 30x  Wand lat stretch 5# 30x  Tens unit education  Serratus press upright 3 x 15  Med ball lift 4# 30x  Palloff rotations OTB 2 x 15  Open books on R 2 x 15  No money GTB loop 3 x 15  Chops 4# 3 x 15  Wand 5# press 3 x 15    therapeutic activities to improve functional performance for 0  minutes, including:     10' lumbar spine and shoulders    Patient Education and Home Exercises       Education provided:   - improve lat mobility    Written Home Exercises Provided: YES. Exercises were reviewed and Meryl was able to demonstrate them prior to the end of the session.  Meryl demonstrated good understanding of the education provided. See EMR under Patient Instructions for exercises provided during therapy sessions    Assessment     Less pain today, able to progress to overhead flexion with handcuff. Noted fatigue but able to complete all reps and sets. Relief from MH end of session    Meryl Is progressing well towards her goals.   Pt prognosis is Excellent.     Pt will continue to benefit from skilled outpatient physical therapy to address the deficits listed in the problem list box on initial evaluation, provide pt/family education and to maximize pt's level of independence in the home and community environment.     Pt's spiritual, cultural and educational needs considered and pt agreeable to plan of care and goals.     Anticipated barriers to physical therapy: none    GOALS: Short Term Goals:  4 weeks(Progressing, not met)  1.Report decreased shoulder pain < / =  2/10  to increase tolerance for return to work without pains  2. Increase PROM full overhead flexion without shoulder pain   3. Increased strength by 1/3 MMT grade in lat to increase tolerance for ADL and work activities.  4. Pt to tolerate HEP to improve ROM and independence with ADL's     Long Term Goals: 8 weeks(Progressing, not met)  1.Report decreased shoulder pain  < / =  0 /10  to increase tolerance for return to [employer]  2.Increase AROM to  full motion without pain  3.Increase strength to >/= 4/5 in lat and cuff to increase tolerance for ADL and work activities.  4. Pt goal: return to work and ADL pain free  5. Pt will have improved gcode of CJ (20-40% limited) on FOTO shoulder in order to demonstrate true functional improvement.     Plan     Plan of care Certification: 5/9/2024 to 11/9/2024.     Improve lat strength    Fabricio Tolentino, PT, DPT, OCS, FAAOMPT

## 2024-07-01 NOTE — PATIENT INSTRUCTIONS
Follow up in 4 months w/Irielle   A1c prior -4 months   DE education - dexcom g7   Mammogram 2024     Lab Results   Component Value Date    HGBA1C 7.9 (H) 03/19/2024     Goal less than 7%    Dexcom g7 training     [email or web address]  Eat fit yudy  [employer] yudy  [email or web address]    mySugr yudy     Goal  no higher than 180     Jardiance 25 mg daily   Metformin 1000 mg twice a day

## 2024-07-08 ENCOUNTER — CLINICAL SUPPORT (OUTPATIENT)
Dept: REHABILITATION | Facility: HOSPITAL | Age: 69
End: 2024-07-08
Payer: COMMERCIAL

## 2024-07-08 DIAGNOSIS — S29.012A STRAIN OF RIGHT LATISSIMUS DORSI MUSCLE: Primary | ICD-10-CM

## 2024-07-08 PROCEDURE — 97112 NEUROMUSCULAR REEDUCATION: CPT | Performed by: PHYSICAL THERAPIST

## 2024-07-09 NOTE — PROGRESS NOTES
"[facility]   Physical Therapy Treatment Note      Name: Meryl Johnson  [facility] Number: [id number]    Therapy Diagnosis:   Encounter Diagnosis   Name Primary?    Strain of right latissimus dorsi muscle Yes     Physician: Russell Honeycutt III, *    Visit Date: 7/8/2024    Physician Orders: PT Eval and Treat   Medical Diagnosis from Referral:   Diagnosis   M25.511 (ICD-10-CM) - Right shoulder pain, unspecified chronicity      Evaluation Date: 5/9/2024  Authorization Period Expiration: 12/31/2024  Plan of Care Expiration: 11/9/2024  Progress Note Due: 8/9/2024  Visit # / Visits authorized: 13/20  FOTO: 1/3    PTA Visit #: 0/5     Time In: 1630  Time Out: 1715  Total Billable Time: 45 minutes    Subjective     Pt reports: Not as painful, just staying busy at work. Taking off a few days next week    She was compliant with home exercise program.  Response to previous treatment: sore  Functional change: no change    Pain: 4/10  Location: right shoulder      Objective      Objective Measures updated at progress report unless specified.     Treatment     Meryl received the treatments listed below:      therapeutic exercises to develop strength, endurance, ROM, flexibility, posture, and core stabilization for 0 minutes including:      manual therapy techniques: Joint mobilizations and Soft tissue Mobilization were applied to the: R shoulder for 0 minutes, including:      neuromuscular re-education activities to improve: Balance, Coordination, Sense, Proprioception, and Posture for 45 minutes. The following activities were included:    Scaptions 2# 3 x 15  Handcuffs yellow loop 430x  ER with press GTB 30x  4# Med ball lifts 30x    NT  Post tilt with BKFO and marching 30x ea  Wand flexion OH 2# 30x  Moist heat:   -BKFO and marching  Supine clam holds BTB 30x 3"  Posterior tilt low abd contraction 30x  Posterior tilt with BKFO 30x  Posterior tilt with Marching 30x  No money yellow loop 30x  Rows BTB " 30x  Seated scaptions RTB 30x  Wand lat stretch 5# 30x  Tens unit education  Serratus press upright 3 x 15  Med ball lift 4# 30x  Palloff rotations OTB 2 x 15  Open books on R 2 x 15  No money GTB loop 3 x 15  Chops 4# 3 x 15  Wand 5# press 3 x 15    therapeutic activities to improve functional performance for 0  minutes, including:    MH 0' lumbar spine and shoulders    Patient Education and Home Exercises       Education provided:   - improve lat mobility    Written Home Exercises Provided: YES. Exercises were reviewed and Meryl was able to demonstrate them prior to the end of the session.  Meryl demonstrated good understanding of the education provided. See EMR under Patient Instructions for exercises provided during therapy sessions    Assessment   Lat pain improving with exercise, just limited endurance today due to wearing sweater due to cold office. Able to increase pressing with cuff activation and no spasm of lat today    Meryl Is progressing well towards her goals.   Pt prognosis is Excellent.     Pt will continue to benefit from skilled outpatient physical therapy to address the deficits listed in the problem list box on initial evaluation, provide pt/family education and to maximize pt's level of independence in the home and community environment.     Pt's spiritual, cultural and educational needs considered and pt agreeable to plan of care and goals.     Anticipated barriers to physical therapy: none    GOALS: Short Term Goals:  4 weeks(Progressing, not met)  1.Report decreased shoulder pain < / =  2/10  to increase tolerance for return to work without pains  2. Increase PROM full overhead flexion without shoulder pain   3. Increased strength by 1/3 MMT grade in lat to increase tolerance for ADL and work activities.  4. Pt to tolerate HEP to improve ROM and independence with ADL's     Long Term Goals: 8 weeks(Progressing, not met)  1.Report decreased shoulder pain  < / =  0 /10  to increase tolerance  for return to [employer]  2.Increase AROM to full motion without pain  3.Increase strength to >/= 4/5 in lat and cuff to increase tolerance for ADL and work activities.  4. Pt goal: return to work and ADL pain free  5. Pt will have improved gcode of CJ (20-40% limited) on FOTO shoulder in order to demonstrate true functional improvement.     Plan     Plan of care Certification: 5/9/2024 to 11/9/2024.     Improve lat strength    Fabricio Tolentino, PT, DPT, OCS, FAAOMPT

## 2024-07-10 ENCOUNTER — CLINICAL SUPPORT (OUTPATIENT)
Dept: DIABETES | Facility: CLINIC | Age: 69
End: 2024-07-10
Payer: COMMERCIAL

## 2024-07-10 DIAGNOSIS — N18.31 TYPE 2 DIABETES MELLITUS WITH STAGE 3A CHRONIC KIDNEY DISEASE, WITHOUT LONG-TERM CURRENT USE OF INSULIN: ICD-10-CM

## 2024-07-10 DIAGNOSIS — E11.22 TYPE 2 DIABETES MELLITUS WITH STAGE 3A CHRONIC KIDNEY DISEASE, WITHOUT LONG-TERM CURRENT USE OF INSULIN: ICD-10-CM

## 2024-07-10 PROCEDURE — 99999 PR PBB SHADOW E&M-EST. PATIENT-LVL II: CPT | Mod: PBBFAC,,,

## 2024-07-11 ENCOUNTER — HOSPITAL ENCOUNTER (OUTPATIENT)
Dept: RADIOLOGY | Facility: HOSPITAL | Age: 69
Discharge: HOME | End: 2024-07-11
Attending: NURSE PRACTITIONER
Payer: COMMERCIAL

## 2024-07-11 VITALS — WEIGHT: 273.81 LBS | HEIGHT: 67 IN | BODY MASS INDEX: 42.98 KG/M2

## 2024-07-11 DIAGNOSIS — Z12.31 ENCOUNTER FOR SCREENING MAMMOGRAM FOR MALIGNANT NEOPLASM OF BREAST: ICD-10-CM

## 2024-07-11 PROCEDURE — 77067 SCR MAMMO BI INCL CAD: CPT | Mod: 26,,, | Performed by: RADIOLOGY

## 2024-07-11 PROCEDURE — 77063 BREAST TOMOSYNTHESIS BI: CPT | Mod: TC

## 2024-07-11 PROCEDURE — 77063 BREAST TOMOSYNTHESIS BI: CPT | Mod: 26,,, | Performed by: RADIOLOGY

## 2024-07-11 PROCEDURE — 77067 SCR MAMMO BI INCL CAD: CPT | Mod: TC

## 2024-07-11 RX ORDER — INSULIN PUMP SYRINGE, 3 ML
EACH MISCELLANEOUS
Qty: 1 EACH | Refills: 0 | Status: SHIPPED | OUTPATIENT
Start: 2024-07-11

## 2024-07-11 RX ORDER — LANCETS 28 GAUGE
EACH MISCELLANEOUS
Qty: 100 EACH | Refills: 11 | Status: SHIPPED | OUTPATIENT
Start: 2024-07-11

## 2024-07-11 NOTE — PROGRESS NOTES
"Diabetes Care Specialist Progress Note  Author: Sheree Ellison RN  Date: 7/11/2024    Program Intake  Reason for Diabetes Program Visit:: Initial Diabetes Assessment  Current diabetes risk level:: moderate  In the last 12 months, have you:: none  Continuous Glucose Monitoring  Patient has CGM: Yes  Personal CGM type:: dex com start today  Medications/Current Diabetes Treatment   Current Diabetes Treatment: Diet/Exercise, Oral Medications  Oral Medication Type/Dose: jardiance, metformin  Patient is able to identify current diabetes medications, dosages, and appropriate timing of medications.: yes  Patient reports problems or concerns with current medication regimen.: no  Patient is  aware that some diabetes medications can cause low blood sugar?: No (instructed)  Medication Skills Assessment Completed:: Yes  Assessment indicates:: Knowledge deficit, Instruction Needed  Area of need?: Yes    Lab Results   Component Value Date    HGBA1C 7.9 (H) 03/19/2024       Weight: 124.2 kg (273 lb 13 oz)   Height: 5' 7" (170.2 cm)   Body mass index is 42.88 kg/m².    Lifestyle Coping Support & Clinical    Lifestyle/Coping/Support  Compared to other people your age, how would you rate your health?: Fair  Culture or Restoration beliefs that may impact ability to access healthcare: No  Psychosocial/Coping Skills Assessment Completed: : Yes  Assessment indicates:: Adequate understanding  Area of need?: No    Problem Review  Comorbidities: Cardiovascular Disease, Hypertension, Other (comment) (knee problems)    Diabetes Self-Management Skills Assessment    Diabetes Disease Process/Treatment Options  Diabetes Type?: Type II  When were you diagnosed?: 1 year  What are your goals for this education session?: eat better  Is patient aware of what causes diabetes?: No  Does patient understand the pathophysiology of diabetes?: No  Diabetes Disease Process/Treatment Options: Skills Assessment Completed: Yes  Assessment indicates:: Knowledge " deficit, Instruction Needed  Area of need?: Yes    Nutrition/Healthy Eating  Meal Plan 24 Hour Recall - Breakfast: 2 eggs, au, 2 toast  Meal Plan 24 Hour Recall - Lunch: bbq mashed potatoes, green beans  Meal Plan 24 Hour Recall - Dinner: turkey, cranberry sauce, toast  Meal Plan 24 Hour Recall - Snack: dove dark chocolate, ritz crackers, potato chips  Meal Plan 24 Hour Recall - Beverage: us tea, water, coffee  Who shops/cooks?: self soetimes  Patient can identify foods that impact blood sugar.: no (see comments)  Challenges to healthy eating:: portion control, snacking between meals and at night  Nutrition/Healthy Eating Skills Assessment Completed:: Yes  Assessment indicates:: Knowledge deficit, Instruction Needed  Area of need?: Yes    Physical Activity/Exercise  Patient's daily activity level:: sedentary  Patient formally exercises outside of work.: no  Reasons for not exercising:: physically unable to exercise currently  Patient can identify forms of physical activity.: yes  Physical Activity/Exercise Skills Assessment Completed: : Yes  Assessment indicates:: Adequate understanding  Area of need?: No    Home Blood Glucose Monitoring  Patient states that blood sugar is checked at home daily.: no  Personal CGM type:: dex com start today  What is your A1c [employer]?: 6.5  Home Blood Glucose Monitoring Skills Assessment Completed: : Yes  Assessment indicates:: Knowledge deficit, Instruction Needed  Area of need?: Yes    Acute Complications  Have you ever had hypoglycemia (low BG 70 or less)?: no  Do you know the symptoms of low blood sugar and how to treat?: no- instructed  Have you ever had hyperglycemia (high  or more)?: yes  How often and what are your symptoms?: no  How do you treat hyperglycemia? : no  Have you ever had DKA?: no  Do you ever test for ketones?: no  Do you have a sick day plan?: no  Acute Complications Skills Assessment Completed: : Yes  Assessment indicates:: Knowledge deficit,  Instruction Needed  Area of need?: Yes    Chronic Complications  Reviewed health maintenance: yes  Have you completed your annual diabetes maintenance labwork? : yes  Do you examine your feet daily?: no  Has your doctor examined your feet?: no  Do you see a Dentist?: yes  Do you see an eye doctor?: yes  Chronic Complications Skills Assessment Completed: : Yes  Assessment indicates:: Knowledge deficit  Area of need?: Yes      Assessment Summary and Plan    Based on today's diabetes care assessment, the following areas of need were identified:          7/10/2024    12:01 AM   Diabetes Self-Management Skills   Diabetes Disease Process/Treatment Options YesProvided Diabetes management guide and provided instruction regarding SS and consume increased amount of carbohydrate foods and risk factors of diabetes.Instructed patient on what is Diabetes and the progression of uncontrolled diabetes. Discussed qualifying parameters of diabetes diagnosis. Reviewed/Instructed on disease process. Discussed current treatment options, especially dietary and lifestyle changes as well as possible medication additions and/or changes. Patient verbalizes understanding of received information/education.   Nutrition/Healthy Eating YesEmphasized importance of eliminating all SSB. Discussed SF drink options. Discussed carb vs non-carb foods and reviewed appropriate amounts of carbs to have at meals vs snacks. Recommended 30 - 45 gm carb at meals and 15 gm carb at snacks. Instructed on appropriate label reading and serving sizes of specific carb containing foods. Reviewed need to limit total/saturated fats. Discussed meal plans and snack ideas amenable to pt. Reviewed the plate method. Patient verbalizes understanding of received information/education.       Physical Activity/Exercise No   Medication YesDiscussed mechanism of action of oral diabetic medication:  metformin Reviewed signs and symptoms of hypoglycemia and treatment with Rule of 15.  "Discussed general vs severe hyperglycemia and risk of DKA. Patient verbalizes understanding of received information/education.    Home Blood Glucose Monitoring YesProvided patient with blood glucose logs, reviewed appropriate timing and frequency to SMBG, education on parameters on when to notify provider and advised patient to bring logs to all appts with PCP/Endocrinologist/Diabetes Care Specialist.Reviewed the importance of self-monitoring blood glucose and keeping logs.Instructed on how to self-monitor blood glucose using a home glucometer, how to properly dispose of used strips and lancets after use, and how to appropriately store meter and supplies.   Acute Complications YesDiscussed hypoglycemia vs hyperglycemia symptoms and discussed appropriate treatments for each. Reviewed that pt is at high risk of hypo with current medication regimen. Discussed general vs severe hyperglycemia and risk of DKA.   Chronic Complications YesProvided Diabetes management guide and provided instruction regarding the disease progression if diabetes is uncontrolled. Reviewed ways to decrease risk of chronic complications by healthy eating and having regular activity. Maintaining optimal blood glucose control. Following up with Diabetic team which includes PCP, ENDO MD (if applicable), yearly eye exam, yearly foot exam and Diabetes care specialist .Patient verbalizes understanding of received information/education. DEXCOM G7CGM TRAINING     Patient referred to clinic today for Dexcom G6 continuous glucose sensor system training.  Patient arrived with  fully charged and Dexcom G7 mobile yudy downloaded to phone. Education was provided using "Quick Start Guide" per Dexcom protocol.     Pt will be using his/her phone as the primary .  Overview:  5min glucose reading updates, trending arrows, BG graph screens, battery life indicator, Blue Tooth Symbol.  Menus: trend Graph, start sensor, enter BG, events, Alerts, " Settings, Shutdown, Stop Sensor   Settings:                          * Urgent Low: 55 mg/dL                          * Urgent Low Soon: on                          * Low alert: 80 mg/dl repeat 15 min                          * High alert: 250 mg/dl repeat 30 min                          * Rise rate: off                          * Fall Rate: off                          * Signal Loss: on repeat 20 min                          * No Reading: on repeat 20 min                          * Always sound: on                          * Alert Schedule:  (instructed on how to set up alert schedule if desired)     Reviewed additional setting options with patient, including Graph Height and Transmitter ID. Transmitter was paired with .    Reviewed where to find sensor insertion time and sensor expiration date.   Discussed no need to calibrate sensor during the entirety of the 10 day wear. Discussed that pt can calibrate sensor if desired, but at that time she will need to continue calibrating every 12 hours for sensor to remain accurate. Reviewed appropriate calibration techniques.  Reviewed sensor site selection. Site selected and prepped using aseptic technique Inserted to left forearm. Transmitter placed in pod and secured.  Practiced sensor pod/transmitter removal from site, and removal of transmitter from sensor pod.  Patient able to demonstrate without difficulty.  Encouraged to review manual prior to starting another sensor.   Reviewed problem solving aspects of sensor transmission/ variables that can disrupt RF transmission.  range 20 feet, but the first 3 hrs keep within 3 feet of transmitter.  Pt instructed on lag time of interstitial fluid from CBG and was advised to tx hypoglycemia and dose insulin based on SMBG values.  Dexcom technical support contact number given and examples of when to contact them discussed. Pt will download software at home for Dexcom download.   Patient advised to always  check glucose with glucometer should readings do not match symptoms felt (ex. Hypo or hyperglycemia).          Today's interventions were provided through individual discussion, instruction, and written materials were provided.      Patient verbalized understanding of instruction and written materials.  Pt was able to return back demonstration of instructions today. Patient understood key points, needs reinforcement and further instruction.     Diabetes Self-Management Care Plan:    Today's Diabetes Self-Management Care Plan was developed with Meryl's input. Meryl has agreed to work toward the following goal(s) to improve his/her overall diabetes control.      Care Plan: Diabetes Management   Updates made since 6/11/2024 12:00 AM        Problem: Healthy Eating         Goal: Eat 3 meals daily with 30-45g/2-3 servings of Carbohydrate per meal.    Start Date: 7/10/2024   Priority: Medium   Barriers: Knowledge deficit   Note:    7/10/24  instructed pt on the food groups, how to read labels and count carbs. Pt was given sample menus and meal plans as examples. Discussed with pt the importance of eating 3 balanced and portioned meals per day. Pt has had gastric bypass in the past and is unable to eat large amounts of food at one time. Pt states that her job causes her lots of stress. Pt has been avoiding carbs and eating lots of protein She generally does not cook and eats her meals at work. She snacks but does not read labels or count carbs. Discussed with pt how to put her meals together. Discussed with pt suggestions for healthier snacks. Discussed with pt what she needs to add to meals to make them more balanced. Pt did not test bs- lack of meter and does not want to stick her finger. Pt will start dex com today- see notes       Task: Reviewed the sources and role of Carbohydrate, Protein, and Fat and how each nutrient impacts blood sugar. Completed 7/11/2024        Task: Provided visual examples using dry measuring  cups, food models, and other familiar objects such as computer mouse, deck or cards, tennis ball etc. to help with visualization of portions. Completed 7/11/2024        Task: Explained how to count carbohydrates using the food label and the use of dry measuring cups for accurate carb counting. Completed 7/11/2024        Task: Discussed strategies for choosing healthier menu options when dining out. Completed 7/11/2024        Task: Recommended replacing beverages containing high sugar content with noncaloric/sugar free options and/or water.         Task: Review the importance of balancing carbohydrates with each meal using portion control techniques to count servings of carbohydrate and label reading to identify serving size and amount of total carbs per serving. Completed 7/11/2024        Task: Provided Sample plate method and reviewed the use of the plate to estimate amounts of carbohydrate per meal. Completed 7/11/2024          Follow Up Plan     Follow up in about 9 days (around 7/19/2024) for evaluate bs's meal planning and med compliance.    Today's care plan and follow up schedule was discussed with patient.  Meryl verbalized understanding of the care plan, goals, and agrees to follow up plan.        The patient was encouraged to communicate with his/her health care provider/physician and care team regarding his/her condition(s) and treatment.  I provided the patient with my contact information today and encouraged to contact me via phone or [facility] Patient Portal as needed.     Length of Visit   Total Time: 60 Minutes

## 2024-07-18 ENCOUNTER — CLINICAL SUPPORT (OUTPATIENT)
Dept: REHABILITATION | Facility: HOSPITAL | Age: 69
End: 2024-07-18
Payer: COMMERCIAL

## 2024-07-18 ENCOUNTER — HOSPITAL ENCOUNTER (OUTPATIENT)
Dept: RADIOLOGY | Facility: HOSPITAL | Age: 69
Discharge: HOME | End: 2024-07-18
Attending: NURSE PRACTITIONER
Payer: COMMERCIAL

## 2024-07-18 DIAGNOSIS — S29.012A STRAIN OF RIGHT LATISSIMUS DORSI MUSCLE: Primary | ICD-10-CM

## 2024-07-18 DIAGNOSIS — R92.8 ABNORMAL MAMMOGRAM: ICD-10-CM

## 2024-07-18 PROCEDURE — 97140 MANUAL THERAPY 1/> REGIONS: CPT | Performed by: PHYSICAL THERAPIST

## 2024-07-18 PROCEDURE — 76642 ULTRASOUND BREAST LIMITED: CPT | Mod: TC,RT

## 2024-07-18 PROCEDURE — 76642 ULTRASOUND BREAST LIMITED: CPT | Mod: 26,RT,, | Performed by: RADIOLOGY

## 2024-07-18 PROCEDURE — 97112 NEUROMUSCULAR REEDUCATION: CPT | Performed by: PHYSICAL THERAPIST

## 2024-07-18 PROCEDURE — 77061 BREAST TOMOSYNTHESIS UNI: CPT | Mod: 26,RT,, | Performed by: RADIOLOGY

## 2024-07-18 PROCEDURE — 77061 BREAST TOMOSYNTHESIS UNI: CPT | Mod: TC,RT

## 2024-07-18 PROCEDURE — 77065 DX MAMMO INCL CAD UNI: CPT | Mod: 26,RT,, | Performed by: RADIOLOGY

## 2024-07-18 NOTE — PROGRESS NOTES
"[facility]   Physical Therapy Treatment Note      Name: Meryl Johnson  [facility] Number: [id number]    Therapy Diagnosis:   Encounter Diagnosis   Name Primary?    Strain of right latissimus dorsi muscle Yes     Physician: Russell Honeycutt III, *    Visit Date: 7/18/2024    Physician Orders: PT Eval and Treat   Medical Diagnosis from Referral:   Diagnosis   M25.511 (ICD-10-CM) - Right shoulder pain, unspecified chronicity      Evaluation Date: 5/9/2024  Authorization Period Expiration: 12/31/2024  Plan of Care Expiration: 11/9/2024  Progress Note Due: 8/9/2024  Visit # / Visits authorized: 14/20  FOTO: 1/3    PTA Visit #: 0/5     Time In: 1604  Time Out: 1700  Total Billable Time: 56 minutes    Subjective     Pt reports: Shoulder has bene doing well     She was compliant with home exercise program.  Response to previous treatment: sore  Functional change: no change    Pain: 4/10  Location: right shoulder      Objective      Objective Measures updated at progress report unless specified.     Treatment     Meryl received the treatments listed below:      therapeutic exercises to develop strength, endurance, ROM, flexibility, posture, and core stabilization for 0 minutes including:      manual therapy techniques: Joint mobilizations and Soft tissue Mobilization were applied to the: R shoulder for 10 minutes, including:    Shoulder Gr III flexion  STM lat and teres  Inferior mob Gr IV at 90 deg abd  IR/ER arm abd 45 deg     neuromuscular re-education activities to improve: Balance, Coordination, Sense, Proprioception, and Posture for 45 minutes. The following activities were included:    Supine wedge flexion 20x  Supine flexion OTB on shoe with post tilt for core control 3 x 15  Seated no money YTB loop 20x 5"  Standing A GTB 3 x 15    NT  Scaptions 2# 3 x 15  Handcuffs yellow loop 430x  ER with press GTB 30x  4# Med ball lifts 30x  Post tilt with BKFO and marching 30x ea  Wand flexion OH 2# " "30x  Moist heat:   -BKFO and marching  Supine clam holds BTB 30x 3"  Posterior tilt low abd contraction 30x  Posterior tilt with BKFO 30x  Posterior tilt with Marching 30x  No money yellow loop 30x  Rows BTB 30x      therapeutic activities to improve functional performance for 0  minutes, including:    MH 0' lumbar spine and shoulders    Patient Education and Home Exercises       Education provided:   - improve lat mobility    Written Home Exercises Provided: YES. Exercises were reviewed and Meryl was able to demonstrate them prior to the end of the session.  Meryl demonstrated good understanding of the education provided. See EMR under Patient Instructions for exercises provided during therapy sessions    Assessment     Meryl progressed loading to the lat today. Fatigue with standing lat extensions but not painful. Will continue progressing the loading to the shoulder as tolerated    Meryl Is progressing well towards her goals.   Pt prognosis is Excellent.     Pt will continue to benefit from skilled outpatient physical therapy to address the deficits listed in the problem list box on initial evaluation, provide pt/family education and to maximize pt's level of independence in the home and community environment.     Pt's spiritual, cultural and educational needs considered and pt agreeable to plan of care and goals.     Anticipated barriers to physical therapy: none    GOALS: Short Term Goals:  4 weeks(Progressing, not met)  1.Report decreased shoulder pain < / =  2/10  to increase tolerance for return to work without pains  2. Increase PROM full overhead flexion without shoulder pain   3. Increased strength by 1/3 MMT grade in lat to increase tolerance for ADL and work activities.  4. Pt to tolerate HEP to improve ROM and independence with ADL's     Long Term Goals: 8 weeks(Progressing, not met)  1.Report decreased shoulder pain  < / =  0 /10  to increase tolerance for return to [employer]  2.Increase AROM " to full motion without pain  3.Increase strength to >/= 4/5 in lat and cuff to increase tolerance for ADL and work activities.  4. Pt goal: return to work and ADL pain free  5. Pt will have improved gcode of CJ (20-40% limited) on FOTO shoulder in order to demonstrate true functional improvement.     Plan     Plan of care Certification: 5/9/2024 to 11/9/2024.     Improve lat strength    Fabricio Tolentino, PT, DPT, OCS, FAAOMPT

## 2024-07-19 ENCOUNTER — CLINICAL SUPPORT (OUTPATIENT)
Dept: DIABETES | Facility: CLINIC | Age: 69
End: 2024-07-19
Payer: COMMERCIAL

## 2024-07-19 DIAGNOSIS — E11.22 TYPE 2 DIABETES MELLITUS WITH STAGE 3A CHRONIC KIDNEY DISEASE, WITHOUT LONG-TERM CURRENT USE OF INSULIN: Primary | ICD-10-CM

## 2024-07-19 DIAGNOSIS — N18.31 TYPE 2 DIABETES MELLITUS WITH STAGE 3A CHRONIC KIDNEY DISEASE, WITHOUT LONG-TERM CURRENT USE OF INSULIN: Primary | ICD-10-CM

## 2024-07-19 PROCEDURE — 99999 PR PBB SHADOW E&M-EST. PATIENT-LVL I: CPT | Mod: PBBFAC,,,

## 2024-07-23 ENCOUNTER — CLINICAL SUPPORT (OUTPATIENT)
Dept: REHABILITATION | Facility: HOSPITAL | Age: 69
End: 2024-07-23
Payer: COMMERCIAL

## 2024-07-23 VITALS — BODY MASS INDEX: 42.98 KG/M2 | WEIGHT: 273.81 LBS | HEIGHT: 67 IN

## 2024-07-23 DIAGNOSIS — S29.012A STRAIN OF RIGHT LATISSIMUS DORSI MUSCLE: Primary | ICD-10-CM

## 2024-07-23 PROCEDURE — 97140 MANUAL THERAPY 1/> REGIONS: CPT | Performed by: PHYSICAL THERAPIST

## 2024-07-23 PROCEDURE — 97112 NEUROMUSCULAR REEDUCATION: CPT | Performed by: PHYSICAL THERAPIST

## 2024-07-23 NOTE — PROGRESS NOTES
"[facility]   Physical Therapy Treatment Note      Name: Meryl Johnson  [facility] Number: [id number]    Therapy Diagnosis:   Encounter Diagnosis   Name Primary?    Strain of right latissimus dorsi muscle Yes     Physician: Russell Honeycutt III, *    Visit Date: 7/23/2024    Physician Orders: PT Eval and Treat   Medical Diagnosis from Referral:   Diagnosis   M25.511 (ICD-10-CM) - Right shoulder pain, unspecified chronicity      Evaluation Date: 5/9/2024  Authorization Period Expiration: 12/31/2024  Plan of Care Expiration: 11/9/2024  Progress Note Due: 8/9/2024  Visit # / Visits authorized: 14/20  FOTO: 1/3    PTA Visit #: 0/5     Time In: 1604  Time Out: 1700  Total Billable Time: 56 minutes    Subjective     Pt reports: Shoulder has been doing well     She was compliant with home exercise program.  Response to previous treatment: sore  Functional change: no change    Pain: 4/10  Location: right shoulder      Objective      Objective Measures updated at progress report unless specified.     Treatment     Meryl received the treatments listed below:      therapeutic exercises to develop strength, endurance, ROM, flexibility, posture, and core stabilization for 0 minutes including:      manual therapy techniques: Joint mobilizations and Soft tissue Mobilization were applied to the: R shoulder for 10 minutes, including:    Shoulder Gr III flexion  STM lat and teres  Inferior mob Gr IV at 90 deg abd  IR/ER arm abd 45 deg     neuromuscular re-education activities to improve: Balance, Coordination, Sense, Proprioception, and Posture for 45 minutes. The following activities were included:    Scaptions 2# 3 x 15  Seated no money GTB loop 20x 5"  4# Med ball lifts 30x  Supine X GTB 3 x 15    NT  Supine wedge flexion 20x  Supine flexion OTB on shoe with post tilt for core control 3 x 15  Standing A GTB 3 x 15  Handcuffs yellow loop 430x  ER with press GTB 30x  Post tilt with BKFO and marching 30x " "ea  Wand flexion OH 2# 30x  Moist heat:   -BKFO and marching  Supine clam holds BTB 30x 3"  Posterior tilt low abd contraction 30x  Posterior tilt with BKFO 30x  Posterior tilt with Marching 30x  No money yellow loop 30x  Rows BTB 30x      therapeutic activities to improve functional performance for 0  minutes, including:    MH 0' lumbar spine and shoulders    Patient Education and Home Exercises       Education provided:   - improve lat mobility    Written Home Exercises Provided: YES. Exercises were reviewed and Meryl was able to demonstrate them prior to the end of the session.  Meryl demonstrated good understanding of the education provided. See EMR under Patient Instructions for exercises provided during therapy sessions    Assessment     Meryl noted some fatigue with gripping the band for exercise. Less pain in lat and able to exercise without increase in symptoms today    Meryl Is progressing well towards her goals.   Pt prognosis is Excellent.     Pt will continue to benefit from skilled outpatient physical therapy to address the deficits listed in the problem list box on initial evaluation, provide pt/family education and to maximize pt's level of independence in the home and community environment.     Pt's spiritual, cultural and educational needs considered and pt agreeable to plan of care and goals.     Anticipated barriers to physical therapy: none    GOALS: Short Term Goals:  4 weeks(Progressing, not met)  1.Report decreased shoulder pain < / =  2/10  to increase tolerance for return to work without pains  2. Increase PROM full overhead flexion without shoulder pain   3. Increased strength by 1/3 MMT grade in lat to increase tolerance for ADL and work activities.  4. Pt to tolerate HEP to improve ROM and independence with ADL's     Long Term Goals: 8 weeks(Progressing, not met)  1.Report decreased shoulder pain  < / =  0 /10  to increase tolerance for return to [employer]  2.Increase AROM to full " motion without pain  3.Increase strength to >/= 4/5 in lat and cuff to increase tolerance for ADL and work activities.  4. Pt goal: return to work and ADL pain free  5. Pt will have improved gcode of CJ (20-40% limited) on FOTO shoulder in order to demonstrate true functional improvement.     Plan     Plan of care Certification: 5/9/2024 to 11/9/2024.     Improve lat strength    Fabricio Tolentino, PT, DPT, OCS, FAAOMPT

## 2024-07-23 NOTE — PROGRESS NOTES
Diabetes Care Specialist Follow-up Note  Author: Sheree Ellison RN  Date: 7/23/2024    Intake    Program Intake  Reason for Diabetes Program Visit:: Intervention  Type of Intervention:: Individual  Individual: Education  Education: Nutrition and Meal Planning, Self-Management Skill Review, Pattern Management    Current Diabetes Treatment: Diet/Exercise, Oral Medications  Oral Medication Type/Dose: jardiance, metformin    Continuous Glucose Monitoring  Personal CGM type:: g7    Lab Results   Component Value Date    HGBA1C 7.9 (H) 03/19/2024      During today's follow-up visit,  the following areas required further assessment and content was provided/reviewed.    Based on today's diabetes care assessment, the following areas of need were identified:          7/10/2024    12:01 AM   Areas of Need   Medications/Current Diabetes Treatment Yes   Lifestyle Coping Support No   Diabetes Disease Process/Treatment Options Yes   Nutrition/Healthy Eating Yes   Physical Activity/Exercise No   Home Blood Glucose Monitoring Yes   Acute Complications Yes   Chronic Complications Yes        Today's interventions were provided through individual discussion, instruction, and written materials were provided.    Patient verbalized understanding of instruction and written materials.  Pt was able to return back demonstration of instructions today. Patient understood key points, needs reinforcement and further instruction.     Diabetes Self-Management Care Plan Review and Evaluation of Progress:    During today's follow-up Meryl's Diabetes Self-Management Care Plan progress was reviewed and progress was evaluated including his/her input. Meryl has agreed to continue his/her journey to improve/maintain overall diabetes control by continuing to set health goals. See care plan progress below.      Care Plan: Diabetes Management   Updates made since 6/23/2024 12:00 AM        Problem: Healthy Eating         Goal: Eat 3 meals daily with  30-45g/2-3 servings of Carbohydrate per meal.    Start Date: 7/10/2024   Priority: Medium   Barriers: Knowledge deficit   Note:    7/10/24  instructed pt on the food groups, how to read labels and count carbs. Pt was given sample menus and meal plans as examples. Discussed with pt the importance of eating 3 balanced and portioned meals per day. Pt has had gastric bypass in the past and is unable to eat large amounts of food at one time. Pt states that her job causes her lots of stress. Pt has been avoiding carbs and eating lots of protein She generally does not cook and eats her meals at work. She snacks but does not read labels or count carbs. Discussed with pt how to put her meals together. Discussed with pt suggestions for healthier snacks. Discussed with pt what she needs to add to meals to make them more balanced. Pt did not test bs- lack of meter and does not want to stick her finger. Pt will start dex com today- see notes    7/19/24  Pt came to clinic for fu visit. l . G7 download showed 47% in range with 34% high and 10% very high and average bs of 202. Discussed with pt what adjustments she needs to make to her meals for them to be more balanced. Discussed with pt about taking more time to plan her meals and read her labels. Discussed some other options for snacks with pt. Pt will try to do better. Assisted patient with changing g7 sensor.  Discussed with pt about adding veggies to her meals.        Task: Reviewed the sources and role of Carbohydrate, Protein, and Fat and how each nutrient impacts blood sugar. Completed 7/11/2024        Task: Provided visual examples using dry measuring cups, food models, and other familiar objects such as computer mouse, deck or cards, tennis ball etc. to help with visualization of portions. Completed 7/11/2024        Task: Explained how to count carbohydrates using the food label and the use of dry measuring cups for accurate carb counting. Completed 7/11/2024     "    Task: Discussed strategies for choosing healthier menu options when dining out. Completed 7/11/2024        Task: Recommended replacing beverages containing high sugar content with noncaloric/sugar free options and/or water.         Task: Review the importance of balancing carbohydrates with each meal using portion control techniques to count servings of carbohydrate and label reading to identify serving size and amount of total carbs per serving. Completed 7/11/2024        Task: Provided Sample plate method and reviewed the use of the plate to estimate amounts of carbohydrate per meal. Completed 7/11/2024          Follow Up Plan     Follow up in about 6 years (around 7/29/2030) for change dex com sensor, evaluate meal planning, bs's  med compiance.    Today's care plan and follow up schedule was discussed with patient.  Meryl verbalized understanding of the care plan, goals, and agrees to follow up plan.        The patient was encouraged to communicate with his/her health care provider/physician and care team regarding his/her condition(s) and treatment.  I provided the patient with my contact information today and encouraged to contact me via phone or [facility] Patient Portal as needed.     DEXCOM G6 CGM TRAINING     Patient referred to clinic today for Dexcom G6 continuous glucose sensor system training.  Patient arrived with  fully charged and Dexcom G6 mobile yudy downloaded to phone. Education was provided using "Quick Start Guide" per Dexcom protocol.     Pt will be using his/her phone as the primary .  Overview:  5min glucose reading updates, trending arrows, BG graph screens, battery life indicator, Blue Tooth Symbol.  Menus: trend Graph, start sensor, enter BG, events, Alerts, Settings, Shutdown, Stop Sensor   Settings:                          * Urgent Low: 55 mg/dL                          * Urgent Low Soon: on                          * Low alert: 80 mg/dl repeat 15 min        "                   * High alert: 250 mg/dl repeat 30 min                          * Rise rate: off                          * Fall Rate: off                          * Signal Loss: on repeat 20 min                          * No Reading: on repeat 20 min                          * Always sound: on                          * Alert Schedule:  (instructed on how to set up alert schedule if desired)     Reviewed additional setting options with patient, including Graph Height and Transmitter ID. Transmitter was paired with .    Reviewed where to find sensor insertion time and sensor expiration date.   Discussed no need to calibrate sensor during the entirety of the 10 day wear. Discussed that pt can calibrate sensor if desired, but at that time she will need to continue calibrating every 12 hours for sensor to remain accurate. Reviewed appropriate calibration techniques.  Reviewed sensor site selection. Site selected and prepped using aseptic technique Inserted to left arm. Transmitter placed in pod and secured.  Practiced sensor pod/transmitter removal from site, and removal of transmitter from sensor pod.  Patient able to demonstrate without difficulty.  Encouraged to review manual prior to starting another sensor.   Reviewed problem solving aspects of sensor transmission/ variables that can disrupt RF transmission.  range 20 feet, but the first 3 hrs keep within 3 feet of transmitter.  Pt instructed on lag time of interstitial fluid from CBG and was advised to tx hypoglycemia and dose insulin based on SMBG values.  Dexcom technical support contact number given and examples of when to contact them discussed. Pt will download software at home for Dexcom download.   Patient advised to always check glucose with glucometer should readings do not match symptoms felt (ex. Hypo or hyperglycemia). [id number]     Diabetes Care Specialist Follow-up Note  Author: Sheree Ellison RN  Date: 7/23/2024    Intake    Program  "Intake  Reason for Diabetes Program Visit:: Intervention  Type of Intervention:: Individual  Individual: Education  Education: Nutrition and Meal Planning, Self-Management Skill Review, Pattern Management    Current Diabetes Treatment: Diet/Exercise, Oral Medications  Oral Medication Type/Dose: jardiance, metformin    Continuous Glucose Monitoring  Personal CGM type:: g7    Lab Results   Component Value Date    HGBA1C 7.9 (H) 03/19/2024     A1c Pre Diabetes Care Specialist Intervention:  7.3%      Weight: 124.2 kg (273 lb 13 oz)   Height: 5' 7" (170.2 cm)   Body mass index is 42.88 kg/m².  No change  Physical activity/Exercise:   no    SMBG: g7- see notes    Nutrition/Healthy Eating  Meal Plan 24 Hour Recall - Breakfast: 2 eggs, au, 2 toast  Meal Plan 24 Hour Recall - Lunch: pasta nakia, chicken  Meal Plan 24 Hour Recall - Dinner: sandwich, chips  Meal Plan 24 Hour Recall - Snack: dove dark chocolate  Meal Plan 24 Hour Recall - Beverage: cl tea  Who shops/cooks?: self         Home Blood Glucose Monitoring  Personal CGM type:: g7    During today's follow-up visit,  the following areas required further assessment and content was provided/reviewed.    Based on today's diabetes care assessment, the following areas of need were identified:          7/10/2024    12:01 AM   Areas of Need   Medications/Current Diabetes Treatment Yes   Lifestyle Coping Support No   Diabetes Disease Process/Treatment Options Yes   Nutrition/Healthy Eating Yes   Physical Activity/Exercise No   Home Blood Glucose Monitoring Yes   Acute Complications Yes   Chronic Complications Yes        Today's interventions were provided through individual discussion, instruction, and written materials were provided.    Patient verbalized understanding of instruction and written materials.  Pt was able to return back demonstration of instructions today. Patient understood key points, needs reinforcement and further instruction.     Diabetes " Self-Management Care Plan Review and Evaluation of Progress:    During today's follow-up Meryl's Diabetes Self-Management Care Plan progress was reviewed and progress was evaluated including his/her input. Meryl has agreed to continue his/her journey to improve/maintain overall diabetes control by continuing to set health goals. See care plan progress below.      Care Plan: Diabetes Management   Updates made since 6/23/2024 12:00 AM        Problem: Healthy Eating         Goal: Eat 3 meals daily with 30-45g/2-3 servings of Carbohydrate per meal.    Start Date: 7/10/2024   Priority: Medium   Barriers: Knowledge deficit   Note:    7/10/24  instructed pt on the food groups, how to read labels and count carbs. Pt was given sample menus and meal plans as examples. Discussed with pt the importance of eating 3 balanced and portioned meals per day. Pt has had gastric bypass in the past and is unable to eat large amounts of food at one time. Pt states that her job causes her lots of stress. Pt has been avoiding carbs and eating lots of protein She generally does not cook and eats her meals at work. She snacks but does not read labels or count carbs. Discussed with pt how to put her meals together. Discussed with pt suggestions for healthier snacks. Discussed with pt what she needs to add to meals to make them more balanced. Pt did not test bs- lack of meter and does not want to stick her finger. Pt will start dex com today- see notes    7/19/24  Pt came to clinic for fu visit. l . G7 download showed 47% in range with 34% high and 10% very high and average bs of 202. Discussed with pt what adjustments she needs to make to her meals for them to be more balanced. Discussed with pt about taking more time to plan her meals and read her labels. Discussed some other options for snacks with pt. Pt will try to do better. Assisted patient with changing g7 sensor.  Discussed with pt about adding veggies to her meals.        Task:  Reviewed the sources and role of Carbohydrate, Protein, and Fat and how each nutrient impacts blood sugar. Completed 7/11/2024        Task: Provided visual examples using dry measuring cups, food models, and other familiar objects such as computer mouse, deck or cards, tennis ball etc. to help with visualization of portions. Completed 7/11/2024        Task: Explained how to count carbohydrates using the food label and the use of dry measuring cups for accurate carb counting. Completed 7/11/2024        Task: Discussed strategies for choosing healthier menu options when dining out. Completed 7/11/2024        Task: Recommended replacing beverages containing high sugar content with noncaloric/sugar free options and/or water.         Task: Review the importance of balancing carbohydrates with each meal using portion control techniques to count servings of carbohydrate and label reading to identify serving size and amount of total carbs per serving. Completed 7/11/2024        Task: Provided Sample plate method and reviewed the use of the plate to estimate amounts of carbohydrate per meal. Completed 7/11/2024          Follow Up Plan     Follow up in about 6 years (around 7/29/2030) for change dex com sensor, evaluate meal planning, bs's  med compiance.    Today's care plan and follow up schedule was discussed with patient.  Meryl verbalized understanding of the care plan, goals, and agrees to follow up plan.        The patient was encouraged to communicate with his/her health care provider/physician and care team regarding his/her condition(s) and treatment.  I provided the patient with my contact information today and encouraged to contact me via phone or [facility] Patient Portal as needed.     Length of Visit   Total Time: 60 Minutes

## 2024-07-25 ENCOUNTER — CLINICAL SUPPORT (OUTPATIENT)
Dept: REHABILITATION | Facility: HOSPITAL | Age: 69
End: 2024-07-25
Payer: COMMERCIAL

## 2024-07-25 DIAGNOSIS — S29.012A STRAIN OF RIGHT LATISSIMUS DORSI MUSCLE: Primary | ICD-10-CM

## 2024-07-25 PROCEDURE — 97112 NEUROMUSCULAR REEDUCATION: CPT | Performed by: PHYSICAL THERAPIST

## 2024-07-25 NOTE — PROGRESS NOTES
"[facility]   Physical Therapy Treatment Note      Name: Meryl Johnson  [facility] Number: [id number]    Therapy Diagnosis:   Encounter Diagnosis   Name Primary?    Strain of right latissimus dorsi muscle Yes     Physician: Russell Honeycutt III, *    Visit Date: 7/25/2024    Physician Orders: PT Eval and Treat   Medical Diagnosis from Referral:   Diagnosis   M25.511 (ICD-10-CM) - Right shoulder pain, unspecified chronicity      Evaluation Date: 5/9/2024  Authorization Period Expiration: 12/31/2024  Plan of Care Expiration: 11/9/2024  Progress Note Due: 8/9/2024  Visit # / Visits authorized: 16/20  FOTO: 1/3    PTA Visit #: 0/5     Time In: 1600  Time Out: 1700  Total Billable Time: 60 minutes    Subjective     Pt reports: No pain just tired today    She was compliant with home exercise program.  Response to previous treatment: sore  Functional change: no change    Pain: 4/10  Location: right shoulder      Objective      Objective Measures updated at progress report unless specified.     Treatment     Meryl received the treatments listed below:      therapeutic exercises to develop strength, endurance, ROM, flexibility, posture, and core stabilization for 0 minutes including:      manual therapy techniques: Joint mobilizations and Soft tissue Mobilization were applied to the: R shoulder for 0 minutes, including:    Shoulder Gr III flexion  STM lat and teres  Inferior mob Gr IV at 90 deg abd  IR/ER arm abd 45 deg     neuromuscular re-education activities to improve: Balance, Coordination, Sense, Proprioception, and Posture for 60 minutes. The following activities were included:    Scaptions 2# 3 x 15  Seated no money GTB loop 20x 5"  4# Med ball chops 30x  Supine no money GTB 3 x 15  UBE 10' forward/backward level 3    NT  Supine wedge flexion 20x  Supine flexion OTB on shoe with post tilt for core control 3 x 15  Standing A GTB 3 x 15  Handcuffs yellow loop 430x  ER with press GTB 30x  Post " "tilt with BKFO and marching 30x ea  Wand flexion OH 2# 30x  Moist heat:   -BKFO and marching  Supine clam holds BTB 30x 3"  Posterior tilt low abd contraction 30x  Posterior tilt with BKFO 30x  Posterior tilt with Marching 30x  No money yellow loop 30x  Rows BTB 30x      therapeutic activities to improve functional performance for 0  minutes, including:    MH 0' lumbar spine and shoulders    Patient Education and Home Exercises       Education provided:   - improve lat mobility    Written Home Exercises Provided: YES. Exercises were reviewed and Meryl was able to demonstrate them prior to the end of the session.  Meryl demonstrated good understanding of the education provided. See EMR under Patient Instructions for exercises provided during therapy sessions    Assessment     No increase in symptoms just noted the bones crunching with the chop exercise. Progressed weights without pain today    Meryl Is progressing well towards her goals.   Pt prognosis is Excellent.     Pt will continue to benefit from skilled outpatient physical therapy to address the deficits listed in the problem list box on initial evaluation, provide pt/family education and to maximize pt's level of independence in the home and community environment.     Pt's spiritual, cultural and educational needs considered and pt agreeable to plan of care and goals.     Anticipated barriers to physical therapy: none    GOALS: Short Term Goals:  4 weeks(Progressing, not met)  1.Report decreased shoulder pain < / =  2/10  to increase tolerance for return to work without pains  2. Increase PROM full overhead flexion without shoulder pain   3. Increased strength by 1/3 MMT grade in lat to increase tolerance for ADL and work activities.  4. Pt to tolerate HEP to improve ROM and independence with ADL's     Long Term Goals: 8 weeks(Progressing, not met)  1.Report decreased shoulder pain  < / =  0 /10  to increase tolerance for return to [employer]  2.Increase " AROM to full motion without pain  3.Increase strength to >/= 4/5 in lat and cuff to increase tolerance for ADL and work activities.  4. Pt goal: return to work and ADL pain free  5. Pt will have improved gcode of CJ (20-40% limited) on FOTO shoulder in order to demonstrate true functional improvement.     Plan     Plan of care Certification: 5/9/2024 to 11/9/2024.     Improve lat strength    Fabricio Tolentino, PT, DPT, OCS, FAAOMPT

## 2024-07-26 ENCOUNTER — TELEPHONE (OUTPATIENT)
Dept: RADIOLOGY | Facility: HOSPITAL | Age: 69
End: 2024-07-26
Payer: COMMERCIAL

## 2024-07-26 NOTE — TELEPHONE ENCOUNTER
----- Message from Andrea Wells sent at 7/26/2024  8:57 AM CDT -----  Regarding: Appt  Contact: [phone]  Who call ? Meryl Johnson     What is the request Details : Pt calling to speak with someone in provider office regards scheduling an mammogram for December. No appt available.       Can clinic  use patient portal  : No     What number to call back : [phone] or EX [phone]

## 2024-07-29 ENCOUNTER — PATIENT OUTREACH (OUTPATIENT)
Dept: OTHER | Facility: OTHER | Age: 69
End: 2024-07-29
Payer: COMMERCIAL

## 2024-07-29 ENCOUNTER — CLINICAL SUPPORT (OUTPATIENT)
Dept: DIABETES | Facility: CLINIC | Age: 69
End: 2024-07-29
Payer: COMMERCIAL

## 2024-07-29 DIAGNOSIS — E11.22 TYPE 2 DIABETES MELLITUS WITH STAGE 3A CHRONIC KIDNEY DISEASE, WITHOUT LONG-TERM CURRENT USE OF INSULIN: Primary | ICD-10-CM

## 2024-07-29 DIAGNOSIS — N18.31 TYPE 2 DIABETES MELLITUS WITH STAGE 3A CHRONIC KIDNEY DISEASE, WITHOUT LONG-TERM CURRENT USE OF INSULIN: Primary | ICD-10-CM

## 2024-07-29 PROCEDURE — G0108 DIAB MANAGE TRN  PER INDIV: HCPCS | Mod: S$GLB,,, | Performed by: INTERNAL MEDICINE

## 2024-07-29 PROCEDURE — 99999 PR PBB SHADOW E&M-EST. PATIENT-LVL I: CPT | Mod: PBBFAC,,,

## 2024-07-30 VITALS — HEIGHT: 67 IN | BODY MASS INDEX: 42.98 KG/M2 | WEIGHT: 273.81 LBS

## 2024-07-30 NOTE — PROGRESS NOTES
"Diabetes Care Specialist Follow-up Note  Author: Sheree Ellison RN  Date: 7/30/2024    Intake    Program Intake  Reason for Diabetes Program Visit:: Intervention  Individual: Education  Education: Self-Management Skill Review, Nutrition and Meal Planning, Pattern Management    Current Diabetes Treatment: Diet/Exercise, Oral Medications  Oral Medication Type/Dose: metformin, jardiance    Continuous Glucose Monitoring  Patient has CGM: Yes  Personal CGM type:: g7     195 average bs, 49% in range, 16% very high, 35% high  GMI Date: 07/26/24    Lab Results   Component Value Date    HGBA1C 7.9 (H) 03/19/2024     A1c Pre Diabetes Care Specialist Intervention:  7.3%      Weight: 124.2 kg (273 lb 13 oz)   Height: 5' 7" (170.2 cm)   Body mass index is 42.88 kg/m².  Has not weighed  Physical activity/Exercise:   Not very much    SMBG: g7   see notes       Nutrition/Healthy Eating  Meal Plan 24 Hour Recall - Breakfast: 2 eggs, ham cheese onion omlet   1 slice bread  Meal Plan 24 Hour Recall - Lunch: nothing  Meal Plan 24 Hour Recall - Dinner: sausage, cheese, gravy corn  Meal Plan 24 Hour Recall - Snack: vanilla ice cream.  Meal Plan 24 Hour Recall - Beverage: diet soda, water ice tea us  Who shops/cooks?: eats out a lot- not much cooking         Home Blood Glucose Monitoring  Personal CGM type:: g7     195 average bs, 49% in range, 16% very high, 35% high       During today's follow-up visit,  the following areas required further assessment and content was provided/reviewed.    Based on today's diabetes care assessment, the following areas of need were identified:          7/10/2024    12:01 AM   Areas of Need   Medications/Current Diabetes Treatment Yes   Lifestyle Coping Support No   Diabetes Disease Process/Treatment Options Yes   Nutrition/Healthy Eating Yes   Physical Activity/Exercise No   Home Blood Glucose Monitoring Yes   Acute Complications Yes   Chronic Complications Yes        Today's interventions were provided " through individual discussion, instruction, and written materials were provided.    Patient verbalized understanding of instruction and written materials.  Pt was able to return back demonstration of instructions today. Patient understood key points, needs reinforcement and further instruction.     Diabetes Self-Management Care Plan Review and Evaluation of Progress:    During today's follow-up Ilirs Diabetes Self-Management Care Plan progress was reviewed and progress was evaluated including his/her input. Meryl has agreed to continue his/her journey to improve/maintain overall diabetes control by continuing to set health goals. See care plan progress below.      Care Plan: Diabetes Management   Updates made since 6/30/2024 12:00 AM        Problem: Healthy Eating         Goal: Eat 3 meals daily with 30-45g/2-3 servings of Carbohydrate per meal.    Start Date: 7/10/2024   Priority: Medium   Barriers: Knowledge deficit   Note:    7/10/24  instructed pt on the food groups, how to read labels and count carbs. Pt was given sample menus and meal plans as examples. Discussed with pt the importance of eating 3 balanced and portioned meals per day. Pt has had gastric bypass in the past and is unable to eat large amounts of food at one time. Pt states that her job causes her lots of stress. Pt has been avoiding carbs and eating lots of protein She generally does not cook and eats her meals at work. She snacks but does not read labels or count carbs. Discussed with pt how to put her meals together. Discussed with pt suggestions for healthier snacks. Discussed with pt what she needs to add to meals to make them more balanced. Pt did not test bs- lack of meter and does not want to stick her finger. Pt will start dex com today- see notes    7/19/24  Pt came to clinic for fu visit. l . G7 download showed 47% in range with 34% high and 10% very high and average bs of 202. Discussed with pt what adjustments she needs to make to  her meals for them to be more balanced. Discussed with pt about taking more time to plan her meals and read her labels. Discussed some other options for snacks with pt. Pt will try to do better. Assisted patient with changing g7 sensor.  Discussed with pt about adding veggies to her meals.     7/29/24  Pt came to clinic for fu visit and assistance with changing dedra com sensor. Pt is still skipping meals. 24 hour meal recall was done and reviewed with pt what adjustments she needs to make to her meals for them to be more balanced. Pt sometimes works from home and does not do much cooking. Pt needs to work on reading labels and counting carbs and putting balanced meals together. Assisted pt with changing dex com sensor  [id number]- see notes       Task: Reviewed the sources and role of Carbohydrate, Protein, and Fat and how each nutrient impacts blood sugar. Completed 7/11/2024        Task: Provided visual examples using dry measuring cups, food models, and other familiar objects such as computer mouse, deck or cards, tennis ball etc. to help with visualization of portions. Completed 7/11/2024        Task: Explained how to count carbohydrates using the food label and the use of dry measuring cups for accurate carb counting. Completed 7/11/2024        Task: Discussed strategies for choosing healthier menu options when dining out. Completed 7/11/2024        Task: Recommended replacing beverages containing high sugar content with noncaloric/sugar free options and/or water.         Task: Review the importance of balancing carbohydrates with each meal using portion control techniques to count servings of carbohydrate and label reading to identify serving size and amount of total carbs per serving. Completed 7/11/2024        Task: Provided Sample plate method and reviewed the use of the plate to estimate amounts of carbohydrate per meal. Completed 7/11/2024          Follow Up Plan     Follow up in about 5 weeks (around 9/4/2024)  for evaluate meal planning, bs's and med compliance.    Today's care plan and follow up schedule was discussed with patient.  Meryl verbalized understanding of the care plan, goals, and agrees to follow up plan.        The patient was encouraged to communicate with his/her health care provider/physician and care team regarding his/her condition(s) and treatment.  I provided the patient with my contact information today and encouraged to contact me via phone or [facility] Patient Portal as needed.     Length of Visit   Total Time: 60 Minutes

## 2024-08-05 RX ORDER — ROSUVASTATIN CALCIUM 20 MG/1
20 TABLET, COATED ORAL DAILY
Qty: 90 TABLET | Refills: 2 | Status: SHIPPED | OUTPATIENT
Start: 2024-08-05

## 2024-08-08 ENCOUNTER — CLINICAL SUPPORT (OUTPATIENT)
Dept: REHABILITATION | Facility: HOSPITAL | Age: 69
End: 2024-08-08
Payer: COMMERCIAL

## 2024-08-08 DIAGNOSIS — S29.012A STRAIN OF RIGHT LATISSIMUS DORSI MUSCLE: Primary | ICD-10-CM

## 2024-08-08 PROCEDURE — 97112 NEUROMUSCULAR REEDUCATION: CPT | Performed by: PHYSICAL THERAPIST

## 2024-08-15 ENCOUNTER — CLINICAL SUPPORT (OUTPATIENT)
Dept: REHABILITATION | Facility: HOSPITAL | Age: 69
End: 2024-08-15
Payer: COMMERCIAL

## 2024-08-15 DIAGNOSIS — S29.012A STRAIN OF RIGHT LATISSIMUS DORSI MUSCLE: Primary | ICD-10-CM

## 2024-08-15 PROCEDURE — 97112 NEUROMUSCULAR REEDUCATION: CPT | Performed by: PHYSICAL THERAPIST

## 2024-08-15 NOTE — PROGRESS NOTES
"[facility]   Physical Therapy Treatment Note      Name: Meryl Johnson  [facility] Number: [id number]    Therapy Diagnosis:   Encounter Diagnosis   Name Primary?    Strain of right latissimus dorsi muscle Yes     Physician: Russell Honeycutt III, *    Visit Date: 8/15/2024    Physician Orders: PT Eval and Treat   Medical Diagnosis from Referral:   Diagnosis   M25.511 (ICD-10-CM) - Right shoulder pain, unspecified chronicity      Evaluation Date: 5/9/2024  Authorization Period Expiration: 12/31/2024  Plan of Care Expiration: 11/9/2024  Progress Note Due: 8/9/2024  Visit # / Visits authorized: 18/20  FOTO: 1/3    PTA Visit #: 0/5     Time In: 1500  Time Out: 1600  Total Billable Time: 60 minutes    Subjective     Pt reports Shoulder does feel better, need to work on R knee to climb stairs at [employer]     She was compliant with home exercise program.  Response to previous treatment: sore  Functional change: no change    Pain: 4/10  Location: right shoulder      Objective      Objective Measures updated at progress report unless specified.     Treatment     Meryl received the treatments listed below:      therapeutic exercises to develop strength, endurance, ROM, flexibility, posture, and core stabilization for 0 minutes including:      manual therapy techniques: Joint mobilizations and Soft tissue Mobilization were applied to the: R shoulder for 0 minutes, including:    Shoulder Gr III flexion  STM lat and teres  Inferior mob Gr IV at 90 deg abd  IR/ER arm abd 45 deg     neuromuscular re-education activities to improve: Balance, Coordination, Sense, Proprioception, and Posture for 60 minutes. The following activities were included:    Rhythmic stab YTB 10x 10"  Scaptions 2# 3 x 12  LAQ 5# 30x  SAQ 3# 30X  Quad set wedge 30x      NT  UBE 10' forward/backward level 3  Supine no money GTB 3 x 15  Scaptions 2# 3 x 15  Supine wedge flexion 20x  Supine flexion OTB on shoe with post tilt for core " "control 3 x 15  Standing A GTB 3 x 15  Handcuffs yellow loop 430x  ER with press GTB 30x  Post tilt with BKFO and marching 30x ea  Wand flexion OH 2# 30x  Moist heat:   -BKFO and marching  Supine clam holds BTB 30x 3"  Posterior tilt low abd contraction 30x  Posterior tilt with BKFO 30x  Posterior tilt with Marching 30x  No money yellow loop 30x  Rows BTB 30x      therapeutic activities to improve functional performance for 0  minutes, including:    MH 0' lumbar spine and shoulders    Patient Education and Home Exercises       Education provided:   - improve lat mobility    Written Home Exercises Provided: YES. Exercises were reviewed and Meryl was able to demonstrate them prior to the end of the session.  Meryl demonstrated good understanding of the education provided. See EMR under Patient Instructions for exercises provided during therapy sessions    Assessment     Meryl progressed loading to the LE as tolerated today to return to climbing stairs without pain. Notes quad shaking and fatigue with exercise today in clinic    Meryl Is progressing well towards her goals.   Pt prognosis is Excellent.     Pt will continue to benefit from skilled outpatient physical therapy to address the deficits listed in the problem list box on initial evaluation, provide pt/family education and to maximize pt's level of independence in the home and community environment.     Pt's spiritual, cultural and educational needs considered and pt agreeable to plan of care and goals.     Anticipated barriers to physical therapy: none    GOALS: Short Term Goals:  4 weeks(Progressing, not met)  1.Report decreased shoulder pain < / =  2/10  to increase tolerance for return to work without pains  2. Increase PROM full overhead flexion without shoulder pain   3. Increased strength by 1/3 MMT grade in lat to increase tolerance for ADL and work activities.  4. Pt to tolerate HEP to improve ROM and independence with ADL's     Long Term Goals: 8 " weeks(Progressing, not met)  1.Report decreased shoulder pain  < / =  0 /10  to increase tolerance for return to [employer]  2.Increase AROM to full motion without pain  3.Increase strength to >/= 4/5 in lat and cuff to increase tolerance for ADL and work activities.  4. Pt goal: return to work and ADL pain free  5. Pt will have improved gcode of CJ (20-40% limited) on FOTO shoulder in order to demonstrate true functional improvement.     Plan     Plan of care Certification: 5/9/2024 to 11/9/2024.     Improve lat strength    Fabricio Tolentino, PT, DPT, OCS, FAAOMPT

## 2024-08-22 ENCOUNTER — CLINICAL SUPPORT (OUTPATIENT)
Dept: REHABILITATION | Facility: HOSPITAL | Age: 69
End: 2024-08-22
Payer: COMMERCIAL

## 2024-08-22 DIAGNOSIS — S29.012A STRAIN OF RIGHT LATISSIMUS DORSI MUSCLE: Primary | ICD-10-CM

## 2024-08-22 PROCEDURE — 97112 NEUROMUSCULAR REEDUCATION: CPT | Performed by: PHYSICAL THERAPIST

## 2024-08-23 NOTE — PROGRESS NOTES
"[facility]   Physical Therapy Treatment Note      Name: Meryl Johnson  [facility] Number: [id number]    Therapy Diagnosis:   Encounter Diagnosis   Name Primary?    Strain of right latissimus dorsi muscle Yes     Physician: Russell Honeycutt III, *    Visit Date: 8/22/2024    Physician Orders: PT Eval and Treat   Medical Diagnosis from Referral:   Diagnosis   M25.511 (ICD-10-CM) - Right shoulder pain, unspecified chronicity      Evaluation Date: 5/9/2024  Authorization Period Expiration: 12/31/2024  Plan of Care Expiration: 11/9/2024  Progress Note Due: 8/9/2024  Visit # / Visits authorized: 19/20  FOTO: 1/3    PTA Visit #: 0/5     Time In: 1500  Time Out: 1600  Total Billable Time: 60 minutes    Subjective     Pt reports Sore all over from being at the hospital yesterday with my sister. A lot more walking around that hospital    She was compliant with home exercise program.  Response to previous treatment: sore  Functional change: no change    Pain: 4/10  Location: right shoulder      Objective      Objective Measures updated at progress report unless specified.     Treatment     Meryl received the treatments listed below:      therapeutic exercises to develop strength, endurance, ROM, flexibility, posture, and core stabilization for 0 minutes including:      manual therapy techniques: Joint mobilizations and Soft tissue Mobilization were applied to the: R shoulder for 0 minutes, including:    Shoulder Gr III flexion  STM lat and teres  Inferior mob Gr IV at 90 deg abd  IR/ER arm abd 45 deg     neuromuscular re-education activities to improve: Balance, Coordination, Sense, Proprioception, and Posture for 50 minutes. The following activities were included:    Rhythmic stab YTB 10x 10"  Scaptions 1# 3 x 12  GSS stretch strap 30" 5x  SLR with strap 30x      NT  UBE 10' forward/backward level 3  Supine no money GTB 3 x 15  Scaptions 2# 3 x 15  Supine wedge flexion 20x  Supine flexion OTB on shoe " "with post tilt for core control 3 x 15  Standing A GTB 3 x 15  Handcuffs yellow loop 430x  ER with press GTB 30x  Post tilt with BKFO and marching 30x ea  Wand flexion OH 2# 30x  Moist heat:   -BKFO and marching  Supine clam holds BTB 30x 3"  Posterior tilt low abd contraction 30x  Posterior tilt with BKFO 30x  Posterior tilt with Marching 30x  No money yellow loop 30x  Rows BTB 30x      therapeutic activities to improve functional performance for 0  minutes, including:     10' B shoulders    Patient Education and Home Exercises       Education provided:   - improve lat mobility    Written Home Exercises Provided: YES. Exercises were reviewed and Meryl was able to demonstrate them prior to the end of the session.  Meryl demonstrated good understanding of the education provided. See EMR under Patient Instructions for exercises provided during therapy sessions    Assessment     Patient was generally sore today following stressful day at the hospital with her sister and increased demand on LE walking around to take care of her. Noted the lat was doing okay, just tight in shoulders with stress. Limited R knee extension which needs to improve for stair climbing strength at [employer] in coming weeks    Meryl Is progressing well towards her goals.   Pt prognosis is Excellent.     Pt will continue to benefit from skilled outpatient physical therapy to address the deficits listed in the problem list box on initial evaluation, provide pt/family education and to maximize pt's level of independence in the home and community environment.     Pt's spiritual, cultural and educational needs considered and pt agreeable to plan of care and goals.     Anticipated barriers to physical therapy: none    GOALS: Short Term Goals:  4 weeks(Progressing, not met)  1.Report decreased shoulder pain < / =  2/10  to increase tolerance for return to work without pains  2. Increase PROM full overhead flexion without shoulder pain   3. " Increased strength by 1/3 MMT grade in lat to increase tolerance for ADL and work activities.  4. Pt to tolerate HEP to improve ROM and independence with ADL's     Long Term Goals: 8 weeks(Progressing, not met)  1.Report decreased shoulder pain  < / =  0 /10  to increase tolerance for return to [employer]  2.Increase AROM to full motion without pain  3.Increase strength to >/= 4/5 in lat and cuff to increase tolerance for ADL and work activities.  4. Pt goal: return to work and ADL pain free  5. Pt will have improved gcode of CJ (20-40% limited) on FOTO shoulder in order to demonstrate true functional improvement.     Plan     Plan of care Certification: 5/9/2024 to 11/9/2024.     Improve lat strength    Fabricio Tolentino, PT, DPT, OCS, FAAOMPT

## 2024-08-29 ENCOUNTER — CLINICAL SUPPORT (OUTPATIENT)
Dept: REHABILITATION | Facility: HOSPITAL | Age: 69
End: 2024-08-29
Payer: COMMERCIAL

## 2024-08-29 DIAGNOSIS — S29.012A STRAIN OF RIGHT LATISSIMUS DORSI MUSCLE: Primary | ICD-10-CM

## 2024-08-29 PROCEDURE — 97112 NEUROMUSCULAR REEDUCATION: CPT | Performed by: PHYSICAL THERAPIST

## 2024-08-29 NOTE — PROGRESS NOTES
"[facility]   Physical Therapy Treatment Note      Name: Meryl Johnson  [facility] Number: [id number]    Therapy Diagnosis:   Encounter Diagnosis   Name Primary?    Strain of right latissimus dorsi muscle Yes     Physician: Russell Honeycutt III, *    Visit Date: 8/29/2024    Physician Orders: PT Eval and Treat   Medical Diagnosis from Referral:   Diagnosis   M25.511 (ICD-10-CM) - Right shoulder pain, unspecified chronicity      Evaluation Date: 5/9/2024  Authorization Period Expiration: 12/31/2024  Plan of Care Expiration: 11/9/2024  Progress Note Due: 8/9/2024  Visit # / Visits authorized: 20/20  FOTO: 1/3    PTA Visit #: 0/5     Time In: 1600  Time Out: 1700  Total Billable Time: 60 minutes    Subjective     Pt reports My knees are sore from the hospital and going to the game. Notes the most fatigue in the glute    She was compliant with home exercise program.  Response to previous treatment: sore  Functional change: no change    Pain: 4/10  Location: right shoulder      Objective        8/29/24    Glute med 4/5  Lat strength 4+/5  Limited standing and walking endurance 15 minutes  Painful end range extension and flexion to knees  Quad strength 4+/5  Painful step up on 6" box  Treatment     Meryl received the treatments listed below:      therapeutic exercises to develop strength, endurance, ROM, flexibility, posture, and core stabilization for 0 minutes including:      manual therapy techniques: Joint mobilizations and Soft tissue Mobilization were applied to the: R shoulder for 0 minutes, including:    Shoulder Gr III flexion  STM lat and teres  Inferior mob Gr IV at 90 deg abd  IR/ER arm abd 45 deg     neuromuscular re-education activities to improve: Balance, Coordination, Sense, Proprioception, and Posture for 60 minutes. The following activities were included:    Sidelying hip abd 3 x 10 B   Lateral walks GTB at table   LAQ 5# 3 x 15  Hip ext/abd GTB 20x ea      NT  UBE 10' " "forward/backward level 3  Supine no money GTB 3 x 15  Scaptions 2# 3 x 15  Supine wedge flexion 20x  Supine flexion OTB on shoe with post tilt for core control 3 x 15  Standing A GTB 3 x 15  Handcuffs yellow loop 430x  ER with press GTB 30x  Post tilt with BKFO and marching 30x ea  Wand flexion OH 2# 30x  Moist heat:   -BKFO and marching  Supine clam holds BTB 30x 3"  Posterior tilt low abd contraction 30x  Posterior tilt with BKFO 30x  Posterior tilt with Marching 30x  No money yellow loop 30x  Rows BTB 30x      therapeutic activities to improve functional performance for 0  minutes, including:     10' B shoulders    Patient Education and Home Exercises       Education provided:   - improve lat mobility    Written Home Exercises Provided: YES. Exercises were reviewed and Meryl was able to demonstrate them prior to the end of the session.  Meryl demonstrated good understanding of the education provided. See EMR under Patient Instructions for exercises provided during therapy sessions    Assessment     Progressed glute loading today as this is her chief complain and consistent with lacking lumbar spine staiblity. Fatigue with exercise but able to perform closed chain glute loading today    Meryl Is progressing well towards her goals.   Pt prognosis is Excellent.     Pt will continue to benefit from skilled outpatient physical therapy to address the deficits listed in the problem list box on initial evaluation, provide pt/family education and to maximize pt's level of independence in the home and community environment.     Pt's spiritual, cultural and educational needs considered and pt agreeable to plan of care and goals.     Anticipated barriers to physical therapy: none    GOALS: Short Term Goals:  4 weeks(met)  1.Report decreased shoulder pain < / =  2/10  to increase tolerance for return to work without pains  2. Increase PROM full overhead flexion without shoulder pain   3. Increased strength by 1/3 MMT " grade in lat to increase tolerance for ADL and work activities.  4. Pt to tolerate HEP to improve ROM and independence with ADL's     Long Term Goals: 8 weeks(Progressing, not met)  1.Report decreased shoulder pain  < / =  0 /10  to increase tolerance for return to [employer]  2.Increase AROM to full motion without pain(MET)  3.Increase strength to >/= 4/5 in lat and cuff to increase tolerance for ADL and work activities.(MET)  4. Pt goal: return to work and ADL pain free  5. Pt will have improved gcode of CJ (20-40% limited) on FOTO shoulder in order to demonstrate true functional improvement.     Plan     Plan of care Certification: 5/9/2024 to 11/9/2024.     Improve lat strength    Fabricio Tolentino, PT, DPT, OCS, FAAOMPT    Co treat Myron Johnson, PT, DPT

## 2024-08-30 NOTE — PLAN OF CARE
"[facility]   Physical Therapy Treatment Note      Name: Meryl Johnson  [facility] Number: [id number]    Therapy Diagnosis:   Encounter Diagnosis   Name Primary?    Strain of right latissimus dorsi muscle Yes     Physician: Russell Honeycutt III, *    Visit Date: 8/29/2024    Physician Orders: PT Eval and Treat   Medical Diagnosis from Referral:   Diagnosis   M25.511 (ICD-10-CM) - Right shoulder pain, unspecified chronicity      Evaluation Date: 5/9/2024  Authorization Period Expiration: 12/31/2024  Plan of Care Expiration: 11/9/2024  Progress Note Due: 8/9/2024  Visit # / Visits authorized: 20/20  FOTO: 1/3    PTA Visit #: 0/5     Time In: 1600  Time Out: 1700  Total Billable Time: 60 minutes    Subjective     Pt reports My knees are sore from the hospital and going to the game. Notes the most fatigue in the glute    She was compliant with home exercise program.  Response to previous treatment: sore  Functional change: no change    Pain: 4/10  Location: right shoulder      Objective        8/29/24    Glute med 4/5  Lat strength 4+/5  Limited standing and walking endurance 15 minutes  Painful end range extension and flexion to knees  Quad strength 4+/5  Painful step up on 6" box  Treatment     Meryl received the treatments listed below:      therapeutic exercises to develop strength, endurance, ROM, flexibility, posture, and core stabilization for 0 minutes including:      manual therapy techniques: Joint mobilizations and Soft tissue Mobilization were applied to the: R shoulder for 0 minutes, including:    Shoulder Gr III flexion  STM lat and teres  Inferior mob Gr IV at 90 deg abd  IR/ER arm abd 45 deg     neuromuscular re-education activities to improve: Balance, Coordination, Sense, Proprioception, and Posture for 60 minutes. The following activities were included:    Sidelying hip abd 3 x 10 B   Lateral walks GTB at table   LAQ 5# 3 x 15  Hip ext/abd GTB 20x ea      NT  UBE 10' " "forward/backward level 3  Supine no money GTB 3 x 15  Scaptions 2# 3 x 15  Supine wedge flexion 20x  Supine flexion OTB on shoe with post tilt for core control 3 x 15  Standing A GTB 3 x 15  Handcuffs yellow loop 430x  ER with press GTB 30x  Post tilt with BKFO and marching 30x ea  Wand flexion OH 2# 30x  Moist heat:   -BKFO and marching  Supine clam holds BTB 30x 3"  Posterior tilt low abd contraction 30x  Posterior tilt with BKFO 30x  Posterior tilt with Marching 30x  No money yellow loop 30x  Rows BTB 30x      therapeutic activities to improve functional performance for 0  minutes, including:     10' B shoulders    Patient Education and Home Exercises       Education provided:   - improve lat mobility    Written Home Exercises Provided: YES. Exercises were reviewed and Meryl was able to demonstrate them prior to the end of the session.  Meryl demonstrated good understanding of the education provided. See EMR under Patient Instructions for exercises provided during therapy sessions    Assessment     Progressed glute loading today as this is her chief complain and consistent with lacking lumbar spine staiblity. Fatigue with exercise but able to perform closed chain glute loading today    Meryl Is progressing well towards her goals.   Pt prognosis is Excellent.     Pt will continue to benefit from skilled outpatient physical therapy to address the deficits listed in the problem list box on initial evaluation, provide pt/family education and to maximize pt's level of independence in the home and community environment.     Pt's spiritual, cultural and educational needs considered and pt agreeable to plan of care and goals.     Anticipated barriers to physical therapy: none    GOALS: Short Term Goals:  4 weeks(met)  1.Report decreased shoulder pain < / =  2/10  to increase tolerance for return to work without pains  2. Increase PROM full overhead flexion without shoulder pain   3. Increased strength by 1/3 MMT " grade in lat to increase tolerance for ADL and work activities.  4. Pt to tolerate HEP to improve ROM and independence with ADL's     Long Term Goals: 8 weeks(Progressing, not met)  1.Report decreased shoulder pain  < / =  0 /10  to increase tolerance for return to [employer]  2.Increase AROM to full motion without pain(MET)  3.Increase strength to >/= 4/5 in lat and cuff to increase tolerance for ADL and work activities.(MET)  4. Pt goal: return to work and ADL pain free  5. Pt will have improved gcode of CJ (20-40% limited) on FOTO shoulder in order to demonstrate true functional improvement.     Plan     Plan of care Certification: 5/9/2024 to 11/9/2024.     Improve lat strength    Fabricio Tolentino, PT, DPT, OCS, FAAOMPT    Co treat Myron Johnson, PT, DPT

## 2024-09-05 ENCOUNTER — CLINICAL SUPPORT (OUTPATIENT)
Dept: REHABILITATION | Facility: HOSPITAL | Age: 69
End: 2024-09-05
Payer: COMMERCIAL

## 2024-09-05 DIAGNOSIS — S29.012A STRAIN OF RIGHT LATISSIMUS DORSI MUSCLE: Primary | ICD-10-CM

## 2024-09-05 PROCEDURE — 97112 NEUROMUSCULAR REEDUCATION: CPT | Performed by: PHYSICAL THERAPIST

## 2024-09-05 RX ORDER — METFORMIN HYDROCHLORIDE 1000 MG/1
1000 TABLET ORAL 2 TIMES DAILY WITH MEALS
Qty: 180 TABLET | Refills: 0 | Status: SHIPPED | OUTPATIENT
Start: 2024-09-05

## 2024-09-05 NOTE — PROGRESS NOTES
"[facility]   Physical Therapy Treatment Note      Name: Meryl Johnson  [facility] Number: [id number]    Therapy Diagnosis:   Encounter Diagnosis   Name Primary?    Strain of right latissimus dorsi muscle Yes     Physician: Russell Honeycutt III, *    Visit Date: 9/5/2024    Physician Orders: PT Eval and Treat   Medical Diagnosis from Referral:   Diagnosis   M25.511 (ICD-10-CM) - Right shoulder pain, unspecified chronicity      Evaluation Date: 5/9/2024  Authorization Period Expiration: 12/31/2024  Plan of Care Expiration: 11/9/2024  Progress Note Due: 8/9/2024  Visit # / Visits authorized: 21/40  FOTO: 1/3    PTA Visit #: 0/5     Time In: 1500  Time Out: 1600  Total Billable Time: 60 minutes    Subjective     Pt reports Right elbow pain, scared of a break requesting to see Rachele. Notes knees and lat are sore from caring for sister and work    She was compliant with home exercise program.  Response to previous treatment: sore  Functional change: no change    Pain: 4/10  Location: right shoulder      Objective        8/29/24    Glute med 4/5  Lat strength 4+/5  Limited standing and walking endurance 15 minutes  Painful end range extension and flexion to knees  Quad strength 4+/5  Painful step up on 6" box  Treatment     Meryl received the treatments listed below:      therapeutic exercises to develop strength, endurance, ROM, flexibility, posture, and core stabilization for 0 minutes including:      manual therapy techniques: Joint mobilizations and Soft tissue Mobilization were applied to the: R shoulder for 0 minutes, including:    Shoulder Gr III flexion  STM lat and teres  Inferior mob Gr IV at 90 deg abd  IR/ER arm abd 45 deg     neuromuscular re-education activities to improve: Balance, Coordination, Sense, Proprioception, and Posture for 60 minutes. The following activities were included:    Supine SLR 3 x 12  Sidelying hip abd 3 x 10 B   LAQ 3# 3 x 15  SAQ 2# 3 x 15  Supine bridge clams " "GTB 3 x 15      NT  Lateral walks GTB at table   Hip ext/abd GTB 20x ea  UBE 10' forward/backward level 3  Supine no money GTB 3 x 15  Scaptions 2# 3 x 15  Supine wedge flexion 20x  Supine flexion OTB on shoe with post tilt for core control 3 x 15  Standing A GTB 3 x 15  Handcuffs yellow loop 430x  ER with press GTB 30x  Post tilt with BKFO and marching 30x ea  Wand flexion OH 2# 30x  Moist heat:   -BKFO and marching  Supine clam holds BTB 30x 3"  Posterior tilt low abd contraction 30x  Posterior tilt with BKFO 30x  Posterior tilt with Marching 30x  No money yellow loop 30x  Rows BTB 30x      therapeutic activities to improve functional performance for 0  minutes, including:     10' Lumbar spine    Patient Education and Home Exercises       Education provided:   - improve lat mobility    Written Home Exercises Provided: YES. Exercises were reviewed and Meryl was able to demonstrate them prior to the end of the session.  Meryl demonstrated good understanding of the education provided. See EMR under Patient Instructions for exercises provided during therapy sessions    Assessment     Meryl progressed loading to the knee. Unable to  for the UE today due to radial head pain. Continues to progress glute med strengthening for stair climbing at [employer]    Meryl Is progressing well towards her goals.   Pt prognosis is Excellent.     Pt will continue to benefit from skilled outpatient physical therapy to address the deficits listed in the problem list box on initial evaluation, provide pt/family education and to maximize pt's level of independence in the home and community environment.     Pt's spiritual, cultural and educational needs considered and pt agreeable to plan of care and goals.     Anticipated barriers to physical therapy: none    GOALS: Short Term Goals:  4 weeks(met)  1.Report decreased shoulder pain < / =  2/10  to increase tolerance for return to work without pains  2. Increase PROM full " overhead flexion without shoulder pain   3. Increased strength by 1/3 MMT grade in lat to increase tolerance for ADL and work activities.  4. Pt to tolerate HEP to improve ROM and independence with ADL's     Long Term Goals: 8 weeks(Progressing, not met)  1.Report decreased shoulder pain  < / =  0 /10  to increase tolerance for return to [employer]  2.Increase AROM to full motion without pain(MET)  3.Increase strength to >/= 4/5 in lat and cuff to increase tolerance for ADL and work activities.(MET)  4. Pt goal: return to work and ADL pain free  5. Pt will have improved gcode of CJ (20-40% limited) on FOTO shoulder in order to demonstrate true functional improvement.     Plan     Plan of care Certification: 5/9/2024 to 11/9/2024.     Improve lat strength    Fabricio Tolentino, PT, DPT, OCS, FAAOMPT

## 2024-09-05 NOTE — TELEPHONE ENCOUNTER
Refill Decision Note   Meryl Alex  is requesting a refill authorization.  Brief Assessment and Rationale for Refill:  Approve     Medication Therapy Plan:         Comments:     Note composed:6:44 PM 09/05/2024

## 2024-09-05 NOTE — TELEPHONE ENCOUNTER
No care due was identified.  [facility] Embedded Care Due Messages. Reference number: [id number].   9/05/2024 2:47:23 PM CDT

## 2024-09-10 ENCOUNTER — OFFICE VISIT (OUTPATIENT)
Dept: SPORTS MEDICINE | Facility: CLINIC | Age: 69
End: 2024-09-10
Payer: COMMERCIAL

## 2024-09-10 ENCOUNTER — HOSPITAL ENCOUNTER (OUTPATIENT)
Dept: RADIOLOGY | Facility: HOSPITAL | Age: 69
Discharge: HOME | End: 2024-09-10
Attending: PHYSICIAN ASSISTANT
Payer: COMMERCIAL

## 2024-09-10 VITALS
DIASTOLIC BLOOD PRESSURE: 81 MMHG | SYSTOLIC BLOOD PRESSURE: 142 MMHG | WEIGHT: 273.81 LBS | HEART RATE: 106 BPM | BODY MASS INDEX: 42.98 KG/M2 | HEIGHT: 67 IN

## 2024-09-10 DIAGNOSIS — M25.521 RIGHT ELBOW PAIN: Primary | ICD-10-CM

## 2024-09-10 DIAGNOSIS — M77.11 LATERAL EPICONDYLITIS OF RIGHT ELBOW: ICD-10-CM

## 2024-09-10 DIAGNOSIS — M25.521 RIGHT ELBOW PAIN: ICD-10-CM

## 2024-09-10 PROCEDURE — 1159F MED LIST DOCD IN RCRD: CPT | Mod: CPTII,S$GLB,, | Performed by: PHYSICIAN ASSISTANT

## 2024-09-10 PROCEDURE — 73080 X-RAY EXAM OF ELBOW: CPT | Mod: TC,RT

## 2024-09-10 PROCEDURE — 3066F NEPHROPATHY DOC TX: CPT | Mod: CPTII,S$GLB,, | Performed by: PHYSICIAN ASSISTANT

## 2024-09-10 PROCEDURE — 99999 PR PBB SHADOW E&M-EST. PATIENT-LVL III: CPT | Mod: PBBFAC,,, | Performed by: PHYSICIAN ASSISTANT

## 2024-09-10 PROCEDURE — 4010F ACE/ARB THERAPY RXD/TAKEN: CPT | Mod: CPTII,S$GLB,, | Performed by: PHYSICIAN ASSISTANT

## 2024-09-10 PROCEDURE — 1125F AMNT PAIN NOTED PAIN PRSNT: CPT | Mod: CPTII,S$GLB,, | Performed by: PHYSICIAN ASSISTANT

## 2024-09-10 PROCEDURE — 3079F DIAST BP 80-89 MM HG: CPT | Mod: CPTII,S$GLB,, | Performed by: PHYSICIAN ASSISTANT

## 2024-09-10 PROCEDURE — 99214 OFFICE O/P EST MOD 30 MIN: CPT | Mod: S$GLB,,, | Performed by: PHYSICIAN ASSISTANT

## 2024-09-10 PROCEDURE — 3051F HG A1C>EQUAL 7.0%<8.0%: CPT | Mod: CPTII,S$GLB,, | Performed by: PHYSICIAN ASSISTANT

## 2024-09-10 PROCEDURE — 1101F PT FALLS ASSESS-DOCD LE1/YR: CPT | Mod: CPTII,S$GLB,, | Performed by: PHYSICIAN ASSISTANT

## 2024-09-10 PROCEDURE — 3288F FALL RISK ASSESSMENT DOCD: CPT | Mod: CPTII,S$GLB,, | Performed by: PHYSICIAN ASSISTANT

## 2024-09-10 PROCEDURE — 3077F SYST BP >= 140 MM HG: CPT | Mod: CPTII,S$GLB,, | Performed by: PHYSICIAN ASSISTANT

## 2024-09-10 PROCEDURE — 73080 X-RAY EXAM OF ELBOW: CPT | Mod: 26,RT,, | Performed by: RADIOLOGY

## 2024-09-10 PROCEDURE — 1160F RVW MEDS BY RX/DR IN RCRD: CPT | Mod: CPTII,S$GLB,, | Performed by: PHYSICIAN ASSISTANT

## 2024-09-10 PROCEDURE — 3072F LOW RISK FOR RETINOPATHY: CPT | Mod: CPTII,S$GLB,, | Performed by: PHYSICIAN ASSISTANT

## 2024-09-10 PROCEDURE — 3008F BODY MASS INDEX DOCD: CPT | Mod: CPTII,S$GLB,, | Performed by: PHYSICIAN ASSISTANT

## 2024-09-10 PROCEDURE — 3060F POS MICROALBUMINURIA REV: CPT | Mod: CPTII,S$GLB,, | Performed by: PHYSICIAN ASSISTANT
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
## 2024-09-12 ENCOUNTER — CLINICAL SUPPORT (OUTPATIENT)
Dept: REHABILITATION | Facility: HOSPITAL | Age: 69
End: 2024-09-12
Payer: COMMERCIAL

## 2024-09-12 DIAGNOSIS — S29.012A STRAIN OF RIGHT LATISSIMUS DORSI MUSCLE: Primary | ICD-10-CM

## 2024-09-12 PROCEDURE — 97112 NEUROMUSCULAR REEDUCATION: CPT | Performed by: PHYSICAL THERAPIST
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
## 2024-09-19 ENCOUNTER — CLINICAL SUPPORT (OUTPATIENT)
Dept: REHABILITATION | Facility: HOSPITAL | Age: 69
End: 2024-09-19
Payer: COMMERCIAL

## 2024-09-19 DIAGNOSIS — S29.012A STRAIN OF RIGHT LATISSIMUS DORSI MUSCLE: Primary | ICD-10-CM

## 2024-09-19 PROCEDURE — 97112 NEUROMUSCULAR REEDUCATION: CPT | Performed by: PHYSICAL THERAPIST

## 2024-09-19 NOTE — PROGRESS NOTES
"[facility]   Physical Therapy Treatment Note      Name: Meryl Johnson  [facility] Number: [id number]    Therapy Diagnosis:   Encounter Diagnosis   Name Primary?    Strain of right latissimus dorsi muscle Yes     Physician: Russell Honeycutt III, *    Visit Date: 9/19/2024    Physician Orders: PT Eval and Treat   Medical Diagnosis from Referral:   Diagnosis   M25.511 (ICD-10-CM) - Right shoulder pain, unspecified chronicity      Evaluation Date: 5/9/2024  Authorization Period Expiration: 12/31/2024  Plan of Care Expiration: 11/9/2024  Progress Note Due: 8/9/2024  Visit # / Visits authorized: 23/40  FOTO: 1/3    PTA Visit #: 0/5     Time In: 1600  Time Out: 1700  Total Billable Time: 60 minutes    Subjective     Pt reports Not as sore but still in the elbow, lat and knees been doing better. Feels the elbow when she rests it at work typing    She was compliant with home exercise program.  Response to previous treatment: sore  Functional change: no change    Pain: 4/10  Location: right shoulder      Objective        8/29/24    Glute med 4/5  Lat strength 4+/5  Limited standing and walking endurance 15 minutes  Painful end range extension and flexion to knees  Quad strength 4+/5  Painful step up on 6" box  Treatment     Meryl received the treatments listed below:      therapeutic exercises to develop strength, endurance, ROM, flexibility, posture, and core stabilization for 0 minutes including:      manual therapy techniques: Joint mobilizations and Soft tissue Mobilization were applied to the: R shoulder for 0 minutes, including:    Shoulder Gr III flexion  STM lat and teres  Inferior mob Gr IV at 90 deg abd  IR/ER arm abd 45 deg     neuromuscular re-education activities to improve: Balance, Coordination, Sense, Proprioception, and Posture for 60 minutes. The following activities were included:    Supine SLR 3 x 12  Wrist ext/flex 2# 30x  ER iso wrist YTB 2 x 10  Wand flexion 3# 3 x 15  LAQ 5# 3 " x 15    NT  Supine bridge clams GTB 3 x 15  SAQ 2# 3 x 15  Sidelying hip abd 3 x 10 B   Lateral walks GTB at table   Hip ext/abd GTB 20x ea  UBE 10' forward/backward level 3  Supine no money GTB 3 x 15  Scaptions 2# 3 x 15  Supine wedge flexion 20x  Supine flexion OTB on shoe with post tilt for core control 3 x 15    therapeutic activities to improve functional performance for 0  minutes, including:    MH 0' Lumbar spine    Patient Education and Home Exercises       Education provided:   - improve lat mobility    Written Home Exercises Provided: YES. Exercises were reviewed and Meryl was able to demonstrate them prior to the end of the session.  Meryl demonstrated good understanding of the education provided. See EMR under Patient Instructions for exercises provided during therapy sessions    Assessment     Meryl progressed with loading to quad, ER cuff and wrist flexors/extensors. Progressing with OH mobility and better flexion noted without lat pain.     Meryl Is progressing well towards her goals.   Pt prognosis is Excellent.     Pt will continue to benefit from skilled outpatient physical therapy to address the deficits listed in the problem list box on initial evaluation, provide pt/family education and to maximize pt's level of independence in the home and community environment.     Pt's spiritual, cultural and educational needs considered and pt agreeable to plan of care and goals.     Anticipated barriers to physical therapy: none    GOALS: Short Term Goals:  4 weeks(met)  1.Report decreased shoulder pain < / =  2/10  to increase tolerance for return to work without pains  2. Increase PROM full overhead flexion without shoulder pain   3. Increased strength by 1/3 MMT grade in lat to increase tolerance for ADL and work activities.  4. Pt to tolerate HEP to improve ROM and independence with ADL's     Long Term Goals: 8 weeks(Progressing, not met)  1.Report decreased shoulder pain  < / =  0 /10  to increase  tolerance for return to [employer]  2.Increase AROM to full motion without pain(MET)  3.Increase strength to >/= 4/5 in lat and cuff to increase tolerance for ADL and work activities.(MET)  4. Pt goal: return to work and ADL pain free  5. Pt will have improved gcode of CJ (20-40% limited) on FOTO shoulder in order to demonstrate true functional improvement.     Plan     Plan of care Certification: 5/9/2024 to 11/9/2024.     Improve lat strength    Fabricio Tolentino, PT, DPT, OCS, FAAOMPT

## 2024-10-02 RX ORDER — ERGOCALCIFEROL 1.25 MG/1
50000 CAPSULE ORAL
Qty: 12 CAPSULE | Refills: 1 | Status: SHIPPED | OUTPATIENT
Start: 2024-10-02

## 2024-10-02 RX ORDER — METFORMIN HYDROCHLORIDE 1000 MG/1
1000 TABLET ORAL 2 TIMES DAILY WITH MEALS
Qty: 180 TABLET | Refills: 0 | Status: SHIPPED | OUTPATIENT
Start: 2024-10-02

## 2024-10-02 NOTE — TELEPHONE ENCOUNTER
No care due was identified.  [facility] Embedded Care Due Messages. Reference number: [id number].   10/02/2024 10:56:38 AM CDT

## 2024-10-02 NOTE — TELEPHONE ENCOUNTER
No care due was identified.  [facility] Embedded Care Due Messages. Reference number: [id number].   10/02/2024 10:58:01 AM CDT

## 2024-10-02 NOTE — TELEPHONE ENCOUNTER
Refill Routing Note   Medication(s) are not appropriate for processing by [facility] for the following reason(s):        Required labs outdated    [facility] action(s):  Defer               Appointments  past 12m or future 3m with PCP    Date Provider   Last Visit   Visit date not found Russell Martinez MD   Next Visit   10/2/2024 Russell Martinez MD   ED visits in past 90 days: 0        Note composed:4:25 PM 10/02/2024

## 2024-10-02 NOTE — TELEPHONE ENCOUNTER
No care due was identified.  [facility] Embedded Care Due Messages. Reference number: [id number].   10/02/2024 10:55:59 AM CDT

## 2024-10-02 NOTE — TELEPHONE ENCOUNTER
Refill Routing Note   Medication(s) are not appropriate for processing by [facility] for the following reason(s):        Outside of protocol    [facility] action(s):  Route               Appointments  past 12m or future 3m with PCP    Date Provider   Last Visit   Visit date not found Russell Martinez MD   Next Visit   10/2/2024 Russell Martinez MD   ED visits in past 90 days: 0        Note composed:4:26 PM 10/02/2024

## 2024-10-02 NOTE — TELEPHONE ENCOUNTER
Refill Routing Note   Medication(s) are not appropriate for processing by [facility] for the following reason(s):        Required labs outdated    [facility] action(s):  Defer               Appointments  past 12m or future 3m with PCP    Date Provider   Last Visit   Visit date not found Russell Martinez MD   Next Visit   Visit date not found Russell Martinez MD   ED visits in past 90 days: 0        Note composed:4:28 PM 10/02/2024

## 2024-10-03 ENCOUNTER — CLINICAL SUPPORT (OUTPATIENT)
Dept: REHABILITATION | Facility: HOSPITAL | Age: 69
End: 2024-10-03
Payer: COMMERCIAL

## 2024-10-03 DIAGNOSIS — S29.012A STRAIN OF RIGHT LATISSIMUS DORSI MUSCLE: Primary | ICD-10-CM

## 2024-10-03 PROCEDURE — 97140 MANUAL THERAPY 1/> REGIONS: CPT | Performed by: PHYSICAL THERAPIST

## 2024-10-03 PROCEDURE — 97112 NEUROMUSCULAR REEDUCATION: CPT | Performed by: PHYSICAL THERAPIST

## 2024-10-03 NOTE — PROGRESS NOTES
"[facility]   Physical Therapy Treatment Note      Name: Meryl Johnson  [facility] Number: [id number]    Therapy Diagnosis:   Encounter Diagnosis   Name Primary?    Strain of right latissimus dorsi muscle Yes     Physician: Russell Honeycutt III, *    Visit Date: 10/3/2024    Physician Orders: PT Eval and Treat   Medical Diagnosis from Referral:   Diagnosis   M25.511 (ICD-10-CM) - Right shoulder pain, unspecified chronicity      Evaluation Date: 5/9/2024  Authorization Period Expiration: 12/31/2024  Plan of Care Expiration: 11/9/2024  Progress Note Due: 8/9/2024  Visit # / Visits authorized: 24/40  FOTO: 1/3    PTA Visit #: 0/5     Time In: 1600  Time Out: 1710  Total Billable Time: 60 minutes    Subjective     Pt reports Elbow is sore today. Notes that the lat is hurting again on some days    She was compliant with home exercise program.  Response to previous treatment: sore  Functional change: no change    Pain: 4/10  Location: right shoulder      Objective        8/29/24    Glute med 4/5  Lat strength 4+/5  Limited standing and walking endurance 15 minutes  Painful end range extension and flexion to knees  Quad strength 4+/5  Painful step up on 6" box  Treatment     Meryl received the treatments listed below:      therapeutic exercises to develop strength, endurance, ROM, flexibility, posture, and core stabilization for 0 minutes including:      manual therapy techniques: Joint mobilizations and Soft tissue Mobilization were applied to the: R shoulder for 15 minutes, including:    Shoulder Gr III flexion  STM lat and teres  Inferior mob Gr IV at 90 deg abd  IR/ER arm abd 45 deg   Radial head mobs     neuromuscular re-education activities to improve: Balance, Coordination, Sense, Proprioception, and Posture for 55 minutes. The following activities were included:    Supine SLR 3 x 12  Wrist ext/ 45"2# 30x  ER banded with flexion 2 x 10  LAQ 7.5# 3 x 15    NT  Wand flexion 3# 3 x 15  Supine " bridge clams GTB 3 x 15  SAQ 2# 3 x 15  Sidelying hip abd 3 x 10 B   Lateral walks GTB at table   Hip ext/abd GTB 20x ea  UBE 10' forward/backward level 3  Supine no money GTB 3 x 15  Scaptions 2# 3 x 15  Supine wedge flexion 20x  Supine flexion OTB on shoe with post tilt for core control 3 x 15    therapeutic activities to improve functional performance for 0  minutes, including:    MH 0' Lumbar spine    Patient Education and Home Exercises       Education provided:   - improve lat mobility    Written Home Exercises Provided: YES. Exercises were reviewed and Meryl was able to demonstrate them prior to the end of the session.  Meryl demonstrated good understanding of the education provided. See EMR under Patient Instructions for exercises provided during therapy sessions    Assessment     Relief to the elbow with mobilizations to the radial head. She notes the lat was sore today so began overhead lifting with the band. Isometrics to extensors for tendinopathy symptoms    Meryl Is progressing well towards her goals.   Pt prognosis is Excellent.     Pt will continue to benefit from skilled outpatient physical therapy to address the deficits listed in the problem list box on initial evaluation, provide pt/family education and to maximize pt's level of independence in the home and community environment.     Pt's spiritual, cultural and educational needs considered and pt agreeable to plan of care and goals.     Anticipated barriers to physical therapy: none    GOALS: Short Term Goals:  4 weeks(met)  1.Report decreased shoulder pain < / =  2/10  to increase tolerance for return to work without pains  2. Increase PROM full overhead flexion without shoulder pain   3. Increased strength by 1/3 MMT grade in lat to increase tolerance for ADL and work activities.  4. Pt to tolerate HEP to improve ROM and independence with ADL's     Long Term Goals: 8 weeks(Progressing, not met)  1.Report decreased shoulder pain  < / =  0  /10  to increase tolerance for return to [employer]  2.Increase AROM to full motion without pain(MET)  3.Increase strength to >/= 4/5 in lat and cuff to increase tolerance for ADL and work activities.(MET)  4. Pt goal: return to work and ADL pain free  5. Pt will have improved gcode of CJ (20-40% limited) on FOTO shoulder in order to demonstrate true functional improvement.     Plan     Plan of care Certification: 5/9/2024 to 11/9/2024.     Improve lat strength    Fabricio Tolentino, PT, DPT, OCS, FAAOMPT

## 2024-10-08 ENCOUNTER — PATIENT MESSAGE (OUTPATIENT)
Dept: ADMINISTRATIVE | Facility: HOSPITAL | Age: 69
End: 2024-10-08
Payer: COMMERCIAL

## 2024-10-10 ENCOUNTER — CLINICAL SUPPORT (OUTPATIENT)
Dept: REHABILITATION | Facility: HOSPITAL | Age: 69
End: 2024-10-10
Payer: COMMERCIAL

## 2024-10-10 DIAGNOSIS — S29.012A STRAIN OF RIGHT LATISSIMUS DORSI MUSCLE: Primary | ICD-10-CM

## 2024-10-10 PROCEDURE — 97140 MANUAL THERAPY 1/> REGIONS: CPT | Performed by: PHYSICAL THERAPIST

## 2024-10-10 PROCEDURE — 97112 NEUROMUSCULAR REEDUCATION: CPT | Performed by: PHYSICAL THERAPIST

## 2024-10-10 NOTE — PROGRESS NOTES
"[facility]   Physical Therapy Treatment Note      Name: Meryl Johnson  [facility] Number: [id number]    Therapy Diagnosis:   Encounter Diagnosis   Name Primary?    Strain of right latissimus dorsi muscle Yes     Physician: Russell Honeycutt III, *    Visit Date: 10/10/2024    Physician Orders: PT Eval and Treat   Medical Diagnosis from Referral:   Diagnosis   M25.511 (ICD-10-CM) - Right shoulder pain, unspecified chronicity      Evaluation Date: 5/9/2024  Authorization Period Expiration: 12/31/2024  Plan of Care Expiration: 11/9/2024  Progress Note Due: 8/9/2024  Visit # / Visits authorized: 25/40  FOTO: 1/3    PTA Visit #: 0/5     Time In: 1500  Time Out: 1600  Total Billable Time: 60 minutes    Subjective     Pt reports Elbow is a little better. Notes the lat being fatigued and some medial scap pain     She was compliant with home exercise program.  Response to previous treatment: sore  Functional change: no change    Pain: 4/10  Location: right shoulder      Objective        8/29/24    Glute med 4/5  Lat strength 4+/5  Limited standing and walking endurance 15 minutes  Painful end range extension and flexion to knees  Quad strength 4+/5  Painful step up on 6" box  Treatment     Meryl received the treatments listed below:      therapeutic exercises to develop strength, endurance, ROM, flexibility, posture, and core stabilization for 0 minutes including:      manual therapy techniques: Joint mobilizations and Soft tissue Mobilization were applied to the: R shoulder for 15 minutes, including:    Shoulder Gr III flexion  STM lat and teres  Inferior mob Gr IV at 90 deg abd  IR/ER arm abd 45 deg   Radial head mobs   Gapping humeroulnar     neuromuscular re-education activities to improve: Balance, Coordination, Sense, Proprioception, and Posture for 45 minutes. The following activities were included:    Lat extension OTB 3 x 12  Rows GTB 3 x 15  No money GTB 30x  Wand flexion OH 3# 30x " "    NT  Supine SLR 3 x 12  Wrist ext/ 45"2# 30x  ER banded with flexion 2 x 10  LAQ 7.5# 3 x 15  Wand flexion 3# 3 x 15  Supine bridge clams GTB 3 x 15  SAQ 2# 3 x 15  Sidelying hip abd 3 x 10 B   Lateral walks GTB at table   Hip ext/abd GTB 20x ea  UBE 10' forward/backward level 3  Supine no money GTB 3 x 15  Scaptions 2# 3 x 15  Supine wedge flexion 20x  Supine flexion OTB on shoe with post tilt for core control 3 x 15    therapeutic activities to improve functional performance for 0  minutes, including:    MH 0' Lumbar spine    Patient Education and Home Exercises       Education provided:   - improve lat mobility    Written Home Exercises Provided: YES. Exercises were reviewed and Meryl was able to demonstrate them prior to the end of the session.  Meryl demonstrated good understanding of the education provided. See EMR under Patient Instructions for exercises provided during therapy sessions    Assessment     Meryl progressed periscap strengthening to address he medial scap discomfort. Noted relief with elbow mobs at the start of session.     Meryl Is progressing well towards her goals.   Pt prognosis is Excellent.     Pt will continue to benefit from skilled outpatient physical therapy to address the deficits listed in the problem list box on initial evaluation, provide pt/family education and to maximize pt's level of independence in the home and community environment.     Pt's spiritual, cultural and educational needs considered and pt agreeable to plan of care and goals.     Anticipated barriers to physical therapy: none    GOALS: Short Term Goals:  4 weeks(met)  1.Report decreased shoulder pain < / =  2/10  to increase tolerance for return to work without pains  2. Increase PROM full overhead flexion without shoulder pain   3. Increased strength by 1/3 MMT grade in lat to increase tolerance for ADL and work activities.  4. Pt to tolerate HEP to improve ROM and independence with ADL's     Long Term " Goals: 8 weeks(Progressing, not met)  1.Report decreased shoulder pain  < / =  0 /10  to increase tolerance for return to [employer]  2.Increase AROM to full motion without pain(MET)  3.Increase strength to >/= 4/5 in lat and cuff to increase tolerance for ADL and work activities.(MET)  4. Pt goal: return to work and ADL pain free  5. Pt will have improved gcode of CJ (20-40% limited) on FOTO shoulder in order to demonstrate true functional improvement.     Plan     Plan of care Certification: 5/9/2024 to 11/9/2024.     Improve lat strength    Fabricio Tolentino, PT, DPT, OCS, FAAOMPT

## 2024-10-15 ENCOUNTER — CLINICAL SUPPORT (OUTPATIENT)
Dept: REHABILITATION | Facility: HOSPITAL | Age: 69
End: 2024-10-15
Payer: COMMERCIAL

## 2024-10-15 DIAGNOSIS — S29.012A STRAIN OF RIGHT LATISSIMUS DORSI MUSCLE: Primary | ICD-10-CM

## 2024-10-15 PROCEDURE — 97112 NEUROMUSCULAR REEDUCATION: CPT | Performed by: PHYSICAL THERAPIST

## 2024-10-15 PROCEDURE — 97140 MANUAL THERAPY 1/> REGIONS: CPT | Performed by: PHYSICAL THERAPIST

## 2024-10-15 NOTE — PROGRESS NOTES
"[facility]   Physical Therapy Treatment Note      Name: Meryl Johnson  [facility] Number: [id number]    Therapy Diagnosis:   Encounter Diagnosis   Name Primary?    Strain of right latissimus dorsi muscle Yes     Physician: Russell Honeycutt III, *    Visit Date: 10/15/2024    Physician Orders: PT Eval and Treat   Medical Diagnosis from Referral:   Diagnosis   M25.511 (ICD-10-CM) - Right shoulder pain, unspecified chronicity      Evaluation Date: 5/9/2024  Authorization Period Expiration: 12/31/2024  Plan of Care Expiration: 11/9/2024  Progress Note Due: 8/9/2024  Visit # / Visits authorized: 26/40  FOTO: 1/3    PTA Visit #: 0/5     Time In: 800  Time Out: 900  Total Billable Time: 60 minutes    Subjective     Pt reports Some elbow pain, sometimes worse with the elbow brace. Lat is better, just sore sometimes when pressing up from a chair    She was compliant with home exercise program.  Response to previous treatment: sore  Functional change: no change    Pain: 4/10  Location: right shoulder      Objective      8/29/24    Glute med 4/5  Lat strength 4+/5  Limited standing and walking endurance 15 minutes  Painful end range extension and flexion to knees  Quad strength 4+/5  Painful step up on 6" box  Treatment     Meryl received the treatments listed below:      therapeutic exercises to develop strength, endurance, ROM, flexibility, posture, and core stabilization for 0 minutes including:      manual therapy techniques: Joint mobilizations and Soft tissue Mobilization were applied to the: R shoulder for 15 minutes, including:    Shoulder Gr III flexion  STM lat and teres  Inferior mob Gr IV at 90 deg abd  IR/ER arm abd 45 deg   Radial head mobs   Gapping humeroulnar     neuromuscular re-education activities to improve: Balance, Coordination, Sense, Proprioception, and Posture for 45 minutes. The following activities were included:    No money oTB 30x  Handcuffs YTB lift 30x  Scaptions 2# 3 x " "12  Bent over row 5# 3 x 12    NT  Lat extension OTB 3 x 12  Rows GTB 3 x 15  Wand flexion OH 3# 30x   Supine SLR 3 x 12  Wrist ext/ 45"2# 30x  ER banded with flexion 2 x 10  LAQ 7.5# 3 x 15  Wand flexion 3# 3 x 15  Supine bridge clams GTB 3 x 15  SAQ 2# 3 x 15  Sidelying hip abd 3 x 10 B   Lateral walks GTB at table   Hip ext/abd GTB 20x ea  UBE 10' forward/backward level 3  Supine no money GTB 3 x 15  Scaptions 2# 3 x 15  Supine wedge flexion 20x  Supine flexion OTB on shoe with post tilt for core control 3 x 15    therapeutic activities to improve functional performance for 0  minutes, including:     0' Lumbar spine    Patient Education and Home Exercises       Education provided:   - improve lat mobility    Written Home Exercises Provided: YES. Exercises were reviewed and Meryl was able to demonstrate them prior to the end of the session.  Meryl demonstrated good understanding of the education provided. See EMR under Patient Instructions for exercises provided during therapy sessions    Assessment     Noted sharp pain in the elbow with first row but it improved within session. Noted some wrist extension discomfort with handcuff exercise. Good tolerance to 2# scaptions, able to lift overhead    Meryl Is progressing well towards her goals.   Pt prognosis is Excellent.     Pt will continue to benefit from skilled outpatient physical therapy to address the deficits listed in the problem list box on initial evaluation, provide pt/family education and to maximize pt's level of independence in the home and community environment.     Pt's spiritual, cultural and educational needs considered and pt agreeable to plan of care and goals.     Anticipated barriers to physical therapy: none    GOALS: Short Term Goals:  4 weeks(met)  1.Report decreased shoulder pain < / =  2/10  to increase tolerance for return to work without pains  2. Increase PROM full overhead flexion without shoulder pain   3. Increased strength by " 1/3 MMT grade in lat to increase tolerance for ADL and work activities.  4. Pt to tolerate HEP to improve ROM and independence with ADL's     Long Term Goals: 8 weeks(Progressing, not met)  1.Report decreased shoulder pain  < / =  0 /10  to increase tolerance for return to [employer]  2.Increase AROM to full motion without pain(MET)  3.Increase strength to >/= 4/5 in lat and cuff to increase tolerance for ADL and work activities.(MET)  4. Pt goal: return to work and ADL pain free  5. Pt will have improved gcode of CJ (20-40% limited) on FOTO shoulder in order to demonstrate true functional improvement.     Plan     Plan of care Certification: 5/9/2024 to 11/9/2024.     Improve lat strength    Fabricio Tolentino, PT, DPT, OCS, FAAOMPT

## 2024-10-22 ENCOUNTER — CLINICAL SUPPORT (OUTPATIENT)
Dept: REHABILITATION | Facility: HOSPITAL | Age: 69
End: 2024-10-22
Payer: COMMERCIAL

## 2024-10-22 DIAGNOSIS — S29.012A STRAIN OF RIGHT LATISSIMUS DORSI MUSCLE: Primary | ICD-10-CM

## 2024-10-22 PROCEDURE — 97112 NEUROMUSCULAR REEDUCATION: CPT | Performed by: PHYSICAL THERAPIST

## 2024-10-22 PROCEDURE — 97140 MANUAL THERAPY 1/> REGIONS: CPT | Performed by: PHYSICAL THERAPIST

## 2024-10-22 NOTE — PROGRESS NOTES
"[facility]   Physical Therapy Treatment Note      Name: Meryl Johnson  [facility] Number: [id number]    Therapy Diagnosis:   Encounter Diagnosis   Name Primary?    Strain of right latissimus dorsi muscle Yes     Physician: Russell Honeycutt III, *    Visit Date: 10/22/2024    Physician Orders: PT Eval and Treat   Medical Diagnosis from Referral:   Diagnosis   M25.511 (ICD-10-CM) - Right shoulder pain, unspecified chronicity      Evaluation Date: 5/9/2024  Authorization Period Expiration: 12/31/2024  Plan of Care Expiration: 11/9/2024  Progress Note Due: 8/9/2024  Visit # / Visits authorized: 27/40  FOTO: 1/3    PTA Visit #: 0/5     Time In: 1500  Time Out: 1600  Total Billable Time: 60 minutes    Subjective     Pt reports Elbow just hurts with gripping, especially at work yesterday    She was compliant with home exercise program.  Response to previous treatment: sore  Functional change: no change    Pain: 4/10  Location: right shoulder      Objective      8/29/24    Glute med 4/5  Lat strength 4+/5  Limited standing and walking endurance 15 minutes  Painful end range extension and flexion to knees  Quad strength 4+/5  Painful step up on 6" box  Treatment     Meryl received the treatments listed below:      therapeutic exercises to develop strength, endurance, ROM, flexibility, posture, and core stabilization for 0 minutes including:      manual therapy techniques: Joint mobilizations and Soft tissue Mobilization were applied to the: R shoulder for 15 minutes, including:    Shoulder Gr III flexion  STM lat and teres  Inferior mob Gr IV at 90 deg abd  IR/ER arm abd 45 deg   Radial head mobs   Gapping humeroulnar     neuromuscular re-education activities to improve: Balance, Coordination, Sense, Proprioception, and Posture for 45 minutes. The following activities were included:    No money YTB 30x  Handcuffs YTB lift 30x  Scaptions 2# 3 x 12  LAQ 7.5# 3 x 15  Supine clams YTB 3 x 15    NT  Bent " "over row 5# 3 x 12  Lat extension OTB 3 x 12  Rows GTB 3 x 15  Wand flexion OH 3# 30x   Supine SLR 3 x 12  Wrist ext/ 45"2# 30x  ER banded with flexion 2 x 10  Wand flexion 3# 3 x 15  Supine bridge clams GTB 3 x 15  SAQ 2# 3 x 15  Sidelying hip abd 3 x 10 B       therapeutic activities to improve functional performance for 0  minutes, including:    MH 0' Lumbar spine    Patient Education and Home Exercises       Education provided:   - improve lat mobility    Written Home Exercises Provided: YES. Exercises were reviewed and Meryl was able to demonstrate them prior to the end of the session.  Meryl demonstrated good understanding of the education provided. See EMR under Patient Instructions for exercises provided during therapy sessions    Assessment     Continue pain to the radial head. She reports increased tightness to the lat following scaptions to the shoulder. Likely pain associated to fracture in 2021. Got minor relief with bracing today and gapping to radial head    Meryl Is progressing well towards her goals.   Pt prognosis is Excellent.     Pt will continue to benefit from skilled outpatient physical therapy to address the deficits listed in the problem list box on initial evaluation, provide pt/family education and to maximize pt's level of independence in the home and community environment.     Pt's spiritual, cultural and educational needs considered and pt agreeable to plan of care and goals.     Anticipated barriers to physical therapy: none    GOALS: Short Term Goals:  4 weeks(met)  1.Report decreased shoulder pain < / =  2/10  to increase tolerance for return to work without pains  2. Increase PROM full overhead flexion without shoulder pain   3. Increased strength by 1/3 MMT grade in lat to increase tolerance for ADL and work activities.  4. Pt to tolerate HEP to improve ROM and independence with ADL's     Long Term Goals: 8 weeks(Progressing, not met)  1.Report decreased shoulder pain  < / =  0 " /10  to increase tolerance for return to [employer]  2.Increase AROM to full motion without pain(MET)  3.Increase strength to >/= 4/5 in lat and cuff to increase tolerance for ADL and work activities.(MET)  4. Pt goal: return to work and ADL pain free  5. Pt will have improved gcode of CJ (20-40% limited) on FOTO shoulder in order to demonstrate true functional improvement.     Plan     Plan of care Certification: 5/9/2024 to 11/9/2024.     Improve lat strength    Fabricio Tolentino, PT, DPT, OCS, FAAOMPT

## 2024-10-25 ENCOUNTER — TELEPHONE (OUTPATIENT)
Dept: SPORTS MEDICINE | Facility: CLINIC | Age: 69
End: 2024-10-25
Payer: COMMERCIAL

## 2024-10-25 NOTE — TELEPHONE ENCOUNTER
Called and spoke to patient. Offered her next available appt but she declined. Scheduled 11/4/24 per patient request----- Message from Alicia sent at 10/25/2024  1:29 PM CDT -----       Nature of Call:returning a missed call    Best Call Back Number: [phone]     Additional Information:  VLAD GARCIA calling regarding Patient Advice for a missed call from the staff about a scheduling for injection for elbow pain, please call back to assist

## 2024-10-25 NOTE — TELEPHONE ENCOUNTER
----- Message from Russell Honeycutt PA-C sent at 10/25/2024 10:36 AM CDT -----  Regarding: call to schedule right elbow appt  Appt with me.

## 2024-10-29 ENCOUNTER — OFFICE VISIT (OUTPATIENT)
Dept: SPORTS MEDICINE | Facility: CLINIC | Age: 69
End: 2024-10-29
Payer: COMMERCIAL

## 2024-10-29 ENCOUNTER — CLINICAL SUPPORT (OUTPATIENT)
Dept: REHABILITATION | Facility: HOSPITAL | Age: 69
End: 2024-10-29
Payer: COMMERCIAL

## 2024-10-29 ENCOUNTER — CLINICAL SUPPORT (OUTPATIENT)
Dept: AUDIOLOGY | Facility: CLINIC | Age: 69
End: 2024-10-29
Payer: COMMERCIAL

## 2024-10-29 ENCOUNTER — OFFICE VISIT (OUTPATIENT)
Dept: OTOLARYNGOLOGY | Facility: CLINIC | Age: 69
End: 2024-10-29
Payer: COMMERCIAL

## 2024-10-29 VITALS
HEIGHT: 67 IN | WEIGHT: 274.25 LBS | DIASTOLIC BLOOD PRESSURE: 76 MMHG | BODY MASS INDEX: 43.04 KG/M2 | SYSTOLIC BLOOD PRESSURE: 111 MMHG | HEART RATE: 101 BPM

## 2024-10-29 VITALS — DIASTOLIC BLOOD PRESSURE: 84 MMHG | SYSTOLIC BLOOD PRESSURE: 138 MMHG

## 2024-10-29 DIAGNOSIS — M77.11 LATERAL EPICONDYLITIS OF RIGHT ELBOW: Primary | ICD-10-CM

## 2024-10-29 DIAGNOSIS — H90.3 SENSORINEURAL HEARING LOSS (SNHL) OF BOTH EARS: Primary | ICD-10-CM

## 2024-10-29 DIAGNOSIS — H90.3 SENSORINEURAL HEARING LOSS OF BOTH EARS: Primary | ICD-10-CM

## 2024-10-29 DIAGNOSIS — S29.012A STRAIN OF RIGHT LATISSIMUS DORSI MUSCLE: Primary | ICD-10-CM

## 2024-10-29 PROCEDURE — 99999 PR PBB SHADOW E&M-EST. PATIENT-LVL I: CPT | Mod: PBBFAC,,, | Performed by: AUDIOLOGIST

## 2024-10-29 PROCEDURE — 3051F HG A1C>EQUAL 7.0%<8.0%: CPT | Mod: CPTII,S$GLB,, | Performed by: PHYSICIAN ASSISTANT

## 2024-10-29 PROCEDURE — 99999 PR PBB SHADOW E&M-EST. PATIENT-LVL IV: CPT | Mod: PBBFAC,,, | Performed by: PHYSICIAN ASSISTANT

## 2024-10-29 PROCEDURE — 3072F LOW RISK FOR RETINOPATHY: CPT | Mod: CPTII,S$GLB,, | Performed by: PHYSICIAN ASSISTANT

## 2024-10-29 PROCEDURE — 99203 OFFICE O/P NEW LOW 30 MIN: CPT | Mod: S$GLB,,, | Performed by: OTOLARYNGOLOGY

## 2024-10-29 PROCEDURE — 1160F RVW MEDS BY RX/DR IN RCRD: CPT | Mod: CPTII,S$GLB,, | Performed by: PHYSICIAN ASSISTANT

## 2024-10-29 PROCEDURE — 97112 NEUROMUSCULAR REEDUCATION: CPT | Performed by: PHYSICAL THERAPIST

## 2024-10-29 PROCEDURE — 3288F FALL RISK ASSESSMENT DOCD: CPT | Mod: CPTII,S$GLB,, | Performed by: OTOLARYNGOLOGY

## 2024-10-29 PROCEDURE — 3079F DIAST BP 80-89 MM HG: CPT | Mod: CPTII,S$GLB,, | Performed by: OTOLARYNGOLOGY

## 2024-10-29 PROCEDURE — 3008F BODY MASS INDEX DOCD: CPT | Mod: CPTII,S$GLB,, | Performed by: PHYSICIAN ASSISTANT

## 2024-10-29 PROCEDURE — 20605 DRAIN/INJ JOINT/BURSA W/O US: CPT | Mod: RT,S$GLB,, | Performed by: PHYSICIAN ASSISTANT

## 2024-10-29 PROCEDURE — 3075F SYST BP GE 130 - 139MM HG: CPT | Mod: CPTII,S$GLB,, | Performed by: OTOLARYNGOLOGY

## 2024-10-29 PROCEDURE — 97140 MANUAL THERAPY 1/> REGIONS: CPT | Performed by: PHYSICAL THERAPIST

## 2024-10-29 PROCEDURE — 1101F PT FALLS ASSESS-DOCD LE1/YR: CPT | Mod: CPTII,S$GLB,, | Performed by: PHYSICIAN ASSISTANT

## 2024-10-29 PROCEDURE — 99214 OFFICE O/P EST MOD 30 MIN: CPT | Mod: 25,S$GLB,, | Performed by: PHYSICIAN ASSISTANT

## 2024-10-29 PROCEDURE — 1125F AMNT PAIN NOTED PAIN PRSNT: CPT | Mod: CPTII,S$GLB,, | Performed by: PHYSICIAN ASSISTANT

## 2024-10-29 PROCEDURE — 3066F NEPHROPATHY DOC TX: CPT | Mod: CPTII,S$GLB,, | Performed by: PHYSICIAN ASSISTANT

## 2024-10-29 PROCEDURE — 3066F NEPHROPATHY DOC TX: CPT | Mod: CPTII,S$GLB,, | Performed by: OTOLARYNGOLOGY

## 2024-10-29 PROCEDURE — 3074F SYST BP LT 130 MM HG: CPT | Mod: CPTII,S$GLB,, | Performed by: PHYSICIAN ASSISTANT

## 2024-10-29 PROCEDURE — 1159F MED LIST DOCD IN RCRD: CPT | Mod: CPTII,S$GLB,, | Performed by: PHYSICIAN ASSISTANT

## 2024-10-29 PROCEDURE — 3072F LOW RISK FOR RETINOPATHY: CPT | Mod: CPTII,S$GLB,, | Performed by: OTOLARYNGOLOGY

## 2024-10-29 PROCEDURE — 4010F ACE/ARB THERAPY RXD/TAKEN: CPT | Mod: CPTII,S$GLB,, | Performed by: OTOLARYNGOLOGY

## 2024-10-29 PROCEDURE — 3288F FALL RISK ASSESSMENT DOCD: CPT | Mod: CPTII,S$GLB,, | Performed by: PHYSICIAN ASSISTANT

## 2024-10-29 PROCEDURE — 3060F POS MICROALBUMINURIA REV: CPT | Mod: CPTII,S$GLB,, | Performed by: OTOLARYNGOLOGY

## 2024-10-29 PROCEDURE — 3078F DIAST BP <80 MM HG: CPT | Mod: CPTII,S$GLB,, | Performed by: PHYSICIAN ASSISTANT

## 2024-10-29 PROCEDURE — 3060F POS MICROALBUMINURIA REV: CPT | Mod: CPTII,S$GLB,, | Performed by: PHYSICIAN ASSISTANT

## 2024-10-29 PROCEDURE — 1101F PT FALLS ASSESS-DOCD LE1/YR: CPT | Mod: CPTII,S$GLB,, | Performed by: OTOLARYNGOLOGY

## 2024-10-29 PROCEDURE — 92557 COMPREHENSIVE HEARING TEST: CPT | Mod: S$GLB,,, | Performed by: AUDIOLOGIST

## 2024-10-29 PROCEDURE — 3051F HG A1C>EQUAL 7.0%<8.0%: CPT | Mod: CPTII,S$GLB,, | Performed by: OTOLARYNGOLOGY

## 2024-10-29 PROCEDURE — 99999 PR PBB SHADOW E&M-EST. PATIENT-LVL III: CPT | Mod: PBBFAC,,, | Performed by: OTOLARYNGOLOGY

## 2024-10-29 PROCEDURE — 4010F ACE/ARB THERAPY RXD/TAKEN: CPT | Mod: CPTII,S$GLB,, | Performed by: PHYSICIAN ASSISTANT

## 2024-10-29 PROCEDURE — 92567 TYMPANOMETRY: CPT | Mod: S$GLB,,, | Performed by: AUDIOLOGIST

## 2024-10-29 PROCEDURE — 1159F MED LIST DOCD IN RCRD: CPT | Mod: CPTII,S$GLB,, | Performed by: OTOLARYNGOLOGY

## 2024-10-29 RX ADMIN — TRIAMCINOLONE ACETONIDE 40 MG: 40 INJECTION, SUSPENSION INTRA-ARTICULAR; INTRAMUSCULAR at 04:10

## 2024-10-30 RX ORDER — TRIAMCINOLONE ACETONIDE 40 MG/ML
40 INJECTION, SUSPENSION INTRA-ARTICULAR; INTRAMUSCULAR
Status: COMPLETED | OUTPATIENT
Start: 2024-10-29 | End: 2024-10-29

## 2024-10-31 ENCOUNTER — PATIENT MESSAGE (OUTPATIENT)
Dept: ADMINISTRATIVE | Facility: HOSPITAL | Age: 69
End: 2024-10-31
Payer: COMMERCIAL

## 2024-11-01 ENCOUNTER — LAB VISIT (OUTPATIENT)
Dept: LAB | Facility: HOSPITAL | Age: 69
End: 2024-11-01
Payer: COMMERCIAL

## 2024-11-01 DIAGNOSIS — Z12.11 SCREENING FOR COLON CANCER: ICD-10-CM

## 2024-11-01 DIAGNOSIS — N18.31 TYPE 2 DIABETES MELLITUS WITH STAGE 3A CHRONIC KIDNEY DISEASE, WITHOUT LONG-TERM CURRENT USE OF INSULIN: ICD-10-CM

## 2024-11-01 DIAGNOSIS — E11.22 TYPE 2 DIABETES MELLITUS WITH STAGE 3A CHRONIC KIDNEY DISEASE, WITHOUT LONG-TERM CURRENT USE OF INSULIN: ICD-10-CM

## 2024-11-01 LAB
ESTIMATED AVG GLUCOSE: 183 MG/DL (ref 68–131)
HBA1C MFR BLD: 8 % (ref 4–5.6)

## 2024-11-01 PROCEDURE — 36415 COLL VENOUS BLD VENIPUNCTURE: CPT | Performed by: NURSE PRACTITIONER

## 2024-11-01 PROCEDURE — 83036 HEMOGLOBIN GLYCOSYLATED A1C: CPT | Performed by: NURSE PRACTITIONER

## 2024-11-03 ENCOUNTER — PATIENT MESSAGE (OUTPATIENT)
Dept: INTERNAL MEDICINE | Facility: CLINIC | Age: 69
End: 2024-11-03
Payer: COMMERCIAL

## 2024-11-07 ENCOUNTER — CLINICAL SUPPORT (OUTPATIENT)
Dept: REHABILITATION | Facility: HOSPITAL | Age: 69
End: 2024-11-07
Payer: COMMERCIAL

## 2024-11-07 DIAGNOSIS — S29.012A STRAIN OF RIGHT LATISSIMUS DORSI MUSCLE: Primary | ICD-10-CM

## 2024-11-07 PROCEDURE — 97112 NEUROMUSCULAR REEDUCATION: CPT | Performed by: PHYSICAL THERAPIST

## 2024-11-07 PROCEDURE — 97140 MANUAL THERAPY 1/> REGIONS: CPT | Performed by: PHYSICAL THERAPIST

## 2024-11-08 NOTE — PROGRESS NOTES
"[facility]   Physical Therapy Treatment Note      Name: Meryl Johnson  [facility] Number: [id number]    Therapy Diagnosis:   Encounter Diagnosis   Name Primary?    Strain of right latissimus dorsi muscle Yes     Physician: Russell Honeycutt III, *    Visit Date: 11/7/2024    Physician Orders: PT Eval and Treat   Medical Diagnosis from Referral:   Diagnosis   M25.511 (ICD-10-CM) - Right shoulder pain, unspecified chronicity      Evaluation Date: 5/9/2024  Authorization Period Expiration: 12/31/2024  Plan of Care Expiration: 11/9/2024  Progress Note Due: 8/9/2024  Visit # / Visits authorized: 29/40  FOTO: 1/3    PTA Visit #: 0/5     Time In: 1600  Time Out: 1700  Total Billable Time: 60 minutes    Subjective     Pt reports the elbow is feeling better since the injection. Notes her lat hurting when she got out of bed yesterday    She was compliant with home exercise program.  Response to previous treatment: sore  Functional change: no change    Pain: 4/10  Location: right shoulder      Objective      8/29/24    Glute med 4/5  Lat strength 4+/5  Limited standing and walking endurance 15 minutes  Painful end range extension and flexion to knees  Quad strength 4+/5  Painful step up on 6" box  Treatment     Meryl received the treatments listed below:      therapeutic exercises to develop strength, endurance, ROM, flexibility, posture, and core stabilization for 0 minutes including:      manual therapy techniques: Joint mobilizations and Soft tissue Mobilization were applied to the: R shoulder for 15 minutes, including:    Shoulder Gr III flexion  STM lat and teres  Inferior mob Gr IV at 90 deg abd  IR/ER arm abd 45 deg   Radial head mobs   Gapping humeroulnar     neuromuscular re-education activities to improve: Balance, Coordination, Sense, Proprioception, and Posture for 45 minutes. The following activities were included:    No money OTB 30x  Handcuffs YTB lift 30x  Scaptions 2# 3 x 12  Wand " "overhead 3# stretch    NT  Lat extension OTB 3 x 12  Rows GTB 3 x 15  LAQ 7.5# 3 x 15  Supine clams YTB 3 x 15  Bent over row 5# 3 x 12  Wand flexion OH 3# 30x   Supine SLR 3 x 12  Wrist ext/ 45"2# 30x  ER banded with flexion 2 x 10  Wand flexion 3# 3 x 15  Supine bridge clams GTB 3 x 15  SAQ 2# 3 x 15  Sidelying hip abd 3 x 10 B     therapeutic activities to improve functional performance for 0  minutes, including:     10' Lumbar spine    Patient Education and Home Exercises       Education provided:   - improve lat mobility    Written Home Exercises Provided: YES. Exercises were reviewed and Meryl was able to demonstrate them prior to the end of the session.  Meryl demonstrated good understanding of the education provided. See EMR under Patient Instructions for exercises provided during therapy sessions    Assessment     Lat tightness relief with  end of session. Elbow symptoms much improved today. She tolerated exercise progress, 2# scaption getting much easier to perform.     Meryl Is progressing well towards her goals.   Pt prognosis is Excellent.     Pt will continue to benefit from skilled outpatient physical therapy to address the deficits listed in the problem list box on initial evaluation, provide pt/family education and to maximize pt's level of independence in the home and community environment.     Pt's spiritual, cultural and educational needs considered and pt agreeable to plan of care and goals.     Anticipated barriers to physical therapy: none    GOALS: Short Term Goals:  4 weeks(met)  1.Report decreased shoulder pain < / =  2/10  to increase tolerance for return to work without pains  2. Increase PROM full overhead flexion without shoulder pain   3. Increased strength by 1/3 MMT grade in lat to increase tolerance for ADL and work activities.  4. Pt to tolerate HEP to improve ROM and independence with ADL's     Long Term Goals: 8 weeks(Progressing, not met)  1.Report decreased shoulder pain "  < / =  0 /10  to increase tolerance for return to [employer]  2.Increase AROM to full motion without pain(MET)  3.Increase strength to >/= 4/5 in lat and cuff to increase tolerance for ADL and work activities.(MET)  4. Pt goal: return to work and ADL pain free  5. Pt will have improved gcode of CJ (20-40% limited) on FOTO shoulder in order to demonstrate true functional improvement.     Plan     Plan of care Certification: 5/9/2024 to 11/9/2024.     Improve lat strength    Fabricio Tolentino, PT, DPT, OCS, FAAOMPT

## 2024-11-12 ENCOUNTER — CLINICAL SUPPORT (OUTPATIENT)
Dept: REHABILITATION | Facility: HOSPITAL | Age: 69
End: 2024-11-12
Payer: COMMERCIAL

## 2024-11-12 DIAGNOSIS — S29.012A STRAIN OF RIGHT LATISSIMUS DORSI MUSCLE: Primary | ICD-10-CM

## 2024-11-12 PROCEDURE — 97112 NEUROMUSCULAR REEDUCATION: CPT | Performed by: PHYSICAL THERAPIST

## 2024-11-12 PROCEDURE — 97140 MANUAL THERAPY 1/> REGIONS: CPT | Performed by: PHYSICAL THERAPIST

## 2024-11-12 NOTE — PROGRESS NOTES
"[facility]   Physical Therapy Treatment Note      Name: Meryl Johnson  [facility] Number: [id number]    Therapy Diagnosis:   Encounter Diagnosis   Name Primary?    Strain of right latissimus dorsi muscle Yes     Physician: Russell Honeycutt III, *    Visit Date: 11/12/2024    Physician Orders: PT Eval and Treat   Medical Diagnosis from Referral:   Diagnosis   M25.511 (ICD-10-CM) - Right shoulder pain, unspecified chronicity      Evaluation Date: 5/9/2024  Authorization Period Expiration: 12/31/2024  Plan of Care Expiration: 11/9/2024  Progress Note Due: 8/9/2024  Visit # / Visits authorized: 30/40  FOTO: 1/3    PTA Visit #: 0/5     Time In: 1500  Time Out: 1600  Total Billable Time: 60 minutes    Subjective     Pt reports lat is sore and notes some knee pain descending ramp at [employer]    She was compliant with home exercise program.  Response to previous treatment: sore  Functional change: no change    Pain: 4/10  Location: right shoulder      Objective      8/29/24    Glute med 4/5  Lat strength 4+/5  Limited standing and walking endurance 15 minutes  Painful end range extension and flexion to knees  Quad strength 4+/5  Painful step up on 6" box  Treatment     Meryl received the treatments listed below:      therapeutic exercises to develop strength, endurance, ROM, flexibility, posture, and core stabilization for 0 minutes including:      manual therapy techniques: Joint mobilizations and Soft tissue Mobilization were applied to the: R shoulder for 15 minutes, including:    Shoulder Gr III flexion  STM lat and teres  Inferior mob Gr IV at 90 deg abd  IR/ER arm abd 45 deg   Radial head mobs   Gapping humeroulnar     neuromuscular re-education activities to improve: Balance, Coordination, Sense, Proprioception, and Posture for 45 minutes. The following activities were included:    No money OTB 30x  Handcuffs YTB lift 30x  Scaptions 2# 3 x 12  Wand overhead 3# stretch 40x  SLR supine 30x " "    NT  Lat extension OTB 3 x 12  Rows GTB 3 x 15  LAQ 7.5# 3 x 15  Supine clams YTB 3 x 15  Bent over row 5# 3 x 12  Wand flexion OH 3# 30x   Supine SLR 3 x 12  Wrist ext/ 45"2# 30x  ER banded with flexion 2 x 10  Wand flexion 3# 3 x 15  Supine bridge clams GTB 3 x 15  SAQ 2# 3 x 15  Sidelying hip abd 3 x 10 B     therapeutic activities to improve functional performance for 0  minutes, including:     10' Lumbar spine    Patient Education and Home Exercises       Education provided:   - improve lat mobility    Written Home Exercises Provided: YES. Exercises were reviewed and Meryl was able to demonstrate them prior to the end of the session.  Meryl demonstrated good understanding of the education provided. See EMR under Patient Instructions for exercises provided during therapy sessions    Assessment     Began some leg strengthening to assist with ambulation at [employer]. She notes fatigue with clams and leg raises end of session.     Meryl Is progressing well towards her goals.   Pt prognosis is Excellent.     Pt will continue to benefit from skilled outpatient physical therapy to address the deficits listed in the problem list box on initial evaluation, provide pt/family education and to maximize pt's level of independence in the home and community environment.     Pt's spiritual, cultural and educational needs considered and pt agreeable to plan of care and goals.     Anticipated barriers to physical therapy: none    GOALS: Short Term Goals:  4 weeks(met)  1.Report decreased shoulder pain < / =  2/10  to increase tolerance for return to work without pains  2. Increase PROM full overhead flexion without shoulder pain   3. Increased strength by 1/3 MMT grade in lat to increase tolerance for ADL and work activities.  4. Pt to tolerate HEP to improve ROM and independence with ADL's     Long Term Goals: 8 weeks(Progressing, not met)  1.Report decreased shoulder pain  < / =  0 /10  to increase tolerance for " return to [employer]  2.Increase AROM to full motion without pain(MET)  3.Increase strength to >/= 4/5 in lat and cuff to increase tolerance for ADL and work activities.(MET)  4. Pt goal: return to work and ADL pain free  5. Pt will have improved gcode of CJ (20-40% limited) on FOTO shoulder in order to demonstrate true functional improvement.     Plan     Plan of care Certification: 5/9/2024 to 11/9/2024.     Improve lat strength    Fabricio Tolentino, PT, DPT, OCS, FAAOMPT

## 2024-11-21 ENCOUNTER — CLINICAL SUPPORT (OUTPATIENT)
Dept: REHABILITATION | Facility: HOSPITAL | Age: 69
End: 2024-11-21
Payer: COMMERCIAL

## 2024-11-21 DIAGNOSIS — S29.012A STRAIN OF RIGHT LATISSIMUS DORSI MUSCLE: Primary | ICD-10-CM

## 2024-11-21 PROCEDURE — 97140 MANUAL THERAPY 1/> REGIONS: CPT | Performed by: PHYSICAL THERAPIST

## 2024-11-21 PROCEDURE — 97112 NEUROMUSCULAR REEDUCATION: CPT | Performed by: PHYSICAL THERAPIST

## 2024-11-22 NOTE — PROGRESS NOTES
"[facility]   Physical Therapy Treatment Note      Name: Meryl Johnson  [facility] Number: [id number]    Therapy Diagnosis:   Encounter Diagnosis   Name Primary?    Strain of right latissimus dorsi muscle Yes     Physician: Russell Honeycutt III, *    Visit Date: 11/21/2024    Physician Orders: PT Eval and Treat   Medical Diagnosis from Referral:   Diagnosis   M25.511 (ICD-10-CM) - Right shoulder pain, unspecified chronicity      Evaluation Date: 5/9/2024  Authorization Period Expiration: 12/31/2024  Plan of Care Expiration: 11/9/2024  Progress Note Due: 8/9/2024  Visit # / Visits authorized: 31/40  FOTO: 1/3    PTA Visit #: 0/5     Time In: 1500  Time Out: 1600  Total Billable Time: 60 minutes    Subjective     Pt reports work has been busy, she is doing okay in the lat today    She was compliant with home exercise program.  Response to previous treatment: sore  Functional change: no change    Pain: 4/10  Location: right shoulder      Objective      8/29/24    Glute med 4/5  Lat strength 4+/5  Limited standing and walking endurance 15 minutes  Painful end range extension and flexion to knees  Quad strength 4+/5  Painful step up on 6" box  Treatment     Meryl received the treatments listed below:      therapeutic exercises to develop strength, endurance, ROM, flexibility, posture, and core stabilization for 0 minutes including:      manual therapy techniques: Joint mobilizations and Soft tissue Mobilization were applied to the: R shoulder for  10 minutes, including:    Shoulder Gr III flexion  STM lat and teres  Inferior mob Gr IV at 90 deg abd  IR/ER arm abd 45 deg   Radial head mobs   Gapping humeroulnar     neuromuscular re-education activities to improve: Balance, Coordination, Sense, Proprioception, and Posture for 45 minutes. The following activities were included:    No money OTB 30x   Handcuffs YTB lift 30x  Lat extension GTB 3  x15  Wand overhead 3# stretch 40x    NT  Scaptions 2# 3 " "x 12  SLR supine 30x   Lat extension OTB 3 x 12  Rows GTB 3 x 15  LAQ 7.5# 3 x 15  Supine clams YTB 3 x 15  Bent over row 5# 3 x 12  Wand flexion OH 3# 30x   Supine SLR 3 x 12  Wrist ext/ 45"2# 30x  ER banded with flexion 2 x 10  Wand flexion 3# 3 x 15  Supine bridge clams GTB 3 x 15  SAQ 2# 3 x 15  Sidelying hip abd 3 x 10 B     therapeutic activities to improve functional performance for 0  minutes, including:     10' Lumbar spine    Patient Education and Home Exercises       Education provided:   - improve lat mobility    Written Home Exercises Provided: YES. Exercises were reviewed and Meryl was able to demonstrate them prior to the end of the session.  Meryl demonstrated good understanding of the education provided. See EMR under Patient Instructions for exercises provided during therapy sessions    Assessment     Progressed long sitting lat extension with GTB today. Improved mobility to the shoulder flexion without lumbar extension. Relief with MH prior to start of session     Meryl Is progressing well towards her goals.   Pt prognosis is Excellent.     Pt will continue to benefit from skilled outpatient physical therapy to address the deficits listed in the problem list box on initial evaluation, provide pt/family education and to maximize pt's level of independence in the home and community environment.     Pt's spiritual, cultural and educational needs considered and pt agreeable to plan of care and goals.     Anticipated barriers to physical therapy: none    GOALS: Short Term Goals:  4 weeks(met)  1.Report decreased shoulder pain < / =  2/10  to increase tolerance for return to work without pains  2. Increase PROM full overhead flexion without shoulder pain   3. Increased strength by 1/3 MMT grade in lat to increase tolerance for ADL and work activities.  4. Pt to tolerate HEP to improve ROM and independence with ADL's     Long Term Goals: 8 weeks(Progressing, not met)  1.Report decreased shoulder " pain  < / =  0 /10  to increase tolerance for return to [employer]  2.Increase AROM to full motion without pain(MET)  3.Increase strength to >/= 4/5 in lat and cuff to increase tolerance for ADL and work activities.(MET)  4. Pt goal: return to work and ADL pain free  5. Pt will have improved gcode of CJ (20-40% limited) on FOTO shoulder in order to demonstrate true functional improvement.     Plan     Plan of care Certification: 5/9/2024 to 11/9/2024.     Improve lat strength    Fabricio Tolentino, PT, DPT, OCS, FAAOMPT

## 2024-11-26 ENCOUNTER — CLINICAL SUPPORT (OUTPATIENT)
Dept: REHABILITATION | Facility: HOSPITAL | Age: 69
End: 2024-11-26
Payer: COMMERCIAL

## 2024-11-26 DIAGNOSIS — S29.012A STRAIN OF RIGHT LATISSIMUS DORSI MUSCLE: Primary | ICD-10-CM

## 2024-11-26 PROCEDURE — 97112 NEUROMUSCULAR REEDUCATION: CPT | Performed by: PHYSICAL THERAPIST

## 2024-11-26 PROCEDURE — 97140 MANUAL THERAPY 1/> REGIONS: CPT | Performed by: PHYSICAL THERAPIST

## 2024-11-26 NOTE — PROGRESS NOTES
"[facility]   Physical Therapy Treatment Note      Name: Meryl Johnson  [facility] Number: [id number]    Therapy Diagnosis:   Encounter Diagnosis   Name Primary?    Strain of right latissimus dorsi muscle Yes     Physician: Russell Honeycutt III, *    Visit Date: 11/26/2024    Physician Orders: PT Eval and Treat   Medical Diagnosis from Referral:   Diagnosis   M25.511 (ICD-10-CM) - Right shoulder pain, unspecified chronicity      Evaluation Date: 5/9/2024  Authorization Period Expiration: 12/31/2024  Plan of Care Expiration: 11/9/2024  Progress Note Due: 8/9/2024  Visit # / Visits authorized: 32/40  FOTO: 1/3    PTA Visit #: 0/5     Time In: 1500  Time Out: 1600  Total Billable Time: 60 minutes    Subjective     Pt reports sore in lat yesterday and the L ankle. Due to lifting a cabinet at work    She was compliant with home exercise program.  Response to previous treatment: sore  Functional change: no change    Pain: 4/10  Location: right shoulder      Objective      8/29/24    Glute med 4/5  Lat strength 4+/5  Limited standing and walking endurance 15 minutes  Painful end range extension and flexion to knees  Quad strength 4+/5  Painful step up on 6" box  Treatment     Meryl received the treatments listed below:      therapeutic exercises to develop strength, endurance, ROM, flexibility, posture, and core stabilization for 0 minutes including:      manual therapy techniques: Joint mobilizations and Soft tissue Mobilization were applied to the: R shoulder for  10 minutes, including:    Shoulder Gr III flexion  STM lat and teres  Inferior mob Gr IV at 90 deg abd  IR/ER arm abd 45 deg   Radial head mobs   Gapping humeroulnar     neuromuscular re-education activities to improve: Balance, Coordination, Sense, Proprioception, and Posture for 45 minutes. The following activities were included:    No money GTB 30x   Handcuffs YTB lift 30x  Lat extension GTB 3  x15  Wand overhead 3# stretch " "40x  Ankle DF GTB 30x  Seated ankle heel raises 2 x 20    NT  Scaptions 2# 3 x 12  SLR supine 30x   Lat extension OTB 3 x 12  Rows GTB 3 x 15  LAQ 7.5# 3 x 15  Supine clams YTB 3 x 15  Bent over row 5# 3 x 12  Wand flexion OH 3# 30x   Supine SLR 3 x 12  Wrist ext/ 45"2# 30x  ER banded with flexion 2 x 10  Wand flexion 3# 3 x 15  Supine bridge clams GTB 3 x 15  SAQ 2# 3 x 15  Sidelying hip abd 3 x 10 B     therapeutic activities to improve functional performance for 0  minutes, including:     10' Lumbar spine    Patient Education and Home Exercises       Education provided:   - improve lat mobility    Written Home Exercises Provided: YES. Exercises were reviewed and Meryl was able to demonstrate them prior to the end of the session.  Meryl demonstrated good understanding of the education provided. See EMR under Patient Instructions for exercises provided during therapy sessions    Assessment     Noted limited at TC joint with cavitation on asssessment of joint play. Slight weakness in anterior tib today. She does well with lat loading, just needs to be careful in community    Meryl Is progressing well towards her goals.   Pt prognosis is Excellent.     Pt will continue to benefit from skilled outpatient physical therapy to address the deficits listed in the problem list box on initial evaluation, provide pt/family education and to maximize pt's level of independence in the home and community environment.     Pt's spiritual, cultural and educational needs considered and pt agreeable to plan of care and goals.     Anticipated barriers to physical therapy: none    GOALS: Short Term Goals:  4 weeks(met)  1.Report decreased shoulder pain < / =  2/10  to increase tolerance for return to work without pains  2. Increase PROM full overhead flexion without shoulder pain   3. Increased strength by 1/3 MMT grade in lat to increase tolerance for ADL and work activities.  4. Pt to tolerate HEP to improve ROM and independence " with ADL's     Long Term Goals: 8 weeks(Progressing, not met)  1.Report decreased shoulder pain  < / =  0 /10  to increase tolerance for return to [employer]  2.Increase AROM to full motion without pain(MET)  3.Increase strength to >/= 4/5 in lat and cuff to increase tolerance for ADL and work activities.(MET)  4. Pt goal: return to work and ADL pain free  5. Pt will have improved gcode of CJ (20-40% limited) on FOTO shoulder in order to demonstrate true functional improvement.     Plan     Plan of care Certification: 5/9/2024 to 11/9/2024.     Improve lat strength    Fabricio Tolentino, PT, DPT, OCS, FAAOMPT

## 2024-12-03 ENCOUNTER — CLINICAL SUPPORT (OUTPATIENT)
Dept: REHABILITATION | Facility: HOSPITAL | Age: 69
End: 2024-12-03
Payer: COMMERCIAL

## 2024-12-03 DIAGNOSIS — S29.012A STRAIN OF RIGHT LATISSIMUS DORSI MUSCLE: Primary | ICD-10-CM

## 2024-12-03 PROCEDURE — 97140 MANUAL THERAPY 1/> REGIONS: CPT | Performed by: PHYSICAL THERAPIST

## 2024-12-03 PROCEDURE — 97112 NEUROMUSCULAR REEDUCATION: CPT | Performed by: PHYSICAL THERAPIST

## 2024-12-04 NOTE — PROGRESS NOTES
"[facility]   Physical Therapy Treatment Note      Name: Meryl Johnson  [facility] Number: [id number]    Therapy Diagnosis:   Encounter Diagnosis   Name Primary?    Strain of right latissimus dorsi muscle Yes     Physician: Russell Honeycutt III, *    Visit Date: 12/3/2024    Physician Orders: PT Eval and Treat   Medical Diagnosis from Referral:   Diagnosis   M25.511 (ICD-10-CM) - Right shoulder pain, unspecified chronicity      Evaluation Date: 5/9/2024  Authorization Period Expiration: 12/31/2024  Plan of Care Expiration: 11/9/2024  Progress Note Due: 8/9/2024  Visit # / Visits authorized: 33/40  FOTO: 1/3    PTA Visit #: 0/5     Time In: 1500  Time Out: 1600  Total Billable Time: 60 minutes    Subjective     Pt reports soreness for a few days in the lat after last session    She was compliant with home exercise program.  Response to previous treatment: sore  Functional change: no change    Pain: 4/10  Location: right shoulder      Objective      8/29/24    Glute med 4/5  Lat strength 4+/5  Limited standing and walking endurance 15 minutes  Painful end range extension and flexion to knees  Quad strength 4+/5  Painful step up on 6" box  Treatment     Meryl received the treatments listed below:      therapeutic exercises to develop strength, endurance, ROM, flexibility, posture, and core stabilization for 0 minutes including:      manual therapy techniques: Joint mobilizations and Soft tissue Mobilization were applied to the: R shoulder for  10 minutes, including:    Shoulder Gr III flexion  STM lat and teres  Inferior mob Gr IV at 90 deg abd  IR/ER arm abd 45 deg   Radial head mobs   Gapping humeroulnar     neuromuscular re-education activities to improve: Balance, Coordination, Sense, Proprioception, and Posture for 45 minutes. The following activities were included:    Open books 30x  Wand overhead 3# stretch 40x  Palloff press OTB 2 x 15 B   Row OTB 30x    NT  Scaptions 2# 3 x 12  SLR " "supine 30x   Lat extension OTB 3 x 12  Rows GTB 3 x 15  LAQ 7.5# 3 x 15  Supine clams YTB 3 x 15  Bent over row 5# 3 x 12  Wand flexion OH 3# 30x   Supine SLR 3 x 12  Wrist ext/ 45"2# 30x  ER banded with flexion 2 x 10  Wand flexion 3# 3 x 15  Supine bridge clams GTB 3 x 15  SAQ 2# 3 x 15  Sidelying hip abd 3 x 10 B     therapeutic activities to improve functional performance for 0  minutes, including:     10' Lumbar spine    Patient Education and Home Exercises       Education provided:   - improve lat mobility    Written Home Exercises Provided: YES. Exercises were reviewed and Meryl was able to demonstrate them prior to the end of the session.  Meryl demonstrated good understanding of the education provided. See EMR under Patient Instructions for exercises provided during therapy sessions    Assessment     Focused on thoracic mobility today and middle trap activation. Noted no pain with resisted extension today but tenderness with left thoracic rotation. Likely TL junction dysfunction     Meryl Is progressing well towards her goals.   Pt prognosis is Excellent.     Pt will continue to benefit from skilled outpatient physical therapy to address the deficits listed in the problem list box on initial evaluation, provide pt/family education and to maximize pt's level of independence in the home and community environment.     Pt's spiritual, cultural and educational needs considered and pt agreeable to plan of care and goals.     Anticipated barriers to physical therapy: none    GOALS: Short Term Goals:  4 weeks(met)  1.Report decreased shoulder pain < / =  2/10  to increase tolerance for return to work without pains  2. Increase PROM full overhead flexion without shoulder pain   3. Increased strength by 1/3 MMT grade in lat to increase tolerance for ADL and work activities.  4. Pt to tolerate HEP to improve ROM and independence with ADL's     Long Term Goals: 8 weeks(Progressing, not met)  1.Report decreased " shoulder pain  < / =  0 /10  to increase tolerance for return to [employer]  2.Increase AROM to full motion without pain(MET)  3.Increase strength to >/= 4/5 in lat and cuff to increase tolerance for ADL and work activities.(MET)  4. Pt goal: return to work and ADL pain free  5. Pt will have improved gcode of CJ (20-40% limited) on FOTO shoulder in order to demonstrate true functional improvement.     Plan     Plan of care Certification: 5/9/2024 to 11/9/2024.     Improve lat strength    Fabricio Tolentino, PT, DPT, OCS, FAAOMPT

## 2024-12-09 ENCOUNTER — HOSPITAL ENCOUNTER (OUTPATIENT)
Dept: RADIOLOGY | Facility: HOSPITAL | Age: 69
Discharge: HOME | End: 2024-12-09
Attending: NURSE PRACTITIONER
Payer: COMMERCIAL

## 2024-12-09 DIAGNOSIS — R92.8 ABNORMAL FINDING ON BREAST IMAGING: ICD-10-CM

## 2024-12-09 PROCEDURE — 77065 DX MAMMO INCL CAD UNI: CPT | Mod: TC,RT

## 2024-12-09 PROCEDURE — 77061 BREAST TOMOSYNTHESIS UNI: CPT | Mod: 26,RT,, | Performed by: RADIOLOGY

## 2024-12-09 PROCEDURE — 77065 DX MAMMO INCL CAD UNI: CPT | Mod: 26,RT,, | Performed by: RADIOLOGY

## 2024-12-10 ENCOUNTER — CLINICAL SUPPORT (OUTPATIENT)
Dept: REHABILITATION | Facility: HOSPITAL | Age: 69
End: 2024-12-10
Payer: COMMERCIAL

## 2024-12-10 DIAGNOSIS — S29.012A STRAIN OF RIGHT LATISSIMUS DORSI MUSCLE: Primary | ICD-10-CM

## 2024-12-10 PROCEDURE — 97112 NEUROMUSCULAR REEDUCATION: CPT | Performed by: PHYSICAL THERAPIST

## 2024-12-10 PROCEDURE — 97140 MANUAL THERAPY 1/> REGIONS: CPT | Performed by: PHYSICAL THERAPIST

## 2024-12-11 NOTE — PROGRESS NOTES
"[facility]   Physical Therapy Treatment Note      Name: Meryl Johnson  [facility] Number: [id number]    Therapy Diagnosis:   Encounter Diagnosis   Name Primary?    Strain of right latissimus dorsi muscle Yes     Physician: Russell Honeycutt III, *    Visit Date: 12/10/2024    Physician Orders: PT Eval and Treat   Medical Diagnosis from Referral:   Diagnosis   M25.511 (ICD-10-CM) - Right shoulder pain, unspecified chronicity      Evaluation Date: 5/9/2024  Authorization Period Expiration: 12/31/2024  Plan of Care Expiration: 11/9/2024  Progress Note Due: 8/9/2024  Visit # / Visits authorized: 34/40  FOTO: 1/3    PTA Visit #: 0/5     Time In: 1500  Time Out: 1600  Total Billable Time: 60 minutes    Subjective     Pt reports better after last session. She notes lifting 5 gallon water and some pain after imaging yesterday.     She was compliant with home exercise program.  Response to previous treatment: sore  Functional change: no change    Pain: 4/10  Location: right shoulder      Objective      8/29/24    Glute med 4/5  Lat strength 4+/5  Limited standing and walking endurance 15 minutes  Painful end range extension and flexion to knees  Quad strength 4+/5  Painful step up on 6" box  Treatment     Meryl received the treatments listed below:      therapeutic exercises to develop strength, endurance, ROM, flexibility, posture, and core stabilization for 0 minutes including:      manual therapy techniques: Joint mobilizations and Soft tissue Mobilization were applied to the: R shoulder for  10 minutes, including:    Shoulder Gr III flexion  STM lat and teres  Inferior mob Gr IV at 90 deg abd  IR/ER arm abd 45 deg   Radial head mobs   Gapping humeroulnar     neuromuscular re-education activities to improve: Balance, Coordination, Sense, Proprioception, and Posture for 45 minutes. The following activities were included:    Open books 30x  Wand overhead 3# stretch 40x  Palloff press OTB 2 x 15 B " "  Handcuff GTB 2 x 15    NT  Scaptions 2# 3 x 12  SLR supine 30x   Lat extension OTB 3 x 12  Rows GTB 3 x 15  LAQ 7.5# 3 x 15  Supine clams YTB 3 x 15  Bent over row 5# 3 x 12  Wand flexion OH 3# 30x   Supine SLR 3 x 12  Wrist ext/ 45"2# 30x  ER banded with flexion 2 x 10  Wand flexion 3# 3 x 15  Supine bridge clams GTB 3 x 15  SAQ 2# 3 x 15  Sidelying hip abd 3 x 10 B     therapeutic activities to improve functional performance for 0  minutes, including:     10' Lumbar spine    Patient Education and Home Exercises       Education provided:   - improve lat mobility    Written Home Exercises Provided: YES. Exercises were reviewed and Meryl was able to demonstrate them prior to the end of the session.  Meryl demonstrated good understanding of the education provided. See EMR under Patient Instructions for exercises provided during therapy sessions    Assessment     Noted pain today from lifting the water yesterday and imaging day before. Heat end of session with relief. Does better promoting thoracic rotation and stabilization    Meryl Is progressing well towards her goals.   Pt prognosis is Excellent.     Pt will continue to benefit from skilled outpatient physical therapy to address the deficits listed in the problem list box on initial evaluation, provide pt/family education and to maximize pt's level of independence in the home and community environment.     Pt's spiritual, cultural and educational needs considered and pt agreeable to plan of care and goals.     Anticipated barriers to physical therapy: none    GOALS: Short Term Goals:  4 weeks(met)  1.Report decreased shoulder pain < / =  2/10  to increase tolerance for return to work without pains  2. Increase PROM full overhead flexion without shoulder pain   3. Increased strength by 1/3 MMT grade in lat to increase tolerance for ADL and work activities.  4. Pt to tolerate HEP to improve ROM and independence with ADL's     Long Term Goals: 8 " weeks(Progressing, not met)  1.Report decreased shoulder pain  < / =  0 /10  to increase tolerance for return to [employer]  2.Increase AROM to full motion without pain(MET)  3.Increase strength to >/= 4/5 in lat and cuff to increase tolerance for ADL and work activities.(MET)  4. Pt goal: return to work and ADL pain free  5. Pt will have improved gcode of CJ (20-40% limited) on FOTO shoulder in order to demonstrate true functional improvement.     Plan     Plan of care Certification: 5/9/2024 to 11/9/2024.     Improve lat strength    Fabricio Tolentino, PT, DPT, OCS, FAAOMPT

## 2024-12-17 ENCOUNTER — CLINICAL SUPPORT (OUTPATIENT)
Dept: REHABILITATION | Facility: HOSPITAL | Age: 69
End: 2024-12-17
Payer: COMMERCIAL

## 2024-12-17 DIAGNOSIS — S29.012A STRAIN OF RIGHT LATISSIMUS DORSI MUSCLE: Primary | ICD-10-CM

## 2024-12-17 PROCEDURE — 97140 MANUAL THERAPY 1/> REGIONS: CPT | Performed by: PHYSICAL THERAPIST

## 2024-12-17 PROCEDURE — 97112 NEUROMUSCULAR REEDUCATION: CPT | Performed by: PHYSICAL THERAPIST

## 2024-12-17 NOTE — PROGRESS NOTES
"[facility]   Physical Therapy Treatment Note      Name: Meryl Johnson  [facility] Number: [id number]    Therapy Diagnosis:   Encounter Diagnosis   Name Primary?    Strain of right latissimus dorsi muscle Yes     Physician: Russell Honeycutt III, *    Visit Date: 12/17/2024    Physician Orders: PT Eval and Treat   Medical Diagnosis from Referral:   Diagnosis   M25.511 (ICD-10-CM) - Right shoulder pain, unspecified chronicity      Evaluation Date: 5/9/2024  Authorization Period Expiration: 12/31/2024  Plan of Care Expiration: 11/9/2024  Progress Note Due: 8/9/2024  Visit # / Visits authorized: 35/40  FOTO: 1/3    PTA Visit #: 0/5     Time In: 1500  Time Out: 1600  Total Billable Time: 60 minutes    Subjective     Pt reports sore after last session. 2 more visits left    She was compliant with home exercise program.  Response to previous treatment: sore  Functional change: no change    Pain: 4/10  Location: right shoulder      Objective      8/29/24    Glute med 4/5  Lat strength 4+/5  Limited standing and walking endurance 15 minutes  Painful end range extension and flexion to knees  Quad strength 4+/5  Painful step up on 6" box  Treatment     Meryl received the treatments listed below:      therapeutic exercises to develop strength, endurance, ROM, flexibility, posture, and core stabilization for 0 minutes including:      manual therapy techniques: Joint mobilizations and Soft tissue Mobilization were applied to the: R shoulder for  10 minutes, including:    Shoulder Gr III flexion  STM lat and teres  Inferior mob Gr IV at 90 deg abd  IR/ER arm abd 45 deg       neuromuscular re-education activities to improve: Balance, Coordination, Sense, Proprioception, and Posture for 45 minutes. The following activities were included:    Open books 30x  Wand overhead 3# stretch 40x  Palloff press OTB 2 x 15 B   Lat extension OTB 3 x 12  Rows OTB 3 x 15    NT  Scaptions 2# 3 x 12  SLR supine 30x     LAQ " "7.5# 3 x 15  Supine clams YTB 3 x 15  Bent over row 5# 3 x 12  Wand flexion OH 3# 30x   Supine SLR 3 x 12  Wrist ext/ 45"2# 30x  ER banded with flexion 2 x 10  Wand flexion 3# 3 x 15  Supine bridge clams GTB 3 x 15  SAQ 2# 3 x 15  Sidelying hip abd 3 x 10 B     therapeutic activities to improve functional performance for 0  minutes, including:     10' Lumbar spine    Patient Education and Home Exercises       Education provided:   - improve lat mobility    Written Home Exercises Provided: YES. Exercises were reviewed and Meryl was able to demonstrate them prior to the end of the session.  Meryl demonstrated good understanding of the education provided. See EMR under Patient Instructions for exercises provided during therapy sessions    Assessment     Progressed palloff with lift for lat resistance today. Noted fatigue on arrival so added in  at start today. She was able to hold 30# child yesterday for about 30 seconds    Meryl Is progressing well towards her goals.   Pt prognosis is Excellent.     Pt will continue to benefit from skilled outpatient physical therapy to address the deficits listed in the problem list box on initial evaluation, provide pt/family education and to maximize pt's level of independence in the home and community environment.     Pt's spiritual, cultural and educational needs considered and pt agreeable to plan of care and goals.     Anticipated barriers to physical therapy: none    GOALS: Short Term Goals:  4 weeks(met)  1.Report decreased shoulder pain < / =  2/10  to increase tolerance for return to work without pains  2. Increase PROM full overhead flexion without shoulder pain   3. Increased strength by 1/3 MMT grade in lat to increase tolerance for ADL and work activities.  4. Pt to tolerate HEP to improve ROM and independence with ADL's     Long Term Goals: 8 weeks(Progressing, not met)  1.Report decreased shoulder pain  < / =  0 /10  to increase tolerance for return to [employer]  2.Increase AROM to full motion without pain(MET)  3.Increase strength to >/= 4/5 in lat and cuff to increase tolerance for ADL and work activities.(MET)  4. Pt goal: return to work and ADL pain free  5. Pt will have improved gcode of CJ (20-40% limited) on FOTO shoulder in order to demonstrate true functional improvement.     Plan     Plan of care Certification: 5/9/2024 to 11/9/2024.     Improve lat strength    Fabricio Tolentino, PT, DPT, OCS, FAAOMPT

## 2024-12-22 ENCOUNTER — OFFICE VISIT (OUTPATIENT)
Dept: URGENT CARE | Facility: CLINIC | Age: 69
End: 2024-12-22
Payer: COMMERCIAL

## 2024-12-22 VITALS
BODY MASS INDEX: 43 KG/M2 | OXYGEN SATURATION: 96 % | SYSTOLIC BLOOD PRESSURE: 138 MMHG | TEMPERATURE: 98 F | DIASTOLIC BLOOD PRESSURE: 78 MMHG | WEIGHT: 274 LBS | RESPIRATION RATE: 19 BRPM | HEART RATE: 89 BPM | HEIGHT: 67 IN

## 2024-12-22 DIAGNOSIS — J10.1 INFLUENZA A: Primary | ICD-10-CM

## 2024-12-22 DIAGNOSIS — R51.9 SINUS HEADACHE: ICD-10-CM

## 2024-12-22 DIAGNOSIS — R05.1 ACUTE COUGH: ICD-10-CM

## 2024-12-22 DIAGNOSIS — J34.9 SINUS PROBLEM: ICD-10-CM

## 2024-12-22 LAB
CTP QC/QA: YES
POC MOLECULAR INFLUENZA A AGN: NEGATIVE
POC MOLECULAR INFLUENZA B AGN: NEGATIVE

## 2024-12-22 PROCEDURE — 87502 INFLUENZA DNA AMP PROBE: CPT | Mod: QW,S$GLB,, | Performed by: NURSE PRACTITIONER

## 2024-12-22 PROCEDURE — 99203 OFFICE O/P NEW LOW 30 MIN: CPT | Mod: S$GLB,,, | Performed by: NURSE PRACTITIONER

## 2024-12-22 RX ORDER — ACETAMINOPHEN 500 MG
1000 TABLET ORAL
Status: COMPLETED | OUTPATIENT
Start: 2024-12-22 | End: 2024-12-22

## 2024-12-22 RX ORDER — PROMETHAZINE HYDROCHLORIDE AND DEXTROMETHORPHAN HYDROBROMIDE 6.25; 15 MG/5ML; MG/5ML
5 SYRUP ORAL EVERY 8 HOURS PRN
Qty: 150 ML | Refills: 0 | Status: SHIPPED | OUTPATIENT
Start: 2024-12-22 | End: 2025-01-01

## 2024-12-22 RX ORDER — BENZONATATE 100 MG/1
200 CAPSULE ORAL 3 TIMES DAILY PRN
Qty: 60 CAPSULE | Refills: 0 | Status: SHIPPED | OUTPATIENT
Start: 2024-12-22 | End: 2025-01-01

## 2024-12-22 RX ORDER — OSELTAMIVIR PHOSPHATE 75 MG/1
75 CAPSULE ORAL 2 TIMES DAILY
Qty: 10 CAPSULE | Refills: 0 | Status: SHIPPED | OUTPATIENT
Start: 2024-12-22 | End: 2024-12-27

## 2024-12-22 RX ADMIN — Medication 1000 MG: at 09:12

## 2024-12-22 NOTE — PATIENT INSTRUCTIONS
Discharge instructions for Cough and Influenza A   Start Tamiflu as prescribed.  Start Tessalon Perles and Phenergan DM as prescribed.  Push fluids and maintain hydration      1) See orders for this visit as documented in the electronic medical record.  2) Symptomatic therapy suggested: use acetaminophen/ibuprofen every 6-8 hours prn pain or fever, push fluids.   3) Call or return to clinic prn if these symptoms worsen or fail to improve as anticipated.    Discussed results/diagnosis/plan with patient in clinic.  We had shared decision making for patient's treatment. Patient verbalized understanding and in agreement with current treatment plan.     Patient was instructed to return for re-evaluation with urgent care or PCP for continued outpatient workup and management if symptoms do not improve/worsening symptoms. Strict ED versus clinic precautions given in depth.    Discharge and follow-up instructions given verbally/printed with the patient who expressed understanding. The instructions and results are also available on [facility].      - You must understand that you have received an Urgent Care treatment only and that you may be released before all of your medical problems are known or treated.   - You, the patient, will arrange for follow up care as instructed.   - Follow up with your PCP or specialty clinic as directed in the next 1-2 weeks if not improved or as needed.  You can call [phone] to schedule an appointment with the appropriate provider.   - If your condition worsens or fails to improve we recommend that you receive another evaluation at the ER immediately or contact your PCP to discuss your concerns or return here.        CULLEN Davis

## 2024-12-22 NOTE — PROGRESS NOTES
"Subjective:      Patient ID: Meryl Johnson is a 69 y.o. female.    Vitals:  height is 5' 7" (1.702 m) and weight is 124.3 kg (274 lb). Her oral temperature is 98.4 °F (36.9 °C). Her blood pressure is 138/78 and her pulse is 89. Her respiration is 19 and oxygen saturation is 96%.     Chief Complaint: Sinus Problem    Pt is here for sinus problem that onset Friday. Pt states pain is 10/10 when she coughs. Pt has tried tylenol. She states symptoms of cough, congestion, headaches, sinus pressure, sore throat, and runny nose.     Sinus Problem  This is a new problem. The current episode started in the past 7 days. The problem has been gradually worsening since onset. The maximum temperature recorded prior to her arrival was 101 - 101.9 F. Her pain is at a severity of 10/10 (when coughing). The pain is moderate. Associated symptoms include congestion, coughing, headaches, sinus pressure and a sore throat. (Runny nose) Past treatments include acetaminophen.       HENT:  Positive for congestion, sinus pressure and sore throat.    Respiratory:  Positive for cough.    Neurological:  Positive for headaches.      Objective:     Physical Exam   Constitutional: She is oriented to person, place, and time. She appears well-developed. She is cooperative.  Non-toxic appearance. She does not appear ill. No distress. awake  HENT:   Head: Normocephalic and atraumatic.   Ears:   Right Ear: Hearing, tympanic membrane, external ear and ear canal normal. no impacted cerumen  Left Ear: Hearing, tympanic membrane, external ear and ear canal normal. no impacted cerumen  Nose: Mucosal edema and congestion present. No rhinorrhea or nasal deformity. No epistaxis. Right sinus exhibits no maxillary sinus tenderness and no frontal sinus tenderness. Left sinus exhibits no maxillary sinus tenderness and no frontal sinus tenderness.   Mouth/Throat: Uvula is midline and mucous membranes are normal. No trismus in the jaw. Normal dentition. No uvula " swelling. Posterior oropharyngeal erythema and cobblestoning present. No oropharyngeal exudate, posterior oropharyngeal edema or tonsillar abscesses.   Eyes: Conjunctivae and lids are normal. Pupils are equal, round, and reactive to light. No scleral icterus. Extraocular movement intact   Neck: Trachea normal and phonation normal. Neck supple. No thyromegaly present. No edema present. No erythema present. No neck rigidity present.   Cardiovascular: Normal rate, regular rhythm, S1 normal, S2 normal, normal heart sounds and normal pulses.   Pulmonary/Chest: Effort normal and breath sounds normal. No respiratory distress. She has no decreased breath sounds. She has no wheezes. She has no rhonchi. She has no rales.   Abdominal: Normal appearance and bowel sounds are normal. Soft. protuberant   Musculoskeletal: Normal range of motion.         General: No deformity. Normal range of motion.      Right lower leg: No edema.      Left lower leg: No edema.   Lymphadenopathy:     She has no cervical adenopathy.   Neurological: no focal deficit. She is alert and oriented to person, place, and time. She exhibits normal muscle tone. Coordination normal.   Skin: Skin is warm, dry, intact, not diaphoretic and not pale. Capillary refill takes less than 2 seconds.   Psychiatric: Her speech is normal and behavior is normal. Mood, judgment and thought content normal.   Nursing note and vitals reviewed.        Results for orders placed or performed in visit on 12/22/24   POCT Influenza A/B MOLECULAR    Collection Time: 12/22/24  9:30 AM   Result Value Ref Range    POC Molecular Influenza A Ag Negative Negative    POC Molecular Influenza B Ag Negative Negative     Acceptable Yes      *Note: Due to a large number of results and/or encounters for the requested time period, some results have not been displayed. A complete set of results can be found in Results Review.       Assessment:     1. Influenza A    2. Sinus problem     3. Acute cough    4. Sinus headache        Plan:       Influenza A  -     oseltamivir (TAMIFLU) 75 MG capsule; Take 1 capsule (75 mg total) by mouth 2 (two) times daily. for 5 days  Dispense: 10 capsule; Refill: 0    Sinus problem  -     POCT Influenza A/B MOLECULAR    Acute cough  -     benzonatate (TESSALON) 100 MG capsule; Take 2 capsules (200 mg total) by mouth 3 (three) times daily as needed.  Dispense: 60 capsule; Refill: 0  -     promethazine-dextromethorphan (PROMETHAZINE-DM) 6.25-15 mg/5 mL Syrp; Take 5 mLs by mouth every 8 (eight) hours as needed (cough).  Dispense: 150 mL; Refill: 0    Sinus headache  -     acetaminophen tablet 1,000 mg        Patient Instructions   Discharge instructions for Cough and Influenza A   Start Tamiflu as prescribed.  Start Tessalon Perles and Phenergan DM as prescribed.  Push fluids and maintain hydration      1) See orders for this visit as documented in the electronic medical record.  2) Symptomatic therapy suggested: use acetaminophen/ibuprofen every 6-8 hours prn pain or fever, push fluids.   3) Call or return to clinic prn if these symptoms worsen or fail to improve as anticipated.    Discussed results/diagnosis/plan with patient in clinic.  We had shared decision making for patient's treatment. Patient verbalized understanding and in agreement with current treatment plan.     Patient was instructed to return for re-evaluation with urgent care or PCP for continued outpatient workup and management if symptoms do not improve/worsening symptoms. Strict ED versus clinic precautions given in depth.    Discharge and follow-up instructions given verbally/printed with the patient who expressed understanding. The instructions and results are also available on [facility].      - You must understand that you have received an Urgent Care treatment only and that you may be released before all of your medical problems are known or treated.   - You, the patient, will arrange for follow  up care as instructed.   - Follow up with your PCP or specialty clinic as directed in the next 1-2 weeks if not improved or as needed.  You can call [phone] to schedule an appointment with the appropriate provider.   - If your condition worsens or fails to improve we recommend that you receive another evaluation at the ER immediately or contact your PCP to discuss your concerns or return here.        CULLEN Davis

## 2025-01-15 ENCOUNTER — OFFICE VISIT (OUTPATIENT)
Dept: INTERNAL MEDICINE | Facility: CLINIC | Age: 70
End: 2025-01-15
Payer: COMMERCIAL

## 2025-01-15 ENCOUNTER — NURSE TRIAGE (OUTPATIENT)
Dept: ADMINISTRATIVE | Facility: CLINIC | Age: 70
End: 2025-01-15
Payer: COMMERCIAL

## 2025-01-15 ENCOUNTER — HOSPITAL ENCOUNTER (OUTPATIENT)
Dept: RADIOLOGY | Facility: HOSPITAL | Age: 70
Discharge: HOME | End: 2025-01-15
Attending: PHYSICIAN ASSISTANT
Payer: COMMERCIAL

## 2025-01-15 VITALS
HEART RATE: 107 BPM | OXYGEN SATURATION: 96 % | TEMPERATURE: 98 F | DIASTOLIC BLOOD PRESSURE: 70 MMHG | BODY MASS INDEX: 41.69 KG/M2 | HEIGHT: 67 IN | WEIGHT: 265.63 LBS | SYSTOLIC BLOOD PRESSURE: 122 MMHG

## 2025-01-15 DIAGNOSIS — M25.531 RIGHT WRIST PAIN: Primary | ICD-10-CM

## 2025-01-15 DIAGNOSIS — M25.531 RIGHT WRIST PAIN: ICD-10-CM

## 2025-01-15 DIAGNOSIS — R05.9 COUGH, UNSPECIFIED TYPE: ICD-10-CM

## 2025-01-15 LAB
CTP QC/QA: YES
CTP QC/QA: YES
POC MOLECULAR INFLUENZA A AGN: NEGATIVE
POC MOLECULAR INFLUENZA B AGN: NEGATIVE
SARS-COV-2 RDRP RESP QL NAA+PROBE: NEGATIVE

## 2025-01-15 PROCEDURE — 73110 X-RAY EXAM OF WRIST: CPT | Mod: TC,RT

## 2025-01-15 PROCEDURE — 1101F PT FALLS ASSESS-DOCD LE1/YR: CPT | Mod: CPTII,S$GLB,, | Performed by: PHYSICIAN ASSISTANT

## 2025-01-15 PROCEDURE — 3078F DIAST BP <80 MM HG: CPT | Mod: CPTII,S$GLB,, | Performed by: PHYSICIAN ASSISTANT

## 2025-01-15 PROCEDURE — 99214 OFFICE O/P EST MOD 30 MIN: CPT | Mod: S$GLB,,, | Performed by: PHYSICIAN ASSISTANT

## 2025-01-15 PROCEDURE — 87635 SARS-COV-2 COVID-19 AMP PRB: CPT | Mod: QW,S$GLB,, | Performed by: PHYSICIAN ASSISTANT

## 2025-01-15 PROCEDURE — 3074F SYST BP LT 130 MM HG: CPT | Mod: CPTII,S$GLB,, | Performed by: PHYSICIAN ASSISTANT

## 2025-01-15 PROCEDURE — 99999 PR PBB SHADOW E&M-EST. PATIENT-LVL IV: CPT | Mod: PBBFAC,,, | Performed by: PHYSICIAN ASSISTANT

## 2025-01-15 PROCEDURE — 87502 INFLUENZA DNA AMP PROBE: CPT | Mod: QW,S$GLB,, | Performed by: PHYSICIAN ASSISTANT

## 2025-01-15 PROCEDURE — 1159F MED LIST DOCD IN RCRD: CPT | Mod: CPTII,S$GLB,, | Performed by: PHYSICIAN ASSISTANT

## 2025-01-15 PROCEDURE — 73110 X-RAY EXAM OF WRIST: CPT | Mod: 26,RT,, | Performed by: RADIOLOGY

## 2025-01-15 PROCEDURE — 1125F AMNT PAIN NOTED PAIN PRSNT: CPT | Mod: CPTII,S$GLB,, | Performed by: PHYSICIAN ASSISTANT

## 2025-01-15 PROCEDURE — 3288F FALL RISK ASSESSMENT DOCD: CPT | Mod: CPTII,S$GLB,, | Performed by: PHYSICIAN ASSISTANT

## 2025-01-15 PROCEDURE — 3008F BODY MASS INDEX DOCD: CPT | Mod: CPTII,S$GLB,, | Performed by: PHYSICIAN ASSISTANT

## 2025-01-15 RX ORDER — AZELASTINE 1 MG/ML
1 SPRAY, METERED NASAL 2 TIMES DAILY
Qty: 30 ML | Refills: 0 | Status: SHIPPED | OUTPATIENT
Start: 2025-01-15 | End: 2026-01-15

## 2025-01-15 RX ORDER — DOXYCYCLINE 100 MG/1
100 CAPSULE ORAL 2 TIMES DAILY
Qty: 14 CAPSULE | Refills: 0 | Status: SHIPPED | OUTPATIENT
Start: 2025-01-15 | End: 2025-01-22

## 2025-01-15 RX ORDER — PROMETHAZINE HYDROCHLORIDE AND DEXTROMETHORPHAN HYDROBROMIDE 6.25; 15 MG/5ML; MG/5ML
5 SYRUP ORAL EVERY 4 HOURS PRN
Qty: 118 ML | Refills: 0 | Status: SHIPPED | OUTPATIENT
Start: 2025-01-15 | End: 2025-01-25

## 2025-01-15 NOTE — PROGRESS NOTES
INTERNAL MEDICINE URGENT VISIT NOTE    CHIEF COMPLAINT     Chief Complaint   Patient presents with    Cough       HPI     Meryl Johnson is a 69 y.o. female who presents for an urgent visit today.    PCP is Russell Martinez MD, patient is new to me.     Cough   Started on Sunday   Dry not productive   Headache  Not feeling feverish  She does not have chest pain   She does have SOB   Sunday before Aguilar was seen at  and was given cough medicine   She was treated with tamiflu  She took promethazine, this helped some.     Of note, while walking to the exam room from the waiting room she had a misstep and fell into the wall - did not fall to the ground. She caught herself with her right hand. She strained her wrist.      Past Medical History:  Past Medical History:   Diagnosis Date    Anemia     Diabetes mellitus type II     H. pylori infection     Hyperlipidemia     Hypertension     Unspecified vitamin D deficiency 04/24/2013       Home Medications:  Prior to Admission medications    Medication Sig Start Date End Date Taking? Authorizing Provider   empagliflozin (JARDIANCE) 25 mg tablet Take 1 tablet (25 mg total) by mouth once daily. 10/2/24  Yes Russell Martinez MD   ergocalciferol (VITAMIN D2) 50,000 unit Cap Take 1 capsule (50,000 Units total) by mouth every 7 days. 10/2/24  Yes Russell Martinez MD   losartan (COZAAR) 25 MG tablet Take 1 tablet (25 mg total) by mouth once daily. 3/22/24 3/22/25 Yes Russell Martinez MD   metFORMIN (GLUCOPHAGE) 1000 MG tablet Take 1 tablet (1,000 mg total) by mouth 2 (two) times daily with meals. 10/2/24  Yes Russell Martinez MD   rosuvastatin (CRESTOR) 20 MG tablet Take 1 tablet (20 mg total) by mouth once daily. 8/5/24  Yes Russell Martinez MD   aspirin (ECOTRIN) 81 MG EC tablet Take 1 tablet (81 mg total) by mouth once daily. For 4 weeks starting the day after surgery.  Patient not taking: Reported on 1/15/2025 10/3/22 7/1/24  Russell Honeycutt III, PA-C    blood sugar diagnostic Strp To check BG 3 times daily, to use with insurance preferred meter  Patient not taking: Reported on 1/15/2025 7/11/24   Linn Lucero APRN, FNP   blood-glucose meter kit To check BG 3 times daily, to use with insurance preferred meter  Patient not taking: Reported on 1/15/2025 7/11/24   Linn Lucero APRN, FNP   blood-glucose sensor (DEXCOM G7 SENSOR) Caty Change every 10 days, dexcom g7 , e 11.65 , h/o hypoglycemia < 54 mg/dl  Patient not taking: Reported on 1/15/2025 7/1/24   Linn Lucero APRN, JEREDP   celecoxib (CELEBREX) 200 MG capsule Take 1 capsule (200 mg total) by mouth 2 (two) times daily. Take with food.  Patient not taking: Reported on 1/15/2025 5/22/24   Russell Honeycutt III, PA-C   lancets 28 gauge Misc To check BG 3 times daily, to use with insurance preferred meter  Patient not taking: Reported on 1/15/2025 7/11/24   Linn Lucero APRN, JEREDP   triamcinolone acetonide 0.1% (KENALOG) 0.1 % cream Apply topically 2 (two) times daily.  Patient not taking: Reported on 1/15/2025 3/22/24   Russell Martinez MD       Review of Systems:  Review of Systems   Constitutional:  Negative for chills and fever.   HENT:  Negative for sore throat and trouble swallowing.    Eyes:  Negative for visual disturbance.   Respiratory:  Positive for cough. Negative for shortness of breath.    Cardiovascular:  Negative for chest pain.   Gastrointestinal:  Negative for abdominal pain, constipation, diarrhea, nausea and vomiting.   Genitourinary:  Negative for dysuria and flank pain.   Musculoskeletal:  Negative for back pain, neck pain and neck stiffness.        Wrist pain    Skin:  Negative for rash.   Neurological:  Negative for dizziness, syncope, weakness and headaches.   Psychiatric/Behavioral:  Negative for confusion.        Health Maintainence:   Immunizations:  Health Maintenance         Date Due Completion Date    Shingles Vaccine (1 of 2) Never done ---    RSV Vaccine (Age  "60+ and Pregnant patients) (1 - Risk 60-74 years 1-dose series) Never done ---    Colorectal Cancer Screening 07/13/2018 7/12/2018    COVID-19 Vaccine (5 - 2024-25 season) 09/01/2024 5/20/2022    Diabetic Eye Exam 10/04/2024 10/4/2023    Hemoglobin A1c 02/01/2025 11/1/2024    DEXA Scan 04/14/2025 4/14/2023    Lipid Panel 03/19/2025 3/19/2024    Diabetes Urine Screening 03/22/2025 3/22/2024    Foot Exam 07/01/2025 7/1/2024    Override on 2/26/2015: Done    Mammogram 12/09/2025 12/9/2024    High Dose Statin 12/22/2025 12/22/2024    TETANUS VACCINE 07/08/2030 7/8/2020             PHYSICAL EXAM     BP (!) 146/88 (BP Location: Left arm, Patient Position: Sitting)   Pulse 107   Temp 97.7 °F (36.5 °C) (Oral)   Ht 5' 7" (1.702 m)   Wt 120.5 kg (265 lb 10.5 oz)   SpO2 96%   BMI 41.61 kg/m²     Physical Exam  Vitals and nursing note reviewed.   Constitutional:       Appearance: Normal appearance.      Comments: Obese female in NAD or apparent pain.  She sounds nasally congested and coughs occasionally during interview and exam.  She has no shortness of breath.  She rubs her right wrist during interview and exam.   HENT:      Head: Normocephalic and atraumatic.      Nose: Nose normal.      Mouth/Throat:      Pharynx: Oropharynx is clear.   Eyes:      Conjunctiva/sclera: Conjunctivae normal.   Cardiovascular:      Rate and Rhythm: Normal rate and regular rhythm.      Pulses: Normal pulses.   Pulmonary:      Effort: No respiratory distress.   Abdominal:      Tenderness: There is no abdominal tenderness.   Musculoskeletal:         General: Normal range of motion.      Cervical back: No rigidity.      Comments: Right wrist has slight erythema with no edema or ecchymosis.  No deformity.  Normal distal pulse and range of motion.  Point localized tenderness to palpation over dorsal wrist.   Skin:     General: Skin is warm and dry.      Capillary Refill: Capillary refill takes less than 2 seconds.      Findings: No rash. "   Neurological:      General: No focal deficit present.      Mental Status: She is alert.      Gait: Gait normal.   Psychiatric:         Mood and Affect: Mood normal.         LABS     Lab Results   Component Value Date    HGBA1C 8.0 (H) 11/01/2024     CMP  Sodium   Date Value Ref Range Status   03/19/2024 140 136 - 145 mmol/L Final     Potassium   Date Value Ref Range Status   03/19/2024 4.8 3.5 - 5.1 mmol/L Final     Chloride   Date Value Ref Range Status   03/19/2024 110 95 - 110 mmol/L Final     CO2   Date Value Ref Range Status   03/19/2024 19 (L) 23 - 29 mmol/L Final     Glucose   Date Value Ref Range Status   03/19/2024 146 (H) 70 - 110 mg/dL Final     BUN   Date Value Ref Range Status   03/19/2024 20 8 - 23 mg/dL Final     Creatinine   Date Value Ref Range Status   03/19/2024 1.1 0.5 - 1.4 mg/dL Final     Calcium   Date Value Ref Range Status   03/19/2024 9.6 8.7 - 10.5 mg/dL Final     Total Protein   Date Value Ref Range Status   03/19/2024 7.2 6.0 - 8.4 g/dL Final     Albumin   Date Value Ref Range Status   03/19/2024 3.9 3.5 - 5.2 g/dL Final     Total Bilirubin   Date Value Ref Range Status   03/19/2024 0.4 0.1 - 1.0 mg/dL Final     Comment:     For infants and newborns, interpretation of results should be based  on gestational age, weight and in agreement with clinical  observations.    Premature Infant recommended reference ranges:  Up to 24 hours.............<8.0 mg/dL  Up to 48 hours............<12.0 mg/dL  3-5 days..................<15.0 mg/dL  6-29 days.................<15.0 mg/dL       Alkaline Phosphatase   Date Value Ref Range Status   03/19/2024 119 55 - 135 U/L Final     AST   Date Value Ref Range Status   03/19/2024 19 10 - 40 U/L Final     ALT   Date Value Ref Range Status   03/19/2024 16 10 - 44 U/L Final     Anion Gap   Date Value Ref Range Status   03/19/2024 11 8 - 16 mmol/L Final     eGFR if    Date Value Ref Range Status   05/11/2022 41.6 (A) >60 mL/min/1.73 m^2 Final      eGFR if non    Date Value Ref Range Status   05/11/2022 36.0 (A) >60 mL/min/1.73 m^2 Final     Comment:     Calculation used to obtain the estimated glomerular filtration  rate (eGFR) is the CKD-EPI equation.        Lab Results   Component Value Date    WBC 6.24 03/19/2024    HGB 13.9 03/19/2024    HCT 46.1 03/19/2024    MCV 90 03/19/2024     03/19/2024     Lab Results   Component Value Date    CHOL 189 03/19/2024    CHOL 295 (H) 05/11/2022    CHOL 191 12/03/2021     Lab Results   Component Value Date    HDL 57 03/19/2024    HDL 49 05/11/2022    HDL 41 12/03/2021     Lab Results   Component Value Date    LDLCALC 99.8 03/19/2024    LDLCALC 199.8 (H) 05/11/2022    LDLCALC 116.6 12/03/2021     Lab Results   Component Value Date    TRIG 161 (H) 03/19/2024    TRIG 231 (H) 05/11/2022    TRIG 167 (H) 12/03/2021     Lab Results   Component Value Date    CHOLHDL 30.2 03/19/2024    CHOLHDL 16.6 (L) 05/11/2022    CHOLHDL 21.5 12/03/2021     Lab Results   Component Value Date    TSH 3.800 03/19/2024    C3CGOOR 237 (H) 01/13/2004    R1OCBPS 10.1 01/13/2004       ASSESSMENT/PLAN     Meryl Johnson is a 69 y.o. female     Plan:   Flu and COVID are negative here in office.   Wirst x-ray had a fall coming into the office there is no deformity of the right wrist but she is rubbing it during interview and exam.  Good range of motion good distal pulse no open wounds.  She was educated on watchful waiting with regards to antibiotic prescription.  Recommended to start azelastine, cont cough suppressant.      Meryl was seen today for cough.    Diagnoses and all orders for this visit:    Right wrist pain  -     X-Ray Wrist Complete 3 views Right; Future    Cough, unspecified type  -     POCT Influenza A/B Molecular  -     POCT COVID-19 Rapid Screening    Other orders  -     doxycycline (MONODOX) 100 MG capsule; Take 1 capsule (100 mg total) by mouth 2 (two) times daily. for 7 days  -     azelastine (ASTELIN)  137 mcg (0.1 %) nasal spray; 1 spray (137 mcg total) by Nasal route 2 (two) times daily.  -     promethazine-dextromethorphan (PROMETHAZINE-DM) 6.25-15 mg/5 mL Syrp; Take 5 mLs by mouth every 4 (four) hours as needed (cough).       Patient was counseled on when and how to seek emergent care.       Lily Mandujano PA-C   Department of Internal Medicine - [facility]   11:26 AM

## 2025-01-15 NOTE — TELEPHONE ENCOUNTER
Pt trying to get an appt with her pcp. She was diagnosed with flu right before christmas. She recovered and felt fine. She started with a cough on Sunday which is getting worse. No fever.     Dispo- go to office now. Appt sched within timeframe. Care advice given. Pt VU.  Reason for Disposition   MILD difficulty breathing (e.g., minimal/no SOB at rest, SOB with walking, pulse <100) and still present when not coughing    Additional Information   Negative: Bluish (or gray) lips or face   Negative: SEVERE difficulty breathing (e.g., struggling for each breath, speaks in single words)   Negative: Rapid onset of cough and has hives   Negative: Coughing started suddenly after medicine, an allergic food or bee sting   Negative: Difficulty breathing after exposure to flames, smoke, or fumes   Negative: Sounds like a life-threatening emergency to the triager   Negative: MODERATE difficulty breathing (e.g., speaks in phrases, SOB even at rest, pulse 100-120) and still present when not coughing   Negative: Chest pain present when not coughing   Negative: Passed out (e.g., fainted, lost consciousness, blacked out and was not responding)   Negative: Patient sounds very sick or weak to the triager   Negative: Coughed up > 1 tablespoon (15 ml) blood (Exception: Blood-tinged sputum.)    Protocols used: Cough-A-OH

## 2025-01-29 ENCOUNTER — OFFICE VISIT (OUTPATIENT)
Dept: INTERNAL MEDICINE | Facility: CLINIC | Age: 70
End: 2025-01-29
Payer: COMMERCIAL

## 2025-01-29 ENCOUNTER — LAB VISIT (OUTPATIENT)
Dept: LAB | Facility: HOSPITAL | Age: 70
End: 2025-01-29
Attending: INTERNAL MEDICINE
Payer: COMMERCIAL

## 2025-01-29 VITALS
WEIGHT: 268.94 LBS | HEIGHT: 67 IN | OXYGEN SATURATION: 94 % | SYSTOLIC BLOOD PRESSURE: 118 MMHG | DIASTOLIC BLOOD PRESSURE: 62 MMHG | HEART RATE: 99 BPM | BODY MASS INDEX: 42.21 KG/M2

## 2025-01-29 DIAGNOSIS — R05.9 COUGH, UNSPECIFIED TYPE: Primary | ICD-10-CM

## 2025-01-29 DIAGNOSIS — N18.31 TYPE 2 DIABETES MELLITUS WITH STAGE 3A CHRONIC KIDNEY DISEASE, WITHOUT LONG-TERM CURRENT USE OF INSULIN: ICD-10-CM

## 2025-01-29 DIAGNOSIS — E11.22 TYPE 2 DIABETES MELLITUS WITH STAGE 3A CHRONIC KIDNEY DISEASE, WITHOUT LONG-TERM CURRENT USE OF INSULIN: ICD-10-CM

## 2025-01-29 DIAGNOSIS — J32.9 SINUSITIS, UNSPECIFIED CHRONICITY, UNSPECIFIED LOCATION: ICD-10-CM

## 2025-01-29 LAB
ANION GAP SERPL CALC-SCNC: 11 MMOL/L (ref 8–16)
BUN SERPL-MCNC: 30 MG/DL (ref 8–23)
CALCIUM SERPL-MCNC: 9.7 MG/DL (ref 8.7–10.5)
CHLORIDE SERPL-SCNC: 108 MMOL/L (ref 95–110)
CO2 SERPL-SCNC: 21 MMOL/L (ref 23–29)
CREAT SERPL-MCNC: 1.4 MG/DL (ref 0.5–1.4)
EST. GFR  (NO RACE VARIABLE): 40.7 ML/MIN/1.73 M^2
ESTIMATED AVG GLUCOSE: 180 MG/DL (ref 68–131)
GLUCOSE SERPL-MCNC: 169 MG/DL (ref 70–110)
HBA1C MFR BLD: 7.9 % (ref 4–5.6)
POTASSIUM SERPL-SCNC: 5.1 MMOL/L (ref 3.5–5.1)
SODIUM SERPL-SCNC: 140 MMOL/L (ref 136–145)

## 2025-01-29 PROCEDURE — 3078F DIAST BP <80 MM HG: CPT | Mod: CPTII,S$GLB,, | Performed by: INTERNAL MEDICINE

## 2025-01-29 PROCEDURE — 83036 HEMOGLOBIN GLYCOSYLATED A1C: CPT | Performed by: INTERNAL MEDICINE

## 2025-01-29 PROCEDURE — 3051F HG A1C>EQUAL 7.0%<8.0%: CPT | Mod: CPTII,S$GLB,, | Performed by: INTERNAL MEDICINE

## 2025-01-29 PROCEDURE — 3288F FALL RISK ASSESSMENT DOCD: CPT | Mod: CPTII,S$GLB,, | Performed by: INTERNAL MEDICINE

## 2025-01-29 PROCEDURE — 1159F MED LIST DOCD IN RCRD: CPT | Mod: CPTII,S$GLB,, | Performed by: INTERNAL MEDICINE

## 2025-01-29 PROCEDURE — 96372 THER/PROPH/DIAG INJ SC/IM: CPT | Mod: S$GLB,,, | Performed by: INTERNAL MEDICINE

## 2025-01-29 PROCEDURE — 99999 PR PBB SHADOW E&M-EST. PATIENT-LVL IV: CPT | Mod: PBBFAC,,, | Performed by: INTERNAL MEDICINE

## 2025-01-29 PROCEDURE — 80048 BASIC METABOLIC PNL TOTAL CA: CPT | Performed by: INTERNAL MEDICINE

## 2025-01-29 PROCEDURE — 1101F PT FALLS ASSESS-DOCD LE1/YR: CPT | Mod: CPTII,S$GLB,, | Performed by: INTERNAL MEDICINE

## 2025-01-29 PROCEDURE — 99214 OFFICE O/P EST MOD 30 MIN: CPT | Mod: 25,S$GLB,, | Performed by: INTERNAL MEDICINE

## 2025-01-29 PROCEDURE — 3008F BODY MASS INDEX DOCD: CPT | Mod: CPTII,S$GLB,, | Performed by: INTERNAL MEDICINE

## 2025-01-29 PROCEDURE — 1160F RVW MEDS BY RX/DR IN RCRD: CPT | Mod: CPTII,S$GLB,, | Performed by: INTERNAL MEDICINE

## 2025-01-29 PROCEDURE — 3074F SYST BP LT 130 MM HG: CPT | Mod: CPTII,S$GLB,, | Performed by: INTERNAL MEDICINE

## 2025-01-29 PROCEDURE — 36415 COLL VENOUS BLD VENIPUNCTURE: CPT | Performed by: INTERNAL MEDICINE

## 2025-01-29 PROCEDURE — 4010F ACE/ARB THERAPY RXD/TAKEN: CPT | Mod: CPTII,S$GLB,, | Performed by: INTERNAL MEDICINE

## 2025-01-29 RX ORDER — DOXYCYCLINE HYCLATE 100 MG
100 TABLET ORAL EVERY 12 HOURS
Qty: 14 TABLET | Refills: 0 | Status: SHIPPED | OUTPATIENT
Start: 2025-01-29 | End: 2025-02-06

## 2025-01-29 RX ORDER — TRIAMCINOLONE ACETONIDE 40 MG/ML
40 INJECTION, SUSPENSION INTRA-ARTICULAR; INTRAMUSCULAR ONCE
Status: COMPLETED | OUTPATIENT
Start: 2025-01-29 | End: 2025-01-29

## 2025-01-29 RX ADMIN — TRIAMCINOLONE ACETONIDE 40 MG: 40 INJECTION, SUSPENSION INTRA-ARTICULAR; INTRAMUSCULAR at 03:01

## 2025-01-29 NOTE — PROGRESS NOTES
HISTORY:  Type 2 diabetes with peripheral neuropathy  Hypertension.  Hyperlipidemia.  Helicobacter pylori which has been treated.  Obesity with gastric bypass surgery.  Left foot ulcer, fifth metatarsal, debridement     SOCIAL HISTORY:  Tobacco and alcohol use - none.   .      Medications  Rosuvastatin 20 mg  Metformin 500 mg  Two tablets bid  Jardiance 10 mg  Losartan 25 mg  Vitamin-D for that has once a week      59 year female   A persistent cough for the past month.  She presented to urgent care December 22nd with cough congestion low-grade fever she was diagnosed with influenza although the nasal swab in his records stated negative.  Nevertheless Tessalon Perles promethazine DM, Tamiflu was prescribed.  Afterwards she did not feel much better presents to the urgent care January 15th in which doxycycline 100 mg b.i.d. along with the Astelin nasal spray in promethazine day DM prescribed..  Afterwards she still feels congested in his chest.  Will have occasional wheezing..  She is now coughing up yellow phlegm.  Still congested in his head.  No fever    Her last hemoglobin A1c in November was 8.0.  She has not checked her blood glucose.    Examination   Weight tender 68 better   BMI 42.13   Pulse 88   Blood pressure 120/72  Tympanic membranes normal   Nasal mucosa is clear   Oropharynx hyperemic   Chest clear breath sounds no wheezes or rales  Tender when I palpate over the sinuses    Impression   Reactive cough   Sinusitis   Type 2 diabetes    Plan   Labs of chemistry hemoglobin A1c to be up-to-date  Doxycycline mg twice a day for another 7 days.  She still that has promethazine DM to use as needed      Addendum   Test results had been reviewed with the patient.  She is now on Mon Vivek 2.5 mg.  After a month will increase it to 5 mg.  Return visit 3 months for routine check.    Proper hydration discussed

## 2025-01-30 RX ORDER — TIRZEPATIDE 2.5 MG/.5ML
2.5 INJECTION, SOLUTION SUBCUTANEOUS
Qty: 4 PEN | Refills: 0 | Status: SHIPPED | OUTPATIENT
Start: 2025-01-30

## 2025-02-05 RX ORDER — METFORMIN HYDROCHLORIDE 1000 MG/1
1000 TABLET ORAL 2 TIMES DAILY WITH MEALS
Qty: 180 TABLET | Refills: 1 | Status: SHIPPED | OUTPATIENT
Start: 2025-02-05

## 2025-02-05 RX ORDER — LOSARTAN POTASSIUM 25 MG/1
25 TABLET ORAL DAILY
Qty: 90 TABLET | Refills: 3 | Status: SHIPPED | OUTPATIENT
Start: 2025-02-05 | End: 2026-02-05

## 2025-02-05 NOTE — TELEPHONE ENCOUNTER
No care due was identified.  [facility] Embedded Care Due Messages. Reference number: [id number].   2/05/2025 10:22:17 AM CST

## 2025-02-05 NOTE — TELEPHONE ENCOUNTER
Refill Routing Note   Medication(s) are not appropriate for processing by [facility] for the following reason(s):        Required labs abnormal    [facility] action(s):  Approve  Defer             Appointments  past 12m or future 3m with PCP    Date Provider   Last Visit   1/29/2025 Russell Martinez MD   Next Visit   Visit date not found Russell Martinez MD   ED visits in past 90 days: 0        Note composed:11:47 AM 02/05/2025

## 2025-02-05 NOTE — TELEPHONE ENCOUNTER
Refill Routing Note   Medication(s) are not appropriate for processing by [facility] for the following reason(s):        Required labs abnormal    [facility] action(s):  Defer   Requires labs : Yes             Appointments  past 12m or future 3m with PCP    Date Provider   Last Visit   1/29/2025 Russell Martinez MD   Next Visit   2/5/2025 Russell Martinez MD   ED visits in past 90 days: 0        Note composed:11:53 AM 02/05/2025

## 2025-02-05 NOTE — TELEPHONE ENCOUNTER
Care Due:                  Date            Visit Type   Department     Provider  --------------------------------------------------------------------------------                                EP -                              PRIMARY      [facility] INTERNAL  Last Visit: 01-      CARE ([facility])   MEDICINE       Russell Martinez  Next Visit: None Scheduled  None         None Found                                                            Last  Test          Frequency    Reason                     Performed    Due Date  --------------------------------------------------------------------------------    CMP.........  12 months..  rosuvastatin.............  03- 03-    Lipid Panel.  12 months..  rosuvastatin.............  03- 03-    Vitamin D...  12 months..  ergocalciferol...........  03- 03-    [employer] Embedded Care Due Messages. Reference number: [id number].   2/05/2025 10:21:36 AM CST

## 2025-02-26 ENCOUNTER — PATIENT MESSAGE (OUTPATIENT)
Dept: INTERNAL MEDICINE | Facility: CLINIC | Age: 70
End: 2025-02-26
Payer: COMMERCIAL

## 2025-02-26 DIAGNOSIS — E66.01 MORBID OBESITY: ICD-10-CM

## 2025-02-26 DIAGNOSIS — E11.22 TYPE 2 DIABETES MELLITUS WITH STAGE 3A CHRONIC KIDNEY DISEASE, WITHOUT LONG-TERM CURRENT USE OF INSULIN: ICD-10-CM

## 2025-02-26 DIAGNOSIS — E78.5 HYPERLIPIDEMIA, UNSPECIFIED HYPERLIPIDEMIA TYPE: ICD-10-CM

## 2025-02-26 DIAGNOSIS — I10 HYPERTENSION, UNSPECIFIED TYPE: ICD-10-CM

## 2025-02-26 DIAGNOSIS — Z00.00 ANNUAL PHYSICAL EXAM: Primary | ICD-10-CM

## 2025-02-26 DIAGNOSIS — N18.31 TYPE 2 DIABETES MELLITUS WITH STAGE 3A CHRONIC KIDNEY DISEASE, WITHOUT LONG-TERM CURRENT USE OF INSULIN: ICD-10-CM

## 2025-02-26 RX ORDER — AZELASTINE 1 MG/ML
1 SPRAY, METERED NASAL 2 TIMES DAILY
Qty: 90 ML | Refills: 3 | Status: CANCELLED | OUTPATIENT
Start: 2025-02-26

## 2025-02-26 NOTE — TELEPHONE ENCOUNTER
Refill Routing Note   Medication(s) are not appropriate for processing by [facility] for the following reason(s):        New or recently adjusted medication    [facility] action(s):  Defer               Appointments  past 12m or future 3m with PCP    Date Provider   Last Visit   1/29/2025 Russell aMrtinez MD   Next Visit   Visit date not found Russell Martinez MD   ED visits in past 90 days: 0        Note composed:2:39 PM 02/26/2025

## 2025-03-08 ENCOUNTER — PATIENT MESSAGE (OUTPATIENT)
Dept: ADMINISTRATIVE | Facility: OTHER | Age: 70
End: 2025-03-08
Payer: COMMERCIAL

## 2025-03-08 ENCOUNTER — PATIENT MESSAGE (OUTPATIENT)
Dept: OTHER | Facility: OTHER | Age: 70
End: 2025-03-08
Payer: COMMERCIAL

## 2025-03-08 DIAGNOSIS — N18.31 TYPE 2 DIABETES MELLITUS WITH STAGE 3A CHRONIC KIDNEY DISEASE, WITHOUT LONG-TERM CURRENT USE OF INSULIN: ICD-10-CM

## 2025-03-08 DIAGNOSIS — E11.22 TYPE 2 DIABETES MELLITUS WITH STAGE 3A CHRONIC KIDNEY DISEASE, WITHOUT LONG-TERM CURRENT USE OF INSULIN: ICD-10-CM

## 2025-03-08 RX ORDER — TIRZEPATIDE 2.5 MG/.5ML
2.5 INJECTION, SOLUTION SUBCUTANEOUS
Qty: 4 PEN | Refills: 0 | Status: CANCELLED | OUTPATIENT
Start: 2025-03-08

## 2025-03-08 NOTE — TELEPHONE ENCOUNTER
No care due was identified.  [facility] Embedded Care Due Messages. Reference number: [id number].   3/08/2025 5:41:56 AM CST

## 2025-03-08 NOTE — TELEPHONE ENCOUNTER
No care due was identified.  [facility] Embedded Care Due Messages. Reference number: [id number].   3/08/2025 5:41:25 AM CST

## 2025-03-09 RX ORDER — ROSUVASTATIN CALCIUM 20 MG/1
20 TABLET, COATED ORAL DAILY
Qty: 90 TABLET | Refills: 0 | Status: SHIPPED | OUTPATIENT
Start: 2025-03-09

## 2025-03-09 NOTE — TELEPHONE ENCOUNTER
Refill Decision Note   Meryl Johnson  is requesting a refill authorization.  Brief Assessment and Rationale for Refill:  Approve     Medication Therapy Plan:       Medication Reconciliation Completed: No   Comments:     No Care Gaps recommended.     Note composed:2:43 PM 03/09/2025

## 2025-03-09 NOTE — TELEPHONE ENCOUNTER
Refill Routing Note   Medication(s) are not appropriate for processing by [facility] for the following reason(s):      New or recently adjusted medication    [facility] action(s):  Defer Care Due:  None identified            Appointments  past 12m or future 3m with PCP    Date Provider   Last Visit   1/29/2025 Russell Martinez MD   Next Visit   3/8/2025 Russell Martinez MD   ED visits in past 90 days: 0        Note composed:2:43 PM 03/09/2025

## 2025-03-10 RX ORDER — TIRZEPATIDE 5 MG/.5ML
5 INJECTION, SOLUTION SUBCUTANEOUS
Qty: 4 PEN | Refills: 1 | Status: SHIPPED | OUTPATIENT
Start: 2025-03-10

## 2025-03-12 RX ORDER — SULFACETAMIDE SODIUM 100 MG/G
OINTMENT OPHTHALMIC 3 TIMES DAILY
Qty: 3.5 G | Refills: 0 | Status: CANCELLED | OUTPATIENT
Start: 2025-03-12 | End: 2025-03-22

## 2025-03-13 NOTE — TELEPHONE ENCOUNTER
Refill Routing Note   Medication(s) are not appropriate for processing by [facility] for the following reason(s):        Outside of protocol    [facility] action(s):  Route               Appointments  past 12m or future 3m with PCP    Date Provider   Last Visit   1/29/2025 Russell Martinez MD   Next Visit   5/27/2025 Russell Martinez MD   ED visits in past 90 days: 0        Note composed:10:05 PM 03/12/2025

## 2025-03-13 NOTE — TELEPHONE ENCOUNTER
Refill Routing Note   Medication(s) are not appropriate for processing by [facility] for the following reason(s):        Outside of protocol    [facility] action(s):  Route               Appointments  past 12m or future 3m with PCP    Date Provider   Last Visit   1/29/2025 Russell Martinez MD   Next Visit   3/12/2025 Russell Martinez MD   ED visits in past 90 days: 0        Note composed:10:05 PM 03/12/2025

## 2025-03-14 RX ORDER — CIPROFLOXACIN HYDROCHLORIDE 3 MG/ML
1 SOLUTION/ DROPS OPHTHALMIC EVERY 4 HOURS
Qty: 5 ML | Refills: 0 | Status: SHIPPED | OUTPATIENT
Start: 2025-03-14

## 2025-03-18 ENCOUNTER — PATIENT OUTREACH (OUTPATIENT)
Dept: OTHER | Facility: OTHER | Age: 70
End: 2025-03-18
Payer: COMMERCIAL

## 2025-03-18 ENCOUNTER — PATIENT MESSAGE (OUTPATIENT)
Dept: ADMINISTRATIVE | Facility: OTHER | Age: 70
End: 2025-03-18
Payer: COMMERCIAL

## 2025-03-22 ENCOUNTER — PATIENT MESSAGE (OUTPATIENT)
Dept: ADMINISTRATIVE | Facility: OTHER | Age: 70
End: 2025-03-22
Payer: COMMERCIAL

## 2025-03-28 ENCOUNTER — PATIENT OUTREACH (OUTPATIENT)
Dept: OTHER | Facility: OTHER | Age: 70
End: 2025-03-28
Payer: COMMERCIAL

## 2025-04-01 ENCOUNTER — PATIENT MESSAGE (OUTPATIENT)
Dept: OTHER | Facility: OTHER | Age: 70
End: 2025-04-01
Payer: COMMERCIAL

## 2025-04-08 ENCOUNTER — PATIENT MESSAGE (OUTPATIENT)
Dept: ADMINISTRATIVE | Facility: OTHER | Age: 70
End: 2025-04-08
Payer: COMMERCIAL

## 2025-04-11 ENCOUNTER — TELEPHONE (OUTPATIENT)
Dept: INTERNAL MEDICINE | Facility: CLINIC | Age: 70
End: 2025-04-11
Payer: COMMERCIAL

## 2025-04-11 RX ORDER — FLUCONAZOLE 150 MG/1
150 TABLET ORAL WEEKLY
Qty: 2 TABLET | Refills: 0 | Status: SHIPPED | OUTPATIENT
Start: 2025-04-11 | End: 2025-04-19

## 2025-04-11 NOTE — TELEPHONE ENCOUNTER
----- Message from Shayy sent at 4/11/2025  1:20 PM CDT -----  Contact: [phone]  Patient  Pt has a yeast infection from the medications DR Martinez prescribed. Thank youRequesting an RX refill or new RX.Is this a refill or new RX: newRX name and strength (copy/paste from chart):  diflucanIs this a 30 day or 90 day RX: Pharmacy name and phone # (copy/paste from chart):  [facility]/pharmacy #[id number] - [place], [place] - [address] [address]Puajk: [phone] Fax: [phone]

## 2025-04-11 NOTE — TELEPHONE ENCOUNTER
I have spoken to the patient in regards to a yeast infection the patient has been having with non stop itching. The patient states that she was told by her pharmacy  to contact her PCP to get the following medication Diflucan sent over to her pharmacy in regards to the yeast infection.     Pt has a yeast infection from the medications DR Martinez prescribed. Thank you     Requesting an RX refill or new RX.     Is this a refill or new RX: new     RX name and strength (copy/paste from chart):  diflucan     Is this a 30 day or 90 day RX:     Pharmacy name and phone # (copy/paste from chart):    [facility]/pharmacy #[id number] [place], [place] - [address] [facility]   [address] [address]   Phone: [phone] Fax: [phone]

## 2025-04-11 NOTE — TELEPHONE ENCOUNTER
----- Message from Shayy sent at 4/11/2025  1:20 PM CDT -----  Contact: [phone]  Patient  Pt has a yeast infection from the medications DR Martinez prescribed. Thank youRequesting an RX refill or new RX.Is this a refill or new RX: newRX name and strength (copy/paste from chart):  diflucanIs this a 30 day or 90 day RX: Pharmacy name and phone # (copy/paste from chart):  [facility]/pharmacy #[id number] - [place], [place] - [address] [address]Zcclj: [phone] Fax: [phone]   Cyclophosphamide Counseling:  I discussed with the patient the risks of cyclophosphamide including but not limited to hair loss, hormonal abnormalities, decreased fertility, abdominal pain, diarrhea, nausea and vomiting, bone marrow suppression and infection. The patient understands that monitoring is required while taking this medication.

## 2025-04-14 ENCOUNTER — TELEPHONE (OUTPATIENT)
Dept: SPORTS MEDICINE | Facility: CLINIC | Age: 70
End: 2025-04-14
Payer: COMMERCIAL

## 2025-04-14 ENCOUNTER — PATIENT MESSAGE (OUTPATIENT)
Dept: SPORTS MEDICINE | Facility: CLINIC | Age: 70
End: 2025-04-14
Payer: COMMERCIAL

## 2025-04-14 DIAGNOSIS — M25.521 RIGHT ELBOW PAIN: ICD-10-CM

## 2025-04-14 DIAGNOSIS — M77.11 LATERAL EPICONDYLITIS OF RIGHT ELBOW: Primary | ICD-10-CM

## 2025-04-14 NOTE — TELEPHONE ENCOUNTER
----- Message from Med Assistant Marroquin sent at 4/14/2025 12:00 PM CDT -----  Patient states she has not been doing PT because the shot helped so much. Would like you to place PT order for right elbow w/Fabricio.

## 2025-04-15 ENCOUNTER — PATIENT MESSAGE (OUTPATIENT)
Dept: ADMINISTRATIVE | Facility: OTHER | Age: 70
End: 2025-04-15
Payer: COMMERCIAL

## 2025-04-21 ENCOUNTER — CLINICAL SUPPORT (OUTPATIENT)
Dept: REHABILITATION | Facility: HOSPITAL | Age: 70
End: 2025-04-21
Payer: COMMERCIAL

## 2025-04-21 DIAGNOSIS — M77.11 LATERAL EPICONDYLITIS OF RIGHT ELBOW: ICD-10-CM

## 2025-04-21 DIAGNOSIS — M25.521 RIGHT ELBOW PAIN: ICD-10-CM

## 2025-04-21 PROCEDURE — 97140 MANUAL THERAPY 1/> REGIONS: CPT | Performed by: PHYSICAL THERAPIST

## 2025-04-21 PROCEDURE — 97161 PT EVAL LOW COMPLEX 20 MIN: CPT | Performed by: PHYSICAL THERAPIST

## 2025-04-21 PROCEDURE — 97112 NEUROMUSCULAR REEDUCATION: CPT | Performed by: PHYSICAL THERAPIST

## 2025-04-21 NOTE — PROGRESS NOTES
Physical Therapy Evaluation    Patient Name: Meryl Johnson  MRN: [id number]  YOB: 1955  Encounter Date: 4/21/2025    Therapy Diagnosis:   Encounter Diagnoses   Name Primary?    Lateral epicondylitis of right elbow     Right elbow pain      Physician: Russell Honeycutt III, *    Physician Orders: Eval and Treat  Medical Diagnosis: Lateral epicondylitis of right elbow  Right elbow pain    Visit # / Visits Authorized:  1 / 1  Insurance Authorization Period: 4/14/2025 to 4/14/2026  Date of Evaluation: 4/21/2025  Plan of Care Certification: 4/21/2025 to 10/21/2025     Time In: 0800   Time Out: 0900  Total Time: 60   Total Billable Time: 60    Intake Outcome Measure for FOTO Survey    Therapist reviewed FOTO scores for Meryl Johnson on 4/21/2025.   FOTO report - see Media section or FOTO account episode details.     Intake Score: 38%         Subjective   History of Present Illness  Meryl is a 69 y.o. female who reports to physical therapy with a chief concern of Right elbow pain.     The patient reports a medical diagnosis of M25.511 (ICD-10-CM) - Right shoulder pain, unspecified chronicity.            History of Present Condition/Illness: Patient is a 69 y.o. female reports right elbow pain. She received an injection which completely resolved the pain in the past but she is unable to receive another injection. She reports pain with resting her arm to work at [employer]. She also has PMH including LBP and bilateral knee pain treated at this facility. She would like to return to gripping and lifting without elbow pain    Activities of Daily Living  Social history was obtained from Patient.    General Prior Level of Function Comments: No difficulty with lifting, reaching, or carrying  General Current Level of Function Comments: Severe difficulty with lifting, reaching, and carrying           Pain     Patient reports a current pain level of 4/10. Pain at best is reported as 2/10. Pain at worst is reported as  7/10.   Location: R Elbow  Clinical Progression (since onset): Stable  Pain Qualities: Aching, Sharp, Discomfort, Knife-like  Pain-Relieving Factors: Rest, Other (Comment), Physical therapy  Other Pain-Relieving Factors: Injection  Pain-Aggravating Factors: Holding objects, Reaching, Lifting         Employment  Patient reports: Does the patient's condition impact their ability to work?  Employment Status: Employed full-time   [employer]      Past Medical History/Physical Systems Review:   Meryl Johnson  has a past medical history of Anemia, Diabetes mellitus type II, H. pylori infection, Hyperlipidemia, Hypertension, and Unspecified vitamin D deficiency.    Meryl Johnson  has a past surgical history that includes Gastric bypass; Esophagogastroduodenoscopy (N/A, 08/08/2018); Knee arthroscopy w/ meniscectomy (Left, 10/04/2022); Chondroplasty of knee (Left, 10/04/2022); and Synovectomy of knee (Left, 10/04/2022).    Meryl has a current medication list which includes the following prescription(s): aspirin, azelastine, blood sugar diagnostic, blood-glucose meter, dexcom g7 sensor, celecoxib, ciprofloxacin hcl, empagliflozin, ergocalciferol, lancets, losartan, metformin, rosuvastatin, mounjaro, and triamcinolone acetonide 0.1%.    Review of patient's allergies indicates:   Allergen Reactions    Codeine Nausea Only    Vicodin [hydrocodone-acetaminophen] Nausea Only        Objective      Right Dermatomes  Right Cervical Dermatome Light Touch  Intact: C4, C5, C6, C7, and C8  Right Upper Thoracic Dermatome Light Touch  Intact: T1       Left Dermatomes  Left Cervical Dermatome Light Touch  Intact: C4, C5, C6, C7, and C8  Left Upper Thoracic Dermatome Light Touch  Intact: T1           Right Upper Extremity Reflexes       Biceps, C5-C6: Normal (2+)    Brachioradialis, C6: Normal (2+)    Triceps, C7: Normal (2+)    Min's Sign: No    Left Upper Extremity Reflexes       Biceps, C5-C6: Normal (2+)   "  Brachioradialis, C6: Normal (2+)    Triceps, C7: Normal (2+)    Min's Sign: No        Elbow Palpation  Right Elbow Palpation  Unremarkable: Muscle and Tendon/Ligament          Left Elbow Palpation  Unremarkable: Muscle and Tendon/Ligament              Elbow/Forearm Range of Motion   Right Elbow/Forearm   Active (deg) Passive (deg) Pain   Flexion 120 130     Extension 0 0     Forearm Pronation 85 85     Forearm Supination 90 90       Left Elbow/Forearm   Active (deg) Passive (deg) Pain   Flexion 120 130     Extension 0 0     Forearm Pronation 85 85     Forearm Supination 90 90                   Elbow Strength   Right Strength Right Pain Left Strength Left  Pain   Flexion (C6) 5   5     Extension (C7) 4- Yes 5          Forearm Strength   Right Strength Right Pain Left Strength Left  Pain   Pronation 4+   5     Supination 5   5         Right  Strength  Right Hand Dynamometer Position: 3  Elbow Position Forearm Position Trial 1 (lbs) Trial 2  (lbs) Trial 3  (lbs) Average  (lbs) Pain   Flexed Neutral 28 40 36 34.67 Yes       Left  Strength  Left Hand Dynamometer Position: 3  Elbow Position Forearm Position Trial 1 (lbs) Trial 2 (lbs) Trial 3 (lbs) Average (lbs) Pain   Flexed Neutral 48 40 44 44                   Treatment:  Manual Therapy  MT 1: Dry Needling ECRB 8'  Balance/Neuromuscular Re-Education  NMR 1: Extension isometrics 45" 5x 2' rest    Time Entry(in minutes):  PT Evaluation (Low) Time Entry: 40  Manual Therapy Time Entry: 10  Neuromuscular Re-Education Time Entry: 10    Assessment & Plan   Assessment  Meryl presents with a condition of Low complexity.   Presentation of Symptoms: Stable       Functional Limitations: Activity tolerance, Completing self-care activities, Proprioception, Range of motion, Participating in leisure activities, Pain with ADLs/IADLs, Carrying objects, Disrupted sleep pattern, Pain when reaching, Reaching  Impairments: Pain with functional activity, Impaired physical " strength  Personal Factors Affecting Prognosis: Pain    Patient Goal for Therapy (PT): return to work without pain  Prognosis: Good  Assessment Details: Patient is a 69 y.o. female presenting with right tennis elbow with ECRB tendinopathy. She will benefit from improving range of motion, strength, and reducing radial nerve tension on the right side.     Plan  From a physical therapy perspective, the patient would benefit from: Skilled Rehab Services    Planned therapy interventions include: Therapeutic exercise, Therapeutic activities, Neuromuscular re-education, Manual therapy, ADLs/IADLs, and Other (Comment). Dry Needling (prn)  Planned modalities to include: Biofeedback, Electrical stimulation - attended, Electrical stimulation - passive/unattended, Thermotherapy (hot pack), and Cryotherapy (cold pack).        Visit Frequency: 1 times Per Week for 12 Weeks.       This plan was discussed with Patient.   Discussion participants: Agreed Upon Plan of Care             Patient's spiritual, cultural, and educational needs considered and patient agreeable to plan of care and goals.     Education  Education was done with Patient. The patient's learning style includes Demonstration. The patient Demonstrates understanding and Verbalizes understanding.                 Goals:   Active       LTG       Pt will demonstrate independence with initial HEP to improve their performance of their Instrumental Activities of Daily Living.          Start:  04/21/25    Expected End:  06/16/25            Patient will improve their numeric pain rating score to no greater than 0/10 when performing LIFTING to improve performance of their Instrumental Activities of Daily Living.         Start:  04/21/25    Expected End:  06/16/25               STG       Pt will demonstrate independence with initial HEP to improve their performance of their Activities of Daily Living.          Start:  04/21/25    Expected End:  05/19/25            Patient will  improve their numeric pain rating score to no greater than 2/10 when performing wrist ext to improve performance of their Activities of Daily Living.         Start:  04/21/25    Expected End:  05/19/25                Fabricio Tolentino PT, DPT

## 2025-04-29 ENCOUNTER — CLINICAL SUPPORT (OUTPATIENT)
Dept: REHABILITATION | Facility: HOSPITAL | Age: 70
End: 2025-04-29
Payer: COMMERCIAL

## 2025-04-29 DIAGNOSIS — M25.521 RIGHT ELBOW PAIN: Primary | ICD-10-CM

## 2025-04-29 PROCEDURE — 97140 MANUAL THERAPY 1/> REGIONS: CPT | Performed by: PHYSICAL THERAPIST

## 2025-04-29 PROCEDURE — 97112 NEUROMUSCULAR REEDUCATION: CPT | Performed by: PHYSICAL THERAPIST

## 2025-04-30 ENCOUNTER — OFFICE VISIT (OUTPATIENT)
Dept: OPTOMETRY | Facility: CLINIC | Age: 70
End: 2025-04-30
Payer: COMMERCIAL

## 2025-04-30 ENCOUNTER — PATIENT MESSAGE (OUTPATIENT)
Dept: ADMINISTRATIVE | Facility: OTHER | Age: 70
End: 2025-04-30
Payer: COMMERCIAL

## 2025-04-30 ENCOUNTER — PATIENT MESSAGE (OUTPATIENT)
Dept: OTHER | Facility: OTHER | Age: 70
End: 2025-04-30
Payer: COMMERCIAL

## 2025-04-30 DIAGNOSIS — E11.9 TYPE 2 DIABETES MELLITUS WITHOUT RETINOPATHY: ICD-10-CM

## 2025-04-30 DIAGNOSIS — H01.009 BLEPHARITIS OF BOTH UPPER AND LOWER EYELID: ICD-10-CM

## 2025-04-30 DIAGNOSIS — H25.13 NUCLEAR SCLEROSIS OF BOTH EYES: ICD-10-CM

## 2025-04-30 DIAGNOSIS — B88.0 OTHER ACARIASIS: Primary | ICD-10-CM

## 2025-04-30 PROCEDURE — 1126F AMNT PAIN NOTED NONE PRSNT: CPT | Mod: CPTII,S$GLB,, | Performed by: OPTOMETRIST

## 2025-04-30 PROCEDURE — 99999 PR PBB SHADOW E&M-EST. PATIENT-LVL III: CPT | Mod: PBBFAC,,, | Performed by: OPTOMETRIST

## 2025-04-30 PROCEDURE — 1159F MED LIST DOCD IN RCRD: CPT | Mod: CPTII,S$GLB,, | Performed by: OPTOMETRIST

## 2025-04-30 PROCEDURE — G2211 COMPLEX E/M VISIT ADD ON: HCPCS | Mod: S$GLB,,, | Performed by: OPTOMETRIST

## 2025-04-30 PROCEDURE — 4010F ACE/ARB THERAPY RXD/TAKEN: CPT | Mod: CPTII,S$GLB,, | Performed by: OPTOMETRIST

## 2025-04-30 PROCEDURE — 1101F PT FALLS ASSESS-DOCD LE1/YR: CPT | Mod: CPTII,S$GLB,, | Performed by: OPTOMETRIST

## 2025-04-30 PROCEDURE — 99214 OFFICE O/P EST MOD 30 MIN: CPT | Mod: S$GLB,,, | Performed by: OPTOMETRIST

## 2025-04-30 PROCEDURE — 3288F FALL RISK ASSESSMENT DOCD: CPT | Mod: CPTII,S$GLB,, | Performed by: OPTOMETRIST

## 2025-04-30 PROCEDURE — 3051F HG A1C>EQUAL 7.0%<8.0%: CPT | Mod: CPTII,S$GLB,, | Performed by: OPTOMETRIST

## 2025-04-30 NOTE — PROGRESS NOTES
Physical Therapy Visit    Patient Name: Meryl Johnson  MRN: [id number]  YOB: 1955  Encounter Date: 4/29/2025    Therapy Diagnosis:   Encounter Diagnosis   Name Primary?    Right elbow pain Yes     Physician: Russell Honeycutt III, *    Physician Orders: Eval and Treat  Medical Diagnosis: Right shoulder pain, unspecified chronicity    Visit # / Visits Authorized:  1 / 12  Insurance Authorization Period: 1/2/2025 to 12/31/2025  Date of Evaluation: 4/29/2025  Plan of Care Certification: 4/29/2025 to 10/21/2025      PT/PTA:     Number of PTA visits since last PT visit:   Time In: 1600   Time Out: 1645  Total Time: 45   Total Billable Time: 45    FOTO:  Intake Score:  %  Survey Score 1:  %  Survey Score 2:  %         Subjective   Pain following DN and no relief with isometrics. The brace does help.  Pain reported as 3/10.      Objective            Treatment:  Manual Therapy  MT 1: Radial head mobs  MT 2: Gapping humeroulnar joint  MT 3: Radiohumeral gapping  MT 4: STM ECRB and brachioradialis  MT 5: Gapping with active wrist extension  Balance/Neuromuscular Re-Education  NMR 1: Ecc wirst extension 3# 30x  NMR 2: Pro/supination 2# 30x    Time Entry(in minutes):  Manual Therapy Time Entry: 25  Neuromuscular Re-Education Time Entry: 20    Assessment & Plan   Assessment: Pain with eccentrics today but did get relief with gapping/mobs radial head.  Evaluation/Treatment Tolerance: Patient limited by pain    Patient will continue to benefit from skilled outpatient physical therapy to address the deficits listed in the problem list box on initial evaluation, provide pt/family education and to maximize pt's level of independence in the home and community environment.     Patient's spiritual, cultural, and educational needs considered and patient agreeable to plan of care and goals.           Plan: Improve wrist ext strength    Goals:   Active       LTG       Pt will demonstrate independence with initial HEP to  improve their performance of their Instrumental Activities of Daily Living.          Start:  04/21/25    Expected End:  06/16/25            Patient will improve their numeric pain rating score to no greater than 0/10 when performing LIFTING to improve performance of their Instrumental Activities of Daily Living.         Start:  04/21/25    Expected End:  06/16/25               STG       Pt will demonstrate independence with initial HEP to improve their performance of their Activities of Daily Living.          Start:  04/21/25    Expected End:  05/19/25            Patient will improve their numeric pain rating score to no greater than 2/10 when performing wrist ext to improve performance of their Activities of Daily Living.         Start:  04/21/25    Expected End:  05/19/25                Fabricio Tolentino, PT, DPT

## 2025-05-01 RX ORDER — LOTILANER OPHTHALMIC SOLUTION 2.5 MG/ML
1 SOLUTION/ DROPS OPHTHALMIC 2 TIMES DAILY
Qty: 10 ML | Refills: 0 | Status: SHIPPED | OUTPATIENT
Start: 2025-05-01 | End: 2025-06-12

## 2025-05-01 NOTE — PROGRESS NOTES
HPI    Pt is here today for diabetic eye exam. States that she has been getting   eye infections a lot lately, last one being over a week ago. Her PCP   prescribed Cipro gtts to help clear it up as well as told her to use baby   shampoo. Patient states that she is still using an older pair of glasses   due to not being able to adjust to the newer ones yet.   DLS: 2023 Dr. Harris  (-)Flashes   (-)Floaters   (-)Diplopia   (+)Headaches   (+)Itching   (+)Tearing  (-)Burning  (-)Dryness   (-)Photophobia  (-)Glare   (-)Blurred [facility]  Past Eye Sx: (-)  Eye Meds: Baby shampoo PRN OU (often)  Hemoglobin A1C       Date                     Value               Ref Range             Status                01/29/2025               7.9 (H)             4.0 - 5.6 %           Final            Last edited by Clarice Olivas, OD on 4/30/2025  1:50 PM.        Dictation #1  MRN:[id number]  CSN:[id number]     Assessment /Plan     For exam results, see Encounter Report.    Other acariasis  -     XDEMVY 0.25 % Drop; Apply 1 drop to eye 2 (two) times a day.  Dispense: 10 mL; Refill: 0    Blepharitis of both upper and lower eyelid  -     XDEMVY 0.25 % Drop; Apply 1 drop to eye 2 (two) times a day.  Dispense: 10 mL; Refill: 0    Type 2 diabetes mellitus without retinopathy    Nuclear sclerosis of both eyes      1-2. Educated pt on today's findings consistent with symptoms and past chart notes of flare ups. Rx XDEMVY 6 week course bid OU.    3. No retinopathy noted today.  Continued control with primary care physician and annual comprehensive eye exam.     4. Educated pt on findings. Not visually significant. No need for removal at this time. Monitor yearly.      RTC in 1 year for annual eye exam unless changes noted sooner.      Today's visit is associated with current and anticipated ongoing medical care related to this patient's single serious/complex condition (bleph). Follow up is to be continued indefinitely to monitor the condition.

## 2025-05-06 ENCOUNTER — CLINICAL SUPPORT (OUTPATIENT)
Dept: REHABILITATION | Facility: HOSPITAL | Age: 70
End: 2025-05-06
Payer: COMMERCIAL

## 2025-05-06 DIAGNOSIS — M25.521 RIGHT ELBOW PAIN: Primary | ICD-10-CM

## 2025-05-06 PROCEDURE — 97140 MANUAL THERAPY 1/> REGIONS: CPT | Performed by: PHYSICAL THERAPIST

## 2025-05-06 PROCEDURE — 97112 NEUROMUSCULAR REEDUCATION: CPT | Performed by: PHYSICAL THERAPIST

## 2025-05-06 NOTE — PROGRESS NOTES
"Physical Therapy Visit    Patient Name: Meryl Johnson  MRN: [id number]  YOB: 1955  Encounter Date: 5/6/2025    Therapy Diagnosis:   Encounter Diagnosis   Name Primary?    Right elbow pain Yes     Physician: Russell Honeycutt III, *    Physician Orders: Eval and Treat  Medical Diagnosis: Right shoulder pain, unspecified chronicity    Visit # / Visits Authorized:  2 / 12  Insurance Authorization Period: 1/2/2025 to 12/31/2025  Date of Evaluation: 4/29/2025  Plan of Care Certification: 4/29/2025 to 10/21/2025      PT/PTA:     Number of PTA visits since last PT visit:   Time In: 1430   Time Out: 1520  Total Time: 50   Total Billable Time: 50    FOTO:  Intake Score:  %  Survey Score 1:  %  Survey Score 2:  %         Subjective   Continues to have pain with eccentrics, requires wearin the brace very tight for relief.  Pain reported as 5/10.      Objective            Treatment:  Manual Therapy  MT 1: Radial head mobs  MT 2: Gapping humeroulnar joint  MT 3: Radiohumeral gapping  Balance/Neuromuscular Re-Education  NMR 1: Supine tricep ext 1# 30x  NMR 2: Side lying open books 20x  NMR 3: No money YTB 20x 3"    Time Entry(in minutes):  Manual Therapy Time Entry: 25  Neuromuscular Re-Education Time Entry: 25    Assessment & Plan   Assessment: Meryl tolerated open books to reduce the radial nerve tension on arrival. Pain with gripping and eccentrics continues. Noted she felt better end of session  Evaluation/Treatment Tolerance: Patient limited by pain    Patient will continue to benefit from skilled outpatient physical therapy to address the deficits listed in the problem list box on initial evaluation, provide pt/family education and to maximize pt's level of independence in the home and community environment.     Patient's spiritual, cultural, and educational needs considered and patient agreeable to plan of care and goals.           Plan: Improve wrist ext strength    Goals:   Active       LTG       Pt will " demonstrate independence with initial HEP to improve their performance of their Instrumental Activities of Daily Living.          Start:  04/21/25    Expected End:  06/16/25            Patient will improve their numeric pain rating score to no greater than 0/10 when performing LIFTING to improve performance of their Instrumental Activities of Daily Living.         Start:  04/21/25    Expected End:  06/16/25               STG       Pt will demonstrate independence with initial HEP to improve their performance of their Activities of Daily Living.          Start:  04/21/25    Expected End:  05/19/25            Patient will improve their numeric pain rating score to no greater than 2/10 when performing wrist ext to improve performance of their Activities of Daily Living.         Start:  04/21/25    Expected End:  05/19/25                Fabricio Tolentino, PT, DPT

## 2025-05-08 ENCOUNTER — PATIENT MESSAGE (OUTPATIENT)
Dept: ADMINISTRATIVE | Facility: OTHER | Age: 70
End: 2025-05-08
Payer: COMMERCIAL

## 2025-05-13 ENCOUNTER — CLINICAL SUPPORT (OUTPATIENT)
Dept: REHABILITATION | Facility: HOSPITAL | Age: 70
End: 2025-05-13
Payer: COMMERCIAL

## 2025-05-13 DIAGNOSIS — M25.521 RIGHT ELBOW PAIN: Primary | ICD-10-CM

## 2025-05-13 PROCEDURE — 97112 NEUROMUSCULAR REEDUCATION: CPT | Performed by: PHYSICAL THERAPIST

## 2025-05-13 PROCEDURE — 97140 MANUAL THERAPY 1/> REGIONS: CPT | Performed by: PHYSICAL THERAPIST

## 2025-05-14 NOTE — PROGRESS NOTES
"Physical Therapy Visit    Patient Name: Meryl Johnson  MRN: [id number]  YOB: 1955  Encounter Date: 5/13/2025    Therapy Diagnosis:   Encounter Diagnosis   Name Primary?    Right elbow pain Yes     Physician: Russell Hoenycutt III, *    Physician Orders: Eval and Treat  Medical Diagnosis: Right shoulder pain, unspecified chronicity    Visit # / Visits Authorized:  3 / 12  Insurance Authorization Period: 1/2/2025 to 12/31/2025  Date of Evaluation: 4/29/2025  Plan of Care Certification: 4/29/2025 to 10/21/2025      PT/PTA:     Number of PTA visits since last PT visit:   Time In: 1600  Time Out: 1700  Total Time: 60   Total Billable Time: 60    FOTO:  Intake Score:  %  Survey Score 1:  %  Survey Score 2:  %         Subjective   It was good until she picked up a chair. Better on days off work, really bothers her on the computer.  Pain reported as 5/10.      Objective            Treatment:  Manual Therapy  MT 1: Radial head mobs  MT 2: Gapping humeroulnar joint  MT 3: Radiohumeral gapping  MT 4: STM ECRB and brachioradialis  MT 5: Gapping with active wrist extension  Balance/Neuromuscular Re-Education  NMR 1: wrist ext/flexion 1# 3 x 12  NMR 2: Wand OH press 2# 30x  NMR 3: No money YTB 20x 3"  NMR 4: Radial dev 30x 1#    Time Entry(in minutes):  Manual Therapy Time Entry: 25  Neuromuscular Re-Education Time Entry: 25    Assessment & Plan   Assessment: Worked on [place] mobility for thoracic motion, to reduce neural tension. active wrist motion was not painful today with light weight.  Evaluation/Treatment Tolerance: Patient limited by pain    Patient will continue to benefit from skilled outpatient physical therapy to address the deficits listed in the problem list box on initial evaluation, provide pt/family education and to maximize pt's level of independence in the home and community environment.     Patient's spiritual, cultural, and educational needs considered and patient agreeable to plan of care and " goals.           Plan: Improve wrist ext strength    Goals:   Active       LTG       Pt will demonstrate independence with initial HEP to improve their performance of their Instrumental Activities of Daily Living.          Start:  04/21/25    Expected End:  06/16/25            Patient will improve their numeric pain rating score to no greater than 0/10 when performing LIFTING to improve performance of their Instrumental Activities of Daily Living.         Start:  04/21/25    Expected End:  06/16/25               STG       Pt will demonstrate independence with initial HEP to improve their performance of their Activities of Daily Living.          Start:  04/21/25    Expected End:  05/19/25            Patient will improve their numeric pain rating score to no greater than 2/10 when performing wrist ext to improve performance of their Activities of Daily Living.         Start:  04/21/25    Expected End:  05/19/25                Fabricio Tolentino, PT, DPT

## 2025-05-15 ENCOUNTER — PATIENT MESSAGE (OUTPATIENT)
Dept: OPTOMETRY | Facility: CLINIC | Age: 70
End: 2025-05-15
Payer: COMMERCIAL

## 2025-05-15 ENCOUNTER — PATIENT OUTREACH (OUTPATIENT)
Dept: OTHER | Facility: OTHER | Age: 70
End: 2025-05-15
Payer: COMMERCIAL

## 2025-05-15 ENCOUNTER — PATIENT MESSAGE (OUTPATIENT)
Dept: ADMINISTRATIVE | Facility: OTHER | Age: 70
End: 2025-05-15
Payer: COMMERCIAL

## 2025-05-15 DIAGNOSIS — H01.009 BLEPHARITIS OF BOTH UPPER AND LOWER EYELID: ICD-10-CM

## 2025-05-15 DIAGNOSIS — B88.0 OTHER ACARIASIS: ICD-10-CM

## 2025-05-15 RX ORDER — LOTILANER OPHTHALMIC SOLUTION 2.5 MG/ML
1 SOLUTION/ DROPS OPHTHALMIC 2 TIMES DAILY
Qty: 10 ML | Refills: 0 | Status: SHIPPED | OUTPATIENT
Start: 2025-05-15 | End: 2025-06-26

## 2025-05-16 ENCOUNTER — CLINICAL SUPPORT (OUTPATIENT)
Dept: REHABILITATION | Facility: HOSPITAL | Age: 70
End: 2025-05-16
Payer: COMMERCIAL

## 2025-05-16 DIAGNOSIS — M25.521 RIGHT ELBOW PAIN: Primary | ICD-10-CM

## 2025-05-16 PROCEDURE — 97140 MANUAL THERAPY 1/> REGIONS: CPT | Performed by: PHYSICAL THERAPIST

## 2025-05-16 PROCEDURE — 97112 NEUROMUSCULAR REEDUCATION: CPT | Performed by: PHYSICAL THERAPIST

## 2025-05-16 NOTE — PROGRESS NOTES
"Physical Therapy Visit    Patient Name: Meryl Johnson  MRN: [id number]  YOB: 1955  Encounter Date: 5/16/2025    Therapy Diagnosis:   Encounter Diagnosis   Name Primary?    Right elbow pain Yes     Physician: Russell Honeycutt III, *    Physician Orders: Eval and Treat  Medical Diagnosis: Right shoulder pain, unspecified chronicity    Visit # / Visits Authorized:  4 / 12  Insurance Authorization Period: 1/2/2025 to 12/31/2025  Date of Evaluation: 4/29/2025  Plan of Care Certification: 4/29/2025 to 10/21/2025      PT/PTA:     Number of PTA visits since last PT visit:   Time In: 0700  Time Out: 0800  Total Time: 60   Total Billable Time: 60    FOTO:  Intake Score:  %  Survey Score 1:  %  Survey Score 2:  %         Subjective   Elbow is aching, but she was able to sleep last night without it hurting.  Pain reported as 3/10.      Objective            Treatment:  Manual Therapy  MT 1: Radial head mobs  MT 2: Gapping humeroulnar joint  MT 3: Radiohumeral gapping  MT 4: STM ECRB and brachioradialis  MT 5: Gapping with active wrist extension  Balance/Neuromuscular Re-Education  NMR 1: wrist ext/flexion 1# 3 x 12  NMR 2: Wand OH press 2# 30x  NMR 3: No money YTB 20x 3"  NMR 4: Radial dev 30x 1#  NMR 5: Lat ext GTB 2 x 15  NMR 6: Tricep ext 1# supine 3 x 12    Time Entry(in minutes):  Manual Therapy Time Entry: 15  Neuromuscular Re-Education Time Entry: 45    Assessment & Plan   Assessment: Not as tender to palpation, will try today without the brace  Evaluation/Treatment Tolerance: Patient limited by pain    Patient will continue to benefit from skilled outpatient physical therapy to address the deficits listed in the problem list box on initial evaluation, provide pt/family education and to maximize pt's level of independence in the home and community environment.     Patient's spiritual, cultural, and educational needs considered and patient agreeable to plan of care and goals.           Plan: Improve wrist " ext strength    Goals:   Active       LTG       Pt will demonstrate independence with initial HEP to improve their performance of their Instrumental Activities of Daily Living.          Start:  04/21/25    Expected End:  06/16/25            Patient will improve their numeric pain rating score to no greater than 0/10 when performing LIFTING to improve performance of their Instrumental Activities of Daily Living.         Start:  04/21/25    Expected End:  06/16/25               STG       Pt will demonstrate independence with initial HEP to improve their performance of their Activities of Daily Living.          Start:  04/21/25    Expected End:  05/19/25            Patient will improve their numeric pain rating score to no greater than 2/10 when performing wrist ext to improve performance of their Activities of Daily Living.         Start:  04/21/25    Expected End:  05/19/25                Fabricio Tolentino, PT, DPT

## 2025-05-19 ENCOUNTER — OFFICE VISIT (OUTPATIENT)
Dept: INTERNAL MEDICINE | Facility: CLINIC | Age: 70
End: 2025-05-19
Payer: COMMERCIAL

## 2025-05-19 ENCOUNTER — PATIENT MESSAGE (OUTPATIENT)
Dept: ADMINISTRATIVE | Facility: OTHER | Age: 70
End: 2025-05-19
Payer: COMMERCIAL

## 2025-05-19 ENCOUNTER — HOSPITAL ENCOUNTER (OUTPATIENT)
Dept: RADIOLOGY | Facility: HOSPITAL | Age: 70
Discharge: HOME | End: 2025-05-19
Attending: STUDENT IN AN ORGANIZED HEALTH CARE EDUCATION/TRAINING PROGRAM
Payer: COMMERCIAL

## 2025-05-19 ENCOUNTER — PATIENT MESSAGE (OUTPATIENT)
Dept: INTERNAL MEDICINE | Facility: CLINIC | Age: 70
End: 2025-05-19

## 2025-05-19 ENCOUNTER — PATIENT OUTREACH (OUTPATIENT)
Dept: OTHER | Facility: OTHER | Age: 70
End: 2025-05-19
Payer: COMMERCIAL

## 2025-05-19 VITALS
WEIGHT: 264.13 LBS | HEART RATE: 98 BPM | OXYGEN SATURATION: 96 % | DIASTOLIC BLOOD PRESSURE: 78 MMHG | SYSTOLIC BLOOD PRESSURE: 148 MMHG | BODY MASS INDEX: 41.37 KG/M2

## 2025-05-19 DIAGNOSIS — M25.551 ACUTE RIGHT HIP PAIN: ICD-10-CM

## 2025-05-19 DIAGNOSIS — N18.32 TYPE 2 DIABETES MELLITUS WITH STAGE 3B CHRONIC KIDNEY DISEASE, WITHOUT LONG-TERM CURRENT USE OF INSULIN: ICD-10-CM

## 2025-05-19 DIAGNOSIS — E11.22 TYPE 2 DIABETES MELLITUS WITH STAGE 3B CHRONIC KIDNEY DISEASE, WITHOUT LONG-TERM CURRENT USE OF INSULIN: ICD-10-CM

## 2025-05-19 DIAGNOSIS — M25.551 ACUTE RIGHT HIP PAIN: Primary | ICD-10-CM

## 2025-05-19 PROCEDURE — 1159F MED LIST DOCD IN RCRD: CPT | Mod: CPTII,S$GLB,, | Performed by: STUDENT IN AN ORGANIZED HEALTH CARE EDUCATION/TRAINING PROGRAM

## 2025-05-19 PROCEDURE — 3051F HG A1C>EQUAL 7.0%<8.0%: CPT | Mod: CPTII,S$GLB,, | Performed by: STUDENT IN AN ORGANIZED HEALTH CARE EDUCATION/TRAINING PROGRAM

## 2025-05-19 PROCEDURE — 4010F ACE/ARB THERAPY RXD/TAKEN: CPT | Mod: CPTII,S$GLB,, | Performed by: STUDENT IN AN ORGANIZED HEALTH CARE EDUCATION/TRAINING PROGRAM

## 2025-05-19 PROCEDURE — 3078F DIAST BP <80 MM HG: CPT | Mod: CPTII,S$GLB,, | Performed by: STUDENT IN AN ORGANIZED HEALTH CARE EDUCATION/TRAINING PROGRAM

## 2025-05-19 PROCEDURE — 3008F BODY MASS INDEX DOCD: CPT | Mod: CPTII,S$GLB,, | Performed by: STUDENT IN AN ORGANIZED HEALTH CARE EDUCATION/TRAINING PROGRAM

## 2025-05-19 PROCEDURE — 73502 X-RAY EXAM HIP UNI 2-3 VIEWS: CPT | Mod: TC,RT

## 2025-05-19 PROCEDURE — 99214 OFFICE O/P EST MOD 30 MIN: CPT | Mod: S$GLB,,, | Performed by: STUDENT IN AN ORGANIZED HEALTH CARE EDUCATION/TRAINING PROGRAM

## 2025-05-19 PROCEDURE — 99999 PR PBB SHADOW E&M-EST. PATIENT-LVL V: CPT | Mod: PBBFAC,,, | Performed by: STUDENT IN AN ORGANIZED HEALTH CARE EDUCATION/TRAINING PROGRAM

## 2025-05-19 PROCEDURE — 1160F RVW MEDS BY RX/DR IN RCRD: CPT | Mod: CPTII,S$GLB,, | Performed by: STUDENT IN AN ORGANIZED HEALTH CARE EDUCATION/TRAINING PROGRAM

## 2025-05-19 PROCEDURE — 3077F SYST BP >= 140 MM HG: CPT | Mod: CPTII,S$GLB,, | Performed by: STUDENT IN AN ORGANIZED HEALTH CARE EDUCATION/TRAINING PROGRAM

## 2025-05-19 PROCEDURE — 1101F PT FALLS ASSESS-DOCD LE1/YR: CPT | Mod: CPTII,S$GLB,, | Performed by: STUDENT IN AN ORGANIZED HEALTH CARE EDUCATION/TRAINING PROGRAM

## 2025-05-19 PROCEDURE — 73502 X-RAY EXAM HIP UNI 2-3 VIEWS: CPT | Mod: 26,RT,, | Performed by: RADIOLOGY

## 2025-05-19 PROCEDURE — 3288F FALL RISK ASSESSMENT DOCD: CPT | Mod: CPTII,S$GLB,, | Performed by: STUDENT IN AN ORGANIZED HEALTH CARE EDUCATION/TRAINING PROGRAM

## 2025-05-19 RX ORDER — TRAMADOL HYDROCHLORIDE 50 MG/1
50 TABLET, FILM COATED ORAL EVERY 6 HOURS PRN
Qty: 20 TABLET | Refills: 0 | Status: SHIPPED | OUTPATIENT
Start: 2025-05-19 | End: 2025-05-24

## 2025-05-19 RX ORDER — METHYLPREDNISOLONE 4 MG/1
TABLET ORAL
Qty: 21 TABLET | Refills: 0 | Status: SHIPPED | OUTPATIENT
Start: 2025-05-19

## 2025-05-19 NOTE — PROGRESS NOTES
Connected Back: Patient Outreach    Weekly QNR Escalation - spoke with pt on 5/19. Educated on moving to next week of exercises. Updated due date to 5/19 for exercise week progression.

## 2025-05-19 NOTE — PROGRESS NOTES
[facility] VISIT      CHIEF COMPLAINT:   Chief Complaint   Patient presents with    Flank Pain     Right side; rates pain a 10; started yesterday        HISTORY OF PRESENT ILLNESS:       History of Present Illness    CHIEF COMPLAINT:  Patient presents today for right-sided back/hip pain    HISTORY OF PRESENT ILLNESS:  She reports right-sided hip and lower back pain that started yesterday. Pain is exacerbated by position changes, particularly when transitioning from sitting to standing, and persists with ambulation. Pain is exacerbated when she lies on her right side. Pain significantly impacts activities of daily living. She denies any numbness, tingling, or weakness radiating down the leg. She took Advil this morning without relief. She denies any recent injury, fall, or preceding event.    MEDICAL HISTORY:  She has stage 3A kidney disease associated with diabetes. She reports previous left-sided sciatica. She has a history of joint issues including a torn meniscus in one knee and cartilage loss in the other knee, as well as pain in elbows and shoulders.    PREVIOUS TREATMENTS:  She has received steroid injections in her elbows and knees, and completed an oral steroid course in 2019.      ROS:  General: -fevers, -chills  Gastrointestinal: +constipation, -bowel incontinence  Genitourinary: -hematuria, -dysuria, -urinary incontinence  Musculoskeletal: +joint pain, +back pain, +pain with movement  Neurological: -numbness, -tingling, -weakness          MEDICAL HISTORY:      Past Medical History:   Diagnosis Date    Anemia     Diabetes mellitus type II     H. pylori infection     Hyperlipidemia     Hypertension     Unspecified vitamin D deficiency 04/24/2013         MEDICATIONS:      Medications Ordered Prior to Encounter[1]      PHYSICAL EXAM:    BP (!) 148/78 (BP Location: Left arm, Patient Position: Sitting) Comment: hasn't taken bp med yet  Pulse 98   Wt 119.8 kg (264 lb 1.8 oz)   SpO2 96%   BMI 41.37  kg/m²     Physical Exam  Vitals and nursing note reviewed.   Constitutional:       General: She is not in acute distress.     Appearance: Normal appearance. She is not ill-appearing, toxic-appearing or diaphoretic.   HENT:      Head: Normocephalic and atraumatic.      Nose: Nose normal.   Eyes:      Extraocular Movements: Extraocular movements intact.      Conjunctiva/sclera: Conjunctivae normal.      Pupils: Pupils are equal, round, and reactive to light.   Cardiovascular:      Rate and Rhythm: Normal rate and regular rhythm.      Heart sounds: Normal heart sounds. No murmur heard.  Pulmonary:      Effort: Pulmonary effort is normal. No respiratory distress.      Breath sounds: Normal breath sounds. No wheezing.   Abdominal:      Tenderness: There is no right CVA tenderness or left CVA tenderness.   Musculoskeletal:         General: No deformity. Normal range of motion.      Cervical back: No bony tenderness.      Thoracic back: No bony tenderness.      Lumbar back: Tenderness present. No bony tenderness.        Back:       Right hip: Tenderness present. Normal range of motion.        Legs:       Comments: Lateral hip pain right side - radiates to lower back   Skin:     Findings: No lesion or rash.   Neurological:      General: No focal deficit present.      Mental Status: She is alert.      Motor: No weakness.      Gait: Gait normal.   Psychiatric:         Mood and Affect: Mood normal.         Behavior: Behavior normal.         Thought Content: Thought content normal.         Judgment: Judgment normal.               ASSESSMENT & PLAN:    Assessment & Plan    Considered differential diagnoses including sciatica, sacroiliac joint pain, bursitis, and arthritis for acute right-sided hip pain.  Ruled out kidney stones due to lack of urinary symptoms and pain location.      1. Acute right hip pain  - Patient reports right hip pain with onset yesterday in absence of any injury.  - Pain is exacerbated by sitting,  standing, walking, and lying on the right side.  - No associated numbness, tingling, weakness in the legs, or bowel/bladder incontinence.  - Considered differential diagnosis including sciatica, arthritis, bursitis, and sacroiliac joint issues.  - Ordered XR Right Hip to evaluate further.   - Advised to continue Tylenol as needed for pain.   - Emphasized avoidance of NSAIDs due to renal dysfunction.   - Instead trial Tramadol PRN for pain not responsive to Tylenol.  reviewed, no suspicious activity.   - Prescribed 5-day tapering course of oral steroids to address inflammation.   - Instructed patient to contact the office if pain persists after completing prescribed medications.  - Return and ER Precautions discussed.  -     X-Ray Hip 2 or 3 views Right with Pelvis when performed; Future; Expected date: 05/19/2025  -     methylPREDNISolone (MEDROL DOSEPACK) 4 mg tablet; use as directed  Dispense: 21 tablet; Refill: 0  -     traMADoL (ULTRAM) 50 mg tablet; Take 1 tablet (50 mg total) by mouth every 6 (six) hours as needed for Pain.  Dispense: 20 tablet; Refill: 0      2. Type 2 diabetes mellitus with stage 3b chronic kidney disease, without long-term current use of insulin  - Last A1c 7.9%  - Discussed potential for steroids to cause hyperglycemia, will trial 5 day course only   - Emphasized avoidance of NSAIDs due to renal dysfunction.   - Continue F/U with PCP/Endocrinology            Attestation:  This note was generated with the assistance of ambient listening technology. Verbal consent was obtained by the patient and accompanying visitor(s) for the recording of patient appointment to facilitate this note. I attest to having reviewed and edited the generated note for accuracy, though some syntax or spelling errors may persist. Please contact the author of this note for any clarification.           Ina Hays MD  [facility]         [1]   Current Outpatient Medications on File Prior to Visit    Medication Sig Dispense Refill    ciprofloxacin HCl (CILOXAN) 0.3 % ophthalmic solution Place 1 drop into the right eye every 4 (four) hours. 5 mL 0    empagliflozin (JARDIANCE) 25 mg tablet Take 1 tablet (25 mg total) by mouth once daily. 90 tablet 1    ergocalciferol (VITAMIN D2) 50,000 unit Cap Take 1 capsule (50,000 Units total) by mouth every 7 days. 12 capsule 1    losartan (COZAAR) 25 MG tablet Take 1 tablet (25 mg total) by mouth once daily. 90 tablet 3    metFORMIN (GLUCOPHAGE) 1000 MG tablet Take 1 tablet (1,000 mg total) by mouth 2 (two) times daily with meals. 180 tablet 1    rosuvastatin (CRESTOR) 20 MG tablet Take 1 tablet (20 mg total) by mouth once daily. 90 tablet 0    tirzepatide (MOUNJARO) 5 mg/0.5 mL PnIj Inject 5 mg into the skin every 7 days. 4 Pen 1    triamcinolone acetonide 0.1% (KENALOG) 0.1 % cream Apply topically 2 (two) times daily. 45 g 1    XDEMVY 0.25 % Drop Apply 1 drop to eye 2 (two) times a day. 10 mL 0    aspirin (ECOTRIN) 81 MG EC tablet Take 1 tablet (81 mg total) by mouth once daily. For 4 weeks starting the day after surgery. 28 tablet 0    azelastine (ASTELIN) 137 mcg (0.1 %) nasal spray 1 spray (137 mcg total) by Nasal route 2 (two) times daily. (Patient not taking: Reported on 5/19/2025) 30 mL 0    blood sugar diagnostic Strp To check BG 3 times daily, to use with insurance preferred meter (Patient not taking: Reported on 5/19/2025) 100 each 11    blood-glucose meter kit To check BG 3 times daily, to use with insurance preferred meter (Patient not taking: Reported on 5/19/2025) 1 each 0    blood-glucose sensor (DEXCOM G7 SENSOR) Caty Change every 10 days, dexcom g7 , e 11.65 , h/o hypoglycemia < 54 mg/dl (Patient not taking: Reported on 5/19/2025) 3 each 11    celecoxib (CELEBREX) 200 MG capsule Take 1 capsule (200 mg total) by mouth 2 (two) times daily. Take with food. (Patient not taking: Reported on 5/19/2025) 60 capsule 1    lancets 28 gauge Misc To check BG 3 times  daily, to use with insurance preferred meter (Patient not taking: Reported on 5/19/2025) 100 each 11     No current facility-administered medications on file prior to visit.

## 2025-05-20 ENCOUNTER — CLINICAL SUPPORT (OUTPATIENT)
Dept: REHABILITATION | Facility: HOSPITAL | Age: 70
End: 2025-05-20
Payer: COMMERCIAL

## 2025-05-20 ENCOUNTER — RESULTS FOLLOW-UP (OUTPATIENT)
Dept: INTERNAL MEDICINE | Facility: CLINIC | Age: 70
End: 2025-05-20

## 2025-05-20 ENCOUNTER — LAB VISIT (OUTPATIENT)
Dept: LAB | Facility: HOSPITAL | Age: 70
End: 2025-05-20
Attending: INTERNAL MEDICINE
Payer: COMMERCIAL

## 2025-05-20 DIAGNOSIS — I10 HYPERTENSION, UNSPECIFIED TYPE: ICD-10-CM

## 2025-05-20 DIAGNOSIS — Z00.00 ANNUAL PHYSICAL EXAM: ICD-10-CM

## 2025-05-20 DIAGNOSIS — E11.22 TYPE 2 DIABETES MELLITUS WITH STAGE 3A CHRONIC KIDNEY DISEASE, WITHOUT LONG-TERM CURRENT USE OF INSULIN: ICD-10-CM

## 2025-05-20 DIAGNOSIS — E78.5 HYPERLIPIDEMIA, UNSPECIFIED HYPERLIPIDEMIA TYPE: ICD-10-CM

## 2025-05-20 DIAGNOSIS — M25.521 RIGHT ELBOW PAIN: Primary | ICD-10-CM

## 2025-05-20 DIAGNOSIS — N18.31 TYPE 2 DIABETES MELLITUS WITH STAGE 3A CHRONIC KIDNEY DISEASE, WITHOUT LONG-TERM CURRENT USE OF INSULIN: ICD-10-CM

## 2025-05-20 DIAGNOSIS — E66.01 MORBID OBESITY: ICD-10-CM

## 2025-05-20 LAB
ABSOLUTE EOSINOPHIL (OHS): 0.02 K/UL
ABSOLUTE MONOCYTE (OHS): 0.45 K/UL (ref 0.3–1)
ABSOLUTE NEUTROPHIL COUNT (OHS): 5.37 K/UL (ref 1.8–7.7)
ALBUMIN SERPL BCP-MCNC: 3.7 G/DL (ref 3.5–5.2)
ALP SERPL-CCNC: 106 UNIT/L (ref 40–150)
ALT SERPL W/O P-5'-P-CCNC: 26 UNIT/L (ref 10–44)
ANION GAP (OHS): 13 MMOL/L (ref 8–16)
AST SERPL-CCNC: 22 UNIT/L (ref 11–45)
BASOPHILS # BLD AUTO: 0.03 K/UL
BASOPHILS NFR BLD AUTO: 0.4 %
BILIRUB SERPL-MCNC: 0.4 MG/DL (ref 0.1–1)
BUN SERPL-MCNC: 19 MG/DL (ref 8–23)
CALCIUM SERPL-MCNC: 9.3 MG/DL (ref 8.7–10.5)
CHLORIDE SERPL-SCNC: 102 MMOL/L (ref 95–110)
CHOLEST SERPL-MCNC: 201 MG/DL (ref 120–199)
CHOLEST/HDLC SERPL: 3.1 {RATIO} (ref 2–5)
CO2 SERPL-SCNC: 23 MMOL/L (ref 23–29)
CREAT SERPL-MCNC: 1 MG/DL (ref 0.5–1.4)
EAG (OHS): 174 MG/DL (ref 68–131)
ERYTHROCYTE [DISTWIDTH] IN BLOOD BY AUTOMATED COUNT: 15.1 % (ref 11.5–14.5)
GFR SERPLBLD CREATININE-BSD FMLA CKD-EPI: >60 ML/MIN/1.73/M2
GLUCOSE SERPL-MCNC: 159 MG/DL (ref 70–110)
HBA1C MFR BLD: 7.7 % (ref 4–5.6)
HCT VFR BLD AUTO: 43.3 % (ref 37–48.5)
HDLC SERPL-MCNC: 64 MG/DL (ref 40–75)
HDLC SERPL: 31.8 % (ref 20–50)
HGB BLD-MCNC: 13 GM/DL (ref 12–16)
IMM GRANULOCYTES # BLD AUTO: 0.02 K/UL (ref 0–0.04)
IMM GRANULOCYTES NFR BLD AUTO: 0.3 % (ref 0–0.5)
LDLC SERPL CALC-MCNC: 113 MG/DL (ref 63–159)
LYMPHOCYTES # BLD AUTO: 1.89 K/UL (ref 1–4.8)
MCH RBC QN AUTO: 25 PG (ref 27–31)
MCHC RBC AUTO-ENTMCNC: 30 G/DL (ref 32–36)
MCV RBC AUTO: 83 FL (ref 82–98)
NONHDLC SERPL-MCNC: 137 MG/DL
NUCLEATED RBC (/100WBC) (OHS): 0 /100 WBC
PLATELET # BLD AUTO: 338 K/UL (ref 150–450)
PMV BLD AUTO: 11.1 FL (ref 9.2–12.9)
POTASSIUM SERPL-SCNC: 4 MMOL/L (ref 3.5–5.1)
PROT SERPL-MCNC: 7.7 GM/DL (ref 6–8.4)
RBC # BLD AUTO: 5.19 M/UL (ref 4–5.4)
RELATIVE EOSINOPHIL (OHS): 0.3 %
RELATIVE LYMPHOCYTE (OHS): 24.3 % (ref 18–48)
RELATIVE MONOCYTE (OHS): 5.8 % (ref 4–15)
RELATIVE NEUTROPHIL (OHS): 68.9 % (ref 38–73)
SODIUM SERPL-SCNC: 138 MMOL/L (ref 136–145)
TRIGL SERPL-MCNC: 120 MG/DL (ref 30–150)
TSH SERPL-ACNC: 2.43 UIU/ML (ref 0.4–4)
WBC # BLD AUTO: 7.78 K/UL (ref 3.9–12.7)

## 2025-05-20 PROCEDURE — 85025 COMPLETE CBC W/AUTO DIFF WBC: CPT

## 2025-05-20 PROCEDURE — 97140 MANUAL THERAPY 1/> REGIONS: CPT | Performed by: PHYSICAL THERAPIST

## 2025-05-20 PROCEDURE — 97112 NEUROMUSCULAR REEDUCATION: CPT | Performed by: PHYSICAL THERAPIST

## 2025-05-20 PROCEDURE — 80053 COMPREHEN METABOLIC PANEL: CPT

## 2025-05-20 PROCEDURE — 84443 ASSAY THYROID STIM HORMONE: CPT

## 2025-05-20 PROCEDURE — 36415 COLL VENOUS BLD VENIPUNCTURE: CPT

## 2025-05-20 PROCEDURE — 83036 HEMOGLOBIN GLYCOSYLATED A1C: CPT

## 2025-05-20 PROCEDURE — 80061 LIPID PANEL: CPT

## 2025-05-20 NOTE — PROGRESS NOTES
"Physical Therapy Visit    Patient Name: Meryl Johnson  MRN: [id number]  YOB: 1955  Encounter Date: 5/20/2025    Therapy Diagnosis:   Encounter Diagnosis   Name Primary?    Right elbow pain Yes     Physician: Russell Honeycutt III, *    Physician Orders: Eval and Treat  Medical Diagnosis: Right shoulder pain, unspecified chronicity    Visit # / Visits Authorized:  5 / 12  Insurance Authorization Period: 1/2/2025 to 12/31/2025  Date of Evaluation: 4/29/2025  Plan of Care Certification: 4/29/2025 to 10/21/2025      PT/PTA:     Number of PTA visits since last PT visit:   Time In: 1600  Time Out: 1700  Total Time: 60   Total Billable Time: 60    FOTO:  Intake Score:  %  Survey Score 1:  %  Survey Score 2:  %         Subjective   She has R hip pain so on a steroid pack.  Pain reported as 2/10.      Objective            Treatment:  Manual Therapy  MT 1: Radial head mobs  MT 2: Gapping humeroulnar joint  MT 3: Radiohumeral gapping  MT 5: Gapping with active wrist extension  Balance/Neuromuscular Re-Education  NMR 1: wrist ext/flexion 1# 3 x 12  NMR 2: Wand OH press 2# 30x  NMR 3: No money YTB 20x 3"  NMR 4: Radial dev 30x 1#  NMR 6: Tricep ext 2# supine 3 x 12    Time Entry(in minutes):  Manual Therapy Time Entry: 20  Neuromuscular Re-Education Time Entry: 40    Assessment & Plan   Assessment: Improved ability to use UE, just tender to the tricep. Able to increase loading today without pain.  Evaluation/Treatment Tolerance: Patient limited by pain    Patient will continue to benefit from skilled outpatient physical therapy to address the deficits listed in the problem list box on initial evaluation, provide pt/family education and to maximize pt's level of independence in the home and community environment.     Patient's spiritual, cultural, and educational needs considered and patient agreeable to plan of care and goals.           Plan: Improve wrist ext strength    Goals:   Active       LTG       Pt will " demonstrate independence with initial HEP to improve their performance of their Instrumental Activities of Daily Living.          Start:  04/21/25    Expected End:  06/16/25            Patient will improve their numeric pain rating score to no greater than 0/10 when performing LIFTING to improve performance of their Instrumental Activities of Daily Living.         Start:  04/21/25    Expected End:  06/16/25               STG       Pt will demonstrate independence with initial HEP to improve their performance of their Activities of Daily Living.          Start:  04/21/25    Expected End:  05/19/25            Patient will improve their numeric pain rating score to no greater than 2/10 when performing wrist ext to improve performance of their Activities of Daily Living.         Start:  04/21/25    Expected End:  05/19/25                Fabricio Tolentino, PT, DPT

## 2025-05-21 ENCOUNTER — PATIENT MESSAGE (OUTPATIENT)
Dept: OPTOMETRY | Facility: CLINIC | Age: 70
End: 2025-05-21
Payer: COMMERCIAL

## 2025-05-21 DIAGNOSIS — Z78.0 MENOPAUSE: ICD-10-CM

## 2025-05-23 ENCOUNTER — CLINICAL SUPPORT (OUTPATIENT)
Dept: REHABILITATION | Facility: HOSPITAL | Age: 70
End: 2025-05-23
Payer: COMMERCIAL

## 2025-05-23 DIAGNOSIS — M25.521 RIGHT ELBOW PAIN: Primary | ICD-10-CM

## 2025-05-23 PROCEDURE — 97112 NEUROMUSCULAR REEDUCATION: CPT | Performed by: PHYSICAL THERAPIST

## 2025-05-23 PROCEDURE — 97140 MANUAL THERAPY 1/> REGIONS: CPT | Performed by: PHYSICAL THERAPIST

## 2025-05-23 NOTE — PROGRESS NOTES
"  Outpatient Rehab    Physical Therapy Progress Note    Patient Name: Meryl Johnson  MRN: [id number]  YOB: 1955  Encounter Date: 5/23/2025    Therapy Diagnosis:   Encounter Diagnosis   Name Primary?    Right elbow pain Yes     Physician: Russell Honeycutt III, *    Physician Orders: Eval and Treat  Medical Diagnosis: Right shoulder pain, unspecified chronicity    Visit # / Visits Authorized:  6 / 12  Insurance Authorization Period: 1/2/2025 to 12/31/2025  Date of Evaluation: 4/21/2025  Plan of Care Certification: 4/21/2025 to 10/21/2025      PT/PTA:     Number of PTA visits since last PT visit:   Time In: 0700   Time Out: 0800  Total Time (in minutes): 60   Total Billable Time (in minutes): 60    FOTO:  Intake Score:  %  Survey Score 2:  %  Survey Score 3:  %    Precautions:       Subjective   Hip is hurting, elbow was worse yesterday.  Pain reported as 2/10.      Objective            Treatment:  Manual Therapy  MT 1: Radial head mobs  MT 2: Gapping humeroulnar joint  MT 3: Radiohumeral gapping  MT 4: STM ECRB and brachioradialis  MT 5: Gapping with active wrist extension  Balance/Neuromuscular Re-Education  NMR 1: wrist ext/flexion 1# 3 x 12  NMR 2: Wand OH press 2# 30x  NMR 3: No money YTB 20x 3"  NMR 4: Radial dev 30x 1#  NMR 5: Lat ext GTB 2 x 15  NMR 6: Tricep ext 2# supine 3 x 12    Time Entry(in minutes):  Manual Therapy Time Entry: 15  Neuromuscular Re-Education Time Entry: 45    Assessment & Plan   Assessment: Meryl was tender to E CRB today on arrival. Will wear brace today at the office. Continues to progress as tolerated  Evaluation/Treatment Tolerance: Patient limited by pain    The patient will continue to benefit from skilled outpatient physical therapy in order to address the deficits listed in the problem list on the initial evaluation, provide patient and family education, and maximize the patients level of independence in the home and community environments.     The patient's " spiritual, cultural, and educational needs were considered, and the patient is agreeable to the plan of care and goals.           Plan: Improve wrist ext strength    Goals:   Active       LTG       Pt will demonstrate independence with initial HEP to improve their performance of their Instrumental Activities of Daily Living.          Start:  04/21/25    Expected End:  06/16/25            Patient will improve their numeric pain rating score to no greater than 0/10 when performing LIFTING to improve performance of their Instrumental Activities of Daily Living.         Start:  04/21/25    Expected End:  06/16/25               STG       Pt will demonstrate independence with initial HEP to improve their performance of their Activities of Daily Living.          Start:  04/21/25    Expected End:  05/19/25            Patient will improve their numeric pain rating score to no greater than 2/10 when performing wrist ext to improve performance of their Activities of Daily Living.         Start:  04/21/25    Expected End:  05/19/25                Fabricio Tolentino, PT, DPT

## 2025-05-26 ENCOUNTER — TELEPHONE (OUTPATIENT)
Dept: INTERNAL MEDICINE | Facility: CLINIC | Age: 70
End: 2025-05-26
Payer: COMMERCIAL

## 2025-05-27 ENCOUNTER — CLINICAL SUPPORT (OUTPATIENT)
Dept: REHABILITATION | Facility: HOSPITAL | Age: 70
End: 2025-05-27
Payer: COMMERCIAL

## 2025-05-27 ENCOUNTER — OFFICE VISIT (OUTPATIENT)
Dept: INTERNAL MEDICINE | Facility: CLINIC | Age: 70
End: 2025-05-27
Payer: COMMERCIAL

## 2025-05-27 VITALS
SYSTOLIC BLOOD PRESSURE: 160 MMHG | OXYGEN SATURATION: 99 % | BODY MASS INDEX: 41.84 KG/M2 | HEIGHT: 67 IN | HEART RATE: 81 BPM | DIASTOLIC BLOOD PRESSURE: 90 MMHG | WEIGHT: 266.56 LBS

## 2025-05-27 DIAGNOSIS — E11.9 TYPE 2 DIABETES MELLITUS WITHOUT COMPLICATION, WITHOUT LONG-TERM CURRENT USE OF INSULIN: ICD-10-CM

## 2025-05-27 DIAGNOSIS — Z12.11 COLON CANCER SCREENING: ICD-10-CM

## 2025-05-27 DIAGNOSIS — M70.61 TROCHANTERIC BURSITIS OF RIGHT HIP: ICD-10-CM

## 2025-05-27 DIAGNOSIS — I10 PRIMARY HYPERTENSION: ICD-10-CM

## 2025-05-27 DIAGNOSIS — E66.01 MORBID OBESITY: ICD-10-CM

## 2025-05-27 DIAGNOSIS — Z12.31 ENCOUNTER FOR SCREENING MAMMOGRAM FOR MALIGNANT NEOPLASM OF BREAST: ICD-10-CM

## 2025-05-27 DIAGNOSIS — Z00.00 ANNUAL PHYSICAL EXAM: Primary | ICD-10-CM

## 2025-05-27 DIAGNOSIS — E78.5 HYPERLIPIDEMIA, UNSPECIFIED HYPERLIPIDEMIA TYPE: ICD-10-CM

## 2025-05-27 DIAGNOSIS — M25.521 RIGHT ELBOW PAIN: Primary | ICD-10-CM

## 2025-05-27 PROCEDURE — 3080F DIAST BP >= 90 MM HG: CPT | Mod: CPTII,S$GLB,, | Performed by: INTERNAL MEDICINE

## 2025-05-27 PROCEDURE — 97112 NEUROMUSCULAR REEDUCATION: CPT | Performed by: PHYSICAL THERAPIST

## 2025-05-27 PROCEDURE — 1159F MED LIST DOCD IN RCRD: CPT | Mod: CPTII,S$GLB,, | Performed by: INTERNAL MEDICINE

## 2025-05-27 PROCEDURE — 1160F RVW MEDS BY RX/DR IN RCRD: CPT | Mod: CPTII,S$GLB,, | Performed by: INTERNAL MEDICINE

## 2025-05-27 PROCEDURE — 3008F BODY MASS INDEX DOCD: CPT | Mod: CPTII,S$GLB,, | Performed by: INTERNAL MEDICINE

## 2025-05-27 PROCEDURE — 97140 MANUAL THERAPY 1/> REGIONS: CPT | Performed by: PHYSICAL THERAPIST

## 2025-05-27 PROCEDURE — 99999 PR PBB SHADOW E&M-EST. PATIENT-LVL IV: CPT | Mod: PBBFAC,,, | Performed by: INTERNAL MEDICINE

## 2025-05-27 PROCEDURE — 1125F AMNT PAIN NOTED PAIN PRSNT: CPT | Mod: CPTII,S$GLB,, | Performed by: INTERNAL MEDICINE

## 2025-05-27 PROCEDURE — 99397 PER PM REEVAL EST PAT 65+ YR: CPT | Mod: S$GLB,,, | Performed by: INTERNAL MEDICINE

## 2025-05-27 PROCEDURE — 4010F ACE/ARB THERAPY RXD/TAKEN: CPT | Mod: CPTII,S$GLB,, | Performed by: INTERNAL MEDICINE

## 2025-05-27 PROCEDURE — 3077F SYST BP >= 140 MM HG: CPT | Mod: CPTII,S$GLB,, | Performed by: INTERNAL MEDICINE

## 2025-05-27 PROCEDURE — 3051F HG A1C>EQUAL 7.0%<8.0%: CPT | Mod: CPTII,S$GLB,, | Performed by: INTERNAL MEDICINE

## 2025-05-27 RX ORDER — LOSARTAN POTASSIUM 50 MG/1
50 TABLET ORAL DAILY
Qty: 90 TABLET | Refills: 1 | Status: SHIPPED | OUTPATIENT
Start: 2025-05-27

## 2025-05-27 RX ORDER — DICLOFENAC SODIUM 75 MG/1
75 TABLET, DELAYED RELEASE ORAL 2 TIMES DAILY
Qty: 20 TABLET | Refills: 0 | Status: SHIPPED | OUTPATIENT
Start: 2025-05-27 | End: 2025-06-06

## 2025-05-27 RX ORDER — DICLOFENAC SODIUM 75 MG/1
75 TABLET, DELAYED RELEASE ORAL 2 TIMES DAILY
Qty: 20 TABLET | Refills: 0 | Status: SHIPPED | OUTPATIENT
Start: 2025-05-27 | End: 2025-05-27

## 2025-05-27 NOTE — PROGRESS NOTES
HISTORY:  Type 2 diabetes with peripheral neuropathy  Hypertension.  Hyperlipidemia.  Helicobacter pylori which has been treated.  Obesity with gastric bypass surgery.  Left foot ulcer, fifth metatarsal, debridement     SOCIAL HISTORY:  Tobacco and alcohol use - none     FAMILY HISTORY:  Mother is , COPD, heart disease.  Father is , liver cancer, heart disease.  Two sisters, but one sister recently passed away from metastatic liver disease, the primary is unknown; the other sister has diabetes and hypothyroid disease.      Medications  Mounjaro 5 mg  Jardiance 25 mg   Metformin 1000 mg b.i.d.  Rosuvastatin 20 mg  Losartan 25 mg  Vitamin-D for that has once a week    Component      Latest Ref Rng 2025   WBC      3.90 - 12.70 K/uL 7.78    RBC      4.00 - 5.40 M/uL 5.19    Hemoglobin      12.0 - 16.0 gm/dL 13.0    Hematocrit      37.0 - 48.5 % 43.3    MCV      82 - 98 fL 83    MCH      27.0 - 31.0 pg 25.0 (L)    MCHC      32.0 - 36.0 g/dL 30.0 (L)    RDW      11.5 - 14.5 % 15.1 (H)    Platelet Count      150 - 450 K/uL 338    MPV      9.2 - 12.9 fL 11.1    nRBC      <=0 /100 WBC 0    Neut %      38 - 73 % 68.9    Lymph %      18 - 48 % 24.3    Mono %      4 - 15 % 5.8    Eos %      <=8 % 0.3    Basophil %      <=1.9 % 0.4    Immature Granulocytes      0.0 - 0.5 % 0.3    Gran # (ANC)      1.8 - 7.7 K/uL 5.37    Lymph #      1 - 4.8 K/uL 1.89    Mono #      0.3 - 1 K/uL 0.45    Eos #      <=0.5 K/uL 0.02    Baso #      <=0.2 K/uL 0.03    Immature Grans (Abs)      0.00 - 0.04 K/uL 0.02    Sodium      136 - 145 mmol/L 138    Potassium      3.5 - 5.1 mmol/L 4.0    Chloride      95 - 110 mmol/L 102    CO2      23 - 29 mmol/L 23    Glucose      70 - 110 mg/dL 159 (H)    BUN      8 - 23 mg/dL 19    Creatinine      0.5 - 1.4 mg/dL 1.0    Calcium      8.7 - 10.5 mg/dL 9.3    PROTEIN TOTAL      6.0 - 8.4 gm/dL 7.7    Albumin      3.5 - 5.2 g/dL 3.7    BILIRUBIN TOTAL      0.1 - 1.0 mg/dL 0.4    ALP      40 -  150 unit/L 106    AST      11 - 45 unit/L 22    ALT      10 - 44 unit/L 26    Anion Gap      8 - 16 mmol/L 13    eGFR      >60 mL/min/1.73/m2 >60    Cholesterol Total      120 - 199 mg/dL 201 (H)    Triglycerides      30 - 150 mg/dL 120    HDL      40 - 75 mg/dL 64    LDL Cholesterol      63.0 - 159.0 mg/dL 113.0    HDL/Cholesterol Ratio      20.0 - 50.0 % 31.8    Total Cholesterol/HDL Ratio      2.0 - 5.0  3.1    Non-HDL Cholesterol      mg/dL 137    Hemoglobin A1C External      4.0 - 5.6 % 7.7 (H)    Estimated Avg Glucose      68 - 131 mg/dL 174 (H)    TSH      0.400 - 4.000 uIU/mL 2.426       Legend:  (L) Low  (H) High      69-year-old female   Annual exam   Feels well in general    Last week was seen for pain around the right hip lateral buttocks and inguinal region.  Was given Medrol Dosepak.  Tramadol.  The degree of pain is improved but still present.  Does not extend down the leg      She is also on much for the past few months.  It has helped improve diet habits as for his portions.  At 1 time a constipation but this has pass no significant change in weight.  The hemoglobin A1c is slightly down when compared to before.  Also on lipid profile triglycerides a now normal    Feels symptoms   Negative for pains in his chest palpitations shortness of breath abdominal pain.  Regular bowel function.  No difficulty urinating, no incontinence.  No indigestion no heartburn.  No headaches.      Examination   Weight 266   BMI 41.75  Pulse 80   Blood pressure 167/82  HEENT exam, no abnormal findings   Neck, no thyromegaly no masses   Chest, clear breath sounds  Heart, regular rate and rhythm, no murmurs or gallops   Abdominal exam, soft nontender no hepatosplenomegaly abdominal masses  Pulses, 2+ carotid pulses no bruits 1+ dorsalis pedal pulses   Extremities no edema  Lymph gland, no palpable adenopathy in his skin, no gross abnormal findings  Musculoskeletal tender over the right greater trochanter.  Some degree of  pain when abduct the leg at the hip joint in the inguinal region    Impression   General examination  Type 2 diabetes with peripheral neuropathy  Hypertension   Hyperlipidemia  Morbid obesity with comorbidities  Trochanteric bursitis  Hip pain probably that of tendinopathy  Colon cancer screening  Breast cancer screening    Plan   Trial of diclofenac 75 mg b.i.d. for 10 days.    Increase losartan to 50 mg daily  Cologuard, defers on colonoscopy  Mammogram   Return visit 4 months

## 2025-05-28 NOTE — PROGRESS NOTES
"  Outpatient Rehab    Physical Therapy Progress Note    Patient Name: Meryl Johnson  MRN: [id number]  YOB: 1955  Encounter Date: 5/27/2025    Therapy Diagnosis:   Encounter Diagnosis   Name Primary?    Right elbow pain Yes       Physician: Russell Honeycutt III, *    Physician Orders: Eval and Treat  Medical Diagnosis: Right shoulder pain, unspecified chronicity    Visit # / Visits Authorized:  8 / 12  Insurance Authorization Period: 1/2/2025 to 12/31/2025  Date of Evaluation: 4/21/2025  Plan of Care Certification: 4/21/2025 to 10/21/2025      PT/PTA:     Number of PTA visits since last PT visit:   Time In: 1700   Time Out: 1800  Total Time (in minutes): 60   Total Billable Time (in minutes): 60    FOTO:  Intake Score:  %  Survey Score 2:  %  Survey Score 3:  %    Precautions:       Subjective   On the weekends no pain in the elbow but today it hurt with typing. R Hip still the worst.  Pain reported as 4/10.      Objective            Treatment:  Manual Therapy  MT 1: Radial head mobs  MT 2: Gapping humeroulnar joint  MT 3: Radiohumeral gapping  Balance/Neuromuscular Re-Education  NMR 1: wrist ext/flexion 1# 3 x 12  NMR 2: Open books 20x R  NMR 3: No money YTB 20x 3"  NMR 6: Tricep ext 2# supine 3 x 12    Time Entry(in minutes):       Assessment & Plan   Assessment: Limited by pain along radial nerve distribution, started with open books to modulate nervous system but Hip pain is also contributing to nervous system pain.  Evaluation/Treatment Tolerance: Patient limited by pain    The patient will continue to benefit from skilled outpatient physical therapy in order to address the deficits listed in the problem list on the initial evaluation, provide patient and family education, and maximize the patients level of independence in the home and community environments.     The patient's spiritual, cultural, and educational needs were considered, and the patient is agreeable to the plan of care and goals. "           Plan: Improve wrist ext strength    Goals:   Active       LTG       Pt will demonstrate independence with initial HEP to improve their performance of their Instrumental Activities of Daily Living.          Start:  04/21/25    Expected End:  06/16/25            Patient will improve their numeric pain rating score to no greater than 0/10 when performing LIFTING to improve performance of their Instrumental Activities of Daily Living.         Start:  04/21/25    Expected End:  06/16/25               STG       Pt will demonstrate independence with initial HEP to improve their performance of their Activities of Daily Living.          Start:  04/21/25    Expected End:  05/19/25            Patient will improve their numeric pain rating score to no greater than 2/10 when performing wrist ext to improve performance of their Activities of Daily Living.         Start:  04/21/25    Expected End:  05/19/25                Fabricio Tolentino, PT, DPT

## 2025-05-30 ENCOUNTER — CLINICAL SUPPORT (OUTPATIENT)
Dept: REHABILITATION | Facility: HOSPITAL | Age: 70
End: 2025-05-30
Payer: COMMERCIAL

## 2025-05-30 DIAGNOSIS — M25.521 RIGHT ELBOW PAIN: Primary | ICD-10-CM

## 2025-05-30 PROCEDURE — 97112 NEUROMUSCULAR REEDUCATION: CPT | Performed by: PHYSICAL THERAPIST

## 2025-05-30 PROCEDURE — 97140 MANUAL THERAPY 1/> REGIONS: CPT | Performed by: PHYSICAL THERAPIST

## 2025-05-30 NOTE — PROGRESS NOTES
"  Outpatient Rehab    Physical Therapy Progress Note    Patient Name: Meryl Johnson  MRN: [id number]  YOB: 1955  Encounter Date: 5/30/2025    Therapy Diagnosis:   Encounter Diagnosis   Name Primary?    Right elbow pain Yes       Physician: Russell Honeycutt III, *    Physician Orders: Eval and Treat  Medical Diagnosis: Right shoulder pain, unspecified chronicity    Visit # / Visits Authorized:  9 / 24  Insurance Authorization Period: 1/2/2025 to 12/31/2025  Date of Evaluation: 4/21/2025  Plan of Care Certification: 4/21/2025 to 10/21/2025      PT/PTA:     Number of PTA visits since last PT visit:   Time In: 0700   Time Out: 0755  Total Time (in minutes): 55   Total Billable Time (in minutes): 55    FOTO:  Intake Score:  %  Survey Score 2:  %  Survey Score 3:  %    Precautions:       Subjective   In the hospital with sister last night, elbow and hip are feeling better.  Pain reported as 2/10.      Objective            Treatment:  Manual Therapy  MT 1: Radial head mobs  MT 2: Gapping humeroulnar joint  MT 3: Radiohumeral gapping  MT 4: STM ECRB and brachioradialis  MT 5: Gapping with active wrist extension  Balance/Neuromuscular Re-Education  NMR 1: wrist ext/flexion 2# 3 x 12  NMR 2: Pro/sup 1# 30x  NMR 3: No money YTB 20x 3"  NMR 4: Radial dev 30x 1#  NMR 6: Tricep ext 2# supine 3 x 12    Time Entry(in minutes):  Manual Therapy Time Entry: 15  Neuromuscular Re-Education Time Entry: 40    Assessment & Plan   Assessment: Less pain on arrival, just painful to palpation over radial head and unable use 2# with brachioradialis today.  Evaluation/Treatment Tolerance: Patient limited by pain    The patient will continue to benefit from skilled outpatient physical therapy in order to address the deficits listed in the problem list on the initial evaluation, provide patient and family education, and maximize the patients level of independence in the home and community environments.     The patient's spiritual, " cultural, and educational needs were considered, and the patient is agreeable to the plan of care and goals.           Plan: Improve wrist ext strength    Goals:   Active       LTG       Pt will demonstrate independence with initial HEP to improve their performance of their Instrumental Activities of Daily Living.    (Progressing)       Start:  04/21/25    Expected End:  06/16/25            Patient will improve their numeric pain rating score to no greater than 0/10 when performing LIFTING to improve performance of their Instrumental Activities of Daily Living.   (Progressing)       Start:  04/21/25    Expected End:  06/16/25               STG       Pt will demonstrate independence with initial HEP to improve their performance of their Activities of Daily Living.    (Progressing)       Start:  04/21/25    Expected End:  05/19/25            Patient will improve their numeric pain rating score to no greater than 2/10 when performing wrist ext to improve performance of their Activities of Daily Living.   (Progressing)       Start:  04/21/25    Expected End:  05/19/25                Fabricio Tolentino, PT, DPT

## 2025-06-02 ENCOUNTER — PATIENT MESSAGE (OUTPATIENT)
Dept: ADMINISTRATIVE | Facility: OTHER | Age: 70
End: 2025-06-02
Payer: COMMERCIAL

## 2025-06-03 ENCOUNTER — PATIENT MESSAGE (OUTPATIENT)
Dept: OPTOMETRY | Facility: CLINIC | Age: 70
End: 2025-06-03
Payer: COMMERCIAL

## 2025-06-03 ENCOUNTER — CLINICAL SUPPORT (OUTPATIENT)
Dept: REHABILITATION | Facility: HOSPITAL | Age: 70
End: 2025-06-03
Payer: COMMERCIAL

## 2025-06-03 DIAGNOSIS — M25.521 RIGHT ELBOW PAIN: Primary | ICD-10-CM

## 2025-06-03 PROCEDURE — 97140 MANUAL THERAPY 1/> REGIONS: CPT | Performed by: PHYSICAL THERAPIST

## 2025-06-03 PROCEDURE — 97112 NEUROMUSCULAR REEDUCATION: CPT | Performed by: PHYSICAL THERAPIST

## 2025-06-06 ENCOUNTER — CLINICAL SUPPORT (OUTPATIENT)
Dept: REHABILITATION | Facility: HOSPITAL | Age: 70
End: 2025-06-06
Payer: COMMERCIAL

## 2025-06-06 DIAGNOSIS — M25.521 RIGHT ELBOW PAIN: Primary | ICD-10-CM

## 2025-06-06 PROCEDURE — 97112 NEUROMUSCULAR REEDUCATION: CPT | Performed by: PHYSICAL THERAPIST

## 2025-06-06 PROCEDURE — 97140 MANUAL THERAPY 1/> REGIONS: CPT | Performed by: PHYSICAL THERAPIST

## 2025-06-10 ENCOUNTER — PATIENT MESSAGE (OUTPATIENT)
Dept: ADMINISTRATIVE | Facility: OTHER | Age: 70
End: 2025-06-10
Payer: COMMERCIAL

## 2025-06-11 ENCOUNTER — CLINICAL SUPPORT (OUTPATIENT)
Dept: REHABILITATION | Facility: HOSPITAL | Age: 70
End: 2025-06-11
Attending: PHYSICAL THERAPIST
Payer: COMMERCIAL

## 2025-06-11 DIAGNOSIS — M25.521 RIGHT ELBOW PAIN: Primary | ICD-10-CM

## 2025-06-11 PROCEDURE — 97112 NEUROMUSCULAR REEDUCATION: CPT | Performed by: PHYSICAL THERAPIST

## 2025-06-11 NOTE — PROGRESS NOTES
Outpatient Rehab    Physical Therapy Progress Note    Patient Name: Meryl Johnson  MRN: [id number]  YOB: 1955  Encounter Date: 6/11/2025    Therapy Diagnosis:   Encounter Diagnosis   Name Primary?    Right elbow pain Yes       Physician: Russell Honeycutt III, *    Physician Orders: Eval and Treat  Medical Diagnosis: Right shoulder pain, unspecified chronicity    Visit # / Visits Authorized:  12 / 24  Insurance Authorization Period: 1/2/2025 to 12/31/2025  Date of Evaluation: 4/21/2025  Plan of Care Certification: 4/21/2025 to 10/21/2025      PT/PTA:     Number of PTA visits since last PT visit:   Time In: 0730   Time Out: 0830  Total Time (in minutes): 60   Total Billable Time (in minutes): 30    FOTO:  Intake Score:  %  Survey Score 2:  %  Survey Score 3:  %    Precautions:       Subjective   Hurts after trying to assist in moving her sister yesterday.  Pain reported as 6/10.      Objective            Treatment:  Manual Therapy  MT 1: Radial head mobs  MT 2: Gapping humeroulnar joint  MT 3: Radiohumeral gapping  MT 5: Gapping with active wrist extension  Balance/Neuromuscular Re-Education  NMR 1: wrist ext/flexion 2# 3 x 12  NMR 2: Pro/sup 1# 30x  NMR 4: Radial dev 30x 1#    Time Entry(in minutes):  Manual Therapy Time Entry: 25  Neuromuscular Re-Education Time Entry: 25    Assessment & Plan   Assessment: Pain with active supination today, relief with mobs and active mobilization. Discussed with patient and MD surgery  Evaluation/Treatment Tolerance: Patient limited by pain    The patient will continue to benefit from skilled outpatient physical therapy in order to address the deficits listed in the problem list on the initial evaluation, provide patient and family education, and maximize the patients level of independence in the home and community environments.     The patient's spiritual, cultural, and educational needs were considered, and the patient is agreeable to the plan of care and goals.            Plan: Improve wrist ext strength    Goals:   Active       LTG       Pt will demonstrate independence with initial HEP to improve their performance of their Instrumental Activities of Daily Living.    (Progressing)       Start:  04/21/25    Expected End:  06/16/25            Patient will improve their numeric pain rating score to no greater than 0/10 when performing LIFTING to improve performance of their Instrumental Activities of Daily Living.   (Progressing)       Start:  04/21/25    Expected End:  06/16/25               STG       Pt will demonstrate independence with initial HEP to improve their performance of their Activities of Daily Living.    (Progressing)       Start:  04/21/25    Expected End:  05/19/25            Patient will improve their numeric pain rating score to no greater than 2/10 when performing wrist ext to improve performance of their Activities of Daily Living.   (Progressing)       Start:  04/21/25    Expected End:  05/19/25                Fabricio Tolentino, PT, DPT

## 2025-06-13 ENCOUNTER — CLINICAL SUPPORT (OUTPATIENT)
Dept: REHABILITATION | Facility: HOSPITAL | Age: 70
End: 2025-06-13
Payer: COMMERCIAL

## 2025-06-13 DIAGNOSIS — M25.521 RIGHT ELBOW PAIN: Primary | ICD-10-CM

## 2025-06-13 PROCEDURE — 97112 NEUROMUSCULAR REEDUCATION: CPT | Performed by: PHYSICAL THERAPIST

## 2025-06-13 PROCEDURE — 97140 MANUAL THERAPY 1/> REGIONS: CPT | Performed by: PHYSICAL THERAPIST

## 2025-06-13 RX ORDER — FLUCONAZOLE 150 MG/1
150 TABLET ORAL WEEKLY
Qty: 2 TABLET | Refills: 0 | Status: CANCELLED | OUTPATIENT
Start: 2025-06-13 | End: 2025-06-21

## 2025-06-13 RX ORDER — FLUCONAZOLE 150 MG/1
150 TABLET ORAL WEEKLY
Qty: 2 TABLET | Refills: 0 | Status: SHIPPED | OUTPATIENT
Start: 2025-06-13 | End: 2025-06-27

## 2025-06-13 NOTE — TELEPHONE ENCOUNTER
Refill Routing Note   Medication(s) are not appropriate for processing by [facility] for the following reason(s):        Outside of protocol    [facility] action(s):  Route               Appointments  past 12m or future 3m with PCP    Date Provider   Last Visit   5/27/2025 Russell Martinez MD   Next Visit   9/29/2025 Russell Martinez MD   ED visits in past 90 days: 0        Note composed:2:06 PM 06/13/2025

## 2025-06-13 NOTE — PROGRESS NOTES
"  Outpatient Rehab    Physical Therapy Progress Note    Patient Name: Meryl Johnson  MRN: [id number]  YOB: 1955  Encounter Date: 6/13/2025    Therapy Diagnosis:   Encounter Diagnosis   Name Primary?    Right elbow pain Yes       Physician: Russell Honeycutt III, *    Physician Orders: Eval and Treat  Medical Diagnosis: Right shoulder pain, unspecified chronicity    Visit # / Visits Authorized:  13 / 24  Insurance Authorization Period: 1/2/2025 to 12/31/2025  Date of Evaluation: 4/21/2025  Plan of Care Certification: 4/21/2025 to 10/21/2025      PT/PTA:     Number of PTA visits since last PT visit:   Time In: 0700   Time Out: 0750  Total Time (in minutes): 50   Total Billable Time (in minutes): 50    FOTO:  Intake Score:  %  Survey Score 2:  %  Survey Score 3:  %    Precautions:       Subjective   Ready to exercise again today. It hurts with lifting, radiating up my arm into the tricep.  Pain reported as 5/10.      Objective            Treatment:  Manual Therapy  MT 1: Radial head mobs  MT 2: Gapping humeroulnar joint  MT 3: Radiohumeral gapping  MT 4: Compresion with pronation/supination  MT 5: Gapping with active wrist extension  Balance/Neuromuscular Re-Education  NMR 1: wrist ext/flexion 2# 3 x 12  NMR 2: Pro/sup 1# 30x  NMR 3: Seated ecc OTB wrist extension 20x 3" decent  NMR 4: Radial dev 30x 1#    Time Entry(in minutes):  Manual Therapy Time Entry: 25  Neuromuscular Re-Education Time Entry: 25    Assessment & Plan   Assessment: Increased resistance with OTB resistance to wrist extensors. Added in compression mob with pro/sup as her radial head is hypermobile due to distal fracture years ago.  Evaluation/Treatment Tolerance: Patient limited by pain    The patient will continue to benefit from skilled outpatient physical therapy in order to address the deficits listed in the problem list on the initial evaluation, provide patient and family education, and maximize the patients level of " independence in the home and community environments.     The patient's spiritual, cultural, and educational needs were considered, and the patient is agreeable to the plan of care and goals.           Plan: Improve wrist ext strength, radial head mobility    Goals:   Active       LTG       Pt will demonstrate independence with initial HEP to improve their performance of their Instrumental Activities of Daily Living.    (Progressing)       Start:  04/21/25    Expected End:  06/16/25            Patient will improve their numeric pain rating score to no greater than 0/10 when performing LIFTING to improve performance of their Instrumental Activities of Daily Living.   (Progressing)       Start:  04/21/25    Expected End:  06/16/25               STG       Pt will demonstrate independence with initial HEP to improve their performance of their Activities of Daily Living.    (Progressing)       Start:  04/21/25    Expected End:  05/19/25            Patient will improve their numeric pain rating score to no greater than 2/10 when performing wrist ext to improve performance of their Activities of Daily Living.   (Progressing)       Start:  04/21/25    Expected End:  05/19/25                Fabricio Tolentino, PT, DPT

## 2025-06-17 ENCOUNTER — CLINICAL SUPPORT (OUTPATIENT)
Dept: REHABILITATION | Facility: HOSPITAL | Age: 70
End: 2025-06-17
Payer: COMMERCIAL

## 2025-06-17 DIAGNOSIS — M25.521 RIGHT ELBOW PAIN: Primary | ICD-10-CM

## 2025-06-17 PROCEDURE — 97140 MANUAL THERAPY 1/> REGIONS: CPT | Performed by: PHYSICAL THERAPIST

## 2025-06-17 PROCEDURE — 97112 NEUROMUSCULAR REEDUCATION: CPT | Performed by: PHYSICAL THERAPIST

## 2025-06-17 NOTE — PROGRESS NOTES
"  Outpatient Rehab    Physical Therapy Progress Note    Patient Name: Meryl Johnson  MRN: [id number]  YOB: 1955  Encounter Date: 6/17/2025    Therapy Diagnosis:   Encounter Diagnosis   Name Primary?    Right elbow pain Yes       Physician: Russell Honeycutt III, *    Physician Orders: Eval and Treat  Medical Diagnosis: Right shoulder pain, unspecified chronicity    Visit # / Visits Authorized:  14 / 24  Insurance Authorization Period: 1/2/2025 to 12/31/2025  Date of Evaluation: 4/21/2025  Plan of Care Certification: 4/21/2025 to 10/21/2025      PT/PTA:     Number of PTA visits since last PT visit:   Time In: 1600   Time Out: 1700  Total Time (in minutes): 60   Total Billable Time (in minutes): 60    FOTO:  Intake Score: 38%  Survey Score 2: 61%  Survey Score 3: 91%    Precautions:       Subjective   It was okay today when off work. Off tehr est of the week.  Pain reported as 3/10.      Objective            Treatment:  Manual Therapy  MT 1: Radial head mobs  MT 2: Gapping humeroulnar joint  MT 3: Radiohumeral gapping  MT 4: Compresion with pronation/supination  MT 5: Gapping with active wrist extension  MT 6: Graston extensors  Balance/Neuromuscular Re-Education  NMR 1: wrist ext/flexion 2# 3 x 12  NMR 2: Pro/sup 1# 30x  NMR 3: Seated ecc OTB wrist extension 20x 3" decent  NMR 4: Radial dev 30x 1#  NMR 5: Bicep curl 1# 30x  NMR 6: Tricep ext 2# supine 3 x 12  NMR 7: No money with ext YTB 2 x 10    Time Entry(in minutes):  Manual Therapy Time Entry: 20  Neuromuscular Re-Education Time Entry: 40    Assessment & Plan   Assessment: Meryl progressed with loading to the extensors. Began scraping today to see if this helped with pain since she's off work the next 5 days  Evaluation/Treatment Tolerance: Patient tolerated treatment well    The patient will continue to benefit from skilled outpatient physical therapy in order to address the deficits listed in the problem list on the initial evaluation, provide " patient and family education, and maximize the patients level of independence in the home and community environments.     The patient's spiritual, cultural, and educational needs were considered, and the patient is agreeable to the plan of care and goals.           Plan: Improve wrist ext strength, radial head mobility    Goals:   Active       LTG       Pt will demonstrate independence with initial HEP to improve their performance of their Instrumental Activities of Daily Living.    (Progressing)       Start:  04/21/25    Expected End:  06/16/25            Patient will improve their numeric pain rating score to no greater than 0/10 when performing LIFTING to improve performance of their Instrumental Activities of Daily Living.   (Progressing)       Start:  04/21/25    Expected End:  06/16/25               STG       Pt will demonstrate independence with initial HEP to improve their performance of their Activities of Daily Living.    (Progressing)       Start:  04/21/25    Expected End:  05/19/25            Patient will improve their numeric pain rating score to no greater than 2/10 when performing wrist ext to improve performance of their Activities of Daily Living.   (Progressing)       Start:  04/21/25    Expected End:  05/19/25                Fabricio Tolentino, PT, DPT

## 2025-06-23 ENCOUNTER — PATIENT MESSAGE (OUTPATIENT)
Dept: ADMINISTRATIVE | Facility: OTHER | Age: 70
End: 2025-06-23
Payer: COMMERCIAL

## 2025-06-24 ENCOUNTER — CLINICAL SUPPORT (OUTPATIENT)
Dept: REHABILITATION | Facility: HOSPITAL | Age: 70
End: 2025-06-24
Payer: COMMERCIAL

## 2025-06-24 DIAGNOSIS — M25.521 RIGHT ELBOW PAIN: Primary | ICD-10-CM

## 2025-06-24 PROCEDURE — 97112 NEUROMUSCULAR REEDUCATION: CPT | Performed by: PHYSICAL THERAPIST

## 2025-06-24 PROCEDURE — 97140 MANUAL THERAPY 1/> REGIONS: CPT | Performed by: PHYSICAL THERAPIST

## 2025-06-24 NOTE — PROGRESS NOTES
"  Outpatient Rehab    Physical Therapy Progress Note    Patient Name: Meryl Johnson  MRN: [id number]  YOB: 1955  Encounter Date: 6/24/2025    Therapy Diagnosis:   Encounter Diagnosis   Name Primary?    Right elbow pain Yes       Physician: Russell Honeycutt III, *    Physician Orders: Eval and Treat  Medical Diagnosis: Right shoulder pain, unspecified chronicity    Visit # / Visits Authorized:  15 / 24  Insurance Authorization Period: 1/2/2025 to 12/31/2025  Date of Evaluation: 4/21/2025  Plan of Care Certification: 4/21/2025 to 10/21/2025      PT/PTA:     Number of PTA visits since last PT visit:   Time In: 1600   Time Out: 1700  Total Time (in minutes): 60   Total Billable Time (in minutes): 60    FOTO:  Intake Score:  %  Survey Score 2:  %  Survey Score 3:  %    Precautions:       Subjective   Back hurt after laying in bed for a few days but was out of the brace until Monday.  Pain reported as 1/10.      Objective            Treatment:  Manual Therapy  MT 1: Radial head mobs  MT 2: Gapping humeroulnar joint  MT 3: Radiohumeral gapping  MT 4: Compresion with pronation/supination  MT 5: Gapping with active wrist extension  MT 6: Graston extensors  Balance/Neuromuscular Re-Education  NMR 1: wrist ext/flexion 2# 3 x 12  NMR 2: Pro/sup 1# 30x  NMR 3: Seated ecc OTB wrist extension 20x 3" decent  NMR 4: Radial dev 30x 1#  NMR 5: Bicep curl 1# 30x  NMR 7: No money with ext YTB 2 x 10    Time Entry(in minutes):  Manual Therapy Time Entry: 20  Neuromuscular Re-Education Time Entry: 40    Assessment & Plan   Assessment:    Evaluation/Treatment Tolerance: Patient tolerated treatment well    The patient will continue to benefit from skilled outpatient physical therapy in order to address the deficits listed in the problem list on the initial evaluation, provide patient and family education, and maximize the patients level of independence in the home and community environments.     The patient's spiritual, " cultural, and educational needs were considered, and the patient is agreeable to the plan of care and goals.           Plan: Improve wrist ext strength, radial head mobility    Goals:   Active       LTG       Pt will demonstrate independence with initial HEP to improve their performance of their Instrumental Activities of Daily Living.    (Progressing)       Start:  04/21/25    Expected End:  06/16/25            Patient will improve their numeric pain rating score to no greater than 0/10 when performing LIFTING to improve performance of their Instrumental Activities of Daily Living.   (Progressing)       Start:  04/21/25    Expected End:  06/16/25               STG       Pt will demonstrate independence with initial HEP to improve their performance of their Activities of Daily Living.    (Progressing)       Start:  04/21/25    Expected End:  05/19/25            Patient will improve their numeric pain rating score to no greater than 2/10 when performing wrist ext to improve performance of their Activities of Daily Living.   (Progressing)       Start:  04/21/25    Expected End:  05/19/25                Fabricio Tolentino, PT, DPT

## 2025-07-01 ENCOUNTER — PATIENT MESSAGE (OUTPATIENT)
Dept: SPORTS MEDICINE | Facility: CLINIC | Age: 70
End: 2025-07-01
Payer: COMMERCIAL

## 2025-07-01 ENCOUNTER — CLINICAL SUPPORT (OUTPATIENT)
Dept: REHABILITATION | Facility: HOSPITAL | Age: 70
End: 2025-07-01
Payer: COMMERCIAL

## 2025-07-01 DIAGNOSIS — M25.521 RIGHT ELBOW PAIN: Primary | ICD-10-CM

## 2025-07-01 PROCEDURE — 97112 NEUROMUSCULAR REEDUCATION: CPT | Performed by: PHYSICAL THERAPIST

## 2025-07-01 PROCEDURE — 97140 MANUAL THERAPY 1/> REGIONS: CPT | Performed by: PHYSICAL THERAPIST

## 2025-07-02 ENCOUNTER — OFFICE VISIT (OUTPATIENT)
Dept: SPORTS MEDICINE | Facility: CLINIC | Age: 70
End: 2025-07-02
Payer: COMMERCIAL

## 2025-07-02 ENCOUNTER — HOSPITAL ENCOUNTER (OUTPATIENT)
Dept: RADIOLOGY | Facility: HOSPITAL | Age: 70
Discharge: HOME | End: 2025-07-02
Attending: PHYSICIAN ASSISTANT
Payer: COMMERCIAL

## 2025-07-02 ENCOUNTER — PATIENT OUTREACH (OUTPATIENT)
Dept: OTHER | Facility: OTHER | Age: 70
End: 2025-07-02
Payer: COMMERCIAL

## 2025-07-02 ENCOUNTER — PATIENT MESSAGE (OUTPATIENT)
Dept: ADMINISTRATIVE | Facility: OTHER | Age: 70
End: 2025-07-02
Payer: COMMERCIAL

## 2025-07-02 VITALS
DIASTOLIC BLOOD PRESSURE: 79 MMHG | HEIGHT: 67 IN | WEIGHT: 266 LBS | BODY MASS INDEX: 41.75 KG/M2 | SYSTOLIC BLOOD PRESSURE: 143 MMHG | HEART RATE: 90 BPM

## 2025-07-02 DIAGNOSIS — M54.50 ACUTE RIGHT-SIDED LOW BACK PAIN WITHOUT SCIATICA: ICD-10-CM

## 2025-07-02 DIAGNOSIS — M77.11 LATERAL EPICONDYLITIS OF RIGHT ELBOW: Primary | ICD-10-CM

## 2025-07-02 DIAGNOSIS — M25.521 RIGHT ELBOW PAIN: ICD-10-CM

## 2025-07-02 PROCEDURE — 3008F BODY MASS INDEX DOCD: CPT | Mod: CPTII,S$GLB,, | Performed by: PHYSICIAN ASSISTANT

## 2025-07-02 PROCEDURE — 99214 OFFICE O/P EST MOD 30 MIN: CPT | Mod: 25,S$GLB,, | Performed by: PHYSICIAN ASSISTANT

## 2025-07-02 PROCEDURE — 1160F RVW MEDS BY RX/DR IN RCRD: CPT | Mod: CPTII,S$GLB,, | Performed by: PHYSICIAN ASSISTANT

## 2025-07-02 PROCEDURE — 3288F FALL RISK ASSESSMENT DOCD: CPT | Mod: CPTII,S$GLB,, | Performed by: PHYSICIAN ASSISTANT

## 2025-07-02 PROCEDURE — 3051F HG A1C>EQUAL 7.0%<8.0%: CPT | Mod: CPTII,S$GLB,, | Performed by: PHYSICIAN ASSISTANT

## 2025-07-02 PROCEDURE — 3078F DIAST BP <80 MM HG: CPT | Mod: CPTII,S$GLB,, | Performed by: PHYSICIAN ASSISTANT

## 2025-07-02 PROCEDURE — 3077F SYST BP >= 140 MM HG: CPT | Mod: CPTII,S$GLB,, | Performed by: PHYSICIAN ASSISTANT

## 2025-07-02 PROCEDURE — 4010F ACE/ARB THERAPY RXD/TAKEN: CPT | Mod: CPTII,S$GLB,, | Performed by: PHYSICIAN ASSISTANT

## 2025-07-02 PROCEDURE — 99999 PR PBB SHADOW E&M-EST. PATIENT-LVL V: CPT | Mod: PBBFAC,,, | Performed by: PHYSICIAN ASSISTANT

## 2025-07-02 PROCEDURE — 72100 X-RAY EXAM L-S SPINE 2/3 VWS: CPT | Mod: TC

## 2025-07-02 PROCEDURE — 1125F AMNT PAIN NOTED PAIN PRSNT: CPT | Mod: CPTII,S$GLB,, | Performed by: PHYSICIAN ASSISTANT

## 2025-07-02 PROCEDURE — 72100 X-RAY EXAM L-S SPINE 2/3 VWS: CPT | Mod: 26,,, | Performed by: RADIOLOGY

## 2025-07-02 PROCEDURE — 1159F MED LIST DOCD IN RCRD: CPT | Mod: CPTII,S$GLB,, | Performed by: PHYSICIAN ASSISTANT

## 2025-07-02 PROCEDURE — 20605 DRAIN/INJ JOINT/BURSA W/O US: CPT | Mod: RT,S$GLB,, | Performed by: PHYSICIAN ASSISTANT

## 2025-07-02 PROCEDURE — 1100F PTFALLS ASSESS-DOCD GE2>/YR: CPT | Mod: CPTII,S$GLB,, | Performed by: PHYSICIAN ASSISTANT

## 2025-07-02 RX ORDER — CYCLOBENZAPRINE HCL 10 MG
10 TABLET ORAL NIGHTLY
Qty: 20 TABLET | Refills: 0 | Status: SHIPPED | OUTPATIENT
Start: 2025-07-02

## 2025-07-02 RX ADMIN — BUPIVACAINE HYDROCHLORIDE 6.25 MG: 2.5 INJECTION, SOLUTION INFILTRATION; PERINEURAL at 01:07

## 2025-07-02 RX ADMIN — LIDOCAINE HYDROCHLORIDE 2.5 ML: 10 INJECTION, SOLUTION INFILTRATION; PERINEURAL at 01:07

## 2025-07-02 NOTE — PROGRESS NOTES
Outpatient Rehab    Physical Therapy Progress Note    Patient Name: Meryl Johnson  MRN: [id number]  YOB: 1955  Encounter Date: 7/1/2025    Therapy Diagnosis:   Encounter Diagnosis   Name Primary?    Right elbow pain Yes       Physician: Russell Honeycutt III, *    Physician Orders: Eval and Treat  Medical Diagnosis: Right shoulder pain, unspecified chronicity    Visit # / Visits Authorized:  16 / 24  Insurance Authorization Period: 1/2/2025 to 12/31/2025  Date of Evaluation: 4/21/2025  Plan of Care Certification: 4/21/2025 to 10/21/2025      PT/PTA:     Number of PTA visits since last PT visit:   Time In: 1600   Time Out: 1700  Total Time (in minutes): 60   Total Billable Time (in minutes): 60    FOTO:  Intake Score:  %  Survey Score 2:  %  Survey Score 3:  %    Precautions:       Subjective   She fell getting out the shower yesterday morning so everything hurts and is brusied today..  Pain reported as 6/10.      Objective            Treatment:  Manual Therapy  MT 1: Radial head mobs  MT 2: Gapping humeroulnar joint  MT 3: Radiohumeral gapping  MT 5: Gapping with active wrist extension  Balance/Neuromuscular Re-Education  NMR 1: wrist ext/flexion 2# 3 x 12  NMR 2: Pro/sup 1# 30x  NMR 4: Radial dev 30x 1#  NMR 7: No money with ext YTB 2 x 10    Time Entry(in minutes):  Manual Therapy Time Entry: 15  Neuromuscular Re-Education Time Entry: 35    Assessment & Plan   Assessment: Overall sore, MH start and end of session for pain relief. Elbow did well with brando last visit. To see PAsumi tomorrow  Evaluation/Treatment Tolerance: Patient limited by pain    The patient will continue to benefit from skilled outpatient physical therapy in order to address the deficits listed in the problem list on the initial evaluation, provide patient and family education, and maximize the patients level of independence in the home and community environments.     The patient's spiritual, cultural, and educational needs  were considered, and the patient is agreeable to the plan of care and goals.           Plan:      Goals:   Active       LTG       Pt will demonstrate independence with initial HEP to improve their performance of their Instrumental Activities of Daily Living.    (Progressing)       Start:  04/21/25    Expected End:  06/16/25            Patient will improve their numeric pain rating score to no greater than 0/10 when performing LIFTING to improve performance of their Instrumental Activities of Daily Living.   (Progressing)       Start:  04/21/25    Expected End:  06/16/25               STG       Pt will demonstrate independence with initial HEP to improve their performance of their Activities of Daily Living.    (Progressing)       Start:  04/21/25    Expected End:  05/19/25            Patient will improve their numeric pain rating score to no greater than 2/10 when performing wrist ext to improve performance of their Activities of Daily Living.   (Progressing)       Start:  04/21/25    Expected End:  05/19/25                Fabricio Tolentino, PT, DPT

## 2025-07-08 ENCOUNTER — CLINICAL SUPPORT (OUTPATIENT)
Dept: REHABILITATION | Facility: HOSPITAL | Age: 70
End: 2025-07-08
Payer: COMMERCIAL

## 2025-07-08 DIAGNOSIS — M54.50 ACUTE RIGHT-SIDED LOW BACK PAIN WITHOUT SCIATICA: ICD-10-CM

## 2025-07-08 DIAGNOSIS — M25.521 RIGHT ELBOW PAIN: Primary | ICD-10-CM

## 2025-07-08 PROCEDURE — 97140 MANUAL THERAPY 1/> REGIONS: CPT | Performed by: PHYSICAL THERAPIST

## 2025-07-08 PROCEDURE — 97112 NEUROMUSCULAR REEDUCATION: CPT | Performed by: PHYSICAL THERAPIST

## 2025-07-08 PROCEDURE — 97164 PT RE-EVAL EST PLAN CARE: CPT | Performed by: PHYSICAL THERAPIST

## 2025-07-09 ENCOUNTER — PATIENT MESSAGE (OUTPATIENT)
Dept: INTERNAL MEDICINE | Facility: CLINIC | Age: 70
End: 2025-07-09
Payer: COMMERCIAL

## 2025-07-09 RX ORDER — LIDOCAINE HYDROCHLORIDE 10 MG/ML
2.5 INJECTION, SOLUTION INFILTRATION; PERINEURAL
Status: COMPLETED | OUTPATIENT
Start: 2025-07-02 | End: 2025-07-02

## 2025-07-09 RX ORDER — BUPIVACAINE HYDROCHLORIDE 2.5 MG/ML
2.5 INJECTION, SOLUTION INFILTRATION; PERINEURAL
Status: COMPLETED | OUTPATIENT
Start: 2025-07-02 | End: 2025-07-02

## 2025-07-09 NOTE — PROGRESS NOTES
Outpatient Rehab    Physical Therapy Progress Note : Updated Plan of Care    Patient Name: Meryl Johnson  MRN: [id number]  YOB: 1955  Encounter Date: 7/8/2025    Therapy Diagnosis:   Encounter Diagnoses   Name Primary?    Acute right-sided low back pain without sciatica     Right elbow pain Yes     Physician: Russell Honeycutt III, *    Physician Orders: Eval and Treat  Medical Diagnosis: Right shoulder pain, unspecified chronicity  Acute right-sided low back pain without sciatica  Surgical Diagnosis: Not applicable for this Episode   Surgical Date: Not applicable for this Episode  Days Since Last Surgery: Not applicable for this Episode    Visit # / Visits Authorized:  17 / 24  Insurance Authorization Period: 1/2/2025 to 12/31/2025  Date of Evaluation: 4/21/2025   Plan of Care Certification: 4/21/2025 to 10/21/2025      PT/PTA:     Number of PTA visits since last PT visit:   Time In: 1600   Time Out: 1700  Total Time (in minutes): 60   Total Billable Time (in minutes): 60    FOTO:  Intake Score:  %  Survey Score 2:  %  Survey Score 3:  %    Precautions:       Subjective   Elbow getting a shot. She reports that the back pain has been very bad, trying not to take anything. Sleeping upright in chair for relief.  Pain reported as 7/10.      Objective   Posture          Pelvic tilt observed: Anterior              Right Dermatomes  Right Lumbar Dermatome Light Touch  Intact: L2, L3, L4, and L5  Right Sacral Dermatome Light Touch  Intact: S1-2       Left Dermatomes  Left Lumbar Dermatome Light Touch  Intact: L2, L3, L4, and L5  Left Sacral Dermatome Light Touch  Intact: S1-2           Lumbar Range of Motion   Active (deg) Passive (deg) Pain   Flexion 25 30     Extension 20 30     Right Lateral Flexion 50 50     Right Rotation 50 50     Left Lateral Flexion 50 50     Left Rotation 50 50       Numbers above represent persent of normal motion           Lumbar Strength   Strength Pain   Flexion 4-      Extension 4     Right Lateral Flexion       Left Lateral Flexion       Right Rotation       Left Rotation                            Treatment:  Manual Therapy  MT 1: Lumbar/SI assessment  MT 2: Lumbar Gapping Gr I-IV  MT 3: Side lying opening mob Gr II-V  MT 4: Supine generalized distraction  Balance/Neuromuscular Re-Education  NMR 1: Supine BKFO OTB  NMR 2: Side lying clam 30x  NMR 3: Side lying dowel press/row for thoracic mobility    Time Entry(in minutes):  PT Re-Evaluation Time Entry: 20  Manual Therapy Time Entry: 25  Neuromuscular Re-Education Time Entry: 15    Assessment & Plan   Assessment  Patient was re-evaluated today for lower back pain on the right side. Noted pain with coughing and sneezing consistent with discogenic cause of pain. No directional preference found in treatment. Noted relief with gapping and stabilization exercises.   Evaluation/Treatment Tolerance: Patient tolerated treatment well    The patient will continue to benefit from skilled outpatient physical therapy in order to address the deficits listed in the problem list on the initial evaluation, provide patient and family education, and maximize the patients level of independence in the home and community environments.     The patient's spiritual, cultural, and educational needs were considered, and the patient is agreeable to the plan of care and goals.           Goals:   Active       LTG       Pt will demonstrate independence with initial HEP to improve their performance of their Instrumental Activities of Daily Living.    (Progressing)       Start:  04/21/25    Expected End:  06/16/25            Patient will improve their numeric pain rating score to no greater than 0/10 when performing LIFTING to improve performance of their Instrumental Activities of Daily Living.   (Progressing)       Start:  04/21/25    Expected End:  06/16/25               STG       Pt will demonstrate independence with initial HEP to improve their  performance of their Activities of Daily Living.    (Progressing)       Start:  04/21/25    Expected End:  05/19/25            Patient will improve their numeric pain rating score to no greater than 2/10 when performing wrist ext to improve performance of their Activities of Daily Living.   (Progressing)       Start:  04/21/25    Expected End:  05/19/25                Fabricio Tolentino, PT, DPT

## 2025-07-09 NOTE — PROGRESS NOTES
CC Right elbow pain    69 y.o. Female patient reports pain in elbow for the past 8+ months. Pain began after using her elbow and forearm to push herself up from a bed. Had immediate sharp pain. No swelling. Pain has continued with no improvement despite OTC creams, tennis elbow strap, and PT with Fabricio F and HEP for 12+ weeks. She has been using the che twist bar with no pain relief. She also had a CSI to the lateral epicondyle with 100% pain relief for 6 months. Pain has returned. She still feels moderate pain with twisting to open something or typing.     Now getting some intermittent numbness of her hand when sitting at her desk with elbow flexed and typing.     Denies any specific injury.     Pain is moderate.     She has a history of radial head fracture to that elbow years ago.     Review of Systems   Constitution: Negative. Negative for chills, fever and night sweats.   HENT: Negative for congestion and headaches.    Eyes: Negative for blurred vision, left vision loss and right vision loss.   Cardiovascular: Negative for chest pain and syncope.   Respiratory: Negative for cough and shortness of breath.    Endocrine: Negative for polydipsia, polyphagia and polyuria.   Hematologic/Lymphatic: Negative for bleeding problem. Does not bruise/bleed easily.   Skin: Negative for dry skin, itching and rash.   Musculoskeletal: Negative for falls and muscle weakness.   Gastrointestinal: Negative for abdominal pain and bowel incontinence.   Genitourinary: Negative for bladder incontinence and nocturia.   Neurological: Negative for disturbances in coordination, loss of balance and seizures.   Psychiatric/Behavioral: Negative for depression. The patient does not have insomnia.    Allergic/Immunologic: Negative for hives and persistent infections.     PHYSICAL EXAM   General appearance  This is a well-developed, Well nourished patient. No obvious acute distress.  Orientation: Patient is alert and oriented x4  Mood and  affect: Normal, pleasant and conversant  Gait is coordinated.  Neck: Shows full painless flexion, extension and lateral rotation. No TTP.  Cardiovascular: There are no swelling or varicosities present.       Musculoskeletal   Left UE    Upper extremities shows full active and passive range of motion bilaterally of the shoulders and wrist.                            Elbow show full active and passive range of motion, full flexion and extension bilaterally.     Palpation shows no masses    Shoulder stability is normal    Strength to flexion and extension well maintained;    Skin shows no rashes, lesions or cafe au lait spots    Negative tinels, neg phalen's compression test, no thenar atrophy     Right UE    ROM shows normal flexion, extension of elbow and wrist    Palpation shows no masses    Shoulder stability is normal.     Strength to flexion and extension well maintained    Skin shows no rashes, lesions, abrasions, or cafe au lait spots                          Negative tinels, neg phalen's compression test, no thenar atrophy      Patient has pain with palpation over lateral epicondyle and pain with resisted wrist extension on the left, negative on the contralateral elbow     ROM:  Elbow (AROM)  Right    Left            Elbow Flexion   165°                  165°      Elbow Extension   0°      0°      Pronation   90     90   Supination   90    90    Elbow STRENGTH:                       RIGHT    LEFT    Elbow Flexion   5/5    5/5    Elbow Extension  5/5    5/5   Hand    5/5    5/5   Supination    5/5    5/5   Pronation              5/5    5/5      Hook test - Negative  Cozen sign - negative  Mills test    - +  Painful supination - +    No atrophy, abrasions or lesions are noted.     Pulses are   2+ and symmetric bilaterally.    Xrays:  Xrays of the right elbow 3 view were ordered and reviewed by me today. No fracture, subluxation, or other significant bony or joint abnormality is identified. Bony alignment is  normal. Joints and soft tissues are unremarkable.    Assessment:       1. Lateral epicondylitis , right   2. Elbow pain, right, acute      Plan:         1. PROCEDURE NOTE: After time out was performed and patient ID, side, and site were verified, the  right elbow posterior branch of the posterior cutaneous nerve of the forearm (PBPCNF)  was sterilly prepped in the standard fashion.  A 25-gauge needle was introduced into the joint from an  without complication. The elbow was then injected with 2.5cc 1%lidocane, and 2.55cc 0.25% marcaine.  Sterile dressing was applied.  The patient tolerated the procedure well. 100%  pain relief. No TTP of lateral epicondyle. No pain with tennis elbow provocative tests.      I made the decision to obtain old records of the patient including previous notes and imaging. I independently reviewed and interpreted the radiographs and/or MRIs today as well as prior imaging.     Discussed treatment options and would advise against repeated CSI to lateral epicondyle due to risk of skin atrophy and tendon injury. She has exhausted all non-surgical treatment options. She wishes to proceed to surgery with MAC and below procedure.      OPERATIONS:   right  1. elbow excision of neuroma, posterior branch, posterior cutaneous nerve   of the forearm (CPT 02542).   2. elbow implantation of nerve into bone or muscle (posterior branch and   posterior cutaneous nerve of the forearm into triceps (CPT 09374).      Nonoperative versus operative options were discussed. The risks and benefits were discussed with the patient. The patient acknowledged and wished to proceed with operative intervention. Informed consent was obtained prior to the procedure. Details of surgical procedure were explained, including expectations and probable rehabilitation course. The patient understands the likely length of convalescence after surgery. The patient was explained the risks, benefits and alternatives of surgery.  Reasonable expectations and potential complications were discussed and acknowledged, including but not limited to numbness, infection, bleeding, blood clots (DVT and/or PE), nerve injury, tear/retear, instability, continued pain, loss of function, recurrence and stiffness. It was also explained that there was a chance of failure which may require further management, including but not limited to a subsequent radial tunnel decompression. The patient agreed, understood and wished to proceed.         POSTOPERATIVE PLAN: We will follow the posterior branch of the posterior cutaneous nerve of the forearm excision guidelines. The sling will be weaned over the next 2 to 7 days, with immediate range of motion as tolerated. The patient will be allowed to immediately resume activities of daily living and allowed to return to full activity and sport by 4 weeks after surgery.  We discussed this with the patient's family after surgery.  All patients questions were answered. Patient was advised to call us with any concerns or questions.        DATE: 7/9/2025  PATIENT: Meryl Johnson    CHIEF COMPLAINT: Lumbar back pain     HISTORY:  Meryl Johnson is a 69 y.o. female [employer] Employee here for initial evaluation of low back after slipping and falling a  bath tub a few days ago. She reports landing straight back on her butt and lower pain. She has had back pain since of the lumbar back and mostly of the right low back.  The pain is worse with movements and getting up after sitting and improved by resting. There is no associated numbness and tingling. There is no subjective weakness. Prior treatments have included heat, but no PT.    The patient denies myelopathic symptoms such as handwriting changes or difficulty with buttons/coins/keys. Denies perineal paresthesias, bowel/bladder dysfunction.    PAST MEDICAL/SURGICAL HISTORY:  Past Medical History:   Diagnosis Date    Anemia     Diabetes mellitus type II     H. pylori infection  "    Hyperlipidemia     Hypertension     Unspecified vitamin D deficiency 04/24/2013     Past Surgical History:   Procedure Laterality Date    CHONDROPLASTY OF KNEE Left 10/04/2022    Procedure: CHONDROPLASTY, KNEE;  Surgeon: Brigette Babin MD;  Location: [facility] OR;  Service: Orthopedics;  Laterality: Left;    ESOPHAGOGASTRODUODENOSCOPY N/A 08/08/2018    Procedure: EGD (ESOPHAGOGASTRODUODENOSCOPY);  Surgeon: Darius Gerardo MD;  Location: [address]);  Service: Endoscopy;  Laterality: N/A;    GASTRIC BYPASS      KNEE ARTHROSCOPY W/ MENISCECTOMY Left 10/04/2022    Procedure: ARTHROSCOPY, KNEE, WITH MENISCECTOMY;  Surgeon: Brigette Babin MD;  Location: [facility] OR;  Service: Orthopedics;  Laterality: Left;    SYNOVECTOMY OF KNEE Left 10/04/2022    Procedure: SYNOVECTOMY, KNEE;  Surgeon: Brigette Babin MD;  Location: [facility] OR;  Service: Orthopedics;  Laterality: Left;       Medications:   Medications Ordered Prior to Encounter[1]    Social History: Social History[2]    REVIEW OF SYSTEMS:  Constitution: Negative. Negative for chills, fever and night sweats.   Cardiovascular: Negative for chest pain and syncope.   Respiratory: Negative for cough and shortness of breath.   Gastrointestinal: See HPI. Negative for nausea/vomiting. Negative for abdominal pain.  Genitourinary: See HPI. Negative for discoloration or dysuria.  Skin: Negative for dry skin, itching and rash.   Hematologic/Lymphatic: Negative for bleeding problem. Does not bruise/bleed easily.   Musculoskeletal: Negative for falls and muscle weakness.   Neurological: See HPI. No seizures.   Endocrine: Negative for polydipsia, polyphagia and polyuria.   Allergic/Immunologic: Negative for hives and persistent infections.     EXAM:  BP (!) 143/79 (Patient Position: Sitting)   Pulse 90   Ht 5' 7" (1.702 m)   Wt 120.7 kg (265 lb 15.8 oz)   BMI 41.66 kg/m²     General: The patient is a obese 69 y.o. female in no apparent distress, the patient is oriented to person, place " and time.  Psych: Normal mood and affect  HEENT: Vision grossly intact, hearing intact to the spoken word.  Lungs: Respirations unlabored.  Gait: Normal station and gait, no difficulty with toe or heel walk.   Skin: Dorsal lumbar skin negative for rashes, lesions, hairy patches and surgical scars. There is + paraspinous lumbar tenderness to palpation mostly on the right side.   Range of motion: Lumbar range of motion is acceptable.  Spinal Balance: Global saggital and coronal spinal balance acceptable, not significant for scoliosis and kyphosis.  Musculoskeletal: No pain with the range of motion of the bilateral hips. No trochanteric tenderness to palpation.  Vascular: Bilateral lower extremities warm and well perfused, dorsalis pedis pulses 2+ bilaterally.  Neurological: Normal strength and tone in all major motor groups in the bilateral lower extremities. Normal sensation to light touch in the L2-S1 dermatomes bilaterally.  Deep tendon reflexes symmetric intact in the bilateral lower extremities.  Negative Babinski bilaterally. Straight leg raise negative bilaterally.    Other:  Bilateral straight leg raise test negative today.     IMAGING:     Xrays:  Xrays of the 2 view lumbar spine were ordered and reviewed by me today. No fracture, subluxation noted.     ASSESSMENT/PLAN:    Meryl was seen today for pain.    Diagnoses and all orders for this visit:    Acute right-sided low back pain without sciatica  -     cyclobenzaprine (FLEXERIL) 10 MG tablet; Take 1 tablet (10 mg total) by mouth every evening.  -     X-Ray Lumbar Spine AP And Lateral; Future  -     Ambulatory Referral/Consult to Physical Therapy; Future      She has muscle relaxers that she can take at night to help with pain. Will do PT with Fabricio to help issue improve and is no improvement then f/u with back and spine clinic.     All patients questions were answered. Patient had opportunity and has no other questions or concerns at this time. Patient was  advised to contact us if they have any further questions or if symptoms worsen, change, or new symptoms begin occurring. This could indicate that something different/worse is occurring that was not present at our time of discussion/evaluation.             [1]   Current Outpatient Medications on File Prior to Visit   Medication Sig Dispense Refill    ciprofloxacin HCl (CILOXAN) 0.3 % ophthalmic solution Place 1 drop into the right eye every 4 (four) hours. 5 mL 0    empagliflozin (JARDIANCE) 25 mg tablet Take 1 tablet (25 mg total) by mouth once daily. 90 tablet 1    ergocalciferol (VITAMIN D2) 50,000 unit Cap Take 1 capsule (50,000 Units total) by mouth every 7 days. 12 capsule 1    losartan (COZAAR) 50 MG tablet Take 1 tablet (50 mg total) by mouth once daily. 90 tablet 1    metFORMIN (GLUCOPHAGE) 1000 MG tablet Take 1 tablet (1,000 mg total) by mouth 2 (two) times daily with meals. 180 tablet 1    rosuvastatin (CRESTOR) 20 MG tablet Take 1 tablet (20 mg total) by mouth once daily. 90 tablet 0    tirzepatide (MOUNJARO) 5 mg/0.5 mL PnIj Inject 5 mg into the skin every 7 days. 4 Pen 1    triamcinolone acetonide 0.1% (KENALOG) 0.1 % cream Apply topically 2 (two) times daily. 45 g 1    aspirin (ECOTRIN) 81 MG EC tablet Take 1 tablet (81 mg total) by mouth once daily. For 4 weeks starting the day after surgery. 28 tablet 0    azelastine (ASTELIN) 137 mcg (0.1 %) nasal spray 1 spray (137 mcg total) by Nasal route 2 (two) times daily. (Patient not taking: Reported on 7/2/2025) 30 mL 0    blood sugar diagnostic Strp To check BG 3 times daily, to use with insurance preferred meter (Patient not taking: Reported on 7/2/2025) 100 each 11    blood-glucose meter kit To check BG 3 times daily, to use with insurance preferred meter (Patient not taking: Reported on 7/2/2025) 1 each 0    blood-glucose sensor (DEXCOM G7 SENSOR) Caty Change every 10 days, dexcom g7 , e 11.65 , h/o hypoglycemia < 54 mg/dl (Patient not taking: Reported  on 7/2/2025) 3 each 11    lancets 28 gauge Misc To check BG 3 times daily, to use with insurance preferred meter (Patient not taking: Reported on 7/2/2025) 100 each 11    methylPREDNISolone (MEDROL DOSEPACK) 4 mg tablet use as directed (Patient not taking: Reported on 7/2/2025) 21 tablet 0     No current facility-administered medications on file prior to visit.   [2]   Social History  Socioeconomic History    Marital status: Single   Occupational History     Employer: [employer]   Tobacco Use    Smoking status: Never    Smokeless tobacco: Never   Substance and Sexual Activity    Alcohol use: No    Drug use: No     Social Drivers of Health     Financial Resource Strain: Patient Declined (1/16/2024)    Overall Financial Resource Strain (CARDIA)     Difficulty of Paying Living Expenses: Patient declined   Food Insecurity: Patient Declined (1/16/2024)    Hunger Vital Sign     Worried About Running Out of Food in the Last Year: Patient declined     Ran Out of Food in the Last Year: Patient declined   Transportation Needs: No Transportation Needs (1/16/2024)    PRAPARE - Transportation     Lack of Transportation (Medical): No     Lack of Transportation (Non-Medical): No   Physical Activity: Insufficiently Active (1/16/2024)    Exercise Vital Sign     Days of Exercise per Week: 1 day     Minutes of Exercise per Session: 10 min   Stress: No Stress Concern Present (1/16/2024)    [employer] [place] [employer] - Occupational Stress Questionnaire     Feeling of Stress : Only a little   Housing Stability: Low Risk  (1/16/2024)    Housing Stability Vital Sign     Unable to Pay for Housing in the Last Year: No     Number of Places Lived in the Last Year: 1     Unstable Housing in the Last Year: No

## 2025-07-10 ENCOUNTER — PATIENT MESSAGE (OUTPATIENT)
Dept: ADMINISTRATIVE | Facility: OTHER | Age: 70
End: 2025-07-10
Payer: COMMERCIAL

## 2025-07-10 ENCOUNTER — CLINICAL SUPPORT (OUTPATIENT)
Dept: REHABILITATION | Facility: HOSPITAL | Age: 70
End: 2025-07-10
Payer: COMMERCIAL

## 2025-07-10 ENCOUNTER — TELEPHONE (OUTPATIENT)
Dept: INTERNAL MEDICINE | Facility: CLINIC | Age: 70
End: 2025-07-10
Payer: COMMERCIAL

## 2025-07-10 DIAGNOSIS — M54.50 ACUTE RIGHT-SIDED LOW BACK PAIN WITHOUT SCIATICA: ICD-10-CM

## 2025-07-10 DIAGNOSIS — M25.521 RIGHT ELBOW PAIN: Primary | ICD-10-CM

## 2025-07-10 DIAGNOSIS — M77.11 EPICONDYLITIS, LATERAL, RIGHT: Primary | ICD-10-CM

## 2025-07-10 PROCEDURE — 97112 NEUROMUSCULAR REEDUCATION: CPT | Performed by: PHYSICAL THERAPIST

## 2025-07-10 PROCEDURE — 97140 MANUAL THERAPY 1/> REGIONS: CPT | Performed by: PHYSICAL THERAPIST

## 2025-07-10 NOTE — TELEPHONE ENCOUNTER
Copied from CRM #[id number]. Topic: Appointments - Amb Referral  >> Jul 10, 2025 12:03 PM Monica wrote:  Type: General Call Back         Name of Caller:Pt  Would the patient rather a call back or a response via [employer]? call  Best Call Back Number:[phone]   Additional Information: Pt called requesting a pre opt medical omar form, please contact pt with further information

## 2025-07-10 NOTE — TELEPHONE ENCOUNTER
Copied from CRM #[id number]. Topic: Appointments - Amb Referral  >> Jul 10, 2025 12:03 PM Monica wrote:  Type: General Call Back         Name of Caller:Pt  Would the patient rather a call back or a response via [employer]? call  Best Call Back Number:[phone]   Additional Information: Pt called requesting a pre opt medical omar form, please contact pt with further information

## 2025-07-10 NOTE — TELEPHONE ENCOUNTER
I spokw ith pt, and she was wanting to schedule a preop clearance.  She was told that she needed to let us know what kind of anesthesia it was.  It will be monitored anesthesia care, not intubated.  She is scheduled for Tuesday.

## 2025-07-11 ENCOUNTER — PATIENT MESSAGE (OUTPATIENT)
Dept: REHABILITATION | Facility: HOSPITAL | Age: 70
End: 2025-07-11
Payer: COMMERCIAL

## 2025-07-11 ENCOUNTER — TELEPHONE (OUTPATIENT)
Dept: PAIN MEDICINE | Facility: CLINIC | Age: 70
End: 2025-07-11
Payer: COMMERCIAL

## 2025-07-11 NOTE — PROGRESS NOTES
Outpatient Rehab    Physical Therapy Progress Note : Updated Plan of Care    Patient Name: Meryl Johnson  MRN: [id number]  YOB: 1955  Encounter Date: 7/10/2025    Therapy Diagnosis:   Encounter Diagnoses   Name Primary?    Right elbow pain Yes    Acute right-sided low back pain without sciatica      Physician: Russell Honeycutt III, *    Physician Orders: Eval and Treat  Medical Diagnosis: Right shoulder pain, unspecified chronicity  Surgical Diagnosis: Not applicable for this Episode   Surgical Date: Not applicable for this Episode  Days Since Last Surgery: Not applicable for this Episode    Visit # / Visits Authorized:  18 / 24  Insurance Authorization Period: 1/2/2025 to 12/31/2025  Date of Evaluation: 4/21/2025   Plan of Care Certification: 7/8/2025 to 10/21/2025      PT/PTA:     Number of PTA visits since last PT visit:   Time In: 1600   Time Out: 1700  Total Time (in minutes): 60   Total Billable Time (in minutes): 60    FOTO:  Intake Score:  %  Survey Score 2:  %  Survey Score 3:  %    Precautions:       Subjective   Felt good after last sesison but woke her up at 3am in pain. Needing pain meds to sleep.  Pain reported as 7/10.      Objective              Treatment:  Manual Therapy  MT 1: Lumbar/SI assessment  MT 2: Lumbar Gapping Gr I-IV  MT 3: Side lying opening mob Gr II-V  MT 4: Supine generalized distraction  Balance/Neuromuscular Re-Education  NMR 1: supine BKFO GTB 30x  NMR 2: incline paloff press GTB  30x  NMR 3: Side lying clam 2 x 15    Time Entry(in minutes):  Manual Therapy Time Entry: 35  Neuromuscular Re-Education Time Entry: 25    Assessment & Plan   Assessment  Limited ability to exercise due to disc pain. Pain with transfers today. Noted her glute fatigue, felt like a weight on her knees with clams   Evaluation/Treatment Tolerance: Patient limited by pain    The patient will continue to benefit from skilled outpatient physical therapy in order to address the deficits listed in  the problem list on the initial evaluation, provide patient and family education, and maximize the patients level of independence in the home and community environments.     The patient's spiritual, cultural, and educational needs were considered, and the patient is agreeable to the plan of care and goals.           Goals:   Active       LTG       Pt will demonstrate independence with initial HEP to improve their performance of their Instrumental Activities of Daily Living.    (Progressing)       Start:  04/21/25    Expected End:  06/16/25            Patient will improve their numeric pain rating score to no greater than 0/10 when performing LIFTING to improve performance of their Instrumental Activities of Daily Living.   (Progressing)       Start:  04/21/25    Expected End:  06/16/25               STG       Pt will demonstrate independence with initial HEP to improve their performance of their Activities of Daily Living.    (Progressing)       Start:  04/21/25    Expected End:  05/19/25            Patient will improve their numeric pain rating score to no greater than 2/10 when performing wrist ext to improve performance of their Activities of Daily Living.   (Progressing)       Start:  04/21/25    Expected End:  05/19/25                Fabricio Tolentino, PT, DPT

## 2025-07-11 NOTE — TELEPHONE ENCOUNTER
----- Message from Hari Fitzpatrick DO sent at 7/10/2025 11:44 PM CDT -----  Regarding: RE: Lumbar spine referral  LULY Regalado,    Thank you for the information.  We will offer her an office visit!    Team - could one of y'all please call this patient to schedule a new patient consult?  You can offer her 3pm on the 14th and override it, or it also looks like there is a 3:30 on the 25th.   Thank you!  ----- Message -----  From: Fabricio Tolentino, PT, DPT  Sent: 7/10/2025   9:51 PM CDT  To: Hari Fitzpatrick DO  Subject: Lumbar spine referral                            Dr. Fitzpatrick,    Ms. 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 She tripped and fell in the shower about 2 weeks ago. She saw Clayton Honeycutt PA-c who x-rayed her back and it came back negative. She is having extreme pain, limited ability to complete PT. She does not like taking medication, but is currently needing tramadol or muscle relaxer to sleep. I evaluated her for her spine and she's having pain with coughing, sneezing, etc. So it's presenting discogenic in nature. I was hoping you could examine her and determine the next course of action.    Appreciate your help,  Fabricio Tolentino, PT

## 2025-07-14 ENCOUNTER — OFFICE VISIT (OUTPATIENT)
Dept: PAIN MEDICINE | Facility: CLINIC | Age: 70
End: 2025-07-14
Payer: COMMERCIAL

## 2025-07-14 VITALS
BODY MASS INDEX: 41.46 KG/M2 | HEART RATE: 102 BPM | SYSTOLIC BLOOD PRESSURE: 132 MMHG | HEIGHT: 67 IN | DIASTOLIC BLOOD PRESSURE: 82 MMHG | WEIGHT: 264.13 LBS

## 2025-07-14 DIAGNOSIS — R07.81 RIB PAIN: Primary | ICD-10-CM

## 2025-07-14 DIAGNOSIS — M54.50 ACUTE RIGHT-SIDED LOW BACK PAIN WITHOUT SCIATICA: ICD-10-CM

## 2025-07-14 DIAGNOSIS — I10 PRIMARY HYPERTENSION: ICD-10-CM

## 2025-07-14 DIAGNOSIS — Z01.818 PREOP EXAMINATION: ICD-10-CM

## 2025-07-14 DIAGNOSIS — E11.9 TYPE 2 DIABETES MELLITUS WITHOUT COMPLICATION, WITHOUT LONG-TERM CURRENT USE OF INSULIN: Primary | ICD-10-CM

## 2025-07-14 DIAGNOSIS — M54.9 ACUTE RIGHT-SIDED BACK PAIN, UNSPECIFIED BACK LOCATION: ICD-10-CM

## 2025-07-14 PROCEDURE — 4010F ACE/ARB THERAPY RXD/TAKEN: CPT | Mod: CPTII,S$GLB,, | Performed by: STUDENT IN AN ORGANIZED HEALTH CARE EDUCATION/TRAINING PROGRAM

## 2025-07-14 PROCEDURE — 3008F BODY MASS INDEX DOCD: CPT | Mod: CPTII,S$GLB,, | Performed by: STUDENT IN AN ORGANIZED HEALTH CARE EDUCATION/TRAINING PROGRAM

## 2025-07-14 PROCEDURE — 99204 OFFICE O/P NEW MOD 45 MIN: CPT | Mod: S$GLB,,, | Performed by: STUDENT IN AN ORGANIZED HEALTH CARE EDUCATION/TRAINING PROGRAM

## 2025-07-14 PROCEDURE — 99999 PR PBB SHADOW E&M-EST. PATIENT-LVL III: CPT | Mod: PBBFAC,,, | Performed by: STUDENT IN AN ORGANIZED HEALTH CARE EDUCATION/TRAINING PROGRAM

## 2025-07-14 PROCEDURE — 3079F DIAST BP 80-89 MM HG: CPT | Mod: CPTII,S$GLB,, | Performed by: STUDENT IN AN ORGANIZED HEALTH CARE EDUCATION/TRAINING PROGRAM

## 2025-07-14 PROCEDURE — 3288F FALL RISK ASSESSMENT DOCD: CPT | Mod: CPTII,S$GLB,, | Performed by: STUDENT IN AN ORGANIZED HEALTH CARE EDUCATION/TRAINING PROGRAM

## 2025-07-14 PROCEDURE — 1101F PT FALLS ASSESS-DOCD LE1/YR: CPT | Mod: CPTII,S$GLB,, | Performed by: STUDENT IN AN ORGANIZED HEALTH CARE EDUCATION/TRAINING PROGRAM

## 2025-07-14 PROCEDURE — 1125F AMNT PAIN NOTED PAIN PRSNT: CPT | Mod: CPTII,S$GLB,, | Performed by: STUDENT IN AN ORGANIZED HEALTH CARE EDUCATION/TRAINING PROGRAM

## 2025-07-14 PROCEDURE — 3075F SYST BP GE 130 - 139MM HG: CPT | Mod: CPTII,S$GLB,, | Performed by: STUDENT IN AN ORGANIZED HEALTH CARE EDUCATION/TRAINING PROGRAM

## 2025-07-14 PROCEDURE — 3051F HG A1C>EQUAL 7.0%<8.0%: CPT | Mod: CPTII,S$GLB,, | Performed by: STUDENT IN AN ORGANIZED HEALTH CARE EDUCATION/TRAINING PROGRAM

## 2025-07-14 PROCEDURE — 1159F MED LIST DOCD IN RCRD: CPT | Mod: CPTII,S$GLB,, | Performed by: STUDENT IN AN ORGANIZED HEALTH CARE EDUCATION/TRAINING PROGRAM

## 2025-07-14 RX ORDER — LIDOCAINE 50 MG/G
1 PATCH TOPICAL DAILY
Qty: 30 PATCH | Refills: 1 | Status: SHIPPED | OUTPATIENT
Start: 2025-07-14 | End: 2025-09-12

## 2025-07-14 RX ORDER — METHYLPREDNISOLONE 4 MG/1
TABLET ORAL
Qty: 21 EACH | Refills: 0 | Status: SHIPPED | OUTPATIENT
Start: 2025-07-14 | End: 2025-08-04

## 2025-07-14 NOTE — PROGRESS NOTES
Chronic Pain - New Consult    Referring Physician: No ref. provider found    Date: 07/14/2025     Re: Meryl Johnson  MR#: [id number]  YOB: 1955  Age: 69 y.o.    Chief Complaint:   Chief Complaint   Patient presents with    Back Pain     **This note is dictated using the M*Modal Fluency Direct word recognition program. There are word recognition mistakes that are occasionally missed on review. This note was generated with the assistance of ambient listening technology. Verbal consent was obtained by the patient and accompanying visitor(s) for the recording of patient appointment to facilitate this note. I attest to having reviewed and edited the generated note for accuracy, though some syntax or spelling errors may persist. Please contact the author of this note for any clarification.   **    ASSESSMENT: 69 y.o. year old female with right sided back pain, consistent with     1. Rib pain  CT Chest Abdomen Pelvis Without Contrast (XPD)    methylPREDNISolone (MEDROL DOSEPACK) 4 mg tablet      2. Acute right-sided back pain, unspecified back location  CT Chest Abdomen Pelvis Without Contrast (XPD)    methylPREDNISolone (MEDROL DOSEPACK) 4 mg tablet      3. Acute right-sided low back pain without sciatica  LIDOcaine (LIDODERM) 5 %        **Assessment & Plan      ACUTE RIGHT-SIDED LOW BACK PAIN WITHOUT SCIATICA:  - unclear etiology: rib fx vs lumbar posterior element fracture vs muscle strain  - Started Methylprednisolone Dosepak.  - Take Tylenol 1000 mg 3 times daily as needed.  - Rx Lidoderm patches.  - Apply to painful areas for up to 12 hours, then remove for 12 hours before reapplying.  - Ordered CT Abdomen and Pelvis to further assess back and rib injury  - Follow up on August 6th at 2:15 PM at [place].    RIB PAIN:  - as above    LATERAL EPICONDYLITIS:  - following with sports med    - RTC 8/6/25 @2:15  - Counseled patient regarding the importance of weight loss and activity modification and physical  therapy.    The above plan and management options were discussed at length with patient. Patient is in agreement with the above and verbalized understanding. It will be communicated with the referring physician via electronic record, fax, or mail.  Lab/study reports reviewed were important and necessary because subsequent medical and treatment recommendations required review of the above lab/study reports. Images viewed/reviewed above were important and necessary because subsequent medical and treatment recommendations required review of the reviewed image(s).     Electronically signed by:  Hari Fitzpatrick DO  07/14/2025    =========================================================================================================    SUBJECTIVE:    Meryl Johnson is a 69 y.o. female presents to the clinic for the evaluation of upper, mid, and lower back pain. The pain started 2 weeks ago following fall and symptoms have been unchanged.   Patient presents with back pain following a fall in her bathtub a few weeks ago. She landed on her back, hitting both sides of the tub. Since then, she has had persistent pain in the L2-L3 region, wrapping around to her side to the mid-axillary line. Pain is worse when leaning to one side, lying flat, coughing, and sneezing (particularly along the right side). She reports no improvement over the past two weeks, stating the pain has remained constant or gradually worsened.    She has difficulty with daily activities due to severe pain. She struggles with rolling over in bed, standing up (requiring support), prolonged standing (causing her to lean over), getting off the toilet, and pulling up her pants. She denies pain radiating down her leg but mentions feeling heaviness in her leg during PT exercises.    Prior to this incident, she was undergoing PT for muscle strengthening in her back. An XR L Spine after the fall showed no fractures but indicated facet hypertrophy  "(arthritis). She has been taking Advil for pain relief, usually once or twice daily at 400mg doses. She has also taken Tramadol and one muscle relaxer (Flexeril) since the incident, though she emphasizes her preference to avoid medication. She uses a heating pad at work for relief.    She is scheduled for elbow surgery on August 5th due to a nerve issue. She had a steroid injection (Medrol Dosepak) recently for an eye infection, which did not significantly affect her blood sugar.    She denies hitting her head during the fall or any weakness in the legs. She denies any history of back surgery.    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 She previously worked as a  and has been employed at [employer] for 39 years. Despite her fall and back pain, she continues to work. She experiences difficulty with certain movements at work, such as standing for long periods which requires leaning over. Getting off the toilet and pulling up pants is challenging for her, and sneezing causes pain along her side. She uses a heating pad at work to manage her pain.     Pain Description:    The pain is located in the back area and does not radiate.    At BEST  5/10   At WORST  10/10 on the WORST day.    On average pain is rated as 5/10.   Today the pain is rated as 7/10  The pain is continuous.  The pain is described as sharp and "graving"    Symptoms interfere with daily activity, sleeping, and work.   Exacerbating factors: Sitting, Laying, Walking, and Getting out of bed/chair.    Mitigating factors heat and medications.   She reports 6 hours of sleep per night.    Physical Therapy/Home Exercise: Yes, currently in Physical therapy    Current Pain Medications:    - flexeril 10mg, qhs (once), Tramadol (3 times in the last 2 weeks), Advil 400mg Daily, tylenol    Failed Pain Medications:    - all of the above.    Pain Treatment Therapies:    Pain procedures:   Knee CSI and HA " injections  Right lateral epicondylitis injection  Physical Therapy:  yes  Chiropractor: none  Acupuncture: none  TENS unit: none  Spinal decompression: none  Joint replacement: none    Patient denies urinary incontinence, bowel incontinence, significant motor weakness, and loss of sensations.  Patient denies any suicidal or homicidal ideations     report:  Reviewed and consistent with medication use as prescribed.    Imaging:   LUMBAR X-RAY  Results for orders placed during the hospital encounter of 07/02/25    X-Ray Lumbar Spine AP And Lateral    Narrative  EXAMINATION:  XR LUMBAR SPINE AP AND LATERAL    CLINICAL HISTORY:  Low back pain, unspecified    TECHNIQUE:  AP, lateral and spot images were performed of the lumbar spine.    COMPARISON:  None    FINDINGS:  The alignment of the lumbar spine is normal.    The vertebral body heights are well maintained.    The disc spaces are well maintained.    Small anterior osteophytes noted throughout the lumbar spine.    No fracture, no osseous lesions.    Facet joint osseous hypertrophy L2-3 level.    The sacroiliac joints appear symmetrical on the AP view.    Significant atherosclerotic plaque of the aorta and iliac vessels.    Impression  Spondylosis of the lumbar spine, no acute process seen.      Electronically signed by: Dominique Webb MD  Date:    07/02/2025  Time:    15:30            11/6/2014     3:08 PM   Pain Disability Index (PDI)   Family/Home Responsibilities: 5   Recreation: 5   Occupation: 8   Sexual Behavior: 0   Self Care: 5   Life-Support Activities: 0   Pain Disability Index (PDI) 28        Past Medical History:   Diagnosis Date    Anemia     Diabetes mellitus type II     H. pylori infection     Hyperlipidemia     Hypertension     Unspecified vitamin D deficiency 04/24/2013     Past Surgical History:   Procedure Laterality Date    CHONDROPLASTY OF KNEE Left 10/04/2022    Procedure: CHONDROPLASTY, KNEE;  Surgeon: Brigette Babin MD;  Location: [facility]  OR;  Service: Orthopedics;  Laterality: Left;    ESOPHAGOGASTRODUODENOSCOPY N/A 08/08/2018    Procedure: EGD (ESOPHAGOGASTRODUODENOSCOPY);  Surgeon: Darius Gerardo MD;  Location: [address]);  Service: Endoscopy;  Laterality: N/A;    GASTRIC BYPASS      KNEE ARTHROSCOPY W/ MENISCECTOMY Left 10/04/2022    Procedure: ARTHROSCOPY, KNEE, WITH MENISCECTOMY;  Surgeon: Brigette Babin MD;  Location: [facility] OR;  Service: Orthopedics;  Laterality: Left;    SYNOVECTOMY OF KNEE Left 10/04/2022    Procedure: SYNOVECTOMY, KNEE;  Surgeon: Brigette Babin MD;  Location: [facility] OR;  Service: Orthopedics;  Laterality: Left;     Social History[1]  Family History   Problem Relation Name Age of Onset    COPD Mother      Heart disease Mother      Cancer Father          liver    Heart disease Father      Cancer Sister      Diabetes Sister      Hyperlipidemia Sister      Hypothyroidism Sister      No Known Problems Brother      No Known Problems Maternal Aunt      No Known Problems Maternal Uncle      No Known Problems Paternal Aunt      No Known Problems Paternal Uncle      No Known Problems Maternal Grandmother      No Known Problems Maternal Grandfather      No Known Problems Paternal Grandmother      No Known Problems Paternal Grandfather      Amblyopia Neg Hx      Blindness Neg Hx      Cataracts Neg Hx      Glaucoma Neg Hx      Hypertension Neg Hx      Macular degeneration Neg Hx      Retinal detachment Neg Hx      Strabismus Neg Hx      Stroke Neg Hx      Thyroid disease Neg Hx      Breast cancer Neg Hx      Colon cancer Neg Hx      Ovarian cancer Neg Hx      Stomach cancer Neg Hx      Rectal cancer Neg Hx         Review of patient's allergies indicates:   Allergen Reactions    Codeine Nausea Only    Vicodin [hydrocodone-acetaminophen] Nausea Only       Current Outpatient Medications   Medication Sig    aspirin (ECOTRIN) 81 MG EC tablet Take 1 tablet (81 mg total) by mouth once daily. For 4 weeks starting the day after surgery.     ciprofloxacin HCl (CILOXAN) 0.3 % ophthalmic solution Place 1 drop into the right eye every 4 (four) hours.    cyclobenzaprine (FLEXERIL) 10 MG tablet Take 1 tablet (10 mg total) by mouth every evening.    empagliflozin (JARDIANCE) 25 mg tablet Take 1 tablet (25 mg total) by mouth once daily.    ergocalciferol (VITAMIN D2) 50,000 unit Cap Take 1 capsule (50,000 Units total) by mouth every 7 days.    lancets 28 gauge Misc To check BG 3 times daily, to use with insurance preferred meter (Patient not taking: Reported on 7/2/2025)    LIDOcaine (LIDODERM) 5 % Place 1 patch onto the skin once daily. Remove & Discard patch within 12 hours or as directed by MD    losartan (COZAAR) 50 MG tablet Take 1 tablet (50 mg total) by mouth once daily.    metFORMIN (GLUCOPHAGE) 1000 MG tablet Take 1 tablet (1,000 mg total) by mouth 2 (two) times daily with meals.    methylPREDNISolone (MEDROL DOSEPACK) 4 mg tablet use as directed    rosuvastatin (CRESTOR) 20 MG tablet Take 1 tablet (20 mg total) by mouth once daily.    tirzepatide (MOUNJARO) 5 mg/0.5 mL PnIj Inject 5 mg into the skin every 7 days.    triamcinolone acetonide 0.1% (KENALOG) 0.1 % cream Apply topically 2 (two) times daily.     No current facility-administered medications for this visit.       REVIEW OF SYSTEMS:    GENERAL:  No weight loss, malaise or fevers.  HEENT:   No recent changes in vision or hearing  NECK:  Negative for lumps, no difficulty with swallowing.  RESPIRATORY:  Negative for cough, wheezing or shortness of breath, patient denies any recent URI.  CARDIOVASCULAR:  Negative for chest pain, leg swelling or palpitations.  GI:  Negative for abdominal discomfort, blood in stools or black stools or change in bowel habits.  MUSCULOSKELETAL:  See HPI.  SKIN:  Negative for lesions, rash, and itching.  PSYCH:  No mood disorder or recent psychosocial stressors.  Patients sleep is not disturbed secondary to pain.  HEMATOLOGY/LYMPHOLOGY:  Negative for prolonged  "bleeding, bruising easily or swollen nodes.  Patient is not currently taking any anti-coagulants  NEURO:   No history of headaches, syncope, paralysis, seizures or tremors.  All other reviewed and negative other than HPI.    OBJECTIVE:    /82 (BP Location: Left arm, Patient Position: Sitting)   Pulse 102   Ht 5' 7" (1.702 m)   Wt 119.8 kg (264 lb 1.8 oz)   BMI 41.37 kg/m²     PHYSICAL EXAMINATION:    GENERAL: Well appearing, in no acute distress, alert and oriented x3.  PSYCH:  Mood and affect appropriate.  SKIN: Skin color, texture, turgor normal, no rashes or lesions.  HEAD/FACE:  Normocephalic, atraumatic. Cranial nerves grossly intact.    NECK:   - Did not perform pain to palpation over the cervical paraspinous muscles.   - Spurling  Did not perform.  - Did not perform pain with neck flexion, extension, or lateral flexion.     CV: RRR with palpation of the radial artery.  PULM: CTAB. No evidence of respiratory difficulty, symmetric chest rise.  GI:  Soft and non-tender.    BACK:   - No obvious deformity or signs of trauma, Normal lumbar lordotic curve  - Negative spinous process tenderness  - Positive paravertebral tenderness on the right  - Positive pain to palpation over the facet joints of the thoracic and upper lumbar spine.   - (+) pain with cough  - (+) pain with light palpation along the lower ribs  - Negative QL / Iliac crest / Glut tenderness  - Slump test is Did not perform for radicular pain  - Slump test is Did not perform for back pain  - Supine Straight leg raising is Did not perform for radicular pain  - Supine Straight leg raising is Did not perform for back pain  - Lumbar ROM is diminished in Flexion with pain  - Lumbar ROM is diminished in Extension with pain  - Lumbar ROM is diminished in Lateral Flexion with pain  - Did not perform Sustained Hip Flexion test (for discogenic pain)  - Negative Altered Gait, Posture  - Axial facet loading test Did not perform on the bilateral " side(s)    SI Joint exam:  - Did not perform SI joint tenderness to palpation  - Geovany's sign Did not perform  - Yeoman's Test: Did not perform for SI joint pain indicating anterior SI ligament involvement. Did not perform for anterior thigh pain/paresthesia which indicates femoral nerve stretch.  - Gaenslen's Test:Did not perform  - Finger Mushtaq's Sign:Negative  - SI compression test:Did not perform  - SI distraction test:Did not perform  - Thigh Thrust: Did not perform  - SI Thrust: Did not perform    MUSKULOSKELETAL:    EXTREMITIES:   Hip Exam:  - Log Roll Did not perform  - FADIR Did not perform  - Stinchfield Did not perform  - Hip Scour Did not perform  - GTB Tenderness Did not perform    NEUROLOGICAL EXAM:  MENTAL STATUS: A x O x 3, good concentration, speech is fluent and goal directed  MEMORY: recent and remote are intact  CN: CN2-12 grossly intact  MOTOR: 5/5 in all muscle groups  DTRs: 0+ intact symmetric  Sensation:    -no Loss of sensation in a right rib/lumbar region    GAIT: normal.         [1]   Social History  Socioeconomic History    Marital status: Single   Occupational History     Employer: [employer]   Tobacco Use    Smoking status: Never    Smokeless tobacco: Never   Substance and Sexual Activity    Alcohol use: No    Drug use: No     Social Drivers of Health     Financial Resource Strain: Patient Declined (1/16/2024)    Overall Financial Resource Strain (CARDIA)     Difficulty of Paying Living Expenses: Patient declined   Food Insecurity: Patient Declined (1/16/2024)    Hunger Vital Sign     Worried About Running Out of Food in the Last Year: Patient declined     Ran Out of Food in the Last Year: Patient declined   Transportation Needs: No Transportation Needs (1/16/2024)    PRAPARE - Transportation     Lack of Transportation (Medical): No     Lack of Transportation (Non-Medical): No   Physical Activity: Insufficiently Active (1/16/2024)    Exercise Vital Sign     Days of  Exercise per Week: 1 day     Minutes of Exercise per Session: 10 min   Stress: No Stress Concern Present (1/16/2024)    [employer] - Occupational Stress Questionnaire     Feeling of Stress : Only a little   Housing Stability: Low Risk  (1/16/2024)    Housing Stability Vital Sign     Unable to Pay for Housing in the Last Year: No     Number of Places Lived in the Last Year: 1     Unstable Housing in the Last Year: No

## 2025-07-14 NOTE — PROGRESS NOTES
HISTORY:  Type 2 diabetes with peripheral neuropathy  Hypertension.  Hyperlipidemia.  Helicobacter pylori which has been treated.  Obesity with gastric bypass surgery.  Left foot ulcer, fifth metatarsal, debridement     SOCIAL HISTORY:  Tobacco and alcohol use - none       Medications  Mounjaro 5 mg  Jardiance 25 mg   Metformin 1000 mg b.i.d.  Rosuvastatin 20 mg  Losartan 50 mg  Vitamin-D for that has once a week    69-year-old female   Internal medicine visit preop clearance      Scheduled for left elbow surgery August 5th excision of neuroma transposition of nerve.  She has chronic epicondylitis.  Did not respond well to physical therapy    It is noted that on June 30 she had a fall in a bath tub.  She has been having persistent pain in the right mid to lower back in right lower abdomen.  She has taken 3 tramadol since that time and right now is is taking 2 Advil in the morning    Another issue is that for the past month she has not taken the medication mounjaro because of constipation   she does not check her blood glucose    Since stopping the medicine that has been some improvement steady once a week she has a bowel function 3 or 4 days out the week    Review of symptoms   Reports no pains in his chest palpitations shortness of breath no other abdominal pain other than soreness in the right lateral abdominal wall.  No difficulty urinating    Examination   Weight 165 lb   BMI 41.52   Pulse 84   Blood pressure by me 136/82   Neck no thyromegaly   Chest clear breath sounds   Heart regular rate and rhythm no murmurs   Abdominal exam is bowel sounds soft nontender no masses   2+ carotid pulses no bruits   Extremities no edema    Office ECG normal sinus rhythm with nonspecific changes in the inferior anterior leads but appears to be no change compared to 2022    Impression   Type 2 diabetes with peripheral neuropathy  Hypertension  Hyperlipidemia   Morbid obesity with comorbidities  BMI greater than 40  Lumbar  abdominal myofascial pain    Plan  Labs of CBC basic metabolic profile hemoglobin A1c    Possibly resume until but a 2.5 mg    It should be noted that she met with pain management and a CT of the abdomen chest pelvis were was ordered    Phone review to follow

## 2025-07-15 ENCOUNTER — CLINICAL SUPPORT (OUTPATIENT)
Dept: REHABILITATION | Facility: HOSPITAL | Age: 70
End: 2025-07-15
Payer: COMMERCIAL

## 2025-07-15 ENCOUNTER — OFFICE VISIT (OUTPATIENT)
Dept: INTERNAL MEDICINE | Facility: CLINIC | Age: 70
End: 2025-07-15
Payer: COMMERCIAL

## 2025-07-15 ENCOUNTER — LAB VISIT (OUTPATIENT)
Dept: LAB | Facility: HOSPITAL | Age: 70
End: 2025-07-15
Attending: INTERNAL MEDICINE
Payer: COMMERCIAL

## 2025-07-15 VITALS
DIASTOLIC BLOOD PRESSURE: 82 MMHG | WEIGHT: 265.13 LBS | OXYGEN SATURATION: 97 % | BODY MASS INDEX: 41.61 KG/M2 | HEIGHT: 67 IN | SYSTOLIC BLOOD PRESSURE: 130 MMHG | HEART RATE: 85 BPM

## 2025-07-15 DIAGNOSIS — M25.521 RIGHT ELBOW PAIN: Primary | ICD-10-CM

## 2025-07-15 DIAGNOSIS — E78.5 HYPERLIPIDEMIA, UNSPECIFIED HYPERLIPIDEMIA TYPE: ICD-10-CM

## 2025-07-15 DIAGNOSIS — E66.813 CLASS 3 SEVERE OBESITY WITH SERIOUS COMORBIDITY AND BODY MASS INDEX (BMI) OF 40.0 TO 44.9 IN ADULT, UNSPECIFIED OBESITY TYPE: ICD-10-CM

## 2025-07-15 DIAGNOSIS — Z01.818 PREOP EXAMINATION: ICD-10-CM

## 2025-07-15 DIAGNOSIS — M54.50 ACUTE RIGHT-SIDED LOW BACK PAIN WITHOUT SCIATICA: ICD-10-CM

## 2025-07-15 DIAGNOSIS — I10 PRIMARY HYPERTENSION: ICD-10-CM

## 2025-07-15 DIAGNOSIS — E11.9 TYPE 2 DIABETES MELLITUS WITHOUT COMPLICATION, WITHOUT LONG-TERM CURRENT USE OF INSULIN: ICD-10-CM

## 2025-07-15 DIAGNOSIS — E11.9 TYPE 2 DIABETES MELLITUS WITHOUT COMPLICATION, WITHOUT LONG-TERM CURRENT USE OF INSULIN: Primary | ICD-10-CM

## 2025-07-15 LAB
ABSOLUTE EOSINOPHIL (OHS): 0.01 K/UL
ABSOLUTE MONOCYTE (OHS): 0.3 K/UL (ref 0.3–1)
ABSOLUTE NEUTROPHIL COUNT (OHS): 6.77 K/UL (ref 1.8–7.7)
ANION GAP (OHS): 10 MMOL/L (ref 8–16)
BASOPHILS # BLD AUTO: 0.02 K/UL
BASOPHILS NFR BLD AUTO: 0.2 %
BUN SERPL-MCNC: 24 MG/DL (ref 8–23)
CALCIUM SERPL-MCNC: 9.6 MG/DL (ref 8.7–10.5)
CHLORIDE SERPL-SCNC: 108 MMOL/L (ref 95–110)
CO2 SERPL-SCNC: 18 MMOL/L (ref 23–29)
CREAT SERPL-MCNC: 1.2 MG/DL (ref 0.5–1.4)
EAG (OHS): 183 MG/DL (ref 68–131)
ERYTHROCYTE [DISTWIDTH] IN BLOOD BY AUTOMATED COUNT: 17.5 % (ref 11.5–14.5)
GFR SERPLBLD CREATININE-BSD FMLA CKD-EPI: 49 ML/MIN/1.73/M2
GLUCOSE SERPL-MCNC: 169 MG/DL (ref 70–110)
HBA1C MFR BLD: 8 % (ref 4–5.6)
HCT VFR BLD AUTO: 43 % (ref 37–48.5)
HGB BLD-MCNC: 12.8 GM/DL (ref 12–16)
IMM GRANULOCYTES # BLD AUTO: 0.03 K/UL (ref 0–0.04)
IMM GRANULOCYTES NFR BLD AUTO: 0.4 % (ref 0–0.5)
LYMPHOCYTES # BLD AUTO: 1.26 K/UL (ref 1–4.8)
MCH RBC QN AUTO: 25.5 PG (ref 27–31)
MCHC RBC AUTO-ENTMCNC: 29.8 G/DL (ref 32–36)
MCV RBC AUTO: 86 FL (ref 82–98)
NUCLEATED RBC (/100WBC) (OHS): 0 /100 WBC
OHS QRS DURATION: 86 MS
OHS QTC CALCULATION: 433 MS
PLATELET # BLD AUTO: 327 K/UL (ref 150–450)
PMV BLD AUTO: 11 FL (ref 9.2–12.9)
POTASSIUM SERPL-SCNC: 5 MMOL/L (ref 3.5–5.1)
RBC # BLD AUTO: 5.02 M/UL (ref 4–5.4)
RELATIVE EOSINOPHIL (OHS): 0.1 %
RELATIVE LYMPHOCYTE (OHS): 15 % (ref 18–48)
RELATIVE MONOCYTE (OHS): 3.6 % (ref 4–15)
RELATIVE NEUTROPHIL (OHS): 80.7 % (ref 38–73)
SODIUM SERPL-SCNC: 136 MMOL/L (ref 136–145)
WBC # BLD AUTO: 8.39 K/UL (ref 3.9–12.7)

## 2025-07-15 PROCEDURE — 80048 BASIC METABOLIC PNL TOTAL CA: CPT

## 2025-07-15 PROCEDURE — 3288F FALL RISK ASSESSMENT DOCD: CPT | Mod: CPTII,S$GLB,, | Performed by: INTERNAL MEDICINE

## 2025-07-15 PROCEDURE — 93005 ELECTROCARDIOGRAM TRACING: CPT | Mod: S$GLB,,, | Performed by: INTERNAL MEDICINE

## 2025-07-15 PROCEDURE — 3051F HG A1C>EQUAL 7.0%<8.0%: CPT | Mod: CPTII,S$GLB,, | Performed by: INTERNAL MEDICINE

## 2025-07-15 PROCEDURE — 4010F ACE/ARB THERAPY RXD/TAKEN: CPT | Mod: CPTII,S$GLB,, | Performed by: INTERNAL MEDICINE

## 2025-07-15 PROCEDURE — 1100F PTFALLS ASSESS-DOCD GE2>/YR: CPT | Mod: CPTII,S$GLB,, | Performed by: INTERNAL MEDICINE

## 2025-07-15 PROCEDURE — 99214 OFFICE O/P EST MOD 30 MIN: CPT | Mod: S$GLB,,, | Performed by: INTERNAL MEDICINE

## 2025-07-15 PROCEDURE — 85025 COMPLETE CBC W/AUTO DIFF WBC: CPT

## 2025-07-15 PROCEDURE — 3008F BODY MASS INDEX DOCD: CPT | Mod: CPTII,S$GLB,, | Performed by: INTERNAL MEDICINE

## 2025-07-15 PROCEDURE — 99999 PR PBB SHADOW E&M-EST. PATIENT-LVL IV: CPT | Mod: PBBFAC,,, | Performed by: INTERNAL MEDICINE

## 2025-07-15 PROCEDURE — 1160F RVW MEDS BY RX/DR IN RCRD: CPT | Mod: CPTII,S$GLB,, | Performed by: INTERNAL MEDICINE

## 2025-07-15 PROCEDURE — 3075F SYST BP GE 130 - 139MM HG: CPT | Mod: CPTII,S$GLB,, | Performed by: INTERNAL MEDICINE

## 2025-07-15 PROCEDURE — 83036 HEMOGLOBIN GLYCOSYLATED A1C: CPT

## 2025-07-15 PROCEDURE — 1159F MED LIST DOCD IN RCRD: CPT | Mod: CPTII,S$GLB,, | Performed by: INTERNAL MEDICINE

## 2025-07-15 PROCEDURE — 93010 ELECTROCARDIOGRAM REPORT: CPT | Mod: S$GLB,,, | Performed by: INTERNAL MEDICINE

## 2025-07-15 PROCEDURE — 3080F DIAST BP >= 90 MM HG: CPT | Mod: CPTII,S$GLB,, | Performed by: INTERNAL MEDICINE

## 2025-07-15 PROCEDURE — 1125F AMNT PAIN NOTED PAIN PRSNT: CPT | Mod: CPTII,S$GLB,, | Performed by: INTERNAL MEDICINE

## 2025-07-15 PROCEDURE — 97112 NEUROMUSCULAR REEDUCATION: CPT | Performed by: PHYSICAL THERAPIST

## 2025-07-15 PROCEDURE — 36415 COLL VENOUS BLD VENIPUNCTURE: CPT

## 2025-07-15 NOTE — PROGRESS NOTES
Outpatient Rehab    Physical Therapy Progress Note : Updated Plan of Care    Patient Name: Meryl Johnson  MRN: [id number]  YOB: 1955  Encounter Date: 7/15/2025    Therapy Diagnosis:   Encounter Diagnoses   Name Primary?    Right elbow pain Yes    Acute right-sided low back pain without sciatica      Physician: Russell Honeycutt III, *    Physician Orders: Eval and Treat  Medical Diagnosis: Right shoulder pain, unspecified chronicity  Surgical Diagnosis: Not applicable for this Episode   Surgical Date: Not applicable for this Episode  Days Since Last Surgery: Not applicable for this Episode    Visit # / Visits Authorized:  19 / 24  Insurance Authorization Period: 1/2/2025 to 12/31/2025  Date of Evaluation: 4/21/2025   Plan of Care Certification: 7/8/2025 to 10/21/2025      PT/PTA:     Number of PTA visits since last PT visit:   Time In: 1600   Time Out: 1700  Total Time (in minutes): 60   Total Billable Time (in minutes): 60    FOTO:  Intake Score:  %  Survey Score 2:  %  Survey Score 3:  %    Precautions:       Subjective   She is scheduled for imaging in 2 weeks. Didnt hurt as bad yesterday but its hurting again today.  Pain reported as 5/10.      Objective              Treatment:  Balance/Neuromuscular Re-Education  NMR 1: Supine clams GTB 2 x 15  NMR 2: Handcuffs YTB 30x  NMR 3: incline palloff press OTB 2 x1 5  NMR 4: MH lumbar with pelvic tilting    Time Entry(in minutes):  Neuromuscular Re-Education Time Entry: 45    Assessment & Plan   Assessment  Less pain today performing exercise at 45 deg angle. Noted less pain end of session today. Prior session pain with palloff press  Evaluation/Treatment Tolerance: Patient tolerated treatment well    The patient will continue to benefit from skilled outpatient physical therapy in order to address the deficits listed in the problem list on the initial evaluation, provide patient and family education, and maximize the patients level of independence in  the home and community environments.     The patient's spiritual, cultural, and educational needs were considered, and the patient is agreeable to the plan of care and goals.           Goals:   Active       LTG       Pt will demonstrate independence with initial HEP to improve their performance of their Instrumental Activities of Daily Living.    (Progressing)       Start:  04/21/25    Expected End:  06/16/25            Patient will improve their numeric pain rating score to no greater than 0/10 when performing LIFTING to improve performance of their Instrumental Activities of Daily Living.   (Progressing)       Start:  04/21/25    Expected End:  06/16/25               STG       Pt will demonstrate independence with initial HEP to improve their performance of their Activities of Daily Living.    (Progressing)       Start:  04/21/25    Expected End:  05/19/25            Patient will improve their numeric pain rating score to no greater than 2/10 when performing wrist ext to improve performance of their Activities of Daily Living.   (Progressing)       Start:  04/21/25    Expected End:  05/19/25                Fabricio Tolentino, PT, DPT

## 2025-07-16 ENCOUNTER — PATIENT MESSAGE (OUTPATIENT)
Dept: INTERNAL MEDICINE | Facility: CLINIC | Age: 70
End: 2025-07-16
Payer: COMMERCIAL

## 2025-07-16 DIAGNOSIS — E11.22 TYPE 2 DIABETES MELLITUS WITH STAGE 3A CHRONIC KIDNEY DISEASE, WITHOUT LONG-TERM CURRENT USE OF INSULIN: ICD-10-CM

## 2025-07-16 DIAGNOSIS — N18.31 TYPE 2 DIABETES MELLITUS WITH STAGE 3A CHRONIC KIDNEY DISEASE, WITHOUT LONG-TERM CURRENT USE OF INSULIN: ICD-10-CM

## 2025-07-16 RX ORDER — TIRZEPATIDE 2.5 MG/.5ML
2.5 INJECTION, SOLUTION SUBCUTANEOUS
Qty: 4 PEN | Refills: 1 | Status: SHIPPED | OUTPATIENT
Start: 2025-07-16

## 2025-07-17 ENCOUNTER — PATIENT MESSAGE (OUTPATIENT)
Dept: ADMINISTRATIVE | Facility: OTHER | Age: 70
End: 2025-07-17
Payer: COMMERCIAL

## 2025-07-17 ENCOUNTER — CLINICAL SUPPORT (OUTPATIENT)
Dept: REHABILITATION | Facility: HOSPITAL | Age: 70
End: 2025-07-17
Payer: COMMERCIAL

## 2025-07-17 ENCOUNTER — HOSPITAL ENCOUNTER (OUTPATIENT)
Dept: RADIOLOGY | Facility: HOSPITAL | Age: 70
Discharge: HOME | End: 2025-07-17
Attending: STUDENT IN AN ORGANIZED HEALTH CARE EDUCATION/TRAINING PROGRAM
Payer: COMMERCIAL

## 2025-07-17 DIAGNOSIS — M25.521 RIGHT ELBOW PAIN: Primary | ICD-10-CM

## 2025-07-17 DIAGNOSIS — M54.50 ACUTE RIGHT-SIDED LOW BACK PAIN WITHOUT SCIATICA: ICD-10-CM

## 2025-07-17 DIAGNOSIS — M54.9 ACUTE RIGHT-SIDED BACK PAIN, UNSPECIFIED BACK LOCATION: ICD-10-CM

## 2025-07-17 DIAGNOSIS — R07.81 RIB PAIN: ICD-10-CM

## 2025-07-17 PROCEDURE — 74176 CT ABD & PELVIS W/O CONTRAST: CPT | Mod: TC

## 2025-07-17 PROCEDURE — 74176 CT ABD & PELVIS W/O CONTRAST: CPT | Mod: 26,,, | Performed by: RADIOLOGY

## 2025-07-17 PROCEDURE — 97140 MANUAL THERAPY 1/> REGIONS: CPT | Performed by: PHYSICAL THERAPIST

## 2025-07-17 PROCEDURE — 71250 CT THORAX DX C-: CPT | Mod: 26,,, | Performed by: RADIOLOGY

## 2025-07-17 PROCEDURE — 97112 NEUROMUSCULAR REEDUCATION: CPT | Performed by: PHYSICAL THERAPIST

## 2025-07-17 NOTE — PROGRESS NOTES
Outpatient Rehab    Physical Therapy Progress Note : Updated Plan of Care    Patient Name: Meryl Johnson  MRN: [id number]  YOB: 1955  Encounter Date: 7/17/2025    Therapy Diagnosis:   Encounter Diagnoses   Name Primary?    Right elbow pain Yes    Acute right-sided low back pain without sciatica      Physician: Russell Honeycutt III, *    Physician Orders: Eval and Treat  Medical Diagnosis: Right shoulder pain, unspecified chronicity  Surgical Diagnosis: Not applicable for this Episode   Surgical Date: Not applicable for this Episode  Days Since Last Surgery: Not applicable for this Episode    Visit # / Visits Authorized:  20 / 24  Insurance Authorization Period: 1/2/2025 to 12/31/2025  Date of Evaluation: 4/21/2025   Plan of Care Certification: 7/8/2025 to 10/21/2025      PT/PTA:     Number of PTA visits since last PT visit:   Time In: 1600   Time Out: 1700  Total Time (in minutes): 60   Total Billable Time (in minutes): 60    FOTO:  Intake Score:  %  Survey Score 2:  %  Survey Score 3:  %    Precautions:       Subjective   Imaging came back negative. She is able to sleep in a more reclined position.  Pain reported as 2/10.      Objective              Treatment:  Manual Therapy  MT 1: Lumbar/SI assessment  Balance/Neuromuscular Re-Education  NMR 1: Supine clams GTB 2 x 15  NMR 2: Handcuffs YTB 30x  NMR 3: incline palloff press OTB 2 x1 5  NMR 4: MH lumbar with pelvic tilting    Time Entry(in minutes):  Manual Therapy Time Entry: 15  Neuromuscular Re-Education Time Entry: 35    Assessment & Plan   Assessment  Meryl progressed with loading to the lumbar spine. Better tolerance to her exercises today, making progress. Will consider injection in future   Evaluation/Treatment Tolerance: Patient tolerated treatment well    The patient will continue to benefit from skilled outpatient physical therapy in order to address the deficits listed in the problem list on the initial evaluation, provide patient and  family education, and maximize the patients level of independence in the home and community environments.     The patient's spiritual, cultural, and educational needs were considered, and the patient is agreeable to the plan of care and goals.           Goals:   Active       LTG       Pt will demonstrate independence with initial HEP to improve their performance of their Instrumental Activities of Daily Living.    (Progressing)       Start:  04/21/25    Expected End:  06/16/25            Patient will improve their numeric pain rating score to no greater than 0/10 when performing LIFTING to improve performance of their Instrumental Activities of Daily Living.   (Progressing)       Start:  04/21/25    Expected End:  06/16/25               STG       Pt will demonstrate independence with initial HEP to improve their performance of their Activities of Daily Living.    (Progressing)       Start:  04/21/25    Expected End:  05/19/25            Patient will improve their numeric pain rating score to no greater than 2/10 when performing wrist ext to improve performance of their Activities of Daily Living.   (Progressing)       Start:  04/21/25    Expected End:  05/19/25                Fabricio Tolentino, PT, DPT

## 2025-07-21 ENCOUNTER — HOSPITAL ENCOUNTER (OUTPATIENT)
Dept: RADIOLOGY | Facility: CLINIC | Age: 70
Discharge: HOME | End: 2025-07-21
Attending: INTERNAL MEDICINE
Payer: COMMERCIAL

## 2025-07-21 DIAGNOSIS — Z78.0 MENOPAUSE: ICD-10-CM

## 2025-07-21 PROCEDURE — 77080 DXA BONE DENSITY AXIAL: CPT | Mod: TC

## 2025-07-22 ENCOUNTER — CLINICAL SUPPORT (OUTPATIENT)
Dept: REHABILITATION | Facility: HOSPITAL | Age: 70
End: 2025-07-22
Payer: COMMERCIAL

## 2025-07-22 ENCOUNTER — TELEPHONE (OUTPATIENT)
Dept: INTERNAL MEDICINE | Facility: CLINIC | Age: 70
End: 2025-07-22
Payer: COMMERCIAL

## 2025-07-22 DIAGNOSIS — M25.521 RIGHT ELBOW PAIN: Primary | ICD-10-CM

## 2025-07-22 DIAGNOSIS — M54.50 ACUTE RIGHT-SIDED LOW BACK PAIN WITHOUT SCIATICA: ICD-10-CM

## 2025-07-22 PROCEDURE — 97112 NEUROMUSCULAR REEDUCATION: CPT | Performed by: PHYSICAL THERAPIST

## 2025-07-22 NOTE — TELEPHONE ENCOUNTER
----- Message from Med Assistant Reece sent at 7/22/2025 12:23 PM CDT -----  Good afternoon, is MS. Johnson cleared for surgery with Dr. Babin on 8/5?

## 2025-07-22 NOTE — PROGRESS NOTES
Physical Therapy Visit    Patient Name: Meryl Johnson  MRN: [id number]  YOB: 1955  Encounter Date: 7/22/2025    Therapy Diagnosis:   Encounter Diagnoses   Name Primary?    Right elbow pain Yes    Acute right-sided low back pain without sciatica      Physician: Russell Honeycutt III, *    Physician Orders: Eval and Treat  Medical Diagnosis: Right shoulder pain, unspecified chronicity  Surgical Diagnosis: Not applicable for this Episode   Surgical Date: Not applicable for this Episode  Days Since Last Surgery: Not applicable for this Episode    Visit # / Visits Authorized:  21 / 44  Insurance Authorization Period: 1/2/2025 to 12/31/2025  Date of Evaluation: 4/21/2025  Plan of Care Certification: 7/8/2025 to 10/21/2025      PT/PTA:     Number of PTA visits since last PT visit:   Time In: 1545   Time Out: 1645  Total Time (in minutes): 60   Total Billable Time (in minutes): 60    FOTO:  Intake Score (%): 38  Survey Score 2 (%): 61  Survey Score 3 (%): 91    Precautions:         Subjective   Elbow has been feeling better. Back is just sore. Notes sleeping a lot this weekend.  Pain reported as 1/10.      Objective            Treatment:  Balance/Neuromuscular Re-Education  NMR 1: Supine clams GTB 2 x 15  NMR 2: Handcuffs YTB 30x  NMR 3: incline palloff press GTB 2 x1 5  NMR 4: MH lumbar with pelvic tilting    Time Entry(in minutes):  Neuromuscular Re-Education Time Entry: 60    Assessment & Plan   Assessment: Meryl returned to clinic with soreness but able to complete all reps even with GTB progression. Noted elbow improvement as well, just pain when lifting  Evaluation/Treatment Tolerance: Patient tolerated treatment well    The patient will continue to benefit from skilled outpatient physical therapy in order to address the deficits listed in the problem list on the initial evaluation, provide patient and family education, and maximize the patients level of independence in the home and community  environments.     The patient's spiritual, cultural, and educational needs were considered, and the patient is agreeable to the plan of care and goals.           Plan: Reduce neural tension    Goals:   Active       LTG       Pt will demonstrate independence with initial HEP to improve their performance of their Instrumental Activities of Daily Living.    (Progressing)       Start:  04/21/25    Expected End:  06/16/25            Patient will improve their numeric pain rating score to no greater than 0/10 when performing LIFTING to improve performance of their Instrumental Activities of Daily Living.   (Progressing)       Start:  04/21/25    Expected End:  06/16/25               STG       Pt will demonstrate independence with initial HEP to improve their performance of their Activities of Daily Living.    (Progressing)       Start:  04/21/25    Expected End:  05/19/25            Patient will improve their numeric pain rating score to no greater than 2/10 when performing wrist ext to improve performance of their Activities of Daily Living.   (Progressing)       Start:  04/21/25    Expected End:  05/19/25                Fabricio Tolentino, PT, DPT

## 2025-07-23 ENCOUNTER — PATIENT OUTREACH (OUTPATIENT)
Dept: ADMINISTRATIVE | Facility: HOSPITAL | Age: 70
End: 2025-07-23
Payer: COMMERCIAL

## 2025-07-23 DIAGNOSIS — E11.9 TYPE 2 DIABETES MELLITUS WITHOUT COMPLICATION, WITHOUT LONG-TERM CURRENT USE OF INSULIN: ICD-10-CM

## 2025-07-23 NOTE — PROGRESS NOTES
Chart reviewed and updated. Reconciled immunizations. Placed order for urine, linked lab  Rachel Ortiz LPN   Clinical Care Coordinator  [facility]

## 2025-07-30 ENCOUNTER — PATIENT MESSAGE (OUTPATIENT)
Dept: PREADMISSION TESTING | Facility: HOSPITAL | Age: 70
End: 2025-07-30
Payer: COMMERCIAL

## 2025-07-30 ENCOUNTER — OFFICE VISIT (OUTPATIENT)
Dept: SPORTS MEDICINE | Facility: CLINIC | Age: 70
End: 2025-07-30
Payer: COMMERCIAL

## 2025-07-30 VITALS
SYSTOLIC BLOOD PRESSURE: 115 MMHG | WEIGHT: 257.19 LBS | HEIGHT: 67 IN | DIASTOLIC BLOOD PRESSURE: 82 MMHG | HEART RATE: 112 BPM | BODY MASS INDEX: 40.37 KG/M2

## 2025-07-30 DIAGNOSIS — M77.11 LATERAL EPICONDYLITIS OF RIGHT ELBOW: Primary | ICD-10-CM

## 2025-07-30 PROCEDURE — 99499 UNLISTED E&M SERVICE: CPT | Mod: S$GLB,,, | Performed by: PHYSICIAN ASSISTANT

## 2025-07-30 PROCEDURE — 99999 PR PBB SHADOW E&M-EST. PATIENT-LVL IV: CPT | Mod: PBBFAC,,, | Performed by: PHYSICIAN ASSISTANT

## 2025-07-31 RX ORDER — NAPROXEN SODIUM 220 MG/1
81 TABLET, FILM COATED ORAL DAILY
Qty: 28 TABLET | Refills: 0 | Status: SHIPPED | OUTPATIENT
Start: 2025-07-31 | End: 2026-07-31

## 2025-07-31 RX ORDER — HYDROCODONE BITARTRATE AND ACETAMINOPHEN 10; 325 MG/1; MG/1
TABLET ORAL
Qty: 12 TABLET | Refills: 0 | Status: SHIPPED | OUTPATIENT
Start: 2025-07-31

## 2025-07-31 RX ORDER — SODIUM CHLORIDE 9 MG/ML
INJECTION, SOLUTION INTRAVENOUS CONTINUOUS
OUTPATIENT
Start: 2025-07-31

## 2025-07-31 RX ORDER — TRAMADOL HYDROCHLORIDE 50 MG/1
50-100 TABLET, FILM COATED ORAL EVERY 6 HOURS PRN
Qty: 21 TABLET | Refills: 0 | Status: SHIPPED | OUTPATIENT
Start: 2025-07-31

## 2025-07-31 RX ORDER — ONDANSETRON 4 MG/1
4 TABLET, FILM COATED ORAL EVERY 8 HOURS PRN
Qty: 8 TABLET | Refills: 0 | Status: SHIPPED | OUTPATIENT
Start: 2025-07-31

## 2025-07-31 NOTE — H&P
Meryl Johnson  is here for a completion of her perioperative paperwork. she  Is scheduled to undergo      OPERATIONS:   right  1. elbow excision of neuroma, posterior branch, posterior cutaneous nerve   of the forearm (CPT 02588).   2. elbow implantation of nerve into bone or muscle (posterior branch and   posterior cutaneous nerve of the forearm into triceps (CPT 82018) on 8/5/25.      She is a healthy individual and does need clearance for this procedure which she has received from Dr. Martinez.     PAST MEDICAL HISTORY:   Past Medical History:   Diagnosis Date    Anemia     Diabetes mellitus type II     H. pylori infection     Hyperlipidemia     Hypertension     Unspecified vitamin D deficiency 04/24/2013     PAST SURGICAL HISTORY:   Past Surgical History:   Procedure Laterality Date    CHONDROPLASTY OF KNEE Left 10/04/2022    Procedure: CHONDROPLASTY, KNEE;  Surgeon: Brigette Babin MD;  Location: [facility];  Service: Orthopedics;  Laterality: Left;    ESOPHAGOGASTRODUODENOSCOPY N/A 08/08/2018    Procedure: EGD (ESOPHAGOGASTRODUODENOSCOPY);  Surgeon: Darius Gerardo MD;  Location: [address];  Service: Endoscopy;  Laterality: N/A;    GASTRIC BYPASS      KNEE ARTHROSCOPY W/ MENISCECTOMY Left 10/04/2022    Procedure: ARTHROSCOPY, KNEE, WITH MENISCECTOMY;  Surgeon: Brigette Babin MD;  Location: [facility] OR;  Service: Orthopedics;  Laterality: Left;    SYNOVECTOMY OF KNEE Left 10/04/2022    Procedure: SYNOVECTOMY, KNEE;  Surgeon: Brigette Babin MD;  Location: [facility];  Service: Orthopedics;  Laterality: Left;     FAMILY HISTORY:   Family History   Problem Relation Name Age of Onset    COPD Mother      Heart disease Mother      Cancer Father          liver    Heart disease Father      Cancer Sister      Diabetes Sister      Hyperlipidemia Sister      Hypothyroidism Sister      No Known Problems Brother      No Known Problems Maternal Aunt      No Known Problems Maternal Uncle      No Known Problems Paternal Aunt      No  "Known Problems Paternal Uncle      No Known Problems Maternal Grandmother      No Known Problems Maternal Grandfather      No Known Problems Paternal Grandmother      No Known Problems Paternal Grandfather      Amblyopia Neg Hx      Blindness Neg Hx      Cataracts Neg Hx      Glaucoma Neg Hx      Hypertension Neg Hx      Macular degeneration Neg Hx      Retinal detachment Neg Hx      Strabismus Neg Hx      Stroke Neg Hx      Thyroid disease Neg Hx      Breast cancer Neg Hx      Colon cancer Neg Hx      Ovarian cancer Neg Hx      Stomach cancer Neg Hx      Rectal cancer Neg Hx       SOCIAL HISTORY: Social History[1]    MEDICATIONS: Current Medications[2]  ALLERGIES:   Review of patient's allergies indicates:   Allergen Reactions    Codeine Nausea Only    Vicodin [hydrocodone-acetaminophen] Nausea Only       VITAL SIGNS: /82   Pulse (!) 112   Ht 5' 7" (1.702 m)   Wt 116.7 kg (257 lb 2.7 oz)   BMI 40.28 kg/m²      Risks, indications and benefits of the surgical procedure were discussed with the patient. All questions with regard to surgery, rehab, expected return to functional activities, activities of daily living and recreational endeavors were answered to her satisfaction.    It was explained to the patient that there may be an increase in surgical risks if the patient has certain co-morbidities such as but not limited to: Obesity, Cardiovascular issues (CHF, CAD, Arrhythmias), chronic pulmonary issues, previous or current neurovascular/neurological issues, previous strokes, diabetes mellitus, previous wound healing issues, previous wound or skin infections, PVD, clotting disorders, if the patient uses chronic steroids, if the patient takes or has immune compromising medications or diseases, or has previously or currently used tobacco products.     The patient verbalized that he/she does not have any additional clotting, bleeding, or blood disorders, other than what is list in her chart on today's review. "     Then a brief history and physical exam were performed.    Review of Systems   Constitution: Negative. Negative for chills, fever and night sweats.   HENT: Negative for congestion and headaches.    Eyes: Negative for blurred vision, left vision loss and right vision loss.   Cardiovascular: Negative for chest pain and syncope.   Respiratory: Negative for cough and shortness of breath.    Endocrine: Negative for polydipsia, polyphagia and polyuria.   Hematologic/Lymphatic: Negative for bleeding problem. Does not bruise/bleed easily.   Skin: Negative for dry skin, itching and rash.   Musculoskeletal: Negative for falls and muscle weakness.   Gastrointestinal: Negative for abdominal pain and bowel incontinence.   Genitourinary: Negative for bladder incontinence and nocturia.   Neurological: Negative for disturbances in coordination, loss of balance and seizures.   Psychiatric/Behavioral: Negative for depression. The patient does not have insomnia.    Allergic/Immunologic: Negative for hives and persistent infections.     PHYSICAL EXAM:  GEN: A&Ox3, WD WN NAD  HEENT: WNL  CHEST: CTAB, no W/R/R  HEART: RRR, no M/R/G  ABD: Soft, NT ND, BS x4 QUADS  MS; See Epic  NEURO: CN II-XII intact       The surgical consent was then reviewed with the patient, who agreed with all the contents of the consent form and it was signed. she was then given the surgery packet to bring with her to surgery center for the anesthesia portion of her perioperative paperwork (if needed)   For all physicians except for Dr. Sam, we will email and possibly fax the consent forms and booking sheets to [facility].    The patient was given the opportunity to ask questions about the surgical plan and consent form, and once no other questions were asked, I proceeded with the pre-op appointment.    PHYSICAL THERAPY:  TBD    POST OP CARE:instructions were reviewed including care of the wound and dressing after surgery and when she can  shower. Patient was told not sleep or lay on there surgical extremity following surgery as this could cause repair damage, tissue damage, or nerve injury.    An extensive amount of time was spent on discussion of the following information based on what type of surgery the patient was having. Patient expressed understanding of the material below:    Shoulder surgery or upper extremity surgery requiring post-op sling:  It was explained to the patient that they should remove their arm from the sling approximately 6 times per day to do full elbow ROM (flexion and extension) and full supination and pronation of the elbow for approximately 5 minutes at a time to help prevent elbow stiffness, nerve pain or problems, or nerve injury. They were told to contact us if they begin having numbness and tingling of there surgical extremity that persists longer then 1 day without relief.     Extremity surgery requiring a splint:   It was explained to patient on how to properly elevated position there extremity to prevent pressure ulcers from occurring. I made sure that the patient understood that that surgical site may be numb following surgery and prevent them from feeling pressure pain that they would normal feel if a pressure injury was occurring. Pressure ulcers and there causes were discussed with the patient today.     Post-operative splint:  It was explain to the patient that they can contact us at anytime if they feel that there is a problem with their splint or under their splint that needs evaluation. If there is concern, questions, or discomfort with the splint then they can present to either our clinic or the [facility] ED for removal, evaluation, and replacement of the splint.    CRUTCHES OR WALKER: It was explained to the patient that if they are having a lower extremity surgery that they will require either a walker or crutches to ambulate safely with after surgery. It was explained that a cane or other  assistive devices are not sufficient to safely ambulate with after surgery. I explained to the patient that I will place an order for them to receive either crutches or a walker after surgery to go home with. It was explained that if they have crutches or a walker at home already, that they are REQUIRED to bring them to the hospital on the day of surgery. It was explained that if they do not have them at the hospital on the day of surgery that they WILL be provided a new pair or crutches or a walker to go home with to ensure ambulation will be safe if the patient needs to stop somewhere on the way home.      If the patient's mobility limitation cannot be sufficiently resolved by the use of crutches then it is necessary for the patient's functional mobility deficit to be sufficiently resolved with the use of a (Rolling Walker or Walker). Patient's mobility limitation significantly impairs their ability to participate in one of more activities of daily living. The use of a (Rolling Walker or Walker) will significantly improve the patient's ability to participate in MRADLS and the patient will use it on regular basis in the home.      PAIN MANAGEMENT: Meryl Johnson was also given a pain management regime, which includes the option of getting a TENS unit given to her by [facility] (if patient chooses) along with the education required for its use. She was also instructed regarding the Polar ice unit or gel ice packs (chosen by patient) that will be in place after surgery and her postoperative pain medications.     Patient understands that Polar Ice machine has to be bought today if they want it. It cannot be bought on day of surgery at surgery center.     PAIN MEDICATION:  Norco 10/325mg 1 po q 4-6 hours prn pain  Ultram 50 mg Take 1-2 p.o. q.6 hours p.r.n. breakthrough pain,   Zofran 4mg. 1 tablet po q8h prn nausea     DVT prophylaxis was discussed with the patient today including risk factors for developing DVTs  and history of DVTs. The patient was asked if any specific recommendations were given from the doctor/s that did pre-operative surgical clearance. The patient was then given an education sheet about DVTs and PE with warning signs and symptoms of both and steps to take if they suspect either of these.    This along with the Modified Caprini risk assessment model for VTE in general surgical patients was used to determine the patient's DVT risk.     From: Reena MK, Brady DA, Shivani SM, et al. Prevention of VTE in nonorthopedic surgical patients: antithrombotic therapy and prevention of thrombosis, 9th ed: American College of Chest Physicians evidence-based clinical practical guidelines. Chest 2012; 141:e227S. Copyright © 2012. Reproduced with permission from the American College of Chest Physicians.    The below listed DVT prophylaxis regimen along with bilateral ALONDRA compression stockings will be used post-op. Length of treatment has been determined to be 10-42 days post-op by the above noted Caprini assessment model.     The patient was instructed to buy and take:  Aspirin 81mg QD x 4 weeks for DVT prophylaxis starting on the morning after surgery.  Patient will also use bilateral TEDs on lower extremities, SCDs during surgery, and early ambulation post-op. If the patient was previously taking 81mg baby aspirin, they were told to not take it starting 5 days prior to surgery and to restart the 81mg aspirin after surgery.       Patient was also told to buy over the counter Prilosec medication if needed and take it once daily for GI protection as long as they are taking NSAIDs or Aspirin.   Patient verbally denies history of blood clots or DVTs when asked today.   Patient denies history of seizures.      The patient was told that narcotic pain medications may make them drowsy and instructions were given to not sign legal documents, drive or operate heavy machinery, cars, or equipment while under the influence of narcotic  medications. The patient was told and understands that narcotic pain medications should only be used as needed to control pain and that other options of pain control include TENs unit and ice packs/unit.     As there were no other questions to be asked, she was given my business card along with Brigette Babin MD business card if she has any questions or concerns prior to surgery or in the postop period.     At the end of our encounter today, the patient was given the option and asked if they would like to see the surgeon regarding questions or concerns that they have about the consent form or the surgical procedure. The patient is okay not seeing Dr. Babin today.        [1]   Social History  Socioeconomic History    Marital status: Single   Occupational History     Employer: [employer]   Tobacco Use    Smoking status: Never    Smokeless tobacco: Never   Substance and Sexual Activity    Alcohol use: No    Drug use: No     Social Drivers of Health     Financial Resource Strain: Patient Declined (1/16/2024)    Overall Financial Resource Strain (CARDIA)     Difficulty of Paying Living Expenses: Patient declined   Food Insecurity: Patient Declined (1/16/2024)    Hunger Vital Sign     Worried About Running Out of Food in the Last Year: Patient declined     Ran Out of Food in the Last Year: Patient declined   Transportation Needs: No Transportation Needs (1/16/2024)    PRAPARE - Transportation     Lack of Transportation (Medical): No     Lack of Transportation (Non-Medical): No   Physical Activity: Insufficiently Active (1/16/2024)    Exercise Vital Sign     Days of Exercise per Week: 1 day     Minutes of Exercise per Session: 10 min   Stress: No Stress Concern Present (1/16/2024)    [employer] [place] [employer] - Occupational Stress Questionnaire     Feeling of Stress : Only a little   Housing Stability: Low Risk  (1/16/2024)    Housing Stability Vital Sign     Unable to Pay for Housing in the Last  Year: No     Number of Places Lived in the Last Year: 1     Unstable Housing in the Last Year: No   [2]   Current Outpatient Medications:     ciprofloxacin HCl (CILOXAN) 0.3 % ophthalmic solution, Place 1 drop into the right eye every 4 (four) hours., Disp: 5 mL, Rfl: 0    cyclobenzaprine (FLEXERIL) 10 MG tablet, Take 1 tablet (10 mg total) by mouth every evening., Disp: 20 tablet, Rfl: 0    empagliflozin (JARDIANCE) 25 mg tablet, Take 1 tablet (25 mg total) by mouth once daily., Disp: 90 tablet, Rfl: 1    ergocalciferol (VITAMIN D2) 50,000 unit Cap, Take 1 capsule (50,000 Units total) by mouth every 7 days., Disp: 12 capsule, Rfl: 1    LIDOcaine (LIDODERM) 5 %, Place 1 patch onto the skin once daily. Remove & Discard patch within 12 hours or as directed by MD, Disp: 30 patch, Rfl: 1    losartan (COZAAR) 50 MG tablet, Take 1 tablet (50 mg total) by mouth once daily., Disp: 90 tablet, Rfl: 1    metFORMIN (GLUCOPHAGE) 1000 MG tablet, Take 1 tablet (1,000 mg total) by mouth 2 (two) times daily with meals., Disp: 180 tablet, Rfl: 1    methylPREDNISolone (MEDROL DOSEPACK) 4 mg tablet, use as directed, Disp: 21 each, Rfl: 0    rosuvastatin (CRESTOR) 20 MG tablet, Take 1 tablet (20 mg total) by mouth once daily., Disp: 90 tablet, Rfl: 0    tirzepatide (MOUNJARO) 2.5 mg/0.5 mL PnIj, Inject 2.5 mg into the skin every 7 days., Disp: 4 Pen, Rfl: 1    tirzepatide (MOUNJARO) 5 mg/0.5 mL PnIj, Inject 5 mg into the skin every 7 days., Disp: 4 Pen, Rfl: 1    triamcinolone acetonide 0.1% (KENALOG) 0.1 % cream, Apply topically 2 (two) times daily., Disp: 45 g, Rfl: 1    aspirin 81 MG Chew, Take 1 tablet (81 mg total) by mouth once daily. For 4 weeks., Disp: 28 tablet, Rfl: 0    HYDROcodone-acetaminophen (NORCO)  mg per tablet, Take 1 tablet by mouth every 4-6 hours for pain., Disp: 12 tablet, Rfl: 0    lancets 28 gauge Misc, To check BG 3 times daily, to use with insurance preferred meter (Patient not taking: Reported on  7/30/2025), Disp: 100 each, Rfl: 11    ondansetron (ZOFRAN) 4 MG tablet, Take 1 tablet (4 mg total) by mouth every 8 (eight) hours as needed for Nausea., Disp: 8 tablet, Rfl: 0    traMADoL (ULTRAM) 50 mg tablet, Take 1-2 tablets ( mg total) by mouth every 6 (six) hours as needed for Pain., Disp: 21 tablet, Rfl: 0

## 2025-08-01 ENCOUNTER — TELEPHONE (OUTPATIENT)
Dept: INTERNAL MEDICINE | Facility: CLINIC | Age: 70
End: 2025-08-01
Payer: COMMERCIAL

## 2025-08-01 ENCOUNTER — PATIENT MESSAGE (OUTPATIENT)
Dept: ADMINISTRATIVE | Facility: OTHER | Age: 70
End: 2025-08-01
Payer: COMMERCIAL

## 2025-08-01 ENCOUNTER — CLINICAL SUPPORT (OUTPATIENT)
Dept: REHABILITATION | Facility: HOSPITAL | Age: 70
End: 2025-08-01
Attending: PHYSICAL THERAPIST
Payer: COMMERCIAL

## 2025-08-01 DIAGNOSIS — M54.50 ACUTE RIGHT-SIDED LOW BACK PAIN WITHOUT SCIATICA: ICD-10-CM

## 2025-08-01 DIAGNOSIS — M25.521 RIGHT ELBOW PAIN: Primary | ICD-10-CM

## 2025-08-01 PROCEDURE — 97112 NEUROMUSCULAR REEDUCATION: CPT | Performed by: PHYSICAL THERAPIST

## 2025-08-01 NOTE — TELEPHONE ENCOUNTER
Copied from CRM #[id number]. Topic: General Inquiry - Patient Advice  >> Aug 1, 2025 11:26 AM Nuvia wrote:  Type:  Needs Medical Advice    Who Called: Ty  Symptoms (please be specific): Calling about Clearance for surgery for Tuesday    How long has patient had these symptoms:  n/a  Pharmacy name and phone #:  n/a  Would the patient rather a call back or a response via [facility]? Call back   Best Call Back Number: [phone]  Additional Information: Fax # [phone]

## 2025-08-01 NOTE — PROGRESS NOTES
Physical Therapy Visit    Patient Name: Meryl Johnson  MRN: [id number]  YOB: 1955  Encounter Date: 8/1/2025    Therapy Diagnosis:   Encounter Diagnoses   Name Primary?    Right elbow pain Yes    Acute right-sided low back pain without sciatica      Physician: Russell Honeycutt III, *    Physician Orders: Eval and Treat  Medical Diagnosis: Right shoulder pain, unspecified chronicity  Surgical Diagnosis: Not applicable for this Episode   Surgical Date: Not applicable for this Episode  Days Since Last Surgery: Not applicable for this Episode    Visit # / Visits Authorized:  22 / 44  Insurance Authorization Period: 1/2/2025 to 12/31/2025  Date of Evaluation: 4/21/2025  Plan of Care Certification: 7/8/2025 to 10/21/2025      PT/PTA:     Number of PTA visits since last PT visit:   Time In: 0800   Time Out: 0900  Total Time (in minutes): 60   Total Billable Time (in minutes): 60    FOTO:  Intake Score (%): 38  Survey Score 2 (%): 61  Survey Score 3 (%): 91    Precautions:         Subjective   Doing okay today, ready for her elbow next week.  Pain reported as 2/10.      Objective            Treatment:  Balance/Neuromuscular Re-Education  NMR 1: Supine clams GTB 2 x 15  NMR 2: Handcuffs YTB 30x  NMR 3: Scaptions 2# 3 x 12  NMR 4: Open book seated upright OTB 20x  NMR 5: MH with pelvic tilts 8'    Time Entry(in minutes):  Neuromuscular Re-Education Time Entry: 60    Assessment & Plan   Assessment: Preparing for surgery next week. Back is doing well, seeing MD again next week  Evaluation/Treatment Tolerance: Patient tolerated treatment well    The patient will continue to benefit from skilled outpatient physical therapy in order to address the deficits listed in the problem list on the initial evaluation, provide patient and family education, and maximize the patients level of independence in the home and community environments.     The patient's spiritual, cultural, and educational needs were considered, and  the patient is agreeable to the plan of care and goals.           Plan: Assess tolerance to today's treatment    Goals:   Active       LTG       Pt will demonstrate independence with initial HEP to improve their performance of their Instrumental Activities of Daily Living.    (Progressing)       Start:  04/21/25    Expected End:  06/16/25            Patient will improve their numeric pain rating score to no greater than 0/10 when performing LIFTING to improve performance of their Instrumental Activities of Daily Living.   (Progressing)       Start:  04/21/25    Expected End:  06/16/25               STG       Pt will demonstrate independence with initial HEP to improve their performance of their Activities of Daily Living.    (Progressing)       Start:  04/21/25    Expected End:  05/19/25            Patient will improve their numeric pain rating score to no greater than 2/10 when performing wrist ext to improve performance of their Activities of Daily Living.   (Progressing)       Start:  04/21/25    Expected End:  05/19/25                Fabricio Tolentino, PT, DPT

## 2025-08-01 NOTE — TELEPHONE ENCOUNTER
Sports medicine is trying to get for the pt to have surgery on Tuesady.  They would like to know if you will clear her for the surgery.

## 2025-08-04 ENCOUNTER — PATIENT MESSAGE (OUTPATIENT)
Dept: SPORTS MEDICINE | Facility: CLINIC | Age: 70
End: 2025-08-04
Payer: COMMERCIAL

## 2025-08-05 ENCOUNTER — ANESTHESIA EVENT (OUTPATIENT)
Dept: SURGERY | Facility: HOSPITAL | Age: 70
End: 2025-08-05
Payer: COMMERCIAL

## 2025-08-05 ENCOUNTER — ANESTHESIA (OUTPATIENT)
Dept: SURGERY | Facility: HOSPITAL | Age: 70
End: 2025-08-05
Payer: COMMERCIAL

## 2025-08-05 ENCOUNTER — HOSPITAL ENCOUNTER (OUTPATIENT)
Facility: HOSPITAL | Age: 70
Discharge: HOME | End: 2025-08-05
Attending: ORTHOPAEDIC SURGERY | Admitting: ORTHOPAEDIC SURGERY
Payer: COMMERCIAL

## 2025-08-05 VITALS
RESPIRATION RATE: 20 BRPM | HEIGHT: 67 IN | HEART RATE: 62 BPM | SYSTOLIC BLOOD PRESSURE: 173 MMHG | TEMPERATURE: 98 F | WEIGHT: 257 LBS | DIASTOLIC BLOOD PRESSURE: 78 MMHG | OXYGEN SATURATION: 94 % | BODY MASS INDEX: 40.34 KG/M2

## 2025-08-05 DIAGNOSIS — M77.11 LATERAL EPICONDYLITIS, RIGHT ELBOW: Primary | ICD-10-CM

## 2025-08-05 DIAGNOSIS — M77.11 LATERAL EPICONDYLITIS OF RIGHT ELBOW: ICD-10-CM

## 2025-08-05 LAB
POCT GLUCOSE: 107 MG/DL (ref 70–110)
POCT GLUCOSE: 137 MG/DL (ref 70–110)

## 2025-08-05 PROCEDURE — 25000003 PHARM REV CODE 250: Performed by: ANESTHESIOLOGY

## 2025-08-05 PROCEDURE — 25000003 PHARM REV CODE 250: Performed by: NURSE ANESTHETIST, CERTIFIED REGISTERED

## 2025-08-05 PROCEDURE — 27201423 OPTIME MED/SURG SUP & DEVICES STERILE SUPPLY: Performed by: ORTHOPAEDIC SURGERY

## 2025-08-05 PROCEDURE — 25000003 PHARM REV CODE 250: Performed by: PHYSICIAN ASSISTANT

## 2025-08-05 PROCEDURE — 63600175 PHARM REV CODE 636 W HCPCS: Performed by: NURSE ANESTHETIST, CERTIFIED REGISTERED

## 2025-08-05 PROCEDURE — 37000009 HC ANESTHESIA EA ADD 15 MINS: Performed by: ORTHOPAEDIC SURGERY

## 2025-08-05 PROCEDURE — 63600175 PHARM REV CODE 636 W HCPCS: Mod: JZ,TB | Performed by: ORTHOPAEDIC SURGERY

## 2025-08-05 PROCEDURE — 71000033 HC RECOVERY, INTIAL HOUR: Performed by: ORTHOPAEDIC SURGERY

## 2025-08-05 PROCEDURE — 64784 REMOVE NERVE LESION: CPT | Mod: AS,RT,, | Performed by: PHYSICIAN ASSISTANT

## 2025-08-05 PROCEDURE — 37000008 HC ANESTHESIA 1ST 15 MINUTES: Performed by: ORTHOPAEDIC SURGERY

## 2025-08-05 PROCEDURE — 64787 IMPLANT NERVE END: CPT | Mod: AS,RT,, | Performed by: PHYSICIAN ASSISTANT

## 2025-08-05 PROCEDURE — 64784 REMOVE NERVE LESION: CPT | Mod: RT,,, | Performed by: ORTHOPAEDIC SURGERY

## 2025-08-05 PROCEDURE — 63600175 PHARM REV CODE 636 W HCPCS: Performed by: PHYSICIAN ASSISTANT

## 2025-08-05 PROCEDURE — 36000706: Performed by: ORTHOPAEDIC SURGERY

## 2025-08-05 PROCEDURE — 94761 N-INVAS EAR/PLS OXIMETRY MLT: CPT

## 2025-08-05 PROCEDURE — 71000015 HC POSTOP RECOV 1ST HR: Performed by: ORTHOPAEDIC SURGERY

## 2025-08-05 PROCEDURE — 36000707: Performed by: ORTHOPAEDIC SURGERY

## 2025-08-05 PROCEDURE — 99900035 HC TECH TIME PER 15 MIN (STAT)

## 2025-08-05 PROCEDURE — 82962 GLUCOSE BLOOD TEST: CPT | Performed by: ORTHOPAEDIC SURGERY

## 2025-08-05 PROCEDURE — 64787 IMPLANT NERVE END: CPT | Mod: RT,,, | Performed by: ORTHOPAEDIC SURGERY

## 2025-08-05 PROCEDURE — 25000003 PHARM REV CODE 250: Performed by: ORTHOPAEDIC SURGERY

## 2025-08-05 RX ORDER — GLUCAGON 1 MG
1 KIT INJECTION
Status: DISCONTINUED | OUTPATIENT
Start: 2025-08-05 | End: 2025-08-05 | Stop reason: HOSPADM

## 2025-08-05 RX ORDER — MORPHINE SULFATE 2 MG/ML
2 INJECTION, SOLUTION INTRAMUSCULAR; INTRAVENOUS EVERY 10 MIN PRN
Status: DISCONTINUED | OUTPATIENT
Start: 2025-08-05 | End: 2025-08-05 | Stop reason: HOSPADM

## 2025-08-05 RX ORDER — SODIUM CHLORIDE 9 MG/ML
INJECTION, SOLUTION INTRAVENOUS CONTINUOUS
Status: DISCONTINUED | OUTPATIENT
Start: 2025-08-05 | End: 2025-08-05 | Stop reason: HOSPADM

## 2025-08-05 RX ORDER — FENTANYL CITRATE 50 UG/ML
25 INJECTION, SOLUTION INTRAMUSCULAR; INTRAVENOUS EVERY 5 MIN PRN
Status: DISCONTINUED | OUTPATIENT
Start: 2025-08-05 | End: 2025-08-05 | Stop reason: HOSPADM

## 2025-08-05 RX ORDER — PROMETHAZINE HYDROCHLORIDE 25 MG/1
25 TABLET ORAL EVERY 6 HOURS PRN
Status: DISCONTINUED | OUTPATIENT
Start: 2025-08-05 | End: 2025-08-05 | Stop reason: HOSPADM

## 2025-08-05 RX ORDER — KETAMINE HYDROCHLORIDE 100 MG/ML
INJECTION, SOLUTION INTRAMUSCULAR; INTRAVENOUS
Status: DISCONTINUED | OUTPATIENT
Start: 2025-08-05 | End: 2025-08-05 | Stop reason: HOSPADM

## 2025-08-05 RX ORDER — ROPIVACAINE HYDROCHLORIDE 5 MG/ML
INJECTION, SOLUTION EPIDURAL; INFILTRATION; PERINEURAL
Status: DISCONTINUED | OUTPATIENT
Start: 2025-08-05 | End: 2025-08-05 | Stop reason: HOSPADM

## 2025-08-05 RX ORDER — OXYCODONE HYDROCHLORIDE 5 MG/1
5 TABLET ORAL
Status: DISCONTINUED | OUTPATIENT
Start: 2025-08-05 | End: 2025-08-05 | Stop reason: HOSPADM

## 2025-08-05 RX ORDER — TRAMADOL HYDROCHLORIDE 50 MG/1
100 TABLET, FILM COATED ORAL EVERY 6 HOURS PRN
Status: DISCONTINUED | OUTPATIENT
Start: 2025-08-05 | End: 2025-08-05 | Stop reason: HOSPADM

## 2025-08-05 RX ORDER — FENTANYL CITRATE 50 UG/ML
INJECTION, SOLUTION INTRAMUSCULAR; INTRAVENOUS
Status: DISCONTINUED | OUTPATIENT
Start: 2025-08-05 | End: 2025-08-05

## 2025-08-05 RX ORDER — DEXAMETHASONE SODIUM PHOSPHATE 4 MG/ML
INJECTION, SOLUTION INTRA-ARTICULAR; INTRALESIONAL; INTRAMUSCULAR; INTRAVENOUS; SOFT TISSUE
Status: DISCONTINUED | OUTPATIENT
Start: 2025-08-05 | End: 2025-08-05

## 2025-08-05 RX ORDER — BUPIVACAINE HYDROCHLORIDE 2.5 MG/ML
INJECTION, SOLUTION EPIDURAL; INFILTRATION; INTRACAUDAL; PERINEURAL
Status: DISCONTINUED | OUTPATIENT
Start: 2025-08-05 | End: 2025-08-05 | Stop reason: HOSPADM

## 2025-08-05 RX ORDER — LIDOCAINE HYDROCHLORIDE 20 MG/ML
INJECTION INTRAVENOUS
Status: DISCONTINUED | OUTPATIENT
Start: 2025-08-05 | End: 2025-08-05

## 2025-08-05 RX ORDER — DEXMEDETOMIDINE HYDROCHLORIDE 100 UG/ML
INJECTION, SOLUTION INTRAVENOUS
Status: DISCONTINUED | OUTPATIENT
Start: 2025-08-05 | End: 2025-08-05

## 2025-08-05 RX ORDER — OXYCODONE HYDROCHLORIDE 5 MG/1
10 TABLET ORAL EVERY 4 HOURS PRN
Status: DISCONTINUED | OUTPATIENT
Start: 2025-08-05 | End: 2025-08-05 | Stop reason: HOSPADM

## 2025-08-05 RX ORDER — FAMOTIDINE 10 MG/ML
INJECTION, SOLUTION INTRAVENOUS
Status: DISCONTINUED | OUTPATIENT
Start: 2025-08-05 | End: 2025-08-05

## 2025-08-05 RX ORDER — SODIUM CHLORIDE 0.9 % (FLUSH) 0.9 %
10 SYRINGE (ML) INJECTION
Status: DISCONTINUED | OUTPATIENT
Start: 2025-08-05 | End: 2025-08-05 | Stop reason: HOSPADM

## 2025-08-05 RX ORDER — KETOROLAC TROMETHAMINE 30 MG/ML
INJECTION, SOLUTION INTRAMUSCULAR; INTRAVENOUS
Status: DISCONTINUED | OUTPATIENT
Start: 2025-08-05 | End: 2025-08-05 | Stop reason: HOSPADM

## 2025-08-05 RX ORDER — MIDAZOLAM HYDROCHLORIDE 1 MG/ML
INJECTION INTRAMUSCULAR; INTRAVENOUS
Status: DISCONTINUED | OUTPATIENT
Start: 2025-08-05 | End: 2025-08-05

## 2025-08-05 RX ORDER — PROPOFOL 10 MG/ML
VIAL (ML) INTRAVENOUS CONTINUOUS PRN
Status: DISCONTINUED | OUTPATIENT
Start: 2025-08-05 | End: 2025-08-05

## 2025-08-05 RX ORDER — ONDANSETRON HYDROCHLORIDE 2 MG/ML
INJECTION, SOLUTION INTRAVENOUS
Status: DISCONTINUED | OUTPATIENT
Start: 2025-08-05 | End: 2025-08-05

## 2025-08-05 RX ORDER — ONDANSETRON HYDROCHLORIDE 2 MG/ML
4 INJECTION, SOLUTION INTRAVENOUS EVERY 12 HOURS PRN
Status: DISCONTINUED | OUTPATIENT
Start: 2025-08-05 | End: 2025-08-05 | Stop reason: HOSPADM

## 2025-08-05 RX ADMIN — SODIUM CHLORIDE 2 G: 9 INJECTION, SOLUTION INTRAVENOUS at 12:08

## 2025-08-05 RX ADMIN — ONDANSETRON 4 MG: 2 INJECTION INTRAMUSCULAR; INTRAVENOUS at 12:08

## 2025-08-05 RX ADMIN — SODIUM CHLORIDE: 9 INJECTION, SOLUTION INTRAVENOUS at 09:08

## 2025-08-05 RX ADMIN — FAMOTIDINE 20 MG: 10 INJECTION, SOLUTION INTRAVENOUS at 12:08

## 2025-08-05 RX ADMIN — FENTANYL CITRATE 50 MCG: 50 INJECTION, SOLUTION INTRAMUSCULAR; INTRAVENOUS at 12:08

## 2025-08-05 RX ADMIN — OXYCODONE HYDROCHLORIDE 5 MG: 5 TABLET ORAL at 02:08

## 2025-08-05 RX ADMIN — LIDOCAINE HYDROCHLORIDE 50 MG: 20 INJECTION INTRAVENOUS at 12:08

## 2025-08-05 RX ADMIN — DEXMEDETOMIDINE 12 MCG: 100 INJECTION, SOLUTION, CONCENTRATE INTRAVENOUS at 12:08

## 2025-08-05 RX ADMIN — DEXAMETHASONE SODIUM PHOSPHATE 8 MG: 4 INJECTION, SOLUTION INTRAMUSCULAR; INTRAVENOUS at 12:08

## 2025-08-05 RX ADMIN — PROPOFOL 100 MCG/KG/MIN: 10 INJECTION, EMULSION INTRAVENOUS at 12:08

## 2025-08-05 RX ADMIN — DEXMEDETOMIDINE 8 MCG: 100 INJECTION, SOLUTION, CONCENTRATE INTRAVENOUS at 12:08

## 2025-08-05 RX ADMIN — MIDAZOLAM HYDROCHLORIDE 2 MG: 2 INJECTION, SOLUTION INTRAMUSCULAR; INTRAVENOUS at 12:08

## 2025-08-05 NOTE — ANESTHESIA POSTPROCEDURE EVALUATION
Anesthesia Post Evaluation    Patient: Meryl Johnson    Procedure(s) Performed: Procedure(s) (LRB):  EXCISION, NEUROMA, OPEN ELBOW (Right)  POSTERIOR CUTANEOUS NERVE WITH IMPLANTATION  INTO TRICEPS MUSCLE (Right)    Final Anesthesia Type: MAC      Patient location during evaluation: floor  Patient participation: Yes- Able to Participate  Level of consciousness: awake and alert  Post-procedure vital signs: reviewed and stable  Pain management: adequate  Airway patency: patent  JENNI mitigation strategies: Multimodal analgesia  PONV status at discharge: No PONV  Anesthetic complications: no      Cardiovascular status: blood pressure returned to baseline  Respiratory status: unassisted  Hydration status: euvolemic  Follow-up not needed.              Vitals Value Taken Time   /79 08/05/25 14:02   Temp 36.7 °C (98.1 °F) 08/05/25 13:49   Pulse 65 08/05/25 14:08   Resp 16 08/05/25 14:07   SpO2 96 % 08/05/25 14:08   Vitals shown include unfiled device data.      Event Time   Out of Recovery 13:53:00         Pain/Alen Score: Pain Rating Prior to Med Admin: 2 (8/5/2025  2:03 PM)  Alen Score: 10 (8/5/2025  2:00 PM)

## 2025-08-05 NOTE — TRANSFER OF CARE
"Anesthesia Transfer of Care Note    Patient: Meryl Johnson    Procedure(s) Performed: Procedure(s) (LRB):  EXCISION, NEUROMA, MAJOR PERIPHERAL NERVE EXCLUDING SCIATIC NERVE (Right)  INSERTION, NERVE END, INTO BONE OR MUSCLE (Right)    Patient location: PACU    Anesthesia Type: MAC    Transport from OR: Transported from OR on 100% O2 by closed face mask with adequate spontaneous ventilation    Post pain: adequate analgesia    Post assessment: no apparent anesthetic complications and tolerated procedure well    Post vital signs: stable    Level of consciousness: awake, alert and oriented    Nausea/Vomiting: no nausea/vomiting    Complications: none    Transfer of care protocol was followed      Last vitals: Visit Vitals  BP (!) 179/83 (BP Location: Left arm, Patient Position: Lying)   Pulse 69   Temp 36.7 °C (98.1 °F) (Temporal)   Resp 16   Ht 5' 7" (1.702 m)   Wt 116.6 kg (257 lb)   SpO2 99%   Breastfeeding No   BMI 40.25 kg/m²     "

## 2025-08-05 NOTE — ANESTHESIA PREPROCEDURE EVALUATION
"                                                                                                             2025  Pre-operative evaluation for Procedure(s) (LRB):  EXCISION, NEUROMA, MAJOR PERIPHERAL NERVE EXCLUDING SCIATIC NERVE (Right)  INSERTION, NERVE END, INTO BONE OR MUSCLE (Right)    Meryl Johnson is a 70 y.o. female     Problem List[1]    Review of patient's allergies indicates:   Allergen Reactions    Codeine Nausea Only    Vicodin [hydrocodone-acetaminophen] Nausea Only       Medications Ordered Prior to Encounter[2]    Past Surgical History:   Procedure Laterality Date    CHONDROPLASTY OF KNEE Left 10/04/2022    Procedure: CHONDROPLASTY, KNEE;  Surgeon: Brigette Babin MD;  Location: [facility];  Service: Orthopedics;  Laterality: Left;    ESOPHAGOGASTRODUODENOSCOPY N/A 2018    Procedure: EGD (ESOPHAGOGASTRODUODENOSCOPY);  Surgeon: Darius Gerardo MD;  Location: [address]);  Service: Endoscopy;  Laterality: N/A;    GASTRIC BYPASS      KNEE ARTHROSCOPY W/ MENISCECTOMY Left 10/04/2022    Procedure: ARTHROSCOPY, KNEE, WITH MENISCECTOMY;  Surgeon: Brigette Babin MD;  Location: [facility];  Service: Orthopedics;  Laterality: Left;    SYNOVECTOMY OF KNEE Left 10/04/2022    Procedure: SYNOVECTOMY, KNEE;  Surgeon: Brigette Babin MD;  Location: [facility];  Service: Orthopedics;  Laterality: Left;       Social History[3]      CBC: No results for input(s): "WBC", "RBC", "HGB", "HCT", "PLT", "MCV", "MCH", "MCHC" in the last 72 hours.    CMP: No results for input(s): "NA", "K", "CL", "CO2", "BUN", "CREATININE", "GLU", "MG", "PHOS", "CALCIUM", "ALBUMIN", "PROT", "ALKPHOS", "ALT", "AST", "BILITOT" in the last 72 hours.    INR  No results for input(s): "PT", "INR", "PROTIME", "APTT" in the last 72 hours.        Diagnostic Studies:      EKD Echo:  No results found. However, due to the size of the patient record, not all encounters were searched. Please check Results Review for a complete set of " results.        Pre-op Assessment    I have reviewed the Patient Summary Reports.     I have reviewed the Nursing Notes. I have reviewed the NPO Status.   I have reviewed the Medications.     Review of Systems  Anesthesia Hx:  No problems with previous Anesthesia   History of prior surgery of interest to airway management or planning:          Denies Family Hx of Anesthesia complications.    Denies Personal Hx of Anesthesia complications.                    Hematology/Oncology:       -- Denies Anemia:                                  Cardiovascular:  Exercise tolerance: poor   Hypertension                                          Pulmonary:    Denies COPD.  Denies Asthma.     Denies Sleep Apnea.                Renal/:  Chronic Renal Disease                Hepatic/GI:      Denies GERD.    Taking GLP-1 Agonists Instructed to Hold for 7 Days No Reported GI Symptoms          Neurological:    Denies CVA.    Denies Seizures.                                Endocrine:  Diabetes               Physical Exam  General: Well nourished, Cooperative, Alert and Oriented    Airway:  Mallampati: III / II  Mouth Opening: Normal  TM Distance: Normal  Tongue: Normal  Neck ROM: Normal ROM    Dental:  Intact    Chest/Lungs:  Normal Respiratory Rate    Heart:  Rate: Normal  Rhythm: Regular Rhythm        Anesthesia Plan  Type of Anesthesia, risks & benefits discussed:    Anesthesia Type: Gen Natural Airway  Intra-op Monitoring Plan: Standard ASA Monitors  Post Op Pain Control Plan: multimodal analgesia  Induction:  IV  Informed Consent: Informed consent signed with the Patient and all parties understand the risks and agree with anesthesia plan.  All questions answered.   ASA Score: 3  Day of Surgery Review of History & Physical: H&P Update referred to the surgeon/provider.    Ready For Surgery From Anesthesia Perspective.     .           [1]   Patient Active Problem List  Diagnosis    Hypertension    Hyperlipidemia    Type 2 diabetes  mellitus with stage 3a chronic kidney disease, without long-term current use of insulin    Vitamin D deficiency    Morbid obesity    Iron deficiency anemia    Closed nondisplaced fracture of head of right radius    Acute pain of right knee    Difficulty walking up stairs    Acute medial meniscus tear of left knee    Low vitamin D level    Strain of right latissimus dorsi muscle    Type 2 diabetes mellitus without complication, without long-term current use of insulin    Angina pectoris    Right elbow pain    Acute right-sided low back pain without sciatica    BMI 40.0-44.9, adult   [2]   No current facility-administered medications on file prior to encounter.     Current Outpatient Medications on File Prior to Encounter   Medication Sig Dispense Refill    empagliflozin (JARDIANCE) 25 mg tablet Take 1 tablet (25 mg total) by mouth once daily. 90 tablet 1    ergocalciferol (VITAMIN D2) 50,000 unit Cap Take 1 capsule (50,000 Units total) by mouth every 7 days. 12 capsule 1    losartan (COZAAR) 50 MG tablet Take 1 tablet (50 mg total) by mouth once daily. 90 tablet 1    metFORMIN (GLUCOPHAGE) 1000 MG tablet Take 1 tablet (1,000 mg total) by mouth 2 (two) times daily with meals. 180 tablet 1    rosuvastatin (CRESTOR) 20 MG tablet Take 1 tablet (20 mg total) by mouth once daily. 90 tablet 0    ciprofloxacin HCl (CILOXAN) 0.3 % ophthalmic solution Place 1 drop into the right eye every 4 (four) hours. 5 mL 0    cyclobenzaprine (FLEXERIL) 10 MG tablet Take 1 tablet (10 mg total) by mouth every evening. 20 tablet 0    lancets 28 gauge Misc To check BG 3 times daily, to use with insurance preferred meter (Patient not taking: Reported on 7/30/2025) 100 each 11    tirzepatide (MOUNJARO) 5 mg/0.5 mL PnIj Inject 5 mg into the skin every 7 days. 4 Pen 1    triamcinolone acetonide 0.1% (KENALOG) 0.1 % cream Apply topically 2 (two) times daily. 45 g 1   [3]   Social History  Socioeconomic History    Marital status: Single    Occupational History     Employer: [employer]   Tobacco Use    Smoking status: Never    Smokeless tobacco: Never   Vaping Use    Vaping status: Never Used   Substance and Sexual Activity    Alcohol use: No    Drug use: No     Social Drivers of Health     Financial Resource Strain: Patient Declined (1/16/2024)    Overall Financial Resource Strain (CARDIA)     Difficulty of Paying Living Expenses: Patient declined   Food Insecurity: Patient Declined (1/16/2024)    Hunger Vital Sign     Worried About Running Out of Food in the Last Year: Patient declined     Ran Out of Food in the Last Year: Patient declined   Transportation Needs: No Transportation Needs (1/16/2024)    PRAPARE - Transportation     Lack of Transportation (Medical): No     Lack of Transportation (Non-Medical): No   Physical Activity: Insufficiently Active (1/16/2024)    Exercise Vital Sign     Days of Exercise per Week: 1 day     Minutes of Exercise per Session: 10 min   Stress: No Stress Concern Present (1/16/2024)    [employer] - Occupational Stress Questionnaire     Feeling of Stress : Only a little   Housing Stability: Low Risk  (1/16/2024)    Housing Stability Vital Sign     Unable to Pay for Housing in the Last Year: No     Number of Places Lived in the Last Year: 1     Unstable Housing in the Last Year: No

## 2025-08-06 ENCOUNTER — PATIENT MESSAGE (OUTPATIENT)
Dept: SPORTS MEDICINE | Facility: CLINIC | Age: 70
End: 2025-08-06
Payer: COMMERCIAL

## 2025-08-11 ENCOUNTER — PATIENT MESSAGE (OUTPATIENT)
Dept: ADMINISTRATIVE | Facility: OTHER | Age: 70
End: 2025-08-11
Payer: COMMERCIAL

## 2025-08-12 ENCOUNTER — HOSPITAL ENCOUNTER (OUTPATIENT)
Dept: RADIOLOGY | Facility: HOSPITAL | Age: 70
Discharge: HOME | End: 2025-08-12
Attending: PHYSICIAN ASSISTANT
Payer: COMMERCIAL

## 2025-08-12 ENCOUNTER — OFFICE VISIT (OUTPATIENT)
Facility: CLINIC | Age: 70
End: 2025-08-12
Payer: COMMERCIAL

## 2025-08-12 ENCOUNTER — TELEPHONE (OUTPATIENT)
Dept: SPORTS MEDICINE | Facility: CLINIC | Age: 70
End: 2025-08-12
Payer: COMMERCIAL

## 2025-08-12 VITALS
DIASTOLIC BLOOD PRESSURE: 75 MMHG | HEART RATE: 80 BPM | SYSTOLIC BLOOD PRESSURE: 120 MMHG | WEIGHT: 256.63 LBS | BODY MASS INDEX: 40.19 KG/M2

## 2025-08-12 DIAGNOSIS — S99.921A INJURY OF RIGHT FOOT, INITIAL ENCOUNTER: ICD-10-CM

## 2025-08-12 DIAGNOSIS — S92.354A NONDISPLACED FRACTURE OF FIFTH METATARSAL BONE, RIGHT FOOT, INITIAL ENCOUNTER FOR CLOSED FRACTURE: Primary | ICD-10-CM

## 2025-08-12 PROCEDURE — 73630 X-RAY EXAM OF FOOT: CPT | Mod: 26,RT,, | Performed by: RADIOLOGY

## 2025-08-12 PROCEDURE — 3074F SYST BP LT 130 MM HG: CPT | Mod: CPTII,S$GLB,, | Performed by: PHYSICIAN ASSISTANT

## 2025-08-12 PROCEDURE — 73630 X-RAY EXAM OF FOOT: CPT | Mod: TC,RT

## 2025-08-12 PROCEDURE — 3008F BODY MASS INDEX DOCD: CPT | Mod: CPTII,S$GLB,, | Performed by: PHYSICIAN ASSISTANT

## 2025-08-12 PROCEDURE — 73610 X-RAY EXAM OF ANKLE: CPT | Mod: 26,RT,, | Performed by: RADIOLOGY

## 2025-08-12 PROCEDURE — 4010F ACE/ARB THERAPY RXD/TAKEN: CPT | Mod: CPTII,S$GLB,, | Performed by: PHYSICIAN ASSISTANT

## 2025-08-12 PROCEDURE — 99999 PR PBB SHADOW E&M-EST. PATIENT-LVL V: CPT | Mod: PBBFAC,,, | Performed by: PHYSICIAN ASSISTANT

## 2025-08-12 PROCEDURE — 3078F DIAST BP <80 MM HG: CPT | Mod: CPTII,S$GLB,, | Performed by: PHYSICIAN ASSISTANT

## 2025-08-12 PROCEDURE — 99204 OFFICE O/P NEW MOD 45 MIN: CPT | Mod: S$GLB,,, | Performed by: PHYSICIAN ASSISTANT

## 2025-08-12 PROCEDURE — 73610 X-RAY EXAM OF ANKLE: CPT | Mod: TC,RT

## 2025-08-12 PROCEDURE — 3052F HG A1C>EQUAL 8.0%<EQUAL 9.0%: CPT | Mod: CPTII,S$GLB,, | Performed by: PHYSICIAN ASSISTANT

## 2025-08-12 PROCEDURE — 1125F AMNT PAIN NOTED PAIN PRSNT: CPT | Mod: CPTII,S$GLB,, | Performed by: PHYSICIAN ASSISTANT

## 2025-08-12 PROCEDURE — 1160F RVW MEDS BY RX/DR IN RCRD: CPT | Mod: CPTII,S$GLB,, | Performed by: PHYSICIAN ASSISTANT

## 2025-08-12 PROCEDURE — 1159F MED LIST DOCD IN RCRD: CPT | Mod: CPTII,S$GLB,, | Performed by: PHYSICIAN ASSISTANT

## 2025-08-13 ENCOUNTER — OFFICE VISIT (OUTPATIENT)
Dept: ORTHOPEDICS | Facility: CLINIC | Age: 70
End: 2025-08-13
Payer: COMMERCIAL

## 2025-08-13 DIAGNOSIS — S92.354A NONDISPLACED FRACTURE OF FIFTH METATARSAL BONE, RIGHT FOOT, INITIAL ENCOUNTER FOR CLOSED FRACTURE: ICD-10-CM

## 2025-08-13 PROCEDURE — 99999 PR PBB SHADOW E&M-EST. PATIENT-LVL III: CPT | Mod: PBBFAC,,, | Performed by: PHYSICIAN ASSISTANT

## 2025-08-13 PROCEDURE — 4010F ACE/ARB THERAPY RXD/TAKEN: CPT | Mod: CPTII,S$GLB,, | Performed by: PHYSICIAN ASSISTANT

## 2025-08-13 PROCEDURE — 1101F PT FALLS ASSESS-DOCD LE1/YR: CPT | Mod: CPTII,S$GLB,, | Performed by: PHYSICIAN ASSISTANT

## 2025-08-13 PROCEDURE — 3288F FALL RISK ASSESSMENT DOCD: CPT | Mod: CPTII,S$GLB,, | Performed by: PHYSICIAN ASSISTANT

## 2025-08-13 PROCEDURE — 1159F MED LIST DOCD IN RCRD: CPT | Mod: CPTII,S$GLB,, | Performed by: PHYSICIAN ASSISTANT

## 2025-08-13 PROCEDURE — 3052F HG A1C>EQUAL 8.0%<EQUAL 9.0%: CPT | Mod: CPTII,S$GLB,, | Performed by: PHYSICIAN ASSISTANT

## 2025-08-13 PROCEDURE — 99214 OFFICE O/P EST MOD 30 MIN: CPT | Mod: S$GLB,,, | Performed by: PHYSICIAN ASSISTANT

## 2025-08-14 RX ORDER — ROSUVASTATIN CALCIUM 20 MG/1
20 TABLET, COATED ORAL DAILY
Qty: 90 TABLET | Refills: 3 | Status: SHIPPED | OUTPATIENT
Start: 2025-08-14

## 2025-08-14 RX ORDER — METFORMIN HYDROCHLORIDE 1000 MG/1
1000 TABLET ORAL 2 TIMES DAILY WITH MEALS
Qty: 180 TABLET | Refills: 1 | Status: SHIPPED | OUTPATIENT
Start: 2025-08-14

## 2025-08-20 ENCOUNTER — OFFICE VISIT (OUTPATIENT)
Dept: SPORTS MEDICINE | Facility: CLINIC | Age: 70
End: 2025-08-20
Payer: COMMERCIAL

## 2025-08-20 ENCOUNTER — OFFICE VISIT (OUTPATIENT)
Dept: PAIN MEDICINE | Facility: CLINIC | Age: 70
End: 2025-08-20
Payer: COMMERCIAL

## 2025-08-20 VITALS
BODY MASS INDEX: 41.18 KG/M2 | HEIGHT: 67 IN | SYSTOLIC BLOOD PRESSURE: 163 MMHG | WEIGHT: 262.38 LBS | HEART RATE: 81 BPM | DIASTOLIC BLOOD PRESSURE: 82 MMHG

## 2025-08-20 VITALS
SYSTOLIC BLOOD PRESSURE: 140 MMHG | HEART RATE: 78 BPM | BODY MASS INDEX: 41.18 KG/M2 | WEIGHT: 262.38 LBS | HEIGHT: 67 IN | DIASTOLIC BLOOD PRESSURE: 69 MMHG

## 2025-08-20 DIAGNOSIS — M77.11 LATERAL EPICONDYLITIS OF RIGHT ELBOW: ICD-10-CM

## 2025-08-20 DIAGNOSIS — Z09 S/P ORTHOPEDIC SURGERY, FOLLOW-UP EXAM: Primary | ICD-10-CM

## 2025-08-20 DIAGNOSIS — M47.816 LUMBAR SPONDYLOSIS: Primary | ICD-10-CM

## 2025-08-20 PROCEDURE — 3078F DIAST BP <80 MM HG: CPT | Mod: CPTII,S$GLB,, | Performed by: STUDENT IN AN ORGANIZED HEALTH CARE EDUCATION/TRAINING PROGRAM

## 2025-08-20 PROCEDURE — 99214 OFFICE O/P EST MOD 30 MIN: CPT | Mod: S$GLB,,, | Performed by: STUDENT IN AN ORGANIZED HEALTH CARE EDUCATION/TRAINING PROGRAM

## 2025-08-20 PROCEDURE — 3079F DIAST BP 80-89 MM HG: CPT | Mod: CPTII,S$GLB,, | Performed by: PHYSICIAN ASSISTANT

## 2025-08-20 PROCEDURE — 3008F BODY MASS INDEX DOCD: CPT | Mod: CPTII,S$GLB,, | Performed by: STUDENT IN AN ORGANIZED HEALTH CARE EDUCATION/TRAINING PROGRAM

## 2025-08-20 PROCEDURE — 3077F SYST BP >= 140 MM HG: CPT | Mod: CPTII,S$GLB,, | Performed by: PHYSICIAN ASSISTANT

## 2025-08-20 PROCEDURE — 4010F ACE/ARB THERAPY RXD/TAKEN: CPT | Mod: CPTII,S$GLB,, | Performed by: PHYSICIAN ASSISTANT

## 2025-08-20 PROCEDURE — 1159F MED LIST DOCD IN RCRD: CPT | Mod: CPTII,S$GLB,, | Performed by: PHYSICIAN ASSISTANT

## 2025-08-20 PROCEDURE — 4010F ACE/ARB THERAPY RXD/TAKEN: CPT | Mod: CPTII,S$GLB,, | Performed by: STUDENT IN AN ORGANIZED HEALTH CARE EDUCATION/TRAINING PROGRAM

## 2025-08-20 PROCEDURE — 1159F MED LIST DOCD IN RCRD: CPT | Mod: CPTII,S$GLB,, | Performed by: STUDENT IN AN ORGANIZED HEALTH CARE EDUCATION/TRAINING PROGRAM

## 2025-08-20 PROCEDURE — 1160F RVW MEDS BY RX/DR IN RCRD: CPT | Mod: CPTII,S$GLB,, | Performed by: PHYSICIAN ASSISTANT

## 2025-08-20 PROCEDURE — 99999 PR PBB SHADOW E&M-EST. PATIENT-LVL IV: CPT | Mod: PBBFAC,,, | Performed by: STUDENT IN AN ORGANIZED HEALTH CARE EDUCATION/TRAINING PROGRAM

## 2025-08-20 PROCEDURE — 3052F HG A1C>EQUAL 8.0%<EQUAL 9.0%: CPT | Mod: CPTII,S$GLB,, | Performed by: PHYSICIAN ASSISTANT

## 2025-08-20 PROCEDURE — 1125F AMNT PAIN NOTED PAIN PRSNT: CPT | Mod: CPTII,S$GLB,, | Performed by: STUDENT IN AN ORGANIZED HEALTH CARE EDUCATION/TRAINING PROGRAM

## 2025-08-20 PROCEDURE — 3288F FALL RISK ASSESSMENT DOCD: CPT | Mod: CPTII,S$GLB,, | Performed by: STUDENT IN AN ORGANIZED HEALTH CARE EDUCATION/TRAINING PROGRAM

## 2025-08-20 PROCEDURE — 3288F FALL RISK ASSESSMENT DOCD: CPT | Mod: CPTII,S$GLB,, | Performed by: PHYSICIAN ASSISTANT

## 2025-08-20 PROCEDURE — 99999 PR PBB SHADOW E&M-EST. PATIENT-LVL IV: CPT | Mod: PBBFAC,,, | Performed by: PHYSICIAN ASSISTANT

## 2025-08-20 PROCEDURE — 1126F AMNT PAIN NOTED NONE PRSNT: CPT | Mod: CPTII,S$GLB,, | Performed by: PHYSICIAN ASSISTANT

## 2025-08-20 PROCEDURE — 99024 POSTOP FOLLOW-UP VISIT: CPT | Mod: S$GLB,,, | Performed by: PHYSICIAN ASSISTANT

## 2025-08-20 PROCEDURE — 1100F PTFALLS ASSESS-DOCD GE2>/YR: CPT | Mod: CPTII,S$GLB,, | Performed by: STUDENT IN AN ORGANIZED HEALTH CARE EDUCATION/TRAINING PROGRAM

## 2025-08-20 PROCEDURE — 3052F HG A1C>EQUAL 8.0%<EQUAL 9.0%: CPT | Mod: CPTII,S$GLB,, | Performed by: STUDENT IN AN ORGANIZED HEALTH CARE EDUCATION/TRAINING PROGRAM

## 2025-08-20 PROCEDURE — 1100F PTFALLS ASSESS-DOCD GE2>/YR: CPT | Mod: CPTII,S$GLB,, | Performed by: PHYSICIAN ASSISTANT

## 2025-08-20 PROCEDURE — 3077F SYST BP >= 140 MM HG: CPT | Mod: CPTII,S$GLB,, | Performed by: STUDENT IN AN ORGANIZED HEALTH CARE EDUCATION/TRAINING PROGRAM

## 2025-08-20 RX ORDER — METHYLPREDNISOLONE 4 MG/1
TABLET ORAL
Qty: 21 EACH | Refills: 0 | Status: SHIPPED | OUTPATIENT
Start: 2025-08-20 | End: 2025-09-10

## 2025-08-21 ENCOUNTER — CLINICAL SUPPORT (OUTPATIENT)
Dept: REHABILITATION | Facility: HOSPITAL | Age: 70
End: 2025-08-21
Payer: COMMERCIAL

## 2025-08-21 DIAGNOSIS — M25.521 RIGHT ELBOW PAIN: Primary | ICD-10-CM

## 2025-08-21 DIAGNOSIS — M54.50 ACUTE RIGHT-SIDED LOW BACK PAIN WITHOUT SCIATICA: ICD-10-CM

## 2025-08-21 PROCEDURE — 97140 MANUAL THERAPY 1/> REGIONS: CPT | Performed by: PHYSICAL THERAPIST

## 2025-08-21 PROCEDURE — 97112 NEUROMUSCULAR REEDUCATION: CPT | Performed by: PHYSICAL THERAPIST

## 2025-08-22 RX ORDER — ERGOCALCIFEROL 1.25 MG/1
50000 CAPSULE ORAL
Qty: 12 CAPSULE | Refills: 1 | Status: SHIPPED | OUTPATIENT
Start: 2025-08-22

## 2025-08-25 PROBLEM — M54.50 ACUTE RIGHT-SIDED LOW BACK PAIN WITHOUT SCIATICA: Status: RESOLVED | Noted: 2025-07-08 | Resolved: 2025-08-25

## 2025-09-02 RX ORDER — FLUCONAZOLE 150 MG/1
150 TABLET ORAL WEEKLY
Qty: 2 TABLET | Refills: 0 | Status: CANCELLED | OUTPATIENT
Start: 2025-09-02 | End: 2025-09-10

## 2025-09-03 RX ORDER — FLUCONAZOLE 150 MG/1
150 TABLET ORAL DAILY
Qty: 1 TABLET | Refills: 0 | Status: SHIPPED | OUTPATIENT
Start: 2025-09-03 | End: 2025-09-05

## 2025-09-04 RX ORDER — FLUCONAZOLE 150 MG/1
150 TABLET ORAL WEEKLY
Qty: 2 TABLET | Refills: 0 | Status: SHIPPED | OUTPATIENT
Start: 2025-09-04 | End: 2025-09-18

## (undated) DEVICE — PAD ABDOMINAL STERILE 8X10IN

## (undated) DEVICE — Device

## (undated) DEVICE — SUT VICRYL PLUS 3-0 SH 18IN

## (undated) DEVICE — GLOVE BIOGEL SKINSENSE PI 7.0

## (undated) DEVICE — GOWN SMART IMP BREATHABLE XXLG

## (undated) DEVICE — GLOVE SURGEON SYN PF SZ 9

## (undated) DEVICE — CLOSURE SKIN STERI STRIP 1/2X4

## (undated) DEVICE — DRAPE ARTHSCP FLD CTRL POUCH

## (undated) DEVICE — SYR 10CC LUER LOCK

## (undated) DEVICE — PAD ABD 8X10 STERILE

## (undated) DEVICE — COVER CAMERA OPERATING ROOM

## (undated) DEVICE — SOL 9P NACL IRR PIC IL

## (undated) DEVICE — ADHESIVE MASTISOL VIAL 48/BX

## (undated) DEVICE — MANIFOLD 4 PORT

## (undated) DEVICE — NDL HYPO REG 25G X 1 1/2

## (undated) DEVICE — SUT MCRYL PLUS 4-0 PS2 27IN

## (undated) DEVICE — UNDERGLOVES BIOGEL PI SIZE 7.5

## (undated) DEVICE — GAUZE SPONGE 4X4 12PLY

## (undated) DEVICE — TOURNIQUET SB QC DP 24X4IN

## (undated) DEVICE — SOL NACL IRR 1000ML BTL

## (undated) DEVICE — APPLICATOR CHLORAPREP ORN 26ML

## (undated) DEVICE — GLOVE ORTHO PF SZ 8.5

## (undated) DEVICE — SOL IRR NACL .9% 3000ML

## (undated) DEVICE — SYR B-D DISP CONTROL 10CC100/C

## (undated) DEVICE — SPONGE COTTON TRAY 4X4IN

## (undated) DEVICE — COVER MAYO STAND REINFRCD 30

## (undated) DEVICE — DRAPE SURG W/TWL 17 5/8X23

## (undated) DEVICE — COVER LIGHT HANDLE 80/CA

## (undated) DEVICE — GOWN ECLIPSE REINF LV4 TWL 2XL

## (undated) DEVICE — DRAPE U SPLIT SHEET 54X76IN

## (undated) DEVICE — DRAPE THREE-QTR REINF 53X77IN

## (undated) DEVICE — DRESSING XEROFORM FOIL PK 1X8

## (undated) DEVICE — CORD FOR BIPOLAR FORCEPS 12

## (undated) DEVICE — GOWN POLY REINF X-LONG XL

## (undated) DEVICE — BLADE 4.2MM PREBENT ULTRACUT

## (undated) DEVICE — TUBE SET INFLOW/OUTFLOW

## (undated) DEVICE — WRAP KNEE ACCU THERM GEL PACK

## (undated) DEVICE — PAD ELECTRODE STER 1.5X3

## (undated) DEVICE — DRESSING XEROFORM NONADH 1X8IN

## (undated) DEVICE — SYR 30CC LUER LOCK

## (undated) DEVICE — PAD CAST SPECIALIST STRL 4